# Patient Record
Sex: FEMALE | Race: BLACK OR AFRICAN AMERICAN | NOT HISPANIC OR LATINO | Employment: OTHER | URBAN - METROPOLITAN AREA
[De-identification: names, ages, dates, MRNs, and addresses within clinical notes are randomized per-mention and may not be internally consistent; named-entity substitution may affect disease eponyms.]

---

## 2017-01-06 ENCOUNTER — HOSPITAL ENCOUNTER (OUTPATIENT)
Dept: RADIOLOGY | Facility: CLINIC | Age: 77
Discharge: HOME | End: 2017-01-06
Attending: INTERNAL MEDICINE
Payer: COMMERCIAL

## 2017-01-06 ENCOUNTER — OFFICE VISIT (OUTPATIENT)
Dept: ENDOCRINOLOGY | Facility: CLINIC | Age: 77
End: 2017-01-06
Payer: COMMERCIAL

## 2017-01-06 VITALS
HEIGHT: 67 IN | SYSTOLIC BLOOD PRESSURE: 136 MMHG | HEART RATE: 95 BPM | WEIGHT: 171.31 LBS | DIASTOLIC BLOOD PRESSURE: 80 MMHG | BODY MASS INDEX: 26.89 KG/M2

## 2017-01-06 DIAGNOSIS — M85.80 OSTEOPENIA: Primary | ICD-10-CM

## 2017-01-06 DIAGNOSIS — I10 ESSENTIAL HYPERTENSION: ICD-10-CM

## 2017-01-06 DIAGNOSIS — C50.412 MALIGNANT NEOPLASM OF UPPER-OUTER QUADRANT OF LEFT FEMALE BREAST: ICD-10-CM

## 2017-01-06 DIAGNOSIS — M85.80 OSTEOPENIA: ICD-10-CM

## 2017-01-06 DIAGNOSIS — R73.09 ELEVATED GLUCOSE: ICD-10-CM

## 2017-01-06 DIAGNOSIS — G62.9 NEUROPATHY: ICD-10-CM

## 2017-01-06 PROCEDURE — 77080 DXA BONE DENSITY AXIAL: CPT | Mod: TC

## 2017-01-06 PROCEDURE — 3075F SYST BP GE 130 - 139MM HG: CPT | Mod: S$GLB,,, | Performed by: INTERNAL MEDICINE

## 2017-01-06 PROCEDURE — 1159F MED LIST DOCD IN RCRD: CPT | Mod: S$GLB,,, | Performed by: INTERNAL MEDICINE

## 2017-01-06 PROCEDURE — 99214 OFFICE O/P EST MOD 30 MIN: CPT | Mod: S$GLB,,, | Performed by: INTERNAL MEDICINE

## 2017-01-06 PROCEDURE — 1157F ADVNC CARE PLAN IN RCRD: CPT | Mod: S$GLB,,, | Performed by: INTERNAL MEDICINE

## 2017-01-06 PROCEDURE — 1126F AMNT PAIN NOTED NONE PRSNT: CPT | Mod: S$GLB,,, | Performed by: INTERNAL MEDICINE

## 2017-01-06 PROCEDURE — 1160F RVW MEDS BY RX/DR IN RCRD: CPT | Mod: S$GLB,,, | Performed by: INTERNAL MEDICINE

## 2017-01-06 PROCEDURE — 99999 PR PBB SHADOW E&M-EST. PATIENT-LVL III: CPT | Mod: PBBFAC,,, | Performed by: INTERNAL MEDICINE

## 2017-01-06 PROCEDURE — 77080 DXA BONE DENSITY AXIAL: CPT | Mod: 26,,, | Performed by: INTERNAL MEDICINE

## 2017-01-06 PROCEDURE — 3079F DIAST BP 80-89 MM HG: CPT | Mod: S$GLB,,, | Performed by: INTERNAL MEDICINE

## 2017-01-06 NOTE — PATIENT INSTRUCTIONS
Glucose intolerance and last glucose 220  To get A1C  Needs weight loss     Osteopenia to check BMD and doubt needs medication     Neuropathy feet question diabetes     Hx of breast cancer     HTN and HCTZ may be good for bone

## 2017-01-06 NOTE — MR AVS SNAPSHOT
[facility]  [address]  [facility] [address]  Phone: [phone]  Fax: [phone]                  Ida aSntana   2017 9:30 AM   Office Visit    Description:  Female : 1940   Provider:  Pravin Dumont MD   Department:  [facility]           Reason for Visit     Follow-up           Diagnoses this Visit        Comments    Osteopenia    -  Primary     Malignant neoplasm of upper-outer quadrant of left female breast         Essential hypertension         Elevated glucose         Neuropathy                To Do List           Future Appointments        Provider Department Dept Phone    2017 10:45 AM LAB, APPOINTMENT [place] [facility] [phone]    2017 11:00 AM [facility], DEXA1 [facility] [phone]    3/13/2017 8:00 AM CARTY, VISUAL [place] [facility] [phone]    3/13/2017 8:30 AM Lynda Crawford MD [facility] [phone]      Goals (5 Years of Data)     None      Follow-Up and Disposition     Follow-up and Disposition History      [facility]     [facility] -  Assistance  Registered nurses in the [facility] provide clinical advisement, health education, appointment booking, and other advisory services.  Call for this free service at [phone].             Medications           Message regarding Medications     Verify the changes and/or additions to your medication regime listed below are the same as discussed with your clinician today.  If any of these changes or additions are incorrect, please notify your healthcare provider.             Verify that the below list of medications is an accurate representation of the medications you are currently taking.  If none reported, the list may be blank. If incorrect, please contact your healthcare provider. Carry this list with you in case of emergency.           Current  "Medications     BLOOD SUGAR DIAGNOSTIC (ONE TOUCH II TEST MISC) * * * *.  CHECKS 1 TIME DAILY AT LEAST    CALCIUM CARBONATE/VITAMIN D3 (CALCIUM WITH VITAMIN D3 ORAL) Take by mouth.    flurandrenolide (CORDRAN) 0.05 % lotion 0.05 %.  Lotion Topical .  AAA scalp qd - bid prnDisp:    15ml         60ml     hyoscyamine (LEVSIN/SL) 0.125 mg Subl Place 1 tablet (0.125 mg total) under the tongue every 6 (six) hours as needed.    ketoconazole (NIZORAL) 2 % shampoo USE EVERY OTHER DAY LET SIT ON SCALP FOR FIVE MINUTES BEFORE RINSING    latanoprost 0.005 % ophthalmic solution Place 1 drop into both eyes once daily.    MULTIVITAMIN WITH MINERALS (ONE-A-DAY 50 PLUS) Tab Take by mouth. 1 Tablet Oral Every day    nystatin (MYCOSTATIN) powder Apply to affected area 3 times daily    om-3-epa-dha-fish oil-flax-E (THERA TEARS NUTRITION) [phone] qg-eu-so-unit Cap Take 3 capsules by mouth every morning. Over-the-counter supplement    ondansetron (ZOFRAN) 4 MG tablet Take 1 tablet (4 mg total) by mouth every 8 (eight) hours as needed.    ONE TOUCH ULTRA TEST Strp CHECK 1 TIME DAILY AT LEAST    potassium chloride SA (K-DUR,KLOR-CON) 20 MEQ tablet take 1 tablet by mouth twice a day    sodium,potassium,mag sulfates (SUPREP BOWEL PREP KIT) 17.5-3.13-1.6 gram SolR Take 1 kit by mouth as directed.    triamcinolone (KENALOG) 0.1 % cream 0.1 %.  Cream Topical Twice a day .  mix 30g of 0.1% TAC cream [id number] Loprox cream [id number] mild of magnesiause bid after cool blow dry    valsartan-hydrochlorothiazide (DIOVAN-HCT) 320-12.5 mg per tablet take 1 tablet by mouth once daily    verapamil (CALAN-SR) 240 MG CR tablet take 1 tablet by mouth twice a day           Clinical Reference Information           Vital Signs - Last Recorded  Most recent update: 1/6/2017  9:38 AM by Joann Moore MA    BP Pulse Ht Wt PF BMI    136/80 (BP Location: Right arm, Patient Position: Sitting) 95 5' 7" (1.702 m) 77.7 kg (171 lb 4.8 oz) 98 L/min 26.83 kg/m2    "   Blood Pressure          Most Recent Value    BP  136/80      Allergies as of 1/6/2017     Adhesive Tape-silicones    Atorvastatin    Pravachol  [Pravastatin]      Immunizations Administered on Date of Encounter - 1/6/2017     None      Orders Placed During Today's Visit     Future Labs/Procedures Expected by Expires    DXA Bone Density Spine And Hip_Axial Skeleton  1/6/2017 1/6/2018    Hemoglobin A1c  1/6/2017 1/6/2018      Instructions    Glucose intolerance and last glucose 220  To get A1C  Needs weight loss     Osteopenia to check BMD and doubt needs medication     Neuropathy feet question diabetes     Hx of breast cancer     HTN and HCTZ may be good for bone

## 2017-01-06 NOTE — PROGRESS NOTES
Subjective:      Patient ID: Ida Santana is a 76 y.o. female.    Chief Complaint:  Follow-up      History of Present Illness  Feeling well  Last seen 9/26/13  Recent URI  Osteopenia: No new fractures  Breast cancer in remission: Mother breast cancer. Niece breast cancer  Glucose intolerance and HLP  HTN    Mrs. Santana is a 76-year-old woman, who is well known to me. The patient   has the following problems,   1. Osteopenia.   2. Elevated cholesterol.   3. Hypertension.   4. DCIS treated with lumpectomy and radiation.   5. Status post glucose intolerance.       The patient is actually doing quite well. She has never had a fracture, she had been   on Fosamax for about 5 years. We discontinued that 9 years ago. Please see   my detailed note of May 16, 2007. I had initially seen the patient on   February 12, 2006.      The patient has hx of glucose intolerance and lost 13 pounds and with improvement of weather plans on starting exercise.  Nocturia 0  Not checking glucose.  One touch 2     Stopped statin     BP meds reviewed and changed from Benicar    Review of Systems   Constitutional: Negative for fatigue and unexpected weight change.   HENT: Negative for hearing loss.    Eyes: Negative for visual disturbance.   Respiratory: Negative for cough and shortness of breath.    Cardiovascular: Negative for chest pain and palpitations.   Musculoskeletal: Negative for arthralgias and back pain.   Neurological: Negative for headaches.       Objective:   Physical Exam   Constitutional: She is oriented to person, place, and time. She appears well-developed and well-nourished.   Apple shape   Cardiovascular: Normal rate, regular rhythm and normal heart sounds.    No murmur heard.  Pulmonary/Chest: Effort normal and breath sounds normal.   Musculoskeletal: Normal range of motion.   Neurological: She is alert and oriented to person, place, and time.   Comprehensive foot exam  Left and Right Feet  No ulcers  Normal  temperature  Vibration mild deficit  Pulses intact     Psychiatric: Her behavior is normal. Thought content normal.   Vitals reviewed.      Lab Review:   Results for orders placed or performed during the hospital encounter of 11/02/16   CBC auto differential   Result Value Ref Range    WBC 8.24 3.90 - 12.70 K/uL    RBC 4.59 4.00 - 5.40 M/uL    Hemoglobin 14.5 12.0 - 16.0 g/dL    Hematocrit 41.9 37.0 - 48.5 %    MCV 91 82 - 98 fL    MCH 31.6 (H) 27.0 - 31.0 pg    MCHC 34.6 32.0 - 36.0 %    RDW 12.8 11.5 - 14.5 %    Platelets 211 150 - 350 K/uL    MPV 10.8 9.2 - 12.9 fL    Gran # 7.6 1.8 - 7.7 K/uL    Lymph # 0.5 (L) 1.0 - 4.8 K/uL    Mono # 0.1 (L) 0.3 - 1.0 K/uL    Eos # 0.0 0.0 - 0.5 K/uL    Baso # 0.01 0.00 - 0.20 K/uL    Gran% 92.3 (H) 38.0 - 73.0 %    Lymph% 6.6 (L) 18.0 - 48.0 %    Mono% 0.7 (L) 4.0 - 15.0 %    Eosinophil% 0.1 0.0 - 8.0 %    Basophil% 0.1 0.0 - 1.9 %    Differential Method Automated    Basic metabolic panel   Result Value Ref Range    Sodium 141 136 - 145 mmol/L    Potassium 3.5 3.5 - 5.1 mmol/L    Chloride 105 95 - 110 mmol/L    CO2 24 23 - 29 mmol/L    Glucose 220 (H) 70 - 110 mg/dL    BUN, Bld 14 8 - 23 mg/dL    Creatinine 0.9 0.5 - 1.4 mg/dL    Calcium 9.6 8.7 - 10.5 mg/dL    Anion Gap 12 8 - 16 mmol/L    eGFR if African American >60.0 >60 mL/min/1.73 m^2    eGFR if non African American >60.0 >60 mL/min/1.73 m^2   Hepatic function panel   Result Value Ref Range    Total Protein 7.7 6.0 - 8.4 g/dL    Albumin 4.1 3.5 - 5.2 g/dL    Total Bilirubin 0.6 0.1 - 1.0 mg/dL    Bilirubin, Direct 0.2 0.1 - 0.3 mg/dL    AST 20 10 - 40 U/L    ALT 17 10 - 44 U/L    Alkaline Phosphatase 71 55 - 135 U/L     Lab Results   Component Value Date    CHOL 181 09/26/2016    CHOL 181 09/23/2015    CHOL 174 09/23/2014     Lab Results   Component Value Date    HDL 42 09/26/2016    HDL 45 09/23/2015    HDL 44 09/23/2014     Lab Results   Component Value Date    LDLCALC 118.8 09/26/2016    LDLCALC 109.0 09/23/2015     LDLCALC 97.2 09/23/2014     Lab Results   Component Value Date    TRIG 101 09/26/2016    TRIG 135 09/23/2015    TRIG 164 (H) 09/23/2014     Lab Results   Component Value Date    CHOLHDL 23.2 09/26/2016    CHOLHDL 24.9 09/23/2015    CHOLHDL 25.3 09/23/2014         Assessment:     Glucose intolerance and last glucose 220  To get A1C  Needs weight loss    Osteopenia to check BMD and doubt needs medication    Neuropathy feet question diabetes    Hx of breast cancer    HTN and HCTZ may be good for bone      Plan:     Orders Placed This Encounter   Procedures    DXA Bone Density Spine And Hip_Axial Skeleton     Standing Status:   Future     Standing Expiration Date:   1/6/2018    Hemoglobin A1c     Standing Status:   Future     Standing Expiration Date:   1/6/2018

## 2017-01-09 DIAGNOSIS — H40.1191 PRIMARY OPEN-ANGLE GLAUCOMA, MILD STAGE: ICD-10-CM

## 2017-01-09 RX ORDER — LATANOPROST 50 UG/ML
SOLUTION/ DROPS OPHTHALMIC
Qty: 2.5 ML | Refills: 12 | Status: ON HOLD | OUTPATIENT
Start: 2017-01-09 | End: 2017-01-13

## 2017-01-09 RX ORDER — POTASSIUM CHLORIDE 20 MEQ/1
TABLET, EXTENDED RELEASE ORAL
Qty: 60 TABLET | Refills: 12 | Status: ON HOLD | OUTPATIENT
Start: 2017-01-09 | End: 2017-02-08 | Stop reason: HOSPADM

## 2017-01-12 ENCOUNTER — HOSPITAL ENCOUNTER (INPATIENT)
Facility: HOSPITAL | Age: 77
LOS: 15 days | Discharge: NURSING, REHAB OR LONG-TERM CARE | DRG: 871 | End: 2017-01-27
Attending: EMERGENCY MEDICINE | Admitting: HOSPITALIST
Payer: COMMERCIAL

## 2017-01-12 DIAGNOSIS — N39.0 URINARY TRACT INFECTION WITHOUT HEMATURIA, SITE UNSPECIFIED: Primary | ICD-10-CM

## 2017-01-12 DIAGNOSIS — A41.9 SEPSIS: ICD-10-CM

## 2017-01-12 DIAGNOSIS — R41.82 ALTERED MENTAL STATUS, UNSPECIFIED: ICD-10-CM

## 2017-01-12 DIAGNOSIS — R78.81 MRSA BACTEREMIA: ICD-10-CM

## 2017-01-12 DIAGNOSIS — J15.212 PNEUMONIA OF BOTH LUNGS DUE TO METHICILLIN RESISTANT STAPHYLOCOCCUS AUREUS (MRSA), UNSPECIFIED PART OF LUNG: ICD-10-CM

## 2017-01-12 DIAGNOSIS — B95.62 MRSA BACTEREMIA: ICD-10-CM

## 2017-01-12 DIAGNOSIS — R65.20 SEVERE SEPSIS: ICD-10-CM

## 2017-01-12 DIAGNOSIS — A41.9 SEVERE SEPSIS: ICD-10-CM

## 2017-01-12 LAB
ALBUMIN SERPL BCP-MCNC: 3 G/DL
ALP SERPL-CCNC: 69 U/L
ALT SERPL W/O P-5'-P-CCNC: 17 U/L
AMPHET+METHAMPHET UR QL: NEGATIVE
ANION GAP SERPL CALC-SCNC: 11 MMOL/L
APAP SERPL-MCNC: <3 UG/ML
AST SERPL-CCNC: 22 U/L
BACTERIA #/AREA URNS AUTO: ABNORMAL /HPF
BARBITURATES UR QL SCN>200 NG/ML: NEGATIVE
BASOPHILS # BLD AUTO: 0 K/UL
BASOPHILS NFR BLD: 0 %
BENZODIAZ UR QL SCN>200 NG/ML: NEGATIVE
BILIRUB SERPL-MCNC: 1.2 MG/DL
BILIRUB UR QL STRIP: NEGATIVE
BUN SERPL-MCNC: 18 MG/DL
BZE UR QL SCN: NEGATIVE
CALCIUM SERPL-MCNC: 9.4 MG/DL
CANNABINOIDS UR QL SCN: NEGATIVE
CHLORIDE SERPL-SCNC: 101 MMOL/L
CLARITY UR REFRACT.AUTO: ABNORMAL
CO2 SERPL-SCNC: 21 MMOL/L
COLOR UR AUTO: YELLOW
CREAT SERPL-MCNC: 1.2 MG/DL
CREAT UR-MCNC: 263 MG/DL
DIFFERENTIAL METHOD: ABNORMAL
EOSINOPHIL # BLD AUTO: 0 K/UL
EOSINOPHIL NFR BLD: 0 %
ERYTHROCYTE [DISTWIDTH] IN BLOOD BY AUTOMATED COUNT: 12.9 %
EST. GFR  (AFRICAN AMERICAN): 51.4 ML/MIN/1.73 M^2
EST. GFR  (NON AFRICAN AMERICAN): 44.6 ML/MIN/1.73 M^2
ETHANOL SERPL-MCNC: <10 MG/DL
GLUCOSE SERPL-MCNC: 244 MG/DL
GLUCOSE UR QL STRIP: ABNORMAL
HCT VFR BLD AUTO: 35.8 %
HGB BLD-MCNC: 12.8 G/DL
HGB UR QL STRIP: ABNORMAL
HYALINE CASTS UR QL AUTO: 0 /LPF
KETONES UR QL STRIP: ABNORMAL
LEUKOCYTE ESTERASE UR QL STRIP: ABNORMAL
LYMPHOCYTES # BLD AUTO: 0.8 K/UL
LYMPHOCYTES NFR BLD: 4.9 %
MCH RBC QN AUTO: 32.2 PG
MCHC RBC AUTO-ENTMCNC: 35.8 %
MCV RBC AUTO: 90 FL
METHADONE UR QL SCN>300 NG/ML: NEGATIVE
MICROSCOPIC COMMENT: ABNORMAL
MONOCYTES # BLD AUTO: 1 K/UL
MONOCYTES NFR BLD: 6.7 %
NEUTROPHILS # BLD AUTO: 13.4 K/UL
NEUTROPHILS NFR BLD: 87 %
NITRITE UR QL STRIP: NEGATIVE
OPIATES UR QL SCN: NEGATIVE
PCP UR QL SCN>25 NG/ML: NEGATIVE
PH UR STRIP: 6 [PH] (ref 5–8)
PLATELET # BLD AUTO: 167 K/UL
PMV BLD AUTO: 10.3 FL
POCT GLUCOSE: 231 MG/DL (ref 70–110)
POTASSIUM SERPL-SCNC: 4 MMOL/L
PROT SERPL-MCNC: 7.6 G/DL
PROT UR QL STRIP: ABNORMAL
RBC # BLD AUTO: 3.98 M/UL
RBC #/AREA URNS AUTO: 7 /HPF (ref 0–4)
SODIUM SERPL-SCNC: 133 MMOL/L
SP GR UR STRIP: 1.02 (ref 1–1.03)
SQUAMOUS #/AREA URNS AUTO: 2 /HPF
TOXICOLOGY INFORMATION: NORMAL
TSH SERPL DL<=0.005 MIU/L-ACNC: 0.81 UIU/ML
URN SPEC COLLECT METH UR: ABNORMAL
UROBILINOGEN UR STRIP-ACNC: 1 EU/DL
WBC # BLD AUTO: 15.43 K/UL
WBC #/AREA URNS AUTO: 35 /HPF (ref 0–5)

## 2017-01-12 PROCEDURE — 80329 ANALGESICS NON-OPIOID 1 OR 2: CPT

## 2017-01-12 PROCEDURE — 82962 GLUCOSE BLOOD TEST: CPT

## 2017-01-12 PROCEDURE — 87205 SMEAR GRAM STAIN: CPT

## 2017-01-12 PROCEDURE — 63600175 PHARM REV CODE 636 W HCPCS: Performed by: EMERGENCY MEDICINE

## 2017-01-12 PROCEDURE — 84443 ASSAY THYROID STIM HORMONE: CPT

## 2017-01-12 PROCEDURE — 82570 ASSAY OF URINE CREATININE: CPT

## 2017-01-12 PROCEDURE — 93010 ELECTROCARDIOGRAM REPORT: CPT | Mod: ,,, | Performed by: INTERNAL MEDICINE

## 2017-01-12 PROCEDURE — 80053 COMPREHEN METABOLIC PANEL: CPT

## 2017-01-12 PROCEDURE — 81001 URINALYSIS AUTO W/SCOPE: CPT

## 2017-01-12 PROCEDURE — 12000002 HC ACUTE/MED SURGE SEMI-PRIVATE ROOM

## 2017-01-12 PROCEDURE — 93005 ELECTROCARDIOGRAM TRACING: CPT

## 2017-01-12 PROCEDURE — 99285 EMERGENCY DEPT VISIT HI MDM: CPT | Mod: ,,, | Performed by: EMERGENCY MEDICINE

## 2017-01-12 PROCEDURE — 87086 URINE CULTURE/COLONY COUNT: CPT

## 2017-01-12 PROCEDURE — 96365 THER/PROPH/DIAG IV INF INIT: CPT

## 2017-01-12 PROCEDURE — 85025 COMPLETE CBC W/AUTO DIFF WBC: CPT

## 2017-01-12 PROCEDURE — 83690 ASSAY OF LIPASE: CPT

## 2017-01-12 PROCEDURE — 80320 DRUG SCREEN QUANTALCOHOLS: CPT

## 2017-01-12 PROCEDURE — 25000003 PHARM REV CODE 250: Performed by: EMERGENCY MEDICINE

## 2017-01-12 PROCEDURE — 99285 EMERGENCY DEPT VISIT HI MDM: CPT | Mod: 25

## 2017-01-12 RX ORDER — IBUPROFEN 200 MG
16 TABLET ORAL
Status: DISCONTINUED | OUTPATIENT
Start: 2017-01-13 | End: 2017-01-13

## 2017-01-12 RX ORDER — IBUPROFEN 200 MG
24 TABLET ORAL
Status: DISCONTINUED | OUTPATIENT
Start: 2017-01-13 | End: 2017-01-23

## 2017-01-12 RX ORDER — GLUCAGON 1 MG
1 KIT INJECTION
Status: DISCONTINUED | OUTPATIENT
Start: 2017-01-13 | End: 2017-01-27 | Stop reason: HOSPADM

## 2017-01-12 RX ORDER — INSULIN ASPART 100 [IU]/ML
0-5 INJECTION, SOLUTION INTRAVENOUS; SUBCUTANEOUS
Status: DISCONTINUED | OUTPATIENT
Start: 2017-01-13 | End: 2017-01-27 | Stop reason: HOSPADM

## 2017-01-12 RX ADMIN — CEFTRIAXONE 1 G: 1 INJECTION, SOLUTION INTRAVENOUS at 11:01

## 2017-01-12 RX ADMIN — SODIUM CHLORIDE 500 ML: 0.9 INJECTION, SOLUTION INTRAVENOUS at 11:01

## 2017-01-13 PROBLEM — R65.20 SEVERE SEPSIS: Status: ACTIVE | Noted: 2017-01-13

## 2017-01-13 PROBLEM — G93.41 SEPTIC ENCEPHALOPATHY: Status: ACTIVE | Noted: 2017-01-13

## 2017-01-13 PROBLEM — A41.9 SEPSIS: Status: ACTIVE | Noted: 2017-01-13

## 2017-01-13 PROBLEM — N17.9 AKI (ACUTE KIDNEY INJURY): Status: ACTIVE | Noted: 2017-01-13

## 2017-01-13 PROBLEM — R41.82 ALTERED MENTAL STATUS: Status: ACTIVE | Noted: 2017-01-13

## 2017-01-13 PROBLEM — A41.9 SEVERE SEPSIS: Status: ACTIVE | Noted: 2017-01-13

## 2017-01-13 PROBLEM — E86.9 VOLUME DEPLETION: Status: ACTIVE | Noted: 2017-01-13

## 2017-01-13 PROBLEM — K52.9 GASTROENTERITIS: Status: ACTIVE | Noted: 2017-01-13

## 2017-01-13 LAB
ALBUMIN SERPL BCP-MCNC: 2.5 G/DL
ALP SERPL-CCNC: 60 U/L
ALT SERPL W/O P-5'-P-CCNC: 19 U/L
ANION GAP SERPL CALC-SCNC: 9 MMOL/L
ANISOCYTOSIS BLD QL SMEAR: SLIGHT
AST SERPL-CCNC: 33 U/L
BASOPHILS # BLD AUTO: ABNORMAL K/UL
BASOPHILS NFR BLD: 0 %
BILIRUB SERPL-MCNC: 0.8 MG/DL
BUN SERPL-MCNC: 16 MG/DL
CALCIUM SERPL-MCNC: 9 MG/DL
CHLORIDE SERPL-SCNC: 108 MMOL/L
CO2 SERPL-SCNC: 19 MMOL/L
CREAT SERPL-MCNC: 0.9 MG/DL
DACRYOCYTES BLD QL SMEAR: ABNORMAL
DIFFERENTIAL METHOD: ABNORMAL
EOSINOPHIL # BLD AUTO: ABNORMAL K/UL
EOSINOPHIL NFR BLD: 0 %
ERYTHROCYTE [DISTWIDTH] IN BLOOD BY AUTOMATED COUNT: 13.1 %
EST. GFR  (AFRICAN AMERICAN): >60 ML/MIN/1.73 M^2
EST. GFR  (NON AFRICAN AMERICAN): >60 ML/MIN/1.73 M^2
GLUCOSE SERPL-MCNC: 143 MG/DL
GRAM STN SPEC: NORMAL
HCT VFR BLD AUTO: 33.6 %
HGB BLD-MCNC: 12.1 G/DL
LACTATE SERPL-SCNC: 1.5 MMOL/L
LACTATE SERPL-SCNC: 2.7 MMOL/L
LIPASE SERPL-CCNC: 7 U/L
LYMPHOCYTES # BLD AUTO: ABNORMAL K/UL
LYMPHOCYTES NFR BLD: 2 %
MAGNESIUM SERPL-MCNC: 1.8 MG/DL
MCH RBC QN AUTO: 32.3 PG
MCHC RBC AUTO-ENTMCNC: 36 %
MCV RBC AUTO: 90 FL
MONOCYTES # BLD AUTO: ABNORMAL K/UL
MONOCYTES NFR BLD: 6 %
NEUTROPHILS NFR BLD: 86 %
NEUTS BAND NFR BLD MANUAL: 6 %
OVALOCYTES BLD QL SMEAR: ABNORMAL
PHOSPHATE SERPL-MCNC: 1.4 MG/DL
PLATELET # BLD AUTO: 161 K/UL
PLATELET BLD QL SMEAR: ABNORMAL
PMV BLD AUTO: 11.2 FL
POCT GLUCOSE: 124 MG/DL (ref 70–110)
POCT GLUCOSE: 167 MG/DL (ref 70–110)
POCT GLUCOSE: 192 MG/DL (ref 70–110)
POCT GLUCOSE: 195 MG/DL (ref 70–110)
POIKILOCYTOSIS BLD QL SMEAR: SLIGHT
POTASSIUM SERPL-SCNC: 3.9 MMOL/L
PROCALCITONIN SERPL IA-MCNC: 26.8 NG/ML
PROT SERPL-MCNC: 6.7 G/DL
RBC # BLD AUTO: 3.75 M/UL
SODIUM SERPL-SCNC: 136 MMOL/L
WBC # BLD AUTO: 14.05 K/UL

## 2017-01-13 PROCEDURE — 25000003 PHARM REV CODE 250: Performed by: STUDENT IN AN ORGANIZED HEALTH CARE EDUCATION/TRAINING PROGRAM

## 2017-01-13 PROCEDURE — 63600175 PHARM REV CODE 636 W HCPCS: Performed by: STUDENT IN AN ORGANIZED HEALTH CARE EDUCATION/TRAINING PROGRAM

## 2017-01-13 PROCEDURE — 83605 ASSAY OF LACTIC ACID: CPT | Mod: 91

## 2017-01-13 PROCEDURE — 25000003 PHARM REV CODE 250: Performed by: EMERGENCY MEDICINE

## 2017-01-13 PROCEDURE — 83735 ASSAY OF MAGNESIUM: CPT

## 2017-01-13 PROCEDURE — 87077 CULTURE AEROBIC IDENTIFY: CPT

## 2017-01-13 PROCEDURE — 80053 COMPREHEN METABOLIC PANEL: CPT

## 2017-01-13 PROCEDURE — 87040 BLOOD CULTURE FOR BACTERIA: CPT | Mod: 59

## 2017-01-13 PROCEDURE — 94761 N-INVAS EAR/PLS OXIMETRY MLT: CPT

## 2017-01-13 PROCEDURE — 36415 COLL VENOUS BLD VENIPUNCTURE: CPT

## 2017-01-13 PROCEDURE — 20600001 HC STEP DOWN PRIVATE ROOM

## 2017-01-13 PROCEDURE — 85027 COMPLETE CBC AUTOMATED: CPT

## 2017-01-13 PROCEDURE — 99223 1ST HOSP IP/OBS HIGH 75: CPT | Mod: ,,, | Performed by: HOSPITALIST

## 2017-01-13 PROCEDURE — 25000003 PHARM REV CODE 250: Performed by: INTERNAL MEDICINE

## 2017-01-13 PROCEDURE — 63600175 PHARM REV CODE 636 W HCPCS: Performed by: INTERNAL MEDICINE

## 2017-01-13 PROCEDURE — 85007 BL SMEAR W/DIFF WBC COUNT: CPT

## 2017-01-13 PROCEDURE — 99233 SBSQ HOSP IP/OBS HIGH 50: CPT | Mod: ,,, | Performed by: HOSPITALIST

## 2017-01-13 PROCEDURE — 84100 ASSAY OF PHOSPHORUS: CPT

## 2017-01-13 PROCEDURE — 87186 SC STD MICRODIL/AGAR DIL: CPT

## 2017-01-13 PROCEDURE — 84145 PROCALCITONIN (PCT): CPT

## 2017-01-13 RX ORDER — HEPARIN SODIUM 5000 [USP'U]/ML
5000 INJECTION, SOLUTION INTRAVENOUS; SUBCUTANEOUS EVERY 8 HOURS
Status: DISCONTINUED | OUTPATIENT
Start: 2017-01-13 | End: 2017-01-19

## 2017-01-13 RX ORDER — VERAPAMIL HYDROCHLORIDE 120 MG/1
240 TABLET, FILM COATED, EXTENDED RELEASE ORAL 2 TIMES DAILY
Status: DISCONTINUED | OUTPATIENT
Start: 2017-01-13 | End: 2017-01-27 | Stop reason: HOSPADM

## 2017-01-13 RX ORDER — IBUPROFEN 200 MG
24 TABLET ORAL
Status: DISCONTINUED | OUTPATIENT
Start: 2017-01-13 | End: 2017-01-27 | Stop reason: HOSPADM

## 2017-01-13 RX ORDER — IBUPROFEN 200 MG
16 TABLET ORAL
Status: DISCONTINUED | OUTPATIENT
Start: 2017-01-13 | End: 2017-01-27 | Stop reason: HOSPADM

## 2017-01-13 RX ORDER — ACETAMINOPHEN 325 MG/1
650 TABLET ORAL EVERY 8 HOURS PRN
Status: DISCONTINUED | OUTPATIENT
Start: 2017-01-13 | End: 2017-01-13

## 2017-01-13 RX ORDER — SODIUM CHLORIDE 9 MG/ML
INJECTION, SOLUTION INTRAVENOUS CONTINUOUS
Status: DISCONTINUED | OUTPATIENT
Start: 2017-01-13 | End: 2017-01-13

## 2017-01-13 RX ORDER — ONDANSETRON 2 MG/ML
4 INJECTION INTRAMUSCULAR; INTRAVENOUS EVERY 6 HOURS PRN
Status: DISCONTINUED | OUTPATIENT
Start: 2017-01-13 | End: 2017-01-27 | Stop reason: HOSPADM

## 2017-01-13 RX ORDER — CIPROFLOXACIN 2 MG/ML
400 INJECTION, SOLUTION INTRAVENOUS
Status: DISCONTINUED | OUTPATIENT
Start: 2017-01-13 | End: 2017-01-13

## 2017-01-13 RX ORDER — ACETAMINOPHEN 325 MG/1
650 TABLET ORAL EVERY 8 HOURS PRN
Status: DISCONTINUED | OUTPATIENT
Start: 2017-01-13 | End: 2017-01-27 | Stop reason: HOSPADM

## 2017-01-13 RX ORDER — POLYETHYLENE GLYCOL 3350 17 G/17G
17 POWDER, FOR SOLUTION ORAL DAILY PRN
Status: DISCONTINUED | OUTPATIENT
Start: 2017-01-13 | End: 2017-01-27 | Stop reason: HOSPADM

## 2017-01-13 RX ORDER — TRAMADOL HYDROCHLORIDE 50 MG/1
50 TABLET ORAL EVERY 6 HOURS PRN
Status: DISCONTINUED | OUTPATIENT
Start: 2017-01-13 | End: 2017-01-27 | Stop reason: HOSPADM

## 2017-01-13 RX ORDER — LOSARTAN POTASSIUM AND HYDROCHLOROTHIAZIDE 12.5; 1 MG/1; MG/1
1 TABLET ORAL DAILY
Status: DISCONTINUED | OUTPATIENT
Start: 2017-01-13 | End: 2017-01-13

## 2017-01-13 RX ORDER — PANTOPRAZOLE SODIUM 40 MG/1
40 TABLET, DELAYED RELEASE ORAL DAILY
Status: DISCONTINUED | OUTPATIENT
Start: 2017-01-13 | End: 2017-01-19

## 2017-01-13 RX ORDER — POTASSIUM CHLORIDE 20 MEQ/1
20 TABLET, EXTENDED RELEASE ORAL 2 TIMES DAILY
Status: DISCONTINUED | OUTPATIENT
Start: 2017-01-13 | End: 2017-01-13

## 2017-01-13 RX ORDER — SODIUM CHLORIDE 0.9 % (FLUSH) 0.9 %
3 SYRINGE (ML) INJECTION EVERY 8 HOURS
Status: DISCONTINUED | OUTPATIENT
Start: 2017-01-13 | End: 2017-01-27 | Stop reason: HOSPADM

## 2017-01-13 RX ORDER — SODIUM CHLORIDE 9 MG/ML
INJECTION, SOLUTION INTRAVENOUS CONTINUOUS
Status: ACTIVE | OUTPATIENT
Start: 2017-01-13 | End: 2017-01-13

## 2017-01-13 RX ADMIN — Medication 3 ML: at 02:01

## 2017-01-13 RX ADMIN — SODIUM CHLORIDE: 0.9 INJECTION, SOLUTION INTRAVENOUS at 04:01

## 2017-01-13 RX ADMIN — ACETAMINOPHEN 650 MG: 325 TABLET ORAL at 02:01

## 2017-01-13 RX ADMIN — VERAPAMIL HYDROCHLORIDE 240 MG: 120 TABLET, FILM COATED, EXTENDED RELEASE ORAL at 09:01

## 2017-01-13 RX ADMIN — HEPARIN SODIUM 5000 UNITS: 5000 INJECTION, SOLUTION INTRAVENOUS; SUBCUTANEOUS at 09:01

## 2017-01-13 RX ADMIN — VANCOMYCIN HYDROCHLORIDE 1250 MG: 1 INJECTION, POWDER, LYOPHILIZED, FOR SOLUTION INTRAVENOUS at 05:01

## 2017-01-13 RX ADMIN — SODIUM CHLORIDE 500 ML: 0.9 INJECTION, SOLUTION INTRAVENOUS at 02:01

## 2017-01-13 RX ADMIN — Medication 3 ML: at 09:01

## 2017-01-13 RX ADMIN — HEPARIN SODIUM 5000 UNITS: 5000 INJECTION, SOLUTION INTRAVENOUS; SUBCUTANEOUS at 05:01

## 2017-01-13 RX ADMIN — PANTOPRAZOLE SODIUM 40 MG: 40 TABLET, DELAYED RELEASE ORAL at 09:01

## 2017-01-13 RX ADMIN — SODIUM CHLORIDE 1000 ML: 0.9 INJECTION, SOLUTION INTRAVENOUS at 04:01

## 2017-01-13 RX ADMIN — HEPARIN SODIUM 5000 UNITS: 5000 INJECTION, SOLUTION INTRAVENOUS; SUBCUTANEOUS at 02:01

## 2017-01-13 RX ADMIN — ACETAMINOPHEN 650 MG: 325 TABLET ORAL at 03:01

## 2017-01-13 RX ADMIN — ACETAMINOPHEN 650 MG: 325 TABLET ORAL at 09:01

## 2017-01-13 RX ADMIN — Medication 3 ML: at 05:01

## 2017-01-13 RX ADMIN — CEFTRIAXONE 1 G: 1 INJECTION, SOLUTION INTRAVENOUS at 06:01

## 2017-01-13 RX ADMIN — SODIUM CHLORIDE 1000 ML: 0.9 INJECTION, SOLUTION INTRAVENOUS at 09:01

## 2017-01-13 RX ADMIN — SODIUM CHLORIDE 1000 ML: 0.9 INJECTION, SOLUTION INTRAVENOUS at 11:01

## 2017-01-13 NOTE — PROGRESS NOTES
Called lab to confirm AM labs are pending as a lab draw. Anise in the lab stated she will call back with confirmation.

## 2017-01-13 NOTE — ASSESSMENT & PLAN NOTE
-likely viral in etiology  -pt does not recall any bad/new food intake or sick person exposure  - has had  PO inatake  -treat symptomatically with antiemetics and IVF

## 2017-01-13 NOTE — PROGRESS NOTES
[facility] Hospital Medicine  Progress Note    Patient Name: Ida Santana  MRN: [id number]  Patient Class: IP- Inpatient   Admission Date: 1/12/2017  Length of Stay: 1 days  Attending Physician: Loyda Lui MD  Primary Care Provider: Giovanna Murray MD    [facility] Medicine Team: [facility] HOSP MED 1 Porfirio Posey MD    Subjective:     Principal Problem:Severe sepsis      Hospital Course:  1/13/17: Patient was admitted overnight for confusion, fever, and confusion. Was found to have UTI and was 2/4 SIRS. Began treatment with Rocephin 1g qD. Patient this AM had less confusion. Procalcitonin was elevated to 26.8. Patient feeling better but had continued fevers this today. Giving 2L NS today.       Interval History: Pt admitted overnight but doing better today. Still continuing to have fevers.     Review of Systems   Constitutional: Positive for fever. Negative for chills.   HENT: Negative for congestion and sore throat.    Eyes: Negative for photophobia.   Respiratory: Negative for apnea, cough, chest tightness, shortness of breath and wheezing.    Cardiovascular: Negative for chest pain.   Gastrointestinal: Positive for diarrhea. Negative for abdominal pain, anal bleeding, constipation and nausea.   Genitourinary: Positive for dysuria. Negative for flank pain.   Musculoskeletal: Negative for arthralgias and back pain.   Skin: Negative for pallor.   Neurological: Negative for dizziness and headaches.   Psychiatric/Behavioral: Positive for confusion.     Objective:     Vital Signs (Most Recent):  Temp: (!) 102.5 °F (39.2 °C) (01/13/17 1558)  Pulse: 108 (01/13/17 1200)  Resp: 18 (01/13/17 1200)  BP: 113/67 (01/13/17 1200)  SpO2: (!) 93 % (01/13/17 1200) Vital Signs (24h Range):  Temp:  [97.4 °F (36.3 °C)-103 °F (39.4 °C)] 102.5 °F (39.2 °C)  Pulse:  [106-117] 108  Resp:  [18] 18  SpO2:  [93 %-98 %] 93 %  BP: (113-159)/(58-89) 113/67     Weight: 77.6 kg (171 lb)  Body mass index is 26.78  kg/(m^2).    Intake/Output Summary (Last 24 hours) at 01/13/17 1604  Last data filed at 01/13/17 0700   Gross per 24 hour   Intake               50 ml   Output                0 ml   Net               50 ml      Physical Exam   Constitutional: She is oriented to person, place, and time. She appears well-developed and well-nourished. No distress.   HENT:   Head: Normocephalic and atraumatic.   Eyes: Pupils are equal, round, and reactive to light.   Neck: Normal range of motion. Neck supple.   Cardiovascular: Regular rhythm and normal heart sounds.    No murmur heard.  Tachycardic.    Pulmonary/Chest: Effort normal and breath sounds normal. No respiratory distress.   Abdominal: Soft. Bowel sounds are normal. She exhibits no distension.   Musculoskeletal: Normal range of motion.   Neurological: She is alert and oriented to person, place, and time.   Skin: She is not diaphoretic.       Significant Labs:   CBC:   Recent Labs  Lab 01/12/17  2131 01/13/17  1027   WBC 15.43* 14.05*   HGB 12.8 12.1   HCT 35.8* 33.6*    161     CMP:   Recent Labs  Lab 01/12/17  2131 01/13/17  1027   * 136   K 4.0 3.9    108   CO2 21* 19*   * 143*   BUN 18 16   CREATININE 1.2 0.9   CALCIUM 9.4 9.0   PROT 7.6 6.7   ALBUMIN 3.0* 2.5*   BILITOT 1.2* 0.8   ALKPHOS 69 60   AST 22 33   ALT 17 19   ANIONGAP 11 9   EGFRNONAA 44.6* >60.0       Significant Imaging: I have reviewed all pertinent imaging results/findings within the past 24 hours.    Assessment/Plan:                  * Severe sepsis  Urinary tract infection without hematuria    -pt 2/4  SIRS criteria  - continues to have fevers and tachycardia. Gave 2L NS today. Pt does not appear toxic and is much improved since yesterday  - continue Rocephin, adding Vanc 1/13/17    Hypertension  - continue verapamil and hold off on Diovan in setting of RAHEEL    Altered mental status  Septic encephalopathy  2/2 UTI vs hyponatremia vs intercranial event - status much improved  today  - continue to monitor  - CT and MRI head negative  -avoid benzo, narcotics if possible    Gastroenteritis  -likely viral in etiology  -pt does not recall any bad/new food intake or sick person exposure  - has had decreased PO inatake  -treat symptomatically with antiemetics and IVF      RAHEEL (acute kidney injury)  -likely 2/2 hypovolemia in setting of vomiting and diarrhea and decreased PO intake  -baseline 0.9, on admission 1.2 ->1 today   - will hold of on ace/arb med for now    DMII  - pt does not seem to be taking any home diabetic meds  - low dose SSI    Ppx: [id number]  Diet: Diabetic      Porfirio Posey MD  Department of Hospital Medicine   [facility]                    01/14/2017                             STAFF PHYSICIAN NOTE                                   Attending Attestation for Rounds with Resident  I have reviewed and concur with the resident's history, physical, assessment, and plan.  I have personally interviewed and examined the patient at bedside and agree with the resident's findings.                                  ________________________________________                                     REASON FOR ADMISSION:     Patient is 76 y.o.female    Body mass index is 26.78 kg/(m^2).,  Severe sepsis

## 2017-01-13 NOTE — ASSESSMENT & PLAN NOTE
-pt 2/4  SIRS criteria  - recived 500 cc fluid in ED with ceftriaaxone  - administer another 500 and continue ceftriaxone, adjust based on culture results

## 2017-01-13 NOTE — PLAN OF CARE
Extended Emergency Contact Information  Primary Emergency Contact: Bhavna Santana  Address: [address] [place] [address] [facility]  Home Phone: [phone]  Mobile Phone: [phone]  Relation: Sister    Giovanna Murray MD  [address] / [place] [address]    Future Appointments  Date Time Provider Department Center   3/13/2017 8:00 AM MACHELLE, VISUAL [place] [facility]   3/13/2017 8:30 AM Lynda Crawford MD [facility]     Payor: HUMANA / Plan: HUMANA POS / Product Type: PPO /       [id number] [facility] [place] [facility] [address]  [address] [facility] [address]  Phone: [phone] Fax: [phone]       01/13/17 2031   Discharge Assessment   Assessment Type Discharge Planning Assessment   Confirmed/corrected address and phone number on facesheet? Yes   Assessment information obtained from? Patient;Medical Record   Expected Length of Stay (days) 3   Communicated expected length of stay with patient/caregiver yes   Prior to hospitilization cognitive status: Alert/Oriented   Prior to hospitalization functional status: Independent   Current cognitive status: Alert/Oriented   Current Functional Status: Independent   Arrived From home or self-care   Lives With alone   Able to Return to Prior Arrangements yes   Is patient able to care for self after discharge? Unable to determine at this time (comments)   How many people do you have in your home that can help with your care after discharge? 0   Patient's perception of discharge disposition home or selfcare   Readmission Within The Last 30 Days no previous admission in last 30 days   Patient currently being followed by outpatient case management? No   Patient currently receives home health services? No   Does the patient currently use HME? No   Patient currently receives private duty nursing? N/A   Patient currently receives any other outside agency services? No   Equipment  Currently Used at Home none   Do you have any problems affording any of your prescribed medications? No   Is the patient taking medications as prescribed? yes   Do you have any financial concerns preventing you from receiving the healthcare you need? No   Does the patient have transportation to healthcare appointments? Yes   Transportation Available car   On Dialysis? No   Does the patient receive services at the Coumadin Clinic? No   Are there any open cases? No   Discharge Plan A Home   Discharge Plan B Home;Home Health   Patient/Family In Agreement With Plan yes

## 2017-01-13 NOTE — ASSESSMENT & PLAN NOTE
-likely 2/2 hypovolemia in setting of vomiting and diarrhea and decreased PO intake  -baseline 0.9, on admission 1.2  - adm additional 500 cc bolus and continue IVF  - will hold of on ace/arb med for now

## 2017-01-13 NOTE — PLAN OF CARE
Problem: Patient Care Overview  Goal: Individualization & Mutuality  Outcome: Ongoing (interventions implemented as appropriate)  POC reviewed with patient, understanding verbalized. Patient AAOx4,no signs of confusion, responds appropriately. Able to move all extremities, generalized weakness noted. Patient is able to ambulate with assistance. Patient admitted from the ED in febrile state, oral temperatures trending down. Last temp 99.4. On IV antibiotics, cultures pending. Family at bedside. Will continue to monitor.

## 2017-01-13 NOTE — H&P
[facility] Hospital Medicine  History & Physical    Patient Name: Ida Santana  MRN: [id number]  Admission Date: 1/12/2017  Attending Physician: Yomi Jeong MD   Primary Care Provider: Giovanna Murray MD    [facility] Medicine Team: [facility] HOSP MED 1 Kelli Alan MD     Patient information was obtained from patient, relative(s) and ER records.     Subjective:     Principal Problem:Altered mental status    Chief Complaint:  ams     HPI: pt is a 76 y.o.lady with history of HTN, HLD,  osteopenia  And breast cancer who is brought in by family for  acute onset of confusion this evening.  Family reports that today in the afternoon after visiting pt they noted that pt has let the bathroom sink overflow and left a pot burning on the stove this evening. She did not suffer any burns and she does not recollect the incidents very clearly. The patient reports 3 episodes of NBNB emesis with 3 episodes of diarrhea today,otherwise she denies any fever chills, abdominal pain, weakness, paraesthesia, dysuria, frequency or malodorous urin , family and opt denies any recent changes to her medication. She has never had a similar episode in the past. The patient lives alone.   In ED she was noted to be tachycardic and hypertensive, AAO X3 but inattentive and unable to focus on task. 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    Osteopenia     Potassium depletion 1998       Past Surgical History   Procedure Laterality Date    Breast biopsy  2001     left breast DCIS    Breast surgery  9/2001     left lumpectomy with SLN bx    Colonoscopy       Colonoscopy N/A 10/31/2016     Procedure: COLONOSCOPY;  Surgeon: YAMILETH Richards MD;  Location: [facility] ([address] [place] [address]);  Service: Endoscopy;  Laterality: N/A;       Review of patient's allergies indicates:   Allergen Reactions    Adhesive tape-silicones      Other reaction(s): pulling skin off    Atorvastatin      Other reaction(s): Muscle pain    Pravachol  [pravastatin] Rash       No current facility-administered medications on file prior to encounter.      Current Outpatient Prescriptions on File Prior to Encounter   Medication Sig    BLOOD SUGAR DIAGNOSTIC (ONE TOUCH II TEST MISC) * * * *.  CHECKS 1 TIME DAILY AT LEAST    CALCIUM CARBONATE/VITAMIN D3 (CALCIUM WITH VITAMIN D3 ORAL) Take by mouth.    flurandrenolide (CORDRAN) 0.05 % lotion 0.05 %.  Lotion Topical .  AAA scalp qd - bid prnDisp:    15ml         60ml     hyoscyamine (LEVSIN/SL) 0.125 mg Subl Place 1 tablet (0.125 mg total) under the tongue every 6 (six) hours as needed.    ketoconazole (NIZORAL) 2 % shampoo USE EVERY OTHER DAY LET SIT ON SCALP FOR FIVE MINUTES BEFORE RINSING    latanoprost 0.005 % ophthalmic solution place 1 drop into both eyes once daily    MULTIVITAMIN WITH MINERALS (ONE-A-DAY 50 PLUS) Tab Take by mouth. 1 Tablet Oral Every day    nystatin (MYCOSTATIN) powder Apply to affected area 3 times daily    om-3-epa-dha-fish oil-flax-E (THERA TEARS NUTRITION) [phone] zc-ua-qo-unit Cap Take 3 capsules by mouth every morning. Over-the-counter supplement    ondansetron (ZOFRAN) 4 MG tablet Take 1 tablet (4 mg total) by mouth every 8 (eight) hours as needed.    ONE TOUCH ULTRA TEST Strp CHECK 1 TIME DAILY AT LEAST    potassium chloride SA (K-DUR,KLOR-CON) 20 MEQ tablet take 1 tablet by mouth twice a day    sodium,potassium,mag sulfates (SUPREP BOWEL PREP KIT) 17.5-3.13-1.6 gram SolR Take 1 kit by mouth as directed.    triamcinolone (KENALOG) 0.1 % cream 0.1 %.  Cream Topical Twice a day .  mix 30g of 0.1% TAC cream  [id number] Loprox cream [id number] mild of magnesiause bid after cool blow dry    valsartan-hydrochlorothiazide (DIOVAN-HCT) 320-12.5 mg per tablet take 1 tablet by mouth once daily    verapamil (CALAN-SR) 240 MG CR tablet take 1 tablet by mouth twice a day     Family History     Problem Relation (Age of Onset)    Breast cancer Mother, Other    Cancer Mother, Father    Heart disease Father    Stroke Brother        Social History Main Topics    Smoking status: Never Smoker    Smokeless tobacco: Never Used    Alcohol use No    Drug use: No    Sexual activity: No     Review of Systems   Constitutional: Negative for chills and fever.   Eyes: Negative for visual disturbance.   Respiratory: Negative for cough and shortness of breath.    Cardiovascular: Negative for chest pain and leg swelling.   Gastrointestinal: Positive for diarrhea and vomiting. Negative for abdominal pain, constipation and nausea.   Endocrine: Negative for polyuria.   Genitourinary: Negative for dysuria and frequency.   Musculoskeletal: Negative for arthralgias and myalgias.   Skin: Negative for rash and wound.   Neurological: Negative for weakness, numbness and headaches.   Psychiatric/Behavioral: Positive for confusion.     Objective:     Vital Signs (Most Recent):  Temp: 97.4 °F (36.3 °C) (01/1942)  Pulse: 106 (01/12/17 2132)  Resp: 18 (01/1940)  BP: 131/70 (01/12/17 2132)  SpO2: 98 % (01/12/17 2132) Vital Signs (24h Range):  Temp:  [97.4 °F (36.3 °C)] 97.4 °F (36.3 °C)  Pulse:  [106-113] 106  Resp:  [18] 18  SpO2:  [95 %-98 %] 98 %  BP: (131-159)/(70-73) 131/70     Weight: 77.6 kg (171 lb)  Body mass index is 26.78 kg/(m^2).    Physical Exam   Constitutional: She is oriented to person, place, and time. She appears well-developed and well-nourished. No distress.   HENT:   Head: Normocephalic and atraumatic.   Mouth/Throat: No oropharyngeal exudate.   Eyes: Right eye exhibits no discharge. Left eye exhibits no discharge. No scleral  icterus.   Neck: Normal range of motion. Neck supple. No tracheal deviation present.   Cardiovascular: Normal rate, regular rhythm, normal heart sounds and intact distal pulses.  Exam reveals no gallop and no friction rub.    No murmur heard.  Pulmonary/Chest: Effort normal and breath sounds normal. No respiratory distress. She has no wheezes. She has no rales. She exhibits no tenderness.   Abdominal: Soft. Bowel sounds are normal. She exhibits no distension. There is no tenderness. There is no rebound and no guarding.   Musculoskeletal: She exhibits no edema, tenderness or deformity.   Neurological: She is oriented to person, place, and time. No cranial nerve deficit. She exhibits normal muscle tone.   Upper and lower extremities sensation intact and strength 4/5   Skin: Skin is warm and dry. No rash noted. She is not diaphoretic. No erythema.   Psychiatric: She has a normal mood and affect. Her behavior is normal.        Significant Labs:   CBC:   Recent Labs  Lab 01/12/17 2131   WBC 15.43*   HGB 12.8   HCT 35.8*        CMP:   Recent Labs  Lab 01/12/17 2131   *   K 4.0      CO2 21*   *   BUN 18   CREATININE 1.2   CALCIUM 9.4   PROT 7.6   ALBUMIN 3.0*   BILITOT 1.2*   ALKPHOS 69   AST 22   ALT 17   ANIONGAP 11   EGFRNONAA 44.6*     Lactic Acid: No results for input(s): LACTATE in the last 48 hours.  Lipase:   Recent Labs  Lab 01/12/17  2131   LIPASE 7     Urine Culture: No results for input(s): LABURIN in the last 48 hours.  Urine Studies:   Recent Labs  Lab 01/12/17  2211   COLORU Yellow   APPEARANCEUA Hazy*   PHUR 6.0   SPECGRAV 1.025   PROTEINUA 3+*   GLUCUA Trace*   KETONESU 1+*   BILIRUBINUA Negative   OCCULTUA 2+*   NITRITE Negative   UROBILINOGEN 1.0   LEUKOCYTESUR 1+*   RBCUA 7*   WBCUA 35*   BACTERIA Rare   SQUAMEPITHEL 2   HYALINECASTS 0       Significant Imaging: -   CXR  Stable chronic findings, no acute cardiopulmonary process.      CT head  No acute intracranial abnormality  detected.    Generalized cerebral volume loss and chronic microvascular ischemic change.    Assessment/Plan:     * Altered mental status  2/2 UTI vs hyponatremia vs intercranial event  - CT head was negative, we will FU with MRI to rule out CVA  - UA indicative of infection, pt received ceftriaxone in ED, continue, FU culture and adjust based on sensitivities  - hyponatremia less likely an etiology as it is very mild Na is 133  -avoid benzo, narcotics if possible      Hypertension  -continue verapamil and hold off on Diovan in setting of RAHEEL      Urinary tract infection without hematuria  -pt 2/4  SIRS criteria  - recived 500 cc fluid in ED with ceftriaaxone  - administer another 500 and continue ceftriaxone, adjust based on culture results       Gastroenteritis  -likely viral in etiology  -pt does not recall any bad/new food intake or sick person exposure  - has had  PO inatake  -treat symptomatically with antiemetics and IVF      RAHEEL (acute kidney injury)  -likely 2/2 hypovolemia in setting of vomiting and diarrhea and decreased PO intake  -baseline 0.9, on admission 1.2  - adm additional 500 cc bolus and continue IVF  - will hold of on ace/arb med for now    VTE Risk Mitigation         Ordered     heparin (porcine) injection 5,000 Units  Every 8 hours     Route:  Subcutaneous        17     Low Risk of VTE  Once      17        Kelli Alan MD  Department of Hospital Medicine   [facility]

## 2017-01-13 NOTE — ED PROVIDER NOTES
Encounter Date: 1/12/2017    SCRIBE #1 NOTE: I, Janice Connell, am scribing for, and in the presence of, Dr. Jeong.       History     Chief Complaint   Patient presents with    Altered Mental Status     Onset this evening. Family states pt let the bathroom sink overflow and a pot burn on the stove. 2 episodes of vomiting today. Pt has no complaints     Review of patient's allergies indicates:   Allergen Reactions    Adhesive tape-silicones      Other reaction(s): pulling skin off    Atorvastatin      Other reaction(s): Muscle pain    Pravachol  [pravastatin] Rash     HPI Comments: Time seen by provider: 9:25 PM    This is a 76 y.o. Female with history of HTN, HLD, uterine fibroids, osteopenia who presents with complaint of acute onset of confusion this evening. Family states that the patient let the bathroom sink overflow and left a pot burning on the stove this evening. She did not suffer any burns. The patient reports 3 episodes of emesis today, the most recent while in the emergency department waiting room. Per sister-in-law, the patient was a bit unstable when walking into the ED. The patient reports some diarrhea today. She denies nausea currently. No fever. She has never had a similar episode in the past. No recent medication changes. The patient lives alone.     The history is provided by the patient.     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    Osteopenia     Potassium depletion 1998     Past Medical History Pertinent Negatives   Diagnosis Date Noted    Amblyopia 4/17/2013    Arthritis 4/17/2013    Cataract 4/17/2013    Diabetes mellitus 4/17/2013    Diabetic retinopathy 4/17/2013    Macular degeneration 4/17/2013    Retinal detachment 4/17/2013     Strabismus 4/17/2013    Uveitis 4/17/2013     Past Surgical History   Procedure Laterality Date    Breast biopsy  2001     left breast DCIS    Breast surgery  9/2001     left lumpectomy with SLN bx    Colonoscopy      Colonoscopy N/A 10/31/2016     Procedure: COLONOSCOPY;  Surgeon: YAMILETH Richards MD;  [facility] ([address] [place] [address]);  Service: Endoscopy;  Laterality: N/A;     Family History   Problem Relation Age of Onset    Cancer Mother      breast    Breast cancer Mother      40s and again in 50s (bilateral)    Breast cancer Other     Heart disease Father      bypass    Cancer Father      asbestos    Stroke Brother     Ovarian cancer Neg Hx     Amblyopia Neg Hx     Blindness Neg Hx     Cataracts Neg Hx     Diabetes Neg Hx     Glaucoma Neg Hx     Hypertension Neg Hx     Macular degeneration Neg Hx     Retinal detachment Neg Hx     Strabismus Neg Hx     Thyroid disease Neg Hx     Colon cancer Neg Hx      Social History   Substance Use Topics    Smoking status: Never Smoker    Smokeless tobacco: Never Used    Alcohol use No     Review of Systems   Constitutional: Negative for chills and fever.   HENT: Negative for trouble swallowing.    Respiratory: Negative for shortness of breath.    Cardiovascular: Negative for chest pain.   Gastrointestinal: Positive for diarrhea and vomiting (resolved). Negative for nausea.   Genitourinary: Negative for hematuria.   Musculoskeletal: Negative for neck stiffness.   Skin: Negative for rash.   Neurological: Negative for dizziness, syncope and headaches.   Psychiatric/Behavioral: Positive for confusion.     All systems were reviewed and were negative except as noted in the HPI.    Physical Exam   Initial Vitals   BP Pulse Resp Temp SpO2   01/1940 01/1942 01/1942 01/[birth year]   159/73 113 18 97.4 °F (36.3 °C) 95 %     Physical Exam    Nursing note and vitals reviewed.  Constitutional: She appears well-developed and  well-nourished. No distress.   HENT:   Head: Normocephalic.   Mouth/Throat: Oropharynx is clear and moist.   Eyes: Conjunctivae are normal.   Neck: Normal range of motion. Neck supple.   Cardiovascular: Regular rhythm and intact distal pulses.   Pulmonary/Chest: Breath sounds normal. No respiratory distress. She has no wheezes. She has no rhonchi. She has no rales.   Abdominal: Soft. There is no tenderness.   Musculoskeletal: Normal range of motion.   Neurological: She is alert and oriented to person, place, and time. She has normal strength. No cranial nerve deficit or sensory deficit.   Confused in extended conversation  Moves all extremities x4  Strength 5/5 in all extremities   Skin: Skin is warm and dry.         ED Course   Procedures  Labs Reviewed   CBC W/ AUTO DIFFERENTIAL - Abnormal; Notable for the following:        Result Value    WBC 15.43 (*)     RBC 3.98 (*)     Hematocrit 35.8 (*)     MCH 32.2 (*)     Gran # 13.4 (*)     Lymph # 0.8 (*)     Gran% 87.0 (*)     Lymph% 4.9 (*)     All other components within normal limits   COMPREHENSIVE METABOLIC PANEL - Abnormal; Notable for the following:     Sodium 133 (*)     CO2 21 (*)     Glucose 244 (*)     Albumin 3.0 (*)     Total Bilirubin 1.2 (*)     eGFR if  51.4 (*)     eGFR if non  44.6 (*)     All other components within normal limits   URINALYSIS - Abnormal; Notable for the following:     Appearance, UA Hazy (*)     Protein, UA 3+ (*)     Glucose, UA Trace (*)     Ketones, UA 1+ (*)     Occult Blood UA 2+ (*)     Leukocytes, UA 1+ (*)     All other components within normal limits   ACETAMINOPHEN LEVEL - Abnormal; Notable for the following:     Acetaminophen (Tylenol), Serum <3.0 (*)     All other components within normal limits   URINALYSIS MICROSCOPIC - Abnormal; Notable for the following:     RBC, UA 7 (*)     WBC, UA 35 (*)     All other components within normal limits   POCT GLUCOSE - Abnormal; Notable for the  following:     POCT Glucose 231 (*)     All other components within normal limits   CULTURE, URINE   GRAM STAIN   CULTURE, BLOOD   CULTURE, BLOOD   CULTURE, URINE    Narrative:     add on CXURN order [id number] per Dr. Yomi Jeong  01/12/2017  23:38    GRAM STAIN    Narrative:     add on GRAM order [id number] per Dr. Yomi Jeong  01/12/2017  23:46    TSH   DRUG SCREEN PANEL, URINE EMERGENCY   ALCOHOL,MEDICAL (ETHANOL)   POCT GLUCOSE MONITORING CONTINUOUS   POCT GLUCOSE MONITORING CONTINUOUS     EKG Readings: (Independently Interpreted)   Sinus tachycardia, rate of 109, no obvious ischemia, QTc of 433        X-Rays:   Independently Interpreted Readings:   Chest X-Ray: No obvious pneumonia or acute process.   Head CT: Microvascular changes, but no acute findings.     Medical Decision Making:   History:   Old Medical Records: I decided to obtain old medical records.  Independently Interpreted Test(s):   I have ordered and independently interpreted X-rays - see prior notes.  I have ordered and independently interpreted EKG Reading(s) - see prior notes  Clinical Tests:   Lab Tests: Ordered and Reviewed       <> Summary of Lab: Glucose was mildly elevated; UA showed possible UTI (gram stain and culture was added); drug screen was negative; her Tylenol, ethanol, TSH are normal; chemistry shows mild hyponatremia and mild hyperglycemia; CBC shows an elevated white count of 15.4.  Radiological Study: Reviewed and Ordered  Medical Tests: Reviewed and Ordered  ED Management:  She was given IV antibiotics for the potential UTI and discussed with hospital medicine and case management. She meets inpatient criteria and will be admitted to hospital medicine.  Other:   I have discussed this case with another health care provider.      Medical Decision Making:    I reviewed and interpreted the ECG and any monitoring strips.  I reviewed radiology personally along with interpretations.  I reviewed and interpreted the laboratory  results.    Tin Jeong MD, WILLIAMS, JOI, FACEP  Department of Emergency Medicine  , [facility]             Scribe Attestation:   Scribe #1: I performed the above scribed service and the documentation accurately describes the services I performed. I attest to the accuracy of the note.    Attending Attestation:           Physician Attestation for Scribe:  Physician Attestation Statement for Scribe #1: I, Dr. Jeong, reviewed documentation, as scribed by Janice Connell in my presence, and it is both accurate and complete.                 ED Course     Clinical Impression:   The primary encounter diagnosis was Urinary tract infection without hematuria, site unspecified. A diagnosis of Altered mental status, unspecified was also pertinent to this visit.    Disposition:   Disposition: Admitted    Tin Jeong MD, WILLIAMS, JOI, FACEP  Department of Emergency Medicine  , [facility] [employer]       Yomi Jeong MD  01/14/17 3126

## 2017-01-13 NOTE — ASSESSMENT & PLAN NOTE
-likely viral in etiology  -pt does not recall any bad/new food intake or sick person exposure  - has had decreased PO inatake  -treat symptomatically with antiemetics and IVF

## 2017-01-13 NOTE — ASSESSMENT & PLAN NOTE
-likely 2/2 hypovolemia in setting of vomiting and diarrhea and decreased PO intake  -baseline 0.9, on admission 1.2 ->1 today   - will hold of on ace/arb med for now

## 2017-01-13 NOTE — ASSESSMENT & PLAN NOTE
-pt 2/4  SIRS criteria  - continues to have fevers and tachycardia. Gave 2L NS today. Pt does not appear toxic and is much improved since yesterday  - continue Rocephin, adding Vanc 1/13/17

## 2017-01-13 NOTE — SUBJECTIVE & OBJECTIVE
Interval History: Pt admitted overnight but doing better today. Still continuing to have fevers.     Review of Systems   Constitutional: Positive for fever. Negative for chills.   HENT: Negative for congestion and sore throat.    Eyes: Negative for photophobia.   Respiratory: Negative for apnea, cough, chest tightness, shortness of breath and wheezing.    Cardiovascular: Negative for chest pain.   Gastrointestinal: Positive for diarrhea. Negative for abdominal pain, anal bleeding, constipation and nausea.   Genitourinary: Positive for dysuria. Negative for flank pain.   Musculoskeletal: Negative for arthralgias and back pain.   Skin: Negative for pallor.   Neurological: Negative for dizziness and headaches.   Psychiatric/Behavioral: Positive for confusion.     Objective:     Vital Signs (Most Recent):  Temp: (!) 102.5 °F (39.2 °C) (01/13/17 1558)  Pulse: 108 (01/13/17 1200)  Resp: 18 (01/13/17 1200)  BP: 113/67 (01/13/17 1200)  SpO2: (!) 93 % (01/13/17 1200) Vital Signs (24h Range):  Temp:  [97.4 °F (36.3 °C)-103 °F (39.4 °C)] 102.5 °F (39.2 °C)  Pulse:  [106-117] 108  Resp:  [18] 18  SpO2:  [93 %-98 %] 93 %  BP: (113-159)/(58-89) 113/67     Weight: 77.6 kg (171 lb)  Body mass index is 26.78 kg/(m^2).    Intake/Output Summary (Last 24 hours) at 01/13/17 1604  Last data filed at 01/13/17 0700   Gross per 24 hour   Intake               50 ml   Output                0 ml   Net               50 ml      Physical Exam   Constitutional: She is oriented to person, place, and time. She appears well-developed and well-nourished. No distress.   HENT:   Head: Normocephalic and atraumatic.   Eyes: Pupils are equal, round, and reactive to light.   Neck: Normal range of motion. Neck supple.   Cardiovascular: Regular rhythm and normal heart sounds.    No murmur heard.  Tachycardic.    Pulmonary/Chest: Effort normal and breath sounds normal. No respiratory distress.   Abdominal: Soft. Bowel sounds are normal. She exhibits no  distension.   Musculoskeletal: Normal range of motion.   Neurological: She is alert and oriented to person, place, and time.   Skin: She is not diaphoretic.       Significant Labs:   CBC:   Recent Labs  Lab 01/12/17 2131 01/13/17  1027   WBC 15.43* 14.05*   HGB 12.8 12.1   HCT 35.8* 33.6*    161     CMP:   Recent Labs  Lab 01/12/17 2131 01/13/17  1027   * 136   K 4.0 3.9    108   CO2 21* 19*   * 143*   BUN 18 16   CREATININE 1.2 0.9   CALCIUM 9.4 9.0   PROT 7.6 6.7   ALBUMIN 3.0* 2.5*   BILITOT 1.2* 0.8   ALKPHOS 69 60   AST 22 33   ALT 17 19   ANIONGAP 11 9   EGFRNONAA 44.6* >60.0       Significant Imaging: I have reviewed all pertinent imaging results/findings within the past 24 hours.

## 2017-01-13 NOTE — ASSESSMENT & PLAN NOTE
2/2 UTI vs hyponatremia vs intercranial event - status much improved today  - continue to monitor  - CT and MRI head negative  -avoid benzo, narcotics if possible

## 2017-01-13 NOTE — SUBJECTIVE & OBJECTIVE
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    Osteopenia     Potassium depletion 1998       Past Surgical History   Procedure Laterality Date    Breast biopsy  2001     left breast DCIS    Breast surgery  9/2001     left lumpectomy with SLN bx    Colonoscopy      Colonoscopy N/A 10/31/2016     Procedure: COLONOSCOPY;  Surgeon: YAMILETH Richards MD;  Location: [address]);  Service: Endoscopy;  Laterality: N/A;       Review of patient's allergies indicates:   Allergen Reactions    Adhesive tape-silicones      Other reaction(s): pulling skin off    Atorvastatin      Other reaction(s): Muscle pain    Pravachol  [pravastatin] Rash       No current facility-administered medications on file prior to encounter.      Current Outpatient Prescriptions on File Prior to Encounter   Medication Sig    BLOOD SUGAR DIAGNOSTIC (ONE TOUCH II TEST MISC) * * * *.  CHECKS 1 TIME DAILY AT LEAST    CALCIUM CARBONATE/VITAMIN D3 (CALCIUM WITH VITAMIN D3 ORAL) Take by mouth.    flurandrenolide (CORDRAN) 0.05 % lotion 0.05 %.  Lotion Topical .  AAA scalp qd - bid prnDisp:    15ml         60ml     hyoscyamine (LEVSIN/SL) 0.125 mg Subl Place 1 tablet (0.125 mg total) under the tongue every 6 (six) hours as needed.    ketoconazole (NIZORAL) 2 % shampoo USE EVERY OTHER DAY LET SIT ON SCALP FOR FIVE MINUTES BEFORE RINSING    latanoprost 0.005 % ophthalmic solution place 1 drop into both eyes once daily    MULTIVITAMIN WITH MINERALS (ONE-A-DAY 50 PLUS) Tab Take by mouth. 1 Tablet Oral Every day    nystatin (MYCOSTATIN) powder Apply to affected area 3 times daily    om-3-epa-dha-fish oil-flax-E (THERA TEARS NUTRITION) [phone] nm-pn-yv-unit Cap Take 3 capsules by mouth every  morning. Over-the-counter supplement    ondansetron (ZOFRAN) 4 MG tablet Take 1 tablet (4 mg total) by mouth every 8 (eight) hours as needed.    ONE TOUCH ULTRA TEST Strp CHECK 1 TIME DAILY AT LEAST    potassium chloride SA (K-DUR,KLOR-CON) 20 MEQ tablet take 1 tablet by mouth twice a day    sodium,potassium,mag sulfates (SUPREP BOWEL PREP KIT) 17.5-3.13-1.6 gram SolR Take 1 kit by mouth as directed.    triamcinolone (KENALOG) 0.1 % cream 0.1 %.  Cream Topical Twice a day .  mix 30g of 0.1% TAC cream [id number] Loprox cream [id number] mild of magnesiause bid after cool blow dry    valsartan-hydrochlorothiazide (DIOVAN-HCT) 320-12.5 mg per tablet take 1 tablet by mouth once daily    verapamil (CALAN-SR) 240 MG CR tablet take 1 tablet by mouth twice a day     Family History     Problem Relation (Age of Onset)    Breast cancer Mother, Other    Cancer Mother, Father    Heart disease Father    Stroke Brother        Social History Main Topics    Smoking status: Never Smoker    Smokeless tobacco: Never Used    Alcohol use No    Drug use: No    Sexual activity: No     Review of Systems   Constitutional: Negative for chills and fever.   Eyes: Negative for visual disturbance.   Respiratory: Negative for cough and shortness of breath.    Cardiovascular: Negative for chest pain and leg swelling.   Gastrointestinal: Positive for diarrhea and vomiting. Negative for abdominal pain, constipation and nausea.   Endocrine: Negative for polyuria.   Genitourinary: Negative for dysuria and frequency.   Musculoskeletal: Negative for arthralgias and myalgias.   Skin: Negative for rash and wound.   Neurological: Negative for weakness, numbness and headaches.   Psychiatric/Behavioral: Positive for confusion.     Objective:     Vital Signs (Most Recent):  Temp: 97.4 °F (36.3 °C) (01/1940)  Pulse: 106 (01/12/17 2132)  Resp: 18 (01/1940)  BP: 131/70 (01/12/17 2132)  SpO2: 98 % (01/12/17 2132) Vital Signs (24h Range):  Temp:   [97.4 °F (36.3 °C)] 97.4 °F (36.3 °C)  Pulse:  [106-113] 106  Resp:  [18] 18  SpO2:  [95 %-98 %] 98 %  BP: (131-159)/(70-73) 131/70     Weight: 77.6 kg (171 lb)  Body mass index is 26.78 kg/(m^2).    Physical Exam   Constitutional: She is oriented to person, place, and time. She appears well-developed and well-nourished. No distress.   HENT:   Head: Normocephalic and atraumatic.   Mouth/Throat: No oropharyngeal exudate.   Eyes: Right eye exhibits no discharge. Left eye exhibits no discharge. No scleral icterus.   Neck: Normal range of motion. Neck supple. No tracheal deviation present.   Cardiovascular: Normal rate, regular rhythm, normal heart sounds and intact distal pulses.  Exam reveals no gallop and no friction rub.    No murmur heard.  Pulmonary/Chest: Effort normal and breath sounds normal. No respiratory distress. She has no wheezes. She has no rales. She exhibits no tenderness.   Abdominal: Soft. Bowel sounds are normal. She exhibits no distension. There is no tenderness. There is no rebound and no guarding.   Musculoskeletal: She exhibits no edema, tenderness or deformity.   Neurological: She is oriented to person, place, and time. No cranial nerve deficit. She exhibits normal muscle tone.   Upper and lower extremities sensation intact and strength 4/5   Skin: Skin is warm and dry. No rash noted. She is not diaphoretic. No erythema.   Psychiatric: She has a normal mood and affect. Her behavior is normal.        Significant Labs:   CBC:   Recent Labs  Lab 01/12/17 2131   WBC 15.43*   HGB 12.8   HCT 35.8*        CMP:   Recent Labs  Lab 01/12/17 2131   *   K 4.0      CO2 21*   *   BUN 18   CREATININE 1.2   CALCIUM 9.4   PROT 7.6   ALBUMIN 3.0*   BILITOT 1.2*   ALKPHOS 69   AST 22   ALT 17   ANIONGAP 11   EGFRNONAA 44.6*     Lactic Acid: No results for input(s): LACTATE in the last 48 hours.  Lipase:   Recent Labs  Lab 01/12/17 2131   LIPASE 7     Urine Culture: No results for  input(s): LABURIN in the last 48 hours.  Urine Studies:   Recent Labs  Lab 01/12/17  2211   COLORU Yellow   APPEARANCEUA Hazy*   PHUR 6.0   SPECGRAV 1.025   PROTEINUA 3+*   GLUCUA Trace*   KETONESU 1+*   BILIRUBINUA Negative   OCCULTUA 2+*   NITRITE Negative   UROBILINOGEN 1.0   LEUKOCYTESUR 1+*   RBCUA 7*   WBCUA 35*   BACTERIA Rare   SQUAMEPITHEL 2   HYALINECASTS 0       Significant Imaging: -   CXR  Stable chronic findings, no acute cardiopulmonary process.      CT head  No acute intracranial abnormality detected.    Generalized cerebral volume loss and chronic microvascular ischemic change.

## 2017-01-13 NOTE — ASSESSMENT & PLAN NOTE
2/2 UTI vs hyponatremia vs intercranial event  - CT head was negative, we will FU with MRI to rule out CVA  - UA indicative of infection, pt received ceftriaxone in ED, continue, FU culture and adjust based on sensitivities  - hyponatremia less likely an etiology as it is very mild Na is 133  -avoid benzo, narcotics if possible

## 2017-01-14 PROBLEM — A41.01 STAPHYLOCOCCUS AUREUS SEPSIS: Status: ACTIVE | Noted: 2017-01-14

## 2017-01-14 LAB
ALLENS TEST: ABNORMAL
ANISOCYTOSIS BLD QL SMEAR: SLIGHT
BACTERIA UR CULT: NORMAL
BACTERIA UR CULT: NORMAL
BASOPHILS # BLD AUTO: 0.01 K/UL
BASOPHILS NFR BLD: 0.1 %
BURR CELLS BLD QL SMEAR: ABNORMAL
D DIMER PPP IA.FEU-MCNC: 8.61 MG/L FEU
DACRYOCYTES BLD QL SMEAR: ABNORMAL
DIFFERENTIAL METHOD: ABNORMAL
EOSINOPHIL # BLD AUTO: 0 K/UL
EOSINOPHIL NFR BLD: 0.1 %
ERYTHROCYTE [DISTWIDTH] IN BLOOD BY AUTOMATED COUNT: 13.3 %
HCO3 UR-SCNC: 17.7 MMOL/L (ref 24–28)
HCT VFR BLD AUTO: 33.4 %
HGB BLD-MCNC: 11.8 G/DL
HYPOCHROMIA BLD QL SMEAR: ABNORMAL
LACTATE SERPL-SCNC: 1.7 MMOL/L
LYMPHOCYTES # BLD AUTO: 0.5 K/UL
LYMPHOCYTES NFR BLD: 3.2 %
MCH RBC QN AUTO: 31.8 PG
MCHC RBC AUTO-ENTMCNC: 35.3 %
MCV RBC AUTO: 90 FL
MONOCYTES # BLD AUTO: 1.2 K/UL
MONOCYTES NFR BLD: 7.4 %
NEUTROPHILS # BLD AUTO: 14.7 K/UL
NEUTROPHILS NFR BLD: 89.2 %
OVALOCYTES BLD QL SMEAR: ABNORMAL
PCO2 BLDA: 28.3 MMHG (ref 35–45)
PH SMN: 7.41 [PH] (ref 7.35–7.45)
PLATELET # BLD AUTO: 160 K/UL
PMV BLD AUTO: 11 FL
PO2 BLDA: 48 MMHG (ref 80–100)
POC BE: -7 MMOL/L
POC SATURATED O2: 84 % (ref 95–100)
POC TCO2: 19 MMOL/L (ref 23–27)
POCT GLUCOSE: 71 MG/DL (ref 70–110)
POCT GLUCOSE: 79 MG/DL (ref 70–110)
POCT GLUCOSE: 82 MG/DL (ref 70–110)
POIKILOCYTOSIS BLD QL SMEAR: SLIGHT
POLYCHROMASIA BLD QL SMEAR: ABNORMAL
RBC # BLD AUTO: 3.71 M/UL
SAMPLE: ABNORMAL
SITE: ABNORMAL
WBC # BLD AUTO: 16.54 K/UL

## 2017-01-14 PROCEDURE — 36600 WITHDRAWAL OF ARTERIAL BLOOD: CPT

## 2017-01-14 PROCEDURE — 87077 CULTURE AEROBIC IDENTIFY: CPT

## 2017-01-14 PROCEDURE — 25000003 PHARM REV CODE 250: Performed by: EMERGENCY MEDICINE

## 2017-01-14 PROCEDURE — 25000003 PHARM REV CODE 250: Performed by: STUDENT IN AN ORGANIZED HEALTH CARE EDUCATION/TRAINING PROGRAM

## 2017-01-14 PROCEDURE — 85379 FIBRIN DEGRADATION QUANT: CPT

## 2017-01-14 PROCEDURE — 97166 OT EVAL MOD COMPLEX 45 MIN: CPT

## 2017-01-14 PROCEDURE — 87040 BLOOD CULTURE FOR BACTERIA: CPT | Mod: 59

## 2017-01-14 PROCEDURE — 82803 BLOOD GASES ANY COMBINATION: CPT

## 2017-01-14 PROCEDURE — 36415 COLL VENOUS BLD VENIPUNCTURE: CPT

## 2017-01-14 PROCEDURE — 63600175 PHARM REV CODE 636 W HCPCS: Performed by: STUDENT IN AN ORGANIZED HEALTH CARE EDUCATION/TRAINING PROGRAM

## 2017-01-14 PROCEDURE — 25500020 PHARM REV CODE 255: Performed by: HOSPITALIST

## 2017-01-14 PROCEDURE — 11000001 HC ACUTE MED/SURG PRIVATE ROOM

## 2017-01-14 PROCEDURE — 93010 ELECTROCARDIOGRAM REPORT: CPT | Mod: ,,, | Performed by: INTERNAL MEDICINE

## 2017-01-14 PROCEDURE — 25000003 PHARM REV CODE 250: Performed by: INTERNAL MEDICINE

## 2017-01-14 PROCEDURE — 93005 ELECTROCARDIOGRAM TRACING: CPT

## 2017-01-14 PROCEDURE — 87186 SC STD MICRODIL/AGAR DIL: CPT

## 2017-01-14 PROCEDURE — 85025 COMPLETE CBC W/AUTO DIFF WBC: CPT

## 2017-01-14 PROCEDURE — 63600175 PHARM REV CODE 636 W HCPCS: Performed by: INTERNAL MEDICINE

## 2017-01-14 RX ORDER — AZITHROMYCIN 250 MG/1
500 TABLET, FILM COATED ORAL DAILY
Status: DISCONTINUED | OUTPATIENT
Start: 2017-01-14 | End: 2017-01-15

## 2017-01-14 RX ADMIN — ACETAMINOPHEN 650 MG: 325 TABLET ORAL at 01:01

## 2017-01-14 RX ADMIN — VERAPAMIL HYDROCHLORIDE 240 MG: 120 TABLET, FILM COATED, EXTENDED RELEASE ORAL at 09:01

## 2017-01-14 RX ADMIN — HEPARIN SODIUM 5000 UNITS: 5000 INJECTION, SOLUTION INTRAVENOUS; SUBCUTANEOUS at 09:01

## 2017-01-14 RX ADMIN — Medication 3 ML: at 05:01

## 2017-01-14 RX ADMIN — IOHEXOL 100 ML: 350 INJECTION, SOLUTION INTRAVENOUS at 08:01

## 2017-01-14 RX ADMIN — HEPARIN SODIUM 5000 UNITS: 5000 INJECTION, SOLUTION INTRAVENOUS; SUBCUTANEOUS at 05:01

## 2017-01-14 RX ADMIN — PANTOPRAZOLE SODIUM 40 MG: 40 TABLET, DELAYED RELEASE ORAL at 09:01

## 2017-01-14 RX ADMIN — VANCOMYCIN HYDROCHLORIDE 1250 MG: 1 INJECTION, POWDER, LYOPHILIZED, FOR SOLUTION INTRAVENOUS at 05:01

## 2017-01-14 RX ADMIN — VANCOMYCIN HYDROCHLORIDE 1250 MG: 1 INJECTION, POWDER, LYOPHILIZED, FOR SOLUTION INTRAVENOUS at 06:01

## 2017-01-14 RX ADMIN — AZITHROMYCIN 500 MG: 250 TABLET, FILM COATED ORAL at 04:01

## 2017-01-14 RX ADMIN — HEPARIN SODIUM 5000 UNITS: 5000 INJECTION, SOLUTION INTRAVENOUS; SUBCUTANEOUS at 01:01

## 2017-01-14 RX ADMIN — Medication 3 ML: at 01:01

## 2017-01-14 RX ADMIN — CEFTRIAXONE 1 G: 1 INJECTION, SOLUTION INTRAVENOUS at 05:01

## 2017-01-14 RX ADMIN — Medication 3 ML: at 09:01

## 2017-01-14 NOTE — ASSESSMENT & PLAN NOTE
-likely 2/2 hypovolemia in setting of vomiting and diarrhea and decreased PO intake  -baseline 0.9, on admission 1.2 - improving  - will hold of on ace/arb med for now

## 2017-01-14 NOTE — PROGRESS NOTES
Spoke with pharmacy regarding 2300 scheduled doses of rocephin. Scheduled every 24 hours, last dose given at 645am on 1/13. Pharmacy confirmed future doses should be scheduled at 0630 and will change.

## 2017-01-14 NOTE — PROGRESS NOTES
STAT ABG results called into Dr. Ho per respiratory therapist Estrella. No new orders given at this time. Will monitor.

## 2017-01-14 NOTE — PT/OT/SLP EVAL
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    Osteopenia     Potassium depletion 1998      Past Surgical History   Procedure Laterality Date    Breast biopsy  2001     left breast DCIS    Breast surgery  9/2001     left lumpectomy with SLN bx    Colonoscopy      Colonoscopy N/A 10/31/2016     Procedure: COLONOSCOPY;  Surgeon: YAMILETH Richards MD;  Location: [facility] ([address] [place] [address]);  Service: Endoscopy;  Laterality: N/A;       Referring physician: Porfirio Posey MD  Date referred to OT: 1/13/2017    General Precautions: Standard, fall, NPO  Orthopedic Precautions: N/A  Braces: N/A    Do you have any cultural, spiritual, Hoahaoism conflicts, given your current situation?: none stated     Patient History:  Living Environment  Lives With: alone  Living Arrangements: house  Home Layout: Able to live on 1st floor  Transportation Available: car, family or friend will provide  Living Environment Comment: Pt may not be an accurate historian. Pt's sister-in-law present to answer questions from therapist. Pt reported living in alone in a H with TH step to enter;bathroom has a tub/shower combo. PTA, pt was (I) with all ADLs and mobility. Owns DME of shower chair but not using it. Unsure if pt has 24/7 assistance upon d/c.   Equipment Currently Used at Home: none    Prior level of function:   Bed Mobility/Transfers: independent  Grooming: independent  Bathing: independent  Upper Body Dressing:  "independent  Lower Body Dressing: independent  Toileting: independent  Driving License: Yes  Mode of Transportation: Car, Family, Friends  Occupation: Retired     Dominant hand: right    Subjective:  Communicated with RN prior to session.    Pt agreeable to OT evaluation     Chief Complaint: not remembering  Patient/Family stated goals: return to PLOF    "I just need to remember to look out for handles and things around me"    Pain Ratin/10  Pain Rating Post-Intervention: 0/10    Objective:  Patient found with: peripheral IV, oxygen (IV not connected to monitor)     Pt's sister-in-law present during entire session.    Cognitive Exam:  Oriented to: Person, Place and Time; Pt could not remember why she was in the hospital (does not remember leaving stove on and water flowing in sink)  Follows Commands/attention: Inattentive, Easily distracted and Follows two-step commands  Communication: clear/fluent  Memory:  Impaired STM and Poor immediate recall  Safety awareness/insight to disability: impaired  Coping skills/emotional control: flat affect    Visual/perceptual:  Intact    Physical Exam:  Postural examination/scapula alignment: Rounded shoulder and Head forward  Skin integrity: Visible skin intact  Edema: None noted in BUE    Sensation:   Intact in BUE    Upper Extremity Range of Motion:  Right Upper Extremity: WFL  Left Upper Extremity: WFL    Upper Extremity Strength:  Right Upper Extremity: WFL  Left Upper Extremity: WFL   Strength: good    Fine motor coordination:   Intact    Gross motor coordination: WFL    Functional Mobility:  Bed Mobility:  Rolling/Turning Right: Contact guard assistance  Scooting/Bridging: Stand by Assistance (to EOB)  Supine to Sit: Contact Guard Assistance (HOB flat)    Transfers: verbal cues for safety and technique  Sit <> Stand Assistance: Contact Guard Assistance (1 trial from EOB)  Sit <> Stand Assistive Device: Rolling Walker  Bed <> Chair Technique: Stand Pivot  Bed <> " "Chair Transfer Assistance: Contact Guard Assistance   -During chair transfer, pt became confused and thought RW was chair. She asked "which chair do I sit on" and began pointing to RW and chair.    Functional Ambulation: ~5 steps with CGA using RW, verbal cues for safety and walker management. No LOB or SOB    Activities of Daily Living:  LE Dressing Level of Assistance: Stand by assistance (for donning/doffing socks from chair by reaching down; OT educated on figure four technique for increased safety)    Balance:   Static Sit: FAIR+: Able to take MINIMAL challenges from all directions  Dynamic Sit: FAIR+: Maintains balance through MINIMAL excursions of active trunk motion  Static Stand: FAIR: Maintains without assist but unable to take challenges  Dynamic stand: FAIR: Needs CONTACT GUARD during gait    Therapeutic Activities and Exercises:  Pt educated on OT role/POC, eliminating distractions in room for increased attention to task, whiteboard updated, re-orientation to call light and safety UIC using call light. Verbalized understanding. Pt's sister-in-law was fully educated to not to leave pt alone in chair unless RN was notified due to pt's confusion.       AM-PAC 6 CLICK ADL  How much help from another person does this patient currently need?  1 = Unable, Total/Dependent Assistance  2 = A lot, Maximum/Moderate Assistance  3 = A little, Minimum/Contact Guard/Supervision  4 = None, Modified [place]/Independent    Putting on and taking off regular lower body clothing? : 3  Bathing (including washing, rinsing, drying)?: 3  Toileting, which includes using toilet, bedpan, or urinal? : 3  Putting on and taking off regular upper body clothing?: 3  Taking care of personal grooming such as brushing teeth?: 3  Eating meals?: 4  Total Score: 19    AM-PAC Raw Score CMS "G-Code Modifier Level of Impairment Assistance   6 % Total / Unable   7 - 9 CM 80 - 100% Maximal Assist   10 - 14 CL 60 - 80% Moderate Assist "   15 - 19 CK 40 - 60% Moderate Assist   20 - 22 CJ 20 - 40% Minimal Assist   23 CI 1-20% SBA / CGA   24 CH 0% Independent/ Mod I       Patient left up in chair with all lines intact, call button in reach and RN notified, sister-in-law    Assessment:  Ida Santana is a 76 y.o. female with a medical diagnosis of Severe sepsis and presents with impaired endurance and increased confusion impacting performance on ADLs. Session tolerated well with no c/o pain. PTA, pt was (I) with LE dressing and currently she is requiring verbal cues for safety but no physical assistance from therapist. Completed bed mobility with SBA demonstrating good functional strength. However, pt required verbal/tactile cues for safety while performing bed mobility and ambulation due to current confusion and cognitive deficits. Currently, pt is unsafe to return to her PLOF of living alone due to pt being increased fall risk and poor safety awareness. OT is recommending SNF (SS) vs HHOT pending 24/7 assistance/supervision due to poor safety awareness and decreased caregiver assistance. She will continue to benefit from skilled OT services to maximize safety and increase (I) in ADLs.     Rehab identified problem list/impairments: Rehab identified problem list/impairments: weakness, impaired endurance, impaired self care skills, impaired functional mobilty, impaired balance, impaired cognition, decreased safety awareness    Rehab potential is good.    Activity tolerance: Good    Discharge recommendations: Discharge Facility/Level Of Care Needs: nursing facility, skilled (vs HHOT with 24/7 supervision/assistance; currently pt is unsafe to return home due to increased confusion)     Barriers to discharge: Barriers to Discharge: Decreased caregiver support    Equipment recommendations:  (TBD)     GOALS:   Occupational Therapy Goals        Problem: Occupational Therapy Goal    Goal Priority Disciplines Outcome Interventions   Occupational Therapy Goal      OT, PT/OT Ongoing (interventions implemented as appropriate)    Description:  Goals to be met by: 2017    Patient will increase functional independence with ADLs by performin. Pt will perform supine<>sit transfer with supervision to increase independence and safety with functional transfers.   2. Pt will perform sit<>stand transfer with supervision to increase independence and safety with functional transfers.  3. Pt will demonstrate improve activity tolerance by walking continuous for 2 minutes with no RB   4. Pt will perform therex 2x10 with supervision for BUE strengthening.   5. Pt will perform bed to chair transfer with supervision to increase independence and safety with functional transfers  6. Pt will perform bedside commode transfer with supervision to increase independence and safety with functional transfers.  7. Pt will complete standing grooming activity with supervision to increase endurance for completion of ADLs.                  PLAN:  Patient to be seen 4 x/week to address the above listed problems via self-care/home management, therapeutic activities, therapeutic exercises  Plan of Care expires: 17  Plan of Care reviewed with: patient, family         CLOTILDE Dominguez  2017

## 2017-01-14 NOTE — PROGRESS NOTES
"[facility] Hospital Medicine  Progress Note    Patient Name: Ida Santana  MRN: [id number]  Patient Class: IP- Inpatient   Admission Date: 1/12/2017  Length of Stay: 2 days  Attending Physician: Loyda Lui MD  Primary Care Provider: Giovanna Murray MD    [facility] Medicine Team: [facility] H[facility] MED 1 Porfirio Posey MD    Subjective:     Principal Problem:Severe sepsis      Hospital Course:  1/13/17: Patient was admitted overnight for confusion, fever, and confusion. Was found to have UTI and was 2/4 SIRS. Began treatment with Rocephin 1g qD. Patient this AM had less confusion. Procalcitonin was elevated to 26.8. Patient feeling better but had continued fevers this today. Giving 2L NS today.       1/14/17: Patient had episode of SOB and was confused last night. ABG was ordered and showed low PO2 and procalcitonin was elevated at 26.8. Patient also continued to have fevers. CTA was ordered due to concern for pulmonary embolism - although WELLS score was low - and CTA showed no PE but it did show, "Scattered areas of consolidation in both lungs. This acute pulmonary disease could represent multifocal pneumonia..." Lactate was trended from 2.7 to 1.7 after a liter of NS. Patient this AM was doing well and was sitting up and walking around with assistance. We are continuing with vanc and rocephin and adding azithromycin 500mg to cover for atypicals. OT saw patient and recommended SNF. Orders placed.       Interval History: Patient had fevers and SOB overnight. Improved in the AM. Starting azithromycin.    Review of Systems   Constitutional: Positive for fever. Negative for chills.   HENT: Negative for congestion and sore throat.    Eyes: Negative for photophobia.   Respiratory: Negative for apnea, cough, chest tightness, shortness of breath and wheezing.    Cardiovascular: Negative for chest pain.   Gastrointestinal: Positive for diarrhea. Negative for abdominal pain, anal bleeding, constipation and " nausea.   Genitourinary: Positive for dysuria. Negative for flank pain.   Musculoskeletal: Negative for arthralgias and back pain.   Skin: Negative for pallor.   Neurological: Negative for dizziness and headaches.   Psychiatric/Behavioral: Positive for confusion.     Objective:     Vital Signs (Most Recent):  Temp: 98.8 °F (37.1 °C) (01/14/17 1300)  Pulse: 103 (01/14/17 1300)  Resp: 18 (01/14/17 1300)  BP: 128/60 (01/14/17 1300)  SpO2: (!) 93 % (01/14/17 1300) Vital Signs (24h Range):  Temp:  [97.4 °F (36.3 °C)-102.5 °F (39.2 °C)] 98.8 °F (37.1 °C)  Pulse:  [] 103  Resp:  [18-26] 18  SpO2:  [90 %-94 %] 93 %  BP: (117-168)/(60-81) 128/60     Weight: 77.6 kg (171 lb)  Body mass index is 26.78 kg/(m^2).    Intake/Output Summary (Last 24 hours) at 01/14/17 1522  Last data filed at 01/14/17 0555   Gross per 24 hour   Intake              840 ml   Output              200 ml   Net              640 ml      Physical Exam   Constitutional: She is oriented to person, place, and time. She appears well-developed and well-nourished. No distress.   HENT:   Head: Normocephalic and atraumatic.   Eyes: Pupils are equal, round, and reactive to light.   Neck: Normal range of motion. Neck supple.   Cardiovascular: Regular rhythm and normal heart sounds.    No murmur heard.  Tachycardic.    Pulmonary/Chest: Effort normal and breath sounds normal. No respiratory distress.   Abdominal: Soft. Bowel sounds are normal. She exhibits no distension.   Musculoskeletal: Normal range of motion.   Neurological: She is alert and oriented to person, place, and time.   Skin: She is not diaphoretic.       Significant Labs:   CBC:   Recent Labs  Lab 01/12/17  2131 01/13/17  1027 01/14/17  0443   WBC 15.43* 14.05* 16.54*   HGB 12.8 12.1 11.8*   HCT 35.8* 33.6* 33.4*    161 160       Significant Imaging: I have reviewed all pertinent imaging results/findings within the past 24 hours.    Assessment/Plan:      Staphylococcus aureus sepsis  Septic  "encephalopathy  Severe sepsis  Urinary tract infection without hematuria  Patient had episode of SOB and was confused last night. ABG was ordered and showed low PO2 and procalcitonin was elevated at 26.8. Patient also continued to have fevers. CTA was ordered due to concern for pulmonary embolism - although WELLS score was low - and CTA showed no PE but it did show, "Scattered areas of consolidation in both lungs. This acute pulmonary disease could represent multifocal pneumonia..." Lactate was trended from 2.7 to 1.7 after a liter of NS. Patient this AM was doing well and was sitting up and walking around with assistance.   - continuing with vanc and rocephin and adding azithromycin 500mg to cover for atypicals.     Hypertension  - continue verapamil and hold off on Diovan in setting of RAHEEL    Altered mental status  Volume depletion  2/2 UTI vs hyponatremia vs intercranial event - status much improved today  - continue to monitor  - CT and MRI head negative  -avoid benzo, narcotics if possible    Gastroenteritis  -likely viral in etiology  -pt does not recall any bad/new food intake or sick person exposure  - has had decreased PO inatake  -treat symptomatically with antiemetics and IVF  - symptoms improving    RAHEEL (acute kidney injury)   -likely 2/2 hypovolemia in setting of vomiting and diarrhea and decreased PO intake  -baseline 0.9, on admission 1.2 - improving  - will hold of on ace/arb med for now    VTE Risk Mitigation         Ordered     heparin (porcine) injection 5,000 Units  Every 8 hours     Route:  Subcutaneous        01/13/17 0158     Low Risk of VTE  Once      01/13/17 0158          Diet: Diabetic  Ppx: Heparin 5KU  DISPO: pending resolution of symptoms and possible d/c to SNF    Porfirio Posey MD  Department of Hospital Medicine   [facility]                      01/14/2017                             STAFF PHYSICIAN NOTE                                   Attending Attestation for " Rounds with Resident  I have reviewed and concur with the resident's history, physical, assessment, and plan.  Iagree with the resident's findings.                                  ________________________________________                                     REASON FOR ADMISSION:     Patient is 76 y.o.female    Body mass index is 26.78 kg/(m^2).,  Severe sepsis    Pt away at [facility] today.  Already seen by Dr. Holt for today.

## 2017-01-14 NOTE — PROGRESS NOTES
Paged medicine on call regarding patient with labored mouth breathing, restless and confused, spo2 84%. Patient placed on 2L nasal cannula and repositioned, sp02 returned to 100%. Patient states she does not feel short of breath, but still continues to display labored mouth breathing.  stated he would come to floor. No new orders, will continue to monitor.

## 2017-01-14 NOTE — PROGRESS NOTES
Transfer from 728A  To 1108A    Transfer via wheelchair and 2L nasal cannula    Transfer with pt belongings and family at bedside    Transported by RN    Medicines sent: none    Pt's chart given to UD

## 2017-01-14 NOTE — PLAN OF CARE
Problem: Occupational Therapy Goal  Goal: Occupational Therapy Goal  Goals to be met by: 2017    Patient will increase functional independence with ADLs by performin. Pt will perform supine<>sit transfer with supervision to increase independence and safety with functional transfers.   2. Pt will perform sit<>stand transfer with supervision to increase independence and safety with functional transfers.  3. Pt will demonstrate improve activity tolerance by walking continuous for 2 minutes with no RB   4. Pt will perform therex 2x10 with supervision for BUE strengthening.   5. Pt will perform bed to chair transfer with supervision to increase independence and safety with functional transfers  6. Pt will perform bedside commode transfer with supervision to increase independence and safety with functional transfers.  7. Pt will complete standing grooming activity with supervision to increase endurance for completion of ADLs.  Outcome: Ongoing (interventions implemented as appropriate)  OT evaluation completed. OT goals and POC established.     CLOTILDE Harvey  2017

## 2017-01-14 NOTE — ASSESSMENT & PLAN NOTE
-likely viral in etiology  -pt does not recall any bad/new food intake or sick person exposure  - has had decreased PO inatake  -treat symptomatically with antiemetics and IVF  - symptoms improving

## 2017-01-14 NOTE — ASSESSMENT & PLAN NOTE
"Patient had episode of SOB and was confused last night. ABG was ordered and showed low PO2 and procalcitonin was elevated at 26.8. Patient also continued to have fevers. CTA was ordered due to concern for pulmonary embolism - although WELLS score was low - and CTA showed no PE but it did show, "Scattered areas of consolidation in both lungs. This acute pulmonary disease could represent multifocal pneumonia..." Lactate was trended from 2.7 to 1.7 after a liter of NS. Patient this AM was doing well and was sitting up and walking around with assistance.   - continuing with vanc and rocephin and adding azithromycin 500mg to cover for atypicals.     "

## 2017-01-14 NOTE — PLAN OF CARE
Problem: Patient Care Overview  Goal: Plan of Care Review  Outcome: Ongoing (interventions implemented as appropriate)  Pt free of falls/trauma/injuries this shift.  VSS. Pt receiving IV and PO antibiotics per MD order. Pt was confused at the start of the shift, but has become more oriented to time, place, and situation. Pt voices no complaints. Will continue to monitor pt.

## 2017-01-14 NOTE — PROGRESS NOTES
Pt back to room from CT, Dr. Posey ordered a diet and also MD made aware of positive blood cultures drawn on 1/13/17,

## 2017-01-14 NOTE — SUBJECTIVE & OBJECTIVE
Interval History: Patient had fevers and SOB overnight. Improved in the AM. Starting azithromycin.    Review of Systems   Constitutional: Positive for fever. Negative for chills.   HENT: Negative for congestion and sore throat.    Eyes: Negative for photophobia.   Respiratory: Negative for apnea, cough, chest tightness, shortness of breath and wheezing.    Cardiovascular: Negative for chest pain.   Gastrointestinal: Positive for diarrhea. Negative for abdominal pain, anal bleeding, constipation and nausea.   Genitourinary: Positive for dysuria. Negative for flank pain.   Musculoskeletal: Negative for arthralgias and back pain.   Skin: Negative for pallor.   Neurological: Negative for dizziness and headaches.   Psychiatric/Behavioral: Positive for confusion.     Objective:     Vital Signs (Most Recent):  Temp: 98.8 °F (37.1 °C) (01/14/17 1300)  Pulse: 103 (01/14/17 1300)  Resp: 18 (01/14/17 1300)  BP: 128/60 (01/14/17 1300)  SpO2: (!) 93 % (01/14/17 1300) Vital Signs (24h Range):  Temp:  [97.4 °F (36.3 °C)-102.5 °F (39.2 °C)] 98.8 °F (37.1 °C)  Pulse:  [] 103  Resp:  [18-26] 18  SpO2:  [90 %-94 %] 93 %  BP: (117-168)/(60-81) 128/60     Weight: 77.6 kg (171 lb)  Body mass index is 26.78 kg/(m^2).    Intake/Output Summary (Last 24 hours) at 01/14/17 1522  Last data filed at 01/14/17 0555   Gross per 24 hour   Intake              840 ml   Output              200 ml   Net              640 ml      Physical Exam   Constitutional: She is oriented to person, place, and time. She appears well-developed and well-nourished. No distress.   HENT:   Head: Normocephalic and atraumatic.   Eyes: Pupils are equal, round, and reactive to light.   Neck: Normal range of motion. Neck supple.   Cardiovascular: Regular rhythm and normal heart sounds.    No murmur heard.  Tachycardic.    Pulmonary/Chest: Effort normal and breath sounds normal. No respiratory distress.   Abdominal: Soft. Bowel sounds are normal. She exhibits no  distension.   Musculoskeletal: Normal range of motion.   Neurological: She is alert and oriented to person, place, and time.   Skin: She is not diaphoretic.       Significant Labs:   CBC:   Recent Labs  Lab 01/12/17  2131 01/13/17  1027 01/14/17  0443   WBC 15.43* 14.05* 16.54*   HGB 12.8 12.1 11.8*   HCT 35.8* 33.6* 33.4*    161 160       Significant Imaging: I have reviewed all pertinent imaging results/findings within the past 24 hours.

## 2017-01-14 NOTE — PLAN OF CARE
"Problem: Patient Care Overview  Goal: Plan of Care Review  Outcome: Ongoing (interventions implemented as appropriate)  POC reviewed with patient and family at bedside, understanding verbalized. Patient had episode of labored tachypnea during shift, presenting with labored mouth breathing, restlessness, confusion, SPO2 84 and respiratory rate of 34 breaths per minute. Started patient on 2L nasal canula, adjusted position in bed and notified MD. SP02 increased to 100% with treatment, but patient still presented with labored mouth breathing and tachypnea.  Patient stated she does not feel short of breath, and that it is just her "sinuses".  came to bedside. Stat EKG, D-dimer, and chest x-ray ordered. Lung sounds assessed by RNx2 and MD. No crackles or wheezing noted. Patient febrile at 101. Tylenol given and effective, temperature returned to 97. MD kept up to date on changes.   Returned to bedside to assess. Patient no longer febrile, tachypneic, or presenting with labored breathing, kept on 2L nasal cannula per MD Recommendation. Family at bedside, educated on call bell use should they notice any change in patients condition. Patient continues to receive antibiotics by IV. Free from falls or injury, able to ambulate with assist x 1. All safety measures maintained and will continue to monitor.       "

## 2017-01-14 NOTE — PLAN OF CARE
Problem: Patient Care Overview  Goal: Individualization & Mutuality  Outcome: Ongoing (interventions implemented as appropriate)  POC reviewed with pt and family. AAOx4. VS remained stable throughout shift. Pt had an elevated temperature x2 today which was resolved with PRN Tylenol. No new neuro changes. No new skin impairments. No c/o pain or nausea/vomiting. BG checked; no correction insulin needed. Bed locked and low, side rails up x2, with call light in reach. Will continue to monitor.

## 2017-01-14 NOTE — PLAN OF CARE
[facility]    FACILITY TRANSFER ORDERS      Patient Name: Ida Santana  YOB: 1940    PCP: Giovanna Murray MD   PCP Address: [facility] [address] [facility] / [place] [address]  PCP Phone Number: [phone]  PCP Fax: [phone]    Encounter Date: 01/14/2017    Admit to: SNF    Vital Signs:  Routine    Diagnoses:   Active Hospital Problems    Diagnosis  POA    *Severe sepsis [A41.9, R65.20]  Yes    Altered mental status [R41.82]  Yes    Gastroenteritis [K52.9]  Yes    RAHEEL (acute kidney injury) [N17.9]  Yes    Volume depletion [E86.9]  Yes    Septic encephalopathy [G93.41]  Yes    Urinary tract infection without hematuria [N39.0]  Yes    Hypertension [I10]  Yes      Resolved Hospital Problems    Diagnosis Date Resolved POA   No resolved problems to display.       Allergies:  Review of patient's allergies indicates:   Allergen Reactions    Adhesive tape-silicones      Other reaction(s): pulling skin off    Atorvastatin      Other reaction(s): Muscle pain    Pravachol  [pravastatin] Rash       Diet: regular diet    Activities: Activity as tolerated, Ambulate in wren and Bathroom privileges with assistance    Nursing: Help with ADLs until patient shows she is competent.      Labs: CBC and CMP Once on 1/20/17    CONSULTS:    Physical Therapy to evaluate and treat.  and Occupational Therapy to evaluate and treat.    MISCELLANEOUS CARE:  None    WOUND CARE ORDERS  None    Medications: Review discharge medications with patient and family and provide education.      Current Discharge Medication List      CONTINUE these medications which have NOT CHANGED    Details   BLOOD SUGAR DIAGNOSTIC (ONE TOUCH II TEST MISC) * * * *.  CHECKS 1 TIME DAILY AT LEAST      CALCIUM CARBONATE/VITAMIN D3 (CALCIUM WITH VITAMIN D3 ORAL) Take 1 tablet by mouth.       flurandrenolide (CORDRAN) 0.05 % lotion Apply to  scalp one to two times daily as needed for dryness      ketoconazole (NIZORAL) 2 % shampoo USE  EVERY OTHER DAY LET SIT ON SCALP FOR FIVE MINUTES BEFORE RINSING  Qty: 120 mL, Refills: 6      MULTIVITAMIN WITH MINERALS (ONE-A-DAY 50 PLUS) Tab Take 1 tablet by mouth once daily.       om-3-epa-dha-fish oil-flax-E (THERA TEARS NUTRITION) [phone] up-lw-mi-unit Cap Take 3 capsules by mouth every morning. Over-the-counter supplement    Associated Diagnoses: Dry eye syndrome, bilateral      potassium chloride SA (K-DUR,KLOR-CON) 20 MEQ tablet take 1 tablet by mouth twice a day  Qty: 60 tablet, Refills: 12      valsartan-hydrochlorothiazide (DIOVAN-HCT) 320-12.5 mg per tablet take 1 tablet by mouth once daily  Qty: 30 tablet, Refills: 11      verapamil (CALAN-SR) 240 MG CR tablet take 1 tablet by mouth twice a day  Qty: 30 tablet, Refills: 12                  _________________________________  Porfirio Posey MD  01/14/2017

## 2017-01-15 PROBLEM — N17.9 AKI (ACUTE KIDNEY INJURY): Status: RESOLVED | Noted: 2017-01-13 | Resolved: 2017-01-15

## 2017-01-15 LAB
ALBUMIN SERPL BCP-MCNC: 2 G/DL
ALP SERPL-CCNC: 83 U/L
ALT SERPL W/O P-5'-P-CCNC: 30 U/L
ANION GAP SERPL CALC-SCNC: 7 MMOL/L
AST SERPL-CCNC: 35 U/L
BACTERIA BLD CULT: NORMAL
BASOPHILS # BLD AUTO: 0.02 K/UL
BASOPHILS NFR BLD: 0.1 %
BILIRUB SERPL-MCNC: 0.7 MG/DL
BUN SERPL-MCNC: 17 MG/DL
CALCIUM SERPL-MCNC: 8.8 MG/DL
CHLORIDE SERPL-SCNC: 105 MMOL/L
CO2 SERPL-SCNC: 23 MMOL/L
CREAT SERPL-MCNC: 0.9 MG/DL
DIFFERENTIAL METHOD: ABNORMAL
EOSINOPHIL # BLD AUTO: 0 K/UL
EOSINOPHIL NFR BLD: 0.1 %
ERYTHROCYTE [DISTWIDTH] IN BLOOD BY AUTOMATED COUNT: 13.5 %
EST. GFR  (AFRICAN AMERICAN): >60 ML/MIN/1.73 M^2
EST. GFR  (NON AFRICAN AMERICAN): >60 ML/MIN/1.73 M^2
GLUCOSE SERPL-MCNC: 138 MG/DL
HCT VFR BLD AUTO: 31.7 %
HGB BLD-MCNC: 11.4 G/DL
LYMPHOCYTES # BLD AUTO: 0.8 K/UL
LYMPHOCYTES NFR BLD: 5.2 %
MCH RBC QN AUTO: 31.8 PG
MCHC RBC AUTO-ENTMCNC: 36 %
MCV RBC AUTO: 89 FL
MONOCYTES # BLD AUTO: 1 K/UL
MONOCYTES NFR BLD: 6.3 %
NEUTROPHILS # BLD AUTO: 14.2 K/UL
NEUTROPHILS NFR BLD: 87.7 %
PLATELET # BLD AUTO: 183 K/UL
PMV BLD AUTO: 11.3 FL
POCT GLUCOSE: 120 MG/DL (ref 70–110)
POCT GLUCOSE: 131 MG/DL (ref 70–110)
POCT GLUCOSE: 165 MG/DL (ref 70–110)
POTASSIUM SERPL-SCNC: 3.2 MMOL/L
PROT SERPL-MCNC: 6.2 G/DL
RBC # BLD AUTO: 3.58 M/UL
SODIUM SERPL-SCNC: 135 MMOL/L
VANCOMYCIN TROUGH SERPL-MCNC: 13.3 UG/ML
WBC # BLD AUTO: 16.13 K/UL

## 2017-01-15 PROCEDURE — 85025 COMPLETE CBC W/AUTO DIFF WBC: CPT

## 2017-01-15 PROCEDURE — 87077 CULTURE AEROBIC IDENTIFY: CPT

## 2017-01-15 PROCEDURE — 87040 BLOOD CULTURE FOR BACTERIA: CPT | Mod: 59

## 2017-01-15 PROCEDURE — 80202 ASSAY OF VANCOMYCIN: CPT

## 2017-01-15 PROCEDURE — 27000221 HC OXYGEN, UP TO 24 HOURS

## 2017-01-15 PROCEDURE — 25000003 PHARM REV CODE 250: Performed by: INTERNAL MEDICINE

## 2017-01-15 PROCEDURE — 25000003 PHARM REV CODE 250: Performed by: STUDENT IN AN ORGANIZED HEALTH CARE EDUCATION/TRAINING PROGRAM

## 2017-01-15 PROCEDURE — 25000003 PHARM REV CODE 250: Performed by: EMERGENCY MEDICINE

## 2017-01-15 PROCEDURE — 86580 TB INTRADERMAL TEST: CPT | Performed by: STUDENT IN AN ORGANIZED HEALTH CARE EDUCATION/TRAINING PROGRAM

## 2017-01-15 PROCEDURE — 36415 COLL VENOUS BLD VENIPUNCTURE: CPT

## 2017-01-15 PROCEDURE — 63600175 PHARM REV CODE 636 W HCPCS: Performed by: STUDENT IN AN ORGANIZED HEALTH CARE EDUCATION/TRAINING PROGRAM

## 2017-01-15 PROCEDURE — 99233 SBSQ HOSP IP/OBS HIGH 50: CPT | Mod: ,,, | Performed by: HOSPITALIST

## 2017-01-15 PROCEDURE — 94667 MNPJ CHEST WALL 1ST: CPT

## 2017-01-15 PROCEDURE — 63600175 PHARM REV CODE 636 W HCPCS: Performed by: INTERNAL MEDICINE

## 2017-01-15 PROCEDURE — 11000001 HC ACUTE MED/SURG PRIVATE ROOM

## 2017-01-15 PROCEDURE — 80053 COMPREHEN METABOLIC PANEL: CPT

## 2017-01-15 PROCEDURE — 87186 SC STD MICRODIL/AGAR DIL: CPT

## 2017-01-15 PROCEDURE — 94799 UNLISTED PULMONARY SVC/PX: CPT

## 2017-01-15 RX ADMIN — PANTOPRAZOLE SODIUM 40 MG: 40 TABLET, DELAYED RELEASE ORAL at 08:01

## 2017-01-15 RX ADMIN — VERAPAMIL HYDROCHLORIDE 240 MG: 120 TABLET, FILM COATED, EXTENDED RELEASE ORAL at 09:01

## 2017-01-15 RX ADMIN — Medication 3 ML: at 09:01

## 2017-01-15 RX ADMIN — VERAPAMIL HYDROCHLORIDE 240 MG: 120 TABLET, FILM COATED, EXTENDED RELEASE ORAL at 08:01

## 2017-01-15 RX ADMIN — VANCOMYCIN HYDROCHLORIDE 1250 MG: 1 INJECTION, POWDER, LYOPHILIZED, FOR SOLUTION INTRAVENOUS at 05:01

## 2017-01-15 RX ADMIN — Medication 5 UNITS: at 10:01

## 2017-01-15 RX ADMIN — HEPARIN SODIUM 5000 UNITS: 5000 INJECTION, SOLUTION INTRAVENOUS; SUBCUTANEOUS at 09:01

## 2017-01-15 RX ADMIN — SODIUM CHLORIDE 1000 ML: 0.9 INJECTION, SOLUTION INTRAVENOUS at 10:01

## 2017-01-15 RX ADMIN — Medication 3 ML: at 08:01

## 2017-01-15 RX ADMIN — ACETAMINOPHEN 650 MG: 325 TABLET ORAL at 09:01

## 2017-01-15 RX ADMIN — VANCOMYCIN HYDROCHLORIDE 1500 MG: 500 INJECTION, POWDER, LYOPHILIZED, FOR SOLUTION INTRAVENOUS at 05:01

## 2017-01-15 RX ADMIN — Medication 3 ML: at 02:01

## 2017-01-15 RX ADMIN — HEPARIN SODIUM 5000 UNITS: 5000 INJECTION, SOLUTION INTRAVENOUS; SUBCUTANEOUS at 05:01

## 2017-01-15 RX ADMIN — CEFTRIAXONE 1 G: 1 INJECTION, SOLUTION INTRAVENOUS at 08:01

## 2017-01-15 RX ADMIN — HEPARIN SODIUM 5000 UNITS: 5000 INJECTION, SOLUTION INTRAVENOUS; SUBCUTANEOUS at 01:01

## 2017-01-15 RX ADMIN — ACETAMINOPHEN 650 MG: 325 TABLET ORAL at 05:01

## 2017-01-15 RX ADMIN — Medication 500 MG: at 09:01

## 2017-01-15 NOTE — PLAN OF CARE
Pt free from injury and in no acute distress. Denies pain. Wheels locked, call light in reach, bed in lowest position, side rails up x2, family at bedside. Will continue to monitor.

## 2017-01-15 NOTE — ASSESSMENT & PLAN NOTE
- has had continued fevers and bcx grew MRSA, concerned that 2/2 continued + bcx patient may have staph seeded somewhere, possible endocarditis. She does not have any recent significant hospital contact.   - TTE  - continuing with vanc and rocephin  - 1L NS

## 2017-01-15 NOTE — PROGRESS NOTES
[facility] Hospital Medicine  Progress Note    Patient Name: Ida Santana  MRN: [id number]  Patient Class: IP- Inpatient   Admission Date: 1/12/2017  Length of Stay: 3 days  Attending Physician: Loyda Lui MD  Primary Care Provider: Giovanna Murray MD    [facility] Medicine Team: [facility] HOSP MED 1 Porfirio Posey MD    Subjective:     Principal Problem:Severe sepsis      Hospital Course:  1/15/17: Patient has continued to have fevers. Bcx grew out MRSA. Increased dosing of vanc as trough was 13.3 and we want btwn 15-20. OT saw patient yesterday and recommended SNF 2/2 patient's confusion. Patient this AM was not confused but has not been getting out of bed. Explained to pt and family why it is important to get out of bed and move around as to not get deconditioned. Repeating bcx after afternoon vanc. Giving 1L NS as patient has not been drinking or eating very much per family. Ordered TTE as concern for endocarditis. Ordered ICS and chest physiotherapy as pt has been coughing up mucus.       Interval History: Continues to have fevers and now has productive cough. Bcx grew MRSA.     Review of Systems   Constitutional: Positive for fever. Negative for chills.   HENT: Negative for congestion and sore throat.    Eyes: Negative for photophobia.   Respiratory: Negative for apnea, cough, chest tightness, shortness of breath and wheezing.    Cardiovascular: Negative for chest pain.   Gastrointestinal: Negative for abdominal pain, anal bleeding, constipation, diarrhea and nausea.   Genitourinary: Negative for flank pain.   Musculoskeletal: Negative for arthralgias and back pain.   Skin: Negative for pallor.   Neurological: Negative for dizziness and headaches.   Psychiatric/Behavioral: Positive for confusion.     Objective:     Vital Signs (Most Recent):  Temp: 100.1 °F (37.8 °C) (01/15/17 0804)  Pulse: 69 (01/15/17 0804)  Resp: 18 (01/15/17 0804)  BP: (!) 114/56 (01/15/17 0804)  SpO2: (!) 92 % (01/15/17  0804) Vital Signs (24h Range):  Temp:  [98.2 °F (36.8 °C)-102.4 °F (39.1 °C)] 100.1 °F (37.8 °C)  Pulse:  [] 69  Resp:  [18] 18  SpO2:  [92 %-94 %] 92 %  BP: (114-140)/(56-68) 114/56     Weight: 77.6 kg (171 lb)  Body mass index is 26.78 kg/(m^2).  No intake or output data in the 24 hours ending 01/15/17 1028   Physical Exam   Constitutional: She is oriented to person, place, and time. She appears well-developed and well-nourished. No distress.   HENT:   Head: Normocephalic and atraumatic.   Eyes: Pupils are equal, round, and reactive to light.   Neck: Normal range of motion. Neck supple.   Cardiovascular: Regular rhythm and normal heart sounds.    No murmur heard.  Tachycardic.    Pulmonary/Chest: Effort normal and breath sounds normal. No respiratory distress.   Abdominal: Soft. Bowel sounds are normal. She exhibits no distension.   Musculoskeletal: Normal range of motion.   Neurological: She is alert and oriented to person, place, and time.   Skin: She is not diaphoretic.       Significant Labs:   Blood Culture:   Recent Labs  Lab 01/13/17  1703 01/13/17  1704 01/14/17  1101   LABBLOO Gram stain aer bottle: Gram positive cocci in clusters resembling Staph  Gram stain alea bottle: Gram positive cocci in clusters resembling Staph   Results called to and read back by:Yariel Verdin RN 01/14/2017  09:19  METHICILLIN RESISTANT STAPHYLOCOCCUS AUREUSFor susceptibility see order #[id number] Gram stain aer bottle: Gram positive cocci in clusters resembling Staph   Results called to and read back by:Yariel Verdin RN 01/14/2017  09:18  Gram stain alea bottle: Gram positive cocci in clusters resembling Staph  01/14/2017  11:07   Results previously called.  METHICILLIN RESISTANT STAPHYLOCOCCUS AUREUSFor susceptibility see order #[id number] Gram stain aer bottle: Gram positive cocci in clusters resembling Staph   Results called to and read back by:Taylor Valerio RN 01/15/2017  02:34  Gram stain aer bottle: Gram  positive cocci in clusters resembling Staph   Results called to and read back by:Taylor Valerio RN 01/15/2017  02:34     CBC:   Recent Labs  Lab 01/14/17  0443 01/15/17  0453   WBC 16.54* 16.13*   HGB 11.8* 11.4*   HCT 33.4* 31.7*    183     CMP:   Recent Labs  Lab 01/15/17  0749   *   K 3.2*      CO2 23   *   BUN 17   CREATININE 0.9   CALCIUM 8.8   PROT 6.2   ALBUMIN 2.0*   BILITOT 0.7   ALKPHOS 83   AST 35   ALT 30   ANIONGAP 7*   EGFRNONAA >60.0       Significant Imaging: I have reviewed all pertinent imaging results/findings within the past 24 hours.    Assessment/Plan:      Staphylococcus aureus sepsis/bactermia  MRSA bacteremia   Urinary tract infection without hematuria  Septic encephalopathy  - has had continued fevers and bcx grew MRSA, concerned that 2/2 continued + bcx patient may have staph seeded somewhere, possible endocarditis. She does not have any recent significant hospital contact.   - TTE  - continuing with vanc and rocephin  - 1L NS    Hypertension  - continue verapamil and hold off on Diovan in setting of low BP    Altered mental status  2/2 UTI vs hyponatremia vs intercranial event - status much improved today  - continue to monitor  - CT and MRI head negative  -avoid benzo, narcotics if possible    Gastroenteritis  -likely viral in etiology  -pt does not recall any bad/new food intake or sick person exposure  - has had decreased PO inatake  -treat symptomatically with antiemetics and IVF  - symptoms improving      RAHEEL (acute kidney injury), resolved as of 1/15/2017       Diet: Diabetic  Ppx: Heparin 5KU  DISPO: Pending finding source of bacteremia and negative blood cultures. OT recommended SNF. I told family that they needed to discuss amongst each other.     Porfirio Posey MD  Department of Hospital Medicine   [facility]                      01/15/2017                             STAFF PHYSICIAN NOTE                                   Attending  Attestation for Rounds with Resident  I have reviewed and concur with the resident's history, physical, assessment, and plan.  I have personally interviewed and examined the patient at bedside and agree with the resident's findings.                                  ________________________________________                                     REASON FOR ADMISSION:     Patient is 76 y.o.female    Body mass index is 26.78 kg/(m^2).,  Severe sepsis

## 2017-01-15 NOTE — SUBJECTIVE & OBJECTIVE
Interval History: Continues to have fevers and now has productive cough. Bcx grew MRSA.     Review of Systems   Constitutional: Positive for fever. Negative for chills.   HENT: Negative for congestion and sore throat.    Eyes: Negative for photophobia.   Respiratory: Negative for apnea, cough, chest tightness, shortness of breath and wheezing.    Cardiovascular: Negative for chest pain.   Gastrointestinal: Negative for abdominal pain, anal bleeding, constipation, diarrhea and nausea.   Genitourinary: Negative for flank pain.   Musculoskeletal: Negative for arthralgias and back pain.   Skin: Negative for pallor.   Neurological: Negative for dizziness and headaches.   Psychiatric/Behavioral: Positive for confusion.     Objective:     Vital Signs (Most Recent):  Temp: 100.1 °F (37.8 °C) (01/15/17 0804)  Pulse: 69 (01/15/17 0804)  Resp: 18 (01/15/17 0804)  BP: (!) 114/56 (01/15/17 0804)  SpO2: (!) 92 % (01/15/17 0804) Vital Signs (24h Range):  Temp:  [98.2 °F (36.8 °C)-102.4 °F (39.1 °C)] 100.1 °F (37.8 °C)  Pulse:  [] 69  Resp:  [18] 18  SpO2:  [92 %-94 %] 92 %  BP: (114-140)/(56-68) 114/56     Weight: 77.6 kg (171 lb)  Body mass index is 26.78 kg/(m^2).  No intake or output data in the 24 hours ending 01/15/17 1028   Physical Exam   Constitutional: She is oriented to person, place, and time. She appears well-developed and well-nourished. No distress.   HENT:   Head: Normocephalic and atraumatic.   Eyes: Pupils are equal, round, and reactive to light.   Neck: Normal range of motion. Neck supple.   Cardiovascular: Regular rhythm and normal heart sounds.    No murmur heard.  Tachycardic.    Pulmonary/Chest: Effort normal and breath sounds normal. No respiratory distress.   Abdominal: Soft. Bowel sounds are normal. She exhibits no distension.   Musculoskeletal: Normal range of motion.   Neurological: She is alert and oriented to person, place, and time.   Skin: She is not diaphoretic.       Significant Labs:   Blood  Culture:   Recent Labs  Lab 01/13/17  1703 01/13/17  1704 01/14/17  1101   LABBLOO Gram stain aer bottle: Gram positive cocci in clusters resembling Staph  Gram stain alea bottle: Gram positive cocci in clusters resembling Staph   Results called to and read back by:Yariel Verdin RN 01/14/2017  09:19  METHICILLIN RESISTANT STAPHYLOCOCCUS AUREUSFor susceptibility see order #[id number] Gram stain aer bottle: Gram positive cocci in clusters resembling Staph   Results called to and read back by:Yariel Verdin RN 01/14/2017  09:18  Gram stain alea bottle: Gram positive cocci in clusters resembling Staph  01/14/2017  11:07   Results previously called.  METHICILLIN RESISTANT STAPHYLOCOCCUS AUREUSFor susceptibility see order #[id number] Gram stain aer bottle: Gram positive cocci in clusters resembling Staph   Results called to and read back by:Taylor Valerio RN 01/15/2017  02:34  Gram stain aer bottle: Gram positive cocci in clusters resembling Staph   Results called to and read back by:aTylor Valerio RN 01/15/2017  02:34     CBC:   Recent Labs  Lab 01/14/17  0443 01/15/17  0453   WBC 16.54* 16.13*   HGB 11.8* 11.4*   HCT 33.4* 31.7*    183     CMP:   Recent Labs  Lab 01/15/17  0749   *   K 3.2*      CO2 23   *   BUN 17   CREATININE 0.9   CALCIUM 8.8   PROT 6.2   ALBUMIN 2.0*   BILITOT 0.7   ALKPHOS 83   AST 35   ALT 30   ANIONGAP 7*   EGFRNONAA >60.0       Significant Imaging: I have reviewed all pertinent imaging results/findings within the past 24 hours.

## 2017-01-15 NOTE — PT/OT/SLP PROGRESS
Physical Therapy      Ida Santana  MRN: [id number]    PT evaluation attempted in AM, MD with pt. PT unable to re-attempt in PM. Will re-attempt evaluation tomorrow.    JOEL CLEMENS, PT   1/15/2017

## 2017-01-16 ENCOUNTER — ANESTHESIA EVENT (OUTPATIENT)
Dept: MEDSURG UNIT | Facility: HOSPITAL | Age: 77
DRG: 871 | End: 2017-01-16
Payer: COMMERCIAL

## 2017-01-16 PROBLEM — B95.62 MRSA BACTEREMIA: Status: ACTIVE | Noted: 2017-01-16

## 2017-01-16 PROBLEM — R78.81 MRSA BACTEREMIA: Status: ACTIVE | Noted: 2017-01-16

## 2017-01-16 LAB
ALBUMIN SERPL BCP-MCNC: 2 G/DL
ALP SERPL-CCNC: 96 U/L
ALT SERPL W/O P-5'-P-CCNC: 40 U/L
AMORPH CRY UR QL COMP ASSIST: ABNORMAL
ANION GAP SERPL CALC-SCNC: 7 MMOL/L
AST SERPL-CCNC: 40 U/L
BACTERIA #/AREA URNS AUTO: ABNORMAL /HPF
BACTERIA BLD CULT: NORMAL
BASOPHILS # BLD AUTO: 0.02 K/UL
BASOPHILS NFR BLD: 0.1 %
BILIRUB SERPL-MCNC: 0.7 MG/DL
BILIRUB UR QL STRIP: NEGATIVE
BUN SERPL-MCNC: 16 MG/DL
CALCIUM SERPL-MCNC: 8.9 MG/DL
CHLORIDE SERPL-SCNC: 108 MMOL/L
CLARITY UR REFRACT.AUTO: ABNORMAL
CO2 SERPL-SCNC: 24 MMOL/L
COLOR UR AUTO: YELLOW
CREAT SERPL-MCNC: 0.8 MG/DL
DIASTOLIC DYSFUNCTION: YES
DIFFERENTIAL METHOD: ABNORMAL
EOSINOPHIL # BLD AUTO: 0 K/UL
EOSINOPHIL NFR BLD: 0.1 %
ERYTHROCYTE [DISTWIDTH] IN BLOOD BY AUTOMATED COUNT: 13.8 %
EST. GFR  (AFRICAN AMERICAN): >60 ML/MIN/1.73 M^2
EST. GFR  (NON AFRICAN AMERICAN): >60 ML/MIN/1.73 M^2
GLOBAL PERICARDIAL EFFUSION: ABNORMAL
GLUCOSE SERPL-MCNC: 115 MG/DL
GLUCOSE UR QL STRIP: NEGATIVE
HCT VFR BLD AUTO: 32.8 %
HGB BLD-MCNC: 11.7 G/DL
HGB UR QL STRIP: ABNORMAL
HYALINE CASTS UR QL AUTO: 0 /LPF
KETONES UR QL STRIP: NEGATIVE
LEUKOCYTE ESTERASE UR QL STRIP: NEGATIVE
LYMPHOCYTES # BLD AUTO: 0.7 K/UL
LYMPHOCYTES NFR BLD: 4.5 %
MCH RBC QN AUTO: 32 PG
MCHC RBC AUTO-ENTMCNC: 35.7 %
MCV RBC AUTO: 90 FL
MICROSCOPIC COMMENT: ABNORMAL
MONOCYTES # BLD AUTO: 1.6 K/UL
MONOCYTES NFR BLD: 9.7 %
NEUTROPHILS # BLD AUTO: 13.7 K/UL
NEUTROPHILS NFR BLD: 85 %
NITRITE UR QL STRIP: NEGATIVE
PH UR STRIP: 6 [PH] (ref 5–8)
PLATELET # BLD AUTO: 200 K/UL
PMV BLD AUTO: 10.7 FL
POCT GLUCOSE: 120 MG/DL (ref 70–110)
POCT GLUCOSE: 154 MG/DL (ref 70–110)
POCT GLUCOSE: 185 MG/DL (ref 70–110)
POTASSIUM SERPL-SCNC: 3.4 MMOL/L
PROT SERPL-MCNC: 6.3 G/DL
PROT UR QL STRIP: ABNORMAL
RBC # BLD AUTO: 3.66 M/UL
RBC #/AREA URNS AUTO: 13 /HPF (ref 0–4)
RETIRED EF AND QEF - SEE NOTES: 65 (ref 55–65)
SODIUM SERPL-SCNC: 139 MMOL/L
SP GR UR STRIP: 1.01 (ref 1–1.03)
SQUAMOUS #/AREA URNS AUTO: 1 /HPF
URN SPEC COLLECT METH UR: ABNORMAL
UROBILINOGEN UR STRIP-ACNC: NEGATIVE EU/DL
WBC # BLD AUTO: 16.15 K/UL
WBC #/AREA URNS AUTO: 1 /HPF (ref 0–5)

## 2017-01-16 PROCEDURE — 85025 COMPLETE CBC W/AUTO DIFF WBC: CPT

## 2017-01-16 PROCEDURE — 63600175 PHARM REV CODE 636 W HCPCS: Performed by: STUDENT IN AN ORGANIZED HEALTH CARE EDUCATION/TRAINING PROGRAM

## 2017-01-16 PROCEDURE — 25000003 PHARM REV CODE 250: Performed by: STUDENT IN AN ORGANIZED HEALTH CARE EDUCATION/TRAINING PROGRAM

## 2017-01-16 PROCEDURE — 87449 NOS EACH ORGANISM AG IA: CPT

## 2017-01-16 PROCEDURE — 99233 SBSQ HOSP IP/OBS HIGH 50: CPT | Mod: ,,, | Performed by: HOSPITALIST

## 2017-01-16 PROCEDURE — 25000003 PHARM REV CODE 250: Performed by: EMERGENCY MEDICINE

## 2017-01-16 PROCEDURE — 93307 TTE W/O DOPPLER COMPLETE: CPT

## 2017-01-16 PROCEDURE — 99223 1ST HOSP IP/OBS HIGH 75: CPT | Mod: ,,, | Performed by: INTERNAL MEDICINE

## 2017-01-16 PROCEDURE — 87040 BLOOD CULTURE FOR BACTERIA: CPT

## 2017-01-16 PROCEDURE — 36415 COLL VENOUS BLD VENIPUNCTURE: CPT

## 2017-01-16 PROCEDURE — 81001 URINALYSIS AUTO W/SCOPE: CPT

## 2017-01-16 PROCEDURE — 25000003 PHARM REV CODE 250: Performed by: INTERNAL MEDICINE

## 2017-01-16 PROCEDURE — 87086 URINE CULTURE/COLONY COUNT: CPT

## 2017-01-16 PROCEDURE — 11000001 HC ACUTE MED/SURG PRIVATE ROOM

## 2017-01-16 PROCEDURE — 80053 COMPREHEN METABOLIC PANEL: CPT

## 2017-01-16 PROCEDURE — 93307 TTE W/O DOPPLER COMPLETE: CPT | Mod: 26,,, | Performed by: INTERNAL MEDICINE

## 2017-01-16 PROCEDURE — 87186 SC STD MICRODIL/AGAR DIL: CPT

## 2017-01-16 PROCEDURE — 63600175 PHARM REV CODE 636 W HCPCS: Performed by: INTERNAL MEDICINE

## 2017-01-16 PROCEDURE — 87077 CULTURE AEROBIC IDENTIFY: CPT

## 2017-01-16 RX ORDER — BENZONATATE 100 MG/1
100 CAPSULE ORAL 3 TIMES DAILY PRN
Status: DISCONTINUED | OUTPATIENT
Start: 2017-01-16 | End: 2017-01-27 | Stop reason: HOSPADM

## 2017-01-16 RX ADMIN — PANTOPRAZOLE SODIUM 40 MG: 40 TABLET, DELAYED RELEASE ORAL at 08:01

## 2017-01-16 RX ADMIN — CEFTRIAXONE 1 G: 1 INJECTION, SOLUTION INTRAVENOUS at 08:01

## 2017-01-16 RX ADMIN — HEPARIN SODIUM 5000 UNITS: 5000 INJECTION, SOLUTION INTRAVENOUS; SUBCUTANEOUS at 04:01

## 2017-01-16 RX ADMIN — Medication 3 ML: at 04:01

## 2017-01-16 RX ADMIN — HEPARIN SODIUM 5000 UNITS: 5000 INJECTION, SOLUTION INTRAVENOUS; SUBCUTANEOUS at 05:01

## 2017-01-16 RX ADMIN — Medication 3 ML: at 09:01

## 2017-01-16 RX ADMIN — BENZONATATE 100 MG: 100 CAPSULE ORAL at 07:01

## 2017-01-16 RX ADMIN — VERAPAMIL HYDROCHLORIDE 240 MG: 120 TABLET, FILM COATED, EXTENDED RELEASE ORAL at 09:01

## 2017-01-16 RX ADMIN — Medication 3 ML: at 05:01

## 2017-01-16 RX ADMIN — VANCOMYCIN HYDROCHLORIDE 1500 MG: 500 INJECTION, POWDER, LYOPHILIZED, FOR SOLUTION INTRAVENOUS at 05:01

## 2017-01-16 RX ADMIN — VERAPAMIL HYDROCHLORIDE 240 MG: 120 TABLET, FILM COATED, EXTENDED RELEASE ORAL at 08:01

## 2017-01-16 RX ADMIN — ACETAMINOPHEN 650 MG: 325 TABLET ORAL at 09:01

## 2017-01-16 RX ADMIN — HEPARIN SODIUM 5000 UNITS: 5000 INJECTION, SOLUTION INTRAVENOUS; SUBCUTANEOUS at 09:01

## 2017-01-16 RX ADMIN — BENZONATATE 100 MG: 100 CAPSULE ORAL at 05:01

## 2017-01-16 NOTE — ANESTHESIA PREPROCEDURE EVALUATION
01/16/2017  Pre-operative evaluation for Procedure(s) (LRB):  TRANSESOPHAGEAL ECHOCARDIOGRAM (MIS) (N/A)    Ida Santana is a 76 y.o. female with history of HTN, HLD, osteopenia and breast cancer who is brought in by family for acute onset of confusion, emesis and diarrhea being evaluated for the above procedure. Pt w/ MRSA bacteremia on abx. Leukocytosis improving.    Pt on 2L NC. Febrile w/in last 24h.    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    Open angle 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 eyes    Osteopenia    Screening    Neuropathy    Urinary tract infection without hematuria    Altered mental status    Gastroenteritis    Volume depletion    Sepsis    Septic encephalopathy    Severe sepsis    Staphylococcus aureus sepsis    MRSA bacteremia       Review of patient's allergies indicates:   Allergen Reactions    Adhesive tape-silicones      Other reaction(s): pulling skin off    Atorvastatin      Other reaction(s): Muscle pain    Pravachol  [pravastatin] Rash        No current facility-administered medications on file prior to encounter.      Current Outpatient Prescriptions on File Prior to Encounter   Medication Sig Dispense Refill    BLOOD SUGAR DIAGNOSTIC (ONE TOUCH II TEST MISC) * * * *.  CHECKS 1 TIME DAILY AT LEAST      CALCIUM CARBONATE/VITAMIN D3 (CALCIUM WITH VITAMIN D3 ORAL) Take 1 tablet by mouth.       flurandrenolide (CORDRAN) 0.05 % lotion Apply to  scalp one to two times daily as needed for dryness      ketoconazole (NIZORAL) 2 % shampoo USE EVERY OTHER DAY LET SIT ON SCALP FOR FIVE MINUTES  BEFORE RINSING 120 mL 6    MULTIVITAMIN WITH MINERALS (ONE-A-DAY 50 PLUS) Tab Take 1 tablet by mouth once daily.       om-3-epa-dha-fish oil-flax-E (THERA TEARS NUTRITION) [phone] oy-um-uy-unit Cap Take 3 capsules by mouth every morning. Over-the-counter supplement      potassium chloride SA (K-DUR,KLOR-CON) 20 MEQ tablet take 1 tablet by mouth twice a day 60 tablet 12    valsartan-hydrochlorothiazide (DIOVAN-HCT) 320-12.5 mg per tablet take 1 tablet by mouth once daily 30 tablet 11    verapamil (CALAN-SR) 240 MG CR tablet take 1 tablet by mouth twice a day 30 tablet 12       Past Surgical History   Procedure Laterality Date    Breast biopsy  2001     left breast DCIS    Breast surgery  9/2001     left lumpectomy with SLN bx    Colonoscopy      Colonoscopy N/A 10/31/2016     Procedure: COLONOSCOPY;  Surgeon: YAMILETH Richards MD;  Location: [address]);  Service: Endoscopy;  Laterality: N/A;       Social History     Social History    Marital status: Single     Spouse name: N/A    Number of children: N/A    Years of education: N/A     Occupational History    Not on file.     Social History Main Topics    Smoking status: Never Smoker    Smokeless tobacco: Never Used    Alcohol use No    Drug use: No    Sexual activity: No     Other Topics Concern    Not on file     Social History Narrative    Retired  (34 years) with [employer]         Vital Signs Range (Last 24H):  Temp:  [36.9 °C (98.5 °F)-38 °C (100.4 °F)]   Pulse:  []   Resp:  [18-20]   BP: (122-156)/(57-77)   SpO2:  [18 %-95 %]       CBC:   Recent Labs      01/15/17   0453  01/16/17   0359   WBC  16.13*  16.15*   RBC  3.58*  3.66*   HGB  11.4*  11.7*   HCT  31.7*  32.8*   PLT  183  200   MCV  89  90   MCH  31.8*  32.0*   MCHC  36.0  35.7       CMP:   Recent Labs      01/15/17   0749  01/16/17   0359   NA  135*  139   K  3.2*  3.4*   CL  105  108   CO2  23  24   BUN  17  16   CREATININE  0.9  0.8    GLU  138*  115*   CALCIUM  8.8  8.9   ALBUMIN  2.0*  2.0*   PROT  6.2  6.3   ALKPHOS  83  96   ALT  30  40   AST  35  40   BILITOT  0.7  0.7       INR  No results for input(s): INR, PROTIME, APTT in the last 72 hours.    Invalid input(s): PT        Diagnostic Studies:      EKG:  Sinus tachycardia  Otherwise normal ECG  When compared with ECG of 12-JAN-2017 19:54,  No significant change was found  Confirmed by RENATE RODRIGUEZ MD ([id number]) on 1/14/2017 2:59:24 PM      [facility] Anesthesia Evaluation         Review of Systems  Anesthesia Hx:  No problems with previous Anesthesia  History of prior surgery of interest to airway management or planning: Denies Family Hx of Anesthesia complications.   Denies Personal Hx of Anesthesia complications.   Social:  Non-Smoker, Alcohol Use    Hematology/Oncology:  Hematology Normal   Oncology Normal   Oncology Comments: 2001 left breast lumpectomy, no lymph nodes radiation, no chemo.  TABITHA now.       Cardiovascular:   Hypertension Denies MI.    Denies Angina.    Pulmonary:  Pulmonary Normal    Renal/:   Denies Chronic Renal Disease.     Hepatic/GI:   Denies Liver Disease.    Neurological:   Denies TIA. Denies CVA. Denies Seizures.    Endocrine:   Denies Diabetes.        Physical Exam  General:  Well nourished    Airway/Jaw/Neck:  Airway Findings: Mouth Opening: Normal Tongue: Normal  General Airway Assessment: Adult, Average  Mallampati: II  TM Distance: Normal, at least 6 cm  Jaw/Neck Findings:  Neck ROM: Normal ROM       Chest/Lungs:  Chest/Lungs Findings: Clear to auscultation, Normal Respiratory Rate     Heart/Vascular:  Heart Findings: Rate: Normal  Rhythm: Regular Rhythm        Mental Status:  Mental Status Findings:  Cooperative, Alert and Oriented         Anesthesia Plan  Type of Anesthesia, risks & benefits discussed:  Anesthesia Type:  general, MAC  Patient's Preference:   Intra-op Monitoring Plan: standard ASA monitors  Intra-op Monitoring Plan Comments:   Post Op Pain  Control Plan:   Post Op Pain Control Plan Comments:   Induction:   IV  Beta Blocker:  Patient is not currently on a Beta-Blocker (No further documentation required).       Informed Consent: Patient understands risks and agrees with Anesthesia plan.  Questions answered. Anesthesia consent signed with patient.  ASA Score: 3     Day of Surgery Review of History & Physical: I have interviewed and examined the patient. I have reviewed the patient's H&P dated:            Ready For Surgery From Anesthesia Perspective.

## 2017-01-16 NOTE — PLAN OF CARE
1/13/17 Blood: MRSA. Please maintain contact isolation with appropriate use of PPE and hand hygiene. Contact Infection Control at [phone] for guidelines on discontinuation of isolation.

## 2017-01-16 NOTE — PROCEDURES
TRANSESOPHAGEAL ECHOCARDIOGRAPHY PRE-PROCEDURE NOTE    2017    HPI:   This is a 76 y.o. female with remote history of breast cancer s/p XRT and mastectomy, HTN, HLD, who was admitted to hospital medicine 17 for severe sepsis possibly from UTI. She had acute dyspnea on  that led to CTA that was negative for PTE but led to concern over multifocal pneumonia. She continues to improve on broad spectrum antibiotics.    Blood cultures , , and  were positive for MRSA. Culture from 1/15 is NGTD.     Dysphagia or odynophagia: no  Liver disease, esophageal disease, or known varices: no  Snoring or sleep apnea: no  Prior neck surgery or radiation: radiation to chest  Able to move neck in all directions: yes  History of anesthetic difficulties: no  Family history of anesthetic difficulties: no  Last oral intake: 9am 17    PHYSICAL EXAM:  VS: Temp:  [98.5 °F (36.9 °C)-100.4 °F (38 °C)] 99.8 °F (37.7 °C)  Pulse:  [] 92  Resp:  [18-20] 18  SpO2:  [18 %-95 %] 95 %  BP: (122-174)/(57-78) 156/77    Gen: alert, oriented  HEENT: uvula midline  CV: RRR  Pulm: CTA  Neuro: nonfocal    DIAGNOSTICS:  Telemetry/EC16 - sinus tachycardia    Labs:   Hb.7   Plts: 200   INR: n/a   LFTs: 6.3/2.0/0.7/40/40/96   K+: 3.4    Mallampati Class: III  ASA Score: III    ASSESSMENT:  Ms Santana is a 77yo F with remote history of breast cancer s/p XRT and mastectomy, HTN, HLD, who presented with severe sepsis from UTI vs pneumonia source. Blood cultures persistently positive for MRSA. MIS to evaluate for endocarditis.    PLAN:  1. MIS on 17 for evaluation of endocarditis.  2. The risk, benefits, and alternatives to the procedure have been discussed with the patient in detail.The patient wishes to proceed.    -The risks, benefits & alternatives of the procedure were explained to the patient.   -The risks of transesophageal echo include but are not limited to: Dental trauma, esophageal trauma/perforation,  bleeding, laryngospasm/brochospasm, aspiration, sore throat/hoarseness, & dislodgement of the endotracheal tube/nasogastric tube (where applicable).    -The risks of moderate sedation include hypotension, respiratory depression, arrhythmias, bronchospasm, & death.    -Informed consent was obtained & the patient is agreeable to proceed with the procedure.

## 2017-01-16 NOTE — PLAN OF CARE
01/16/17 1644   Discharge Reassessment   Assessment Type Discharge Planning Reassessment   Can the patient answer the patient profile reliably? Yes, cognitively intact   How does the patient rate their overall health at the present time? Fair   Describe the patient's ability to walk at the present time. Minor restrictions or changes   How often would a person be available to care for the patient? Occasionally   During the past month, has the patient often been bothered by feeling down, depressed or hopeless? No   During the past month, has the patient often been bothered by little interest or pleasure in doing things? No   Discharge plan remains the same: Yes   Discharge Plan A Home   Discharge Plan B Home;Home Health

## 2017-01-16 NOTE — ASSESSMENT & PLAN NOTE
77 y/o female admitted for an episode of acute confusion. B/C 1/13/17, 1/14/17 positive for MRSA, 1/15/17 no growth today will follow. Source of bacteremia no obvious (no hardware, no skin breakdown, no cellulitis, no wounds, no trauma, no central lines, no joint or back pain).   - will discontinue Ceftriaxone  - Vancomycin trough 13.3, dose already increased, trough goal 15-20  - will monitor B/C for clearance  - follow MIS results

## 2017-01-16 NOTE — CONSULTS
[facility]  Infectious Disease  Consult Note    Patient Name: Ida Santana  MRN: [id number]  Admission Date: 1/12/2017  Hospital Length of Stay: 4 days  Attending Physician: Loyda Lui MD  Primary Care Provider: Giovanna Murray MD     Isolation Status: Contact    Patient information was obtained from patient, past medical records and ER records.      Consults  Assessment/Plan:     MRSA bacteremia  75 y/o female admitted for an episode of acute confusion. B/C 1/13/17, 1/14/17 positive for MRSA, 1/15/17 no growth today will follow. Source of bacteremia no obvious (no hardware, no skin breakdown, no cellulitis, no wounds, no trauma, no central lines, no joint or back pain).   - will discontinue Ceftriaxone  - Vancomycin trough 13.3, dose already increased, trough goal 15-20  - will monitor B/C for clearance  - follow MIS results        Thank you for your consult. I will follow-up with patient. Please contact us if you have any additional questions.     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    Osteopenia     Potassium depletion 1998       Past Surgical History   Procedure Laterality Date    Breast biopsy  2001     left breast DCIS    Breast surgery  9/2001     left lumpectomy with SLN bx    Colonoscopy      Colonoscopy N/A 10/31/2016     Procedure: COLONOSCOPY;  Surgeon: YAMILETH Richards MD;  Location: [facility] ([address] [place] [address]);  Service: Endoscopy;  Laterality: N/A;       Review of patient's allergies indicates:   Allergen Reactions    Adhesive tape-silicones      Other  reaction(s): pulling skin off    Atorvastatin      Other reaction(s): Muscle pain    Pravachol  [pravastatin] Rash       Medications:  Prescriptions Prior to Admission   Medication Sig    BLOOD SUGAR DIAGNOSTIC (ONE TOUCH II TEST MISC) * * * *.  CHECKS 1 TIME DAILY AT LEAST    CALCIUM CARBONATE/VITAMIN D3 (CALCIUM WITH VITAMIN D3 ORAL) Take 1 tablet by mouth.     flurandrenolide (CORDRAN) 0.05 % lotion Apply to  scalp one to two times daily as needed for dryness    ketoconazole (NIZORAL) 2 % shampoo USE EVERY OTHER DAY LET SIT ON SCALP FOR FIVE MINUTES BEFORE RINSING    MULTIVITAMIN WITH MINERALS (ONE-A-DAY 50 PLUS) Tab Take 1 tablet by mouth once daily.     om-3-epa-dha-fish oil-flax-E (THERA TEARS NUTRITION) [phone] bo-rp-vi-unit Cap Take 3 capsules by mouth every morning. Over-the-counter supplement    potassium chloride SA (K-DUR,KLOR-CON) 20 MEQ tablet take 1 tablet by mouth twice a day    valsartan-hydrochlorothiazide (DIOVAN-HCT) 320-12.5 mg per tablet take 1 tablet by mouth once daily    verapamil (CALAN-SR) 240 MG CR tablet take 1 tablet by mouth twice a day     Antibiotics     Start     Stop Route Frequency Ordered    01/13/17 0700  cefTRIAXone (ROCEPHIN) 1 g in dextrose 5 % 50 mL IVPB      -- IV Every 24 hours (non-standard times) 01/13/17 0152    01/15/17 1745  vancomycin (VANCOCIN) 1,500 mg in dextrose 5 % 250 mL IVPB  (Vancomycin IVPB with levels panel)      -- IV Every 12 hours (non-standard times) 01/15/17 1011        Antifungals     None        Antivirals     None           Immunization History   Administered Date(s) Administered    PPD Test 01/15/2017    Pneumococcal Conjugate - 13 Valent 09/23/2015    Pneumococcal Polysaccharide - 23 Valent 09/26/2012    Tdap 09/26/2016    Zoster 10/30/2015       Family History     Problem Relation (Age of Onset)    Breast cancer Mother, Other    Cancer Mother, Father    Heart disease Father    Stroke Brother        Social History     Social  History    Marital status: Single     Spouse name: N/A    Number of children: N/A    Years of education: N/A     Social History Main Topics    Smoking status: Never Smoker    Smokeless tobacco: Never Used    Alcohol use No    Drug use: No    Sexual activity: No     Other Topics Concern    None     Social History Narrative    Retired  (34 years) with [employer]     Travel History:   Has patient traveled outside of the [place]?  Not Relevant  Has patient traveled outside of [place]? Not Relevant      Review of Systems   Constitutional: Negative for chills and fever.   HENT: Negative for dental problem and sore throat.    Eyes: Negative for photophobia and visual disturbance.   Respiratory: Negative for cough, chest tightness and shortness of breath.    Cardiovascular: Negative for chest pain, palpitations and leg swelling.   Gastrointestinal: Negative for abdominal distention, abdominal pain, diarrhea, nausea and vomiting.   Genitourinary: Negative for dysuria, flank pain and urgency.   Musculoskeletal: Negative for arthralgias, back pain, gait problem, joint swelling and neck pain.   Skin: Negative for rash and wound.   Neurological: Negative for dizziness, syncope, speech difficulty, light-headedness, numbness and headaches.   Psychiatric/Behavioral: Negative for agitation and confusion.     Objective:     Vital Signs (Most Recent):  Temp: 99.8 °F (37.7 °C) (01/16/17 0734)  Pulse: 92 (01/16/17 0734)  Resp: 18 (01/16/17 0734)  BP: (!) 156/77 (01/16/17 0734)  SpO2: 95 % (01/16/17 0734) Vital Signs (24h Range):  Temp:  [98.5 °F (36.9 °C)-100.4 °F (38 °C)] 99.8 °F (37.7 °C)  Pulse:  [] 92  Resp:  [18-20] 18  SpO2:  [18 %-95 %] 95 %  BP: (122-156)/(57-77) 156/77     Weight: 77.6 kg (171 lb)  Body mass index is 26.78 kg/(m^2).    Estimated Creatinine Clearance: 66.2 mL/min (based on Cr of 0.8).    Physical Exam   Constitutional: She is oriented to person, place, and time.  No distress.   HENT:   Head: Normocephalic.   Mouth/Throat: No oropharyngeal exudate.   Eyes: No scleral icterus.   Cardiovascular: Normal rate and regular rhythm.  Exam reveals no gallop and no friction rub.    Murmur heard.  Pulmonary/Chest: Effort normal. No respiratory distress. She has no wheezes. She has no rales.   Abdominal: Soft. Bowel sounds are normal. She exhibits no distension. There is no tenderness. There is no rebound and no guarding.   Musculoskeletal: Normal range of motion.   Lymphadenopathy:     She has no cervical adenopathy.   Neurological: She is alert and oriented to person, place, and time. No cranial nerve deficit.   Skin: No rash noted. She is not diaphoretic. No erythema.   No wounds   Vitals reviewed.      Significant Labs:   Blood Culture:   Recent Labs  Lab 01/13/17  0228 01/13/17  1703 01/13/17  1704 01/14/17  1101 01/15/17  1829   LABBLOO Gram stain aer bottle: Gram positive cocci in clusters resembling Staph  Results called to and read back by:Annabelle Gonzales RN 01/13/2017  16:33  Gram stain alea bottle: Gram positive cocci in clusters resembling Staph   Positive results previously called  METHICILLIN RESISTANT STAPHYLOCOCCUS AUREUSFor susceptibility see order #[id number]  Gram stain aer bottle: Gram positive cocci in clusters resembling Staph  Results called to and read back by:Annabelle Gonzales RN 01/13/2017  16:56  Gram stain alea bottle: Gram positive cocci in clusters resembling Staph  Positive results previously called  METHICILLIN RESISTANT STAPHYLOCOCCUS AUREUS Gram stain aer bottle: Gram positive cocci in clusters resembling Staph  Gram stain alea bottle: Gram positive cocci in clusters resembling Staph   Results called to and read back by:Yariel Verdin RN 01/14/2017  09:19  METHICILLIN RESISTANT STAPHYLOCOCCUS AUREUSFor susceptibility see order #[id number] Gram stain aer bottle: Gram positive cocci in clusters resembling Staph   Results called to and read back  by:Yariel Verdin RN 01/14/2017  09:18  Gram stain alea bottle: Gram positive cocci in clusters resembling Staph  01/14/2017  11:07   Results previously called.  METHICILLIN RESISTANT STAPHYLOCOCCUS AUREUSFor susceptibility see order #[id number] Gram stain aer bottle: Gram positive cocci in clusters resembling Staph   Results called to and read back by:Taylor Valerio RN 01/15/2017  02:34  STAPHYLOCOCCUS AUREUSFor susceptibility see order #[id number]  Gram stain aer bottle: Gram positive cocci in clusters resembling Staph   Results called to and read back by:Taylor Valerio RN 01/15/2017  02:34  STAPHYLOCOCCUS AUREUSSusceptibility pending No Growth to date  No Growth to date     BMP:   Recent Labs  Lab 01/16/17  0359   *      K 3.4*      CO2 24   BUN 16   CREATININE 0.8   CALCIUM 8.9     CBC:   Recent Labs  Lab 01/15/17  0453 01/16/17  0359   WBC 16.13* 16.15*   HGB 11.4* 11.7*   HCT 31.7* 32.8*    200     CMP:   Recent Labs  Lab 01/15/17  0749 01/16/17  0359   * 139   K 3.2* 3.4*    108   CO2 23 24   * 115*   BUN 17 16   CREATININE 0.9 0.8   CALCIUM 8.8 8.9   PROT 6.2 6.3   ALBUMIN 2.0* 2.0*   BILITOT 0.7 0.7   ALKPHOS 83 96   AST 35 40   ALT 30 40   ANIONGAP 7* 7*   EGFRNONAA >60.0 >60.0     Urine Culture:   Recent Labs  Lab 01/12/17 2211   LABURIN Multiple organisms isolated. None in predominance.  Repeat if  clinically necessary.     Urine Studies:   Recent Labs  Lab 01/12/17 2211   COLORU Yellow   APPEARANCEUA Hazy*   PHUR 6.0   SPECGRAV 1.025   PROTEINUA 3+*   GLUCUA Trace*   KETONESU 1+*   BILIRUBINUA Negative   OCCULTUA 2+*   NITRITE Negative   UROBILINOGEN 1.0   LEUKOCYTESUR 1+*   RBCUA 7*   WBCUA 35*   BACTERIA Rare   SQUAMEPITHEL 2   HYALINECASTS 0       Significant Imaging: I have reviewed all pertinent imaging results/findings within the past 24 hours.

## 2017-01-16 NOTE — PLAN OF CARE
Problem: Patient Care Overview  Goal: Plan of Care Review  Outcome: Ongoing (interventions implemented as appropriate)  Pt free of falls/injuries throughout the shift. Bed locked, in lowest position, call bell within reach. Pt afebrile, pain assessed & denied. VSS, temp treated w/ Tylenol. Pt up w/ assist to the restroom. Family at bedside, will cont. To monitor.

## 2017-01-16 NOTE — SUBJECTIVE & OBJECTIVE
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    Osteopenia     Potassium depletion 1998       Past Surgical History   Procedure Laterality Date    Breast biopsy  2001     left breast DCIS    Breast surgery  9/2001     left lumpectomy with SLN bx    Colonoscopy      Colonoscopy N/A 10/31/2016     Procedure: COLONOSCOPY;  Surgeon: YAMILETH Richards MD;  Location: [facility] ([address] [place] [address]);  Service: Endoscopy;  Laterality: N/A;       Review of patient's allergies indicates:   Allergen Reactions    Adhesive tape-silicones      Other reaction(s): pulling skin off    Atorvastatin      Other reaction(s): Muscle pain    Pravachol  [pravastatin] Rash       Medications:  Prescriptions Prior to Admission   Medication Sig    BLOOD SUGAR DIAGNOSTIC (ONE TOUCH II TEST MISC) * * * *.  CHECKS 1 TIME DAILY AT LEAST    CALCIUM CARBONATE/VITAMIN D3 (CALCIUM WITH VITAMIN D3 ORAL) Take 1 tablet by mouth.     flurandrenolide (CORDRAN) 0.05 % lotion Apply to  scalp one to two times daily as needed for dryness    ketoconazole (NIZORAL) 2 % shampoo USE EVERY OTHER DAY LET SIT ON SCALP FOR FIVE MINUTES BEFORE RINSING    MULTIVITAMIN WITH MINERALS (ONE-A-DAY 50 PLUS) Tab Take 1 tablet by mouth once daily.     om-3-epa-dha-fish oil-flax-E (THERA TEARS NUTRITION) [phone] xk-np-cl-unit Cap Take 3 capsules by mouth every morning. Over-the-counter supplement    potassium chloride SA (K-DUR,KLOR-CON) 20 MEQ tablet take 1 tablet by mouth twice a day    valsartan-hydrochlorothiazide (DIOVAN-HCT) 320-12.5 mg per tablet take 1 tablet by mouth once daily    verapamil (CALAN-SR) 240 MG CR tablet take 1 tablet by mouth twice a day     Antibiotics     Start     Stop Route Frequency Ordered    01/13/17  0700  cefTRIAXone (ROCEPHIN) 1 g in dextrose 5 % 50 mL IVPB      -- IV Every 24 hours (non-standard times) 01/13/17 0152    01/15/17 1745  vancomycin (VANCOCIN) 1,500 mg in dextrose 5 % 250 mL IVPB  (Vancomycin IVPB with levels panel)      -- IV Every 12 hours (non-standard times) 01/15/17 1011        Antifungals     None        Antivirals     None           Immunization History   Administered Date(s) Administered    PPD Test 01/15/2017    Pneumococcal Conjugate - 13 Valent 09/23/2015    Pneumococcal Polysaccharide - 23 Valent 09/26/2012    Tdap 09/26/2016    Zoster 10/30/2015       Family History     Problem Relation (Age of Onset)    Breast cancer Mother, Other    Cancer Mother, Father    Heart disease Father    Stroke Brother        Social History     Social History    Marital status: Single     Spouse name: N/A    Number of children: N/A    Years of education: N/A     Social History Main Topics    Smoking status: Never Smoker    Smokeless tobacco: Never Used    Alcohol use No    Drug use: No    Sexual activity: No     Other Topics Concern    None     Social History Narrative    Retired  (34 years) with [employer]     Travel History:   Has patient traveled outside of the [place]?  Not Relevant  Has patient traveled outside of [place]? Not Relevant      Review of Systems   Constitutional: Negative for chills and fever.   HENT: Negative for dental problem and sore throat.    Eyes: Negative for photophobia and visual disturbance.   Respiratory: Negative for cough, chest tightness and shortness of breath.    Cardiovascular: Negative for chest pain, palpitations and leg swelling.   Gastrointestinal: Negative for abdominal distention, abdominal pain, diarrhea, nausea and vomiting.   Genitourinary: Negative for dysuria, flank pain and urgency.   Musculoskeletal: Negative for arthralgias, back pain, gait problem, joint swelling and neck pain.   Skin: Negative for rash and  wound.   Neurological: Negative for dizziness, syncope, speech difficulty, light-headedness, numbness and headaches.   Psychiatric/Behavioral: Negative for agitation and confusion.     Objective:     Vital Signs (Most Recent):  Temp: 99.8 °F (37.7 °C) (01/16/17 0734)  Pulse: 92 (01/16/17 0734)  Resp: 18 (01/16/17 0734)  BP: (!) 156/77 (01/16/17 0734)  SpO2: 95 % (01/16/17 0734) Vital Signs (24h Range):  Temp:  [98.5 °F (36.9 °C)-100.4 °F (38 °C)] 99.8 °F (37.7 °C)  Pulse:  [] 92  Resp:  [18-20] 18  SpO2:  [18 %-95 %] 95 %  BP: (122-156)/(57-77) 156/77     Weight: 77.6 kg (171 lb)  Body mass index is 26.78 kg/(m^2).    Estimated Creatinine Clearance: 66.2 mL/min (based on Cr of 0.8).    Physical Exam   Constitutional: She is oriented to person, place, and time. No distress.   HENT:   Head: Normocephalic.   Mouth/Throat: No oropharyngeal exudate.   Eyes: No scleral icterus.   Cardiovascular: Normal rate and regular rhythm.  Exam reveals no gallop and no friction rub.    Murmur heard.  Pulmonary/Chest: Effort normal. No respiratory distress. She has no wheezes. She has no rales.   Abdominal: Soft. Bowel sounds are normal. She exhibits no distension. There is no tenderness. There is no rebound and no guarding.   Musculoskeletal: Normal range of motion.   Lymphadenopathy:     She has no cervical adenopathy.   Neurological: She is alert and oriented to person, place, and time. No cranial nerve deficit.   Skin: No rash noted. She is not diaphoretic. No erythema.   No wounds   Vitals reviewed.      Significant Labs:   Blood Culture:   Recent Labs  Lab 01/13/17  0228 01/13/17  1703 01/13/17  1704 01/14/17  1101 01/15/17  1829   LABBLOO Gram stain aer bottle: Gram positive cocci in clusters resembling Staph  Results called to and read back by:Annabelle Gonzales RN 01/13/2017  16:33  Gram stain alea bottle: Gram positive cocci in clusters resembling Staph   Positive results previously called  METHICILLIN RESISTANT  STAPHYLOCOCCUS AUREUSFor susceptibility see order #[id number]  Gram stain aer bottle: Gram positive cocci in clusters resembling Staph  Results called to and read back by:Annabelle Gonzales RN 01/13/2017  16:56  Gram stain alea bottle: Gram positive cocci in clusters resembling Staph  Positive results previously called  METHICILLIN RESISTANT STAPHYLOCOCCUS AUREUS Gram stain aer bottle: Gram positive cocci in clusters resembling Staph  Gram stain alea bottle: Gram positive cocci in clusters resembling Staph   Results called to and read back by:Yariel Verdin RN 01/14/2017  09:19  METHICILLIN RESISTANT STAPHYLOCOCCUS AUREUSFor susceptibility see order #[id number] Gram stain aer bottle: Gram positive cocci in clusters resembling Staph   Results called to and read back by:Yariel Verdin RN 01/14/2017  09:18  Gram stain alea bottle: Gram positive cocci in clusters resembling Staph  01/14/2017  11:07   Results previously called.  METHICILLIN RESISTANT STAPHYLOCOCCUS AUREUSFor susceptibility see order #[id number] Gram stain aer bottle: Gram positive cocci in clusters resembling Staph   Results called to and read back by:Taylor Valerio RN 01/15/2017  02:34  STAPHYLOCOCCUS AUREUSFor susceptibility see order #[id number]  Gram stain aer bottle: Gram positive cocci in clusters resembling Staph   Results called to and read back by:Taylor Valerio RN 01/15/2017  02:34  STAPHYLOCOCCUS AUREUSSusceptibility pending No Growth to date  No Growth to date     BMP:   Recent Labs  Lab 01/16/17  0359   *      K 3.4*      CO2 24   BUN 16   CREATININE 0.8   CALCIUM 8.9     CBC:   Recent Labs  Lab 01/15/17  0453 01/16/17  0359   WBC 16.13* 16.15*   HGB 11.4* 11.7*   HCT 31.7* 32.8*    200     CMP:   Recent Labs  Lab 01/15/17  0749 01/16/17  0359   * 139   K 3.2* 3.4*    108   CO2 23 24   * 115*   BUN 17 16   CREATININE 0.9 0.8   CALCIUM 8.8 8.9   PROT 6.2 6.3   ALBUMIN 2.0* 2.0*   BILITOT 0.7  0.7   ALKPHOS 83 96   AST 35 40   ALT 30 40   ANIONGAP 7* 7*   EGFRNONAA >60.0 >60.0     Urine Culture:   Recent Labs  Lab 01/12/17 2211   LABURIN Multiple organisms isolated. None in predominance.  Repeat if  clinically necessary.     Urine Studies:   Recent Labs  Lab 01/12/17 2211   COLORU Yellow   APPEARANCEUA Hazy*   PHUR 6.0   SPECGRAV 1.025   PROTEINUA 3+*   GLUCUA Trace*   KETONESU 1+*   BILIRUBINUA Negative   OCCULTUA 2+*   NITRITE Negative   UROBILINOGEN 1.0   LEUKOCYTESUR 1+*   RBCUA 7*   WBCUA 35*   BACTERIA Rare   SQUAMEPITHEL 2   HYALINECASTS 0       Significant Imaging: I have reviewed all pertinent imaging results/findings within the past 24 hours.

## 2017-01-16 NOTE — SUBJECTIVE & OBJECTIVE
Interval History: NAEON. AOx3. Doing     Review of Systems   Constitutional: Negative for chills and fever.   HENT: Negative for dental problem and sore throat.    Eyes: Negative for photophobia and visual disturbance.   Respiratory: Negative for cough, chest tightness and shortness of breath.    Cardiovascular: Negative for chest pain, palpitations and leg swelling.   Gastrointestinal: Negative for abdominal distention, abdominal pain, diarrhea, nausea and vomiting.   Genitourinary: Negative for dysuria, flank pain and urgency.   Musculoskeletal: Negative for arthralgias, back pain, gait problem, joint swelling and neck pain.   Skin: Negative for rash and wound.   Neurological: Negative for dizziness, syncope, speech difficulty, light-headedness, numbness and headaches.   Psychiatric/Behavioral: Negative for agitation and confusion.     Objective:     Vital Signs (Most Recent):  Temp: 99.8 °F (37.7 °C) (01/16/17 0734)  Pulse: 92 (01/16/17 0734)  Resp: 18 (01/16/17 0734)  BP: (!) 156/77 (01/16/17 0734)  SpO2: 95 % (01/16/17 0734) Vital Signs (24h Range):  Temp:  [98.5 °F (36.9 °C)-100.4 °F (38 °C)] 99.8 °F (37.7 °C)  Pulse:  [] 92  Resp:  [18-20] 18  SpO2:  [18 %-95 %] 95 %  BP: (122-156)/(57-77) 156/77     Weight: 77.6 kg (171 lb)  Body mass index is 26.78 kg/(m^2).    Intake/Output Summary (Last 24 hours) at 01/16/17 1304  Last data filed at 01/15/17 1710   Gross per 24 hour   Intake              400 ml   Output                0 ml   Net              400 ml      Physical Exam   Constitutional: She is oriented to person, place, and time. She appears well-developed and well-nourished. No distress.   HENT:   Head: Normocephalic and atraumatic.   Eyes: Pupils are equal, round, and reactive to light.   Neck: Normal range of motion. Neck supple.   Cardiovascular: Regular rhythm and normal heart sounds.    No murmur heard.  Tachycardic.    Pulmonary/Chest: Effort normal and breath sounds normal. No respiratory  distress.   Abdominal: Soft. Bowel sounds are normal. She exhibits no distension.   Musculoskeletal: Normal range of motion.   Neurological: She is alert and oriented to person, place, and time.   Skin: She is not diaphoretic.       Significant Labs:   CBC:   Recent Labs  Lab 01/15/17  0453 01/16/17  0359   WBC 16.13* 16.15*   HGB 11.4* 11.7*   HCT 31.7* 32.8*    200     CMP:   Recent Labs  Lab 01/15/17  0749 01/16/17 0359   * 139   K 3.2* 3.4*    108   CO2 23 24   * 115*   BUN 17 16   CREATININE 0.9 0.8   CALCIUM 8.8 8.9   PROT 6.2 6.3   ALBUMIN 2.0* 2.0*   BILITOT 0.7 0.7   ALKPHOS 83 96   AST 35 40   ALT 30 40   ANIONGAP 7* 7*   EGFRNONAA >60.0 >60.0       Significant Imaging: I have reviewed all pertinent imaging results/findings within the past 24 hours.

## 2017-01-17 ENCOUNTER — ANESTHESIA (OUTPATIENT)
Dept: MEDSURG UNIT | Facility: HOSPITAL | Age: 77
DRG: 871 | End: 2017-01-17
Payer: COMMERCIAL

## 2017-01-17 PROBLEM — K52.9 GASTROENTERITIS: Status: RESOLVED | Noted: 2017-01-13 | Resolved: 2017-01-17

## 2017-01-17 PROBLEM — E83.39 HYPOPHOSPHATEMIA: Status: ACTIVE | Noted: 2017-01-17

## 2017-01-17 LAB
ALBUMIN SERPL BCP-MCNC: 1.8 G/DL
ALP SERPL-CCNC: 87 U/L
ALT SERPL W/O P-5'-P-CCNC: 44 U/L
ANION GAP SERPL CALC-SCNC: 6 MMOL/L
ANISOCYTOSIS BLD QL SMEAR: SLIGHT
AST SERPL-CCNC: 41 U/L
BACTERIA BLD CULT: NORMAL
BASOPHILS # BLD AUTO: ABNORMAL K/UL
BASOPHILS NFR BLD: 0 %
BILIRUB SERPL-MCNC: 0.7 MG/DL
BUN SERPL-MCNC: 17 MG/DL
CALCIUM SERPL-MCNC: 8.7 MG/DL
CHLORIDE SERPL-SCNC: 107 MMOL/L
CO2 SERPL-SCNC: 26 MMOL/L
CREAT SERPL-MCNC: 0.8 MG/DL
DIFFERENTIAL METHOD: ABNORMAL
EOSINOPHIL # BLD AUTO: ABNORMAL K/UL
EOSINOPHIL NFR BLD: 0 %
ERYTHROCYTE [DISTWIDTH] IN BLOOD BY AUTOMATED COUNT: 13.9 %
EST. GFR  (AFRICAN AMERICAN): >60 ML/MIN/1.73 M^2
EST. GFR  (NON AFRICAN AMERICAN): >60 ML/MIN/1.73 M^2
GLUCOSE SERPL-MCNC: 105 MG/DL
HCT VFR BLD AUTO: 30.8 %
HGB BLD-MCNC: 11.3 G/DL
LYMPHOCYTES # BLD AUTO: ABNORMAL K/UL
LYMPHOCYTES NFR BLD: 13 %
MAGNESIUM SERPL-MCNC: 2 MG/DL
MCH RBC QN AUTO: 31.9 PG
MCHC RBC AUTO-ENTMCNC: 36.7 %
MCV RBC AUTO: 87 FL
MITRAL VALVE MOBILITY: NORMAL
MITRAL VALVE REGURGITATION: NORMAL
MONOCYTES # BLD AUTO: ABNORMAL K/UL
MONOCYTES NFR BLD: 6 %
NEUTROPHILS NFR BLD: 80 %
NEUTS BAND NFR BLD MANUAL: 1 %
OVALOCYTES BLD QL SMEAR: ABNORMAL
PHOSPHATE SERPL-MCNC: 2 MG/DL
PLATELET # BLD AUTO: 216 K/UL
PLATELET BLD QL SMEAR: ABNORMAL
PMV BLD AUTO: 10.5 FL
POCT GLUCOSE: 111 MG/DL (ref 70–110)
POCT GLUCOSE: 117 MG/DL (ref 70–110)
POCT GLUCOSE: 125 MG/DL (ref 70–110)
POCT GLUCOSE: 94 MG/DL (ref 70–110)
POIKILOCYTOSIS BLD QL SMEAR: SLIGHT
POTASSIUM SERPL-SCNC: 3.1 MMOL/L
PROT SERPL-MCNC: 5.8 G/DL
RBC # BLD AUTO: 3.54 M/UL
SODIUM SERPL-SCNC: 139 MMOL/L
TB INDURATION 48 - 72 HR READ: 0 MM
TRICUSPID VALVE REGURGITATION: NORMAL
VANCOMYCIN TROUGH SERPL-MCNC: 15.1 UG/ML
WBC # BLD AUTO: 13.53 K/UL

## 2017-01-17 PROCEDURE — 93325 DOPPLER ECHO COLOR FLOW MAPG: CPT

## 2017-01-17 PROCEDURE — 85007 BL SMEAR W/DIFF WBC COUNT: CPT

## 2017-01-17 PROCEDURE — 25000003 PHARM REV CODE 250: Performed by: INTERNAL MEDICINE

## 2017-01-17 PROCEDURE — D9220A PRA ANESTHESIA: Mod: CRNA,,, | Performed by: NURSE ANESTHETIST, CERTIFIED REGISTERED

## 2017-01-17 PROCEDURE — 97530 THERAPEUTIC ACTIVITIES: CPT

## 2017-01-17 PROCEDURE — 80202 ASSAY OF VANCOMYCIN: CPT

## 2017-01-17 PROCEDURE — 25000003 PHARM REV CODE 250: Performed by: EMERGENCY MEDICINE

## 2017-01-17 PROCEDURE — 94668 MNPJ CHEST WALL SBSQ: CPT

## 2017-01-17 PROCEDURE — 83735 ASSAY OF MAGNESIUM: CPT

## 2017-01-17 PROCEDURE — 25000003 PHARM REV CODE 250: Performed by: NURSE ANESTHETIST, CERTIFIED REGISTERED

## 2017-01-17 PROCEDURE — 63600175 PHARM REV CODE 636 W HCPCS: Performed by: NURSE ANESTHETIST, CERTIFIED REGISTERED

## 2017-01-17 PROCEDURE — 11000001 HC ACUTE MED/SURG PRIVATE ROOM

## 2017-01-17 PROCEDURE — D9220A PRA ANESTHESIA: Mod: ANES,,, | Performed by: ANESTHESIOLOGY

## 2017-01-17 PROCEDURE — 80053 COMPREHEN METABOLIC PANEL: CPT

## 2017-01-17 PROCEDURE — 36415 COLL VENOUS BLD VENIPUNCTURE: CPT

## 2017-01-17 PROCEDURE — 85027 COMPLETE CBC AUTOMATED: CPT

## 2017-01-17 PROCEDURE — 93312 ECHO TRANSESOPHAGEAL: CPT | Mod: 26,,, | Performed by: INTERNAL MEDICINE

## 2017-01-17 PROCEDURE — 99233 SBSQ HOSP IP/OBS HIGH 50: CPT | Mod: ,,, | Performed by: INTERNAL MEDICINE

## 2017-01-17 PROCEDURE — 63600175 PHARM REV CODE 636 W HCPCS: Performed by: STUDENT IN AN ORGANIZED HEALTH CARE EDUCATION/TRAINING PROGRAM

## 2017-01-17 PROCEDURE — 97161 PT EVAL LOW COMPLEX 20 MIN: CPT

## 2017-01-17 PROCEDURE — 99233 SBSQ HOSP IP/OBS HIGH 50: CPT | Mod: ,,, | Performed by: HOSPITALIST

## 2017-01-17 PROCEDURE — 37000009 HC ANESTHESIA EA ADD 15 MINS: Performed by: INTERNAL MEDICINE

## 2017-01-17 PROCEDURE — 25000003 PHARM REV CODE 250: Performed by: STUDENT IN AN ORGANIZED HEALTH CARE EDUCATION/TRAINING PROGRAM

## 2017-01-17 PROCEDURE — 97535 SELF CARE MNGMENT TRAINING: CPT

## 2017-01-17 PROCEDURE — 37000008 HC ANESTHESIA 1ST 15 MINUTES: Performed by: INTERNAL MEDICINE

## 2017-01-17 PROCEDURE — 84100 ASSAY OF PHOSPHORUS: CPT

## 2017-01-17 PROCEDURE — 93320 DOPPLER ECHO COMPLETE: CPT | Mod: 26,,, | Performed by: INTERNAL MEDICINE

## 2017-01-17 RX ORDER — PROPOFOL 10 MG/ML
INJECTION, EMULSION INTRAVENOUS CONTINUOUS PRN
Status: DISCONTINUED | OUTPATIENT
Start: 2017-01-17 | End: 2017-01-17

## 2017-01-17 RX ORDER — CARVEDILOL 25 MG/1
25 TABLET ORAL 2 TIMES DAILY WITH MEALS
Status: ON HOLD | COMMUNITY
End: 2017-02-10

## 2017-01-17 RX ORDER — SODIUM CHLORIDE 9 MG/ML
INJECTION, SOLUTION INTRAVENOUS CONTINUOUS PRN
Status: DISCONTINUED | OUTPATIENT
Start: 2017-01-17 | End: 2017-01-17

## 2017-01-17 RX ORDER — LISINOPRIL 20 MG/1
20 TABLET ORAL DAILY
Status: ON HOLD | COMMUNITY
End: 2017-02-10

## 2017-01-17 RX ORDER — PROPOFOL 10 MG/ML
INJECTION, EMULSION INTRAVENOUS
Status: DISCONTINUED | OUTPATIENT
Start: 2017-01-17 | End: 2017-01-17

## 2017-01-17 RX ORDER — LIDOCAINE HCL/PF 100 MG/5ML
SYRINGE (ML) INTRAVENOUS
Status: DISCONTINUED | OUTPATIENT
Start: 2017-01-17 | End: 2017-01-17

## 2017-01-17 RX ORDER — SODIUM,POTASSIUM PHOSPHATES 280-250MG
1 POWDER IN PACKET (EA) ORAL
Status: COMPLETED | OUTPATIENT
Start: 2017-01-17 | End: 2017-01-18

## 2017-01-17 RX ADMIN — LIDOCAINE HYDROCHLORIDE 100 MG: 20 INJECTION, SOLUTION INTRAVENOUS at 09:01

## 2017-01-17 RX ADMIN — POTASSIUM BICARBONATE 50 MEQ: 25 TABLET, EFFERVESCENT ORAL at 10:01

## 2017-01-17 RX ADMIN — Medication 3 ML: at 10:01

## 2017-01-17 RX ADMIN — PROPOFOL 50 MG: 10 INJECTION, EMULSION INTRAVENOUS at 09:01

## 2017-01-17 RX ADMIN — PROPOFOL 20 MG: 10 INJECTION, EMULSION INTRAVENOUS at 09:01

## 2017-01-17 RX ADMIN — VERAPAMIL HYDROCHLORIDE 240 MG: 120 TABLET, FILM COATED, EXTENDED RELEASE ORAL at 10:01

## 2017-01-17 RX ADMIN — PANTOPRAZOLE SODIUM 40 MG: 40 TABLET, DELAYED RELEASE ORAL at 08:01

## 2017-01-17 RX ADMIN — VERAPAMIL HYDROCHLORIDE 240 MG: 120 TABLET, FILM COATED, EXTENDED RELEASE ORAL at 08:01

## 2017-01-17 RX ADMIN — PROPOFOL 100 MCG/KG/MIN: 10 INJECTION, EMULSION INTRAVENOUS at 09:01

## 2017-01-17 RX ADMIN — POTASSIUM & SODIUM PHOSPHATES POWDER PACK 280-160-250 MG 1 PACKET: 280-160-250 PACK at 05:01

## 2017-01-17 RX ADMIN — HEPARIN SODIUM 5000 UNITS: 5000 INJECTION, SOLUTION INTRAVENOUS; SUBCUTANEOUS at 02:01

## 2017-01-17 RX ADMIN — HEPARIN SODIUM 5000 UNITS: 5000 INJECTION, SOLUTION INTRAVENOUS; SUBCUTANEOUS at 05:01

## 2017-01-17 RX ADMIN — SODIUM CHLORIDE: 0.9 INJECTION, SOLUTION INTRAVENOUS at 09:01

## 2017-01-17 RX ADMIN — POTASSIUM & SODIUM PHOSPHATES POWDER PACK 280-160-250 MG 1 PACKET: 280-160-250 PACK at 10:01

## 2017-01-17 RX ADMIN — Medication 3 ML: at 02:01

## 2017-01-17 RX ADMIN — VANCOMYCIN HYDROCHLORIDE 1500 MG: 500 INJECTION, POWDER, LYOPHILIZED, FOR SOLUTION INTRAVENOUS at 05:01

## 2017-01-17 RX ADMIN — VANCOMYCIN HYDROCHLORIDE 1500 MG: 500 INJECTION, POWDER, LYOPHILIZED, FOR SOLUTION INTRAVENOUS at 06:01

## 2017-01-17 RX ADMIN — HEPARIN SODIUM 5000 UNITS: 5000 INJECTION, SOLUTION INTRAVENOUS; SUBCUTANEOUS at 10:01

## 2017-01-17 RX ADMIN — Medication 3 ML: at 06:01

## 2017-01-17 NOTE — TRANSFER OF CARE
"Anesthesia Transfer of Care Note    Patient: Ida Santana    Procedure(s) Performed: Procedure(s) (LRB):  TRANSESOPHAGEAL ECHOCARDIOGRAM (MIS) (N/A)    Patient location: PACU    Anesthesia Type: general    Transport from OR: Transported from OR on 100% O2 by closed face mask with adequate spontaneous ventilation    Post pain: adequate analgesia    Post assessment: no apparent anesthetic complications and tolerated procedure well    Post vital signs: stable    Level of consciousness: awake, alert and oriented    Nausea/Vomiting: no nausea/vomiting    Complications: none          Last vitals:   0952    /36    Pulse 76    Temp 36.7 °C (98.1 °F) (Oral)    Resp 19    Ht 5' 7" (1.702 m)    Wt 77.6 kg (171 lb)    LMP  (LMP Unknown)    SpO2 (!) 96%    Breastfeeding No    BMI 26.78 kg/m2     "

## 2017-01-17 NOTE — SUBJECTIVE & OBJECTIVE
Interval History: Patient continues to cough. Receiving MIS today. Had episode of fever to 101.6 overnight. Finished course of rocephin if we are concerned for pneumonia. Still on Vanc.     Review of Systems   Constitutional: Positive for fever. Negative for chills.   HENT: Negative for dental problem and sore throat.    Eyes: Negative for photophobia and visual disturbance.   Respiratory: Negative for cough, chest tightness and shortness of breath.    Cardiovascular: Negative for chest pain, palpitations and leg swelling.   Gastrointestinal: Negative for abdominal distention, abdominal pain, diarrhea, nausea and vomiting.   Genitourinary: Negative for dysuria, flank pain and urgency.   Musculoskeletal: Negative for arthralgias, back pain, gait problem, joint swelling and neck pain.   Skin: Negative for rash and wound.   Neurological: Negative for dizziness, syncope, speech difficulty, light-headedness, numbness and headaches.   Psychiatric/Behavioral: Negative for agitation and confusion.     Objective:     Vital Signs (Most Recent):  Temp: 99 °F (37.2 °C) (01/17/17 1030)  Pulse: 79 (01/17/17 1055)  Resp: 20 (01/17/17 1055)  BP: 108/61 (01/17/17 1055)  SpO2: 96 % (01/17/17 1055) Vital Signs (24h Range):  Temp:  [97.8 °F (36.6 °C)-101.6 °F (38.7 °C)] 99 °F (37.2 °C)  Pulse:  [76-93] 79  Resp:  [18-22] 20  SpO2:  [90 %-96 %] 96 %  BP: (107-150)/(36-67) 108/61     Weight: 77.6 kg (171 lb)  Body mass index is 26.78 kg/(m^2).    Intake/Output Summary (Last 24 hours) at 01/17/17 1205  Last data filed at 01/17/17 0947   Gross per 24 hour   Intake              750 ml   Output                0 ml   Net              750 ml      Physical Exam   Constitutional: She is oriented to person, place, and time. No distress.   HENT:   Head: Normocephalic.   Mouth/Throat: No oropharyngeal exudate.   Eyes: No scleral icterus.   Cardiovascular: Normal rate and regular rhythm.  Exam reveals no gallop and no friction rub.    Murmur  heard.  Pulmonary/Chest: Effort normal. No respiratory distress. She has no wheezes. She has no rales.   Abdominal: Soft. Bowel sounds are normal. She exhibits no distension. There is no tenderness. There is no rebound and no guarding.   Musculoskeletal: Normal range of motion.   Lymphadenopathy:     She has no cervical adenopathy.   Neurological: She is alert and oriented to person, place, and time. No cranial nerve deficit.   Skin: No rash noted. She is not diaphoretic. No erythema.   Vitals reviewed.      Significant Labs:   Blood Culture:   Recent Labs  Lab 01/15/17  1829 01/16/17 1949   LABBLOO Gram stain aer bottle: Gram positive cocci in clusters resembling Staph   Results called to and read back by:Kristie Palomares RN 01/16/2017  13:45  STAPHYLOCOCCUS AUREUSFor susceptibility see order #[id number]  Gram stain aer bottle: Gram positive cocci in clusters resembling Staph   Results called to and read back by:Kristie Wolff RN  01/16/2017  15:34  STAPHYLOCOCCUS AUREUSSusceptibility pending No Growth to date  No Growth to date     CBC:   Recent Labs  Lab 01/16/17  0359 01/17/17  0412   WBC 16.15* 13.53*   HGB 11.7* 11.3*   HCT 32.8* 30.8*    216     CMP:   Recent Labs  Lab 01/16/17  0359 01/17/17  0412    139   K 3.4* 3.1*    107   CO2 24 26   * 105   BUN 16 17   CREATININE 0.8 0.8   CALCIUM 8.9 8.7   PROT 6.3 5.8*   ALBUMIN 2.0* 1.8*   BILITOT 0.7 0.7   ALKPHOS 96 87   AST 40 41*   ALT 40 44   ANIONGAP 7* 6*   EGFRNONAA >60.0 >60.0       Significant Imaging: I have reviewed all pertinent imaging results/findings within the past 24 hours.

## 2017-01-17 NOTE — PROGRESS NOTES
[facility]  Infectious Disease  Progress Note    Patient Name: Ida Santana  MRN: [id number]  Admission Date: 1/12/2017  Length of Stay: 5 days  Attending Physician: Loyda Lui MD  Primary Care Provider: Giovanna Murray MD    Isolation Status: Contact  Assessment/Plan:      MRSA bacteremia  75 y/o female admitted for an episode of acute confusion. B/C 1/13/17, 1/14/17, 1/15/17, 1/16/17 positive for MRSA, repeated 1/16/17 will follow. No obvious source of bacteremia (no hardware, no skin breakdown, no cellulitis, no wounds, no trauma, no central lines, no joint or back pain).   - Vancomycin trough 15.1, trough goal 15-20, will continue same dose for now  - repeat B/C tomorrow AM  - MIS 1/17/17 negative for endocarditis      Anticipated Disposition: IV Vancomycin    Thank you for your consult. I will follow-up with patient. Please contact us if you have any additional questions.     Farhad Finney MD  Infectious Disease Fellow, PGY-4  Pager: [phone]  [facility]    Subjective:     Principal Problem:Severe sepsis    Interval History: Patient had another episode of fever yesterday, WBC decreased to 13    HPI:   75 y/o female with history of HTN, HLD,  Osteopenia, remote breast cancer came to hospital for family members noted that patient let the bathroom sink overflow and left a pot burning on the stove the day of admission. Patient lives by herself and is fully functional. The patient reports 3 episodes of vomits gastric content and 3 episodes of diarrhea today, denied fever, chills, abdominal pain, weakness, paraesthesia, dysuria, frequency or malodorous urin, skin breakdown, wounds, trauma, central lines, joint or back pain. Denies similar episodes in the past.        Review of Systems   Constitutional: Positive for fever. Negative for chills.   HENT: Negative for sore throat.    Respiratory: Negative for cough, chest tightness and shortness of breath.     Cardiovascular: Negative for chest pain, palpitations and leg swelling.   Gastrointestinal: Negative for abdominal distention, abdominal pain, diarrhea, nausea and vomiting.   Genitourinary: Negative for dysuria, flank pain and urgency.   Skin: Negative for rash.   Neurological: Negative for dizziness, light-headedness and numbness.   Psychiatric/Behavioral: Negative for confusion.     Objective:     Vital Signs (Most Recent):  Temp: 98.1 °F (36.7 °C) (01/16/17 2300)  Pulse: 76 (01/17/17 0751)  Resp: 19 (01/17/17 0751)  BP: (!) 142/67 (01/17/17 0751)  SpO2: (!) 91 % (01/17/17 0751) Vital Signs (24h Range):  Temp:  [98.1 °F (36.7 °C)-101.6 °F (38.7 °C)] 98.1 °F (36.7 °C)  Pulse:  [76-93] 76  Resp:  [18-19] 19  SpO2:  [90 %-95 %] 91 %  BP: (119-150)/(56-67) 142/67     Weight: 77.6 kg (171 lb)  Body mass index is 26.78 kg/(m^2).    Estimated Creatinine Clearance: 66.2 mL/min (based on Cr of 0.8).    Physical Exam   Constitutional: She is oriented to person, place, and time. No distress.   HENT:   Head: Normocephalic.   Mouth/Throat: No oropharyngeal exudate.   Eyes: No scleral icterus.   Cardiovascular: Normal rate and regular rhythm.  Exam reveals no gallop and no friction rub.    Murmur heard.  Pulmonary/Chest: Effort normal. No respiratory distress. She has no wheezes. She has no rales.   Abdominal: Soft. Bowel sounds are normal. She exhibits no distension. There is no tenderness. There is no rebound and no guarding.   Musculoskeletal: Normal range of motion.   Lymphadenopathy:     She has no cervical adenopathy.   Neurological: She is alert and oriented to person, place, and time. No cranial nerve deficit.   Skin: No rash noted. She is not diaphoretic. No erythema.   Vitals reviewed.      Significant Labs:   Blood Culture:   Recent Labs  Lab 01/13/17  1703 01/13/17  1704 01/14/17  1101 01/15/17  1829 01/16/17  1949   LABBLOO Gram stain aer bottle: Gram positive cocci in clusters resembling Staph  Gram stain alea  bottle: Gram positive cocci in clusters resembling Staph   Results called to and read back by:Yariel Verdin RN 01/14/2017  09:19  METHICILLIN RESISTANT STAPHYLOCOCCUS AUREUSFor susceptibility see order #[id number] Gram stain aer bottle: Gram positive cocci in clusters resembling Staph   Results called to and read back by:Yariel Verdin RN 01/14/2017  09:18  Gram stain alea bottle: Gram positive cocci in clusters resembling Staph  01/14/2017  11:07   Results previously called.  METHICILLIN RESISTANT STAPHYLOCOCCUS AUREUSFor susceptibility see order #[id number] Gram stain aer bottle: Gram positive cocci in clusters resembling Staph   Results called to and read back by:Taylor Valerio RN 01/15/2017  02:34  STAPHYLOCOCCUS AUREUSFor susceptibility see order #[id number]  Gram stain aer bottle: Gram positive cocci in clusters resembling Staph   Results called to and read back by:Taylor Valerio RN 01/15/2017  02:34  STAPHYLOCOCCUS AUREUSSusceptibility pending Gram stain aer bottle: Gram positive cocci in clusters resembling Staph   Results called to and read back by:Kristie Wolff RN  01/16/2017  15:34  Gram stain aer bottle: Gram positive cocci in clusters resembling Staph   Results called to and read back by:Kristie Palomares RN 01/16/2017  13:45 No Growth to date  No Growth to date     BMP:   Recent Labs  Lab 01/17/17  0412         K 3.1*      CO2 26   BUN 17   CREATININE 0.8   CALCIUM 8.7     CBC:   Recent Labs  Lab 01/16/17  0359 01/17/17  0412   WBC 16.15* 13.53*   HGB 11.7* 11.3*   HCT 32.8* 30.8*    216     CMP:   Recent Labs  Lab 01/16/17  0359 01/17/17  0412    139   K 3.4* 3.1*    107   CO2 24 26   * 105   BUN 16 17   CREATININE 0.8 0.8   CALCIUM 8.9 8.7   PROT 6.3 5.8*   ALBUMIN 2.0* 1.8*   BILITOT 0.7 0.7   ALKPHOS 96 87   AST 40 41*   ALT 40 44   ANIONGAP 7* 6*   EGFRNONAA >60.0 >60.0       Significant Imaging: I have reviewed all pertinent imaging  results/findings within the past 24 hours.

## 2017-01-17 NOTE — PROGRESS NOTES
[facility] Hospital Medicine  Progress Note    Patient Name: Ida Santana  MRN: [id number]  Patient Class: IP- Inpatient   Admission Date: 1/12/2017  Length of Stay: 4 days  Attending Physician: Loyda Lui MD  Primary Care Provider: Giovanna Murray MD    [facility] Medicine Team: [facility] HOSP MED 1 Porfirio Posey MD    Subjective:     Principal Problem:Severe sepsis    Hospital Course:  1/16/17: Patient had elevated temp overnight and continues to have leukocytosis. Bcx from yesterday was (-) x1 day. Consulted ID for assistance with patient's bacteremia. MIS scheduled along with TTE to assess for endocarditis. Repeating UA and UCX.       Interval History: NAEON. AOx3. Doing     Review of Systems   Constitutional: Negative for chills and fever.   HENT: Negative for dental problem and sore throat.    Eyes: Negative for photophobia and visual disturbance.   Respiratory: Negative for cough, chest tightness and shortness of breath.    Cardiovascular: Negative for chest pain, palpitations and leg swelling.   Gastrointestinal: Negative for abdominal distention, abdominal pain, diarrhea, nausea and vomiting.   Genitourinary: Negative for dysuria, flank pain and urgency.   Musculoskeletal: Negative for arthralgias, back pain, gait problem, joint swelling and neck pain.   Skin: Negative for rash and wound.   Neurological: Negative for dizziness, syncope, speech difficulty, light-headedness, numbness and headaches.   Psychiatric/Behavioral: Negative for agitation and confusion.     Objective:     Vital Signs (Most Recent):  Temp: 99.8 °F (37.7 °C) (01/16/17 0734)  Pulse: 92 (01/16/17 0734)  Resp: 18 (01/16/17 0734)  BP: (!) 156/77 (01/16/17 0734)  SpO2: 95 % (01/16/17 0734) Vital Signs (24h Range):  Temp:  [98.5 °F (36.9 °C)-100.4 °F (38 °C)] 99.8 °F (37.7 °C)  Pulse:  [] 92  Resp:  [18-20] 18  SpO2:  [18 %-95 %] 95 %  BP: (122-156)/(57-77) 156/77     Weight: 77.6 kg (171 lb)  Body mass index is  26.78 kg/(m^2).    Intake/Output Summary (Last 24 hours) at 01/16/17 1304  Last data filed at 01/15/17 1710   Gross per 24 hour   Intake              400 ml   Output                0 ml   Net              400 ml      Physical Exam   Constitutional: She is oriented to person, place, and time. She appears well-developed and well-nourished. No distress.   HENT:   Head: Normocephalic and atraumatic.   Eyes: Pupils are equal, round, and reactive to light.   Neck: Normal range of motion. Neck supple.   Cardiovascular: Regular rhythm and normal heart sounds.    No murmur heard.  Tachycardic.    Pulmonary/Chest: Effort normal and breath sounds normal. No respiratory distress.   Abdominal: Soft. Bowel sounds are normal. She exhibits no distension.   Musculoskeletal: Normal range of motion.   Neurological: She is alert and oriented to person, place, and time.   Skin: She is not diaphoretic.       Significant Labs:   CBC:   Recent Labs  Lab 01/15/17  0453 01/16/17  0359   WBC 16.13* 16.15*   HGB 11.4* 11.7*   HCT 31.7* 32.8*    200     CMP:   Recent Labs  Lab 01/15/17  0749 01/16/17  0359   * 139   K 3.2* 3.4*    108   CO2 23 24   * 115*   BUN 17 16   CREATININE 0.9 0.8   CALCIUM 8.8 8.9   PROT 6.2 6.3   ALBUMIN 2.0* 2.0*   BILITOT 0.7 0.7   ALKPHOS 83 96   AST 35 40   ALT 30 40   ANIONGAP 7* 7*   EGFRNONAA >60.0 >60.0       Significant Imaging: I have reviewed all pertinent imaging results/findings within the past 24 hours.    Assessment/Plan:      Staphylococcus aureus sepsis  Urinary tract infection without hematuria  Septic encephalopathy  MRSA bacteremia  - has had continued fevers and bcx grew MRSA, concerned that 2/2 continued + bcx patient may have staph seeded somewhere, possible endocarditis. She does not have any recent significant hospital contact.   - TTE and MIS  - continuing with vanc and rocephin  - repeat bcx shown staph again, repeating bcx  - ID consult said to get MIS  also    Hypertension  - continue verapamil and hold off on Diovan in setting of low BP    Altered mental status  2/2 UTI vs hyponatremia vs intercranial event - status much improved today  - continue to monitor  - CT and MRI head negative  -avoid benzo, narcotics if possible    Gastroenteritis  -likely viral in etiology  -pt does not recall any bad/new food intake or sick person exposure  - has had decreased PO inatake  -treat symptomatically with antiemetics and IVF  - symptoms improving      Ppx: [id number]  Diet: Diabetic, NPO after midnight      Porfirio Posey MD  Department of Hospital Medicine   [facility]                      01/17/2017                             STAFF PHYSICIAN NOTE                                   Attending Attestation for Rounds with Resident  I have reviewed and concur with the resident's history, physical, assessment, and plan.  I have personally interviewed and examined the patient at bedside and agree with the resident's findings.                                  ________________________________________                                     REASON FOR ADMISSION:     Patient is 76 y.o.female    Body mass index is 26.78 kg/(m^2).,  Severe sepsis

## 2017-01-17 NOTE — ASSESSMENT & PLAN NOTE
2/2 UTI vs hyponatremia vs intercranial event - status much improved today  - continue to monitor  - CT and MRI head negative  - avoid benzo, narcotics if possible

## 2017-01-17 NOTE — ASSESSMENT & PLAN NOTE
- Ucx grew what appears to be contaminant with nothing in predominance. Unclear if this is source of patient's MRSA bacteremia, doubtful  - repeat UA showed no UTI

## 2017-01-17 NOTE — ASSESSMENT & PLAN NOTE
- has had continued fevers and bcx grew MRSA, concerned that 2/2 continued + bcx patient may have staph seeded somewhere, possible endocarditis. She does not have any recent significant hospital contact.   - TTE and MIS  - continuing with vanc and rocephin  - repeat bcx shown staph again, repeating bcx - now no growth x1 day  - MIS done today - FU results

## 2017-01-17 NOTE — ASSESSMENT & PLAN NOTE
-likely viral in etiology  -pt does not recall any bad/new food intake or sick person exposure  - has had decreased PO intake  -treat symptomatically with antiemetics and IVF  - symptoms improving

## 2017-01-17 NOTE — SUBJECTIVE & OBJECTIVE
Interval History: Patient had another episode of fever yesterday, WBC decreased to 13    HPI:   77 y/o female with history of HTN, HLD,  Osteopenia, remote breast cancer came to hospital for family members noted that patient let the bathroom sink overflow and left a pot burning on the stove the day of admission. Patient lives by herself and is fully functional. The patient reports 3 episodes of vomits gastric content and 3 episodes of diarrhea today, denied fever, chills, abdominal pain, weakness, paraesthesia, dysuria, frequency or malodorous urin, skin breakdown, wounds, trauma, central lines, joint or back pain. Denies similar episodes in the past.        Review of Systems   Constitutional: Positive for fever. Negative for chills.   HENT: Negative for sore throat.    Respiratory: Negative for cough, chest tightness and shortness of breath.    Cardiovascular: Negative for chest pain, palpitations and leg swelling.   Gastrointestinal: Negative for abdominal distention, abdominal pain, diarrhea, nausea and vomiting.   Genitourinary: Negative for dysuria, flank pain and urgency.   Skin: Negative for rash.   Neurological: Negative for dizziness, light-headedness and numbness.   Psychiatric/Behavioral: Negative for confusion.     Objective:     Vital Signs (Most Recent):  Temp: 98.1 °F (36.7 °C) (01/16/17 2300)  Pulse: 76 (01/17/17 0751)  Resp: 19 (01/17/17 0751)  BP: (!) 142/67 (01/17/17 0751)  SpO2: (!) 91 % (01/17/17 0751) Vital Signs (24h Range):  Temp:  [98.1 °F (36.7 °C)-101.6 °F (38.7 °C)] 98.1 °F (36.7 °C)  Pulse:  [76-93] 76  Resp:  [18-19] 19  SpO2:  [90 %-95 %] 91 %  BP: (119-150)/(56-67) 142/67     Weight: 77.6 kg (171 lb)  Body mass index is 26.78 kg/(m^2).    Estimated Creatinine Clearance: 66.2 mL/min (based on Cr of 0.8).    Physical Exam   Constitutional: She is oriented to person, place, and time. No distress.   HENT:   Head: Normocephalic.   Mouth/Throat: No oropharyngeal exudate.   Eyes: No scleral  icterus.   Cardiovascular: Normal rate and regular rhythm.  Exam reveals no gallop and no friction rub.    Murmur heard.  Pulmonary/Chest: Effort normal. No respiratory distress. She has no wheezes. She has no rales.   Abdominal: Soft. Bowel sounds are normal. She exhibits no distension. There is no tenderness. There is no rebound and no guarding.   Musculoskeletal: Normal range of motion.   Lymphadenopathy:     She has no cervical adenopathy.   Neurological: She is alert and oriented to person, place, and time. No cranial nerve deficit.   Skin: No rash noted. She is not diaphoretic. No erythema.   Vitals reviewed.      Significant Labs:   Blood Culture:   Recent Labs  Lab 01/13/17  1703 01/13/17  1704 01/14/17  1101 01/15/17  1829 01/16/17  1949   LABBLOO Gram stain aer bottle: Gram positive cocci in clusters resembling Staph  Gram stain alea bottle: Gram positive cocci in clusters resembling Staph   Results called to and read back by:Yariel Verdin RN 01/14/2017  09:19  METHICILLIN RESISTANT STAPHYLOCOCCUS AUREUSFor susceptibility see order #[id number] Gram stain aer bottle: Gram positive cocci in clusters resembling Staph   Results called to and read back by:Yariel Verdin RN 01/14/2017  09:18  Gram stain alea bottle: Gram positive cocci in clusters resembling Staph  01/14/2017  11:07   Results previously called.  METHICILLIN RESISTANT STAPHYLOCOCCUS AUREUSFor susceptibility see order #[id number] Gram stain aer bottle: Gram positive cocci in clusters resembling Staph   Results called to and read back by:Taylor Valerio RN 01/15/2017  02:34  STAPHYLOCOCCUS AUREUSFor susceptibility see order #[id number]  Gram stain aer bottle: Gram positive cocci in clusters resembling Staph   Results called to and read back by:Taylor Valerio RN 01/15/2017  02:34  STAPHYLOCOCCUS AUREUSSusceptibility pending Gram stain aer bottle: Gram positive cocci in clusters resembling Staph   Results called to and read back by:Kristie  Mariella AHUJA  01/16/2017  15:34  Gram stain aer bottle: Gram positive cocci in clusters resembling Staph   Results called to and read back by:Kristie Palomares RN 01/16/2017  13:45 No Growth to date  No Growth to date     BMP:   Recent Labs  Lab 01/17/17  0412         K 3.1*      CO2 26   BUN 17   CREATININE 0.8   CALCIUM 8.7     CBC:   Recent Labs  Lab 01/16/17 0359 01/17/17 0412   WBC 16.15* 13.53*   HGB 11.7* 11.3*   HCT 32.8* 30.8*    216     CMP:   Recent Labs  Lab 01/16/17  0359 01/17/17 0412    139   K 3.4* 3.1*    107   CO2 24 26   * 105   BUN 16 17   CREATININE 0.8 0.8   CALCIUM 8.9 8.7   PROT 6.3 5.8*   ALBUMIN 2.0* 1.8*   BILITOT 0.7 0.7   ALKPHOS 96 87   AST 40 41*   ALT 40 44   ANIONGAP 7* 6*   EGFRNONAA >60.0 >60.0       Significant Imaging: I have reviewed all pertinent imaging results/findings within the past 24 hours.

## 2017-01-17 NOTE — ASSESSMENT & PLAN NOTE
- has had continued fevers and bcx grew MRSA, concerned that 2/2 continued + bcx patient may have staph seeded somewhere, possible endocarditis. She does not have any recent significant hospital contact.   - TTE and MIS  - continuing with vanc and rocephin  - repeat bcx shown staph again, repeating bcx  - ID consult said to get MIS also

## 2017-01-17 NOTE — ASSESSMENT & PLAN NOTE
75 y/o female admitted for an episode of acute confusion. B/C 1/13/17, 1/14/17, 1/15/17, 1/16/17 positive for MRSA, repeated 1/16/17 will follow. No obvious source of bacteremia (no hardware, no skin breakdown, no cellulitis, no wounds, no trauma, no central lines, no joint or back pain).   - Vancomycin trough 15.1, trough goal 15-20, will continue same dose for now  - repeat B/C tomorrow AM  - MIS 1/17/17 negative for endocarditis

## 2017-01-17 NOTE — ANESTHESIA RELEASE NOTE
Anesthesia Release from PACU Note    Patient: Ida Santana    Procedure(s) Performed: Procedure(s) (LRB):  TRANSESOPHAGEAL ECHOCARDIOGRAM (MIS) (N/A)    Anesthesia type: general    Post pain: Adequate analgesia    Post assessment: no apparent anesthetic complications, tolerated procedure well and no evidence of recall    Post vital signs:   Vitals:    01/17/17 1030   BP: (!) 112/46   Pulse: 79   Resp: 20   Temp: 37.2 °C (99 °F)        Level of consciousness: awake, alert  and oriented    Nausea/Vomiting: no nausea/no vomiting    Complications: none    Airway Patency: patent    Respiratory: unassisted, spontaneous ventilation    Cardiovascular: stable and blood pressure at baseline    Hydration: euvolemic

## 2017-01-17 NOTE — ASSESSMENT & PLAN NOTE
- Ucx grew what appears to be contaminant with nothing in predominance. Unclear if this is source of patient's MRSA bacteremia, doubtful  - repeat UA showed no evidence of UTI

## 2017-01-17 NOTE — PLAN OF CARE
Problem: Occupational Therapy Goal  Goal: Occupational Therapy Goal  Goals to be met by: 2017    Patient will increase functional independence with ADLs by performin. Pt will perform supine<>sit transfer with supervision to increase independence and safety with functional transfers.   2. Pt will perform sit<>stand transfer with supervision to increase independence and safety with functional transfers. (met )  3. Pt will demonstrate improve activity tolerance by walking continuous for 2 minutes with no RB   4. Pt will perform therex 2x10 with supervision for BUE strengthening.   5. Pt will perform bed to chair transfer with supervision to increase independence and safety with functional transfers  6. Pt will perform bedside commode transfer with supervision to increase independence and safety with functional transfers.  7. Pt will complete standing grooming activity with supervision to increase endurance for completion of ADLs.(MET )     Outcome: Ongoing (interventions implemented as appropriate)  Patient making good progress towards goals.  Patient still limited by confusion affecting safety and tasks initiation.

## 2017-01-17 NOTE — ANESTHESIA POSTPROCEDURE EVALUATION
"Anesthesia Post Evaluation    Patient: Ida Santana    Procedure(s) Performed: Procedure(s) (LRB):  TRANSESOPHAGEAL ECHOCARDIOGRAM (MIS) (N/A)    Final Anesthesia Type: general  Patient location during evaluation: PACU  Patient participation: Yes- Able to Participate  Level of consciousness: awake and alert  Post-procedure vital signs: reviewed and stable  Pain management: adequate  Airway patency: patent  PONV status at discharge: No PONV  Anesthetic complications: no      Cardiovascular status: hemodynamically stable  Respiratory status: unassisted  Hydration status: euvolemic  Follow-up not needed.        Visit Vitals    BP (!) 112/46    Pulse 79    Temp 37.2 °C (99 °F) (Oral)    Resp 20    Ht 5' 7" (1.702 m)    Wt 77.6 kg (171 lb)    LMP  (LMP Unknown)    SpO2 96%    Breastfeeding No    BMI 26.78 kg/m2       Pain/Ellen Score: Pain Assessment Performed: Yes (1/17/2017 10:00 AM)  Presence of Pain: denies (1/17/2017 10:00 AM)  Pain Rating Prior to Med Admin: 0 (1/16/2017  9:57 PM)  Ellen Score: 8 (1/17/2017 10:00 AM)      "

## 2017-01-17 NOTE — PT/OT/SLP EVAL
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    Osteopenia     Potassium depletion 1998      Past Surgical History   Procedure Laterality Date    Breast biopsy  2001     left breast DCIS    Breast surgery  9/2001     left lumpectomy with SLN bx    Colonoscopy      Colonoscopy N/A 10/31/2016     Procedure: COLONOSCOPY;  Surgeon: YAMILETH Richards MD;  Location: [facility] ([address] [place] [address]);  Service: Endoscopy;  Laterality: N/A;       Referring physician: Porfirio April   Date referred to PT: 1/13/17    General Precautions: Standard, fall, contact  Orthopedic Precautions: N/A   Braces: N/A       Do you have any cultural, spiritual, Orthodox conflicts, given your current situation?: None stated     Patient History:  Lives With: alone  Living Arrangements: house  Home Layout: Able to live on 1st floor  Transportation Available: family or friend will provide  Living Environment Comment: Per patient and patient's sister in law, patient lives alone in [facility] with no SCOTT. Bathroom has tub/shower combo. PTA, patient was (I) with functional mobility and ADLs. Pt's family and friends able to assist upon DC if needed.   Equipment Currently Used at Home: none  DME owned (not currently used): shower chair    Previous Level of Function:  Ambulation Skills: independent  Transfer Skills: independent  ADL Skills: independent  Work/Leisure Activity: independent    Subjective:  Communicated with RN prior to session.  Pt agreeable to  "therapy.   Chief Complaint: "Can I eat?"  Patient goals: return home     Pain Ratin/10   Pain Rating Post-Intervention: 0/10    Objective:   Patient found with: oxygen     Cognitive Exam:  Oriented to: Person, Place, Time and Situation    Follows Commands/attention: Follows one-step commands and Follows two-step commands  Communication: clear/fluent  Safety awareness/insight to disability: impaired    Physical Exam:  Postural examination/scapula alignment: Rounded shoulder and Head forward    Skin integrity: Visible skin intact  Edema: None noted in BLE    Sensation:   Intact    Lower Extremity Range of Motion:  Right Lower Extremity: WFL  Left Lower Extremity: WFL    Lower Extremity Strength:  Right Lower Extremity: WFL  Left Lower Extremity: WFL     Fine motor coordination:  Intact    Gross motor coordination: WFL    Functional Mobility:  Bed Mobility:  Rolling/Turning to Left: Stand by assistance, With side rail  Scooting/Bridging: Stand by Assistance (HOB flat)  Supine to Sit: Stand by Assistance    Transfers:  Sit <> Stand Assistance: Stand By Assistance  Sit <> Stand Assistive Device: No Assistive Device  Bed <> Chair Technique: Stand Pivot  Bed <> Chair Assistance: Stand By Assistance  Bed <> Chair Assistive Device: No Assistive Device    Gait:    Gait Distance: 10' ; no LOB or SOB noted; VCs for safety   Assistance 1: Stand by Assistance  Gait Assistive Device: No device  Gait Pattern: reciprocal  Gait Deviation(s): decreased jose, decreased step length, decreased stride length, increased time in double stance    Stairs: did not occur     Balance:   Static Sit: GOOD-: Takes MODERATE challenges from all directions but inconsistently  Dynamic Sit: FAIR+: Maintains balance through MINIMAL excursions of active trunk motion  Static Stand: FAIR+: Takes MINIMAL challenges from all directions  Dynamic stand: FAIR: Needs CONTACT GUARD during gait    Therapeutic Activities and Exercises:  Pt educated on role of " PT and POC/goals for therapy as well as safety with mobility. Pt verbalized understanding. Pt expressed no further concerns/questions.     AM-PAC 6 CLICK MOBILITY  How much help from another person does this patient currently need?   1 = Unable, Total/Dependent Assistance  2 = A lot, Maximum/Moderate Assistance  3 = A little, Minimum/Contact Guard/Supervision  4 = None, Modified [place]/Independent    Turning over in bed (including adjusting bedclothes, sheets and blankets)?: 3  Sitting down on and standing up from a chair with arms (e.g., wheelchair, bedside commode, etc.): 3  Moving from lying on back to sitting on the side of the bed?: 3  Moving to and from a bed to a chair (including a wheelchair)?: 3  Need to walk in hospital room?: 3  Climbing 3-5 steps with a railing?: 3  Total Score: 18     AM-PAC Raw Score CMS G-Code Modifier Level of Impairment Assistance   6 % Total / Unable   7 - 9 CM 80 - 100% Maximal Assist   10 - 14 CL 60 - 80% Moderate Assist   15 - 19 CK 40 - 60% Moderate Assist   20 - 22 CJ 20 - 40% Minimal Assist   23 CI 1-20% SBA / CGA   24 CH 0% Independent/ Mod I     Patient left up in chair with all lines intact, call button in reach and family present.    Assessment:   Ida Santana is a 76 y.o. female with a medical diagnosis of Severe sepsis. PT evaluation complete and goals established. Upon initial PT evaluation, pt presents with gait instability, decreased endurance, and impaired functional mobility. Pt completed all functional mobility with SBA. Pt reports great family support upon DC. Pt would benefit from skilled PT services to address these deficits and improve return to PLOF. Anticipate d/c to HHPT.     Rehab identified problem list/impairments: Rehab identified problem list/impairments: weakness, impaired endurance, impaired self care skills, impaired functional mobilty, gait instability, impaired balance, decreased safety awareness    Rehab potential is  good.    Activity tolerance: Good    Discharge recommendations: Discharge Facility/Level Of Care Needs: home health PT     Barriers to discharge: Barriers to Discharge: None    Equipment recommendations: Equipment Needed After Discharge: none     GOALS:   Physical Therapy Goals        Problem: Physical Therapy Goal    Goal Priority Disciplines Outcome Goal Variances Interventions   Physical Therapy Goal     PT/OT, PT Ongoing (interventions implemented as appropriate)     Description:  Goals to be met by: 17     Patient will increase functional independence with mobility by performin. Supine to sit with Modified [place]  2. Sit to supine with Modified [place]  3. Sit to stand transfer with Supervision  4. Bed to chair transfer with Supervision using LRAD  5. Gait  x 150 feet with Supervision using LRAD              PLAN:    Patient to be seen 4 x/week to address the above listed problems via gait training, therapeutic activities, therapeutic exercises, neuromuscular re-education  Plan of Care expires: 17  Plan of Care reviewed with: patient, family      Ashtyn Wright, PT  2017

## 2017-01-17 NOTE — PLAN OF CARE
JULIANO met with pt face to face to discuss d/c planning and recommendations of SNF placement. Pt's family members at her bedside. JULIANO provided pt and family with list of SNF choice in network with insurance ([employer]). Pt and her family selected [facility], [facility], [facility] and [facility].     JULIANO sent referrals via [facility] per [facility] Karly HENAO called [phone] to complete LOCET, completed with [place] , Next JULIANO faxed completed PASRR to [facility] [phone]

## 2017-01-17 NOTE — PLAN OF CARE
Problem: Physical Therapy Goal  Goal: Physical Therapy Goal  Goals to be met by: 17     Patient will increase functional independence with mobility by performin. Supine to sit with Modified [place]  2. Sit to supine with Modified [place]  3. Sit to stand transfer with Supervision  4. Bed to chair transfer with Supervision using LRAD  5. Gait x 150 feet with Supervision using LRAD  Outcome: Ongoing (interventions implemented as appropriate)  PT evaluation complete and goals established. Upon initial PT evaluation, pt presents with gait instability, decreased endurance, and impaired functional mobility. Pt completed all functional mobility with SBA. Pt reports great family support upon DC. Pt would benefit from skilled PT services to address these deficits and improve return to PLOF. Anticipate d/c to HHPT.      Ashtyn Wright DPT, PT  2017

## 2017-01-17 NOTE — PLAN OF CARE
[facility]     Department of Hospital Medicine     [address] [place] [address]     [phone] after hours  [phone] fax       NURSING HOME ORDERS    01/17/2017    Admit to Nursing Home:   Skilled Bed      Diagnoses:  Active Hospital Problems    Diagnosis  POA    *Severe sepsis [A41.9, R65.20]  Yes     Priority: 2     Staphylococcus aureus sepsis [A41.01]  Yes     Priority: 1 - High    MRSA bacteremia [R78.81]  Yes     Priority: 2     Urinary tract infection without hematuria [N39.0]  Yes     Priority: 2     Hypophosphatemia [E83.39]  Yes    Hypertension [I10]  Yes      Resolved Hospital Problems    Diagnosis Date Resolved POA    Gastroenteritis [K52.9] 01/17/2017 Yes       Patient is homebound due to:  Severe sepsis    Allergies:  Review of patient's allergies indicates:   Allergen Reactions    Adhesive tape-silicones      Other reaction(s): pulling skin off    Atorvastatin      Other reaction(s): Muscle pain    Pravachol  [pravastatin] Rash       Vitals:  Every shift (Skilled Nursing patients)    Diet:    Supplement:  1 can three times a day with meals                         Type:   Glucerna      Infusion:    vancomycin (VANCOCIN) 1,500 mg in dextrose 5 % 250 mL IVPB 20 mg/kg, Intravenous, 166.7 mL/hr, Every 12 hours  End date: 2/12/17  To be given through 1L NS    Acitivities:     - Up in a chair each morning as tolerated   - Scheduled walks once each shift (every 8 hours)   - May ambulate independently    LABS:  Per facility protocol   CMP, CBC each month for 3 months     Nursing Precautions:     - Fall precautions per nursing home protocol       CONSULTS:     Physical Therapy: to evaluate and treat     Occupational Therapy: to evaluate and treat                     DIABETES CARE:     Check blood sugar:     Fingerstick blood sugar a.m and p.m.          Report CBG < 60 or > 400 to physician.                                          Insulin  Sliding Scale          Glucose  Novolog Insulin Subcutaneous        0 - 60   Orange juice or glucose tablet, hold insulin      No insulin   201-250  2 units   251-300  4 units   301-350  6 units   351-400  8 units   >400   10 units then call physician      Medications: Discontinue all previous medication orders, if any. See new list below.     Ida Santana   [place] Medication Instructions JERRICA:[id number]    Printed on:01/17/17 4013   Medication Information                      BLOOD SUGAR DIAGNOSTIC (ONE TOUCH II TEST MISC)  * * * *.  CHECKS 1 TIME DAILY AT LEAST             CALCIUM CARBONATE/VITAMIN D3 (CALCIUM WITH VITAMIN D3 ORAL)  Take 1 tablet by mouth.              carvedilol (COREG) 25 MG tablet  Take 25 mg by mouth 2 (two) times daily with meals.             dextrose 5 % SolP 250 mL with vancomycin 1,000 mg SolR 1,552 mg  Inject 1,552 mg into the vein every 12 (twelve) hours.             flurandrenolide (CORDRAN) 0.05 % lotion  Apply to  scalp one to two times daily as needed for dryness             ketoconazole (NIZORAL) 2 % shampoo  USE EVERY OTHER DAY LET SIT ON SCALP FOR FIVE MINUTES BEFORE RINSING             lisinopril (PRINIVIL,ZESTRIL) 20 MG tablet  Take 20 mg by mouth once daily.             MULTIVITAMIN WITH MINERALS (ONE-A-DAY 50 PLUS) Tab  Take 1 tablet by mouth once daily.              om-3-epa-dha-fish oil-flax-E (THERA TEARS NUTRITION) [phone] og-bc-ma-unit Cap  Take 3 capsules by mouth every morning. Over-the-counter supplement             potassium chloride SA (K-DUR,KLOR-CON) 20 MEQ tablet  take 1 tablet by mouth twice a day             valsartan-hydrochlorothiazide (DIOVAN-HCT) 320-12.5 mg per tablet  take 1 tablet by mouth once daily             verapamil (CALAN-SR) 240 MG CR tablet  take 1 tablet by mouth twice a day       vancomycin (VANCOCIN) 1,500 mg in dextrose 5 % 250 mL IVPB 20 mg/kg, Intravenous, 166.7 mL/hr, Every 12 hours                              _________________________________  Porfirio Posey MD  01/17/2017

## 2017-01-17 NOTE — PT/OT/SLP PROGRESS
Occupational Therapy  Treatment    Ida Santana   MRN: [id number]   Admitting Diagnosis: Severe sepsis    OT Date of Treatment: 17   OT Start Time: 1420  OT Stop Time: 1446  OT Total Time (min): 26 min    Billable Minutes:  Self Care/Home Management 16 and Therapeutic Activity 10    General Precautions: Standard, fall, contact  Orthopedic Precautions: N/A  Braces: N/A    Do you have any cultural, spiritual, Jew conflicts, given your current situation?: none stated    Subjective:  Communicated with nurse prior to session.    Pain Ratin/10         Pain Rating Post-Intervention: 0/10    Objective:  Patient found with: oxygen     Functional Mobility:  Bed Mobility:  Scooting/Bridging: Supervision    Transfers:   Sit <> Stand Assistance: Supervision  Sit <> Stand Assistive Device: No Assistive Device    Functional Ambulation: HHA and SBA for ambulation EOB to and from sink    Activities of Daily Living:   LE Dressing Level of Assistance: Minimum assistance (socks) Patient displays difficulty reaching feet, propping strategies and compensatory movement discussed  Grooming Position: Standing at sink  Grooming Level of Assistance: Modified independent good task sequencing with grooming (items were in place at sink)        Balance:   Static Sit: NORMAL: No deviations seen in posture held statically  Dynamic Sit: GOOD+: Maintains balance through MAXIMAL excursions of active trunk motion  Static Stand: GOOD-: Takes MODERATE challenges from all directions inconsistently  Dynamic stand: FAIR+: Needs CLOSE SUPERVISION during gait and is able to right self with minor LOB    Therapeutic Activities and Exercises:  Patient was found sit EOB with good balance, and remained EOB 5 mins before ready to initiate axs.  Once initiated, problem solving for tasks sequencing required at EOB.  Patient ambulated to sink and returned to EOB, and left seated EOB.     AM-PAC 6 CLICK ADL   How much help from another person does this  patient currently need?   1 = Unable, Total/Dependent Assistance  2 = A lot, Maximum/Moderate Assistance  3 = A little, Minimum/Contact Guard/Supervision  4 = None, Modified [place]/Independent    Putting on and taking off regular lower body clothing? : 3  Bathing (including washing, rinsing, drying)?: 3  Toileting, which includes using toilet, bedpan, or urinal? : 3  Putting on and taking off regular upper body clothing?: 3  Taking care of personal grooming such as brushing teeth?: 3  Eating meals?: 4  Total Score: 19     AM-PAC Raw Score CMS G-Code Modifier Level of Impairment Assistance   6 % Total / Unable   7 - 9 CM 80 - 100% Maximal Assist   10 - 14 CL 60 - 80% Moderate Assist   15 - 19 CK 40 - 60% Moderate Assist   20 - 22 CJ 20 - 40% Minimal Assist   23 CI 1-20% SBA / CGA   24 CH 0% Independent/ Mod I     Patient left seated EOB with all lines intact, call button in reach and family and friend present    ASSESSMENT:  Ida Santana is a 76 y.o. female with a medical diagnosis of Severe sepsis and presents with improving participation in ADLs, but still displays marked confusion affecting tasks completion .    Rehab identified problem list/impairments: Rehab identified problem list/impairments: weakness, impaired endurance, impaired self care skills, impaired functional mobilty, gait instability, impaired balance, impaired cognition, decreased safety awareness, edema    Rehab potential is good.    Activity tolerance: Good    Discharge recommendations: Discharge Facility/Level Of Care Needs: home, home health OT (24/7 supervision required)     Barriers to discharge: Barriers to Discharge: Decreased caregiver support    Equipment recommendations: shower chair     GOALS:   Occupational Therapy Goals        Problem: Occupational Therapy Goal    Goal Priority Disciplines Outcome Interventions   Occupational Therapy Goal     OT, PT/OT Ongoing (interventions implemented as appropriate)    Description:   Goals to be met by: 2017    Patient will increase functional independence with ADLs by performin. Pt will perform supine<>sit transfer with supervision to increase independence and safety with functional transfers.   2. Pt will perform sit<>stand transfer with supervision to increase independence and safety with functional transfers. (met )  3. Pt will demonstrate improve activity tolerance by walking continuous for 2 minutes with no RB   4. Pt will perform therex 2x10 with supervision for BUE strengthening.   5. Pt will perform bed to chair transfer with supervision to increase independence and safety with functional transfers  6. Pt will perform bedside commode transfer with supervision to increase independence and safety with functional transfers.  7. Pt will complete standing grooming activity with supervision to increase endurance for completion of ADLs.(MET )                   Plan:  Patient to be seen 4 x/week to address the above listed problems via self-care/home management, therapeutic activities, therapeutic exercises  Plan of Care expires:    Plan of Care reviewed with: patient, family         Caned Baum OT  2017

## 2017-01-17 NOTE — SUBJECTIVE & OBJECTIVE
"Interval History: NAEON. AOx3. WBC increased. Pt reports cough, post nasal drip. Pt has injected left eye, pt denies pain or discharge, thinks it is because she "slept on it."     Review of Systems   HENT: Positive for postnasal drip, rhinorrhea, sinus pressure and sore throat.    Eyes: Positive for redness. Negative for pain, discharge and visual disturbance.   Respiratory: Positive for cough. Negative for shortness of breath.    Cardiovascular: Negative for chest pain, palpitations and leg swelling.   Gastrointestinal: Negative for abdominal pain.   Genitourinary: Negative for dysuria.   Neurological: Negative for headaches.     Objective:     Vital Signs (Most Recent):  Temp: 99 °F (37.2 °C) (01/17/17 1030)  Pulse: 79 (01/17/17 1055)  Resp: 20 (01/17/17 1055)  BP: 108/61 (01/17/17 1055)  SpO2: 96 % (01/17/17 1055) Vital Signs (24h Range):  Temp:  [97.8 °F (36.6 °C)-101.6 °F (38.7 °C)] 99 °F (37.2 °C)  Pulse:  [76-93] 79  Resp:  [18-22] 20  SpO2:  [90 %-96 %] 96 %  BP: (107-150)/(36-67) 108/61     Weight: 77.6 kg (171 lb)  Body mass index is 26.78 kg/(m^2).    Intake/Output Summary (Last 24 hours) at 01/17/17 1510  Last data filed at 01/17/17 0947   Gross per 24 hour   Intake              750 ml   Output                0 ml   Net              750 ml      Physical Exam   Constitutional: She is oriented to person, place, and time. No distress.   HENT:   Head: Normocephalic.   Mouth/Throat: No oropharyngeal exudate.   Eyes: EOM are normal. Pupils are equal, round, and reactive to light. Right eye exhibits no discharge. Left eye exhibits no discharge. Right conjunctiva is not injected. Left conjunctiva is injected.   Cardiovascular: Normal rate and regular rhythm.  Exam reveals no gallop and no friction rub.    Murmur heard.  Pulmonary/Chest: Effort normal and breath sounds normal. No respiratory distress.   Abdominal: Soft. Bowel sounds are normal. She exhibits no distension. There is no tenderness. "   Musculoskeletal: Normal range of motion. She exhibits no edema.   Neurological: She is alert and oriented to person, place, and time. No cranial nerve deficit.   Skin: She is not diaphoretic.   Psychiatric: She has a normal mood and affect.   Vitals reviewed.      Significant Labs:   CBC:     Recent Labs  Lab 01/17/17 0412 01/18/17  0611   WBC 13.53* 18.07*   HGB 11.3* 10.2*   HCT 30.8* 28.7*    242     CMP:     Recent Labs  Lab 01/17/17 0412 01/18/17  0611    142   K 3.1* 3.4*    109   CO2 26 25    157*   BUN 17 17   CREATININE 0.8 0.7   CALCIUM 8.7 8.2*   PROT 5.8* 5.6*   ALBUMIN 1.8* 1.7*   BILITOT 0.7 0.7   ALKPHOS 87 81   AST 41* 42*   ALT 44 53*   ANIONGAP 6* 8   EGFRNONAA >60.0 >60.0       Significant Imaging: I have reviewed all pertinent imaging results/findings within the past 24 hours.

## 2017-01-17 NOTE — CONSULTS
Please see consult note from today.    Farhad Finney MD  Infectious Disease Fellow, PGY-4  Pager: [phone]  [facility]

## 2017-01-17 NOTE — ASSESSMENT & PLAN NOTE
- has had continued fevers and bcx grew MRSA, concerned that 2/2 continued + bcx patient may have staph seeded somewhere, possible endocarditis. She does not have any recent significant hospital contact.   - MIS negative for endocarditis  - continuing with vanc and rocephin  - repeat bcx shown staph again, repeating bcx - now no growth x1 day (from 1/16)   - repeat bcx today  - Per ID, holding off on broadening abx and ordered CT chest, abdomen, pelvis with contrast.

## 2017-01-17 NOTE — PROGRESS NOTES
[facility] Hospital Medicine  Progress Note    Patient Name: Ida Santana  MRN: [id number]  Patient Class: IP- Inpatient   Admission Date: 1/12/2017  Length of Stay: 5 days  Attending Physician: Loyda Lui MD  Primary Care Provider: Giovanna Murray MD    [facility] Medicine Team: [facility] HOSP MED 1 Porfirio Posey MD    Subjective:     Principal Problem:Severe sepsis    Hospital Course:  1/17/17: Patient continues to cough. Receiving MIS today. Had episode of fever to 101.6 overnight. Finished course of rocephin if we are concerned for pneumonia. Continuing on vanc.         Interval History: Patient continues to cough. Receiving MIS today. Had episode of fever to 101.6 overnight. Finished course of rocephin if we are concerned for pneumonia. Still on Vanc.     Review of Systems   Constitutional: Positive for fever. Negative for chills.   HENT: Negative for dental problem and sore throat.    Eyes: Negative for photophobia and visual disturbance.   Respiratory: Negative for cough, chest tightness and shortness of breath.    Cardiovascular: Negative for chest pain, palpitations and leg swelling.   Gastrointestinal: Negative for abdominal distention, abdominal pain, diarrhea, nausea and vomiting.   Genitourinary: Negative for dysuria, flank pain and urgency.   Musculoskeletal: Negative for arthralgias, back pain, gait problem, joint swelling and neck pain.   Skin: Negative for rash and wound.   Neurological: Negative for dizziness, syncope, speech difficulty, light-headedness, numbness and headaches.   Psychiatric/Behavioral: Negative for agitation and confusion.     Objective:     Vital Signs (Most Recent):  Temp: 99 °F (37.2 °C) (01/17/17 1030)  Pulse: 79 (01/17/17 1055)  Resp: 20 (01/17/17 1055)  BP: 108/61 (01/17/17 1055)  SpO2: 96 % (01/17/17 1055) Vital Signs (24h Range):  Temp:  [97.8 °F (36.6 °C)-101.6 °F (38.7 °C)] 99 °F (37.2 °C)  Pulse:  [76-93] 79  Resp:  [18-22] 20  SpO2:  [90 %-96 %] 96  %  BP: (107-150)/(36-67) 108/61     Weight: 77.6 kg (171 lb)  Body mass index is 26.78 kg/(m^2).    Intake/Output Summary (Last 24 hours) at 01/17/17 1205  Last data filed at 01/17/17 0947   Gross per 24 hour   Intake              750 ml   Output                0 ml   Net              750 ml      Physical Exam   Constitutional: She is oriented to person, place, and time. No distress.   HENT:   Head: Normocephalic.   Mouth/Throat: No oropharyngeal exudate.   Eyes: No scleral icterus.   Cardiovascular: Normal rate and regular rhythm.  Exam reveals no gallop and no friction rub.    Murmur heard.  Pulmonary/Chest: Effort normal. No respiratory distress. She has no wheezes. She has no rales.   Abdominal: Soft. Bowel sounds are normal. She exhibits no distension. There is no tenderness. There is no rebound and no guarding.   Musculoskeletal: Normal range of motion.   Lymphadenopathy:     She has no cervical adenopathy.   Neurological: She is alert and oriented to person, place, and time. No cranial nerve deficit.   Skin: No rash noted. She is not diaphoretic. No erythema.   Vitals reviewed.      Significant Labs:   Blood Culture:   Recent Labs  Lab 01/15/17  1829 01/16/17 1949   LABBLOO Gram stain aer bottle: Gram positive cocci in clusters resembling Staph   Results called to and read back by:Kristie Palomares RN 01/16/2017  13:45  STAPHYLOCOCCUS AUREUSFor susceptibility see order #[id number]  Gram stain aer bottle: Gram positive cocci in clusters resembling Staph   Results called to and read back by:Kristie Wolff RN  01/16/2017  15:34  STAPHYLOCOCCUS AUREUSSusceptibility pending No Growth to date  No Growth to date     CBC:   Recent Labs  Lab 01/16/17  0359 01/17/17 0412   WBC 16.15* 13.53*   HGB 11.7* 11.3*   HCT 32.8* 30.8*    216     CMP:   Recent Labs  Lab 01/16/17  0359 01/17/17 0412    139   K 3.4* 3.1*    107   CO2 24 26   * 105   BUN 16 17   CREATININE 0.8 0.8   CALCIUM 8.9 8.7    PROT 6.3 5.8*   ALBUMIN 2.0* 1.8*   BILITOT 0.7 0.7   ALKPHOS 96 87   AST 40 41*   ALT 40 44   ANIONGAP 7* 6*   EGFRNONAA >60.0 >60.0       Significant Imaging: I have reviewed all pertinent imaging results/findings within the past 24 hours.    Assessment/Plan:        MRSA bacteremia  Staphylococcus aureus sepsis  Severe sepsis  - has had continued fevers and bcx grew MRSA, concerned that 2/2 continued + bcx patient may have staph seeded somewhere, possible endocarditis. She does not have any recent significant hospital contact.   - MIS showed no valvular endocarditis  - continuing with vanc and rocephin  - repeat bcx shown staph again, repeating bcx - now no growth x1 day  - MIS done today - FU results     Urinary tract infection without hematuria  - Ucx grew what appears to be contaminant with nothing in predominance. Unclear if this is source of patient's MRSA bacteremia, doubtful  - repeat UA showed no UTI    Hypophosphatemia  - low today - repleting with NaPh oral packets for 4 doses    Altered mental status  2/2 UTI vs hyponatremia vs intercranial event - status much improved today  - continue to monitor  - CT and MRI head negative  - avoid benzo, narcotics if possible    Gastroenteritis  -likely viral in etiology  -pt does not recall any bad/new food intake or sick person exposure  - has had decreased PO inatake  -treat symptomatically with antiemetics and IVF  - symptoms improving    Ppx: [id number]  Diet: Diabetic  DISPO: FU with ID and continued clear bcx    Porfirio Posey MD  Department of Hospital Medicine   [facility]                          01/17/2017                             STAFF PHYSICIAN NOTE                                   Attending Attestation for Rounds with Resident  I have reviewed and concur with the resident's history, physical, assessment, and plan.  I have personally interviewed and examined the patient at bedside and agree with the resident's findings.                                   ________________________________________                                     REASON FOR ADMISSION:     Patient is 76 y.o.female    Body mass index is 26.78 kg/(m^2).,  Severe sepsis

## 2017-01-18 PROBLEM — H10.9 CONJUNCTIVITIS: Status: ACTIVE | Noted: 2017-01-18

## 2017-01-18 LAB
ALBUMIN SERPL BCP-MCNC: 1.7 G/DL
ALP SERPL-CCNC: 81 U/L
ALT SERPL W/O P-5'-P-CCNC: 53 U/L
ANION GAP SERPL CALC-SCNC: 8 MMOL/L
ANISOCYTOSIS BLD QL SMEAR: SLIGHT
AST SERPL-CCNC: 42 U/L
BACTERIA BLD CULT: NORMAL
BACTERIA UR CULT: NO GROWTH
BASOPHILS # BLD AUTO: ABNORMAL K/UL
BASOPHILS NFR BLD: 0 %
BILIRUB SERPL-MCNC: 0.7 MG/DL
BUN SERPL-MCNC: 17 MG/DL
CALCIUM SERPL-MCNC: 8.2 MG/DL
CHLORIDE SERPL-SCNC: 109 MMOL/L
CO2 SERPL-SCNC: 25 MMOL/L
CREAT SERPL-MCNC: 0.7 MG/DL
DIFFERENTIAL METHOD: ABNORMAL
EOSINOPHIL # BLD AUTO: ABNORMAL K/UL
EOSINOPHIL NFR BLD: 0 %
ERYTHROCYTE [DISTWIDTH] IN BLOOD BY AUTOMATED COUNT: 14.2 %
EST. GFR  (AFRICAN AMERICAN): >60 ML/MIN/1.73 M^2
EST. GFR  (NON AFRICAN AMERICAN): >60 ML/MIN/1.73 M^2
GLUCOSE SERPL-MCNC: 157 MG/DL
HCT VFR BLD AUTO: 28.7 %
HGB BLD-MCNC: 10.2 G/DL
HIV1+2 IGG SERPL QL IA.RAPID: NEGATIVE
HYPOCHROMIA BLD QL SMEAR: ABNORMAL
L PNEUMO AG UR QL IA: NOT DETECTED
LYMPHOCYTES # BLD AUTO: ABNORMAL K/UL
LYMPHOCYTES NFR BLD: 4 %
MCH RBC QN AUTO: 31.8 PG
MCHC RBC AUTO-ENTMCNC: 35.5 %
MCV RBC AUTO: 89 FL
MONOCYTES # BLD AUTO: ABNORMAL K/UL
MONOCYTES NFR BLD: 2 %
NEUTROPHILS NFR BLD: 94 %
OVALOCYTES BLD QL SMEAR: ABNORMAL
PLATELET # BLD AUTO: 242 K/UL
PMV BLD AUTO: 10.1 FL
POCT GLUCOSE: 142 MG/DL (ref 70–110)
POCT GLUCOSE: 154 MG/DL (ref 70–110)
POCT GLUCOSE: 189 MG/DL (ref 70–110)
POCT GLUCOSE: 90 MG/DL (ref 70–110)
POCT GLUCOSE: 97 MG/DL (ref 70–110)
POIKILOCYTOSIS BLD QL SMEAR: SLIGHT
POLYCHROMASIA BLD QL SMEAR: ABNORMAL
POTASSIUM SERPL-SCNC: 3.4 MMOL/L
PROT SERPL-MCNC: 5.6 G/DL
RBC # BLD AUTO: 3.21 M/UL
SODIUM SERPL-SCNC: 142 MMOL/L
WBC # BLD AUTO: 18.07 K/UL

## 2017-01-18 PROCEDURE — 99233 SBSQ HOSP IP/OBS HIGH 50: CPT | Mod: ,,, | Performed by: INTERNAL MEDICINE

## 2017-01-18 PROCEDURE — 27000221 HC OXYGEN, UP TO 24 HOURS

## 2017-01-18 PROCEDURE — 85027 COMPLETE CBC AUTOMATED: CPT

## 2017-01-18 PROCEDURE — 80053 COMPREHEN METABOLIC PANEL: CPT

## 2017-01-18 PROCEDURE — 86701 HIV-1ANTIBODY: CPT

## 2017-01-18 PROCEDURE — 11000001 HC ACUTE MED/SURG PRIVATE ROOM

## 2017-01-18 PROCEDURE — 87633 RESP VIRUS 12-25 TARGETS: CPT

## 2017-01-18 PROCEDURE — 25500020 PHARM REV CODE 255: Performed by: HOSPITALIST

## 2017-01-18 PROCEDURE — 87186 SC STD MICRODIL/AGAR DIL: CPT

## 2017-01-18 PROCEDURE — 85007 BL SMEAR W/DIFF WBC COUNT: CPT

## 2017-01-18 PROCEDURE — 25000003 PHARM REV CODE 250: Performed by: EMERGENCY MEDICINE

## 2017-01-18 PROCEDURE — 25000003 PHARM REV CODE 250: Performed by: STUDENT IN AN ORGANIZED HEALTH CARE EDUCATION/TRAINING PROGRAM

## 2017-01-18 PROCEDURE — 25000003 PHARM REV CODE 250: Performed by: INTERNAL MEDICINE

## 2017-01-18 PROCEDURE — 94761 N-INVAS EAR/PLS OXIMETRY MLT: CPT

## 2017-01-18 PROCEDURE — 87449 NOS EACH ORGANISM AG IA: CPT

## 2017-01-18 PROCEDURE — 36415 COLL VENOUS BLD VENIPUNCTURE: CPT

## 2017-01-18 PROCEDURE — 63600175 PHARM REV CODE 636 W HCPCS: Performed by: STUDENT IN AN ORGANIZED HEALTH CARE EDUCATION/TRAINING PROGRAM

## 2017-01-18 PROCEDURE — 99233 SBSQ HOSP IP/OBS HIGH 50: CPT | Mod: ,,, | Performed by: HOSPITALIST

## 2017-01-18 PROCEDURE — 87040 BLOOD CULTURE FOR BACTERIA: CPT | Mod: 59

## 2017-01-18 PROCEDURE — 87077 CULTURE AEROBIC IDENTIFY: CPT

## 2017-01-18 PROCEDURE — 94668 MNPJ CHEST WALL SBSQ: CPT

## 2017-01-18 RX ORDER — ERYTHROMYCIN 5 MG/G
OINTMENT OPHTHALMIC 4 TIMES DAILY
Status: DISPENSED | OUTPATIENT
Start: 2017-01-18 | End: 2017-01-25

## 2017-01-18 RX ADMIN — Medication 3 ML: at 05:01

## 2017-01-18 RX ADMIN — VERAPAMIL HYDROCHLORIDE 240 MG: 120 TABLET, FILM COATED, EXTENDED RELEASE ORAL at 09:01

## 2017-01-18 RX ADMIN — HEPARIN SODIUM 5000 UNITS: 5000 INJECTION, SOLUTION INTRAVENOUS; SUBCUTANEOUS at 05:01

## 2017-01-18 RX ADMIN — PANTOPRAZOLE SODIUM 40 MG: 40 TABLET, DELAYED RELEASE ORAL at 08:01

## 2017-01-18 RX ADMIN — VANCOMYCIN HYDROCHLORIDE 1500 MG: 500 INJECTION, POWDER, LYOPHILIZED, FOR SOLUTION INTRAVENOUS at 05:01

## 2017-01-18 RX ADMIN — POTASSIUM & SODIUM PHOSPHATES POWDER PACK 280-160-250 MG 1 PACKET: 280-160-250 PACK at 08:01

## 2017-01-18 RX ADMIN — VERAPAMIL HYDROCHLORIDE 240 MG: 120 TABLET, FILM COATED, EXTENDED RELEASE ORAL at 08:01

## 2017-01-18 RX ADMIN — IOHEXOL 75 ML: 350 INJECTION, SOLUTION INTRAVENOUS at 10:01

## 2017-01-18 RX ADMIN — POTASSIUM & SODIUM PHOSPHATES POWDER PACK 280-160-250 MG 1 PACKET: 280-160-250 PACK at 12:01

## 2017-01-18 RX ADMIN — Medication 3 ML: at 09:01

## 2017-01-18 RX ADMIN — HEPARIN SODIUM 5000 UNITS: 5000 INJECTION, SOLUTION INTRAVENOUS; SUBCUTANEOUS at 02:01

## 2017-01-18 RX ADMIN — Medication 3 ML: at 02:01

## 2017-01-18 RX ADMIN — ERYTHROMYCIN 1 INCH: 5 OINTMENT OPHTHALMIC at 05:01

## 2017-01-18 RX ADMIN — ERYTHROMYCIN 1 INCH: 5 OINTMENT OPHTHALMIC at 11:01

## 2017-01-18 RX ADMIN — HEPARIN SODIUM 5000 UNITS: 5000 INJECTION, SOLUTION INTRAVENOUS; SUBCUTANEOUS at 09:01

## 2017-01-18 NOTE — SUBJECTIVE & OBJECTIVE
Interval History: Patient had low grade fever 100.3 F, otherwise no changes in clinical status    HPI:   77 y/o female with history of HTN, HLD,  Osteopenia, remote breast cancer came to hospital for family members noted that patient let the bathroom sink overflow and left a pot burning on the stove the day of admission. Patient lives by herself and is fully functional. The patient reports 3 episodes of vomits gastric content and 3 episodes of diarrhea today, denied fever, chills, abdominal pain, weakness, paraesthesia, dysuria, frequency or malodorous urin, skin breakdown, wounds, trauma, central lines, joint or back pain. Denies similar episodes in the past.        Review of Systems   Constitutional: Positive for fever. Negative for chills.   HENT: Negative for sore throat.    Respiratory: Negative for cough, chest tightness and shortness of breath.    Cardiovascular: Negative for chest pain, palpitations and leg swelling.   Gastrointestinal: Negative for abdominal distention, abdominal pain, diarrhea, nausea and vomiting.   Genitourinary: Negative for dysuria, flank pain and urgency.   Skin: Negative for rash.   Neurological: Negative for dizziness, light-headedness and numbness.   Psychiatric/Behavioral: Negative for confusion.     Objective:     Vital Signs (Most Recent):  Temp: 99.5 °F (37.5 °C) (01/18/17 0300)  Pulse: 76 (01/18/17 0300)  Resp: 20 (01/18/17 0300)  BP: (!) 121/57 (01/18/17 0300)  SpO2: 95 % (01/18/17 0300) Vital Signs (24h Range):  Temp:  [97.8 °F (36.6 °C)-100.3 °F (37.9 °C)] 99.5 °F (37.5 °C)  Pulse:  [76-86] 76  Resp:  [18-22] 20  SpO2:  [91 %-96 %] 95 %  BP: (107-146)/(36-67) 121/57     Weight: 77.6 kg (171 lb)  Body mass index is 26.78 kg/(m^2).    Estimated Creatinine Clearance: 75.7 mL/min (based on Cr of 0.7).    Physical Exam   Constitutional: She is oriented to person, place, and time. No distress.   HENT:   Head: Normocephalic.   Mouth/Throat: No oropharyngeal exudate.   Eyes: No  scleral icterus.   Cardiovascular: Normal rate and regular rhythm.  Exam reveals no gallop and no friction rub.    Murmur heard.  Pulmonary/Chest: Effort normal. No respiratory distress. She has no wheezes. She has no rales.   Abdominal: Soft. Bowel sounds are normal. She exhibits no distension. There is no tenderness. There is no rebound and no guarding.   Musculoskeletal: Normal range of motion.   Lymphadenopathy:     She has no cervical adenopathy.   Neurological: She is alert and oriented to person, place, and time. No cranial nerve deficit.   Skin: No rash noted. She is not diaphoretic. No erythema.   Vitals reviewed.      Significant Labs:   Blood Culture:     Recent Labs  Lab 01/13/17  1703 01/13/17  1704 01/14/17  1101 01/15/17  1829 01/16/17  1949   LABBLOO Gram stain aer bottle: Gram positive cocci in clusters resembling Staph  Gram stain alea bottle: Gram positive cocci in clusters resembling Staph   Results called to and read back by:Yariel Verdin RN 01/14/2017  09:19  METHICILLIN RESISTANT STAPHYLOCOCCUS AUREUSFor susceptibility see order #[id number] Gram stain aer bottle: Gram positive cocci in clusters resembling Staph   Results called to and read back by:Yariel Verdin RN 01/14/2017  09:18  Gram stain alea bottle: Gram positive cocci in clusters resembling Staph  01/14/2017  11:07   Results previously called.  METHICILLIN RESISTANT STAPHYLOCOCCUS AUREUSFor susceptibility see order #[id number] Gram stain aer bottle: Gram positive cocci in clusters resembling Staph   Results called to and read back by:Taylor Valerio RN 01/15/2017  02:34  METHICILLIN RESISTANT STAPHYLOCOCCUS AUREUSFor susceptibility see order #[id number]  Gram stain aer bottle: Gram positive cocci in clusters resembling Staph   Results called to and read back by:Taylor Valerio RN 01/15/2017  02:34  METHICILLIN RESISTANT STAPHYLOCOCCUS AUREUS Gram stain aer bottle: Gram positive cocci in clusters resembling Staph   Results  called to and read back by:Kristie Palomares RN 01/16/2017  13:45  STAPHYLOCOCCUS AUREUSFor susceptibility see order #[id number]  Gram stain aer bottle: Gram positive cocci in clusters resembling Staph   Results called to and read back by:Kristie Wolff RN  01/16/2017  15:34  STAPHYLOCOCCUS AUREUSSusceptibility pending Gram stain aer bottle: Gram positive cocci in clusters resembling Staph   Results called to and read back by: Kristie Wolff RN  01/17/2017  17:58  Gram stain aer bottle: Gram positive cocci in clusters resembling Staph   Results called to and read back by: Sujatha Bennett RN 01/17/2017  15:13     BMP:     Recent Labs  Lab 01/17/17  0706 01/18/17  0611   GLU  --  157*   NA  --  142   K  --  3.4*   CL  --  109   CO2  --  25   BUN  --  17   CREATININE  --  0.7   CALCIUM  --  8.2*   MG 2.0  --      CBC:     Recent Labs  Lab 01/17/17  0412 01/18/17  0611   WBC 13.53* 18.07*   HGB 11.3* 10.2*   HCT 30.8* 28.7*    242     CMP:     Recent Labs  Lab 01/17/17  0412 01/18/17  0611    142   K 3.1* 3.4*    109   CO2 26 25    157*   BUN 17 17   CREATININE 0.8 0.7   CALCIUM 8.7 8.2*   PROT 5.8* 5.6*   ALBUMIN 1.8* 1.7*   BILITOT 0.7 0.7   ALKPHOS 87 81   AST 41* 42*   ALT 44 53*   ANIONGAP 6* 8   EGFRNONAA >60.0 >60.0       Significant Imaging: I have reviewed all pertinent imaging results/findings within the past 24 hours.

## 2017-01-18 NOTE — PROGRESS NOTES
[facility]  Infectious Disease  Progress Note    Patient Name: Ida Santana  MRN: [id number]  Admission Date: 1/12/2017  Length of Stay: 6 days  Attending Physician: Loyda Lui MD  Primary Care Provider: Giovanna Murray MD    Isolation Status: Contact  Assessment/Plan:      MRSA bacteremia  75 y/o female admitted for an episode of acute confusion. B/C 1/13/17, 1/14/17, 1/15/17, 1/16/17 positive for MRSA, repeated 1/16/17 will follow. No obvious source of bacteremia (no hardware, no skin breakdown, no cellulitis, no wounds, no trauma, no central lines, no joint or back pain).    - Last Vancomycin trough 15.1, trough goal 15-20, will continue same dose for now  - B/C repeated today, will follow  - MIS 1/17/17 negative for endocarditis  - consider imaging of spine looking for abscess/discitis  - seems there is no good source control at this time          Anticipated Disposition: IV vancomycin    Thank you for your consult. I will follow-up with patient. Please contact us if you have any additional questions.     Farhad Finney MD  Infectious Disease Fellow, PGY-4  Pager: [phone]  [facility]    Subjective:     Principal Problem:Severe sepsis    Interval History: Patient had low grade fever 100.3 F, otherwise no changes in clinical status    HPI:   75 y/o female with history of HTN, HLD,  Osteopenia, remote breast cancer came to hospital for family members noted that patient let the bathroom sink overflow and left a pot burning on the stove the day of admission. Patient lives by herself and is fully functional. The patient reports 3 episodes of vomits gastric content and 3 episodes of diarrhea today, denied fever, chills, abdominal pain, weakness, paraesthesia, dysuria, frequency or malodorous urin, skin breakdown, wounds, trauma, central lines, joint or back pain. Denies similar episodes in the past.        Review of Systems   Constitutional: Positive for fever.  Negative for chills.   HENT: Negative for sore throat.    Respiratory: Negative for cough, chest tightness and shortness of breath.    Cardiovascular: Negative for chest pain, palpitations and leg swelling.   Gastrointestinal: Negative for abdominal distention, abdominal pain, diarrhea, nausea and vomiting.   Genitourinary: Negative for dysuria, flank pain and urgency.   Skin: Negative for rash.   Neurological: Negative for dizziness, light-headedness and numbness.   Psychiatric/Behavioral: Negative for confusion.     Objective:     Vital Signs (Most Recent):  Temp: 99.5 °F (37.5 °C) (01/18/17 0300)  Pulse: 76 (01/18/17 0300)  Resp: 20 (01/18/17 0300)  BP: (!) 121/57 (01/18/17 0300)  SpO2: 95 % (01/18/17 0300) Vital Signs (24h Range):  Temp:  [97.8 °F (36.6 °C)-100.3 °F (37.9 °C)] 99.5 °F (37.5 °C)  Pulse:  [76-86] 76  Resp:  [18-22] 20  SpO2:  [91 %-96 %] 95 %  BP: (107-146)/(36-67) 121/57     Weight: 77.6 kg (171 lb)  Body mass index is 26.78 kg/(m^2).    Estimated Creatinine Clearance: 75.7 mL/min (based on Cr of 0.7).    Physical Exam   Constitutional: She is oriented to person, place, and time. No distress.   HENT:   Head: Normocephalic.   Mouth/Throat: No oropharyngeal exudate.   Eyes: No scleral icterus.   Cardiovascular: Normal rate and regular rhythm.  Exam reveals no gallop and no friction rub.    Murmur heard.  Pulmonary/Chest: Effort normal. No respiratory distress. She has no wheezes. She has no rales.   Abdominal: Soft. Bowel sounds are normal. She exhibits no distension. There is no tenderness. There is no rebound and no guarding.   Musculoskeletal: Normal range of motion.   Lymphadenopathy:     She has no cervical adenopathy.   Neurological: She is alert and oriented to person, place, and time. No cranial nerve deficit.   Skin: No rash noted. She is not diaphoretic. No erythema.   Vitals reviewed.      Significant Labs:   Blood Culture:     Recent Labs  Lab 01/13/17  1703 01/13/17  1708  01/14/17  1101 01/15/17  1829 01/16/17  1949   LABBLOO Gram stain aer bottle: Gram positive cocci in clusters resembling Staph  Gram stain alea bottle: Gram positive cocci in clusters resembling Staph   Results called to and read back by:Yariel Verdin RN 01/14/2017  09:19  METHICILLIN RESISTANT STAPHYLOCOCCUS AUREUSFor susceptibility see order #[id number] Gram stain aer bottle: Gram positive cocci in clusters resembling Staph   Results called to and read back by:Yariel Verdin RN 01/14/2017  09:18  Gram stain alea bottle: Gram positive cocci in clusters resembling Staph  01/14/2017  11:07   Results previously called.  METHICILLIN RESISTANT STAPHYLOCOCCUS AUREUSFor susceptibility see order #[id number] Gram stain aer bottle: Gram positive cocci in clusters resembling Staph   Results called to and read back by:Taylor Valerio RN 01/15/2017  02:34  METHICILLIN RESISTANT STAPHYLOCOCCUS AUREUSFor susceptibility see order #[id number]  Gram stain aer bottle: Gram positive cocci in clusters resembling Staph   Results called to and read back by:Taylor Valerio RN 01/15/2017  02:34  METHICILLIN RESISTANT STAPHYLOCOCCUS AUREUS Gram stain aer bottle: Gram positive cocci in clusters resembling Staph   Results called to and read back by:Kristie Palomares RN 01/16/2017  13:45  STAPHYLOCOCCUS AUREUSFor susceptibility see order #[id number]  Gram stain aer bottle: Gram positive cocci in clusters resembling Staph   Results called to and read back by:Kristie Wolff RN  01/16/2017  15:34  STAPHYLOCOCCUS AUREUSSusceptibility pending Gram stain aer bottle: Gram positive cocci in clusters resembling Staph   Results called to and read back by: Kristie Wolff RN  01/17/2017  17:58  Gram stain aer bottle: Gram positive cocci in clusters resembling Staph   Results called to and read back by: Sujatha Bennett RN 01/17/2017  15:13     BMP:     Recent Labs  Lab 01/17/17  0706 01/18/17  0611   GLU  --  157*   NA  --  142   K  --  3.4*   CL  --   109   CO2  --  25   BUN  --  17   CREATININE  --  0.7   CALCIUM  --  8.2*   MG 2.0  --      CBC:     Recent Labs  Lab 01/17/17  0412 01/18/17  0611   WBC 13.53* 18.07*   HGB 11.3* 10.2*   HCT 30.8* 28.7*    242     CMP:     Recent Labs  Lab 01/17/17 0412 01/18/17  0611    142   K 3.1* 3.4*    109   CO2 26 25    157*   BUN 17 17   CREATININE 0.8 0.7   CALCIUM 8.7 8.2*   PROT 5.8* 5.6*   ALBUMIN 1.8* 1.7*   BILITOT 0.7 0.7   ALKPHOS 87 81   AST 41* 42*   ALT 44 53*   ANIONGAP 6* 8   EGFRNONAA >60.0 >60.0       Significant Imaging: I have reviewed all pertinent imaging results/findings within the past 24 hours.

## 2017-01-19 PROBLEM — H10.9 CONJUNCTIVITIS: Status: ACTIVE | Noted: 2017-01-19

## 2017-01-19 LAB
ALBUMIN SERPL BCP-MCNC: 2 G/DL
ALP SERPL-CCNC: 107 U/L
ALT SERPL W/O P-5'-P-CCNC: 65 U/L
ANION GAP SERPL CALC-SCNC: 12 MMOL/L
ANISOCYTOSIS BLD QL SMEAR: SLIGHT
ANISOCYTOSIS BLD QL SMEAR: SLIGHT
APTT BLDCRRT: 32.1 SEC
AST SERPL-CCNC: 51 U/L
BACTERIA BLD CULT: NORMAL
BASOPHILS # BLD AUTO: ABNORMAL K/UL
BASOPHILS # BLD AUTO: ABNORMAL K/UL
BASOPHILS NFR BLD: 0 %
BASOPHILS NFR BLD: 0 %
BILIRUB SERPL-MCNC: 0.8 MG/DL
BUN SERPL-MCNC: 13 MG/DL
CALCIUM SERPL-MCNC: 8.6 MG/DL
CHLORIDE SERPL-SCNC: 105 MMOL/L
CO2 SERPL-SCNC: 26 MMOL/L
CREAT SERPL-MCNC: 0.7 MG/DL
DIFFERENTIAL METHOD: ABNORMAL
DIFFERENTIAL METHOD: ABNORMAL
EOSINOPHIL # BLD AUTO: ABNORMAL K/UL
EOSINOPHIL # BLD AUTO: ABNORMAL K/UL
EOSINOPHIL NFR BLD: 0 %
EOSINOPHIL NFR BLD: 1.5 %
ERYTHROCYTE [DISTWIDTH] IN BLOOD BY AUTOMATED COUNT: 14.5 %
ERYTHROCYTE [DISTWIDTH] IN BLOOD BY AUTOMATED COUNT: 14.7 %
EST. GFR  (AFRICAN AMERICAN): >60 ML/MIN/1.73 M^2
EST. GFR  (NON AFRICAN AMERICAN): >60 ML/MIN/1.73 M^2
FACT X PPP CHRO-ACNC: 0.73 IU/ML
FACT X PPP CHRO-ACNC: 1.15 IU/ML
GLUCOSE SERPL-MCNC: 92 MG/DL
HCT VFR BLD AUTO: 32.4 %
HCT VFR BLD AUTO: 33.7 %
HGB BLD-MCNC: 11.4 G/DL
HGB BLD-MCNC: 11.9 G/DL
INR PPP: 1.1
LYMPHOCYTES # BLD AUTO: ABNORMAL K/UL
LYMPHOCYTES # BLD AUTO: ABNORMAL K/UL
LYMPHOCYTES NFR BLD: 13 %
LYMPHOCYTES NFR BLD: 5 %
MCH RBC QN AUTO: 31.3 PG
MCH RBC QN AUTO: 31.6 PG
MCHC RBC AUTO-ENTMCNC: 35.2 %
MCHC RBC AUTO-ENTMCNC: 35.3 %
MCV RBC AUTO: 89 FL
MCV RBC AUTO: 90 FL
METAMYELOCYTES NFR BLD MANUAL: 0.5 %
METAMYELOCYTES NFR BLD MANUAL: 3 %
MONOCYTES # BLD AUTO: ABNORMAL K/UL
MONOCYTES # BLD AUTO: ABNORMAL K/UL
MONOCYTES NFR BLD: 5 %
MONOCYTES NFR BLD: 8.5 %
MYELOCYTES NFR BLD MANUAL: 0.5 %
NEUTROPHILS NFR BLD: 78 %
NEUTROPHILS NFR BLD: 81 %
NEUTS BAND NFR BLD MANUAL: 1 %
NEUTS BAND NFR BLD MANUAL: 3 %
OVALOCYTES BLD QL SMEAR: ABNORMAL
PLATELET # BLD AUTO: 284 K/UL
PLATELET # BLD AUTO: 326 K/UL
PLATELET BLD QL SMEAR: ABNORMAL
PMV BLD AUTO: 10 FL
PMV BLD AUTO: 10.2 FL
POCT GLUCOSE: 114 MG/DL (ref 70–110)
POCT GLUCOSE: 133 MG/DL (ref 70–110)
POIKILOCYTOSIS BLD QL SMEAR: SLIGHT
POLYCHROMASIA BLD QL SMEAR: ABNORMAL
POLYCHROMASIA BLD QL SMEAR: ABNORMAL
POTASSIUM SERPL-SCNC: 2.9 MMOL/L
PROT SERPL-MCNC: 6.5 G/DL
PROTHROMBIN TIME: 11.4 SEC
RBC # BLD AUTO: 3.64 M/UL
RBC # BLD AUTO: 3.76 M/UL
SODIUM SERPL-SCNC: 143 MMOL/L
VANCOMYCIN TROUGH SERPL-MCNC: 18.9 UG/ML
WBC # BLD AUTO: 22.48 K/UL
WBC # BLD AUTO: 27.77 K/UL

## 2017-01-19 PROCEDURE — 25000003 PHARM REV CODE 250: Performed by: STUDENT IN AN ORGANIZED HEALTH CARE EDUCATION/TRAINING PROGRAM

## 2017-01-19 PROCEDURE — 99233 SBSQ HOSP IP/OBS HIGH 50: CPT | Mod: ,,, | Performed by: HOSPITALIST

## 2017-01-19 PROCEDURE — 99233 SBSQ HOSP IP/OBS HIGH 50: CPT | Mod: ,,, | Performed by: INTERNAL MEDICINE

## 2017-01-19 PROCEDURE — 85610 PROTHROMBIN TIME: CPT

## 2017-01-19 PROCEDURE — 85730 THROMBOPLASTIN TIME PARTIAL: CPT

## 2017-01-19 PROCEDURE — 85027 COMPLETE CBC AUTOMATED: CPT | Mod: 91

## 2017-01-19 PROCEDURE — 80202 ASSAY OF VANCOMYCIN: CPT

## 2017-01-19 PROCEDURE — 87040 BLOOD CULTURE FOR BACTERIA: CPT

## 2017-01-19 PROCEDURE — 85007 BL SMEAR W/DIFF WBC COUNT: CPT

## 2017-01-19 PROCEDURE — 85520 HEPARIN ASSAY: CPT

## 2017-01-19 PROCEDURE — 25000003 PHARM REV CODE 250: Performed by: EMERGENCY MEDICINE

## 2017-01-19 PROCEDURE — 11000001 HC ACUTE MED/SURG PRIVATE ROOM

## 2017-01-19 PROCEDURE — 36415 COLL VENOUS BLD VENIPUNCTURE: CPT

## 2017-01-19 PROCEDURE — 63600175 PHARM REV CODE 636 W HCPCS: Performed by: STUDENT IN AN ORGANIZED HEALTH CARE EDUCATION/TRAINING PROGRAM

## 2017-01-19 PROCEDURE — 80053 COMPREHEN METABOLIC PANEL: CPT

## 2017-01-19 RX ORDER — HEPARIN SODIUM,PORCINE/D5W 25000/250
17 INTRAVENOUS SOLUTION INTRAVENOUS CONTINUOUS
Status: DISPENSED | OUTPATIENT
Start: 2017-01-19 | End: 2017-01-25

## 2017-01-19 RX ADMIN — VANCOMYCIN HYDROCHLORIDE 1500 MG: 500 INJECTION, POWDER, LYOPHILIZED, FOR SOLUTION INTRAVENOUS at 08:01

## 2017-01-19 RX ADMIN — Medication 3 ML: at 02:01

## 2017-01-19 RX ADMIN — ERYTHROMYCIN 1 INCH: 5 OINTMENT OPHTHALMIC at 05:01

## 2017-01-19 RX ADMIN — Medication 3 ML: at 11:01

## 2017-01-19 RX ADMIN — VERAPAMIL HYDROCHLORIDE 240 MG: 120 TABLET, FILM COATED, EXTENDED RELEASE ORAL at 08:01

## 2017-01-19 RX ADMIN — Medication 3 ML: at 05:01

## 2017-01-19 RX ADMIN — HEPARIN SODIUM AND DEXTROSE 13 UNITS/KG/HR: 10000; 5 INJECTION INTRAVENOUS at 06:01

## 2017-01-19 RX ADMIN — ERYTHROMYCIN 1 INCH: 5 OINTMENT OPHTHALMIC at 06:01

## 2017-01-19 RX ADMIN — ERYTHROMYCIN 1 INCH: 5 OINTMENT OPHTHALMIC at 11:01

## 2017-01-19 RX ADMIN — ERYTHROMYCIN 1 INCH: 5 OINTMENT OPHTHALMIC at 12:01

## 2017-01-19 RX ADMIN — HEPARIN SODIUM AND DEXTROSE 13.02 UNITS/KG/HR: 10000; 5 INJECTION INTRAVENOUS at 08:01

## 2017-01-19 RX ADMIN — PANTOPRAZOLE SODIUM 40 MG: 40 TABLET, DELAYED RELEASE ORAL at 08:01

## 2017-01-19 RX ADMIN — VANCOMYCIN HYDROCHLORIDE 1500 MG: 500 INJECTION, POWDER, LYOPHILIZED, FOR SOLUTION INTRAVENOUS at 04:01

## 2017-01-19 RX ADMIN — HEPARIN SODIUM AND DEXTROSE 17 UNITS/KG/HR: 10000; 5 INJECTION INTRAVENOUS at 01:01

## 2017-01-19 NOTE — PLAN OF CARE
JULIANO updated pt's sister-in-law Bhavna that pt will now possibly discharge on Monday and that pt has been clinically accepted to [facility] and [facility]. [facility] is still under review. Bhavna stated that she really wanted [facility] because they really don't want to have to deal with all the nursing home paperwork.     Melina to follow up on possibly admission by responding to SNF consult in EPIC

## 2017-01-19 NOTE — SIGNIFICANT EVENT
Notifed by Radiology that among other CT findings, patient is positive for left renal vein thrombosis. As there are no obvious contraindication to anticoagulation at this time, heparin drip is ordered. Will collect PT/INR prior to starting infusion.    Yesi Garza MD

## 2017-01-19 NOTE — CONSULTS
Consult Note  Pulmonology    Team: [facility]    PRESENTING HISTORY     Chief Complaint/Reason for Admission:  Altered Mental Status    History of Present Illness:  Ms. Ida Santana is a 76 y.o. female with a past medical history of HTN, HLD, osteopenia, and breast cancer presented to [facility] ED with altered mental status. She was brought in by her family after having an acute onset of confusion. She had three episodes of non bloody non bilious emesis and three episodes of diarrhea prior to her admission. At that time she denied any other associated symptoms such as fever, chills, abdominal pain, weakness, paraesthesias, dysuria, increased urinary frequency or urgency, palpitations, or night sweats. In the emergency department she was found to be tachycardic and hypertensive. CT head was performed which did not show an acute intercranial abnormality. Laboratory work up was significant for a leukocytosis and a UTI. She was ultimately found to have MRSA bacteremia. Infectious disease has been following her case. She has been worked up extensively and no source has been found to date. Her blood cultures have been positive from 1/13-1/18 with cultures from today pending. She is on vancomycin. She had a MIS on 1/17 which did not show evidence of endocarditis. Pulmonology was consulted after CT abdomen, pelvis, and chest was performed for source evaluation. She was found to have evolving cavitary lesions. Today, she states that she has been feeling better. She reports some postnasal drip, rhinorrhea, sinus pressure, sore throat, eye redness, and cough. She had no acute concerns or complaints and was resting comfortably. She denied fever, chills, nausea, vomiting, night sweats, shortness of breath, difficulty breathing, pain on inspiration, chest pain, or chest discomfort.    Review of Systems:  HENT: Positive for postnasal drip, rhinorrhea, sinus pressure and sore throat.   Eyes: Positive for redness.  Negative for pain, discharge and visual disturbance.   Respiratory: Positive for cough. Negative for shortness of breath.   Cardiovascular: Negative for chest pain, palpitations and leg swelling.   Gastrointestinal: Negative for abdominal pain.   Genitourinary: Negative for dysuria.   Neurological: Negative for headaches.     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    Osteopenia     Potassium depletion 1998       Past Surgical History   Procedure Laterality Date    Breast biopsy  2001     left breast DCIS    Breast surgery  9/2001     left lumpectomy with SLN bx    Colonoscopy      Colonoscopy N/A 10/31/2016     Procedure: COLONOSCOPY;  Surgeon: YAMILETH Richards MD;  Location: [facility] ([address] [place] [address]);  Service: Endoscopy;  Laterality: N/A;       Family History   Problem Relation Age of Onset    Cancer Mother      breast    Breast cancer Mother      40s and again in 50s (bilateral)    Breast cancer Other     Heart disease Father      bypass    Cancer Father      asbestos    Stroke Brother     Ovarian cancer Neg Hx     Amblyopia Neg Hx     Blindness Neg Hx     Cataracts Neg Hx     Diabetes Neg Hx     Glaucoma Neg Hx     Hypertension Neg Hx     Macular degeneration Neg Hx     Retinal detachment Neg Hx     Strabismus Neg Hx     Thyroid disease Neg Hx     Colon cancer Neg Hx        Social History     Social History    Marital status: Single     Spouse name: N/A    Number of children: N/A    Years of education: N/A     Social History Main Topics    Smoking status: Never Smoker    Smokeless tobacco: Never Used    Alcohol use No    Drug use: No    Sexual activity: No     Other Topics Concern    None     Social History Narrative    Retired   (34 years) with [employer]       MEDICATIONS & ALLERGIES:     No current facility-administered medications on file prior to encounter.      Current Outpatient Prescriptions on File Prior to Encounter   Medication Sig Dispense Refill    BLOOD SUGAR DIAGNOSTIC (ONE TOUCH II TEST MISC) * * * *.  CHECKS 1 TIME DAILY AT LEAST      CALCIUM CARBONATE/VITAMIN D3 (CALCIUM WITH VITAMIN D3 ORAL) Take 1 tablet by mouth.       flurandrenolide (CORDRAN) 0.05 % lotion Apply to  scalp one to two times daily as needed for dryness      ketoconazole (NIZORAL) 2 % shampoo USE EVERY OTHER DAY LET SIT ON SCALP FOR FIVE MINUTES BEFORE RINSING 120 mL 6    MULTIVITAMIN WITH MINERALS (ONE-A-DAY 50 PLUS) Tab Take 1 tablet by mouth once daily.       om-3-epa-dha-fish oil-flax-E (THERA TEARS NUTRITION) [id number] ja-sx-yo-unit Cap Take 3 capsules by mouth every morning. Over-the-counter supplement      potassium chloride SA (K-DUR,KLOR-CON) 20 MEQ tablet take 1 tablet by mouth twice a day 60 tablet 12    valsartan-hydrochlorothiazide (DIOVAN-HCT) 320-12.5 mg per tablet take 1 tablet by mouth once daily 30 tablet 11    verapamil (CALAN-SR) 240 MG CR tablet take 1 tablet by mouth twice a day 30 tablet 12        Review of patient's allergies indicates:   Allergen Reactions    Adhesive tape-silicones      Other reaction(s): pulling skin off    Atorvastatin      Other reaction(s): Muscle pain    Pravachol  [pravastatin] Rash       OBJECTIVE:     Vital Signs:  Temp:  [98.5 °F (36.9 °C)-99.9 °F (37.7 °C)] 99.5 °F (37.5 °C)  Pulse:  [74-80] 75  Resp:  [18] 18  SpO2:  [92 %-97 %] 93 %  BP: (124-142)/(59-69) 127/59  Body mass index is 26.78 kg/(m^2).     Physical Exam:  Constitutional: She is oriented to person, place, and time. No distress.   Head: Normocephalic.   Mouth/Throat: No oropharyngeal exudate.   Eyes: EOM are normal. Pupils are equal, round, and reactive to light. Right eye exhibits no discharge.  Left eye exhibits no discharge. Right conjunctiva is not injected. Left conjunctiva is erythematous.   Cardiovascular: Normal rate and regular rhythm. Exam reveals no gallop and no friction rub.   Pulmonary/Chest: Her breathing is non labored. Her respiratory effort is normal. Breath sounds are decreased bilaterally. She has no r/r/w. She is in no respiratory distress.   Abdominal: Soft. Bowel sounds are normal. She exhibits no distension. There is no tenderness.   Musculoskeletal: Normal range of motion. She exhibits no edema.   Neurological: She is alert and oriented to person, place, and time. No cranial nerve deficit.   Skin: She is not diaphoretic.   Psychiatric: She has a normal mood and affect.     Laboratory  Lab Results   Component Value Date    WBC 27.77 (H) 01/19/2017    HGB 11.9 (L) 01/19/2017    HCT 33.7 (L) 01/19/2017    MCV 90 01/19/2017     01/19/2017       Recent Labs  Lab 01/19/17  0402   GLU 92      K 2.9*      CO2 26   BUN 13   CREATININE 0.7   CALCIUM 8.6*     Lab Results   Component Value Date    INR 1.1 01/19/2017     Lab Results   Component Value Date    HGBA1C 6.0 01/06/2017     Recent Labs      01/17/17   1153  01/17/17   1451  01/17/17   2233  01/18/17   0759  01/18/17   1218  01/18/17   1640  01/18/17   2110  01/19/17   0832   POCTGLUCOSE  94  111*  189*  154*  90  97  142*  133*       Diagnostic Results:  Imaging Results         CT Abdomen Pelvis With Contrast (Final result)    Abnormal Result time:  01/19/17 00:24:04    Final result by Shalini Cowan MD (01/19/17 00:24:04)    Impression:      1.  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    2. There is a 1.7 cm hypodense lesion in the inferior pole of the left kidney which could represent renal infarction but underlying neoplastic lesion not excluded.    3.  Large soft tissue density lesion in the left upper abdomen,  anterior to the left kidney and superior to the left renal vein.  This is of uncertain etiology and could represent enlarged lymph node and/or metastatic lesion.  Clinical correlation recommended.    4.  Interval development of cavitation in several previously identified focal regions of pulmonary parenchymal consolidation, most predominant within the upper lobes.  Additional multifocal opacities as well as bibasilar atelectatic/consolidative change in pleural effusions.  Findings are concerning for septic emboli or possibly multifocal infectious process with underlying neoplasia not excluded.  Clinical correlation recommended.    5.  Several subcentimeter hypodensities within the pancreas as well as a 2.1 cm hypodense lesion within the neck of the pancreas.  Findings are nonspecific and possible cystic pancreatic neoplasm not excluded.    6.  Markedly abnormal appearance of the enlarged leiomyomatous uterus with probable large pedunculated fibroid as detailed above.  Further evaluation with pelvic ultrasound recommended.    7. Multiple additional findings including probable pericardial cyst, large hiatal hernia and cholelithiasis as detailed above.    Report was flagged in EPIC.    Findings were identified at 23:45 and findings discussed with Dr. Teresa M.D. at 23:51:22 01/18/17.      Electronically signed by: BILLIE BANEGAS  Date:     01/19/17  Time:    00:24     Narrative:      TECHNIQUE: 5 mm axial CT images of the chest, abdomen and pelvis were obtained following the administration of 75 cc Omni 350 IV contrast.  Coronal and sagittal reformatted images were reviewed.    COMPARISON: CTA chest 1/14/2017    FINDINGS:    The soft tissue and vascular structures of the base of the neck are within normal limits.  The thoracic aorta is non-aneurysmal.  The heart is not enlarged.  No significant pericardial effusion is present.  There is redemonstration of an approximately 4.1 cm well-circumscribed fluid density lesion  along the left aspect of the superior mediastinum, similar to prior CT chest.  This could represent a pericardial cyst or possibly thymic cyst.  There is no significant mediastinal adenopathy or axillary adenopathy.    The trachea and proximal airways are patent.  There once again bilateral pleural effusions with associated atelectatic versus consolidative change in the lung bases.  There has been interval development of cavitation within multiple previously identified regions of pulmonary consolidation.  For example, there is a large, 2.7 cm cavitary lesion within the right upper lobe with additional smaller cavitary lesions predominately within the upper lobes bilaterally.  There are multiple additional focal nodular opacities throughout the lung parenchyma.  Overall, findings are concerning for septic emboli or possibly multifocal pneumonia with possible neoplasia not excluded.    The liver is normal in size and attenuation without focal hepatic abnormality.  There are multiple large peripherally calcified stones within the gallbladder lumen.  There is no intra-or extrahepatic biliary ductal dilatation.  The pancreas demonstrates several subcentimeter hypodensities within the body (axial series 1, image 54).  There is an approximately 2.1 cm hypodensity within the neck of the pancreas (axial series 2, image 23). There is a large hiatal hernia present.  The stomach is otherwise unremarkable.  The right adrenal gland is unremarkable.    There is a filling defect consistent with thrombus involving the left renal vein (coronal series, images 31 and 32).  The left kidney is diffusely enlarged/edematous with adjacent perinephric fat stranding and perinephric edema.  Within the inferior pole of the left kidney there is a hypodensity measuring approximately 1.7 cm.  This does not demonstrate fluid attenuation.  Differential considerations include possible renal infarct with underlying neoplasm not excluded.  The left  kidney demonstrates a striated nephrogram, but does appear to concentrated and excreted contrast, with renally excreted contrast seen in the left ureter.  Anterior to left kidney and superior to the left renal vein there is a solid, 4.2 x 3.1 x 3.3 cm mass (axial series 1, image 58).  This is of uncertain etiology.  The medial aspect of this lesion does appear to be within close proximity to the left adrenal gland, but it is difficult to delineate if it definitively originates from the adrenal.  This could possibly represent an enlarged lymph node and/or peritoneal metastatic lesion.  Clinical correlation recommended.  The right kidney is normal in size and concentrates and excretes contrast satisfactorily.  There is a subcentimeter hypodensity in the right kidney, too small to definitively characterize.  There is no right hydronephrosis.    The uterus is markedly abnormal in appearance, with significant enlargement and a marked lobular contour.  There are multiple calcifications throughout the uterus likely representing involuting fibroids.  There is a 6.1 cm possible pedunculated projecting from the left aspect of the uterus.  Further evaluation with pelvic ultrasound can be performed.    The visualized loops of large and small bowel demonstrate no evidence of obstruction or inflammatory change.  The abdominal aorta is nonaneurysmal.  There is atherosclerotic plaquing of the aorta.  Osseous structures demonstrate degenerative change            CT Chest With Contrast (Final result)    Abnormal Result time:  01/19/17 00:24:04    Final result by Shalini Cowan MD (01/19/17 00:24:04)    Impression:      1.  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    2. There is a 1.7 cm hypodense lesion in the inferior pole of the left kidney which could represent renal infarction but underlying neoplastic lesion not excluded.    3.   Large soft tissue density lesion in the left upper abdomen, anterior to the left kidney and superior to the left renal vein.  This is of uncertain etiology and could represent enlarged lymph node and/or metastatic lesion.  Clinical correlation recommended.    4.  Interval development of cavitation in several previously identified focal regions of pulmonary parenchymal consolidation, most predominant within the upper lobes.  Additional multifocal opacities as well as bibasilar atelectatic/consolidative change in pleural effusions.  Findings are concerning for septic emboli or possibly multifocal infectious process with underlying neoplasia not excluded.  Clinical correlation recommended.    5.  Several subcentimeter hypodensities within the pancreas as well as a 2.1 cm hypodense lesion within the neck of the pancreas.  Findings are nonspecific and possible cystic pancreatic neoplasm not excluded.    6.  Markedly abnormal appearance of the enlarged leiomyomatous uterus with probable large pedunculated fibroid as detailed above.  Further evaluation with pelvic ultrasound recommended.    7. Multiple additional findings including probable pericardial cyst, large hiatal hernia and cholelithiasis as detailed above.        Findings were identified at 23:45 and findings discussed with Dr. Teresa M.D. at 23:51:22 01/18/17.      Electronically signed by: BILLIE BANEGAS  Date:     01/19/17  Time:    00:24     Narrative:      TECHNIQUE: 5 mm axial CT images of the chest, abdomen and pelvis were obtained following the administration of 75 cc Omni 350 IV contrast.  Coronal and sagittal reformatted images were reviewed.    COMPARISON: CTA chest 1/14/2017    FINDINGS:    The soft tissue and vascular structures of the base of the neck are within normal limits.  The thoracic aorta is non-aneurysmal.  The heart is not enlarged.  No significant pericardial effusion is present.  There is redemonstration of an approximately 4.1 cm  well-circumscribed fluid density lesion along the left aspect of the superior mediastinum, similar to prior CT chest.  This could represent a pericardial cyst or possibly thymic cyst.  There is no significant mediastinal adenopathy or axillary adenopathy.    The trachea and proximal airways are patent.  There once again bilateral pleural effusions with associated atelectatic versus consolidative change in the lung bases.  There has been interval development of cavitation within multiple previously identified regions of pulmonary consolidation.  For example, there is a large, 2.7 cm cavitary lesion within the right upper lobe with additional smaller cavitary lesions predominately within the upper lobes bilaterally.  There are multiple additional focal nodular opacities throughout the lung parenchyma.  Overall, findings are concerning for septic emboli or possibly multifocal pneumonia with possible neoplasia not excluded.    The liver is normal in size and attenuation without focal hepatic abnormality.  There are multiple large peripherally calcified stones within the gallbladder lumen.  There is no intra-or extrahepatic biliary ductal dilatation.  The pancreas demonstrates several subcentimeter hypodensities within the body (axial series 1, image 54).  There is an approximately 2.1 cm hypodensity within the neck of the pancreas (axial series 2, image 23). There is a large hiatal hernia present.  The stomach is otherwise unremarkable.  The right adrenal gland is unremarkable.    There is a filling defect consistent with thrombus involving the left renal vein (coronal series, images 31 and 32).  The left kidney is diffusely enlarged/edematous with adjacent perinephric fat stranding and perinephric edema.  Within the inferior pole of the left kidney there is a hypodensity measuring approximately 1.7 cm.  This does not demonstrate fluid attenuation.  Differential considerations include possible renal infarct with  underlying neoplasm not excluded.  The left kidney demonstrates a striated nephrogram, but does appear to concentrated and excreted contrast, with renally excreted contrast seen in the left ureter.  Anterior to left kidney and superior to the left renal vein there is a solid, 4.2 x 3.1 x 3.3 cm mass (axial series 1, image 58).  This is of uncertain etiology.  The medial aspect of this lesion does appear to be within close proximity to the left adrenal gland, but it is difficult to delineate if it definitively originates from the adrenal.  This could possibly represent an enlarged lymph node and/or peritoneal metastatic lesion.  Clinical correlation recommended.  The right kidney is normal in size and concentrates and excretes contrast satisfactorily.  There is a subcentimeter hypodensity in the right kidney, too small to definitively characterize.  There is no right hydronephrosis.    The uterus is markedly abnormal in appearance, with significant enlargement and a marked lobular contour.  There are multiple calcifications throughout the uterus likely representing involuting fibroids.  There is a 6.1 cm possible pedunculated projecting from the left aspect of the uterus.  Further evaluation with pelvic ultrasound can be performed.    The visualized loops of large and small bowel demonstrate no evidence of obstruction or inflammatory change.  The abdominal aorta is nonaneurysmal.  There is atherosclerotic plaquing of the aorta.  Osseous structures demonstrate degenerative change            X-Ray Chest 1 View (Final result) Result time:  01/18/17 12:03:13    Final result by Eric Roe MD (01/18/17 12:03:13)    Impression:     See above      Electronically signed by: Eric Roe MD  Date:     01/18/17  Time:    12:03     Narrative:    Heart size is normal.  Widened mediastinum consistent with lymphadenopathy identified.  Ill-defined patchy airspace disease with associated nodular changes identified and was  described on the previous CT scan.  Left-sided pleural effusion.            CTA Chest Non Coronary (Final result)    Abnormal Result time:  01/14/17 08:50:06    Final result by Beni Garcia MD (01/14/17 08:50:06)    Impression:        1. No pulmonary embolus.    2. Mild bilateral pleural effusions.    3. Scattered areas of consolidation in both lungs. This acute pulmonary disease could represent multifocal pneumonia, aspiration, edema, septic emboli, etc. Correlate clinically.    4. Probable pericardial cyst along left mediastinal border    5. Probable hiatal hernia in lower mediastinum    6. Cholelithiasis    7. Small pancreatic cystic lesions. Recommend MRI followup in one year.    This report has been flagged in EPIC .       Electronically signed by: BENI GARCIA MD  Date:     01/14/17  Time:    08:50     Narrative:    CTA of the thorax was performed in 3 mm sections with 100 cc Omnipaque 350 intravenous contrast with pulmonary embolus protocol. Multiplanar reconstructions including MIP images for vessel analysis were processed from original data. Comparison: Chest radiographs 1/12/17 and 1/14/17.    The pulmonary arteries enhance with contrast with no definite filling defects identified that would indicate pulmonary thromboembolism.    Visualized structures in the lower neck and both axillary areas are unremarkable. The mediastinal and hilar areas show no significant lymph node enlargement. The left side of the mediastinum near the main pulmonary artery shows a well circumscribed fluid density finding 4 cm in size likely representing a pericardial cyst. Thymic cyst is another possibility . The lower mediastinum shows a soft tissue density containing air, likely a hiatal hernia. Esophageal tumor is thought unlikely. Thoracic aorta is normal size with left-sided three-vessel arch. Atherosclerosis calcifications are seen in the aorta and coronary arteries.    The visualized upper abdomen shows  multiple calcified gallstones. No liver or adrenal lesions are seen. The pancreas is normal size but demonstrates 3 fluid density findings with the largest 1.0 cm in the neck area. These are consistent with pancreatic cysts or cystic tumors. Usual recommendation is MRI followup in one year.    The trachea and bronchi are patent with expansion of both lungs. Both lower lobes show mild volume loss next to mild bilateral pleural effusions which appear simple by imaging. Both lungs also demonstrate scattered areas of consolidation opacity, more notably in the bases of the lower lobes. Many of these opacities are nodular in form, and cavitation may be developing in one in the right upper lobe. Reviewing recent chest radiographs, this represents acute pulmonary disease. Possibilities include multifocal pneumonia, aspiration, edema, septic emboli, etc. Correlate clinically.    Skeletal structures are intact with age appropriate degenerative changes in the spine. No acute fracture or lytic/blastic lesion is identified.            X-Ray Chest 1 View (Final result) Result time:  01/14/17 01:26:04    Final result by Tyrell Mar MD (01/14/17 01:26:04)    Impression:      As above        Electronically signed by: TYRELL MAR MD  Date:     01/14/17  Time:    01:26     Narrative:    Chest AP portable    Indication:Hypoxemia    Comparison:1/12/2017    Findings:  The cardiomediastinal silhouette is stable.  There is no pleural effusion.  The trachea is midline.  The lungs are symmetrically expanded bilaterally with patchy increased interstitial and parenchymal attenuation bilaterally, more prominent on today's exam, however likely as a result of shallow inspiratory effort, differential would potentially include worsening edema. No large focal consolidation seen.  There is no pneumothorax.  The osseous structures are unchanged.            MRI Brain Without Contrast (Final result) Result time:  01/13/17 03:55:18    Final  result by Tyrell Mar MD (01/13/17 03:55:18)    Impression:        Motion limited exam.    T2/flair signal abnormality within the bilateral white matter likely related to chronic microvascular ischemic change.    No acute infarct or acute hemorrhage.        Electronically signed by: TYRELL MAR MD  Date:     01/13/17  Time:    03:55     Narrative:    Comparison: CT 1/12/2017    Rule out stroke    Technique:    Multiplanar multi-sequence MRI images of the brain were obtained pre-and post the administration of 14 cc Omniscan IV contrast.        Findings:    Please note, examination is limited by motion artifact, most severely affecting the axial T2 sequence, and Barry sequence.    There are multifocal regions of T2/flair signal abnormality within the supratentorial white matter as well as within the homer, most consistent with sequela of chronic microvascular ischemic change.  There is generalized cerebral volume loss with compensatory ventricular enlargement without hydrocephalus.  There is a small focus of gradient susceptibility within the right basal ganglia suggesting sequela of previous microhemorrhage..  There is no evidence of intracranial mass.  There is no restricted diffusion that would suggest acute ischemia.   There are no significant extra-axial or extracranial abnormalities detected.    The globes, orbits, pituitary gland, pineal gland and craniocervical junction are normal in configuration.  The major vascular flow voids are patent.            X-Ray Chest PA And Lateral (Final result) Result time:  01/12/17 22:48:34    Final result by Tyrell Mar MD (01/12/17 22:48:34)    Impression:         Stable chronic findings, no acute cardiopulmonary process.          Electronically signed by: TYRELL MAR MD  Date:     01/12/17  Time:    22:48     Narrative:    Chest PA and lateral    Indication:Shortness of breath    Comparison:11/2/2016    Findings:  The cardiomediastinal silhouette is not  enlarged prominent calcification of the aortic arch.  There is no pleural effusion.  The trachea is midline.  The lungs are symmetrically expanded bilaterally with coarse interstitial attenuation, similar to the previous exam. No large focal consolidation seen.  There is no pneumothorax.  The osseous structures remarkable for degenerative changes of the spine and shoulders.            CT Head Without Contrast (Final result) Result time:  01/12/17 22:38:36    Final result by Raheel Moore MD (01/12/17 22:38:36)    Impression:         No acute intracranial abnormality detected.    Generalized cerebral volume loss and chronic microvascular ischemic change.  ______________________________________     Electronically signed by resident: RAHEEL MOORE MD  Date:     01/12/17  Time:    22:30            As the supervising and teaching physician, I personally reviewed the images and resident's interpretation and I agree with the findings.          Electronically signed by: TYRELL ZALDIVAR MD  Date:     01/12/17  Time:    22:38     Narrative:    Clinical indication: Altered mental status.    Comparison: None.    Technique: 5-mm axial images of the head were performed without the administration of contrast.  Sagittal and coronal reformatted images were also obtained.    Findings:    Generalized cerebral volume loss with compensatory enlargement of the ventricles slightly advanced for the patient's age. Moderate degree of decreased attenuation in the supratentorial white matter compatible with chronic microvascular ischemic change. No evidence of acute major territorial infarction or hemorrhage.  The ventricular system shows no distortion by mass-effect or evidence of hydrocephalus. The visualized paranasal sinuses demonstrate no significant abnormality. The mastoid air cells are clear.  The osseous structures show no evidence of fracture or any other abnormality.  The surrounding soft tissues are unremarkable.                   ASSESSMENT & PLAN:     Current Problems List:  Active Hospital Problems    Diagnosis  POA    *Severe sepsis [A41.9, R65.20]  Yes    Conjunctivitis [H10.9]  No    Hypophosphatemia [E83.39]  Yes    MRSA bacteremia [R78.81]  Yes    Staphylococcus aureus sepsis [A41.01]  Yes    Urinary tract infection without hematuria [N39.0]  Yes    Hypertension [I10]  Yes      Resolved Hospital Problems    Diagnosis Date Resolved POA    Gastroenteritis [K52.9] 01/17/2017 Yes     Problem Assessment & Treatment Plan:    Ms. Ida Santana is a 76 y.o. female with a past medical history of HTN, HLD, osteopenia, and breast cancer presented to [facility] ED with altered mental status. Found to have MRSA bacteremia. Currently undergoing source evaluation. Pulmonology was consulted for interval development of cavitary lesions.    - CT imaging shows interval development of cavitation in several previously identified focal regions of pulmonary parenchymal consolidations. It is unclear of the lungs are the primary site. Based on her imaging it is more likely that there has been hematogenous spread to the lungs. There appears to be a new collection of fluid in the mediastinum on recent imaging. Will query radiology and ask them to comment about this.    - Pulmonology will follow up with pending respiratory viral panel    - Recommend repeat respiratory culture   - Agree with infectious disease. Continue vancomycin therapy for now. Repeat blood cultures until clear for 48 hours.   - Consider further evaluation to determine source of the infection. May consider investigation of the spine.  - no acute intervention at this time.   - DVT prophylaxis   - maintain oxygen saturations > 90     Thank you for your consult and allowing us to participate in the care of this patient.     Alex Pak MD PGY 1  Pager: [phone]    Formal staff attestation to follow by Dr. Hendrix    Signing Physician:  Alex Pak MD

## 2017-01-19 NOTE — PROGRESS NOTES
[facility]  Infectious Disease  Progress Note    Patient Name: Ida Santana  MRN: [id number]  Admission Date: 1/12/2017  Length of Stay: 7 days  Attending Physician: Loyda Lui MD  Primary Care Provider: Giovanna Murray MD    Isolation Status: Contact  Assessment/Plan:      MRSA bacteremia  77 y/o female admitted for an episode of acute confusion. B/C 1/13/17, 1/14/17, 1/15/17, 1/16/17, 1/18/17 positive for MRSA, will repeat tomorrow. No obvious initial source of bacteremia (no hardware, no skin breakdown, no cellulitis, no wounds, no trauma, no central lines, no joint or back pain). Findings on Chest/A/P CT scan (on lungs and kidney) 1/18/17 most likely due to septic emboli.   - B/C persistent positive, most likely due to poor source control  - Last Vancomycin trough 15.1, trough goal 15-20, will continue same dose for now  - repeat B/C tomorrow  - MIS 1/17/17 negative for endocarditis          Anticipated Disposition: IV vancomycin    Thank you for your consult. I will follow-up with patient. Please contact us if you have any additional questions.     Farhad Finney MD  Infectious Disease Fellow, PGY-4  Pager: [phone]  [facility]    Subjective:     Principal Problem:Severe sepsis    Interval History: fever improving otherwise no changes in clinical status    HPI:   77 y/o female with history of HTN, HLD,  Osteopenia, remote breast cancer came to hospital for family members noted that patient let the bathroom sink overflow and left a pot burning on the stove the day of admission. Patient lives by herself and is fully functional. The patient reports 3 episodes of vomits gastric content and 3 episodes of diarrhea today, denied fever, chills, abdominal pain, weakness, paraesthesia, dysuria, frequency or malodorous urin, skin breakdown, wounds, trauma, central lines, joint or back pain. Denies similar episodes in the past.        Review of Systems   Constitutional:  Positive for fever. Negative for chills.   HENT: Negative for sore throat.    Respiratory: Negative for cough, chest tightness and shortness of breath.    Cardiovascular: Negative for chest pain, palpitations and leg swelling.   Gastrointestinal: Negative for abdominal distention, abdominal pain, diarrhea, nausea and vomiting.   Genitourinary: Negative for dysuria, flank pain and urgency.   Skin: Negative for rash.   Neurological: Negative for dizziness, light-headedness and numbness.   Psychiatric/Behavioral: Negative for confusion.     Objective:     Vital Signs (Most Recent):  Temp: 98.6 °F (37 °C) (01/19/17 0724)  Pulse: 75 (01/19/17 0724)  Resp: 18 (01/19/17 0724)  BP: 124/62 (01/19/17 0724)  SpO2: 97 % (01/19/17 0724) Vital Signs (24h Range):  Temp:  [98.5 °F (36.9 °C)-99.9 °F (37.7 °C)] 98.6 °F (37 °C)  Pulse:  [72-80] 75  Resp:  [18] 18  SpO2:  [92 %-99 %] 97 %  BP: (124-142)/(59-69) 124/62     Weight: 77.6 kg (171 lb)  Body mass index is 26.78 kg/(m^2).    Estimated Creatinine Clearance: 75.7 mL/min (based on Cr of 0.7).    Physical Exam   Constitutional: She is oriented to person, place, and time. No distress.   HENT:   Head: Normocephalic.   Mouth/Throat: No oropharyngeal exudate.   Eyes: No scleral icterus.   Cardiovascular: Normal rate and regular rhythm.  Exam reveals no gallop and no friction rub.    Murmur heard.  Pulmonary/Chest: Effort normal. No respiratory distress. She has no wheezes. She has no rales.   Abdominal: Soft. Bowel sounds are normal. She exhibits no distension. There is no tenderness. There is no rebound and no guarding.   Musculoskeletal: Normal range of motion.   Lymphadenopathy:     She has no cervical adenopathy.   Neurological: She is alert and oriented to person, place, and time. No cranial nerve deficit.   Skin: No rash noted. She is not diaphoretic. No erythema.   Vitals reviewed.      Significant Labs:   Blood Culture:     Recent Labs  Lab 01/13/17  1704 01/14/17  1105  01/15/17  1829 01/16/17  1949 01/18/17  0611   LABBLOO Gram stain aer bottle: Gram positive cocci in clusters resembling Staph   Results called to and read back by:Yariel Verdin RN 01/14/2017  09:18  Gram stain alea bottle: Gram positive cocci in clusters resembling Staph  01/14/2017  11:07   Results previously called.  METHICILLIN RESISTANT STAPHYLOCOCCUS AUREUSFor susceptibility see order #[id number] Gram stain aer bottle: Gram positive cocci in clusters resembling Staph   Results called to and read back by:Taylor Valerio RN 01/15/2017  02:34  METHICILLIN RESISTANT STAPHYLOCOCCUS AUREUSFor susceptibility see order #[id number]  Gram stain aer bottle: Gram positive cocci in clusters resembling Staph   Results called to and read back by:Taylor Valerio RN 01/15/2017  02:34  METHICILLIN RESISTANT STAPHYLOCOCCUS AUREUS Gram stain aer bottle: Gram positive cocci in clusters resembling Staph   Results called to and read back by:Kristie Palomares RN 01/16/2017  13:45  METHICILLIN RESISTANT STAPHYLOCOCCUS AUREUSFor susceptibility see order #[id number]  Gram stain aer bottle: Gram positive cocci in clusters resembling Staph   Results called to and read back by:Kristie Wolff RN  01/16/2017  15:34  METHICILLIN RESISTANT STAPHYLOCOCCUS AUREUS Gram stain aer bottle: Gram positive cocci in clusters resembling Staph   Results called to and read back by: Sujatha Bennett RN 01/17/2017  15:13  STAPHYLOCOCCUS AUREUSFor susceptibility see order #[id number]  Gram stain aer bottle: Gram positive cocci in clusters resembling Staph   Results called to and read back by: Kristie Wolff RN  01/17/2017  17:58  STAPHYLOCOCCUS AUREUSSusceptibility pending Gram stain aer bottle: Gram positive cocci in clusters resembling Staph   Results called to and read back by:Ness Lagos RN 01/19/2017  04:10  Gram stain aer bottle: Gram positive cocci in clusters resembling Staph   Results called to and read back by:Ness Lagos RN 01/19/2017   04:10     BMP:     Recent Labs  Lab 01/19/17  0402   GLU 92      K 2.9*      CO2 26   BUN 13   CREATININE 0.7   CALCIUM 8.6*     CBC:     Recent Labs  Lab 01/18/17  0611 01/19/17  0015 01/19/17  0402   WBC 18.07* 22.48* 27.77*   HGB 10.2* 11.4* 11.9*   HCT 28.7* 32.4* 33.7*    284 326     CMP:     Recent Labs  Lab 01/18/17  0611 01/19/17  0402    143   K 3.4* 2.9*    105   CO2 25 26   * 92   BUN 17 13   CREATININE 0.7 0.7   CALCIUM 8.2* 8.6*   PROT 5.6* 6.5   ALBUMIN 1.7* 2.0*   BILITOT 0.7 0.8   ALKPHOS 81 107   AST 42* 51*   ALT 53* 65*   ANIONGAP 8 12   EGFRNONAA >60.0 >60.0       Significant Imaging: I have reviewed all pertinent imaging results/findings within the past 24 hours.     Chest/A/P CT scan 1/18/17:    1.  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    2. There is a 1.7 cm hypodense lesion in the inferior pole of the left kidney which could represent renal infarction but underlying neoplastic lesion not excluded.    3.  Large soft tissue density lesion in the left upper abdomen, anterior to the left kidney and superior to the left renal vein.  This is of uncertain etiology and could represent enlarged lymph node and/or metastatic lesion.  Clinical correlation recommended.    4.  Interval development of cavitation in several previously identified focal regions of pulmonary parenchymal consolidation, most predominant within the upper lobes.  Additional multifocal opacities as well as bibasilar atelectatic/consolidative change in pleural effusions.  Findings are concerning for septic emboli or possibly multifocal infectious process with underlying neoplasia not excluded.  Clinical correlation recommended.    5.  Several subcentimeter hypodensities within the pancreas as well as a 2.1 cm hypodense lesion within the neck of the pancreas.  Findings are nonspecific and  possible cystic pancreatic neoplasm not excluded.    6.  Markedly abnormal appearance of the enlarged leiomyomatous uterus with probable large pedunculated fibroid as detailed above.  Further evaluation with pelvic ultrasound recommended.    7. Multiple additional findings including probable pericardial cyst, large hiatal hernia and cholelithiasis as detailed above.

## 2017-01-19 NOTE — ASSESSMENT & PLAN NOTE
Developed conjunctivitis of left eye 1/18. Pt denies pain, drainage, decreased vision.   - Erythromycin x7days  - Spoke with Ophthalmology. Call if she develops pain, drainage, decreased vision, or pain with eye movement.   - Cont to monitor

## 2017-01-19 NOTE — PROGRESS NOTES
"[facility] Hospital Medicine  Progress Note    Patient Name: Ida Santana  MRN: [id number]  Patient Class: IP- Inpatient   Admission Date: 1/12/2017  Length of Stay: 6 days  Attending Physician: Loyda Lui MD  Primary Care Provider: Giovanna Murray MD    [facility] Medicine Team: [facility] HOSP MED 1 Diann Lopez MD    Subjective:     Principal Problem:Severe sepsis    Interval History: NAEON. AOx3. WBC increased. Pt reports cough, post nasal drip. Pt has injected left eye, pt denies pain or discharge, thinks it is because she "slept on it."     Review of Systems   HENT: Positive for postnasal drip, rhinorrhea, sinus pressure and sore throat.    Eyes: Positive for redness. Negative for pain, discharge and visual disturbance.   Respiratory: Positive for cough. Negative for shortness of breath.    Cardiovascular: Negative for chest pain, palpitations and leg swelling.   Gastrointestinal: Negative for abdominal pain.   Genitourinary: Negative for dysuria.   Neurological: Negative for headaches.     Objective:     Vital Signs (Most Recent):  Temp: 99 °F (37.2 °C) (01/17/17 1030)  Pulse: 79 (01/17/17 1055)  Resp: 20 (01/17/17 1055)  BP: 108/61 (01/17/17 1055)  SpO2: 96 % (01/17/17 1055) Vital Signs (24h Range):  Temp:  [97.8 °F (36.6 °C)-101.6 °F (38.7 °C)] 99 °F (37.2 °C)  Pulse:  [76-93] 79  Resp:  [18-22] 20  SpO2:  [90 %-96 %] 96 %  BP: (107-150)/(36-67) 108/61     Weight: 77.6 kg (171 lb)  Body mass index is 26.78 kg/(m^2).    Intake/Output Summary (Last 24 hours) at 01/17/17 1510  Last data filed at 01/17/17 0947   Gross per 24 hour   Intake              750 ml   Output                0 ml   Net              750 ml      Physical Exam   Constitutional: She is oriented to person, place, and time. No distress.   HENT:   Head: Normocephalic.   Mouth/Throat: No oropharyngeal exudate.   Eyes: EOM are normal. Pupils are equal, round, and reactive to light. Right eye exhibits no discharge. Left eye " exhibits no discharge. Right conjunctiva is not injected. Left conjunctiva is injected.   Cardiovascular: Normal rate and regular rhythm.  Exam reveals no gallop and no friction rub.    Murmur heard.  Pulmonary/Chest: Effort normal and breath sounds normal. No respiratory distress.   Abdominal: Soft. Bowel sounds are normal. She exhibits no distension. There is no tenderness.   Musculoskeletal: Normal range of motion. She exhibits no edema.   Neurological: She is alert and oriented to person, place, and time. No cranial nerve deficit.   Skin: She is not diaphoretic.   Psychiatric: She has a normal mood and affect.   Vitals reviewed.      Significant Labs:   CBC:     Recent Labs  Lab 01/17/17 0412 01/18/17  0611   WBC 13.53* 18.07*   HGB 11.3* 10.2*   HCT 30.8* 28.7*    242     CMP:     Recent Labs  Lab 01/17/17 0412 01/18/17  0611    142   K 3.1* 3.4*    109   CO2 26 25    157*   BUN 17 17   CREATININE 0.8 0.7   CALCIUM 8.7 8.2*   PROT 5.8* 5.6*   ALBUMIN 1.8* 1.7*   BILITOT 0.7 0.7   ALKPHOS 87 81   AST 41* 42*   ALT 44 53*   ANIONGAP 6* 8   EGFRNONAA >60.0 >60.0       Significant Imaging: I have reviewed all pertinent imaging results/findings within the past 24 hours.    Assessment/Plan:      * Severe sepsis        Hypertension  - continue verapamil and hold off on Diovan in setting of low BP      Urinary tract infection without hematuria  - Ucx grew what appears to be contaminant with nothing in predominance. Unclear if this is source of patient's MRSA bacteremia, doubtful  - repeat UA showed no evidence of UTI     Staphylococcus aureus sepsis  - has had continued fevers and bcx grew MRSA, concerned that 2/2 continued + bcx patient may have staph seeded somewhere, possible endocarditis. She does not have any recent significant hospital contact.   - MIS negative for endocarditis  - continuing with vanc and rocephin  - repeat bcx shown staph again, repeating bcx - now no growth x1 day  (from 1/16)   - repeat bcx today  - Per ID, holding off on broadening abx and ordered CT chest, abdomen, pelvis with contrast.       MRSA bacteremia        Hypophosphatemia  - low 1/17- repleting with NaPh oral packets for 4 doses    Conjunctivitis  Developed conjunctivitis of left eye 1/18. Pt denies pain, drainage, decreased vision.   - Erythromycin x7days  - Spoke with Ophthalmology. Call if she develops pain, drainage, decreased vision, or pain with eye movement.   - Cont to monitor      Gastroenteritis, resolved as of 1/17/2017  -likely viral in etiology  -pt does not recall any bad/new food intake or sick person exposure  - has had decreased PO intake  -treat symptomatically with antiemetics and IVF  - symptoms improving      VTE Risk Mitigation         Ordered     heparin (porcine) injection 5,000 Units  Every 8 hours     Route:  Subcutaneous        01/13/17 0158     Low Risk of VTE  Once      01/13/17 0158        Ppx: heparin  Diet: [id number]  PT/OT: ordered  Dispo: Pending hospital course    Discussed with staff, Dr. Lui.   THEO Lopez MD MPH PGY-1  Department of Hospital Medicine   [facility]                      01/19/2017                             STAFF PHYSICIAN NOTE                                   Attending Attestation for Rounds with Resident  I have reviewed and concur with the resident's history, physical, assessment, and plan.  I have personally interviewed and examined the patient at bedside and agree with the resident's findings.                                  ________________________________________                                     REASON FOR ADMISSION:     Patient is 76 y.o.female    Body mass index is 26.78 kg/(m^2).,  Severe sepsis

## 2017-01-19 NOTE — PLAN OF CARE
Pt has been clinically accepted to [facility] and [facility] for SNF. Galina Nolasco is still reviewing.

## 2017-01-19 NOTE — SUBJECTIVE & OBJECTIVE
Interval History: fever improving otherwise no changes in clinical status    HPI:   75 y/o female with history of HTN, HLD,  Osteopenia, remote breast cancer came to hospital for family members noted that patient let the bathroom sink overflow and left a pot burning on the stove the day of admission. Patient lives by herself and is fully functional. The patient reports 3 episodes of vomits gastric content and 3 episodes of diarrhea today, denied fever, chills, abdominal pain, weakness, paraesthesia, dysuria, frequency or malodorous urin, skin breakdown, wounds, trauma, central lines, joint or back pain. Denies similar episodes in the past.        Review of Systems   Constitutional: Positive for fever. Negative for chills.   HENT: Negative for sore throat.    Respiratory: Negative for cough, chest tightness and shortness of breath.    Cardiovascular: Negative for chest pain, palpitations and leg swelling.   Gastrointestinal: Negative for abdominal distention, abdominal pain, diarrhea, nausea and vomiting.   Genitourinary: Negative for dysuria, flank pain and urgency.   Skin: Negative for rash.   Neurological: Negative for dizziness, light-headedness and numbness.   Psychiatric/Behavioral: Negative for confusion.     Objective:     Vital Signs (Most Recent):  Temp: 98.6 °F (37 °C) (01/19/17 0724)  Pulse: 75 (01/19/17 0724)  Resp: 18 (01/19/17 0724)  BP: 124/62 (01/19/17 0724)  SpO2: 97 % (01/19/17 0724) Vital Signs (24h Range):  Temp:  [98.5 °F (36.9 °C)-99.9 °F (37.7 °C)] 98.6 °F (37 °C)  Pulse:  [72-80] 75  Resp:  [18] 18  SpO2:  [92 %-99 %] 97 %  BP: (124-142)/(59-69) 124/62     Weight: 77.6 kg (171 lb)  Body mass index is 26.78 kg/(m^2).    Estimated Creatinine Clearance: 75.7 mL/min (based on Cr of 0.7).    Physical Exam   Constitutional: She is oriented to person, place, and time. No distress.   HENT:   Head: Normocephalic.   Mouth/Throat: No oropharyngeal exudate.   Eyes: No scleral icterus.   Cardiovascular:  Normal rate and regular rhythm.  Exam reveals no gallop and no friction rub.    Murmur heard.  Pulmonary/Chest: Effort normal. No respiratory distress. She has no wheezes. She has no rales.   Abdominal: Soft. Bowel sounds are normal. She exhibits no distension. There is no tenderness. There is no rebound and no guarding.   Musculoskeletal: Normal range of motion.   Lymphadenopathy:     She has no cervical adenopathy.   Neurological: She is alert and oriented to person, place, and time. No cranial nerve deficit.   Skin: No rash noted. She is not diaphoretic. No erythema.   Vitals reviewed.      Significant Labs:   Blood Culture:     Recent Labs  Lab 01/13/17  1704 01/14/17  1101 01/15/17  1829 01/16/17  1949 01/18/17  0611   LABBLOO Gram stain aer bottle: Gram positive cocci in clusters resembling Staph   Results called to and read back by:Yariel Verdin RN 01/14/2017  09:18  Gram stain alea bottle: Gram positive cocci in clusters resembling Staph  01/14/2017  11:07   Results previously called.  METHICILLIN RESISTANT STAPHYLOCOCCUS AUREUSFor susceptibility see order #[id number] Gram stain aer bottle: Gram positive cocci in clusters resembling Staph   Results called to and read back by:Taylor Valerio RN 01/15/2017  02:34  METHICILLIN RESISTANT STAPHYLOCOCCUS AUREUSFor susceptibility see order #[id number]  Gram stain aer bottle: Gram positive cocci in clusters resembling Staph   Results called to and read back by:Taylor Valerio RN 01/15/2017  02:34  METHICILLIN RESISTANT STAPHYLOCOCCUS AUREUS Gram stain aer bottle: Gram positive cocci in clusters resembling Staph   Results called to and read back by:Kristie Palomares RN 01/16/2017  13:45  METHICILLIN RESISTANT STAPHYLOCOCCUS AUREUSFor susceptibility see order #[id number]  Gram stain aer bottle: Gram positive cocci in clusters resembling Staph   Results called to and read back by:Kristie Wolff RN  01/16/2017  15:34  METHICILLIN RESISTANT STAPHYLOCOCCUS AUREUS Gram  stain aer bottle: Gram positive cocci in clusters resembling Staph   Results called to and read back by: Sujatha Bennett RN 01/17/2017  15:13  STAPHYLOCOCCUS AUREUSFor susceptibility see order #[id number]  Gram stain aer bottle: Gram positive cocci in clusters resembling Staph   Results called to and read back by: Kristie Wolff RN  01/17/2017  17:58  STAPHYLOCOCCUS AUREUSSusceptibility pending Gram stain aer bottle: Gram positive cocci in clusters resembling Staph   Results called to and read back by:Ness Lagos RN 01/19/2017  04:10  Gram stain aer bottle: Gram positive cocci in clusters resembling Staph   Results called to and read back by:Ness Lagos RN 01/19/2017  04:10     BMP:     Recent Labs  Lab 01/19/17  0402   GLU 92      K 2.9*      CO2 26   BUN 13   CREATININE 0.7   CALCIUM 8.6*     CBC:     Recent Labs  Lab 01/18/17  0611 01/19/17  0015 01/19/17  0402   WBC 18.07* 22.48* 27.77*   HGB 10.2* 11.4* 11.9*   HCT 28.7* 32.4* 33.7*    284 326     CMP:     Recent Labs  Lab 01/18/17  0611 01/19/17  0402    143   K 3.4* 2.9*    105   CO2 25 26   * 92   BUN 17 13   CREATININE 0.7 0.7   CALCIUM 8.2* 8.6*   PROT 5.6* 6.5   ALBUMIN 1.7* 2.0*   BILITOT 0.7 0.8   ALKPHOS 81 107   AST 42* 51*   ALT 53* 65*   ANIONGAP 8 12   EGFRNONAA >60.0 >60.0       Significant Imaging: I have reviewed all pertinent imaging results/findings within the past 24 hours.     Chest/A/P CT scan 1/18/17:    1.  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    2. There is a 1.7 cm hypodense lesion in the inferior pole of the left kidney which could represent renal infarction but underlying neoplastic lesion not excluded.    3.  Large soft tissue density lesion in the left upper abdomen, anterior to the left kidney and superior to the left renal vein.  This is of uncertain etiology and could represent  enlarged lymph node and/or metastatic lesion.  Clinical correlation recommended.    4.  Interval development of cavitation in several previously identified focal regions of pulmonary parenchymal consolidation, most predominant within the upper lobes.  Additional multifocal opacities as well as bibasilar atelectatic/consolidative change in pleural effusions.  Findings are concerning for septic emboli or possibly multifocal infectious process with underlying neoplasia not excluded.  Clinical correlation recommended.    5.  Several subcentimeter hypodensities within the pancreas as well as a 2.1 cm hypodense lesion within the neck of the pancreas.  Findings are nonspecific and possible cystic pancreatic neoplasm not excluded.    6.  Markedly abnormal appearance of the enlarged leiomyomatous uterus with probable large pedunculated fibroid as detailed above.  Further evaluation with pelvic ultrasound recommended.    7. Multiple additional findings including probable pericardial cyst, large hiatal hernia and cholelithiasis as detailed above.

## 2017-01-19 NOTE — ASSESSMENT & PLAN NOTE
77 y/o female admitted for an episode of acute confusion. B/C 1/13/17, 1/14/17, 1/15/17, 1/16/17, 1/18/17 positive for MRSA, will repeat tomorrow. No obvious initial source of bacteremia (no hardware, no skin breakdown, no cellulitis, no wounds, no trauma, no central lines, no joint or back pain). Findings on Chest/A/P CT scan (on lungs and kidney) 1/18/17 most likely due to septic emboli.   - B/C persistent positive, most likely due to poor source control  - Last Vancomycin trough 15.1, trough goal 15-20, will continue same dose for now  - repeat B/C tomorrow  - MIS 1/17/17 negative for endocarditis

## 2017-01-19 NOTE — PHYSICIAN QUERY
PT Name: Ida Santana  MR #: [id number]     Physician Query Form - Diagnosis Clarification    Reviewer  Ext [id number] Deysi Anderson RN CDS    This form is a permanent document in the medical record.     Query Date: January 19, 2017    By submitting this query, we are merely seeking further clarification of documentation.  Please utilize your independent clinical judgment when addressing the question(s) below.     (The Medical record reflects the following:)      Findings Supporting Clinical Information Location in Medical Record   Had episode of fever to 101.6 overnight. Finished course of rocephin if we are concerned for pneumonia. Continuing on vanc.     Progress Note 1/17     Please clarify if the ______pneumonia________________ diagnosis has been:                 [   ]   Ruled In               [   ]   Ruled In, Now Resolved               [   ]   Resolved Prior to My Assessment               [   ]   Ruled Out               [   ]   Clinically insignificant               [x   ]   Clinically undetermined  Not sure yet               [   ]   Other/Clarification of findings:_____________________________________       Please document in your progress notes daily for the duration of treatment, until resolved, and include in your discharge summary.

## 2017-01-19 NOTE — PLAN OF CARE
01/19/17 1556   Discharge Reassessment   Assessment Type Discharge Planning Reassessment   Can the patient answer the patient profile reliably? Yes, cognitively intact   How does the patient rate their overall health at the present time? Fair   Describe the patient's ability to walk at the present time. Minor restrictions or changes   How often would a person be available to care for the patient? Occasionally   Number of comorbid conditions (as recorded on the chart) None   During the past month, has the patient often been bothered by feeling down, depressed or hopeless? No   During the past month, has the patient often been bothered by little interest or pleasure in doing things? No   Discharge plan remains the same: Yes   Discharge Plan A Home   Discharge Plan B Home;Home Health   Change in patient condition or support system No

## 2017-01-20 PROBLEM — J15.212 MRSA PNEUMONIA: Status: ACTIVE | Noted: 2017-01-20

## 2017-01-20 LAB
ALBUMIN SERPL BCP-MCNC: 1.8 G/DL
ALP SERPL-CCNC: 101 U/L
ALT SERPL W/O P-5'-P-CCNC: 50 U/L
ANION GAP SERPL CALC-SCNC: 8 MMOL/L
ANISOCYTOSIS BLD QL SMEAR: SLIGHT
AST SERPL-CCNC: 32 U/L
BASOPHILS NFR BLD: 0 %
BILIRUB SERPL-MCNC: 0.7 MG/DL
BUN SERPL-MCNC: 9 MG/DL
CALCIUM SERPL-MCNC: 7.7 MG/DL
CHLORIDE SERPL-SCNC: 106 MMOL/L
CO2 SERPL-SCNC: 25 MMOL/L
CREAT SERPL-MCNC: 0.7 MG/DL
DIFFERENTIAL METHOD: ABNORMAL
EOSINOPHIL NFR BLD: 1 %
ERYTHROCYTE [DISTWIDTH] IN BLOOD BY AUTOMATED COUNT: 14.6 %
EST. GFR  (AFRICAN AMERICAN): >60 ML/MIN/1.73 M^2
EST. GFR  (NON AFRICAN AMERICAN): >60 ML/MIN/1.73 M^2
FACT X PPP CHRO-ACNC: 0.24 IU/ML
FACT X PPP CHRO-ACNC: 0.47 IU/ML
FACT X PPP CHRO-ACNC: 1.1 IU/ML
GLUCOSE SERPL-MCNC: 134 MG/DL
HCT VFR BLD AUTO: 29 %
HGB BLD-MCNC: 10.3 G/DL
HYPOCHROMIA BLD QL SMEAR: ABNORMAL
L PNEUMO AG UR QL IA: NOT DETECTED
LYMPHOCYTES NFR BLD: 5 %
MCH RBC QN AUTO: 31.6 PG
MCHC RBC AUTO-ENTMCNC: 35.5 %
MCV RBC AUTO: 89 FL
MONOCYTES NFR BLD: 4 %
NEUTROPHILS NFR BLD: 90 %
OVALOCYTES BLD QL SMEAR: ABNORMAL
PLATELET # BLD AUTO: 301 K/UL
PLATELET BLD QL SMEAR: ABNORMAL
PMV BLD AUTO: 9.8 FL
POCT GLUCOSE: 114 MG/DL (ref 70–110)
POCT GLUCOSE: 126 MG/DL (ref 70–110)
POCT GLUCOSE: 145 MG/DL (ref 70–110)
POCT GLUCOSE: 193 MG/DL (ref 70–110)
POIKILOCYTOSIS BLD QL SMEAR: SLIGHT
POLYCHROMASIA BLD QL SMEAR: ABNORMAL
POTASSIUM SERPL-SCNC: 3 MMOL/L
PROT SERPL-MCNC: 6.1 G/DL
RBC # BLD AUTO: 3.26 M/UL
SODIUM SERPL-SCNC: 139 MMOL/L
WBC # BLD AUTO: 25.06 K/UL

## 2017-01-20 PROCEDURE — 25000003 PHARM REV CODE 250: Performed by: STUDENT IN AN ORGANIZED HEALTH CARE EDUCATION/TRAINING PROGRAM

## 2017-01-20 PROCEDURE — 36415 COLL VENOUS BLD VENIPUNCTURE: CPT

## 2017-01-20 PROCEDURE — 99233 SBSQ HOSP IP/OBS HIGH 50: CPT | Mod: ,,, | Performed by: INTERNAL MEDICINE

## 2017-01-20 PROCEDURE — 11000001 HC ACUTE MED/SURG PRIVATE ROOM

## 2017-01-20 PROCEDURE — 99233 SBSQ HOSP IP/OBS HIGH 50: CPT | Mod: ,,, | Performed by: HOSPITALIST

## 2017-01-20 PROCEDURE — 63600175 PHARM REV CODE 636 W HCPCS: Performed by: STUDENT IN AN ORGANIZED HEALTH CARE EDUCATION/TRAINING PROGRAM

## 2017-01-20 PROCEDURE — 80053 COMPREHEN METABOLIC PANEL: CPT

## 2017-01-20 PROCEDURE — 99223 1ST HOSP IP/OBS HIGH 75: CPT | Mod: ,,, | Performed by: INTERNAL MEDICINE

## 2017-01-20 PROCEDURE — A9585 GADOBUTROL INJECTION: HCPCS | Performed by: HOSPITALIST

## 2017-01-20 PROCEDURE — 85007 BL SMEAR W/DIFF WBC COUNT: CPT

## 2017-01-20 PROCEDURE — 87040 BLOOD CULTURE FOR BACTERIA: CPT | Mod: 59

## 2017-01-20 PROCEDURE — 85027 COMPLETE CBC AUTOMATED: CPT

## 2017-01-20 PROCEDURE — 25500020 PHARM REV CODE 255: Performed by: HOSPITALIST

## 2017-01-20 PROCEDURE — 25000003 PHARM REV CODE 250: Performed by: EMERGENCY MEDICINE

## 2017-01-20 PROCEDURE — 85520 HEPARIN ASSAY: CPT | Mod: 91

## 2017-01-20 PROCEDURE — 85520 HEPARIN ASSAY: CPT

## 2017-01-20 RX ORDER — POTASSIUM CHLORIDE 750 MG/1
50 CAPSULE, EXTENDED RELEASE ORAL 2 TIMES DAILY
Status: COMPLETED | OUTPATIENT
Start: 2017-01-20 | End: 2017-01-20

## 2017-01-20 RX ORDER — GADOBUTROL 604.72 MG/ML
8 INJECTION INTRAVENOUS
Status: COMPLETED | OUTPATIENT
Start: 2017-01-20 | End: 2017-01-20

## 2017-01-20 RX ORDER — GADOBUTROL 604.72 MG/ML
10 INJECTION INTRAVENOUS
Status: COMPLETED | OUTPATIENT
Start: 2017-01-20 | End: 2017-01-20

## 2017-01-20 RX ADMIN — VERAPAMIL HYDROCHLORIDE 240 MG: 120 TABLET, FILM COATED, EXTENDED RELEASE ORAL at 08:01

## 2017-01-20 RX ADMIN — POTASSIUM BICARBONATE 50 MEQ: 25 TABLET, EFFERVESCENT ORAL at 09:01

## 2017-01-20 RX ADMIN — POTASSIUM CHLORIDE 50 MEQ: 750 CAPSULE, EXTENDED RELEASE ORAL at 08:01

## 2017-01-20 RX ADMIN — Medication 3 ML: at 08:01

## 2017-01-20 RX ADMIN — GADOBUTROL 8 ML: 604.72 INJECTION INTRAVENOUS at 05:01

## 2017-01-20 RX ADMIN — ERYTHROMYCIN 1 INCH: 5 OINTMENT OPHTHALMIC at 08:01

## 2017-01-20 RX ADMIN — VANCOMYCIN HYDROCHLORIDE 1500 MG: 500 INJECTION, POWDER, LYOPHILIZED, FOR SOLUTION INTRAVENOUS at 05:01

## 2017-01-20 RX ADMIN — POTASSIUM CHLORIDE 50 MEQ: 750 CAPSULE, EXTENDED RELEASE ORAL at 12:01

## 2017-01-20 RX ADMIN — Medication 3 ML: at 05:01

## 2017-01-20 RX ADMIN — ERYTHROMYCIN 1 INCH: 5 OINTMENT OPHTHALMIC at 12:01

## 2017-01-20 RX ADMIN — VERAPAMIL HYDROCHLORIDE 240 MG: 120 TABLET, FILM COATED, EXTENDED RELEASE ORAL at 09:01

## 2017-01-20 RX ADMIN — GADOBUTROL 10 ML: 604.72 INJECTION INTRAVENOUS at 05:01

## 2017-01-20 RX ADMIN — ERYTHROMYCIN 1 INCH: 5 OINTMENT OPHTHALMIC at 05:01

## 2017-01-20 NOTE — SUBJECTIVE & OBJECTIVE
Interval History: Patient continues to have + bcx. From 1/19 to 1/20 are clear. Consulted CT surgery.     Review of Systems   Constitutional: Negative for chills and fever.   HENT: Negative for sore throat.    Respiratory: Negative for cough, chest tightness and shortness of breath.    Cardiovascular: Negative for chest pain, palpitations and leg swelling.   Gastrointestinal: Negative for abdominal distention, abdominal pain, diarrhea, nausea and vomiting.   Genitourinary: Negative for dysuria, flank pain and urgency.   Skin: Negative for rash.   Neurological: Negative for dizziness, light-headedness and numbness.   Psychiatric/Behavioral: Negative for confusion.     Objective:     Vital Signs (Most Recent):  Temp: 97.9 °F (36.6 °C) (01/20/17 1219)  Pulse: 82 (01/20/17 1219)  Resp: 18 (01/20/17 1219)  BP: (!) 144/75 (01/20/17 1219)  SpO2: 95 % (01/20/17 1219) Vital Signs (24h Range):  Temp:  [97.9 °F (36.6 °C)-100.6 °F (38.1 °C)] 97.9 °F (36.6 °C)  Pulse:  [71-82] 82  Resp:  [17-18] 18  SpO2:  [94 %-96 %] 95 %  BP: (136-148)/(63-75) 144/75     Weight: 77.6 kg (171 lb)  Body mass index is 26.78 kg/(m^2).    Intake/Output Summary (Last 24 hours) at 01/20/17 1619  Last data filed at 01/20/17 1230   Gross per 24 hour   Intake              720 ml   Output                0 ml   Net              720 ml      Physical Exam   Constitutional: She is oriented to person, place, and time. No distress.   HENT:   Head: Normocephalic.   Mouth/Throat: No oropharyngeal exudate.   Eyes: EOM are normal. Pupils are equal, round, and reactive to light. Right eye exhibits no discharge. Left eye exhibits no discharge. Right conjunctiva is not injected. Left conjunctiva is injected.   Cardiovascular: Normal rate and regular rhythm.  Exam reveals no gallop and no friction rub.    Murmur heard.  Pulmonary/Chest: Effort normal and breath sounds normal. No respiratory distress.   Abdominal: Soft. Bowel sounds are normal. She exhibits no  distension. There is no tenderness.   Musculoskeletal: Normal range of motion. She exhibits no edema.   Neurological: She is alert and oriented to person, place, and time. No cranial nerve deficit.   Skin: She is not diaphoretic.   Psychiatric: She has a normal mood and affect.   Vitals reviewed.      Significant Labs:   Blood Culture:   Recent Labs  Lab 01/19/17  1210   LABBLOO No Growth to date  No Growth to date  No Growth to date  No Growth to date     BMP:   Recent Labs  Lab 01/20/17  0546   *      K 3.0*      CO2 25   BUN 9   CREATININE 0.7   CALCIUM 7.7*     CBC:   Recent Labs  Lab 01/19/17  0015 01/19/17  0402 01/20/17  0546   WBC 22.48* 27.77* 25.06*   HGB 11.4* 11.9* 10.3*   HCT 32.4* 33.7* 29.0*    326 301       Significant Imaging: I have reviewed all pertinent imaging results/findings within the past 24 hours.

## 2017-01-20 NOTE — PROGRESS NOTES
[facility] Hospital Medicine  Progress Note    Patient Name: Ida Santana  MRN: [id number]  Patient Class: IP- Inpatient   Admission Date: 1/12/2017  Length of Stay: 8 days  Attending Physician: Loyda Lui MD  Primary Care Provider: Giovanna Murray MD    [facility] Medicine Team: [facility] HOSP MED 1 Porfirio Posey MD    Subjective:     Principal Problem:Severe sepsis      Hospital Course:  1/20/17: CT chest showed bilateral pleural effusions and a possible carmen-aortic fluid collection. Consulted CT surgery and they recommend continuing with abx and seeing if IR can drain her left-sided pleural effusion.       Interval History: Patient continues to have + bcx. From 1/19 to 1/20 are clear. Consulted CT surgery.     Review of Systems   Constitutional: Negative for chills and fever.   HENT: Negative for sore throat.    Respiratory: Negative for cough, chest tightness and shortness of breath.    Cardiovascular: Negative for chest pain, palpitations and leg swelling.   Gastrointestinal: Negative for abdominal distention, abdominal pain, diarrhea, nausea and vomiting.   Genitourinary: Negative for dysuria, flank pain and urgency.   Skin: Negative for rash.   Neurological: Negative for dizziness, light-headedness and numbness.   Psychiatric/Behavioral: Negative for confusion.     Objective:     Vital Signs (Most Recent):  Temp: 97.9 °F (36.6 °C) (01/20/17 1219)  Pulse: 82 (01/20/17 1219)  Resp: 18 (01/20/17 1219)  BP: (!) 144/75 (01/20/17 1219)  SpO2: 95 % (01/20/17 1219) Vital Signs (24h Range):  Temp:  [97.9 °F (36.6 °C)-100.6 °F (38.1 °C)] 97.9 °F (36.6 °C)  Pulse:  [71-82] 82  Resp:  [17-18] 18  SpO2:  [94 %-96 %] 95 %  BP: (136-148)/(63-75) 144/75     Weight: 77.6 kg (171 lb)  Body mass index is 26.78 kg/(m^2).    Intake/Output Summary (Last 24 hours) at 01/20/17 1619  Last data filed at 01/20/17 1230   Gross per 24 hour   Intake              720 ml   Output                0 ml   Net               720 ml      Physical Exam   Constitutional: She is oriented to person, place, and time. No distress.   HENT:   Head: Normocephalic.   Mouth/Throat: No oropharyngeal exudate.   Eyes: EOM are normal. Pupils are equal, round, and reactive to light. Right eye exhibits no discharge. Left eye exhibits no discharge. Right conjunctiva is not injected. Left conjunctiva is injected.   Cardiovascular: Normal rate and regular rhythm.  Exam reveals no gallop and no friction rub.    Murmur heard.  Pulmonary/Chest: Effort normal and breath sounds normal. No respiratory distress.   Abdominal: Soft. Bowel sounds are normal. She exhibits no distension. There is no tenderness.   Musculoskeletal: Normal range of motion. She exhibits no edema.   Neurological: She is alert and oriented to person, place, and time. No cranial nerve deficit.   Skin: She is not diaphoretic.   Psychiatric: She has a normal mood and affect.   Vitals reviewed.      Significant Labs:   Blood Culture:   Recent Labs  Lab 01/19/17  1210   LABBLOO No Growth to date  No Growth to date  No Growth to date  No Growth to date     BMP:   Recent Labs  Lab 01/20/17  0546   *      K 3.0*      CO2 25   BUN 9   CREATININE 0.7   CALCIUM 7.7*     CBC:   Recent Labs  Lab 01/19/17  0015 01/19/17  0402 01/20/17  0546   WBC 22.48* 27.77* 25.06*   HGB 11.4* 11.9* 10.3*   HCT 32.4* 33.7* 29.0*    326 301       Significant Imaging: I have reviewed all pertinent imaging results/findings within the past 24 hours.    Assessment/Plan:      * Severe sepsis  Staphylococcus aureus sepsis  MRSA bacteremia  - has had continued fevers and bcx grew MRSA, concerned that 2/2 continued + bcx patient may have staph seeded somewhere, possible endocarditis. She does not have any recent significant hospital contact.   - MIS negative for endocarditis  - continuing with vanc and rocephin  - repeat bcx shown staph again, repeating bcx - now no growth x1 day (from 1/16)   -  Follow repeat bcx.   - Per ID, holding off on broadening abx for now.   -  CT chest, abdomen, pelvis with contrast: septic emboli to multiple organs (lungs w/ cavitary lesions, pancreas, kidneys...) among other findings that will require follow up. (eveutally urology for concern for renal cancer).   - WBC continues to increase, do not have source control.   - MRI spine c/t/l - no evidence of spinal abscess  - CT surgery saw pt today and recommended to continue with vanc and consult IR for possible drainage of left pleural effusion    Urinary tract infection without hematuria  - Ucx grew what appears to be contaminant with nothing in predominance. Unclear if this is source of patient's MRSA bacteremia, doubtful  - repeat UA showed no evidence of UTI     Hypertension  - continue verapamil and hold off on Diovan in setting of low BP    Hypophosphatemia  - replete PRN    Conjunctivitis  Developed conjunctivitis of left eye 1/18. Pt denies pain, drainage, decreased vision.   - Erythromycin x7days  - Spoke with Ophthalmology. Call if she develops pain, drainage, decreased vision, or pain with eye movement.   - Cont to monitor    Ppx: [id number]  Diet: Diabetic      Porfirio Posey MD  Department of Hospital Medicine   [facility]                      01/20/2017                             STAFF PHYSICIAN NOTE                                   Attending Attestation for Rounds with Resident  I have reviewed and concur with the resident's history, physical, assessment, and plan.  I have personally interviewed and examined the patient at bedside and agree with the resident's findings.                                  ________________________________________                                     REASON FOR ADMISSION:     Patient is 76 y.o.female    Body mass index is 26.78 kg/(m^2).,  Severe sepsis

## 2017-01-20 NOTE — ASSESSMENT & PLAN NOTE
75 y/o female admitted for an episode of acute confusion. B/C 1/13/17, 1/14/17, 1/15/17, 1/16/17, 1/18/17 positive for MRSA, 1/19/17 and 1/20/17 in progress. Initial source of bacteremia could be related to pneumonia (no hardware, no skin breakdown, no cellulitis, no wounds, no trauma, no central lines, no joint or back pain).     - Findings on Chest/A/P CT scan (on lungs and kidney) 1/18/17 most likely due to septic emboli.   - MRI cervical, thoracic, lumbar 1/20/17 negative for osteomyelitis/abscess.  - MIS 1/17/17 negative for endocarditis  - B/C persistent positive, most likely due to poor source control  - follow B/C   - Last Vancomycin trough 18, trough goal 15-20  - will continue same dose for now

## 2017-01-20 NOTE — CONSULTS
"Consult Note  Thoracic Surgery    Consult Requested By: Dr Lui  Reason for Consult: Pleural effusion, cavitary lung lesions    SUBJECTIVE:     History of Present Illness:  Patient is a 76 y.o. female with PMH of HTN, HLD, osteopenia and breast cancer presented on 1/13 for altered mental status.  Initial workup showed a negative CT head but leukocytosis and a UTI.  She was ultimately found to have MRSA bacteremia with no known source.  She is currently on vancomycin.  MIS on 1/17 was negative for endocarditis.  CT chest/abdomen/pelvis was performed on 1/18 showing bilateral basilar pneumonia and multiple cavitary lung lesions.  There was also a questionable carmen-aortic fluid collection.  Thoracic surgery was consulted to evaluate for possible drainage of pleural fluid.  When evaluated at bedside patient's only complaint was a cough from a "sinus cold" she has been having.  She denies any CP, SOB, nausea, vomiting, diarrhea.  She has been afebrile today with a WBC count of 26.      Scheduled Meds:   erythromycin   Left Eye QID    potassium chloride  50 mEq Oral BID    sodium chloride 0.9%  1,000 mL Intravenous Once    sodium chloride 0.9%  3 mL Intravenous Q8H    vancomycin (VANCOCIN) IVPB  20 mg/kg Intravenous Q12H    verapamil  240 mg Oral BID     Continuous Infusions:   heparin (porcine) in D5W 12 Units/kg/hr (01/20/17 1039)     PRN Meds:acetaminophen, benzonatate, dextrose 50%, dextrose 50%, glucagon (human recombinant), glucose, glucose, glucose, heparin (PORCINE), heparin (PORCINE), insulin aspart, ondansetron, polyethylene glycol, tramadol    Review of patient's allergies indicates:   Allergen Reactions    Adhesive tape-silicones      Other reaction(s): pulling skin off    Atorvastatin      Other reaction(s): Muscle pain    Pravachol  [pravastatin] Rash       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    History of uterine " fibroid     Hyperlipidemia      dyslipidemia    Hypertension     Open angle 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 eyes 5/2/2013    Osteopenia     Potassium depletion 1998     Past Surgical History   Procedure Laterality Date    Breast biopsy  2001     left breast DCIS    Breast surgery  9/2001     left lumpectomy with SLN bx    Colonoscopy      Colonoscopy N/A 10/31/2016     Procedure: COLONOSCOPY;  Surgeon: YAMILETH Richards MD;  Location: [facility] ([address] [place] [address]);  Service: Endoscopy;  Laterality: N/A;     Family History   Problem Relation Age of Onset    Cancer Mother      breast    Breast cancer Mother      40s and again in 50s (bilateral)    Breast cancer Other     Heart disease Father      bypass    Cancer Father      asbestos    Stroke Brother     Ovarian cancer Neg Hx     Amblyopia Neg Hx     Blindness Neg Hx     Cataracts Neg Hx     Diabetes Neg Hx     Glaucoma Neg Hx     Hypertension Neg Hx     Macular degeneration Neg Hx     Retinal detachment Neg Hx     Strabismus Neg Hx     Thyroid disease Neg Hx     Colon cancer Neg Hx      Social History   Substance Use Topics    Smoking status: Never Smoker    Smokeless tobacco: Never Used    Alcohol use No        Review of Systems:   A 10-point review of systems is negative except for the above mentioned in the HPI.      OBJECTIVE:     Vital Signs (Most Recent)  Temp: 97.9 °F (36.6 °C) (01/20/17 1219)  Pulse: 82 (01/20/17 1219)  Resp: 18 (01/20/17 1219)  BP: (!) 144/75 (01/20/17 1219)  SpO2: 95 % (01/20/17 1219)    Vital Signs Range (Last 24H):  Temp:  [97.9 °F (36.6 °C)-100.6 °F (38.1 °C)]   Pulse:  [71-82]   Resp:  [17-18]   BP: (136-148)/(63-75)   SpO2:  [94 %-96 %]     Physical Exam:  NAD, resting comfortably in bed  RRR  Decreased breath sounds at bilateral lung bases  Abdomen soft, obese, nondistended, nontender  Ext warm and well perfused    Laboratory:  CBC:   Recent Labs  Lab 01/20/17  0546   WBC 25.06*   RBC 3.26*    HGB 10.3*   HCT 29.0*        CMP:   Recent Labs  Lab 01/20/17  0546   *   CALCIUM 7.7*   ALBUMIN 1.8*   PROT 6.1      K 3.0*   CO2 25      BUN 9   CREATININE 0.7   ALKPHOS 101   ALT 50*   AST 32   BILITOT 0.7       Diagnostic Results:  CT: Reviewed    ASSESSMENT/PLAN:     75 yo F with PMHx of HTN, HLD, osteopenia and breast cancer who presented on 1/13 with altered mental status.  She was found to have MRSA bacteremia without a source.  Thoracic surgery was consulted for bilateral basilar pneumonia, Left pleural effusion, and cavitary lung lesions    - Recommend continuing current antibiotic therapy  - If leukocytosis does not improve, consider ultrasound guided thoracentesis of Left pleural effusion  - If not amendable to US guided thoracentesis and leukocytosis persists or increases, will consider VATS drainage of effusion next week  - Questionable periaortic fluid collection likely loculated pleural fluid  - No surgical interventions for cavitary lung lesions   - Imaging reviewed and case discussed with Dr Jenkins  - Will follow, please call with any questions    Leonardo Limon M.D.  General Surgery PGY1  [phone]

## 2017-01-20 NOTE — ASSESSMENT & PLAN NOTE
- has had continued fevers and bcx grew MRSA, concerned that 2/2 continued + bcx patient may have staph seeded somewhere, possible endocarditis. She does not have any recent significant hospital contact.   - MIS negative for endocarditis  - continuing with vanc and rocephin  - repeat bcx shown staph again, repeating bcx - now no growth x1 day (from 1/16)   - Follow repeat bcx.   - Per ID, holding off on broadening abx for now.   -  CT chest, abdomen, pelvis with contrast: septic emboli to multiple organs (lungs w/ cavitary lesions, pancreas, kidneys...) among other findings that will require follow up. (eveutally urology for concern for renal cancer).   - WBC continues to increase, do not have source control.   - MRI spine c/t/l - no evidence of spinal abscess

## 2017-01-20 NOTE — PROGRESS NOTES
[facility] Hospital Medicine  Progress Note    Patient Name: Ida Santana  MRN: [id number]  Patient Class: IP- Inpatient   Admission Date: 1/12/2017  Length of Stay: 7 days  Attending Physician: Loyda Lui MD  Primary Care Provider: Giovanna Murray MD    [facility] Medicine Team: [facility] 1 Diann Lopez MD    Subjective:     Principal Problem:Severe sepsis    Interval History: Overnight, started on heparin gtt for renal vein thrombosis noted on CT. Pt reports feeling OK. Denies SOB, CP, Abd pain.     Review of Systems   Eyes: Positive for redness. Negative for pain, discharge and visual disturbance.   Respiratory: Positive for cough. Negative for shortness of breath.    Cardiovascular: Negative for chest pain, palpitations and leg swelling.   Gastrointestinal: Negative for abdominal pain.   Genitourinary: Negative for dysuria.   Neurological: Negative for headaches.     Objective:     Vital Signs (Most Recent):  Temp: 99.4 °F (37.4 °C) (01/19/17 1615)  Pulse: 76 (01/19/17 1615)  Resp: 18 (01/19/17 1615)  BP: (!) 140/63 (01/19/17 1615)  SpO2: 96 % (01/19/17 1615) Vital Signs (24h Range):  Temp:  [98.5 °F (36.9 °C)-99.5 °F (37.5 °C)] 99.4 °F (37.4 °C)  Pulse:  [74-80] 76  Resp:  [18] 18  SpO2:  [92 %-97 %] 96 %  BP: (124-140)/(59-69) 140/63     Weight: 77.6 kg (171 lb)  Body mass index is 26.78 kg/(m^2).  No intake or output data in the 24 hours ending 01/19/17 1850   Physical Exam   Constitutional: She is oriented to person, place, and time. No distress.   HENT:   Head: Normocephalic.   Mouth/Throat: No oropharyngeal exudate.   Eyes: EOM are normal. Pupils are equal, round, and reactive to light. Right eye exhibits no discharge. Left eye exhibits no discharge. Right conjunctiva is not injected. Left conjunctiva is injected.   Cardiovascular: Normal rate and regular rhythm.  Exam reveals no gallop and no friction rub.    Murmur heard.  Pulmonary/Chest: Effort normal and breath sounds  normal. No respiratory distress.   Abdominal: Soft. Bowel sounds are normal. She exhibits no distension. There is no tenderness.   Musculoskeletal: Normal range of motion. She exhibits no edema.   Neurological: She is alert and oriented to person, place, and time. No cranial nerve deficit.   Skin: She is not diaphoretic.   Psychiatric: She has a normal mood and affect.   Vitals reviewed.      Significant Labs:   CBC:   Recent Labs  Lab 01/18/17  0611 01/19/17  0015 01/19/17  0402   WBC 18.07* 22.48* 27.77*   HGB 10.2* 11.4* 11.9*   HCT 28.7* 32.4* 33.7*    284 326     CMP:   Recent Labs  Lab 01/18/17  0611 01/19/17  0402    143   K 3.4* 2.9*    105   CO2 25 26   * 92   BUN 17 13   CREATININE 0.7 0.7   CALCIUM 8.2* 8.6*   PROT 5.6* 6.5   ALBUMIN 1.7* 2.0*   BILITOT 0.7 0.8   ALKPHOS 81 107   AST 42* 51*   ALT 53* 65*   ANIONGAP 8 12   EGFRNONAA >60.0 >60.0       Significant Imaging: I have reviewed all pertinent imaging results/findings within the past 24 hours.    Assessment/Plan:      * Severe sepsis        Hypertension  - continue verapamil and hold off on Diovan in setting of low BP      Urinary tract infection without hematuria  - Ucx grew what appears to be contaminant with nothing in predominance. Unclear if this is source of patient's MRSA bacteremia, doubtful  - repeat UA showed no evidence of UTI     Staphylococcus aureus sepsis  - has had continued fevers and bcx grew MRSA, concerned that 2/2 continued + bcx patient may have staph seeded somewhere, possible endocarditis. She does not have any recent significant hospital contact.   - MIS negative for endocarditis  - continuing with vanc and rocephin  - repeat bcx shown staph again, repeating bcx - now no growth x1 day (from 1/16)   - Follow repeat bcx.   - Per ID, holding off on broadening abx for now.   -  CT chest, abdomen, pelvis with contrast: septic emboli to multiple organs (lungs w/ cavitary lesions, pancreas, kidneys...)  among other findings that will require follow up. (eveutally urology for concern for renal cancer).   - WBC continues to increase, do not have source control.   - Follow up MRI spine c/t/l.     MRSA bacteremia        Hypophosphatemia  - low 1/17- repleting with NaPh oral packets for 4 doses    Conjunctivitis  Developed conjunctivitis of left eye 1/18. Pt denies pain, drainage, decreased vision.   - Erythromycin x7days  - Spoke with Ophthalmology. Call if she develops pain, drainage, decreased vision, or pain with eye movement.   - Cont to monitor    VTE Risk Mitigation         Ordered     Low Risk of VTE  Once      01/13/17 0158          Ppx: Hep gtt   Diet: DM  PT/OT: ordered  Dispo: Pending hospital course. Anticipate SNF.     Discussed with staff, Dr. Lui.   THEO Lopez MD MPH PGY-1  Department of Hospital Medicine   [facility]                      01/19/2017                             STAFF PHYSICIAN NOTE                                   Attending Attestation for Rounds with Resident  I have reviewed and concur with the resident's history, physical, assessment, and plan.  I have personally interviewed and examined the patient at bedside and agree with the resident's findings.                                  ________________________________________                                     REASON FOR ADMISSION:     Patient is 76 y.o.female    Body mass index is 26.78 kg/(m^2).,  Severe sepsis

## 2017-01-20 NOTE — SUBJECTIVE & OBJECTIVE
Interval History: Overnight, started on heparin gtt for renal vein thrombosis noted on CT. Pt reports feeling OK. Denies SOB, CP, Abd pain.     Review of Systems   Eyes: Positive for redness. Negative for pain, discharge and visual disturbance.   Respiratory: Positive for cough. Negative for shortness of breath.    Cardiovascular: Negative for chest pain, palpitations and leg swelling.   Gastrointestinal: Negative for abdominal pain.   Genitourinary: Negative for dysuria.   Neurological: Negative for headaches.     Objective:     Vital Signs (Most Recent):  Temp: 99.4 °F (37.4 °C) (01/19/17 1615)  Pulse: 76 (01/19/17 1615)  Resp: 18 (01/19/17 1615)  BP: (!) 140/63 (01/19/17 1615)  SpO2: 96 % (01/19/17 1615) Vital Signs (24h Range):  Temp:  [98.5 °F (36.9 °C)-99.5 °F (37.5 °C)] 99.4 °F (37.4 °C)  Pulse:  [74-80] 76  Resp:  [18] 18  SpO2:  [92 %-97 %] 96 %  BP: (124-140)/(59-69) 140/63     Weight: 77.6 kg (171 lb)  Body mass index is 26.78 kg/(m^2).  No intake or output data in the 24 hours ending 01/19/17 1850   Physical Exam   Constitutional: She is oriented to person, place, and time. No distress.   HENT:   Head: Normocephalic.   Mouth/Throat: No oropharyngeal exudate.   Eyes: EOM are normal. Pupils are equal, round, and reactive to light. Right eye exhibits no discharge. Left eye exhibits no discharge. Right conjunctiva is not injected. Left conjunctiva is injected.   Cardiovascular: Normal rate and regular rhythm.  Exam reveals no gallop and no friction rub.    Murmur heard.  Pulmonary/Chest: Effort normal and breath sounds normal. No respiratory distress.   Abdominal: Soft. Bowel sounds are normal. She exhibits no distension. There is no tenderness.   Musculoskeletal: Normal range of motion. She exhibits no edema.   Neurological: She is alert and oriented to person, place, and time. No cranial nerve deficit.   Skin: She is not diaphoretic.   Psychiatric: She has a normal mood and affect.   Vitals  reviewed.      Significant Labs:   CBC:   Recent Labs  Lab 01/18/17  0611 01/19/17  0015 01/19/17  0402   WBC 18.07* 22.48* 27.77*   HGB 10.2* 11.4* 11.9*   HCT 28.7* 32.4* 33.7*    284 326     CMP:   Recent Labs  Lab 01/18/17  0611 01/19/17  0402    143   K 3.4* 2.9*    105   CO2 25 26   * 92   BUN 17 13   CREATININE 0.7 0.7   CALCIUM 8.2* 8.6*   PROT 5.6* 6.5   ALBUMIN 1.7* 2.0*   BILITOT 0.7 0.8   ALKPHOS 81 107   AST 42* 51*   ALT 53* 65*   ANIONGAP 8 12   EGFRNONAA >60.0 >60.0       Significant Imaging: I have reviewed all pertinent imaging results/findings within the past 24 hours.

## 2017-01-20 NOTE — ASSESSMENT & PLAN NOTE
- has had continued fevers and bcx grew MRSA, concerned that 2/2 continued + bcx patient may have staph seeded somewhere, possible endocarditis. She does not have any recent significant hospital contact.   - MIS negative for endocarditis  - continuing with vanc and rocephin  - repeat bcx shown staph again, repeating bcx - now no growth x1 day (from 1/16)   - Follow repeat bcx.   - Per ID, holding off on broadening abx for now.   -  CT chest, abdomen, pelvis with contrast: septic emboli to multiple organs (lungs w/ cavitary lesions, pancreas, kidneys...) among other findings that will require follow up. (eveutally urology for concern for renal cancer).   - WBC continues to increase, do not have source control.   - Follow up MRI spine c/t/l.

## 2017-01-20 NOTE — PROGRESS NOTES
[facility]  Infectious Disease  Progress Note    Patient Name: Ida Santana  MRN: [id number]  Admission Date: 1/12/2017  Length of Stay: 8 days  Attending Physician: Loyda Lui MD  Primary Care Provider: Giovanna Murray MD    Isolation Status: Contact  Assessment/Plan:      MRSA bacteremia  77 y/o female admitted for an episode of acute confusion. B/C 1/13/17, 1/14/17, 1/15/17, 1/16/17, 1/18/17 positive for MRSA, 1/19/17 and 1/20/17 in progress. Initial source of bacteremia could be related to pneumonia (no hardware, no skin breakdown, no cellulitis, no wounds, no trauma, no central lines, no joint or back pain).     - Findings on Chest/A/P CT scan (on lungs and kidney) 1/18/17 most likely due to septic emboli.   - MRI cervical, thoracic, lumbar 1/20/17 negative for osteomyelitis/abscess.  - MIS 1/17/17 negative for endocarditis  - B/C persistent positive, most likely due to poor source control  - follow B/C   - Last Vancomycin trough 18, trough goal 15-20  - will continue same dose for now                  Anticipated Disposition: IV Vancomycin    Thank you for your consult. I will follow-up with patient. Please contact us if you have any additional questions.     Farhad Finney MD  Infectious Disease Fellow, PGY-4  Pager: [phone]  [facility]    Subjective:     Principal Problem:Severe sepsis    Interval History: Patient feels well, has no complain, fever improving     HPI:   77 y/o female with history of HTN, HLD,  Osteopenia, remote breast cancer came to hospital for family members noted that patient let the bathroom sink overflow and left a pot burning on the stove the day of admission. Patient lives by herself and is fully functional. The patient reports 3 episodes of vomits gastric content and 3 episodes of diarrhea today, denied fever, chills, abdominal pain, weakness, paraesthesia, dysuria, frequency or malodorous urin, skin breakdown, wounds, trauma,  central lines, joint or back pain. Denies similar episodes in the past.        Review of Systems   Constitutional: Positive for fever. Negative for chills.   HENT: Negative for sore throat.    Respiratory: Negative for cough, chest tightness and shortness of breath.    Cardiovascular: Negative for chest pain, palpitations and leg swelling.   Gastrointestinal: Negative for abdominal distention, abdominal pain, diarrhea, nausea and vomiting.   Genitourinary: Negative for dysuria, flank pain and urgency.   Skin: Negative for rash.   Neurological: Negative for dizziness, light-headedness and numbness.   Psychiatric/Behavioral: Negative for confusion.     Objective:     Vital Signs (Most Recent):  Temp: 97.9 °F (36.6 °C) (01/20/17 1219)  Pulse: 82 (01/20/17 1219)  Resp: 18 (01/20/17 1219)  BP: (!) 144/75 (01/20/17 1219)  SpO2: 95 % (01/20/17 1219) Vital Signs (24h Range):  Temp:  [97.9 °F (36.6 °C)-100.6 °F (38.1 °C)] 97.9 °F (36.6 °C)  Pulse:  [71-82] 82  Resp:  [17-18] 18  SpO2:  [94 %-96 %] 95 %  BP: (136-148)/(63-75) 144/75     Weight: 77.6 kg (171 lb)  Body mass index is 26.78 kg/(m^2).    Estimated Creatinine Clearance: 75.7 mL/min (based on Cr of 0.7).    Physical Exam   Constitutional: She is oriented to person, place, and time. No distress.   HENT:   Head: Normocephalic.   Mouth/Throat: No oropharyngeal exudate.   Eyes: No scleral icterus.   Cardiovascular: Normal rate and regular rhythm.  Exam reveals no gallop and no friction rub.    Murmur heard.  Pulmonary/Chest: Effort normal. No respiratory distress. She has no wheezes. She has no rales.   Abdominal: Soft. Bowel sounds are normal. She exhibits no distension. There is no tenderness. There is no rebound and no guarding.   Musculoskeletal: Normal range of motion.   Lymphadenopathy:     She has no cervical adenopathy.   Neurological: She is alert and oriented to person, place, and time. No cranial nerve deficit.   Skin: No rash noted. She is not diaphoretic.  No erythema.   Vitals reviewed.      Significant Labs:   Blood Culture:     Recent Labs  Lab 01/14/17  1101 01/15/17  1829 01/16/17  1949 01/18/17  0611 01/19/17  1210   LABBLOO Gram stain aer bottle: Gram positive cocci in clusters resembling Staph   Results called to and read back by:Taylor Valerio RN 01/15/2017  02:34  METHICILLIN RESISTANT STAPHYLOCOCCUS AUREUSFor susceptibility see order #[id number]  Gram stain aer bottle: Gram positive cocci in clusters resembling Staph   Results called to and read back by:Taylor Valerio RN 01/15/2017  02:34  METHICILLIN RESISTANT STAPHYLOCOCCUS AUREUS Gram stain aer bottle: Gram positive cocci in clusters resembling Staph   Results called to and read back by:Kristie Palomares RN 01/16/2017  13:45  METHICILLIN RESISTANT STAPHYLOCOCCUS AUREUSFor susceptibility see order #[id number]  Gram stain aer bottle: Gram positive cocci in clusters resembling Staph   Results called to and read back by:Kristie Wolff RN  01/16/2017  15:34  METHICILLIN RESISTANT STAPHYLOCOCCUS AUREUS Gram stain aer bottle: Gram positive cocci in clusters resembling Staph   Results called to and read back by: Sujatha Bennett RN 01/17/2017  15:13  METHICILLIN RESISTANT STAPHYLOCOCCUS AUREUSFor susceptibility see order #[id number]  Gram stain aer bottle: Gram positive cocci in clusters resembling Staph   Results called to and read back by: Kristie Wolff RN  01/17/2017  17:58  METHICILLIN RESISTANT STAPHYLOCOCCUS AUREUS Gram stain aer bottle: Gram positive cocci in clusters resembling Staph   Results called to and read back by:Ness Lagos RN 01/19/2017  04:10  STAPHYLOCOCCUS AUREUSFor susceptibility see order # [id number]  Gram stain aer bottle: Gram positive cocci in clusters resembling Staph   Results called to and read back by:Ness Lagos RN 01/19/2017  04:10  STAPHYLOCOCCUS AUREUSSusceptibility pending No Growth to date  No Growth to date     BMP:     Recent Labs  Lab 01/20/17  0546   *       K 3.0*      CO2 25   BUN 9   CREATININE 0.7   CALCIUM 7.7*     CBC:     Recent Labs  Lab 01/19/17  0015 01/19/17  0402 01/20/17  0546   WBC 22.48* 27.77* 25.06*   HGB 11.4* 11.9* 10.3*   HCT 32.4* 33.7* 29.0*    326 301     CMP:     Recent Labs  Lab 01/19/17  0402 01/20/17  0546    139   K 2.9* 3.0*    106   CO2 26 25   GLU 92 134*   BUN 13 9   CREATININE 0.7 0.7   CALCIUM 8.6* 7.7*   PROT 6.5 6.1   ALBUMIN 2.0* 1.8*   BILITOT 0.8 0.7   ALKPHOS 107 101   AST 51* 32   ALT 65* 50*   ANIONGAP 12 8   EGFRNONAA >60.0 >60.0       Significant Imaging: I have reviewed all pertinent imaging results/findings within the past 24 hours.     Chest/A/P CT scan 1/18/17:    1.  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    2. There is a 1.7 cm hypodense lesion in the inferior pole of the left kidney which could represent renal infarction but underlying neoplastic lesion not excluded.    3.  Large soft tissue density lesion in the left upper abdomen, anterior to the left kidney and superior to the left renal vein.  This is of uncertain etiology and could represent enlarged lymph node and/or metastatic lesion.  Clinical correlation recommended.    4.  Interval development of cavitation in several previously identified focal regions of pulmonary parenchymal consolidation, most predominant within the upper lobes.  Additional multifocal opacities as well as bibasilar atelectatic/consolidative change in pleural effusions.  Findings are concerning for septic emboli or possibly multifocal infectious process with underlying neoplasia not excluded.  Clinical correlation recommended.    5.  Several subcentimeter hypodensities within the pancreas as well as a 2.1 cm hypodense lesion within the neck of the pancreas.  Findings are nonspecific and possible cystic pancreatic neoplasm not excluded.    6.  Markedly  abnormal appearance of the enlarged leiomyomatous uterus with probable large pedunculated fibroid as detailed above.  Further evaluation with pelvic ultrasound recommended.    7. Multiple additional findings including probable pericardial cyst, large hiatal hernia and cholelithiasis as detailed above.

## 2017-01-20 NOTE — SUBJECTIVE & OBJECTIVE
Interval History: Patient feels well, has no complain, fever improving     HPI:   77 y/o female with history of HTN, HLD,  Osteopenia, remote breast cancer came to hospital for family members noted that patient let the bathroom sink overflow and left a pot burning on the stove the day of admission. Patient lives by herself and is fully functional. The patient reports 3 episodes of vomits gastric content and 3 episodes of diarrhea today, denied fever, chills, abdominal pain, weakness, paraesthesia, dysuria, frequency or malodorous urin, skin breakdown, wounds, trauma, central lines, joint or back pain. Denies similar episodes in the past.        Review of Systems   Constitutional: Positive for fever. Negative for chills.   HENT: Negative for sore throat.    Respiratory: Negative for cough, chest tightness and shortness of breath.    Cardiovascular: Negative for chest pain, palpitations and leg swelling.   Gastrointestinal: Negative for abdominal distention, abdominal pain, diarrhea, nausea and vomiting.   Genitourinary: Negative for dysuria, flank pain and urgency.   Skin: Negative for rash.   Neurological: Negative for dizziness, light-headedness and numbness.   Psychiatric/Behavioral: Negative for confusion.     Objective:     Vital Signs (Most Recent):  Temp: 97.9 °F (36.6 °C) (01/20/17 1219)  Pulse: 82 (01/20/17 1219)  Resp: 18 (01/20/17 1219)  BP: (!) 144/75 (01/20/17 1219)  SpO2: 95 % (01/20/17 1219) Vital Signs (24h Range):  Temp:  [97.9 °F (36.6 °C)-100.6 °F (38.1 °C)] 97.9 °F (36.6 °C)  Pulse:  [71-82] 82  Resp:  [17-18] 18  SpO2:  [94 %-96 %] 95 %  BP: (136-148)/(63-75) 144/75     Weight: 77.6 kg (171 lb)  Body mass index is 26.78 kg/(m^2).    Estimated Creatinine Clearance: 75.7 mL/min (based on Cr of 0.7).    Physical Exam   Constitutional: She is oriented to person, place, and time. No distress.   HENT:   Head: Normocephalic.   Mouth/Throat: No oropharyngeal exudate.   Eyes: No scleral icterus.    Cardiovascular: Normal rate and regular rhythm.  Exam reveals no gallop and no friction rub.    Murmur heard.  Pulmonary/Chest: Effort normal. No respiratory distress. She has no wheezes. She has no rales.   Abdominal: Soft. Bowel sounds are normal. She exhibits no distension. There is no tenderness. There is no rebound and no guarding.   Musculoskeletal: Normal range of motion.   Lymphadenopathy:     She has no cervical adenopathy.   Neurological: She is alert and oriented to person, place, and time. No cranial nerve deficit.   Skin: No rash noted. She is not diaphoretic. No erythema.   Vitals reviewed.      Significant Labs:   Blood Culture:     Recent Labs  Lab 01/14/17  1101 01/15/17  1829 01/16/17  1949 01/18/17  0611 01/19/17  1210   LABBLOO Gram stain aer bottle: Gram positive cocci in clusters resembling Staph   Results called to and read back by:Taylor Valerio RN 01/15/2017  02:34  METHICILLIN RESISTANT STAPHYLOCOCCUS AUREUSFor susceptibility see order #[id number]  Gram stain aer bottle: Gram positive cocci in clusters resembling Staph   Results called to and read back by:Taylor Valerio RN 01/15/2017  02:34  METHICILLIN RESISTANT STAPHYLOCOCCUS AUREUS Gram stain aer bottle: Gram positive cocci in clusters resembling Staph   Results called to and read back by:Kristie Palomares RN 01/16/2017  13:45  METHICILLIN RESISTANT STAPHYLOCOCCUS AUREUSFor susceptibility see order #[id number]  Gram stain aer bottle: Gram positive cocci in clusters resembling Staph   Results called to and read back by:Kristie Wolff RN  01/16/2017  15:34  METHICILLIN RESISTANT STAPHYLOCOCCUS AUREUS Gram stain aer bottle: Gram positive cocci in clusters resembling Staph   Results called to and read back by: Sujatha Bennett RN 01/17/2017  15:13  METHICILLIN RESISTANT STAPHYLOCOCCUS AUREUSFor susceptibility see order #[id number]  Gram stain aer bottle: Gram positive cocci in clusters resembling Staph   Results called to and read back  by: Kristie Wolff RN  01/17/2017  17:58  METHICILLIN RESISTANT STAPHYLOCOCCUS AUREUS Gram stain aer bottle: Gram positive cocci in clusters resembling Staph   Results called to and read back by:Ness Lagos RN 01/19/2017  04:10  STAPHYLOCOCCUS AUREUSFor susceptibility see order # [id number]  Gram stain aer bottle: Gram positive cocci in clusters resembling Staph   Results called to and read back by:Ness Lagos RN 01/19/2017  04:10  STAPHYLOCOCCUS AUREUSSusceptibility pending No Growth to date  No Growth to date     BMP:     Recent Labs  Lab 01/20/17  0546   *      K 3.0*      CO2 25   BUN 9   CREATININE 0.7   CALCIUM 7.7*     CBC:     Recent Labs  Lab 01/19/17  0015 01/19/17  0402 01/20/17  0546   WBC 22.48* 27.77* 25.06*   HGB 11.4* 11.9* 10.3*   HCT 32.4* 33.7* 29.0*    326 301     CMP:     Recent Labs  Lab 01/19/17  0402 01/20/17  0546    139   K 2.9* 3.0*    106   CO2 26 25   GLU 92 134*   BUN 13 9   CREATININE 0.7 0.7   CALCIUM 8.6* 7.7*   PROT 6.5 6.1   ALBUMIN 2.0* 1.8*   BILITOT 0.8 0.7   ALKPHOS 107 101   AST 51* 32   ALT 65* 50*   ANIONGAP 12 8   EGFRNONAA >60.0 >60.0       Significant Imaging: I have reviewed all pertinent imaging results/findings within the past 24 hours.     Chest/A/P CT scan 1/18/17:    1.  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    2. There is a 1.7 cm hypodense lesion in the inferior pole of the left kidney which could represent renal infarction but underlying neoplastic lesion not excluded.    3.  Large soft tissue density lesion in the left upper abdomen, anterior to the left kidney and superior to the left renal vein.  This is of uncertain etiology and could represent enlarged lymph node and/or metastatic lesion.  Clinical correlation recommended.    4.  Interval development of cavitation in several previously identified focal  regions of pulmonary parenchymal consolidation, most predominant within the upper lobes.  Additional multifocal opacities as well as bibasilar atelectatic/consolidative change in pleural effusions.  Findings are concerning for septic emboli or possibly multifocal infectious process with underlying neoplasia not excluded.  Clinical correlation recommended.    5.  Several subcentimeter hypodensities within the pancreas as well as a 2.1 cm hypodense lesion within the neck of the pancreas.  Findings are nonspecific and possible cystic pancreatic neoplasm not excluded.    6.  Markedly abnormal appearance of the enlarged leiomyomatous uterus with probable large pedunculated fibroid as detailed above.  Further evaluation with pelvic ultrasound recommended.    7. Multiple additional findings including probable pericardial cyst, large hiatal hernia and cholelithiasis as detailed above.

## 2017-01-21 PROBLEM — N39.0 URINARY TRACT INFECTION WITHOUT HEMATURIA: Status: RESOLVED | Noted: 2017-01-12 | Resolved: 2017-01-21

## 2017-01-21 LAB
ALBUMIN SERPL BCP-MCNC: 1.9 G/DL
ALP SERPL-CCNC: 93 U/L
ALT SERPL W/O P-5'-P-CCNC: 46 U/L
ANION GAP SERPL CALC-SCNC: 9 MMOL/L
ANISOCYTOSIS BLD QL SMEAR: SLIGHT
AST SERPL-CCNC: 31 U/L
BACTERIA BLD CULT: NORMAL
BASOPHILS # BLD AUTO: ABNORMAL K/UL
BASOPHILS NFR BLD: 0 %
BILIRUB SERPL-MCNC: 0.7 MG/DL
BUN SERPL-MCNC: 8 MG/DL
CALCIUM SERPL-MCNC: 8 MG/DL
CHLORIDE SERPL-SCNC: 108 MMOL/L
CO2 SERPL-SCNC: 23 MMOL/L
CREAT SERPL-MCNC: 0.8 MG/DL
DIFFERENTIAL METHOD: ABNORMAL
EOSINOPHIL # BLD AUTO: ABNORMAL K/UL
EOSINOPHIL NFR BLD: 0 %
ERYTHROCYTE [DISTWIDTH] IN BLOOD BY AUTOMATED COUNT: 14.9 %
EST. GFR  (AFRICAN AMERICAN): >60 ML/MIN/1.73 M^2
EST. GFR  (NON AFRICAN AMERICAN): >60 ML/MIN/1.73 M^2
FACT X PPP CHRO-ACNC: 0.22 IU/ML
FACT X PPP CHRO-ACNC: 0.23 IU/ML
GLUCOSE SERPL-MCNC: 117 MG/DL
HCT VFR BLD AUTO: 30.2 %
HGB BLD-MCNC: 10.5 G/DL
HYPOCHROMIA BLD QL SMEAR: ABNORMAL
LYMPHOCYTES # BLD AUTO: ABNORMAL K/UL
LYMPHOCYTES NFR BLD: 3 %
MCH RBC QN AUTO: 31.3 PG
MCHC RBC AUTO-ENTMCNC: 34.8 %
MCV RBC AUTO: 90 FL
MONOCYTES # BLD AUTO: ABNORMAL K/UL
MONOCYTES NFR BLD: 2 %
NEUTROPHILS NFR BLD: 95 %
OVALOCYTES BLD QL SMEAR: ABNORMAL
PLATELET # BLD AUTO: 301 K/UL
PMV BLD AUTO: 9.5 FL
POCT GLUCOSE: 109 MG/DL (ref 70–110)
POCT GLUCOSE: 137 MG/DL (ref 70–110)
POCT GLUCOSE: 248 MG/DL (ref 70–110)
POCT GLUCOSE: 77 MG/DL (ref 70–110)
POCT GLUCOSE: 85 MG/DL (ref 70–110)
POIKILOCYTOSIS BLD QL SMEAR: SLIGHT
POLYCHROMASIA BLD QL SMEAR: ABNORMAL
POTASSIUM SERPL-SCNC: 3.8 MMOL/L
PROT SERPL-MCNC: 6.3 G/DL
RBC # BLD AUTO: 3.36 M/UL
SODIUM SERPL-SCNC: 140 MMOL/L
VANCOMYCIN TROUGH SERPL-MCNC: 22.6 UG/ML
WBC # BLD AUTO: 19.41 K/UL

## 2017-01-21 PROCEDURE — 80202 ASSAY OF VANCOMYCIN: CPT

## 2017-01-21 PROCEDURE — 85027 COMPLETE CBC AUTOMATED: CPT

## 2017-01-21 PROCEDURE — 99232 SBSQ HOSP IP/OBS MODERATE 35: CPT | Mod: ,,, | Performed by: INTERNAL MEDICINE

## 2017-01-21 PROCEDURE — 27000221 HC OXYGEN, UP TO 24 HOURS

## 2017-01-21 PROCEDURE — 25000003 PHARM REV CODE 250: Performed by: STUDENT IN AN ORGANIZED HEALTH CARE EDUCATION/TRAINING PROGRAM

## 2017-01-21 PROCEDURE — 94668 MNPJ CHEST WALL SBSQ: CPT

## 2017-01-21 PROCEDURE — 11000001 HC ACUTE MED/SURG PRIVATE ROOM

## 2017-01-21 PROCEDURE — 63600175 PHARM REV CODE 636 W HCPCS: Performed by: STUDENT IN AN ORGANIZED HEALTH CARE EDUCATION/TRAINING PROGRAM

## 2017-01-21 PROCEDURE — 85520 HEPARIN ASSAY: CPT | Mod: 91

## 2017-01-21 PROCEDURE — 99233 SBSQ HOSP IP/OBS HIGH 50: CPT | Mod: ,,, | Performed by: HOSPITALIST

## 2017-01-21 PROCEDURE — 87040 BLOOD CULTURE FOR BACTERIA: CPT

## 2017-01-21 PROCEDURE — 85007 BL SMEAR W/DIFF WBC COUNT: CPT

## 2017-01-21 PROCEDURE — 25000003 PHARM REV CODE 250: Performed by: EMERGENCY MEDICINE

## 2017-01-21 PROCEDURE — 80053 COMPREHEN METABOLIC PANEL: CPT

## 2017-01-21 PROCEDURE — 94799 UNLISTED PULMONARY SVC/PX: CPT

## 2017-01-21 PROCEDURE — 36415 COLL VENOUS BLD VENIPUNCTURE: CPT

## 2017-01-21 RX ORDER — CARVEDILOL 3.12 MG/1
6.25 TABLET ORAL 2 TIMES DAILY WITH MEALS
Status: DISCONTINUED | OUTPATIENT
Start: 2017-01-21 | End: 2017-01-22

## 2017-01-21 RX ORDER — LISINOPRIL 20 MG/1
20 TABLET ORAL DAILY
Status: DISCONTINUED | OUTPATIENT
Start: 2017-01-21 | End: 2017-01-27 | Stop reason: HOSPADM

## 2017-01-21 RX ADMIN — VERAPAMIL HYDROCHLORIDE 240 MG: 120 TABLET, FILM COATED, EXTENDED RELEASE ORAL at 09:01

## 2017-01-21 RX ADMIN — Medication 3 ML: at 06:01

## 2017-01-21 RX ADMIN — Medication 3 ML: at 09:01

## 2017-01-21 RX ADMIN — LISINOPRIL 20 MG: 20 TABLET ORAL at 12:01

## 2017-01-21 RX ADMIN — ERYTHROMYCIN 1 INCH: 5 OINTMENT OPHTHALMIC at 11:01

## 2017-01-21 RX ADMIN — CARVEDILOL 6.25 MG: 3.12 TABLET, FILM COATED ORAL at 05:01

## 2017-01-21 RX ADMIN — ERYTHROMYCIN 1 INCH: 5 OINTMENT OPHTHALMIC at 09:01

## 2017-01-21 RX ADMIN — ERYTHROMYCIN 1 INCH: 5 OINTMENT OPHTHALMIC at 05:01

## 2017-01-21 RX ADMIN — ERYTHROMYCIN 1 INCH: 5 OINTMENT OPHTHALMIC at 12:01

## 2017-01-21 RX ADMIN — VERAPAMIL HYDROCHLORIDE 240 MG: 120 TABLET, FILM COATED, EXTENDED RELEASE ORAL at 08:01

## 2017-01-21 RX ADMIN — VANCOMYCIN HYDROCHLORIDE 1500 MG: 500 INJECTION, POWDER, LYOPHILIZED, FOR SOLUTION INTRAVENOUS at 10:01

## 2017-01-21 RX ADMIN — VANCOMYCIN HYDROCHLORIDE 1500 MG: 500 INJECTION, POWDER, LYOPHILIZED, FOR SOLUTION INTRAVENOUS at 05:01

## 2017-01-21 RX ADMIN — HEPARIN SODIUM AND DEXTROSE 11.98 UNITS/KG/HR: 10000; 5 INJECTION INTRAVENOUS at 12:01

## 2017-01-21 RX ADMIN — HEPARIN SODIUM AND DEXTROSE 14 UNITS/KG/HR: 10000; 5 INJECTION INTRAVENOUS at 07:01

## 2017-01-21 NOTE — PROGRESS NOTES
[facility]  Infectious Disease  Progress Note    Patient Name: Ida Santana  MRN: [id number]  Admission Date: 1/12/2017  Length of Stay: 9 days  Attending Physician: Loyda Lui MD  Primary Care Provider: Giovanna Murray MD    Isolation Status: Contact  Assessment/Plan:      MRSA bacteremia  75 y/o female admitted for an episode of acute confusion. B/C 1/13/17, 1/14/17, 1/15/17, 1/16/17, 1/18/17 positive for MRSA, 1/19/17 and 1/20/17 in progress. Initial source of bacteremia could be related to pneumonia (no hardware, no skin breakdown, no cellulitis, no wounds, no trauma, no central lines, no joint or back pain).     - Findings on Chest/A/P CT scan (on lungs and kidney) 1/18/17 most likely due to septic emboli.   - MRI cervical, thoracic, lumbar 1/20/17 negative for osteomyelitis/abscess.  - MIS 1/17/17 negative for endocarditis  - B/C persistent positive, most likely due to poor source control  - B/C 1/20/17 so far shows no growth, will follow until final   - Last Vancomycin trough 18, trough goal 15-20  - will continue same dose for now                  Anticipated Disposition: IV vancomycin    Thank you for your consult. I will follow-up with patient. Please contact us if you have any additional questions.     Farhad Finney MD  Infectious Disease Fellow, PGY-4  Pager: [phone]  [facility]    Subjective:     Principal Problem:Severe sepsis    Interval History: Patient feeling well, no complain, still low grade fever, otherwise no change in clinical condition    HPI:   75 y/o female with history of HTN, HLD,  Osteopenia, remote breast cancer came to hospital for family members noted that patient let the bathroom sink overflow and left a pot burning on the stove the day of admission. Patient lives by herself and is fully functional. The patient reports 3 episodes of vomits gastric content and 3 episodes of diarrhea today, denied fever, chills, abdominal pain,  weakness, paraesthesia, dysuria, frequency or malodorous urin, skin breakdown, wounds, trauma, central lines, joint or back pain. Denies similar episodes in the past.        Review of Systems   Constitutional: Positive for fever. Negative for chills.   HENT: Negative for sore throat.    Respiratory: Negative for cough, chest tightness and shortness of breath.    Cardiovascular: Negative for chest pain, palpitations and leg swelling.   Gastrointestinal: Negative for abdominal distention, abdominal pain, diarrhea, nausea and vomiting.   Genitourinary: Negative for dysuria, flank pain and urgency.   Skin: Negative for rash.   Neurological: Negative for dizziness, light-headedness and numbness.   Psychiatric/Behavioral: Negative for confusion.     Objective:     Vital Signs (Most Recent):  Temp: 98.8 °F (37.1 °C) (01/21/17 0800)  Pulse: 85 (01/21/17 0830)  Resp: 18 (01/21/17 0830)  BP: (!) 145/77 (01/21/17 0800)  SpO2: (!) 92 % (01/21/17 0830) Vital Signs (24h Range):  Temp:  [97.9 °F (36.6 °C)-100.1 °F (37.8 °C)] 98.8 °F (37.1 °C)  Pulse:  [74-85] 85  Resp:  [17-18] 18  SpO2:  [92 %-95 %] 92 %  BP: (144-151)/(65-77) 145/77     Weight: 77.6 kg (171 lb)  Body mass index is 26.78 kg/(m^2).    Estimated Creatinine Clearance: 66.2 mL/min (based on Cr of 0.8).    Physical Exam   Constitutional: She is oriented to person, place, and time. No distress.   HENT:   Head: Normocephalic.   Mouth/Throat: No oropharyngeal exudate.   Eyes: No scleral icterus.   Cardiovascular: Normal rate and regular rhythm.  Exam reveals no gallop and no friction rub.    Murmur heard.  Pulmonary/Chest: Effort normal. No respiratory distress. She has no wheezes. She has no rales.   Abdominal: Soft. Bowel sounds are normal. She exhibits no distension. There is no tenderness. There is no rebound and no guarding.   Musculoskeletal: Normal range of motion.   Lymphadenopathy:     She has no cervical adenopathy.   Neurological: She is alert and oriented to  person, place, and time. No cranial nerve deficit.   Skin: No rash noted. She is not diaphoretic. No erythema.   Vitals reviewed.      Significant Labs:   Blood Culture:     Recent Labs  Lab 01/16/17  1949 01/18/17  0611 01/19/17  1210 01/20/17  0546 01/20/17  0551   LABBLOO Gram stain aer bottle: Gram positive cocci in clusters resembling Staph   Results called to and read back by: Sujatha Bennett RN 01/17/2017  15:13  METHICILLIN RESISTANT STAPHYLOCOCCUS AUREUSFor susceptibility see order #[id number]  Gram stain aer bottle: Gram positive cocci in clusters resembling Staph   Results called to and read back by: Kristie Wolff RN  01/17/2017  17:58  METHICILLIN RESISTANT STAPHYLOCOCCUS AUREUS Gram stain aer bottle: Gram positive cocci in clusters resembling Staph   Results called to and read back by:Ness Lagos RN 01/19/2017  04:10  METHICILLIN RESISTANT STAPHYLOCOCCUS AUREUSFor susceptibility see order # [id number]  Gram stain aer bottle: Gram positive cocci in clusters resembling Staph   Results called to and read back by:Ness Lagos RN 01/19/2017  04:10  METHICILLIN RESISTANT STAPHYLOCOCCUS AUREUS Gram stain aer bottle: Gram positive cocci in clusters resembling Staph   Results called to and read back by: Danyelle Langford RN  01/21/2017    05:58  No Growth to date  No Growth to date No Growth to date No Growth to date     BMP:     Recent Labs  Lab 01/21/17  0346   *      K 3.8      CO2 23   BUN 8   CREATININE 0.8   CALCIUM 8.0*     CBC:     Recent Labs  Lab 01/20/17  0546 01/21/17  0346   WBC 25.06* 19.41*   HGB 10.3* 10.5*   HCT 29.0* 30.2*    301     CMP:     Recent Labs  Lab 01/20/17  0546 01/21/17  0346    140   K 3.0* 3.8    108   CO2 25 23   * 117*   BUN 9 8   CREATININE 0.7 0.8   CALCIUM 7.7* 8.0*   PROT 6.1 6.3   ALBUMIN 1.8* 1.9*   BILITOT 0.7 0.7   ALKPHOS 101 93   AST 32 31   ALT 50* 46*   ANIONGAP 8 9   EGFRNONAA >60.0 >60.0        Significant Imaging: I have reviewed all pertinent imaging results/findings within the past 24 hours.     Chest/A/P CT scan 1/18/17:    1.  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    2. There is a 1.7 cm hypodense lesion in the inferior pole of the left kidney which could represent renal infarction but underlying neoplastic lesion not excluded.    3.  Large soft tissue density lesion in the left upper abdomen, anterior to the left kidney and superior to the left renal vein.  This is of uncertain etiology and could represent enlarged lymph node and/or metastatic lesion.  Clinical correlation recommended.    4.  Interval development of cavitation in several previously identified focal regions of pulmonary parenchymal consolidation, most predominant within the upper lobes.  Additional multifocal opacities as well as bibasilar atelectatic/consolidative change in pleural effusions.  Findings are concerning for septic emboli or possibly multifocal infectious process with underlying neoplasia not excluded.  Clinical correlation recommended.    5.  Several subcentimeter hypodensities within the pancreas as well as a 2.1 cm hypodense lesion within the neck of the pancreas.  Findings are nonspecific and possible cystic pancreatic neoplasm not excluded.    6.  Markedly abnormal appearance of the enlarged leiomyomatous uterus with probable large pedunculated fibroid as detailed above.  Further evaluation with pelvic ultrasound recommended.    7. Multiple additional findings including probable pericardial cyst, large hiatal hernia and cholelithiasis as detailed above.

## 2017-01-21 NOTE — SUBJECTIVE & OBJECTIVE
Interval History: Patient feeling well, no complain, still low grade fever, otherwise no change in clinical condition    HPI:   75 y/o female with history of HTN, HLD,  Osteopenia, remote breast cancer came to hospital for family members noted that patient let the bathroom sink overflow and left a pot burning on the stove the day of admission. Patient lives by herself and is fully functional. The patient reports 3 episodes of vomits gastric content and 3 episodes of diarrhea today, denied fever, chills, abdominal pain, weakness, paraesthesia, dysuria, frequency or malodorous urin, skin breakdown, wounds, trauma, central lines, joint or back pain. Denies similar episodes in the past.        Review of Systems   Constitutional: Positive for fever. Negative for chills.   HENT: Negative for sore throat.    Respiratory: Negative for cough, chest tightness and shortness of breath.    Cardiovascular: Negative for chest pain, palpitations and leg swelling.   Gastrointestinal: Negative for abdominal distention, abdominal pain, diarrhea, nausea and vomiting.   Genitourinary: Negative for dysuria, flank pain and urgency.   Skin: Negative for rash.   Neurological: Negative for dizziness, light-headedness and numbness.   Psychiatric/Behavioral: Negative for confusion.     Objective:     Vital Signs (Most Recent):  Temp: 98.8 °F (37.1 °C) (01/21/17 0800)  Pulse: 85 (01/21/17 0830)  Resp: 18 (01/21/17 0830)  BP: (!) 145/77 (01/21/17 0800)  SpO2: (!) 92 % (01/21/17 0830) Vital Signs (24h Range):  Temp:  [97.9 °F (36.6 °C)-100.1 °F (37.8 °C)] 98.8 °F (37.1 °C)  Pulse:  [74-85] 85  Resp:  [17-18] 18  SpO2:  [92 %-95 %] 92 %  BP: (144-151)/(65-77) 145/77     Weight: 77.6 kg (171 lb)  Body mass index is 26.78 kg/(m^2).    Estimated Creatinine Clearance: 66.2 mL/min (based on Cr of 0.8).    Physical Exam   Constitutional: She is oriented to person, place, and time. No distress.   HENT:   Head: Normocephalic.   Mouth/Throat: No  oropharyngeal exudate.   Eyes: No scleral icterus.   Cardiovascular: Normal rate and regular rhythm.  Exam reveals no gallop and no friction rub.    Murmur heard.  Pulmonary/Chest: Effort normal. No respiratory distress. She has no wheezes. She has no rales.   Abdominal: Soft. Bowel sounds are normal. She exhibits no distension. There is no tenderness. There is no rebound and no guarding.   Musculoskeletal: Normal range of motion.   Lymphadenopathy:     She has no cervical adenopathy.   Neurological: She is alert and oriented to person, place, and time. No cranial nerve deficit.   Skin: No rash noted. She is not diaphoretic. No erythema.   Vitals reviewed.      Significant Labs:   Blood Culture:     Recent Labs  Lab 01/16/17  1949 01/18/17  0611 01/19/17  1210 01/20/17  0546 01/20/17  0551   LABBLOO Gram stain aer bottle: Gram positive cocci in clusters resembling Staph   Results called to and read back by: Sujatha Bennett RN 01/17/2017  15:13  METHICILLIN RESISTANT STAPHYLOCOCCUS AUREUSFor susceptibility see order #[id number]  Gram stain aer bottle: Gram positive cocci in clusters resembling Staph   Results called to and read back by: Kristie Wolff RN  01/17/2017  17:58  METHICILLIN RESISTANT STAPHYLOCOCCUS AUREUS Gram stain aer bottle: Gram positive cocci in clusters resembling Staph   Results called to and read back by:Ness Lagos RN 01/19/2017  04:10  METHICILLIN RESISTANT STAPHYLOCOCCUS AUREUSFor susceptibility see order # [id number]  Gram stain aer bottle: Gram positive cocci in clusters resembling Staph   Results called to and read back by:Ness Lagos RN 01/19/2017  04:10  METHICILLIN RESISTANT STAPHYLOCOCCUS AUREUS Gram stain aer bottle: Gram positive cocci in clusters resembling Staph   Results called to and read back by: Danyelle Langford RN  01/21/2017    05:58  No Growth to date  No Growth to date No Growth to date No Growth to date     BMP:     Recent Labs  Lab 01/21/17  0346   GLU  117*      K 3.8      CO2 23   BUN 8   CREATININE 0.8   CALCIUM 8.0*     CBC:     Recent Labs  Lab 01/20/17  0546 01/21/17  0346   WBC 25.06* 19.41*   HGB 10.3* 10.5*   HCT 29.0* 30.2*    301     CMP:     Recent Labs  Lab 01/20/17  0546 01/21/17  0346    140   K 3.0* 3.8    108   CO2 25 23   * 117*   BUN 9 8   CREATININE 0.7 0.8   CALCIUM 7.7* 8.0*   PROT 6.1 6.3   ALBUMIN 1.8* 1.9*   BILITOT 0.7 0.7   ALKPHOS 101 93   AST 32 31   ALT 50* 46*   ANIONGAP 8 9   EGFRNONAA >60.0 >60.0       Significant Imaging: I have reviewed all pertinent imaging results/findings within the past 24 hours.     Chest/A/P CT scan 1/18/17:    1.  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    2. There is a 1.7 cm hypodense lesion in the inferior pole of the left kidney which could represent renal infarction but underlying neoplastic lesion not excluded.    3.  Large soft tissue density lesion in the left upper abdomen, anterior to the left kidney and superior to the left renal vein.  This is of uncertain etiology and could represent enlarged lymph node and/or metastatic lesion.  Clinical correlation recommended.    4.  Interval development of cavitation in several previously identified focal regions of pulmonary parenchymal consolidation, most predominant within the upper lobes.  Additional multifocal opacities as well as bibasilar atelectatic/consolidative change in pleural effusions.  Findings are concerning for septic emboli or possibly multifocal infectious process with underlying neoplasia not excluded.  Clinical correlation recommended.    5.  Several subcentimeter hypodensities within the pancreas as well as a 2.1 cm hypodense lesion within the neck of the pancreas.  Findings are nonspecific and possible cystic pancreatic neoplasm not excluded.    6.  Markedly abnormal appearance of the enlarged  leiomyomatous uterus with probable large pedunculated fibroid as detailed above.  Further evaluation with pelvic ultrasound recommended.    7. Multiple additional findings including probable pericardial cyst, large hiatal hernia and cholelithiasis as detailed above.

## 2017-01-21 NOTE — ASSESSMENT & PLAN NOTE
- has had continued fevers and bcx grew MRSA, concerned that 2/2 continued + bcx patient may have staph seeded somewhere, possible endocarditis. She does not have any recent significant hospital contact.   - MIS negative for endocarditis  - continuing with vanc   - repeat bcx shown staph again, repeating bcx  - ID following, FU continued recs  -  CT chest, abdomen, pelvis with contrast: septic emboli to multiple organs (lungs w/ cavitary lesions, pancreas, kidneys...) among other findings that will require follow up. (eventually urology for concern for renal cancer).   - WBC continues to increase, do not have source control.   - MRI spine c/t/l - no evidence of spinal abscess

## 2017-01-21 NOTE — SUBJECTIVE & OBJECTIVE
Interval History: NAEON. AOx3. Patient subjectively improved symptoms. Encouraging pt to ambulate.     Review of Systems   Constitutional: Negative for chills and fever.   HENT: Negative for sore throat.    Respiratory: Negative for cough, chest tightness and shortness of breath.    Cardiovascular: Negative for chest pain, palpitations and leg swelling.   Gastrointestinal: Negative for abdominal distention, abdominal pain, diarrhea, nausea and vomiting.   Genitourinary: Negative for dysuria, flank pain and urgency.   Skin: Negative for rash.   Neurological: Negative for dizziness, light-headedness and numbness.   Psychiatric/Behavioral: Negative for confusion.     Objective:     Vital Signs (Most Recent):  Temp: 98.8 °F (37.1 °C) (01/21/17 0800)  Pulse: 85 (01/21/17 0830)  Resp: 18 (01/21/17 0830)  BP: (!) 145/77 (01/21/17 0800)  SpO2: (!) 92 % (01/21/17 0830) Vital Signs (24h Range):  Temp:  [97.9 °F (36.6 °C)-100.1 °F (37.8 °C)] 98.8 °F (37.1 °C)  Pulse:  [74-85] 85  Resp:  [17-18] 18  SpO2:  [92 %-95 %] 92 %  BP: (144-151)/(65-77) 145/77     Weight: 77.6 kg (171 lb)  Body mass index is 26.78 kg/(m^2).    Intake/Output Summary (Last 24 hours) at 01/21/17 1101  Last data filed at 01/21/17 0614   Gross per 24 hour   Intake              720 ml   Output                0 ml   Net              720 ml      Physical Exam   Constitutional: She is oriented to person, place, and time. No distress.   HENT:   Head: Normocephalic.   Mouth/Throat: No oropharyngeal exudate.   Eyes: EOM are normal. Pupils are equal, round, and reactive to light. Right eye exhibits no discharge. Left eye exhibits no discharge. Right conjunctiva is not injected. Left conjunctiva is injected.   Cardiovascular: Normal rate and regular rhythm.  Exam reveals no gallop and no friction rub.    Murmur heard.  Pulmonary/Chest: Effort normal and breath sounds normal. No respiratory distress.   Abdominal: Soft. Bowel sounds are normal. She exhibits no  distension. There is no tenderness.   Musculoskeletal: Normal range of motion. She exhibits no edema.   Neurological: She is alert and oriented to person, place, and time. No cranial nerve deficit.   Skin: She is not diaphoretic.   Psychiatric: She has a normal mood and affect.   Vitals reviewed.      Significant Labs:   Blood Culture:   Recent Labs  Lab 01/19/17  1210 01/20/17  0546 01/20/17  0551   LABBLOO Gram stain aer bottle: Gram positive cocci in clusters resembling Staph   Results called to and read back by: Danyelle Langford RN  01/21/2017    05:58  No Growth to date  No Growth to date No Growth to date No Growth to date     CBC:   Recent Labs  Lab 01/20/17  0546 01/21/17  0346   WBC 25.06* 19.41*   HGB 10.3* 10.5*   HCT 29.0* 30.2*    301     CMP:   Recent Labs  Lab 01/20/17  0546 01/21/17  0346    140   K 3.0* 3.8    108   CO2 25 23   * 117*   BUN 9 8   CREATININE 0.7 0.8   CALCIUM 7.7* 8.0*   PROT 6.1 6.3   ALBUMIN 1.8* 1.9*   BILITOT 0.7 0.7   ALKPHOS 101 93   AST 32 31   ALT 50* 46*   ANIONGAP 8 9   EGFRNONAA >60.0 >60.0       Significant Imaging: I have reviewed all pertinent imaging results/findings within the past 24 hours.

## 2017-01-21 NOTE — PT/OT/SLP PROGRESS
Occupational Therapy      dIa Santana  MRN: [id number]    Patient not seen today secondary to Unavailable (Comment) (MRI). Will follow-up.    CLOTILDE Dee  1/21/2017

## 2017-01-21 NOTE — ASSESSMENT & PLAN NOTE
77 y/o female admitted for an episode of acute confusion. B/C 1/13/17, 1/14/17, 1/15/17, 1/16/17, 1/18/17 positive for MRSA, 1/19/17 and 1/20/17 in progress. Initial source of bacteremia could be related to pneumonia (no hardware, no skin breakdown, no cellulitis, no wounds, no trauma, no central lines, no joint or back pain).     - Findings on Chest/A/P CT scan (on lungs and kidney) 1/18/17 most likely due to septic emboli.   - MRI cervical, thoracic, lumbar 1/20/17 negative for osteomyelitis/abscess.  - MIS 1/17/17 negative for endocarditis  - B/C persistent positive, most likely due to poor source control  - B/C 1/20/17 so far shows no growth, will follow until final   - Last Vancomycin trough 18, trough goal 15-20  - will continue same dose for now

## 2017-01-21 NOTE — PROGRESS NOTES
[facility] Hospital Medicine  Progress Note    Patient Name: Ida Santana  MRN: [id number]  Patient Class: IP- Inpatient   Admission Date: 1/12/2017  Length of Stay: 9 days  Attending Physician: Loyda Lui MD  Primary Care Provider: Giovanna Murray MD    [facility] Medicine Team: [facility] MED 1 Porfirio Posey MD    Subjective:     Principal Problem:Severe sepsis    Hospital Course:  1/21/17: Bcx from 1/19 grew out staph again. MRI of spine did not show any evidence of spinal abscesses. Unclear source at this time. Continuing with vancomycin and will push for possible IR US-guided thoracentesis on Monday.       Interval History: NAEON. AOx3. Patient subjectively improved symptoms. Encouraging pt to ambulate.     Review of Systems   Constitutional: Negative for chills and fever.   HENT: Negative for sore throat.    Respiratory: Negative for cough, chest tightness and shortness of breath.    Cardiovascular: Negative for chest pain, palpitations and leg swelling.   Gastrointestinal: Negative for abdominal distention, abdominal pain, diarrhea, nausea and vomiting.   Genitourinary: Negative for dysuria, flank pain and urgency.   Skin: Negative for rash.   Neurological: Negative for dizziness, light-headedness and numbness.   Psychiatric/Behavioral: Negative for confusion.     Objective:     Vital Signs (Most Recent):  Temp: 98.8 °F (37.1 °C) (01/21/17 0800)  Pulse: 85 (01/21/17 0830)  Resp: 18 (01/21/17 0830)  BP: (!) 145/77 (01/21/17 0800)  SpO2: (!) 92 % (01/21/17 0830) Vital Signs (24h Range):  Temp:  [97.9 °F (36.6 °C)-100.1 °F (37.8 °C)] 98.8 °F (37.1 °C)  Pulse:  [74-85] 85  Resp:  [17-18] 18  SpO2:  [92 %-95 %] 92 %  BP: (144-151)/(65-77) 145/77     Weight: 77.6 kg (171 lb)  Body mass index is 26.78 kg/(m^2).    Intake/Output Summary (Last 24 hours) at 01/21/17 1101  Last data filed at 01/21/17 0614   Gross per 24 hour   Intake              720 ml   Output                0 ml   Net               720 ml      Physical Exam   Constitutional: She is oriented to person, place, and time. No distress.   HENT:   Head: Normocephalic.   Mouth/Throat: No oropharyngeal exudate.   Eyes: EOM are normal. Pupils are equal, round, and reactive to light. Right eye exhibits no discharge. Left eye exhibits no discharge. Right conjunctiva is not injected. Left conjunctiva is injected.   Cardiovascular: Normal rate and regular rhythm.  Exam reveals no gallop and no friction rub.    Murmur heard.  Pulmonary/Chest: Effort normal and breath sounds normal. No respiratory distress.   Abdominal: Soft. Bowel sounds are normal. She exhibits no distension. There is no tenderness.   Musculoskeletal: Normal range of motion. She exhibits no edema.   Neurological: She is alert and oriented to person, place, and time. No cranial nerve deficit.   Skin: She is not diaphoretic.   Psychiatric: She has a normal mood and affect.   Vitals reviewed.      Significant Labs:   Blood Culture:   Recent Labs  Lab 01/19/17  1210 01/20/17  0546 01/20/17  0551   LABBLOO Gram stain aer bottle: Gram positive cocci in clusters resembling Staph   Results called to and read back by: Danyelle Langford RN  01/21/2017    05:58  No Growth to date  No Growth to date No Growth to date No Growth to date     CBC:   Recent Labs  Lab 01/20/17  0546 01/21/17  0346   WBC 25.06* 19.41*   HGB 10.3* 10.5*   HCT 29.0* 30.2*    301     CMP:   Recent Labs  Lab 01/20/17  0546 01/21/17  0346    140   K 3.0* 3.8    108   CO2 25 23   * 117*   BUN 9 8   CREATININE 0.7 0.8   CALCIUM 7.7* 8.0*   PROT 6.1 6.3   ALBUMIN 1.8* 1.9*   BILITOT 0.7 0.7   ALKPHOS 101 93   AST 32 31   ALT 50* 46*   ANIONGAP 8 9   EGFRNONAA >60.0 >60.0       Significant Imaging: I have reviewed all pertinent imaging results/findings within the past 24 hours.    Assessment/Plan:      Staphylococcus aureus sepsis  MRSA pneumonia  - has had continued fevers and bcx grew MRSA,  concerned that 2/2 continued + bcx patient may have staph seeded somewhere, possible endocarditis. She does not have any recent significant hospital contact.   - MIS negative for endocarditis  - continuing with vanc   - repeat bcx shown staph again, repeating bcx  - ID following, FU continued recs  -  CT chest, abdomen, pelvis with contrast: septic emboli to multiple organs (lungs w/ cavitary lesions, pancreas, kidneys...) among other findings that will require follow up. (eventually urology for concern for renal cancer).   - MRI spine c/t/l - no evidence of spinal abscess  - WBC finally trending down  - CT also showed bilateral pleural effusions and a periaortic fluid collection - CT surgery consulted and suggested IR US-guided thoracentesis and possible VATS later on   - CTS staff commented that periaortic fluid collection is possible extension of left sided pleural effusion      Urinary tract infection without hematuria, resolved as of 1/21/2017  - Ucx grew what appears to be contaminant with nothing in predominance. Unclear if this is source of patient's MRSA bacteremia, doubtful  - repeat UA showed no evidence of UTI     Hypertension  - continue verapamil  - restarted home lisinopril 20mg and coreg 6.25mg (home dose is 25mg)       Hypophosphatemia  - replete PRN    Conjunctivitis  Developed conjunctivitis of left eye 1/18. Pt denies pain, drainage, decreased vision.   - Erythromycin x7days  - Spoke with Ophthalmology. Call if she develops pain, drainage, decreased vision, or pain with eye movement.   - Cont to monitor      Ppx: Heparin gtt  Diet Diabetic  DISPO: pending clear bcx and finding source of pt's bactermia.       Porfirio Posey MD  Department of Hospital Medicine   [facility]                      01/22/2017                             STAFF PHYSICIAN NOTE                                   Attending Attestation for Rounds with Resident  I have reviewed and concur with the  resident's history, physical, assessment, and plan.  I have personally interviewed and examined the patient at bedside and agree with the resident's findings.                                  ________________________________________                                     REASON FOR ADMISSION:     Patient is 76 y.o.female    Body mass index is 26.78 kg/(m^2).,  Severe sepsis

## 2017-01-22 LAB
ALBUMIN SERPL BCP-MCNC: 1.9 G/DL
ALP SERPL-CCNC: 91 U/L
ALT SERPL W/O P-5'-P-CCNC: 48 U/L
ANION GAP SERPL CALC-SCNC: 10 MMOL/L
AST SERPL-CCNC: 41 U/L
BASOPHILS # BLD AUTO: 0.08 K/UL
BASOPHILS NFR BLD: 0.5 %
BILIRUB SERPL-MCNC: 0.6 MG/DL
BUN SERPL-MCNC: 9 MG/DL
CALCIUM SERPL-MCNC: 8.4 MG/DL
CHLORIDE SERPL-SCNC: 109 MMOL/L
CO2 SERPL-SCNC: 19 MMOL/L
CREAT SERPL-MCNC: 0.8 MG/DL
DIFFERENTIAL METHOD: ABNORMAL
EOSINOPHIL # BLD AUTO: 0.1 K/UL
EOSINOPHIL NFR BLD: 0.8 %
ERYTHROCYTE [DISTWIDTH] IN BLOOD BY AUTOMATED COUNT: 15.6 %
EST. GFR  (AFRICAN AMERICAN): >60 ML/MIN/1.73 M^2
EST. GFR  (NON AFRICAN AMERICAN): >60 ML/MIN/1.73 M^2
FACT X PPP CHRO-ACNC: 0.17 IU/ML
FACT X PPP CHRO-ACNC: 0.27 IU/ML
FACT X PPP CHRO-ACNC: 1.24 IU/ML
FACT X PPP CHRO-ACNC: >1.87 IU/ML
GLUCOSE SERPL-MCNC: 109 MG/DL
HCT VFR BLD AUTO: 29.4 %
HGB BLD-MCNC: 9.9 G/DL
LYMPHOCYTES # BLD AUTO: 1.3 K/UL
LYMPHOCYTES NFR BLD: 7.8 %
MCH RBC QN AUTO: 32 PG
MCHC RBC AUTO-ENTMCNC: 33.7 %
MCV RBC AUTO: 95 FL
MONOCYTES # BLD AUTO: 1 K/UL
MONOCYTES NFR BLD: 6 %
NEUTROPHILS # BLD AUTO: 14.2 K/UL
NEUTROPHILS NFR BLD: 84.9 %
PLATELET # BLD AUTO: 285 K/UL
PMV BLD AUTO: 10.4 FL
POCT GLUCOSE: 128 MG/DL (ref 70–110)
POCT GLUCOSE: 138 MG/DL (ref 70–110)
POCT GLUCOSE: 171 MG/DL (ref 70–110)
POCT GLUCOSE: 213 MG/DL (ref 70–110)
POTASSIUM SERPL-SCNC: 4.3 MMOL/L
PROT SERPL-MCNC: 6.3 G/DL
RBC # BLD AUTO: 3.09 M/UL
SODIUM SERPL-SCNC: 138 MMOL/L
WBC # BLD AUTO: 17.09 K/UL

## 2017-01-22 PROCEDURE — 25000003 PHARM REV CODE 250: Performed by: EMERGENCY MEDICINE

## 2017-01-22 PROCEDURE — 94799 UNLISTED PULMONARY SVC/PX: CPT

## 2017-01-22 PROCEDURE — 36415 COLL VENOUS BLD VENIPUNCTURE: CPT

## 2017-01-22 PROCEDURE — 99233 SBSQ HOSP IP/OBS HIGH 50: CPT | Mod: ,,, | Performed by: INTERNAL MEDICINE

## 2017-01-22 PROCEDURE — 97110 THERAPEUTIC EXERCISES: CPT

## 2017-01-22 PROCEDURE — 25000003 PHARM REV CODE 250: Performed by: STUDENT IN AN ORGANIZED HEALTH CARE EDUCATION/TRAINING PROGRAM

## 2017-01-22 PROCEDURE — 80053 COMPREHEN METABOLIC PANEL: CPT

## 2017-01-22 PROCEDURE — 25000003 PHARM REV CODE 250: Performed by: INTERNAL MEDICINE

## 2017-01-22 PROCEDURE — 94761 N-INVAS EAR/PLS OXIMETRY MLT: CPT

## 2017-01-22 PROCEDURE — 63600175 PHARM REV CODE 636 W HCPCS: Performed by: EMERGENCY MEDICINE

## 2017-01-22 PROCEDURE — 99233 SBSQ HOSP IP/OBS HIGH 50: CPT | Mod: ,,, | Performed by: HOSPITALIST

## 2017-01-22 PROCEDURE — 97535 SELF CARE MNGMENT TRAINING: CPT

## 2017-01-22 PROCEDURE — 94668 MNPJ CHEST WALL SBSQ: CPT

## 2017-01-22 PROCEDURE — 63600175 PHARM REV CODE 636 W HCPCS: Performed by: INTERNAL MEDICINE

## 2017-01-22 PROCEDURE — 85025 COMPLETE CBC W/AUTO DIFF WBC: CPT

## 2017-01-22 PROCEDURE — 11000001 HC ACUTE MED/SURG PRIVATE ROOM

## 2017-01-22 PROCEDURE — 85520 HEPARIN ASSAY: CPT

## 2017-01-22 RX ORDER — CARVEDILOL 25 MG/1
25 TABLET ORAL 2 TIMES DAILY WITH MEALS
Status: DISCONTINUED | OUTPATIENT
Start: 2017-01-22 | End: 2017-01-27 | Stop reason: HOSPADM

## 2017-01-22 RX ADMIN — Medication 3 ML: at 05:01

## 2017-01-22 RX ADMIN — Medication 3 ML: at 08:01

## 2017-01-22 RX ADMIN — HEPARIN SODIUM AND DEXTROSE 21 UNITS/KG/HR: 10000; 5 INJECTION INTRAVENOUS at 09:01

## 2017-01-22 RX ADMIN — ERYTHROMYCIN 1 INCH: 5 OINTMENT OPHTHALMIC at 05:01

## 2017-01-22 RX ADMIN — LISINOPRIL 20 MG: 20 TABLET ORAL at 09:01

## 2017-01-22 RX ADMIN — ERYTHROMYCIN 1 INCH: 5 OINTMENT OPHTHALMIC at 11:01

## 2017-01-22 RX ADMIN — HEPARIN SODIUM AND DEXTROSE 17 UNITS/KG/HR: 10000; 5 INJECTION INTRAVENOUS at 11:01

## 2017-01-22 RX ADMIN — VERAPAMIL HYDROCHLORIDE 240 MG: 120 TABLET, FILM COATED, EXTENDED RELEASE ORAL at 09:01

## 2017-01-22 RX ADMIN — VANCOMYCIN HYDROCHLORIDE 1250 MG: 1 INJECTION, POWDER, LYOPHILIZED, FOR SOLUTION INTRAVENOUS at 11:01

## 2017-01-22 RX ADMIN — HEPARIN SODIUM AND DEXTROSE 14 UNITS/KG/HR: 10000; 5 INJECTION INTRAVENOUS at 05:01

## 2017-01-22 RX ADMIN — CARVEDILOL 25 MG: 25 TABLET, FILM COATED ORAL at 05:01

## 2017-01-22 RX ADMIN — INSULIN ASPART 1 UNITS: 100 INJECTION, SOLUTION INTRAVENOUS; SUBCUTANEOUS at 08:01

## 2017-01-22 RX ADMIN — CARVEDILOL 25 MG: 25 TABLET, FILM COATED ORAL at 09:01

## 2017-01-22 RX ADMIN — VERAPAMIL HYDROCHLORIDE 240 MG: 120 TABLET, FILM COATED, EXTENDED RELEASE ORAL at 08:01

## 2017-01-22 RX ADMIN — HEPARIN SODIUM AND DEXTROSE 14 UNITS/KG/HR: 10000; 5 INJECTION INTRAVENOUS at 09:01

## 2017-01-22 NOTE — PT/OT/SLP PROGRESS
"Occupational Therapy  Treatment    Ida Santana   MRN: [id number]   Admitting Diagnosis: Severe sepsis    OT Date of Treatment: 17   OT Start Time: 1058  OT Stop Time: 1120  OT Total Time (min): 22 min    Billable Minutes:  Self Care/Home Management 8 min and Therapeutic Exercise 14min    General Precautions: Standard, contact, fall  Orthopedic Precautions: N/A  Braces: N/A    Do you have any cultural, spiritual, Protestant conflicts, given your current situation?: none stated    Subjective:  Communicated with nurse prior to session.  "I am so sleepy.  I just want to sleep"    Pain Ratin/10   Pain Rating Post-Intervention: 0/10    Objective:  Patient found with: peripheral IV     Functional Mobility:  Bed Mobility:  Rolling/Turning to Left: Modified independent, With side rail  Rolling/Turning Right: Modified independent, With side rail  Scooting/Bridging: Modified Independent  Supine to Sit: Modified Independent  Sit to Supine: Modified Independent    Transfers:   Sit <> Stand Assistance: Supervision  Sit <> Stand Assistive Device: No Assistive Device    Functional Ambulation: Pt remained sitting on EOB for UE exercises after ADLs.     Activities of Daily Living:   UE Dressing Level of Assistance: Set-up Assistance  LE Dressing Level of Assistance: Set-up Assistance  Grooming Position: EOB  Grooming Level of Assistance: Supervision  ** All self care skills performed with pt sitting on EOB - increased time required for all tasks.      Balance:   Static Sit: GOOD: Takes MODERATE challenges from all directions  Dynamic Sit: GOOD: Maintains balance through MODERATE excursions of active trunk movement  Static Stand: FAIR+: Takes MINIMAL challenges from all directions  Dynamic stand: FAIR+: Needs CLOSE SUPERVISION during gait and is able to right self with minor LOB    Therapeutic Activities and Exercises:  Pt performed UE exercises using yellow theraband for 1 set of 12 reps in order to increase her " strength/endurance to facilitate transfers, mobility and self care skills.  Pt completed over head press, shoulder flex, elbow flex, elbow ext, and shoulder hor abduction.  Pt needed vcs during shoudler flex exercises to maintain straight elbow in order to isolate shoulder muscles.  VCs also needed during elbow flex exercises for pt to maintain elbow at her side (not raise and lower with movement).    AM-PAC 6 CLICK ADL   How much help from another person does this patient currently need?   1 = Unable, Total/Dependent Assistance  2 = A lot, Maximum/Moderate Assistance  3 = A little, Minimum/Contact Guard/Supervision  4 = None, Modified Swain/Independent    Putting on and taking off regular lower body clothing? : 3  Bathing (including washing, rinsing, drying)?: 3  Toileting, which includes using toilet, bedpan, or urinal? : 3  Putting on and taking off regular upper body clothing?: 3  Taking care of personal grooming such as brushing teeth?: 3  Eating meals?: 4  Total Score: 19     AM-PAC Raw Score CMS G-Code Modifier Level of Impairment Assistance   6 % Total / Unable   7 - 9 CM 80 - 100% Maximal Assist   10 - 14 CL 60 - 80% Moderate Assist   15 - 19 CK 40 - 60% Moderate Assist   20 - 22 CJ 20 - 40% Minimal Assist   23 CI 1-20% SBA / CGA   24 CH 0% Independent/ Mod I     Patient left HOB elevated with all lines intact, call button in reach and family present    ASSESSMENT:  Ida Santana is a 76 y.o. female with a medical diagnosis of Severe sepsis and presents with decreased strength/endurance, decreased self care skills, decreased func mobility, and decreased safety awareness.  Pt was noted to reach 2 goals during session: supine -> sit with supervision and UE exercises with supervision.  New goals added for pt to complete dressing and grooming with mod I.  Pt will continue to benefit from skilled OT services to continue to improve her endurance/strength and level of independence with mobility and  self care skills.  OT recommendation for discharge has been changed to  OT at this time as pt will have 24 hours supervision from various family members when discharged home.  Pt's brother, sister-in-law, and niece all verbalized this to be true during OT session.          Rehab identified problem list/impairments: Rehab identified problem list/impairments: weakness, impaired endurance, impaired functional mobilty, impaired self care skills, gait instability, impaired balance, impaired cognition, decreased safety awareness    Rehab potential is good.    Activity tolerance: Good    Discharge recommendations: Discharge Facility/Level Of Care Needs: home health OT     Barriers to discharge: Barriers to Discharge: None (Pt's brother, sister-in-law, and niece present and stated with all family, they will provide 24 hours supervision at home)    Equipment recommendations: shower chair     GOALS:   Occupational Therapy Goals        Problem: Occupational Therapy Goal    Goal Priority Disciplines Outcome Interventions   Occupational Therapy Goal     OT, PT/OT Ongoing (interventions implemented as appropriate)    Description:  Goals to be met by: 2017    Patient will increase functional independence with ADLs by performin. Pt will perform supine<>sit transfer with supervision to increase independence and safety with functional transfers.  GOAL MET 17  2. Pt will perform sit<>stand transfer with supervision to increase independence and safety with functional transfers. (met )  3. Pt will demonstrate improve activity tolerance by walking continuous for 2 minutes with no RB   4. Pt will perform therex 2x10 with supervision for BUE strengthening.  GOAL MET 17  5. Pt will perform bed to chair transfer with supervision to increase independence and safety with functional transfers  6. Pt will perform bedside commode transfer with supervision to increase independence and safety with functional  transfers.  7. Pt will complete standing grooming activity with supervision to increase endurance for completion of ADLs.(MET 1/17)    ** NEW GOALS:  (01/22/17)  Pt will perform UE dressing with mod I  Pt will perform LE dressing with mod I  Pt will perform grooming while standing with mod I                Plan:  Patient to be seen 4 x/week to address the above listed problems via self-care/home management, therapeutic activities, therapeutic exercises  Plan of Care expires:    Plan of Care reviewed with: patient, family         Shaista Ceballos, CALE  01/22/2017

## 2017-01-22 NOTE — PROGRESS NOTES
[facility] Hospital Medicine  Progress Note    Patient Name: Ida Santana  MRN: [id number]  Patient Class: IP- Inpatient   Admission Date: 1/12/2017  Length of Stay: 10 days  Attending Physician: Loyda Lui MD  Primary Care Provider: Giovanna Murray MD    [facility] Medicine Team: [facility] [place] [facility] MED 1 Porfirio Posey MD    Subjective:     Principal Problem:Severe sepsis      Hospital Course:   1/22/17: Pt continues to improve subjectively. Bcx from 1/20 grew staph in one tube and bcx from 1/19 are only + in one tube still. Still will try for IR thoracentesis on Monday.       Interval History: NAEON. AOx3. Patient continues to improve.       Review of Systems   Constitutional: Negative for chills and fever.   HENT: Negative for sore throat.    Respiratory: Negative for cough, chest tightness and shortness of breath.    Cardiovascular: Negative for chest pain, palpitations and leg swelling.   Gastrointestinal: Negative for abdominal distention, abdominal pain, diarrhea, nausea and vomiting.   Genitourinary: Negative for dysuria, flank pain and urgency.   Skin: Negative for rash.   Neurological: Negative for dizziness, light-headedness and numbness.   Psychiatric/Behavioral: Negative for confusion.     Objective:     Vital Signs (Most Recent):  Temp: 98.3 °F (36.8 °C) (01/22/17 0800)  Pulse: 74 (01/22/17 0800)  Resp: 18 (01/22/17 0800)  BP: 134/80 (01/22/17 0800)  SpO2: 96 % (01/22/17 0800) Vital Signs (24h Range):  Temp:  [98 °F (36.7 °C)-98.8 °F (37.1 °C)] 98.3 °F (36.8 °C)  Pulse:  [74-88] 74  Resp:  [17-20] 18  SpO2:  [92 %-96 %] 96 %  BP: (134-158)/(64-80) 134/80     Weight: 77.6 kg (171 lb)  Body mass index is 26.78 kg/(m^2).    Intake/Output Summary (Last 24 hours) at 01/22/17 1101  Last data filed at 01/22/17 0623   Gross per 24 hour   Intake              720 ml   Output                0 ml   Net              720 ml      Physical Exam   Constitutional: She is oriented to person, place, and  time. No distress.   HENT:   Head: Normocephalic.   Mouth/Throat: No oropharyngeal exudate.   Eyes: EOM are normal. Pupils are equal, round, and reactive to light. Right eye exhibits no discharge. Left eye exhibits no discharge. Right conjunctiva is not injected. Left conjunctiva is injected.   Cardiovascular: Normal rate and regular rhythm.  Exam reveals no gallop and no friction rub.    Murmur heard.  Pulmonary/Chest: Effort normal and breath sounds normal. No respiratory distress.   Abdominal: Soft. Bowel sounds are normal. She exhibits no distension. There is no tenderness.   Musculoskeletal: Normal range of motion. She exhibits no edema.   Neurological: She is alert and oriented to person, place, and time. No cranial nerve deficit.   Skin: She is not diaphoretic.   Psychiatric: She has a normal mood and affect.   Vitals reviewed.      Significant Labs:   CBC:   Recent Labs  Lab 01/21/17  0346 01/22/17  0430   WBC 19.41* 17.09*   HGB 10.5* 9.9*   HCT 30.2* 29.4*    285     CMP:   Recent Labs  Lab 01/21/17  0346 01/22/17  0430    138   K 3.8 4.3    109   CO2 23 19*   * 109   BUN 8 9   CREATININE 0.8 0.8   CALCIUM 8.0* 8.4*   PROT 6.3 6.3   ALBUMIN 1.9* 1.9*   BILITOT 0.7 0.6   ALKPHOS 93 91   AST 31 41*   ALT 46* 48*   ANIONGAP 9 10   EGFRNONAA >60.0 >60.0       Significant Imaging: I have reviewed all pertinent imaging results/findings within the past 24 hours.    Assessment/Plan:      * Severe sepsis  Staphylococcus aureus sepsis  MRSA pneumonia  MRSA bacteremia  - has had continued fevers and bcx grew MRSA, concerned that 2/2 continued + bcx patient may have staph seeded somewhere, possible endocarditis. She does not have any recent significant hospital contact.   - MIS negative for endocarditis  - continuing with vanc   - repeat bcx shown staph again, repeating bcx  - ID following, FU continued recs  -  CT chest, abdomen, pelvis with contrast: septic emboli to multiple organs (lungs  w/ cavitary lesions, pancreas, kidneys...) among other findings that will require follow up. (eventually urology for concern for renal cancer).    - MRI spine c/t/l - no evidence of spinal abscess  - CT chest also shows bilateral pleural effusions and a periaortic fluid collection - consult in to IR for US-guided thoracentesis    Hypertension  - continue verapamil  - restarted home lisinopril 20mg and coreg  25mg    Hypophosphatemia  - replete PRN    Conjunctivitis  Developed conjunctivitis of left eye 1/18. Pt denies pain, drainage, decreased vision.   - Erythromycin x7days  - Spoke with Ophthalmology. Call if she develops pain, drainage, decreased vision, or pain with eye movement.   - Cont to monitor, is improving    Ppx: 25,[id number]  Diet: Diabetic       Porfirio Posey MD  Department of Hospital Medicine   [facility]                      01/22/2017                             STAFF PHYSICIAN NOTE                                   Attending Attestation for Rounds with Resident  I have reviewed and concur with the resident's history, physical, assessment, and plan.  I have personally interviewed and examined the patient at bedside and agree with the resident's findings.                                  ________________________________________                                     REASON FOR ADMISSION:     Patient is 76 y.o.female    Body mass index is 26.78 kg/(m^2).,  Severe sepsis

## 2017-01-22 NOTE — ASSESSMENT & PLAN NOTE
Developed conjunctivitis of left eye 1/18. Pt denies pain, drainage, decreased vision.   - Erythromycin x7days  - Spoke with Ophthalmology. Call if she develops pain, drainage, decreased vision, or pain with eye movement.   - Cont to monitor, is improving

## 2017-01-22 NOTE — PLAN OF CARE
Problem: Occupational Therapy Goal  Goal: Occupational Therapy Goal  Goals to be met by: 2017    Patient will increase functional independence with ADLs by performin. Pt will perform supine<>sit transfer with supervision to increase independence and safety with functional transfers. GOAL MET 17  2. Pt will perform sit<>stand transfer with supervision to increase independence and safety with functional transfers. (met )  3. Pt will demonstrate improve activity tolerance by walking continuous for 2 minutes with no RB   4. Pt will perform therex 2x10 with supervision for BUE strengthening. GOAL MET 17  5. Pt will perform bed to chair transfer with supervision to increase independence and safety with functional transfers  6. Pt will perform bedside commode transfer with supervision to increase independence and safety with functional transfers.  7. Pt will complete standing grooming activity with supervision to increase endurance for completion of ADLs.(MET )    ** NEW GOALS: (17)  Pt will perform UE dressing with mod I  Pt will perform LE dressing with mod I  Pt will perform grooming while standing with mod I   Outcome: Ongoing (interventions implemented as appropriate)  Pt was agreeable to OT and noted to reach 2 goals during session: supine -> sit with supervision and UE exercises with supervision.  New goals added for pt to complete dressing and grooming with mod I.  Pt will continue to benefit from skilled OT services to continue to improve her endurance/strength and level of independence with mobility and self care skills.  OT recommendation for discharge has been changed to  OT at this time as pt will have 24 hours supervision from various family members when discharged home.  Pt's brother, sister-in-law, and niece all verbalized this to be true during OT session.       Shaista Ceballos, OT  2017

## 2017-01-22 NOTE — SUBJECTIVE & OBJECTIVE
Interval History: NAEON, feeling well this morning. Afebrile and WCC trending down. Blcx from 1/21 NGTD. On vanc    HPI:   77 y/o female with history of HTN, HLD,  Osteopenia, remote breast cancer came to hospital for family members noted that patient let the bathroom sink overflow and left a pot burning on the stove the day of admission. Patient lives by herself and is fully functional. The patient reports 3 episodes of vomits gastric content and 3 episodes of diarrhea today, denied fever, chills, abdominal pain, weakness, paraesthesia, dysuria, frequency or malodorous urin, skin breakdown, wounds, trauma, central lines, joint or back pain. Denies similar episodes in the past.        Review of Systems   Constitutional: Negative for chills and fever.   HENT: Negative for sore throat.    Respiratory: Negative for cough, chest tightness and shortness of breath.    Cardiovascular: Negative for chest pain, palpitations and leg swelling.   Gastrointestinal: Negative for abdominal distention, abdominal pain, diarrhea, nausea and vomiting.   Genitourinary: Negative for dysuria, flank pain and urgency.   Skin: Negative for rash.   Neurological: Negative for dizziness, light-headedness and numbness.   Psychiatric/Behavioral: Negative for confusion.     Objective:     Vital Signs (Most Recent):  Temp: 98.3 °F (36.8 °C) (01/22/17 0800)  Pulse: 74 (01/22/17 0800)  Resp: 18 (01/22/17 0800)  BP: 134/80 (01/22/17 0800)  SpO2: 96 % (01/22/17 0800) Vital Signs (24h Range):  Temp:  [98 °F (36.7 °C)-98.8 °F (37.1 °C)] 98.3 °F (36.8 °C)  Pulse:  [74-88] 74  Resp:  [17-20] 18  SpO2:  [92 %-96 %] 96 %  BP: (134-150)/(64-80) 134/80     Weight: 77.6 kg (171 lb)  Body mass index is 26.78 kg/(m^2).    Estimated Creatinine Clearance: 66.2 mL/min (based on Cr of 0.8).    Physical Exam   Constitutional: She is oriented to person, place, and time. No distress.   HENT:   Head: Normocephalic.   Mouth/Throat: No oropharyngeal exudate.   Eyes: EOM  are normal. Pupils are equal, round, and reactive to light. Right eye exhibits no discharge. Left eye exhibits no discharge. Right conjunctiva is not injected. Left conjunctiva is injected.   Cardiovascular: Normal rate and regular rhythm.  Exam reveals no gallop and no friction rub.    Murmur heard.  Pulmonary/Chest: Effort normal and breath sounds normal. No respiratory distress.   Abdominal: Soft. Bowel sounds are normal. She exhibits no distension. There is no tenderness.   Musculoskeletal: Normal range of motion. She exhibits no edema.   Neurological: She is alert and oriented to person, place, and time. No cranial nerve deficit.   Skin: She is not diaphoretic.   Psychiatric: She has a normal mood and affect.   Vitals reviewed.      Significant Labs:   CBC:   Recent Labs  Lab 01/21/17 0346 01/22/17  0430   WBC 19.41* 17.09*   HGB 10.5* 9.9*   HCT 30.2* 29.4*    285     CMP:   Recent Labs  Lab 01/21/17 0346 01/22/17  0430    138   K 3.8 4.3    109   CO2 23 19*   * 109   BUN 8 9   CREATININE 0.8 0.8   CALCIUM 8.0* 8.4*   PROT 6.3 6.3   ALBUMIN 1.9* 1.9*   BILITOT 0.7 0.6   ALKPHOS 93 91   AST 31 41*   ALT 46* 48*   ANIONGAP 9 10   EGFRNONAA >60.0 >60.0     Microbiology Results (last 7 days)     Procedure Component Value Units Date/Time    Blood culture [[id number]] Collected:  01/20/17 0546    Order Status:  Completed Specimen:  Blood Updated:  01/22/17 1126     Blood Culture, Routine Gram stain aer bottle: Gram positive cocci in clusters resembling Staph      Blood Culture, Routine Results called to and read back by: Ashley Coleman RN  01/21/2017  15:11     Blood Culture, Routine --     METHICILLIN RESISTANT STAPHYLOCOCCUS AUREUS  For susceptibility see order # [id number]      Blood culture [[id number]] Collected:  01/19/17 1210    Order Status:  Completed Specimen:  Blood Updated:  01/22/17 1124     Blood Culture, Routine Gram stain aer bottle: Gram positive cocci in clusters resembling  Staph      Blood Culture, Routine Results called to and read back by: Danyelle Langford RN  01/21/2017       Blood Culture, Routine 05:58     Blood Culture, Routine --     METHICILLIN RESISTANT STAPHYLOCOCCUS AUREUS  For susceptibility see order # [id number]      Narrative:       Second blood culture to be drawn from separate site.    Blood culture [[id number]]     Order Status:  Canceled Specimen:  Blood     Blood culture [[id number]]     Order Status:  Canceled Specimen:  Blood     Blood culture [[id number]] Collected:  01/21/17 1153    Order Status:  Completed Specimen:  Blood Updated:  01/21/17 1915     Blood Culture, Routine No Growth to date    Narrative:       Please take from different site than first bcx.    Blood culture [[id number]] Collected:  01/21/17 1153    Order Status:  Completed Specimen:  Blood Updated:  01/21/17 1915     Blood Culture, Routine No Growth to date    Blood culture [[id number]] Collected:  01/19/17 1210    Order Status:  Completed Specimen:  Blood Updated:  01/21/17 1612     Blood Culture, Routine No Growth to date     Blood Culture, Routine No Growth to date     Blood Culture, Routine No Growth to date    Blood culture [[id number]] Collected:  01/20/17 0551    Order Status:  Completed Specimen:  Blood Updated:  01/21/17 1212     Blood Culture, Routine No Growth to date     Blood Culture, Routine No Growth to date    Blood culture [[id number]] Collected:  01/18/17 0611    Order Status:  Completed Specimen:  Blood Updated:  01/21/17 1010     Blood Culture, Routine Gram stain aer bottle: Gram positive cocci in clusters resembling Staph      Blood Culture, Routine Results called to and read back by:Ness Lagos RN 01/19/2017  04:10     Blood Culture, Routine --     METHICILLIN RESISTANT STAPHYLOCOCCUS AUREUS  For susceptibility see order # [id number]      Blood culture [[id number]]  (Susceptibility) Collected:  01/18/17 0611    Order Status:  Completed Specimen:  Blood Updated:  01/21/17  1010     Blood Culture, Routine Gram stain aer bottle: Gram positive cocci in clusters resembling Staph      Blood Culture, Routine Results called to and read back by:Ness Lagos RN 01/19/2017  04:10     Blood Culture, Routine METHICILLIN RESISTANT STAPHYLOCOCCUS AUREUS    Blood culture [[id number]] Collected:  01/16/17 1949    Order Status:  Completed Specimen:  Blood Updated:  01/19/17 1023     Blood Culture, Routine Gram stain aer bottle: Gram positive cocci in clusters resembling Staph      Blood Culture, Routine Results called to and read back by: Sujatha Bennett RN 01/17/2017  15:13     Blood Culture, Routine --     METHICILLIN RESISTANT STAPHYLOCOCCUS AUREUS  For susceptibility see order #[id number]      Narrative:       Please take from different site than first bcx.    Blood culture [[id number]]  (Susceptibility) Collected:  01/16/17 1949    Order Status:  Completed Specimen:  Blood Updated:  01/19/17 1022     Blood Culture, Routine Gram stain aer bottle: Gram positive cocci in clusters resembling Staph      Blood Culture, Routine Results called to and read back by: Kristie Wolff RN  01/17/2017  17:58     Blood Culture, Routine METHICILLIN RESISTANT STAPHYLOCOCCUS AUREUS    Respiratory Viral Panel by PCR [[id number]] Collected:  01/18/17 1312    Order Status:  Completed Updated:  01/19/17 0951     Respiratory Virus Panel, source Nasopharyngeal Swab    Respiratory virus antigens panel [[id number]] Collected:  01/18/17 1312    Order Status:  Canceled Specimen:  Respiratory from Nasopharyngeal Swab Updated:  01/18/17 1317    Narrative:       Respiratory Viruses - DFA was cancelled on 01/18/2017 at 13:50 by   SXD; Changed to sendout.    Respiratory Viral Panel by PCR Nasal Swab [[id number]] Collected:  01/18/17 1312    Order Status:  Sent Specimen:  Respiratory Updated:  01/18/17 1312    Narrative:       Specimen Source->Nasal Swab    Blood culture [[id number]] Collected:  01/15/17 3509    Order Status:  Completed  Specimen:  Blood Updated:  01/18/17 1012     Blood Culture, Routine Gram stain aer bottle: Gram positive cocci in clusters resembling Staph      Blood Culture, Routine Results called to and read back by:Kristie Palomares RN 01/16/2017  13:45     Blood Culture, Routine --     METHICILLIN RESISTANT STAPHYLOCOCCUS AUREUS  For susceptibility see order #[id number]      Narrative:       Please take from different site than first bcx.  Please take at least 2 hours after administration of  afternoon vancomycin.    Blood culture [[id number]]  (Susceptibility) Collected:  01/15/17 1829    Order Status:  Completed Specimen:  Blood Updated:  01/18/17 1011     Blood Culture, Routine Gram stain aer bottle: Gram positive cocci in clusters resembling Staph      Blood Culture, Routine Results called to and read back by:Kristie Wolff RN  01/16/2017  15:34     Blood Culture, Routine METHICILLIN RESISTANT STAPHYLOCOCCUS AUREUS    Narrative:       Please take at least 2 hours after administration of  afternoon vancomycin.    Urine culture [[id number]] Collected:  01/16/17 1702    Order Status:  Completed Specimen:  Urine from Urine, Clean Catch Updated:  01/18/17 0115     Urine Culture, Routine No growth    Culture, Respiratory [[id number]]     Order Status:  No result Specimen:  Respiratory from Sputum, Expectorated     Blood culture [[id number]] Collected:  01/14/17 1101    Order Status:  Completed Specimen:  Blood Updated:  01/17/17 1039     Blood Culture, Routine Gram stain aer bottle: Gram positive cocci in clusters resembling Staph      Blood Culture, Routine Results called to and read back by:Taylor Valerio RN 01/15/2017  02:34     Blood Culture, Routine --     METHICILLIN RESISTANT STAPHYLOCOCCUS AUREUS  For susceptibility see order #[id number]      Narrative:       Please obtain from different site than first bcx.    Blood culture [[id number]]  (Susceptibility) Collected:  01/14/17 1101    Order Status:  Completed Specimen:  Blood Updated:   01/17/17 1039     Blood Culture, Routine Gram stain aer bottle: Gram positive cocci in clusters resembling Staph      Blood Culture, Routine Results called to and read back by:Taylor Valerio RN 01/15/2017  02:34     Blood Culture, Routine METHICILLIN RESISTANT STAPHYLOCOCCUS AUREUS    Blood culture [[id number]] Collected:  01/13/17 1703    Order Status:  Completed Specimen:  Blood Updated:  01/16/17 1025     Blood Culture, Routine Gram stain aer bottle: Gram positive cocci in clusters resembling Staph     Blood Culture, Routine Gram stain alae bottle: Gram positive cocci in clusters resembling Staph      Blood Culture, Routine Results called to and read back by:Yariel Verdin RN 01/14/2017  09:19     Blood Culture, Routine --     METHICILLIN RESISTANT STAPHYLOCOCCUS AUREUS  For susceptibility see order #[id number]      Narrative:       Please take from different site than first bcx.    Blood culture [[id number]] Collected:  01/13/17 1704    Order Status:  Completed Specimen:  Blood Updated:  01/16/17 1022     Blood Culture, Routine Gram stain aer bottle: Gram positive cocci in clusters resembling Staph      Blood Culture, Routine Results called to and read back by:Yariel Verdin RN 01/14/2017  09:18     Blood Culture, Routine Gram stain alea bottle: Gram positive cocci in clusters resembling Staph     Blood Culture, Routine 01/14/2017  11:07      Blood Culture, Routine Results previously called.     Blood Culture, Routine --     METHICILLIN RESISTANT STAPHYLOCOCCUS AUREUS  For susceptibility see order #[id number]              Significant Imaging: I have reviewed all pertinent imaging results/findings within the past 24 hours.

## 2017-01-22 NOTE — PROGRESS NOTES
[facility]  Infectious Disease  Progress Note    Patient Name: Ida Santana  MRN: [id number]  Admission Date: 1/12/2017  Length of Stay: 10 days  Attending Physician: Loyda Lui MD  Primary Care Provider: Giovanna Murray MD    Isolation Status: Contact  Assessment/Plan:      MRSA bacteremia  77 y/o female admitted for an episode of acute confusion. B/C 1/13/17, 1/14/17, 1/15/17, 1/16/17, 1/18/17 positive for MRSA, 1/19/17 and 1/20/17 in progress. Initial source of bacteremia could be related to pneumonia (no hardware, no skin breakdown, no cellulitis, no wounds, no trauma, no central lines, no joint or back pain).     - Findings on Chest/A/P CT scan (on lungs and kidney) 1/18/17 most likely due to septic emboli.    -agree with MARCIAL murcia for further evaluation, please send for cell diff, gram stain, and culture  - MRI cervical, thoracic, lumbar 1/20/17 negative for osteomyelitis/abscess.  - MIS 1/17/17 negative for endocarditis  - B/C have remained positive, most likely due to poor source control  - B/C 1/21/17 -> NGTD  - Last Vancomycin trough 22.6, trough goal 15-20   -vanc 1500m g -> 1250 mg, vanc trough ordered prior to 4th dose                    Anticipated Disposition: pending    Thank you for your consult. Will continue to follow, staff attestation pending.    Subjective:     Principal Problem:Severe sepsis    Interval History: NAEON, feeling well this morning. Afebrile and WCC trending down. Blcx from 1/21 NGTD. On vanc    HPI:   77 y/o female with history of HTN, HLD,  Osteopenia, remote breast cancer came to hospital for family members noted that patient let the bathroom sink overflow and left a pot burning on the stove the day of admission. Patient lives by herself and is fully functional. The patient reports 3 episodes of vomits gastric content and 3 episodes of diarrhea today, denied fever, chills, abdominal pain, weakness, paraesthesia, dysuria, frequency or malodorous urin,  skin breakdown, wounds, trauma, central lines, joint or back pain. Denies similar episodes in the past.        Review of Systems   Constitutional: Negative for chills and fever.   HENT: Negative for sore throat.    Respiratory: Negative for cough, chest tightness and shortness of breath.    Cardiovascular: Negative for chest pain, palpitations and leg swelling.   Gastrointestinal: Negative for abdominal distention, abdominal pain, diarrhea, nausea and vomiting.   Genitourinary: Negative for dysuria, flank pain and urgency.   Skin: Negative for rash.   Neurological: Negative for dizziness, light-headedness and numbness.   Psychiatric/Behavioral: Negative for confusion.     Objective:     Vital Signs (Most Recent):  Temp: 98.3 °F (36.8 °C) (01/22/17 0800)  Pulse: 74 (01/22/17 0800)  Resp: 18 (01/22/17 0800)  BP: 134/80 (01/22/17 0800)  SpO2: 96 % (01/22/17 0800) Vital Signs (24h Range):  Temp:  [98 °F (36.7 °C)-98.8 °F (37.1 °C)] 98.3 °F (36.8 °C)  Pulse:  [74-88] 74  Resp:  [17-20] 18  SpO2:  [92 %-96 %] 96 %  BP: (134-150)/(64-80) 134/80     Weight: 77.6 kg (171 lb)  Body mass index is 26.78 kg/(m^2).    Estimated Creatinine Clearance: 66.2 mL/min (based on Cr of 0.8).    Physical Exam   Constitutional: She is oriented to person, place, and time. No distress.   HENT:   Head: Normocephalic.   Mouth/Throat: No oropharyngeal exudate.   Eyes: EOM are normal. Pupils are equal, round, and reactive to light. Right eye exhibits no discharge. Left eye exhibits no discharge. Right conjunctiva is not injected. Left conjunctiva is injected.   Cardiovascular: Normal rate and regular rhythm.  Exam reveals no gallop and no friction rub.    Murmur heard.  Pulmonary/Chest: Effort normal and breath sounds normal. No respiratory distress.   Abdominal: Soft. Bowel sounds are normal. She exhibits no distension. There is no tenderness.   Musculoskeletal: Normal range of motion. She exhibits no edema.   Neurological: She is alert and  oriented to person, place, and time. No cranial nerve deficit.   Skin: She is not diaphoretic.   Psychiatric: She has a normal mood and affect.   Vitals reviewed.      Significant Labs:   CBC:   Recent Labs  Lab 01/21/17  0346 01/22/17  0430   WBC 19.41* 17.09*   HGB 10.5* 9.9*   HCT 30.2* 29.4*    285     CMP:   Recent Labs  Lab 01/21/17  0346 01/22/17  0430    138   K 3.8 4.3    109   CO2 23 19*   * 109   BUN 8 9   CREATININE 0.8 0.8   CALCIUM 8.0* 8.4*   PROT 6.3 6.3   ALBUMIN 1.9* 1.9*   BILITOT 0.7 0.6   ALKPHOS 93 91   AST 31 41*   ALT 46* 48*   ANIONGAP 9 10   EGFRNONAA >60.0 >60.0     Microbiology Results (last 7 days)     Procedure Component Value Units Date/Time    Blood culture [[id number]] Collected:  01/20/17 0546    Order Status:  Completed Specimen:  Blood Updated:  01/22/17 1126     Blood Culture, Routine Gram stain aer bottle: Gram positive cocci in clusters resembling Staph      Blood Culture, Routine Results called to and read back by: Ashley Coleman RN  01/21/2017  15:11     Blood Culture, Routine --     METHICILLIN RESISTANT STAPHYLOCOCCUS AUREUS  For susceptibility see order # [id number]      Blood culture [[id number]] Collected:  01/19/17 1210    Order Status:  Completed Specimen:  Blood Updated:  01/22/17 1124     Blood Culture, Routine Gram stain aer bottle: Gram positive cocci in clusters resembling Staph      Blood Culture, Routine Results called to and read back by: Danyelle Langford RN  01/21/2017       Blood Culture, Routine 05:58     Blood Culture, Routine --     METHICILLIN RESISTANT STAPHYLOCOCCUS AUREUS  For susceptibility see order # [id number]      Narrative:       Second blood culture to be drawn from separate site.    Blood culture [[id number]]     Order Status:  Canceled Specimen:  Blood     Blood culture [[id number]]     Order Status:  Canceled Specimen:  Blood     Blood culture [[id number]] Collected:  01/21/17 1153    Order Status:  Completed Specimen:   Blood Updated:  01/21/17 1915     Blood Culture, Routine No Growth to date    Narrative:       Please take from different site than first bcx.    Blood culture [[id number]] Collected:  01/21/17 1153    Order Status:  Completed Specimen:  Blood Updated:  01/21/17 1915     Blood Culture, Routine No Growth to date    Blood culture [[id number]] Collected:  01/19/17 1210    Order Status:  Completed Specimen:  Blood Updated:  01/21/17 1612     Blood Culture, Routine No Growth to date     Blood Culture, Routine No Growth to date     Blood Culture, Routine No Growth to date    Blood culture [[id number]] Collected:  01/20/17 0551    Order Status:  Completed Specimen:  Blood Updated:  01/21/17 1212     Blood Culture, Routine No Growth to date     Blood Culture, Routine No Growth to date    Blood culture [[id number]] Collected:  01/18/17 0611    Order Status:  Completed Specimen:  Blood Updated:  01/21/17 1010     Blood Culture, Routine Gram stain aer bottle: Gram positive cocci in clusters resembling Staph      Blood Culture, Routine Results called to and read back by:eNss Lagos RN 01/19/2017  04:10     Blood Culture, Routine --     METHICILLIN RESISTANT STAPHYLOCOCCUS AUREUS  For susceptibility see order # [id number]      Blood culture [[id number]]  (Susceptibility) Collected:  01/18/17 0611    Order Status:  Completed Specimen:  Blood Updated:  01/21/17 1010     Blood Culture, Routine Gram stain aer bottle: Gram positive cocci in clusters resembling Staph      Blood Culture, Routine Results called to and read back by:Ness Lagos RN 01/19/2017  04:10     Blood Culture, Routine METHICILLIN RESISTANT STAPHYLOCOCCUS AUREUS    Blood culture [[id number]] Collected:  01/16/17 1949    Order Status:  Completed Specimen:  Blood Updated:  01/19/17 1023     Blood Culture, Routine Gram stain aer bottle: Gram positive cocci in clusters resembling Staph      Blood Culture, Routine Results called to and read back by: Sujatha Bennett RN  01/17/2017  15:13     Blood Culture, Routine --     METHICILLIN RESISTANT STAPHYLOCOCCUS AUREUS  For susceptibility see order #[id number]      Narrative:       Please take from different site than first bcx.    Blood culture [[id number]]  (Susceptibility) Collected:  01/16/17 1949    Order Status:  Completed Specimen:  Blood Updated:  01/19/17 1022     Blood Culture, Routine Gram stain aer bottle: Gram positive cocci in clusters resembling Staph      Blood Culture, Routine Results called to and read back by: Kristie Wolff RN  01/17/2017  17:58     Blood Culture, Routine METHICILLIN RESISTANT STAPHYLOCOCCUS AUREUS    Respiratory Viral Panel by PCR [[id number]] Collected:  01/18/17 1312    Order Status:  Completed Updated:  01/19/17 0951     Respiratory Virus Panel, source Nasopharyngeal Swab    Respiratory virus antigens panel [[id number]] Collected:  01/18/17 1312    Order Status:  Canceled Specimen:  Respiratory from Nasopharyngeal Swab Updated:  01/18/17 1317    Narrative:       Respiratory Viruses - DFA was cancelled on 01/18/2017 at 13:50 by   SXD; Changed to sendout.    Respiratory Viral Panel by PCR Nasal Swab [[id number]] Collected:  01/18/17 1312    Order Status:  Sent Specimen:  Respiratory Updated:  01/18/17 1312    Narrative:       Specimen Source->Nasal Swab    Blood culture [[id number]] Collected:  01/15/17 1829    Order Status:  Completed Specimen:  Blood Updated:  01/18/17 1012     Blood Culture, Routine Gram stain aer bottle: Gram positive cocci in clusters resembling Staph      Blood Culture, Routine Results called to and read back by:Kristie Palomares RN 01/16/2017  13:45     Blood Culture, Routine --     METHICILLIN RESISTANT STAPHYLOCOCCUS AUREUS  For susceptibility see order #[id number]      Narrative:       Please take from different site than first bcx.  Please take at least 2 hours after administration of  afternoon vancomycin.    Blood culture [[id number]]  (Susceptibility) Collected:  01/15/17 1829     Order Status:  Completed Specimen:  Blood Updated:  01/18/17 1011     Blood Culture, Routine Gram stain aer bottle: Gram positive cocci in clusters resembling Staph      Blood Culture, Routine Results called to and read back by:Kristie Wolff RN  01/16/2017  15:34     Blood Culture, Routine METHICILLIN RESISTANT STAPHYLOCOCCUS AUREUS    Narrative:       Please take at least 2 hours after administration of  afternoon vancomycin.    Urine culture [[id number]] Collected:  01/16/17 1702    Order Status:  Completed Specimen:  Urine from Urine, Clean Catch Updated:  01/18/17 0115     Urine Culture, Routine No growth    Culture, Respiratory [[id number]]     Order Status:  No result Specimen:  Respiratory from Sputum, Expectorated     Blood culture [[id number]] Collected:  01/14/17 1101    Order Status:  Completed Specimen:  Blood Updated:  01/17/17 1039     Blood Culture, Routine Gram stain aer bottle: Gram positive cocci in clusters resembling Staph      Blood Culture, Routine Results called to and read back by:Taylor Valerio RN 01/15/2017  02:34     Blood Culture, Routine --     METHICILLIN RESISTANT STAPHYLOCOCCUS AUREUS  For susceptibility see order #[id number]      Narrative:       Please obtain from different site than first bcx.    Blood culture [[id number]]  (Susceptibility) Collected:  01/14/17 1101    Order Status:  Completed Specimen:  Blood Updated:  01/17/17 1039     Blood Culture, Routine Gram stain aer bottle: Gram positive cocci in clusters resembling Staph      Blood Culture, Routine Results called to and read back by:Taylor Valerio RN 01/15/2017  02:34     Blood Culture, Routine METHICILLIN RESISTANT STAPHYLOCOCCUS AUREUS    Blood culture [[id number]] Collected:  01/13/17 1703    Order Status:  Completed Specimen:  Blood Updated:  01/16/17 1025     Blood Culture, Routine Gram stain aer bottle: Gram positive cocci in clusters resembling Staph     Blood Culture, Routine Gram stain alea bottle: Gram positive cocci in  clusters resembling Staph      Blood Culture, Routine Results called to and read back by:Yariel Verdin RN 01/14/2017  09:19     Blood Culture, Routine --     METHICILLIN RESISTANT STAPHYLOCOCCUS AUREUS  For susceptibility see order #[id number]      Narrative:       Please take from different site than first bcx.    Blood culture [[id number]] Collected:  01/13/17 1704    Order Status:  Completed Specimen:  Blood Updated:  01/16/17 1022     Blood Culture, Routine Gram stain aer bottle: Gram positive cocci in clusters resembling Staph      Blood Culture, Routine Results called to and read back by:Yariel Verdin RN 01/14/2017  09:18     Blood Culture, Routine Gram stain alea bottle: Gram positive cocci in clusters resembling Staph     Blood Culture, Routine 01/14/2017  11:07      Blood Culture, Routine Results previously called.     Blood Culture, Routine --     METHICILLIN RESISTANT STAPHYLOCOCCUS AUREUS  For susceptibility see order #[id number]              Significant Imaging: I have reviewed all pertinent imaging results/findings within the past 24 hours.        LONG Varma  Infectious Disease  [facility]

## 2017-01-22 NOTE — SUBJECTIVE & OBJECTIVE
Interval History: NAEON. AOx3. Patient continues to improve.       Review of Systems   Constitutional: Negative for chills and fever.   HENT: Negative for sore throat.    Respiratory: Negative for cough, chest tightness and shortness of breath.    Cardiovascular: Negative for chest pain, palpitations and leg swelling.   Gastrointestinal: Negative for abdominal distention, abdominal pain, diarrhea, nausea and vomiting.   Genitourinary: Negative for dysuria, flank pain and urgency.   Skin: Negative for rash.   Neurological: Negative for dizziness, light-headedness and numbness.   Psychiatric/Behavioral: Negative for confusion.     Objective:     Vital Signs (Most Recent):  Temp: 98.3 °F (36.8 °C) (01/22/17 0800)  Pulse: 74 (01/22/17 0800)  Resp: 18 (01/22/17 0800)  BP: 134/80 (01/22/17 0800)  SpO2: 96 % (01/22/17 0800) Vital Signs (24h Range):  Temp:  [98 °F (36.7 °C)-98.8 °F (37.1 °C)] 98.3 °F (36.8 °C)  Pulse:  [74-88] 74  Resp:  [17-20] 18  SpO2:  [92 %-96 %] 96 %  BP: (134-158)/(64-80) 134/80     Weight: 77.6 kg (171 lb)  Body mass index is 26.78 kg/(m^2).    Intake/Output Summary (Last 24 hours) at 01/22/17 1101  Last data filed at 01/22/17 0623   Gross per 24 hour   Intake              720 ml   Output                0 ml   Net              720 ml      Physical Exam   Constitutional: She is oriented to person, place, and time. No distress.   HENT:   Head: Normocephalic.   Mouth/Throat: No oropharyngeal exudate.   Eyes: EOM are normal. Pupils are equal, round, and reactive to light. Right eye exhibits no discharge. Left eye exhibits no discharge. Right conjunctiva is not injected. Left conjunctiva is injected.   Cardiovascular: Normal rate and regular rhythm.  Exam reveals no gallop and no friction rub.    Murmur heard.  Pulmonary/Chest: Effort normal and breath sounds normal. No respiratory distress.   Abdominal: Soft. Bowel sounds are normal. She exhibits no distension. There is no tenderness.   Musculoskeletal:  Normal range of motion. She exhibits no edema.   Neurological: She is alert and oriented to person, place, and time. No cranial nerve deficit.   Skin: She is not diaphoretic.   Psychiatric: She has a normal mood and affect.   Vitals reviewed.      Significant Labs:   CBC:   Recent Labs  Lab 01/21/17 0346 01/22/17  0430   WBC 19.41* 17.09*   HGB 10.5* 9.9*   HCT 30.2* 29.4*    285     CMP:   Recent Labs  Lab 01/21/17 0346 01/22/17  0430    138   K 3.8 4.3    109   CO2 23 19*   * 109   BUN 8 9   CREATININE 0.8 0.8   CALCIUM 8.0* 8.4*   PROT 6.3 6.3   ALBUMIN 1.9* 1.9*   BILITOT 0.7 0.6   ALKPHOS 93 91   AST 31 41*   ALT 46* 48*   ANIONGAP 9 10   EGFRNONAA >60.0 >60.0       Significant Imaging: I have reviewed all pertinent imaging results/findings within the past 24 hours.

## 2017-01-22 NOTE — ASSESSMENT & PLAN NOTE
- has had continued fevers and bcx grew MRSA, concerned that 2/2 continued + bcx patient may have staph seeded somewhere, possible endocarditis. She does not have any recent significant hospital contact.   - MIS negative for endocarditis  - continuing with vanc   - repeat bcx shown staph again, repeating bcx  - ID following, FU continued recs  -  CT chest, abdomen, pelvis with contrast: septic emboli to multiple organs (lungs w/ cavitary lesions, pancreas, kidneys...) among other findings that will require follow up. (eventually urology for concern for renal cancer).    - MRI spine c/t/l - no evidence of spinal abscess  - CT chest also shows bilateral pleural effusions and a periaortic fluid collection - consult in to IR for US-guided thoracentesis

## 2017-01-23 PROBLEM — E87.6 HYPOKALEMIA: Status: ACTIVE | Noted: 2017-01-23

## 2017-01-23 LAB
ALBUMIN SERPL BCP-MCNC: 1.9 G/DL
ALP SERPL-CCNC: 71 U/L
ALT SERPL W/O P-5'-P-CCNC: 46 U/L
ANION GAP SERPL CALC-SCNC: 6 MMOL/L
AST SERPL-CCNC: 35 U/L
BACTERIA BLD CULT: NORMAL
BASOPHILS # BLD AUTO: 0.03 K/UL
BASOPHILS NFR BLD: 0.2 %
BILIRUB SERPL-MCNC: 0.6 MG/DL
BUN SERPL-MCNC: 11 MG/DL
CALCIUM SERPL-MCNC: 8.4 MG/DL
CHLORIDE SERPL-SCNC: 109 MMOL/L
CO2 SERPL-SCNC: 26 MMOL/L
CREAT SERPL-MCNC: 0.8 MG/DL
DIFFERENTIAL METHOD: ABNORMAL
EOSINOPHIL # BLD AUTO: 0.1 K/UL
EOSINOPHIL NFR BLD: 0.7 %
ERYTHROCYTE [DISTWIDTH] IN BLOOD BY AUTOMATED COUNT: 14.8 %
EST. GFR  (AFRICAN AMERICAN): >60 ML/MIN/1.73 M^2
EST. GFR  (NON AFRICAN AMERICAN): >60 ML/MIN/1.73 M^2
FACT X PPP CHRO-ACNC: 0.5 IU/ML
GLUCOSE SERPL-MCNC: 126 MG/DL
HCT VFR BLD AUTO: 28.1 %
HGB BLD-MCNC: 9.8 G/DL
LYMPHOCYTES # BLD AUTO: 1.6 K/UL
LYMPHOCYTES NFR BLD: 10.6 %
MCH RBC QN AUTO: 30.7 PG
MCHC RBC AUTO-ENTMCNC: 34.9 %
MCV RBC AUTO: 88 FL
MONOCYTES # BLD AUTO: 0.6 K/UL
MONOCYTES NFR BLD: 3.8 %
NEUTROPHILS # BLD AUTO: 12.8 K/UL
NEUTROPHILS NFR BLD: 82.9 %
PLATELET # BLD AUTO: 285 K/UL
PMV BLD AUTO: 9.1 FL
POCT GLUCOSE: 120 MG/DL (ref 70–110)
POCT GLUCOSE: 138 MG/DL (ref 70–110)
POCT GLUCOSE: 142 MG/DL (ref 70–110)
POCT GLUCOSE: 97 MG/DL (ref 70–110)
POTASSIUM SERPL-SCNC: 3.4 MMOL/L
PROT SERPL-MCNC: 6.4 G/DL
RBC # BLD AUTO: 3.19 M/UL
SODIUM SERPL-SCNC: 141 MMOL/L
WBC # BLD AUTO: 15.42 K/UL

## 2017-01-23 PROCEDURE — 97802 MEDICAL NUTRITION INDIV IN: CPT

## 2017-01-23 PROCEDURE — 63600175 PHARM REV CODE 636 W HCPCS: Performed by: INTERNAL MEDICINE

## 2017-01-23 PROCEDURE — 25000003 PHARM REV CODE 250: Performed by: INTERNAL MEDICINE

## 2017-01-23 PROCEDURE — 80053 COMPREHEN METABOLIC PANEL: CPT

## 2017-01-23 PROCEDURE — 85025 COMPLETE CBC W/AUTO DIFF WBC: CPT

## 2017-01-23 PROCEDURE — 99233 SBSQ HOSP IP/OBS HIGH 50: CPT | Mod: ,,, | Performed by: HOSPITALIST

## 2017-01-23 PROCEDURE — 11000001 HC ACUTE MED/SURG PRIVATE ROOM

## 2017-01-23 PROCEDURE — 25000003 PHARM REV CODE 250: Performed by: STUDENT IN AN ORGANIZED HEALTH CARE EDUCATION/TRAINING PROGRAM

## 2017-01-23 PROCEDURE — 25000003 PHARM REV CODE 250: Performed by: EMERGENCY MEDICINE

## 2017-01-23 PROCEDURE — 36415 COLL VENOUS BLD VENIPUNCTURE: CPT

## 2017-01-23 PROCEDURE — 99233 SBSQ HOSP IP/OBS HIGH 50: CPT | Mod: ,,, | Performed by: INTERNAL MEDICINE

## 2017-01-23 PROCEDURE — 94760 N-INVAS EAR/PLS OXIMETRY 1: CPT

## 2017-01-23 PROCEDURE — 94668 MNPJ CHEST WALL SBSQ: CPT

## 2017-01-23 PROCEDURE — 85520 HEPARIN ASSAY: CPT

## 2017-01-23 RX ORDER — POTASSIUM CHLORIDE 20 MEQ/1
40 TABLET, EXTENDED RELEASE ORAL ONCE
Status: COMPLETED | OUTPATIENT
Start: 2017-01-23 | End: 2017-01-23

## 2017-01-23 RX ADMIN — Medication 3 ML: at 02:01

## 2017-01-23 RX ADMIN — POTASSIUM CHLORIDE 40 MEQ: 1500 TABLET, EXTENDED RELEASE ORAL at 05:01

## 2017-01-23 RX ADMIN — HEPARIN SODIUM AND DEXTROSE 21 UNITS/KG/HR: 10000; 5 INJECTION INTRAVENOUS at 01:01

## 2017-01-23 RX ADMIN — CARVEDILOL 25 MG: 25 TABLET, FILM COATED ORAL at 09:01

## 2017-01-23 RX ADMIN — CEFTAROLINE FOSAMIL 600 MG: 600 POWDER, FOR SOLUTION INTRAVENOUS at 06:01

## 2017-01-23 RX ADMIN — ERYTHROMYCIN 1 INCH: 5 OINTMENT OPHTHALMIC at 05:01

## 2017-01-23 RX ADMIN — CARVEDILOL 25 MG: 25 TABLET, FILM COATED ORAL at 05:01

## 2017-01-23 RX ADMIN — Medication 3 ML: at 08:01

## 2017-01-23 RX ADMIN — Medication 3 ML: at 05:01

## 2017-01-23 RX ADMIN — ERYTHROMYCIN 1 INCH: 5 OINTMENT OPHTHALMIC at 11:01

## 2017-01-23 RX ADMIN — HEPARIN SODIUM AND DEXTROSE 21 UNITS/KG/HR: 10000; 5 INJECTION INTRAVENOUS at 09:01

## 2017-01-23 RX ADMIN — VERAPAMIL HYDROCHLORIDE 240 MG: 120 TABLET, FILM COATED, EXTENDED RELEASE ORAL at 09:01

## 2017-01-23 RX ADMIN — VERAPAMIL HYDROCHLORIDE 240 MG: 120 TABLET, FILM COATED, EXTENDED RELEASE ORAL at 08:01

## 2017-01-23 RX ADMIN — LISINOPRIL 20 MG: 20 TABLET ORAL at 09:01

## 2017-01-23 RX ADMIN — VANCOMYCIN HYDROCHLORIDE 1250 MG: 1 INJECTION, POWDER, LYOPHILIZED, FOR SOLUTION INTRAVENOUS at 11:01

## 2017-01-23 NOTE — PT/OT/SLP PROGRESS
Physical Therapy      Ida Santana  MRN: [id number]    Patient not seen today secondary to patient unwilling to participate. PT educated on importance of mobility and ability to ambulate in hallway with therapy. Will follow-up according to PT POC.    Ahstyn Wright, PT   1/23/2017

## 2017-01-23 NOTE — SUBJECTIVE & OBJECTIVE
Interval History: NAEON. AOx3. Remains with leukocytosis. Multiple BCx still growing MRSA. No IR intervention per IR given low volume of fluid to drawin. Will d/w ID and Thoracic surgery for possible VATS.       Review of Systems   Constitutional: Negative for chills and fever.   HENT: Negative for sore throat.    Respiratory: Negative for cough, chest tightness and shortness of breath.    Cardiovascular: Negative for chest pain, palpitations and leg swelling.   Gastrointestinal: Negative for abdominal distention, abdominal pain, diarrhea, nausea and vomiting.   Genitourinary: Negative for dysuria, flank pain and urgency.   Skin: Negative for rash.   Neurological: Negative for dizziness, light-headedness and numbness.   Psychiatric/Behavioral: Negative for confusion.     Objective:     Vital Signs (Most Recent):  Temp: 98.6 °F (37 °C) (01/23/17 0801)  Pulse: 75 (01/23/17 0905)  Resp: 16 (01/23/17 0905)  BP: 132/60 (01/23/17 0801)  SpO2: (!) 92 % (01/23/17 0905) Vital Signs (24h Range):  Temp:  [98.6 °F (37 °C)-99.2 °F (37.3 °C)] 98.6 °F (37 °C)  Pulse:  [65-85] 75  Resp:  [14-18] 16  SpO2:  [90 %-92 %] 92 %  BP: (131-168)/(60-81) 132/60     Weight: 77.6 kg (171 lb)  Body mass index is 26.78 kg/(m^2).    Intake/Output Summary (Last 24 hours) at 01/23/17 1511  Last data filed at 01/23/17 0555   Gross per 24 hour   Intake              240 ml   Output                0 ml   Net              240 ml      Physical Exam   Constitutional: She is oriented to person, place, and time. No distress.   HENT:   Head: Normocephalic.   Mouth/Throat: No oropharyngeal exudate.   Eyes: EOM are normal. Pupils are equal, round, and reactive to light. Right eye exhibits no discharge. Left eye exhibits no discharge. Right conjunctiva is not injected. Left conjunctiva is injected.   Cardiovascular: Normal rate and regular rhythm.  Exam reveals no gallop and no friction rub.    Murmur heard.  Pulmonary/Chest: Effort normal and breath sounds  normal. No respiratory distress.   Abdominal: Soft. Bowel sounds are normal. She exhibits no distension. There is no tenderness.   Musculoskeletal: Normal range of motion. She exhibits no edema.   Neurological: She is alert and oriented to person, place, and time. No cranial nerve deficit.   Skin: She is not diaphoretic.   Psychiatric: She has a normal mood and affect.   Vitals reviewed.      Significant Labs:   CBC:     Recent Labs  Lab 01/22/17  0430 01/23/17  0415   WBC 17.09* 15.42*   HGB 9.9* 9.8*   HCT 29.4* 28.1*    285     CMP:     Recent Labs  Lab 01/22/17  0430 01/23/17  0415    141   K 4.3 3.4*    109   CO2 19* 26    126*   BUN 9 11   CREATININE 0.8 0.8   CALCIUM 8.4* 8.4*   PROT 6.3 6.4   ALBUMIN 1.9* 1.9*   BILITOT 0.6 0.6   ALKPHOS 91 71   AST 41* 35   ALT 48* 46*   ANIONGAP 10 6*   EGFRNONAA >60.0 >60.0     Significant Imaging: I have reviewed all pertinent imaging results/findings within the past 24 hours.

## 2017-01-23 NOTE — PROGRESS NOTES
[facility] Hospital Medicine  Progress Note    Patient Name: Ida Santana  MRN: [id number]  Patient Class: IP- Inpatient   Admission Date: 1/12/2017  Length of Stay: 11 days  Attending Physician: Loyda Lui MD  Primary Care Provider: Giovanna Murray MD    [facility] Medicine Team: [facility] II    Subjective:     Principal Problem:Severe sepsis    HPI:  pt is a 76 y.o.lady with history of HTN, HLD,  osteopenia  And breast cancer who is brought in by family for  acute onset of confusion this evening.  Family reports that today in the afternoon after visiting pt they noted that pt has let the bathroom sink overflow and left a pot burning on the stove this evening. She did not suffer any burns and she does not recollect the incidents very clearly. The patient reports 3 episodes of NBNB emesis with 3 episodes of diarrhea today,otherwise she denies any fever chills, abdominal pain, weakness, paraesthesia, dysuria, frequency or malodorous urin , family and opt denies any recent changes to her medication. She has never had a similar episode in the past. The patient lives alone.   In ED she was noted to be tachycardic and hypertensive, AAO X3 but inattentive and unable to focus on task. CT head did not show any acute intercranial activity and lab work up was significant for leukocytosis and UTI, utox was negative         Hospital Course:  1/13/17: Patient was admitted overnight for confusion, fever, and confusion. Was found to have UTI and was 2/4 SIRS. Began treatment with Rocephin 1g qD. Patient this AM had less confusion. Procalcitonin was elevated to 26.8. Patient feeling better but had continued fevers this today. Giving 2L NS today.       1/14/17: Patient had episode of SOB and was confused last night. ABG was ordered and showed low PO2 and procalcitonin was elevated at 26.8. Patient also continued to have fevers. CTA was ordered due to concern for pulmonary embolism -  "although WELLS score was low - and CTA showed no PE but it did show, "Scattered areas of consolidation in both lungs. This acute pulmonary disease could represent multifocal pneumonia..." Lactate was trended from 2.7 to 1.7 after a liter of NS. Patient this AM was doing well and was sitting up and walking around with assistance. We are continuing with vanc and rocephin and adding azithromycin 500mg to cover for atypicals. OT saw patient and recommended SNF. Orders placed.     1/15/17: Patient has continued to have fevers. Bcx grew out MRSA. Increased dosing of vanc as trough was 13.3 and we want btwn 15-20. OT saw patient yesterday and recommended SNF 2/2 patient's confusion. Patient this AM was not confused but has not been getting out of bed. Explained to pt and family why it is important to get out of bed and move around as to not get deconditioned. Repeating bcx after afternoon vanc. Giving 1L NS as patient has not been drinking or eating very much per family. Ordered TTE as concern for endocarditis. Ordered ICS and chest physiotherapy as pt has been coughing up mucus.     1/16/17: Patient had elevated temp overnight and continues to have leukocytosis. Bcx from yesterday was (-) x1 day. Consulted ID for assistance with patient's bacteremia. MIS scheduled along with TTE to assess for endocarditis. Repeating UA and UCX.     1/17/17: Patient continues to cough. Receiving MIS today. Had episode of fever to 101.6 overnight. Finished course of rocephin if we are concerned for pneumonia. Continuing on vanc. MIS showed no evidence of valvular endocarditis. Bcx from 1/16 show no growth. Continuing on vanc.     1/18/17: WBC continues to increase. Per ID, not broadening abx, ordered CT CAP w/ contrast. Repeat bcx today. Developed conjuctivits, on erythromycin ointment, spoke with optho -call if develops pain, discharge, decreased vision, pain with eye movement.     1/19/17: CT C/A/P resulted. Started hep gtt overnight for " renal vein thrombosis. Cavitary lesions noted (lungs, pancreas, kidney). WBC cont to rise (27). Pulmonary on board. Ordered MRI Spine C/T/L. Multiple other findings that will eventually require follow up, including consult urology for possible renal cancer.     1/20/17: CT chest showed bilateral pleural effusions and a possible carmen-aortic fluid collection. Consulted CT surgery and they recommend continuing with abx and seeing if IR can drain her left-sided pleural effusion.     1/21/17: Bcx from 1/19 grew out staph again, but only from one tube. MRI of spine did not show any evidence of spinal abscesses. Unclear source at this time. Continuing with vancomycin and will push for possible IR US-guided thoracentesis on Monday.      1/22/17: Pt continues to improve subjectively. Bcx from 1/20 grew staph in one tube and bcx from 1/19 are only + in one tube still. Still will try for IR thoracentesis on Monday.       Interval History: NAEON. AOx3. Remains with leukocytosis. Multiple BCx still growing MRSA. No IR intervention per IR given low volume of fluid to drawin. Will d/w ID and Thoracic surgery for possible VATS.       Review of Systems   Constitutional: Negative for chills and fever.   HENT: Negative for sore throat.    Respiratory: Negative for cough, chest tightness and shortness of breath.    Cardiovascular: Negative for chest pain, palpitations and leg swelling.   Gastrointestinal: Negative for abdominal distention, abdominal pain, diarrhea, nausea and vomiting.   Genitourinary: Negative for dysuria, flank pain and urgency.   Skin: Negative for rash.   Neurological: Negative for dizziness, light-headedness and numbness.   Psychiatric/Behavioral: Negative for confusion.     Objective:     Vital Signs (Most Recent):  Temp: 98.6 °F (37 °C) (01/23/17 0801)  Pulse: 75 (01/23/17 0905)  Resp: 16 (01/23/17 0905)  BP: 132/60 (01/23/17 0801)  SpO2: (!) 92 % (01/23/17 0905) Vital Signs (24h Range):  Temp:  [98.6 °F (37  °C)-99.2 °F (37.3 °C)] 98.6 °F (37 °C)  Pulse:  [65-85] 75  Resp:  [14-18] 16  SpO2:  [90 %-92 %] 92 %  BP: (131-168)/(60-81) 132/60     Weight: 77.6 kg (171 lb)  Body mass index is 26.78 kg/(m^2).    Intake/Output Summary (Last 24 hours) at 01/23/17 1511  Last data filed at 01/23/17 0555   Gross per 24 hour   Intake              240 ml   Output                0 ml   Net              240 ml      Physical Exam   Constitutional: She is oriented to person, place, and time. No distress.   HENT:   Head: Normocephalic.   Mouth/Throat: No oropharyngeal exudate.   Eyes: EOM are normal. Pupils are equal, round, and reactive to light. Right eye exhibits no discharge. Left eye exhibits no discharge. Right conjunctiva is not injected. Left conjunctiva is injected.   Cardiovascular: Normal rate and regular rhythm.  Exam reveals no gallop and no friction rub.    Murmur heard.  Pulmonary/Chest: Effort normal and breath sounds normal. No respiratory distress.   Abdominal: Soft. Bowel sounds are normal. She exhibits no distension. There is no tenderness.   Musculoskeletal: Normal range of motion. She exhibits no edema.   Neurological: She is alert and oriented to person, place, and time. No cranial nerve deficit.   Skin: She is not diaphoretic.   Psychiatric: She has a normal mood and affect.   Vitals reviewed.      Significant Labs:   CBC:     Recent Labs  Lab 01/22/17  0430 01/23/17  0415   WBC 17.09* 15.42*   HGB 9.9* 9.8*   HCT 29.4* 28.1*    285     CMP:     Recent Labs  Lab 01/22/17  0430 01/23/17  0415    141   K 4.3 3.4*    109   CO2 19* 26    126*   BUN 9 11   CREATININE 0.8 0.8   CALCIUM 8.4* 8.4*   PROT 6.3 6.4   ALBUMIN 1.9* 1.9*   BILITOT 0.6 0.6   ALKPHOS 91 71   AST 41* 35   ALT 48* 46*   ANIONGAP 10 6*   EGFRNONAA >60.0 >60.0     Significant Imaging: I have reviewed all pertinent imaging results/findings within the past 24 hours.    Assessment/Plan:      * Severe sepsis  -See MRSA  bacteremia.     Hypertension  - continue verapamil  - restarted home lisinopril 20mg and coreg  25mg      Staphylococcus aureus sepsis  - has had continued fevers and bcx grew MRSA, concerned that 2/2 continued + bcx patient may have staph seeded somewhere, possible endocarditis. She does not have any recent significant hospital contact.   - MIS negative for endocarditis  - continuing with vanc   - repeat bcx shown staph again, repeating bcx  - ID following, FU continued recs  -  CT chest, abdomen, pelvis with contrast: septic emboli to multiple organs (lungs w/ cavitary lesions, pancreas, kidneys...) among other findings that will require follow up. (eventually urology for concern for renal cancer).    - MRI spine c/t/l - no evidence of spinal abscess  - CT chest also shows bilateral pleural effusions and a periaortic fluid collection   -No IR drainage due to low volumes.   -Will re-address with ID and thoracic surgery.     MRSA bacteremia  -See staph aureus sepsis.     Hypophosphatemia  - replete PRN  -Continue to monitor.     Conjunctivitis  Developed conjunctivitis of left eye 1/18. Pt denies pain, drainage, decreased vision.   - Erythromycin x7days  - Spoke with Ophthalmology. Call if she develops pain, drainage, decreased vision, or pain with eye movement.   - Cont to monitor, is improving    MRSA pneumonia    -See staph aureus bacteremia.     Hypokalemia  -Replete PRN  -Continue to monitor.     VTE Risk Mitigation         Ordered     Low Risk of VTE  Once      01/13/17 0158          LONG Berry II  Department of Hospital Medicine   [facility]                      01/23/2017                             STAFF PHYSICIAN NOTE                                   Attending Attestation for Rounds with Resident  I have reviewed and concur with the resident's history, physical, assessment, and plan.  I have personally interviewed and examined the patient at bedside and agree with the  resident's findings.                                  ________________________________________                                     REASON FOR ADMISSION:     Patient is 76 y.o.female    Body mass index is 26.78 kg/(m^2).,  Severe sepsis

## 2017-01-23 NOTE — ASSESSMENT & PLAN NOTE
- has had continued fevers and bcx grew MRSA, concerned that 2/2 continued + bcx patient may have staph seeded somewhere, possible endocarditis. She does not have any recent significant hospital contact.   - MIS negative for endocarditis  - continuing with vanc   - repeat bcx shown staph again, repeating bcx  - ID following, FU continued recs  -  CT chest, abdomen, pelvis with contrast: septic emboli to multiple organs (lungs w/ cavitary lesions, pancreas, kidneys...) among other findings that will require follow up. (eventually urology for concern for renal cancer).    - MRI spine c/t/l - no evidence of spinal abscess  - CT chest also shows bilateral pleural effusions and a periaortic fluid collection   -No IR drainage due to low volumes.   -Will re-address with ID and thoracic surgery.

## 2017-01-23 NOTE — SUBJECTIVE & OBJECTIVE
Interval History: Patient feeling well, no complain, still low grade fever, otherwise no change in clinical condition    HPI:   77 y/o female with history of HTN, HLD,  Osteopenia, remote breast cancer came to hospital for family members noted that patient let the bathroom sink overflow and left a pot burning on the stove the day of admission. Patient lives by herself and is fully functional. The patient reports 3 episodes of vomits gastric content and 3 episodes of diarrhea today, denied fever, chills, abdominal pain, weakness, paraesthesia, dysuria, frequency or malodorous urin, skin breakdown, wounds, trauma, central lines, joint or back pain. Denies similar episodes in the past.        Review of Systems   Constitutional: Negative for chills and fever.   HENT: Negative for sore throat.    Respiratory: Negative for cough, chest tightness and shortness of breath.    Cardiovascular: Negative for chest pain, palpitations and leg swelling.   Gastrointestinal: Negative for abdominal distention, abdominal pain, diarrhea, nausea and vomiting.   Genitourinary: Negative for dysuria, flank pain and urgency.   Skin: Negative for rash.   Neurological: Negative for dizziness, light-headedness and numbness.   Psychiatric/Behavioral: Negative for confusion.     Objective:     Vital Signs (Most Recent):  Temp: 98.6 °F (37 °C) (01/23/17 0801)  Pulse: 75 (01/23/17 0905)  Resp: 16 (01/23/17 0905)  BP: 132/60 (01/23/17 0801)  SpO2: (!) 92 % (01/23/17 0905) Vital Signs (24h Range):  Temp:  [98.6 °F (37 °C)-99.2 °F (37.3 °C)] 98.6 °F (37 °C)  Pulse:  [65-85] 75  Resp:  [14-18] 16  SpO2:  [90 %-95 %] 92 %  BP: (127-168)/(60-82) 132/60     Weight: 77.6 kg (171 lb)  Body mass index is 26.78 kg/(m^2).    Estimated Creatinine Clearance: 66.2 mL/min (based on Cr of 0.8).    Physical Exam   Constitutional: She is oriented to person, place, and time. No distress.   HENT:   Head: Normocephalic.   Mouth/Throat: No oropharyngeal exudate.   Eyes:  No scleral icterus.   Cardiovascular: Normal rate and regular rhythm.  Exam reveals no gallop and no friction rub.    Murmur heard.  Pulmonary/Chest: Effort normal. No respiratory distress. She has no wheezes. She has no rales.   Abdominal: Soft. Bowel sounds are normal. She exhibits no distension. There is no tenderness. There is no rebound and no guarding.   Musculoskeletal: Normal range of motion.   Lymphadenopathy:     She has no cervical adenopathy.   Neurological: She is alert and oriented to person, place, and time. No cranial nerve deficit.   Skin: No rash noted. She is not diaphoretic. No erythema.   Vitals reviewed.      Significant Labs:   Blood Culture:     Recent Labs  Lab 01/18/17  0611 01/19/17  1210 01/20/17  0546 01/20/17  0551 01/21/17  1153   LABBLOO Gram stain aer bottle: Gram positive cocci in clusters resembling Staph   Results called to and read back by:Ness Lagos RN 01/19/2017  04:10  METHICILLIN RESISTANT STAPHYLOCOCCUS AUREUSFor susceptibility see order # [id number]  Gram stain aer bottle: Gram positive cocci in clusters resembling Staph   Results called to and read back by:Ness Lagos RN 01/19/2017  04:10  METHICILLIN RESISTANT STAPHYLOCOCCUS AUREUS Gram stain aer bottle: Gram positive cocci in clusters resembling Staph   Results called to and read back by: Danyelle Langford RN  01/21/2017    05:58  METHICILLIN RESISTANT STAPHYLOCOCCUS AUREUSFor susceptibility see order # [id number]  No Growth to date  No Growth to date  No Growth to date  No Growth to date Gram stain aer bottle: Gram positive cocci in clusters resembling Staph   Results called to and read back by: Ashley Coleman RN  01/21/2017  15:11  METHICILLIN RESISTANT STAPHYLOCOCCUS AUREUSFor susceptibility see order # [id number] No Growth to date  No Growth to date  No Growth to date No Growth to date  No Growth to date  No Growth to date  No Growth to date     BMP:     Recent Labs  Lab 01/23/17  0415    *      K 3.4*      CO2 26   BUN 11   CREATININE 0.8   CALCIUM 8.4*     CBC:     Recent Labs  Lab 01/22/17  0430 01/23/17 0415   WBC 17.09* 15.42*   HGB 9.9* 9.8*   HCT 29.4* 28.1*    285     CMP:     Recent Labs  Lab 01/22/17  0430 01/23/17 0415    141   K 4.3 3.4*    109   CO2 19* 26    126*   BUN 9 11   CREATININE 0.8 0.8   CALCIUM 8.4* 8.4*   PROT 6.3 6.4   ALBUMIN 1.9* 1.9*   BILITOT 0.6 0.6   ALKPHOS 91 71   AST 41* 35   ALT 48* 46*   ANIONGAP 10 6*   EGFRNONAA >60.0 >60.0       Significant Imaging: I have reviewed all pertinent imaging results/findings within the past 24 hours.

## 2017-01-23 NOTE — PROGRESS NOTES
[facility]  Infectious Disease  Progress Note    Patient Name: Ida Santana  MRN: [id number]  Admission Date: 1/12/2017  Length of Stay: 11 days  Attending Physician: Loyda Lui MD  Primary Care Provider: Giovanna Murray MD    Isolation Status: Contact  Assessment/Plan:      MRSA bacteremia  77 y/o female admitted for an episode of acute confusion. B/C 1/13/17, 1/14/17, 1/15/17, 1/16/17, 1/18/17, 1/19/17, 1/20/17 positive for MRSA, 1/21/17 in progress. Initial source of bacteremia could be related to pneumonia (no hardware, no skin breakdown, no cellulitis, no wounds, no trauma, no central lines, no joint or back pain).     - Findings on Chest/A/P CT scan (on lungs and kidney) 1/18/17 most likely due to septic emboli.   - MRI cervical, thoracic, lumbar 1/20/17 negative for osteomyelitis/abscess.  - MIS 1/17/17 negative for endocarditis  - B/C have remained positive, most likely due to poor source control  - B/C 1/21/17 NGTD  - due to persistent bacteremia and most likely source of MRSA is pneumonia, will discontinue Vancomycin and start Ceftaroline                Anticipated Disposition: pending    Thank you for your consult. I will follow-up with patient. Please contact us if you have any additional questions.     Farhad Finney MD  Infectious Disease Fellow, PGY-4  Pager: [phone]  [facility]    Subjective:     Principal Problem:Severe sepsis    Interval History: Patient feeling well, no complain, still low grade fever, otherwise no change in clinical condition    HPI:   77 y/o female with history of HTN, HLD,  Osteopenia, remote breast cancer came to hospital for family members noted that patient let the bathroom sink overflow and left a pot burning on the stove the day of admission. Patient lives by herself and is fully functional. The patient reports 3 episodes of vomits gastric content and 3 episodes of diarrhea today, denied fever, chills, abdominal pain,  weakness, paraesthesia, dysuria, frequency or malodorous urin, skin breakdown, wounds, trauma, central lines, joint or back pain. Denies similar episodes in the past.        Review of Systems   Constitutional: Negative for chills and fever.   HENT: Negative for sore throat.    Respiratory: Negative for cough, chest tightness and shortness of breath.    Cardiovascular: Negative for chest pain, palpitations and leg swelling.   Gastrointestinal: Negative for abdominal distention, abdominal pain, diarrhea, nausea and vomiting.   Genitourinary: Negative for dysuria, flank pain and urgency.   Skin: Negative for rash.   Neurological: Negative for dizziness, light-headedness and numbness.   Psychiatric/Behavioral: Negative for confusion.     Objective:     Vital Signs (Most Recent):  Temp: 98.6 °F (37 °C) (01/23/17 0801)  Pulse: 75 (01/23/17 0905)  Resp: 16 (01/23/17 0905)  BP: 132/60 (01/23/17 0801)  SpO2: (!) 92 % (01/23/17 0905) Vital Signs (24h Range):  Temp:  [98.6 °F (37 °C)-99.2 °F (37.3 °C)] 98.6 °F (37 °C)  Pulse:  [65-85] 75  Resp:  [14-18] 16  SpO2:  [90 %-95 %] 92 %  BP: (127-168)/(60-82) 132/60     Weight: 77.6 kg (171 lb)  Body mass index is 26.78 kg/(m^2).    Estimated Creatinine Clearance: 66.2 mL/min (based on Cr of 0.8).    Physical Exam   Constitutional: She is oriented to person, place, and time. No distress.   HENT:   Head: Normocephalic.   Mouth/Throat: No oropharyngeal exudate.   Eyes: No scleral icterus.   Cardiovascular: Normal rate and regular rhythm.  Exam reveals no gallop and no friction rub.    Murmur heard.  Pulmonary/Chest: Effort normal. No respiratory distress. She has no wheezes. She has no rales.   Abdominal: Soft. Bowel sounds are normal. She exhibits no distension. There is no tenderness. There is no rebound and no guarding.   Musculoskeletal: Normal range of motion.   Lymphadenopathy:     She has no cervical adenopathy.   Neurological: She is alert and oriented to person, place, and  time. No cranial nerve deficit.   Skin: No rash noted. She is not diaphoretic. No erythema.   Vitals reviewed.      Significant Labs:   Blood Culture:     Recent Labs  Lab 01/18/17  0611 01/19/17  1210 01/20/17  0546 01/20/17  0551 01/21/17  1153   LABBLOO Gram stain aer bottle: Gram positive cocci in clusters resembling Staph   Results called to and read back by:Ness Lagos RN 01/19/2017  04:10  METHICILLIN RESISTANT STAPHYLOCOCCUS AUREUSFor susceptibility see order # [id number]  Gram stain aer bottle: Gram positive cocci in clusters resembling Staph   Results called to and read back by:Ness Lagos RN 01/19/2017  04:10  METHICILLIN RESISTANT STAPHYLOCOCCUS AUREUS Gram stain aer bottle: Gram positive cocci in clusters resembling Staph   Results called to and read back by: Danyelle Langford RN  01/21/2017    05:58  METHICILLIN RESISTANT STAPHYLOCOCCUS AUREUSFor susceptibility see order # [id number]  No Growth to date  No Growth to date  No Growth to date  No Growth to date Gram stain aer bottle: Gram positive cocci in clusters resembling Staph   Results called to and read back by: Ashley Coleman RN  01/21/2017  15:11  METHICILLIN RESISTANT STAPHYLOCOCCUS AUREUSFor susceptibility see order # [id number] No Growth to date  No Growth to date  No Growth to date No Growth to date  No Growth to date  No Growth to date  No Growth to date     BMP:     Recent Labs  Lab 01/23/17  0415   *      K 3.4*      CO2 26   BUN 11   CREATININE 0.8   CALCIUM 8.4*     CBC:     Recent Labs  Lab 01/22/17  0430 01/23/17  0415   WBC 17.09* 15.42*   HGB 9.9* 9.8*   HCT 29.4* 28.1*    285     CMP:     Recent Labs  Lab 01/22/17  0430 01/23/17  0415    141   K 4.3 3.4*    109   CO2 19* 26    126*   BUN 9 11   CREATININE 0.8 0.8   CALCIUM 8.4* 8.4*   PROT 6.3 6.4   ALBUMIN 1.9* 1.9*   BILITOT 0.6 0.6   ALKPHOS 91 71   AST 41* 35   ALT 48* 46*   ANIONGAP 10 6*   EGFRNONAA >60.0  >60.0       Significant Imaging: I have reviewed all pertinent imaging results/findings within the past 24 hours.

## 2017-01-23 NOTE — ASSESSMENT & PLAN NOTE
75 y/o female admitted for an episode of acute confusion. B/C 1/13/17, 1/14/17, 1/15/17, 1/16/17, 1/18/17, 1/19/17, 1/20/17 positive for MRSA, 1/21/17 in progress. Initial source of bacteremia could be related to pneumonia (no hardware, no skin breakdown, no cellulitis, no wounds, no trauma, no central lines, no joint or back pain).     - Findings on Chest/A/P CT scan (on lungs and kidney) 1/18/17 most likely due to septic emboli.   - MRI cervical, thoracic, lumbar 1/20/17 negative for osteomyelitis/abscess.  - MIS 1/17/17 negative for endocarditis  - B/C have remained positive, most likely due to poor source control  - B/C 1/21/17 NGTD  - due to persistent bacteremia and most likely source of MRSA is pneumonia, will discontinue Vancomycin and start Ceftaroline

## 2017-01-23 NOTE — PROGRESS NOTES
[facility]  Adult Nutrition  Consult Note    SUMMARY     Recommendations    Recommendation/Intervention: 1. Current diet order exceeding EEN by providing 150% of EEN; Rec to change diet order to 1800 misael diabetic 2. Cont Boost OS to help meet EPN; if glu becomes elevated, change to Boost Glu Control TID  Goals: Maintain meal intake >50%  Nutrition Goal Status: new       Continuum of Care Plan    Referral to Outpatient Services: other (see comments) (Nutr D/c Plan: adequate meal intake to meet EEN/EPN)    Reason for Assessment    Reason for Assessment: length of stay  Diagnosis: infection/sepsis, other (see comments) (severe sepsis; MRSA bacteremia-source unclear; w/u ongoing)  Relevent Medical History: HTN, HLD, osteopenia   Interdisciplinary Rounds: did not attend     General Information Comments: Was not able to speak to pt; per RN documentation, pt w/ adequate meal/OS intake. Denied wt loss pta    Nutrition Prescription Ordered    Current Diet Order: 2400 calorie diabetic              Oral Nutrition Supplement: Boost Plus TID        Nutrition Risk Screen     Nutrition Risk Screen: no indicators present, other (see comments)    Nutrition/Diet History       Typical Food/Fluid Intake: adequate  Food Preferences: EPI        Factors Affecting Nutritional Intake: difficulty/impaired swallowing, other (see comments) (none)     Labs/Tests/Procedures/Meds       Pertinent Labs Reviewed: reviewed  Pertinent Labs Comments: K 3.4, Glu 126, WBC 15.42 (improving), POCT's 128-213, A1C 6.0  Pertinent Medications Reviewed: reviewed  Pertinent Medications Comments: IV abx, heparin    Physical Findings    Overall Physical Appearance: overweight        Skin: intact, other (see comments) (Ihsan Valero)    Anthropometrics       Height (inches): 67.01 in  Weight Method: Stated  Weight (kg): 77.6 kg     Ideal Body Weight (IBW), Female: 135.05 lb     % Ideal Body Weight, Female (lb): 126.68 lb  BMI (kg/m2): 26.79  BMI  Grade: 25 - 29.9 - overweight           Weight Loss: other (see comments) (denies)        Estimated/Assessed Needs    Weight Used For Calorie Calculations: 77.6 kg (171 lb 1.2 oz)   Height (cm): 170.2 cm     Energy Need Method: Hernando-St Jeor (x 1.25(PAL) = 1642 cals daily)     RMR (Hernando-St. Jeor Equation): 1313.89        Weight Used For Protein Calculations: 77.6 kg (171 lb 1.2 oz)  Protein Requirements: 78-93 gms (1-1.2 gms/kg)    Fluid Need Method: RDA Method (1ml/kcal)        Malnutrition (Undernutrition) Diagnosis    % Intake of Estimated Energy Needs: 75 - 100%  % Meal Intake: 100%  Malnutrition Reason: other (see comments) (no indicators of malnutrition present)           Nutrition Diagnosis    Nutrition Problem: Excessive energy intake  Etiology/Related To: liberal diet order  Nutrition Diagnosis Signs/Symptoms As Evidenced By: current diet exceeding EEN  Nutrition Diagnosis Status: New    Monitor and Evaluation    Food and Nutrient Intake: food and beverage intake, energy intake  Food and Nutrient Adminstration: diet order     Physical Activity and Function: nutrition-related ADLs and IADLs  Anthropometric Measurements: weight, weight change  Biochemical Data, Medical Tests and Procedures: glucose/endocrine profile, electrolyte and renal panel  Nutrition-Focused Physical Findings: overall appearance    Nutrition Risk    Level of Risk: low    Nutrition Follow-Up    RD Follow-up?: Yes

## 2017-01-23 NOTE — PLAN OF CARE
Problem: Patient Care Overview  Goal: Plan of Care Review  Recommendations     Recommendation/Intervention: 1. Current diet order exceeding EEN by providing 150% of EEN; Rec to change diet order to 1800 misael diabetic 2. Cont Boost OS to help meet EPN; if glu becomes elevated, change to Boost Glu Control TID  Goals: Maintain meal intake >50%  Nutrition Goal Status: new

## 2017-01-24 PROBLEM — I82.3 THROMBOSIS OF LEFT RENAL VEIN: Status: ACTIVE | Noted: 2017-01-24

## 2017-01-24 PROBLEM — A41.9 SEVERE SEPSIS: Status: RESOLVED | Noted: 2017-01-13 | Resolved: 2017-01-24

## 2017-01-24 PROBLEM — R65.20 SEVERE SEPSIS: Status: RESOLVED | Noted: 2017-01-13 | Resolved: 2017-01-24

## 2017-01-24 LAB
ALBUMIN SERPL BCP-MCNC: 1.9 G/DL
ALP SERPL-CCNC: 71 U/L
ALT SERPL W/O P-5'-P-CCNC: 47 U/L
ANION GAP SERPL CALC-SCNC: 8 MMOL/L
AST SERPL-CCNC: 34 U/L
BACTERIA BLD CULT: NORMAL
BASOPHILS # BLD AUTO: 0.03 K/UL
BASOPHILS NFR BLD: 0.2 %
BILIRUB SERPL-MCNC: 0.6 MG/DL
BUN SERPL-MCNC: 12 MG/DL
CALCIUM SERPL-MCNC: 8.3 MG/DL
CHLORIDE SERPL-SCNC: 110 MMOL/L
CO2 SERPL-SCNC: 23 MMOL/L
CREAT SERPL-MCNC: 0.8 MG/DL
DIFFERENTIAL METHOD: ABNORMAL
EOSINOPHIL # BLD AUTO: 0.1 K/UL
EOSINOPHIL NFR BLD: 0.6 %
ERYTHROCYTE [DISTWIDTH] IN BLOOD BY AUTOMATED COUNT: 15 %
EST. GFR  (AFRICAN AMERICAN): >60 ML/MIN/1.73 M^2
EST. GFR  (NON AFRICAN AMERICAN): >60 ML/MIN/1.73 M^2
FACT X PPP CHRO-ACNC: 0.45 IU/ML
GLUCOSE SERPL-MCNC: 112 MG/DL
HCT VFR BLD AUTO: 28 %
HGB BLD-MCNC: 9.4 G/DL
LYMPHOCYTES # BLD AUTO: 1.6 K/UL
LYMPHOCYTES NFR BLD: 11.6 %
MAGNESIUM SERPL-MCNC: 1.9 MG/DL
MCH RBC QN AUTO: 31.4 PG
MCHC RBC AUTO-ENTMCNC: 33.6 %
MCV RBC AUTO: 94 FL
MONOCYTES # BLD AUTO: 0.7 K/UL
MONOCYTES NFR BLD: 5.2 %
NEUTROPHILS # BLD AUTO: 11.5 K/UL
NEUTROPHILS NFR BLD: 80.9 %
PLATELET # BLD AUTO: 327 K/UL
PMV BLD AUTO: 9.5 FL
POCT GLUCOSE: 110 MG/DL (ref 70–110)
POCT GLUCOSE: 121 MG/DL (ref 70–110)
POCT GLUCOSE: 139 MG/DL (ref 70–110)
POCT GLUCOSE: 80 MG/DL (ref 70–110)
POTASSIUM SERPL-SCNC: 3.5 MMOL/L
PROT SERPL-MCNC: 6.5 G/DL
RBC # BLD AUTO: 2.99 M/UL
RVP - ADENOVIRUS: NORMAL
RVP - HUMAN METAPNEUMOVIRUS (HMPV): NORMAL
RVP - INFLUENZA A SUBTYPE H1 - (SEASONAL): NORMAL
RVP - INFLUENZA A SUBTYPE H3 - (SEASONAL): NORMAL
RVP - INFLUENZA A: NORMAL
RVP - INFLUENZA B: NORMAL
RVP - PARAINFLUENZA VIRUS 1: NORMAL
RVP - PARAINFLUENZA VIRUS 2: NORMAL
RVP - PARAINFLUENZA VIRUS 3: NORMAL
RVP - RESPIRATORY SYNCTIAL VIRUS (RSV) A: NORMAL
RVP - RESPIRATORY SYNCTIAL VIRUS (RSV) B: NORMAL
RVP - RESPIRATORY VIRAL PANEL, SOURCE: NORMAL
RVP - RHINOVIRUS: NORMAL
SODIUM SERPL-SCNC: 141 MMOL/L
WBC # BLD AUTO: 14.18 K/UL

## 2017-01-24 PROCEDURE — 97530 THERAPEUTIC ACTIVITIES: CPT

## 2017-01-24 PROCEDURE — 85520 HEPARIN ASSAY: CPT

## 2017-01-24 PROCEDURE — 25000003 PHARM REV CODE 250: Performed by: STUDENT IN AN ORGANIZED HEALTH CARE EDUCATION/TRAINING PROGRAM

## 2017-01-24 PROCEDURE — 94760 N-INVAS EAR/PLS OXIMETRY 1: CPT

## 2017-01-24 PROCEDURE — 97110 THERAPEUTIC EXERCISES: CPT

## 2017-01-24 PROCEDURE — 94799 UNLISTED PULMONARY SVC/PX: CPT

## 2017-01-24 PROCEDURE — 85025 COMPLETE CBC W/AUTO DIFF WBC: CPT

## 2017-01-24 PROCEDURE — 83735 ASSAY OF MAGNESIUM: CPT

## 2017-01-24 PROCEDURE — 80053 COMPREHEN METABOLIC PANEL: CPT

## 2017-01-24 PROCEDURE — 94668 MNPJ CHEST WALL SBSQ: CPT

## 2017-01-24 PROCEDURE — 36415 COLL VENOUS BLD VENIPUNCTURE: CPT

## 2017-01-24 PROCEDURE — 11000001 HC ACUTE MED/SURG PRIVATE ROOM

## 2017-01-24 PROCEDURE — 25000003 PHARM REV CODE 250: Performed by: EMERGENCY MEDICINE

## 2017-01-24 PROCEDURE — 99233 SBSQ HOSP IP/OBS HIGH 50: CPT | Mod: ,,, | Performed by: HOSPITALIST

## 2017-01-24 PROCEDURE — 25000003 PHARM REV CODE 250: Performed by: INTERNAL MEDICINE

## 2017-01-24 PROCEDURE — 99233 SBSQ HOSP IP/OBS HIGH 50: CPT | Mod: ,,, | Performed by: INTERNAL MEDICINE

## 2017-01-24 PROCEDURE — 63600175 PHARM REV CODE 636 W HCPCS: Performed by: INTERNAL MEDICINE

## 2017-01-24 PROCEDURE — 97116 GAIT TRAINING THERAPY: CPT

## 2017-01-24 RX ORDER — POTASSIUM CHLORIDE 20 MEQ/1
40 TABLET, EXTENDED RELEASE ORAL ONCE
Status: COMPLETED | OUTPATIENT
Start: 2017-01-24 | End: 2017-01-24

## 2017-01-24 RX ADMIN — ERYTHROMYCIN 1 INCH: 5 OINTMENT OPHTHALMIC at 06:01

## 2017-01-24 RX ADMIN — CEFTAROLINE FOSAMIL 600 MG: 600 POWDER, FOR SOLUTION INTRAVENOUS at 07:01

## 2017-01-24 RX ADMIN — CEFTAROLINE FOSAMIL 600 MG: 600 POWDER, FOR SOLUTION INTRAVENOUS at 05:01

## 2017-01-24 RX ADMIN — VERAPAMIL HYDROCHLORIDE 240 MG: 120 TABLET, FILM COATED, EXTENDED RELEASE ORAL at 09:01

## 2017-01-24 RX ADMIN — Medication 3 ML: at 09:01

## 2017-01-24 RX ADMIN — CARVEDILOL 25 MG: 25 TABLET, FILM COATED ORAL at 05:01

## 2017-01-24 RX ADMIN — ERYTHROMYCIN 1 INCH: 5 OINTMENT OPHTHALMIC at 05:01

## 2017-01-24 RX ADMIN — CEFTAROLINE FOSAMIL 600 MG: 600 POWDER, FOR SOLUTION INTRAVENOUS at 01:01

## 2017-01-24 RX ADMIN — Medication 3 ML: at 06:01

## 2017-01-24 RX ADMIN — Medication 3 ML: at 02:01

## 2017-01-24 RX ADMIN — ERYTHROMYCIN 1 INCH: 5 OINTMENT OPHTHALMIC at 11:01

## 2017-01-24 RX ADMIN — VERAPAMIL HYDROCHLORIDE 240 MG: 120 TABLET, FILM COATED, EXTENDED RELEASE ORAL at 07:01

## 2017-01-24 RX ADMIN — LISINOPRIL 20 MG: 20 TABLET ORAL at 07:01

## 2017-01-24 RX ADMIN — HEPARIN SODIUM AND DEXTROSE 21 UNITS/KG/HR: 10000; 5 INJECTION INTRAVENOUS at 01:01

## 2017-01-24 RX ADMIN — CARVEDILOL 25 MG: 25 TABLET, FILM COATED ORAL at 07:01

## 2017-01-24 RX ADMIN — POTASSIUM CHLORIDE 40 MEQ: 1500 TABLET, EXTENDED RELEASE ORAL at 05:01

## 2017-01-24 NOTE — ASSESSMENT & PLAN NOTE
- has had continued fevers and bcx grew MRSA, concerned that 2/2 continued + bcx patient may have staph seeded somewhere, possible endocarditis. She does not have any recent significant hospital contact.   - MIS negative for endocarditis  - repeat bcx shown staph again, repeating bcx  - ID following, FU continued recs  - CT chest, abdomen, pelvis with contrast: septic emboli to multiple organs (lungs w/ cavitary lesions, pancreas, kidneys...) among other findings that will require follow up. (eventually urology for concern for renal cancer).    - MRI spine c/t/l - no evidence of spinal abscess  - CT chest also shows bilateral pleural effusions and a periaortic fluid collection   -No IR drainage due to low volumes. No Pulm dx thoracentesis due to low volumes.    -Will re-address with ID and thoracic surgery.   - Per ID, switch from vanc to ceftaroline. 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  Followup with ultrasound in 6 months should be obtained. Hyperechoic round mass adjacent to the superior aspect of the left kidney, nonspecific and better evaluated on recent CT exam. Bilateral medical renal disease with elevated arterial resistive indices.

## 2017-01-24 NOTE — PLAN OF CARE
Problem: Pressure Ulcer Risk (Ihsan Scale) (Adult,Obstetrics,Pediatric)  Intervention: Turn/Reposition Often  No skin breakdown noted.

## 2017-01-24 NOTE — SUBJECTIVE & OBJECTIVE
Interval History: No acute events overnight. Pt feels well this morning, eating breakfast, tired. Pt denies SOB, CP, abd pain.       Review of Systems   HENT: Negative for sore throat.    Eyes: Positive for redness. Negative for pain and visual disturbance.   Respiratory: Negative for shortness of breath.    Cardiovascular: Negative for chest pain.   Gastrointestinal: Negative for abdominal pain and constipation.   Neurological: Negative for headaches.   Psychiatric/Behavioral: Negative for confusion.     Objective:     Vital Signs (Most Recent):  Temp: 98.4 °F (36.9 °C) (01/24/17 1145)  Pulse: 74 (01/24/17 1145)  Resp: 18 (01/24/17 1145)  BP: (!) 145/62 (01/24/17 1145)  SpO2: (!) 94 % (01/24/17 1145) Vital Signs (24h Range):  Temp:  [98.4 °F (36.9 °C)-99 °F (37.2 °C)] 98.4 °F (36.9 °C)  Pulse:  [66-76] 74  Resp:  [18] 18  SpO2:  [90 %-94 %] 94 %  BP: (125-152)/(60-70) 145/62        Physical Exam   Constitutional: She is oriented to person, place, and time. No distress.   HENT:   Head: Normocephalic.   Eyes: EOM are normal. Right conjunctiva is not injected. Left conjunctiva is injected.   Injection improving.    Cardiovascular: Normal rate and regular rhythm.  Exam reveals no gallop and no friction rub.    Murmur heard.  Pulmonary/Chest: Effort normal and breath sounds normal. No respiratory distress.   Abdominal: Soft. Bowel sounds are normal. She exhibits no distension. There is no tenderness.   Musculoskeletal: She exhibits no edema.   Neurological: She is alert and oriented to person, place, and time. No cranial nerve deficit.   Skin: She is not diaphoretic.   Psychiatric: She has a normal mood and affect.   Vitals reviewed.      Significant Labs:   CBC:     Recent Labs  Lab 01/23/17 0415 01/24/17 0457   WBC 15.42* 14.18*   HGB 9.8* 9.4*   HCT 28.1* 28.0*    327     CMP:     Recent Labs  Lab 01/23/17 0415 01/24/17 0457    141   K 3.4* 3.5    110   CO2 26 23   * 112*   BUN 11 12    CREATININE 0.8 0.8   CALCIUM 8.4* 8.3*   PROT 6.4 6.5   ALBUMIN 1.9* 1.9*   BILITOT 0.6 0.6   ALKPHOS 71 71   AST 35 34   ALT 46* 47*   ANIONGAP 6* 8   EGFRNONAA >60.0 >60.0     Significant Imaging: I have reviewed all pertinent imaging results/findings within the past 24 hours.

## 2017-01-24 NOTE — PLAN OF CARE
Problem: Infection, Risk/Actual (Adult)  Intervention: Manage Suspected/Actual Infection  Patient continues to receive Teflano 600mg IV every 8 hours; WBC decreased to 14.1 from 15.4; afebrile this shift. MRSA precautions maintained.

## 2017-01-24 NOTE — PLAN OF CARE
Problem: Physical Therapy Goal  Goal: Physical Therapy Goal  Goals to be met by: 17     Patient will increase functional independence with mobility by performin. Supine to sit with Modified [place]- Met  2. Sit to supine with Modified [place]- Met  3. Sit to stand transfer with Supervision- Met  4. Bed to chair transfer with Supervision using LRAD  5. Gait x 150 feet with Supervision using LRAD   6. BLE therapeutic exercises x 10 with independence  Outcome: Ongoing (interventions implemented as appropriate)  Pt progressing towards goals. All goals remain appropriate at this time.     Ashtyn Wright DPT, PT  2017

## 2017-01-24 NOTE — ASSESSMENT & PLAN NOTE
Developed conjunctivitis of left eye 1/18. Pt denies pain, drainage, decreased vision.   - Erythromycin x7days   - Spoke with Ophthalmology. Call if she develops pain, drainage, decreased vision, or pain with eye movement.   - Cont to monitor, is improving  - Expected stop date would be 1/24; however given that injection persists, will continue erythromycin for now.

## 2017-01-24 NOTE — PLAN OF CARE
Problem: Patient Care Overview  Goal: Plan of Care Review  Outcome: Ongoing (interventions implemented as appropriate)  Pt free of falls or injury during shift. Pain assessed and denied. Heparin running at 16.3 mL/hr. Pt lying in bed, bed in lowest position with wheels locked, side rails raised x2, call light within reach, family at bedside. Will continue to monitor.

## 2017-01-24 NOTE — PLAN OF CARE
Problem: Patient Care Overview  Goal: Plan of Care Review  Outcome: Ongoing (interventions implemented as appropriate)  Alert/oriented-inattentive. Up to bathroom and back w/o difficulty. Skin intact. Continues to receive IV antibiotic therapy for MRSA sepsis. Remains on heparin drip for left renal vein clot-theraupetic. No falls/trauma this shift.

## 2017-01-25 PROBLEM — N28.89 KIDNEY MASS: Status: ACTIVE | Noted: 2017-01-25

## 2017-01-25 LAB
ALBUMIN SERPL BCP-MCNC: 2.1 G/DL
ALP SERPL-CCNC: 65 U/L
ALT SERPL W/O P-5'-P-CCNC: 49 U/L
ANION GAP SERPL CALC-SCNC: 10 MMOL/L
AST SERPL-CCNC: 41 U/L
BACTERIA BLD CULT: NORMAL
BASOPHILS # BLD AUTO: 0.02 K/UL
BASOPHILS NFR BLD: 0.2 %
BILIRUB SERPL-MCNC: 0.5 MG/DL
BUN SERPL-MCNC: 11 MG/DL
CALCIUM SERPL-MCNC: 8.7 MG/DL
CHLORIDE SERPL-SCNC: 108 MMOL/L
CO2 SERPL-SCNC: 21 MMOL/L
CREAT SERPL-MCNC: 0.8 MG/DL
DIFFERENTIAL METHOD: ABNORMAL
EOSINOPHIL # BLD AUTO: 0.1 K/UL
EOSINOPHIL NFR BLD: 0.8 %
ERYTHROCYTE [DISTWIDTH] IN BLOOD BY AUTOMATED COUNT: 15.1 %
EST. GFR  (AFRICAN AMERICAN): >60 ML/MIN/1.73 M^2
EST. GFR  (NON AFRICAN AMERICAN): >60 ML/MIN/1.73 M^2
FACT X PPP CHRO-ACNC: 0.44 IU/ML
GLUCOSE SERPL-MCNC: 113 MG/DL
HCT VFR BLD AUTO: 29.4 %
HGB BLD-MCNC: 10 G/DL
LYMPHOCYTES # BLD AUTO: 1.3 K/UL
LYMPHOCYTES NFR BLD: 11.8 %
MAGNESIUM SERPL-MCNC: 2.2 MG/DL
MCH RBC QN AUTO: 31.3 PG
MCHC RBC AUTO-ENTMCNC: 34 %
MCV RBC AUTO: 92 FL
MONOCYTES # BLD AUTO: 0.8 K/UL
MONOCYTES NFR BLD: 7.3 %
NEUTROPHILS # BLD AUTO: 8.8 K/UL
NEUTROPHILS NFR BLD: 78.9 %
PLATELET # BLD AUTO: 331 K/UL
PMV BLD AUTO: 10.6 FL
POCT GLUCOSE: 93 MG/DL (ref 70–110)
POCT GLUCOSE: 94 MG/DL (ref 70–110)
POTASSIUM SERPL-SCNC: 3.8 MMOL/L
PROT SERPL-MCNC: 7 G/DL
RBC # BLD AUTO: 3.19 M/UL
SODIUM SERPL-SCNC: 139 MMOL/L
WBC # BLD AUTO: 11.12 K/UL

## 2017-01-25 PROCEDURE — 87040 BLOOD CULTURE FOR BACTERIA: CPT | Mod: 59

## 2017-01-25 PROCEDURE — 80053 COMPREHEN METABOLIC PANEL: CPT

## 2017-01-25 PROCEDURE — 25000003 PHARM REV CODE 250: Performed by: STUDENT IN AN ORGANIZED HEALTH CARE EDUCATION/TRAINING PROGRAM

## 2017-01-25 PROCEDURE — 63600175 PHARM REV CODE 636 W HCPCS: Performed by: INTERNAL MEDICINE

## 2017-01-25 PROCEDURE — 36569 INSJ PICC 5 YR+ W/O IMAGING: CPT

## 2017-01-25 PROCEDURE — 25000003 PHARM REV CODE 250: Performed by: EMERGENCY MEDICINE

## 2017-01-25 PROCEDURE — 25000003 PHARM REV CODE 250: Performed by: INTERNAL MEDICINE

## 2017-01-25 PROCEDURE — 02HV33Z INSERTION OF INFUSION DEVICE INTO SUPERIOR VENA CAVA, PERCUTANEOUS APPROACH: ICD-10-PCS | Performed by: HOSPITALIST

## 2017-01-25 PROCEDURE — 83735 ASSAY OF MAGNESIUM: CPT

## 2017-01-25 PROCEDURE — 85025 COMPLETE CBC W/AUTO DIFF WBC: CPT

## 2017-01-25 PROCEDURE — C1751 CATH, INF, PER/CENT/MIDLINE: HCPCS

## 2017-01-25 PROCEDURE — 11000001 HC ACUTE MED/SURG PRIVATE ROOM

## 2017-01-25 PROCEDURE — 76937 US GUIDE VASCULAR ACCESS: CPT

## 2017-01-25 PROCEDURE — 85520 HEPARIN ASSAY: CPT

## 2017-01-25 PROCEDURE — 99233 SBSQ HOSP IP/OBS HIGH 50: CPT | Mod: ,,, | Performed by: HOSPITALIST

## 2017-01-25 RX ORDER — SODIUM CHLORIDE 0.9 % (FLUSH) 0.9 %
10 SYRINGE (ML) INJECTION EVERY 6 HOURS
Status: DISCONTINUED | OUTPATIENT
Start: 2017-01-25 | End: 2017-01-27 | Stop reason: HOSPADM

## 2017-01-25 RX ORDER — SODIUM CHLORIDE 0.9 % (FLUSH) 0.9 %
10 SYRINGE (ML) INJECTION
Status: DISCONTINUED | OUTPATIENT
Start: 2017-01-25 | End: 2017-01-27 | Stop reason: HOSPADM

## 2017-01-25 RX ADMIN — LISINOPRIL 20 MG: 20 TABLET ORAL at 08:01

## 2017-01-25 RX ADMIN — ERYTHROMYCIN 1 INCH: 5 OINTMENT OPHTHALMIC at 05:01

## 2017-01-25 RX ADMIN — ERYTHROMYCIN 1 INCH: 5 OINTMENT OPHTHALMIC at 12:01

## 2017-01-25 RX ADMIN — CARVEDILOL 25 MG: 25 TABLET, FILM COATED ORAL at 04:01

## 2017-01-25 RX ADMIN — VERAPAMIL HYDROCHLORIDE 240 MG: 120 TABLET, FILM COATED, EXTENDED RELEASE ORAL at 08:01

## 2017-01-25 RX ADMIN — Medication 3 ML: at 09:01

## 2017-01-25 RX ADMIN — CEFTAROLINE FOSAMIL 600 MG: 600 POWDER, FOR SOLUTION INTRAVENOUS at 04:01

## 2017-01-25 RX ADMIN — CEFTAROLINE FOSAMIL 600 MG: 600 POWDER, FOR SOLUTION INTRAVENOUS at 02:01

## 2017-01-25 RX ADMIN — CARVEDILOL 25 MG: 25 TABLET, FILM COATED ORAL at 08:01

## 2017-01-25 RX ADMIN — HEPARIN SODIUM AND DEXTROSE 21 UNITS/KG/HR: 10000; 5 INJECTION INTRAVENOUS at 06:01

## 2017-01-25 RX ADMIN — Medication 3 ML: at 05:01

## 2017-01-25 RX ADMIN — VERAPAMIL HYDROCHLORIDE 240 MG: 120 TABLET, FILM COATED, EXTENDED RELEASE ORAL at 09:01

## 2017-01-25 RX ADMIN — RIVAROXABAN 15 MG: 15 TABLET, FILM COATED ORAL at 05:01

## 2017-01-25 RX ADMIN — CEFTAROLINE FOSAMIL 600 MG: 600 POWDER, FOR SOLUTION INTRAVENOUS at 10:01

## 2017-01-25 NOTE — SUBJECTIVE & OBJECTIVE
Interval History: No acute events overnight. Pt cont to feel well; denies SOB, reports good PT and walking the halls. Plan for PICC placement and d/c with HH.       Review of Systems   Eyes: Positive for redness. Negative for pain and visual disturbance.   Respiratory: Negative for shortness of breath.    Cardiovascular: Negative for chest pain.   Gastrointestinal: Negative for abdominal pain and constipation.   Neurological: Negative for headaches.     Objective:     Vital Signs (Most Recent):  Temp: 98.9 °F (37.2 °C) (01/25/17 0831)  Pulse: 73 (01/25/17 0831)  Resp: 18 (01/25/17 0831)  BP: (!) 151/67 (01/25/17 0831)  SpO2: (!) 94 % (01/25/17 0831) Vital Signs (24h Range):  Temp:  [98.4 °F (36.9 °C)-98.9 °F (37.2 °C)] 98.9 °F (37.2 °C)  Pulse:  [70-78] 73  Resp:  [18] 18  SpO2:  [93 %-94 %] 94 %  BP: (133-151)/(60-70) 151/67        Physical Exam   Constitutional: She is oriented to person, place, and time. No distress.   HENT:   Head: Normocephalic.   Eyes: EOM are normal. Right conjunctiva is not injected. Left conjunctiva is injected.   Injection improving.    Cardiovascular: Normal rate and regular rhythm.  Exam reveals no gallop and no friction rub.    Murmur heard.  Pulmonary/Chest: Effort normal and breath sounds normal. No respiratory distress.   Abdominal: Soft. Bowel sounds are normal. She exhibits no distension. There is no tenderness.   Musculoskeletal: She exhibits no edema.   Neurological: She is alert and oriented to person, place, and time. No cranial nerve deficit.   Skin: She is not diaphoretic.   Psychiatric: She has a normal mood and affect.   Vitals reviewed.      Significant Labs:   CBC:     Recent Labs  Lab 01/24/17 0457 01/25/17 0347   WBC 14.18* 11.12   HGB 9.4* 10.0*   HCT 28.0* 29.4*    331     CMP:     Recent Labs  Lab 01/24/17 0457 01/25/17 0347    139   K 3.5 3.8    108   CO2 23 21*   * 113*   BUN 12 11   CREATININE 0.8 0.8   CALCIUM 8.3* 8.7   PROT 6.5 7.0    ALBUMIN 1.9* 2.1*   BILITOT 0.6 0.5   ALKPHOS 71 65   AST 34 41*   ALT 47* 49*   ANIONGAP 8 10   EGFRNONAA >60.0 >60.0     Bcx 1/21: NGTD  Bcx 1/25: NGTD    Significant Imaging: no new

## 2017-01-25 NOTE — ASSESSMENT & PLAN NOTE
- Will ultimately need urology f/u for renal mass seen on CT.   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 Followup with ultrasound in 6 months should be obtained. Hyperechoic round mass adjacent to the superior aspect of the left kidney, nonspecific and better evaluated on recent CT exam. Bilateral medical renal disease with elevated arterial resistive indices.

## 2017-01-25 NOTE — PROCEDURES
"Ida Santana is a 76 y.o. female patient.    Temp: 98.9 °F (37.2 °C) (01/25/17 0831)  Pulse: 73 (01/25/17 0831)  Resp: 18 (01/25/17 0831)  BP: (!) 151/67 (01/25/17 0831)  SpO2: (!) 94 % (01/25/17 0831)  Weight: 77.6 kg (171 lb) (01/1940)  Height: 5' 7" (170.2 cm) (01/1940)    PICC  Date/Time: 1/25/2017 3:40 PM  Performed by: FERNANDO BRIAN  Consent Done: Yes  Time out: Immediately prior to procedure a time out was called to verify the correct patient, procedure, equipment, support staff and site/side marked as required  Indications: med administration and vascular access  Anesthesia: local infiltration  Local anesthetic: lidocaine 1% without epinephrine  Anesthetic Total (mL): 2  Preparation: skin prepped with ChloraPrep  Skin prep agent dried: skin prep agent completely dried prior to procedure  Sterile barriers: all five maximum sterile barriers used - cap, mask, sterile gown, sterile gloves, and large sterile sheet  Hand hygiene: hand hygiene performed prior to central venous catheter insertion  Location details: right brachial  Catheter type: double lumen  Catheter size: 5 Fr  Catheter Length: 37cm    Ultrasound guidance: yes  Vessel Caliber: medium and patent, compressibility normal  Needle advanced into vessel with real time Ultrasound guidance.  Guidewire confirmed in vessel.  Image recorded and saved.  Sterile sheath used.  Number of attempts: 1  Post-procedure: blood return through all ports, chlorhexidine patch and sterile dressing applied  Specimens: No  Implants: No  Assessment: placement verified by x-ray  Complications: none        Arnaldo Sexton  1/25/2017  "

## 2017-01-25 NOTE — ASSESSMENT & PLAN NOTE
- Hep gtt  - Transition to xarelto 1/25 (Transition from heparin gtt to xarelto tonight (15mg BID x21 days, then 20mg daily).

## 2017-01-25 NOTE — CONSULTS
Double lumen PICC place to right brachial vein.  37cm in length, 0cm exposed. Initial arm circumference 30cm. Lot # [id number].

## 2017-01-25 NOTE — PROGRESS NOTES
INFECTIOUS DISEASES FOLLOW UP NOTE      ASSESSMENT AND RECOMMENDATIONS:    1. MRSA bacteremia/pneumonia - clinically markedly better   - cultures negative as of 1/21   - continue ceftaroline and to complete 6 weeks from last negative culture - 1/21   - if cultures remain negative, PICC line can be placed   - will follow up with ID - weekly labs CBC, BMP, ESR, and CRP   - fax to [phone] - attn- Dr. Reyes  2. Renal v. Thrombosis - on anticoagulation; u/s did not reveal abscess  Will sign off -    Please call with any questions,  Janice Reyes MD  Beeper - [phone]    SUBJECTIVE:    Feels great and off O2 - has great family support    REVIEW OF SYSTEMS:  Constitutional: Denies fevers, chills, or weakness.  ENT: Denies dysphagia, nasal discharge, ear pain or discharge.  Cardiovascular: Denies chest pain, palpitations, orthopnea, or claudication.  Respiratory: Denies shortness of breath, cough, hemoptysis, or wheezing.  GI: Denies nausea/vomitting, hematochezia, melena, abd pain, or changes in appetite.  : Denies dysuria, incontinence, or hematuria.  Musculoskeletal: Denies joint pain or myalgias.  Skin/breast: Denies rashes, lumps, lesions, or discharge.  Neurologic: Denies headache, dizziness, vertigo, or paresthesias.      OBJECTIVES:    MEDICATIONS:    Antibiotics     Start     Stop Route Frequency Ordered    01/18/17 1800  erythromycin 5 mg/gram (0.5 %) ophthalmic ointment      01/25 1759 LEFT EYE 4 times daily 01/18/17 1643    01/23/17 1730  ceftaroline fosamil (TEFLARO) 600 mg in dextrose 5 % 50 mL IVPB      -- IV Every 8 hours (non-standard times) 01/23/17 1536          PHYSICAL EXAM:  VS (24h):   Vitals:    01/24/17 1500   BP: (!) 152/70   Pulse: 71   Resp: 20   Temp: 98.7 °F (37.1 °C)     Temp:  [98.4 °F (36.9 °C)-98.7 °F (37.1 °C)]       General: Afebrile, alert, comfortable, no acute distress.   HEENT: TOM. EOMI, no scleral icterus. No sinus tenderness. MMM.  Pulmonary: Non labored,clear to  auscultation A/P/L. No wheezing, crackles, or rhonchi.  Cardiac: normal S1 & S2 w/o rubs/murmurs/gallops. No JVD.   Abdominal: Non-tender, non-distended.Bowel sounds present x 4. No appreciable hepatosplenomegaly. No guarding or rebound tenderness  Extremities: Moves all extremities x 4. No peripheral edema. 2+ pulses.  Skin: No jaundice, rashes, or visible lesions.   Neurological:  Alert and oriented x 4.  CN II-XII grossly intact, sensation intact x 4.     Lines:          LABORATORY TESTS:  CBC:   Lab Results   Component Value Date    WBC 14.18 (H) 01/24/2017    WBC 15.42 (H) 01/23/2017    WBC 17.09 (H) 01/22/2017    WBC 19.41 (H) 01/21/2017    WBC 25.06 (H) 01/20/2017    HCT 28.0 (L) 01/24/2017     01/24/2017        BMP:   Recent Labs  Lab 01/24/17  0457   *      K 3.5      CO2 23   BUN 12   CREATININE 0.8   CALCIUM 8.3*   MG 1.9       LFT: Lab Results   Component Value Date    ALT 47 (H) 01/24/2017    AST 34 01/24/2017    ALKPHOS 71 01/24/2017    BILITOT 0.6 01/24/2017         Microbiology x 7d:   Microbiology Results (last 7 days)     Procedure Component Value Units Date/Time    Blood culture [[id number]] Collected:  01/19/17 1210    Order Status:  Completed Specimen:  Blood Updated:  01/24/17 1612     Blood Culture, Routine No growth after 5 days.    Blood culture [[id number]] Collected:  01/21/17 1153    Order Status:  Completed Specimen:  Blood Updated:  01/24/17 1412     Blood Culture, Routine No Growth to date     Blood Culture, Routine No Growth to date     Blood Culture, Routine No Growth to date     Blood Culture, Routine No Growth to date    Narrative:       Please take from different site than first bcx.    Blood culture [[id number]] Collected:  01/21/17 1153    Order Status:  Completed Specimen:  Blood Updated:  01/24/17 1412     Blood Culture, Routine No Growth to date     Blood Culture, Routine No Growth to date     Blood Culture, Routine No Growth to date     Blood Culture,  Routine No Growth to date    Blood culture [[id number]] Collected:  01/20/17 0551    Order Status:  Completed Specimen:  Blood Updated:  01/24/17 1212     Blood Culture, Routine No Growth to date     Blood Culture, Routine No Growth to date     Blood Culture, Routine No Growth to date     Blood Culture, Routine No Growth to date     Blood Culture, Routine No Growth to date    Respiratory Viral Panel by PCR [[id number]] Collected:  01/18/17 1312    Order Status:  Completed Updated:  01/24/17 0812     Respiratory Virus Panel, source Nasopharyngeal Swab     RVP - Human Metapneumovirus (hMPV) SEE COMMENT      See report under Media Tab        RVP - Rhinovirus SEE COMMENT      See report under Media Tab        RVP - Influenza A SEE COMMENT      See report under Media Tab        RVP - Influenza A, subtype H1 SEE COMMENT      See report under Media Tab        RVP - Influenza A, subtype H3 SEE COMMENT      See report under Media Tab        RVP - Influenza B SEE COMMENT      See report under Media Tab        RVP - Respiratory Synctial Virus (RSV) A SEE COMMENT      See report under Media Tab        RVP - Respiratory Synctial Virus (RSV) B SEE COMMENT      See report under Media Tab        RVP - Parainfluenza Virus 1 SEE COMMENT      See report under Media Tab        RVP - Parainfluenza Virus 2 SEE COMMENT      See report under Media Tab        RVP - Parainfluenza Virus 3 SEE COMMENT      See report under Media Tab        RVP - Adenovirus SEE COMMENT      Respiratory Viral Panel is a product of [employer].  It has been approved or cleared by the [place]. Food and Drug  Administration for in vitro diagnostic use.  Results should be  used in conjunction with clinical findings, and should not form  the sole basis for a diagnosis or treatment decision.  Negative results do not preclude respiratory virus infection  and should not be used as the sole basis for diagnosis,   treatment, or other management decisions.  Positive  results do not rule out bacterial infection, or  co-infection with other viruses.  The agent detected may not  be the definitive cause of the disease. The use of additional   laboratory testing (e.g. bacterial culture, immunofluorescence,  radiography) and clinical presentation must be taken into  consideration in order to obtain the final diagnosis of   respiratory viral infection.  The RVP assay cannot adequately detect Adenovirus species C,  or serotypes 7a and 41.  The RVP primers for detection of   rhinovirus have been shown to cross-react with enterovirus.  A rhinovirus reactive result should be confirmed by an   alternative method (e.g. cell culture).  The  of the Respiratory Viral Panel has   recommmended that specimens found to be negative for  Adenovirus be confirmed by an alternative method.  The  of the Respiratory Viral Panel has  recommended that specimens found to be negative for   Influenza be confirmed by an alternative method.  See report under Media Tab         Blood culture [[id number]] Collected:  01/20/17 0546    Order Status:  Completed Specimen:  Blood Updated:  01/23/17 1006     Blood Culture, Routine Gram stain aer bottle: Gram positive cocci in clusters resembling Staph      Blood Culture, Routine Results called to and read back by: Ashley Coleman RN  01/21/2017  15:11     Blood Culture, Routine --     METHICILLIN RESISTANT STAPHYLOCOCCUS AUREUS  For susceptibility see order # [id number]      Blood culture [[id number]] Collected:  01/19/17 1210    Order Status:  Completed Specimen:  Blood Updated:  01/23/17 1005     Blood Culture, Routine Gram stain aer bottle: Gram positive cocci in clusters resembling Staph      Blood Culture, Routine Results called to and read back by: Danyelle Langford RN  01/21/2017       Blood Culture, Routine 05:58     Blood Culture, Routine --     METHICILLIN RESISTANT STAPHYLOCOCCUS AUREUS  For susceptibility see order # [id number]       Narrative:       Second blood culture to be drawn from separate site.    Blood culture [[id number]]     Order Status:  Canceled Specimen:  Blood     Blood culture [[id number]]     Order Status:  Canceled Specimen:  Blood     Blood culture [[id number]] Collected:  01/18/17 0611    Order Status:  Completed Specimen:  Blood Updated:  01/21/17 1010     Blood Culture, Routine Gram stain aer bottle: Gram positive cocci in clusters resembling Staph      Blood Culture, Routine Results called to and read back by:Ness Lagos RN 01/19/2017  04:10     Blood Culture, Routine --     METHICILLIN RESISTANT STAPHYLOCOCCUS AUREUS  For susceptibility see order # [id number]      Blood culture [[id number]]  (Susceptibility) Collected:  01/18/17 0611    Order Status:  Completed Specimen:  Blood Updated:  01/21/17 1010     Blood Culture, Routine Gram stain aer bottle: Gram positive cocci in clusters resembling Staph      Blood Culture, Routine Results called to and read back by:Ness Lagos RN 01/19/2017  04:10     Blood Culture, Routine METHICILLIN RESISTANT STAPHYLOCOCCUS AUREUS    Blood culture [[id number]] Collected:  01/16/17 1949    Order Status:  Completed Specimen:  Blood Updated:  01/19/17 1023     Blood Culture, Routine Gram stain aer bottle: Gram positive cocci in clusters resembling Staph      Blood Culture, Routine Results called to and read back by: Sujatha Bennett RN 01/17/2017  15:13     Blood Culture, Routine --     METHICILLIN RESISTANT STAPHYLOCOCCUS AUREUS  For susceptibility see order #[id number]      Narrative:       Please take from different site than first bcx.    Blood culture [[id number]]  (Susceptibility) Collected:  01/16/17 1949    Order Status:  Completed Specimen:  Blood Updated:  01/19/17 1022     Blood Culture, Routine Gram stain aer bottle: Gram positive cocci in clusters resembling Staph      Blood Culture, Routine Results called to and read back by: Kristie Wolff RN  01/17/2017  17:58     Blood Culture,  Routine METHICILLIN RESISTANT STAPHYLOCOCCUS AUREUS    Respiratory virus antigens panel [[id number]] Collected:  01/18/17 1312    Order Status:  Canceled Specimen:  Respiratory from Nasopharyngeal Swab Updated:  01/18/17 1317    Narrative:       Respiratory Viruses - DFA was cancelled on 01/18/2017 at 13:50 by   SXD; Changed to sendout.    Respiratory Viral Panel by PCR Nasal Swab [[id number]] Collected:  01/18/17 1312    Order Status:  Sent Specimen:  Respiratory Updated:  01/18/17 1312    Narrative:       Specimen Source->Nasal Swab    Blood culture [[id number]] Collected:  01/15/17 1829    Order Status:  Completed Specimen:  Blood Updated:  01/18/17 1012     Blood Culture, Routine Gram stain aer bottle: Gram positive cocci in clusters resembling Staph      Blood Culture, Routine Results called to and read back by:Kristie Palomares RN 01/16/2017  13:45     Blood Culture, Routine --     METHICILLIN RESISTANT STAPHYLOCOCCUS AUREUS  For susceptibility see order #[id number]      Narrative:       Please take from different site than first bcx.  Please take at least 2 hours after administration of  afternoon vancomycin.    Blood culture [[id number]]  (Susceptibility) Collected:  01/15/17 1829    Order Status:  Completed Specimen:  Blood Updated:  01/18/17 1011     Blood Culture, Routine Gram stain aer bottle: Gram positive cocci in clusters resembling Staph      Blood Culture, Routine Results called to and read back by:Kristie Wolff RN  01/16/2017  15:34     Blood Culture, Routine METHICILLIN RESISTANT STAPHYLOCOCCUS AUREUS    Narrative:       Please take at least 2 hours after administration of  afternoon vancomycin.    Urine culture [[id number]] Collected:  01/16/17 1702    Order Status:  Completed Specimen:  Urine from Urine, Clean Catch Updated:  01/18/17 0115     Urine Culture, Routine No growth

## 2017-01-25 NOTE — PROGRESS NOTES
[facility] Hospital Medicine  Progress Note    Patient Name: Ida Santana  MRN: [id number]  Patient Class: IP- Inpatient   Admission Date: 1/12/2017  Length of Stay: 13 days  Attending Physician: Diogenes Maier MD  Primary Care Provider: Giovanna Murray MD    [facility] Medicine Team: [facility]OSP MED 1 Diann Lopez MD    Subjective:     Principal Problem:MRSA bacteremia      Interval History: No acute events overnight. Pt cont to feel well; denies SOB, reports good PT and walking the halls. Plan for PICC placement and d/c with HH.       Review of Systems   Eyes: Positive for redness. Negative for pain and visual disturbance.   Respiratory: Negative for shortness of breath.    Cardiovascular: Negative for chest pain.   Gastrointestinal: Negative for abdominal pain and constipation.   Neurological: Negative for headaches.     Objective:     Vital Signs (Most Recent):  Temp: 98.9 °F (37.2 °C) (01/25/17 0831)  Pulse: 73 (01/25/17 0831)  Resp: 18 (01/25/17 0831)  BP: (!) 151/67 (01/25/17 0831)  SpO2: (!) 94 % (01/25/17 0831) Vital Signs (24h Range):  Temp:  [98.4 °F (36.9 °C)-98.9 °F (37.2 °C)] 98.9 °F (37.2 °C)  Pulse:  [70-78] 73  Resp:  [18] 18  SpO2:  [93 %-94 %] 94 %  BP: (133-151)/(60-70) 151/67        Physical Exam   Constitutional: She is oriented to person, place, and time. No distress.   HENT:   Head: Normocephalic.   Eyes: EOM are normal. Right conjunctiva is not injected. Left conjunctiva is injected.   Injection improving.    Cardiovascular: Normal rate and regular rhythm.  Exam reveals no gallop and no friction rub.    Murmur heard.  Pulmonary/Chest: Effort normal and breath sounds normal. No respiratory distress.   Abdominal: Soft. Bowel sounds are normal. She exhibits no distension. There is no tenderness.   Musculoskeletal: She exhibits no edema.   Neurological: She is alert and oriented to person, place, and time. No cranial nerve deficit.   Skin: She is not diaphoretic.    Psychiatric: She has a normal mood and affect.   Vitals reviewed.      Significant Labs:   CBC:     Recent Labs  Lab 01/24/17  0457 01/25/17  0347   WBC 14.18* 11.12   HGB 9.4* 10.0*   HCT 28.0* 29.4*    331     CMP:     Recent Labs  Lab 01/24/17  0457 01/25/17  0347    139   K 3.5 3.8    108   CO2 23 21*   * 113*   BUN 12 11   CREATININE 0.8 0.8   CALCIUM 8.3* 8.7   PROT 6.5 7.0   ALBUMIN 1.9* 2.1*   BILITOT 0.6 0.5   ALKPHOS 71 65   AST 34 41*   ALT 47* 49*   ANIONGAP 8 10   EGFRNONAA >60.0 >60.0     Bcx 1/21: NGTD  Bcx 1/25: NGTD    Significant Imaging: no new    Assessment/Plan:      Hypertension  - continue verapamil  - restarted home lisinopril 20mg and coreg  25mg    * MRSA bacteremia  Sepsis resolved; MRSA bacteremia and PNA persisted   - ID following, appreciate recs.   - MIS negative for endocarditis  - CT chest, abdomen, pelvis: suspected septic emboli to multiple organs (lungs w/ cavitary lesions, pancreas, kidneys).   - MRI spine c/t/l - no evidence of spinal abscess  - CT chest also shows bilateral pleural effusions and a periaortic fluid collection. No drainage by IR or pulmonary given low volumes.   - Renal ultrasound: no abscess.   - Per ID, switched from vanc to ceftaroline 1/24.   - BCx 1/21 NGTD. 1/25 NGTD.     Hypophosphatemia  - replete PRN  -Continue to monitor.     Conjunctivitis  Developed conjunctivitis of left eye 1/18. Pt denies pain, drainage, decreased vision.   - Erythromycin x7days   - Spoke with Ophthalmology. Call if she develops pain, drainage, decreased vision, or pain with eye movement.   - Cont to monitor, is improving  - Expected stop date would be 1/24; however given that injection persists, will continue erythromycin for now.       MRSA pneumonia  -See staph aureus bacteremia.     Hypokalemia  -Replete PRN  -Continue to monitor.     Thrombosis of left renal vein  - Hep gtt  - Transition to xarelto 1/25 (Transition from heparin gtt to xarelto  tonight (15mg BID x21 days, then 20mg daily).     Kidney mass  - Will ultimately need urology f/u for renal mass seen on CT.   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 Followup with ultrasound in 6 months should be obtained. Hyperechoic round mass adjacent to the superior aspect of the left kidney, nonspecific and better evaluated on recent CT exam. Bilateral medical renal disease with elevated arterial resistive indices.     VTE Risk Mitigation         Ordered     Low Risk of VTE  Once      01/13/17 0158          Ppx: hep gtt, then xarelto  Diet: DM boost  PT/OT: yes  Dispo: Pending PICC line, anticipate d/c tomorrow with HH.     Discussed with staff, Dr. Maier.   THEO Lopez MD MPH PGY-1  Department of Hospital Medicine   [facility] [address]

## 2017-01-25 NOTE — ASSESSMENT & PLAN NOTE
Sepsis resolved; MRSA bacteremia and PNA persisted   - ID following, appreciate recs.   - MIS negative for endocarditis  - CT chest, abdomen, pelvis: suspected septic emboli to multiple organs (lungs w/ cavitary lesions, pancreas, kidneys).   - MRI spine c/t/l - no evidence of spinal abscess  - CT chest also shows bilateral pleural effusions and a periaortic fluid collection. No drainage by IR or pulmonary given low volumes.   - Renal ultrasound: no abscess.   - Per ID, switched from vanc to ceftaroline 1/24.   - BCx 1/21 NGTD. 1/25 NGTD.

## 2017-01-25 NOTE — PLAN OF CARE
[facility]    HOME HEALTH ORDERS  FACE TO FACE ENCOUNTER    Patient Name: Ida Santana  YOB: 1940    PCP: Giovanna Murray MD   PCP Address: [address] / [place] [address]  PCP Phone Number: [phone]  PCP Fax: [phone]    Encounter Date: 01/25/2017    Admit to Home Health    Diagnoses:  Active Hospital Problems    Diagnosis  POA    *MRSA bacteremia [R78.81]  Yes     Priority: 4     Hypertension [I10]  Yes     Priority: 2     Hypophosphatemia [E83.39]  Yes     Priority: 5     Conjunctivitis [H10.9]  No     Priority: 6     MRSA pneumonia [J15.212]  Yes     Priority: 7     Hypokalemia [E87.6]  No     Priority: 8     Kidney mass [N28.89]  Yes    Thrombosis of left renal vein [I82.3]  Yes      Resolved Hospital Problems    Diagnosis Date Resolved POA    Severe sepsis [A41.9, R65.20] 01/24/2017 Yes     Priority: 1 - High    Gastroenteritis [K52.9] 01/17/2017 Yes    Urinary tract infection without hematuria [N39.0] 01/21/2017 Yes       Future Appointments  Date Time Provider Department Center   3/13/2017 8:00 AM MACHELLE VISUAL FIELDS [facility]   3/13/2017 8:30 AM Lynda Crawford MD [facility]           I have seen and examined this patient face to face today. My clinical findings that support the need for the home health skilled services and home bound status are the following:  Weakness/numbness causing balance and gait disturbance due to Weakness/Debility making it taxing to leave home.  Medical restrictions requiring assistance of another human to leave home due to  IV infusion Needs.    Allergies:  Review of patient's allergies indicates:   Allergen Reactions    Adhesive tape-silicones      Other reaction(s): pulling skin off    Atorvastatin      Other reaction(s): Muscle pain    Pravachol  [pravastatin] Rash       Diet: diabetic diet: 2000 calorie and boost plus    Activities: activity as tolerated and with assistance  as needed    Nursing:   Weekly labs: CBC, BMP, ESR, and CRP. Fax to [phone] - attn- Dr. Reyes.     SN to complete comprehensive assessment including routine vital signs. Instruct on disease process and s/s of complications to report to MD. Review/verify medication list sent home with the patient at time of discharge  and instruct patient/caregiver as needed. Frequency may be adjusted depending on start of care date.    Notify MD if SBP > 160 or < 90; DBP > 90 or < 50; HR > 120 or < 50; Temp > 101      CONSULTS:    Physical Therapy to evaluate and treat. Evaluate for home safety and equipment needs; Establish/upgrade home exercise program. Perform / instruct on therapeutic exercises, gait training, transfer training, and Range of Motion.  Occupational Therapy to evaluate and treat. Evaluate home environment for safety and equipment needs. Perform/Instruct on transfers, ADL training, ROM, and therapeutic exercises.    MISCELLANEOUS CARE:  Home Infusion Therapy:   SN to perform Infusion Therapy/Central Line Care.  Review Central Line Care & Central Line Flush with patient.    Administer (drug and dose): Ceftaroline Fosamil (teflaro) 600mg in D5 50mL IVPB, 600mg IV over 60 minutes every 8 hours. STOP DATE 3/4/17     Weekly labs: CBC, BMP, ESR, and CRP. [facility] [phone] [facility] Reyes.     Last dose given: 1/25 (anticipate 1/26)                      Home dose due: 1/26    Scrub the Hub: Prior to accessing the line, always perform a 30 second alcohol scrub  Each lumen of the central line is to be flushed at least daily with 10 mL Normal Saline and 3 mL Heparin flush (100 units/mL)  Skilled Nurse (SN) may draw blood from IV access  Blood Draw Procedure:   - Aspirate at least 5 mL of blood   - Discard   - Obtain specimen   - Change posiflow cap   - Flush with 20 mL Normal Saline followed by a                 3-5 mL Heparin flush (100 units/mL)  Central :   - Sterile dressing changes are done  weekly and as needed.   - Use chlor-hexadine scrub to cleanse site, apply Biopatch to insertion site,       apply securement device dressing   - Posi-flow caps are changed weekly and after EVERY lab draw.   - If sterile gauze is under dressing to control oozing,                 dressing change must be performed every 24 hours until gauze is not needed.    WOUND CARE ORDERS  n/a      Medications: Review discharge medications with patient and family and provide education.      Current Discharge Medication List      START taking these medications    Details   dextrose 5 % SolP 50 mL with ceftaroline fosamil 400 mg SolR 600 mg Inject 600 mg into the vein every 8 (eight) hours. STOP DATE 3/4/17.      rivaroxaban (XARELTO) 15 mg (42)- 20 mg (9) DsPk Take 1 tablet by mouth 2 (two) times daily. PLEASE CHANGE SIG TO: Take as directed on package.  Qty: 51 tablet, Refills: 0         CONTINUE these medications which have NOT CHANGED    Details   BLOOD SUGAR DIAGNOSTIC (ONE TOUCH II TEST MISC) * * * *.  CHECKS 1 TIME DAILY AT LEAST      CALCIUM CARBONATE/VITAMIN D3 (CALCIUM WITH VITAMIN D3 ORAL) Take 1 tablet by mouth.       carvedilol (COREG) 25 MG tablet Take 25 mg by mouth 2 (two) times daily with meals.      flurandrenolide (CORDRAN) 0.05 % lotion Apply to  scalp one to two times daily as needed for dryness      ketoconazole (NIZORAL) 2 % shampoo USE EVERY OTHER DAY LET SIT ON SCALP FOR FIVE MINUTES BEFORE RINSING  Qty: 120 mL, Refills: 6      lisinopril (PRINIVIL,ZESTRIL) 20 MG tablet Take 20 mg by mouth once daily.      MULTIVITAMIN WITH MINERALS (ONE-A-DAY 50 PLUS) Tab Take 1 tablet by mouth once daily.       om-3-epa-dha-fish oil-flax-E (THERA TEARS NUTRITION) [phone] vs-pm-gq-unit Cap Take 3 capsules by mouth every morning. Over-the-counter supplement    Associated Diagnoses: Dry eye syndrome, bilateral      potassium chloride SA (K-DUR,KLOR-CON) 20 MEQ tablet take 1 tablet by mouth twice a day  Qty: 60 tablet,  Refills: 12      verapamil (CALAN-SR) 240 MG CR tablet take 1 tablet by mouth twice a day  Qty: 30 tablet, Refills: 12             I certify that this patient is confined to her home and needs intermittent skilled nursing care, physical therapy and occupational therapy.

## 2017-01-26 LAB
BACTERIA BLD CULT: NORMAL
BACTERIA BLD CULT: NORMAL
POCT GLUCOSE: 81 MG/DL (ref 70–110)
POCT GLUCOSE: 84 MG/DL (ref 70–110)
POCT GLUCOSE: 99 MG/DL (ref 70–110)

## 2017-01-26 PROCEDURE — 99232 SBSQ HOSP IP/OBS MODERATE 35: CPT | Mod: ,,, | Performed by: HOSPITALIST

## 2017-01-26 PROCEDURE — 25000003 PHARM REV CODE 250: Performed by: HOSPITALIST

## 2017-01-26 PROCEDURE — 11000001 HC ACUTE MED/SURG PRIVATE ROOM

## 2017-01-26 PROCEDURE — 97535 SELF CARE MNGMENT TRAINING: CPT

## 2017-01-26 PROCEDURE — 25000003 PHARM REV CODE 250: Performed by: INTERNAL MEDICINE

## 2017-01-26 PROCEDURE — 25000003 PHARM REV CODE 250: Performed by: EMERGENCY MEDICINE

## 2017-01-26 PROCEDURE — 25000003 PHARM REV CODE 250: Performed by: STUDENT IN AN ORGANIZED HEALTH CARE EDUCATION/TRAINING PROGRAM

## 2017-01-26 PROCEDURE — 63600175 PHARM REV CODE 636 W HCPCS: Performed by: INTERNAL MEDICINE

## 2017-01-26 RX ADMIN — CEFTAROLINE FOSAMIL 600 MG: 600 POWDER, FOR SOLUTION INTRAVENOUS at 08:01

## 2017-01-26 RX ADMIN — SODIUM CHLORIDE, PRESERVATIVE FREE 10 ML: 5 INJECTION INTRAVENOUS at 12:01

## 2017-01-26 RX ADMIN — CEFTAROLINE FOSAMIL 600 MG: 600 POWDER, FOR SOLUTION INTRAVENOUS at 02:01

## 2017-01-26 RX ADMIN — CARVEDILOL 25 MG: 25 TABLET, FILM COATED ORAL at 05:01

## 2017-01-26 RX ADMIN — Medication 3 ML: at 09:01

## 2017-01-26 RX ADMIN — RIVAROXABAN 15 MG: 15 TABLET, FILM COATED ORAL at 08:01

## 2017-01-26 RX ADMIN — VERAPAMIL HYDROCHLORIDE 240 MG: 120 TABLET, FILM COATED, EXTENDED RELEASE ORAL at 08:01

## 2017-01-26 RX ADMIN — CEFTAROLINE FOSAMIL 600 MG: 600 POWDER, FOR SOLUTION INTRAVENOUS at 05:01

## 2017-01-26 RX ADMIN — SODIUM CHLORIDE, PRESERVATIVE FREE 10 ML: 5 INJECTION INTRAVENOUS at 02:01

## 2017-01-26 RX ADMIN — Medication 3 ML: at 12:01

## 2017-01-26 RX ADMIN — RIVAROXABAN 15 MG: 15 TABLET, FILM COATED ORAL at 05:01

## 2017-01-26 RX ADMIN — LISINOPRIL 20 MG: 20 TABLET ORAL at 08:01

## 2017-01-26 RX ADMIN — CARVEDILOL 25 MG: 25 TABLET, FILM COATED ORAL at 08:01

## 2017-01-26 RX ADMIN — SODIUM CHLORIDE, PRESERVATIVE FREE 10 ML: 5 INJECTION INTRAVENOUS at 06:01

## 2017-01-26 RX ADMIN — Medication 3 ML: at 06:01

## 2017-01-26 NOTE — PLAN OF CARE
"Call received from Edgardo with [facility] stating that he received benefit information back from pt's insurance and they cover the "[place]" method which included several complex steps. Edgardo reported that he has some concerns about pt going home on this infusion method, however will attempt family teaching. Edgardo will report back to  the results of the family teaching.   "
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
## 2017-01-26 NOTE — PROGRESS NOTES
"Patient and family expressed concern that they will have to be doing their own infusions every 8 hours.  Stated they didn't know that it would be that frequently and that they would have to "be trained" how to do it.  Stated they do not feel comfortable doing this.  Paged the team to notify, awaiting call back.  Will continue to monitor    "

## 2017-01-26 NOTE — PLAN OF CARE
"Following infusion teaching:     Family reported that they did not feel comfortable with assisting with the infusion, especially every 8 hours.     SW also followed up with Edgardo with CPP and he stated that family members in the room that planned on assisting that pt at home on discharge had difficulties grasping the teaching of the "dialaflow", which Edgardo admits to being more complex than the usual infusion pumps.     SW to follow up tomorrow   "

## 2017-01-26 NOTE — PLAN OF CARE
Problem: Patient Care Overview  Goal: Plan of Care Review  Outcome: Ongoing (interventions implemented as appropriate)  Pt free of falls injuries throughout the shift. Bed locked, in lowest position,call bell within reach. Pain assessed & denied. VSS, will continue to monitor. Family at the bedside.

## 2017-01-26 NOTE — PLAN OF CARE
It appears as though pt will now be going home with home health and home infusion.   [facility]

## 2017-01-26 NOTE — PLAN OF CARE
Problem: Patient Care Overview  Goal: Plan of Care Review  Outcome: Ongoing (interventions implemented as appropriate)  Patient remained in stable condition today.  Able to make needs known to staff.  Family remained at bedside through shift,  Patient transitioned to oral anticoagulation medication this evening and heparin gtt stopped as per MD order.  No complaints of pain this shift. Vital signs and blood sugars remained stable.

## 2017-01-27 ENCOUNTER — HOSPITAL ENCOUNTER (INPATIENT)
Facility: HOSPITAL | Age: 77
LOS: 14 days | Discharge: HOME HEALTH | DRG: 871 | End: 2017-02-10
Attending: HOSPITALIST | Admitting: HOSPITALIST
Payer: COMMERCIAL

## 2017-01-27 VITALS
WEIGHT: 171 LBS | HEIGHT: 67 IN | BODY MASS INDEX: 26.84 KG/M2 | TEMPERATURE: 99 F | HEART RATE: 66 BPM | SYSTOLIC BLOOD PRESSURE: 151 MMHG | DIASTOLIC BLOOD PRESSURE: 68 MMHG | OXYGEN SATURATION: 95 % | RESPIRATION RATE: 16 BRPM

## 2017-01-27 DIAGNOSIS — E78.00 PURE HYPERCHOLESTEROLEMIA: ICD-10-CM

## 2017-01-27 DIAGNOSIS — A41.9 SEPSIS: ICD-10-CM

## 2017-01-27 DIAGNOSIS — B95.62 MRSA BACTEREMIA: Primary | ICD-10-CM

## 2017-01-27 DIAGNOSIS — R78.81 MRSA BACTEREMIA: Primary | ICD-10-CM

## 2017-01-27 DIAGNOSIS — H10.9 CONJUNCTIVITIS, UNSPECIFIED CONJUNCTIVITIS TYPE, UNSPECIFIED LATERALITY: ICD-10-CM

## 2017-01-27 DIAGNOSIS — R53.81 DEBILITY: ICD-10-CM

## 2017-01-27 DIAGNOSIS — N28.89 KIDNEY MASS: ICD-10-CM

## 2017-01-27 DIAGNOSIS — I10 ESSENTIAL HYPERTENSION: ICD-10-CM

## 2017-01-27 DIAGNOSIS — I82.3 THROMBOSIS OF LEFT RENAL VEIN: ICD-10-CM

## 2017-01-27 DIAGNOSIS — N39.0 URINARY TRACT INFECTION WITHOUT HEMATURIA, SITE UNSPECIFIED: ICD-10-CM

## 2017-01-27 DIAGNOSIS — J15.212 PNEUMONIA OF BOTH LOWER LOBES DUE TO METHICILLIN RESISTANT STAPHYLOCOCCUS AUREUS (MRSA): ICD-10-CM

## 2017-01-27 LAB
POCT GLUCOSE: 111 MG/DL (ref 70–110)
POCT GLUCOSE: 99 MG/DL (ref 70–110)

## 2017-01-27 PROCEDURE — 63600175 PHARM REV CODE 636 W HCPCS: Performed by: INTERNAL MEDICINE

## 2017-01-27 PROCEDURE — 94668 MNPJ CHEST WALL SBSQ: CPT

## 2017-01-27 PROCEDURE — 25000003 PHARM REV CODE 250: Performed by: STUDENT IN AN ORGANIZED HEALTH CARE EDUCATION/TRAINING PROGRAM

## 2017-01-27 PROCEDURE — 25000003 PHARM REV CODE 250: Performed by: EMERGENCY MEDICINE

## 2017-01-27 PROCEDURE — 99239 HOSP IP/OBS DSCHRG MGMT >30: CPT | Mod: ,,, | Performed by: HOSPITALIST

## 2017-01-27 PROCEDURE — 94799 UNLISTED PULMONARY SVC/PX: CPT

## 2017-01-27 PROCEDURE — 25000003 PHARM REV CODE 250: Performed by: HOSPITALIST

## 2017-01-27 PROCEDURE — 25000003 PHARM REV CODE 250: Performed by: INTERNAL MEDICINE

## 2017-01-27 PROCEDURE — 11000004 HC SNF PRIVATE

## 2017-01-27 RX ORDER — SODIUM CHLORIDE 0.9 % (FLUSH) 0.9 %
10 SYRINGE (ML) INJECTION
Status: CANCELLED | OUTPATIENT
Start: 2017-01-27

## 2017-01-27 RX ORDER — SODIUM CHLORIDE 0.9 % (FLUSH) 0.9 %
10 SYRINGE (ML) INJECTION EVERY 6 HOURS
Status: DISCONTINUED | OUTPATIENT
Start: 2017-01-27 | End: 2017-02-10 | Stop reason: HOSPADM

## 2017-01-27 RX ORDER — SODIUM CHLORIDE 0.9 % (FLUSH) 0.9 %
10 SYRINGE (ML) INJECTION EVERY 6 HOURS
Status: CANCELLED | OUTPATIENT
Start: 2017-01-27

## 2017-01-27 RX ORDER — BENZONATATE 100 MG/1
100 CAPSULE ORAL 3 TIMES DAILY PRN
Status: DISCONTINUED | OUTPATIENT
Start: 2017-01-27 | End: 2017-02-07

## 2017-01-27 RX ORDER — BENZONATATE 100 MG/1
100 CAPSULE ORAL 3 TIMES DAILY PRN
Status: CANCELLED | OUTPATIENT
Start: 2017-01-27

## 2017-01-27 RX ORDER — LISINOPRIL 20 MG/1
20 TABLET ORAL DAILY
Status: DISCONTINUED | OUTPATIENT
Start: 2017-01-28 | End: 2017-02-10 | Stop reason: HOSPADM

## 2017-01-27 RX ORDER — CARVEDILOL 25 MG/1
25 TABLET ORAL 2 TIMES DAILY WITH MEALS
Status: DISCONTINUED | OUTPATIENT
Start: 2017-01-27 | End: 2017-02-10 | Stop reason: HOSPADM

## 2017-01-27 RX ORDER — LISINOPRIL 20 MG/1
20 TABLET ORAL DAILY
Status: CANCELLED | OUTPATIENT
Start: 2017-01-28

## 2017-01-27 RX ORDER — VERAPAMIL HYDROCHLORIDE 120 MG/1
240 TABLET, FILM COATED, EXTENDED RELEASE ORAL 2 TIMES DAILY
Status: CANCELLED | OUTPATIENT
Start: 2017-01-27

## 2017-01-27 RX ORDER — TRAMADOL HYDROCHLORIDE 50 MG/1
50 TABLET ORAL EVERY 6 HOURS PRN
Status: DISCONTINUED | OUTPATIENT
Start: 2017-01-27 | End: 2017-02-10 | Stop reason: HOSPADM

## 2017-01-27 RX ORDER — TRAMADOL HYDROCHLORIDE 50 MG/1
50 TABLET ORAL EVERY 6 HOURS PRN
Status: CANCELLED | OUTPATIENT
Start: 2017-01-27

## 2017-01-27 RX ORDER — SODIUM CHLORIDE 0.9 % (FLUSH) 0.9 %
10 SYRINGE (ML) INJECTION
Status: DISCONTINUED | OUTPATIENT
Start: 2017-01-27 | End: 2017-02-10 | Stop reason: HOSPADM

## 2017-01-27 RX ORDER — CARVEDILOL 25 MG/1
25 TABLET ORAL 2 TIMES DAILY WITH MEALS
Status: CANCELLED | OUTPATIENT
Start: 2017-01-27

## 2017-01-27 RX ORDER — VERAPAMIL HYDROCHLORIDE 240 MG/1
240 TABLET, FILM COATED, EXTENDED RELEASE ORAL 2 TIMES DAILY
Status: DISCONTINUED | OUTPATIENT
Start: 2017-01-27 | End: 2017-02-10 | Stop reason: HOSPADM

## 2017-01-27 RX ORDER — POLYETHYLENE GLYCOL 3350 17 G/17G
17 POWDER, FOR SOLUTION ORAL DAILY PRN
Status: DISCONTINUED | OUTPATIENT
Start: 2017-01-27 | End: 2017-02-10 | Stop reason: HOSPADM

## 2017-01-27 RX ORDER — POLYETHYLENE GLYCOL 3350 17 G/17G
17 POWDER, FOR SOLUTION ORAL DAILY PRN
Status: CANCELLED | OUTPATIENT
Start: 2017-01-27

## 2017-01-27 RX ADMIN — SODIUM CHLORIDE, PRESERVATIVE FREE 10 ML: 5 INJECTION INTRAVENOUS at 05:01

## 2017-01-27 RX ADMIN — Medication 3 ML: at 05:01

## 2017-01-27 RX ADMIN — CARVEDILOL 25 MG: 25 TABLET, FILM COATED ORAL at 09:01

## 2017-01-27 RX ADMIN — SODIUM CHLORIDE, PRESERVATIVE FREE 10 ML: 5 INJECTION INTRAVENOUS at 12:01

## 2017-01-27 RX ADMIN — CEFTAROLINE FOSAMIL 600 MG: 600 POWDER, FOR SOLUTION INTRAVENOUS at 01:01

## 2017-01-27 RX ADMIN — VERAPAMIL HYDROCHLORIDE 240 MG: 120 TABLET, FILM COATED, EXTENDED RELEASE ORAL at 09:01

## 2017-01-27 RX ADMIN — RIVAROXABAN 15 MG: 15 TABLET, FILM COATED ORAL at 09:01

## 2017-01-27 RX ADMIN — LISINOPRIL 20 MG: 20 TABLET ORAL at 09:01

## 2017-01-27 RX ADMIN — CEFTAROLINE FOSAMIL 600 MG: 600 POWDER, FOR SOLUTION INTRAVENOUS at 09:01

## 2017-01-27 RX ADMIN — VERAPAMIL HYDROCHLORIDE 240 MG: 240 TABLET, FILM COATED, EXTENDED RELEASE ORAL at 10:01

## 2017-01-27 NOTE — PLAN OF CARE
Problem: Patient Care Overview  Goal: Plan of Care Review  Outcome: Ongoing (interventions implemented as appropriate)  Patient remained in stable condition today.  Able to make needs known to staff. Family was not comfortable with the home administration from [facility].  JULIANO Carr came to speak with family regarding options.  Patient and family stated they are open to going to a facility but would prefer home if that is an option. Family remained at bedside through shift.

## 2017-01-27 NOTE — PLAN OF CARE
Melina with [facility] has provided call report information [phone], going to room 303A. Melina informed JULIANO that pt's family needs to be aware that while pt is in the skilled unit they will need to continue to participate in family teaching/training for infusion therapy at home.     JULIANO communicated the above information to pt's sister Bhavna via phone [phone], she verbalized understanding. Bhavna still has concerns about copayment for skilled and JULIANO suggested that she call the back of insurance card an inquire about copayment details.     JULIANO provided attending nurse, Leni with call report information     JULIANO then placed call to [facility] [phone], spoke to An who arranged transport for 2:30pm .

## 2017-01-27 NOTE — PLAN OF CARE
Problem: Occupational Therapy Goal  Goal: Occupational Therapy Goal  Goals to be met by: 2017    Patient will increase functional independence with ADLs by performin. Pt will perform supine<>sit transfer with supervision to increase independence and safety with functional transfers. GOAL MET 17  2. Pt will perform sit<>stand transfer with supervision to increase independence and safety with functional transfers. (met )  3. Pt will demonstrate improve activity tolerance by walking continuous for 2 minutes with no RB   4. Pt will perform therex 2x10 with supervision for BUE strengthening. GOAL MET 17  5. Pt will perform bed to chair transfer with supervision to increase independence and safety with functional transfers  6. Pt will perform bedside commode transfer with supervision to increase independence and safety with functional transfers.  7. Pt will complete standing grooming activity with supervision to increase endurance for completion of ADLs.(MET )    ** NEW GOALS: (17)  Pt will perform UE dressing with mod I  Pt will perform LE dressing with mod I  Pt will perform grooming while standing with mod I   Outcome: Ongoing (interventions implemented as appropriate)  Pt continues to make progress toward goals, recommendations for home with supervision/HH services. Please see progress note for further details.

## 2017-01-27 NOTE — PT/OT/SLP PROGRESS
"Occupational Therapy  Treatment    Ida Santana   MRN: [id number]   Admitting Diagnosis: MRSA bacteremia    OT Date of Treatment: 17   OT Start Time: 1118  OT Stop Time: 1130  OT Total Time (min): 12 min    Billable Minutes:  Self Care/Home Management 12    General Precautions: Standard, contact, fall (diabetic )  Orthopedic Precautions: N/A, Braces: N/A    Do you have any cultural, spiritual, Latter-day conflicts, given your current situation?: none stated    Subjective: "I have two teams of people that can help me." re: to older/ younger brother   Communicated with RN prior to session. Pharmacist team and educators for d/c medication plan present following session.   Family present.     Pain Ratin/10  Pain Rating Post-Intervention: 0/10    Objective:  Patient found with: PICC line     Functional Mobility:  Bed Mobility:  Rolling/Turning to Left: Modified independent  Scooting/Bridging: Modified Independent  Supine to Sit: Modified Independent  Sit to Supine: Modified Independent    Transfers:   Sit <> Stand Assistance: Supervision  Sit <> Stand Assistive Device: No Assistive Device    Functional Ambulation: Pt completed    Activities of Daily Living:  LE Dressing Level of Assistance: Modified independent (doffing/donning B socks)    Grooming Position: Standing at sink  Grooming Level of Assistance: Modified independent     Balance:   Static Stand: FAIR+: Takes MINIMAL challenges from all directions  Dynamic stand: FAIR+: Needs CLOSE SUPERVISION during gait and is able to right self with minor LOB    Therapeutic Activities and Exercises:  Pt and family member educated on safety with selfcare ADLs/ functional transfers upon d/c. Pt with no concerns. Seem limited 2/2 educators present for d/c.     AM-PAC 6 CLICK ADL   How much help from another person does this patient currently need?   1 = Unable, Total/Dependent Assistance  2 = A lot, Maximum/Moderate Assistance  3 = A little, Minimum/Contact " Guard/Supervision  4 = None, Modified [place]/Independent    Putting on and taking off regular lower body clothing? : 3  Bathing (including washing, rinsing, drying)?: 3  Toileting, which includes using toilet, bedpan, or urinal? : 3  Putting on and taking off regular upper body clothing?: 4  Taking care of personal grooming such as brushing teeth?: 4  Eating meals?: 4  Total Score: 21     AM-PAC Raw Score CMS G-Code Modifier Level of Impairment Assistance   6 % Total / Unable   7 - 9 CM 80 - 100% Maximal Assist   10 - 14 CL 60 - 80% Moderate Assist   15 - 19 CK 40 - 60% Moderate Assist   20 - 22 CJ 20 - 40% Minimal Assist   23 CI 1-20% SBA / CGA   24 CH 0% Independent/ Mod I     Patient left supine with all lines intact, call button in reach and pharmacist for education for d/c present    ASSESSMENT:  Ida Santana is a 76 y.o. female with a medical diagnosis of MRSA bacteremia and presents with good demeanor, reported no concerns for safety and family assistance upon d/c. Pt continues to benefit from OT services in order to address below deficits to facilitate participation in selfcare ADLs and return to daily roles and routines.       Rehab identified problem list/impairments: Rehab identified problem list/impairments: impaired endurance, impaired self care skills, impaired functional mobilty    Rehab potential is excellent.    Activity tolerance: Excellent    Discharge recommendations: Discharge Facility/Level Of Care Needs: home health OT     Equipment recommendations: shower chair     GOALS:   Occupational Therapy Goals        Problem: Occupational Therapy Goal    Goal Priority Disciplines Outcome Interventions   Occupational Therapy Goal     OT, PT/OT Ongoing (interventions implemented as appropriate)    Description:  Goals to be met by: 2017    Patient will increase functional independence with ADLs by performin. Pt will perform supine<>sit transfer with supervision to increase  independence and safety with functional transfers.  GOAL MET 01/22/17  2. Pt will perform sit<>stand transfer with supervision to increase independence and safety with functional transfers. (met 1/17)  3. Pt will demonstrate improve activity tolerance by walking continuous for 2 minutes with no RB   4. Pt will perform therex 2x10 with supervision for BUE strengthening.  GOAL MET 01/22/17  5. Pt will perform bed to chair transfer with supervision to increase independence and safety with functional transfers  6. Pt will perform bedside commode transfer with supervision to increase independence and safety with functional transfers.  7. Pt will complete standing grooming activity with supervision to increase endurance for completion of ADLs.(MET 1/17)    ** NEW GOALS:  (01/22/17)  Pt will perform UE dressing with mod I  Pt will perform LE dressing with mod I  Pt will perform grooming while standing with mod I                Plan:  Patient to be seen 3 x/week to address the above listed problems via self-care/home management, therapeutic activities, therapeutic exercises  Plan of Care reviewed with: patient, family    CLOTILDE Mina   01/27/2017

## 2017-01-27 NOTE — PROGRESS NOTES
Report called and given to Margo at SNF, [facility] at bedside picking up patient for transfer at the receiving facility. No acute distress noted

## 2017-01-27 NOTE — PROGRESS NOTES
[facility] Hospital Medicine  Progress Note    Patient Name: Ida Santana  MRN: [id number]  Patient Class: IP- Inpatient   Admission Date: 1/12/2017  Length of Stay: 14 days  Attending Physician: Diogenes Maier MD  Primary Care Provider: Giovanna Murray MD    [facility] Medicine Team: [facility] 1 Diann Lopez MD    Subjective:     Principal Problem:MRSA bacteremia    Interval History: No acute events overnight. Pt cont to feel well; denies SOB. States she is ready to go home. Discharge today; however later pt and family express concern with home infusion. Will keep overnight and plan for HH v SNF tomorrow.       Review of Systems   Eyes: Positive for redness. Negative for pain and visual disturbance.   Respiratory: Negative for shortness of breath.    Cardiovascular: Negative for chest pain.   Gastrointestinal: Negative for abdominal pain and constipation.   Neurological: Negative for headaches.     Objective:     Vital Signs (Most Recent):  Temp: 99.5 °F (37.5 °C) (01/26/17 2025)  Pulse: 69 (01/26/17 2025)  Resp: 18 (01/26/17 2025)  BP: 125/78 (01/26/17 2025)  SpO2: 95 % (01/26/17 2025) Vital Signs (24h Range):  Temp:  [98.2 °F (36.8 °C)-99.5 °F (37.5 °C)] 99.5 °F (37.5 °C)  Pulse:  [66-69] 69  Resp:  [18] 18  SpO2:  [95 %-97 %] 95 %  BP: (125-147)/(63-78) 125/78        Physical Exam   Constitutional: She is oriented to person, place, and time. No distress.   HENT:   Head: Normocephalic.   Eyes: EOM are normal. Right conjunctiva is not injected. Left conjunctiva is injected.   Injection improving.    Cardiovascular: Normal rate and regular rhythm.  Exam reveals no gallop and no friction rub.    Murmur heard.  Pulmonary/Chest: Effort normal and breath sounds normal. No respiratory distress.   Abdominal: Soft. Bowel sounds are normal. She exhibits no distension. There is no tenderness.   Musculoskeletal: She exhibits no edema.   PICC in place, RUE   Neurological: She is alert and oriented to  person, place, and time. No cranial nerve deficit.   Skin: She is not diaphoretic.   Psychiatric: She has a normal mood and affect.   Vitals reviewed.      Significant Labs:   No new.     Bcx 1/21: NGTD  Bcx 1/25: NGTD    Significant Imaging: reviewed.     Assessment/Plan:      Hypertension  - continue verapamil  - restarted home lisinopril 20mg and coreg  25mg    * MRSA bacteremia  Sepsis resolved; MRSA bacteremia and PNA persisted   - ID following, appreciate recs.   - MIS negative for endocarditis  - CT chest, abdomen, pelvis: suspected septic emboli to multiple organs (lungs w/ cavitary lesions, pancreas, kidneys).   - MRI spine c/t/l - no evidence of spinal abscess  - CT chest also shows bilateral pleural effusions and a periaortic fluid collection. No drainage by IR or pulmonary given low volumes.   - Renal ultrasound: no abscess.   - Per ID, switched from vanc to ceftaroline 1/24.   - BCx 1/21 NGTD. 1/25 NGTD.   - PICC placed 1/25.     Hypophosphatemia  - replete PRN  -Continue to monitor.     Conjunctivitis  Developed conjunctivitis of left eye 1/18. Pt denies pain, drainage, decreased vision.   - Erythromycin x7days   - Spoke with Ophthalmology. Call if she develops pain, drainage, decreased vision, or pain with eye movement.   - Cont to monitor, is improving  - Discontinued erythromycin.   - Improved, minimal injection remains. Advised pt to call or see doctor immediately for worsening of redness or symptoms.     MRSA pneumonia  -See staph aureus bacteremia.     Hypokalemia  -Replete PRN  -Continue to monitor.     Thrombosis of left renal vein  - Hep gtt  - Transition to xarelto 1/25 (Transition from heparin gtt to xarelto tonight (15mg BID x21 days, then 20mg daily).     Kidney mass  - Will ultimately need urology f/u for renal mass seen on CT.   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 Followup with ultrasound in 6 months should be obtained. Hyperechoic round mass adjacent to  the superior aspect of the left kidney, nonspecific and better evaluated on recent CT exam. Bilateral medical renal disease with elevated arterial resistive indices.   - Discussed with pt.   - Ambulatory referral to urology.     VTE Risk Mitigation         Ordered     Low Risk of VTE  Once      01/13/17 0158          Ppx: xarelto  Diet: DM  PT/OT: yes  Dispo: Discharge intended today to ; however will keep overnight for  v SNF tomorrow.     Discussed with staff, Dr. Maier.   THEO Lopez MD MPH PGY-1  Department of Hospital Medicine   [facility]

## 2017-01-27 NOTE — PLAN OF CARE
JULIANO had lengthy discuss yesterday evening (12/26/17 at 6:00pm) with pt and her brother and sister-in-law, Bhavna about concerns about pt returning home with infusion therapy. Bhavna reported that pt's support at home will be very limited and they will be no one to assist with this complex infusion therapy treatment, especially with length of time of needed infusion and it's frequency. Pt reported that she would like to go home, but wants to do what is safe and it still willing to go to a skilled facility. Pt and family is aware that with skilled they will be responsible for a co-payment and would like to find out specifics of the payment.     This morning JULIANO was able to confirm with IM1 team that skilled placement is good plan since pt's family support at home will be limited and family teaching of infusion failed. JULIANO followed up with Melina Landers LPN at [facility] [id number] and she has a bed for pt to admit to, she is now awaiting authorization from [facility].     JULIANO provided pt's sister, Bhavna with updates about insurance authorization pending for skilled placement.

## 2017-01-27 NOTE — ASSESSMENT & PLAN NOTE
Sepsis resolved; MRSA bacteremia and PNA persisted   - ID following, appreciate recs.   - MIS negative for endocarditis  - CT chest, abdomen, pelvis: suspected septic emboli to multiple organs (lungs w/ cavitary lesions, pancreas, kidneys).   - MRI spine c/t/l - no evidence of spinal abscess  - CT chest also shows bilateral pleural effusions and a periaortic fluid collection. No drainage by IR or pulmonary given low volumes.   - Renal ultrasound: no abscess.   - Per ID, switched from vanc to ceftaroline 1/24.   - BCx 1/21 NGTD. 1/25 NGTD.   - PICC placed 1/25.

## 2017-01-27 NOTE — ASSESSMENT & PLAN NOTE
Developed conjunctivitis of left eye 1/18. Pt denies pain, drainage, decreased vision.   - Erythromycin x7days   - Spoke with Ophthalmology. Call if she develops pain, drainage, decreased vision, or pain with eye movement.   - Cont to monitor, is improving  - Discontinued erythromycin.   - Improved, minimal injection remains. Advised pt to call or see doctor immediately for worsening of redness or symptoms.

## 2017-01-27 NOTE — ASSESSMENT & PLAN NOTE
- Will ultimately need urology f/u for renal mass seen on CT.   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 Followup with ultrasound in 6 months should be obtained. Hyperechoic round mass adjacent to the superior aspect of the left kidney, nonspecific and better evaluated on recent CT exam. Bilateral medical renal disease with elevated arterial resistive indices.   - Discussed with pt.   - Ambulatory referral to urology.

## 2017-01-27 NOTE — IP AVS SNAPSHOT
[facility]  [address]  [facility] [address]  Phone: [phone]           Patient Discharge Instructions     Our goal is to set you up for success. This packet includes information on your condition, medications, and your home care. It will help you to care for yourself so you don't get sicker and need to go back to the hospital.     Please ask your nurse if you have any questions.        There are many details to remember when preparing to leave the hospital. Here is what you will need to do:    1. Take your medicine. If you are prescribed medications, review your Medication List in the following pages. You may have new medications to  at the pharmacy and others that you'll need to stop taking. Review the instructions for how and when to take your medications. Talk with your doctor or nurses if you are unsure of what to do.     2. Go to your follow-up appointments. Specific follow-up information is listed in the following pages. Your may be contacted by a transition nurse or clinical provider about future appointments. Be sure we have all of the phone numbers to reach you, if needed. Please contact your provider's office if you are unable to make an appointment.     3. Watch for warning signs. Your doctor or nurse will give you detailed warning signs to watch for and when to call for assistance. These instructions may also include educational information about your condition. If you experience any of warning signs to your health, call your doctor.               [facility]  Unless otherwise directed by your provider, please contact [facility], our nurse care line that is available for 24/7 assistance.     [phone] (toll-free)    Registered nurses in the [facility] provide clinical advisement, health education, appointment booking, and other advisory services.                    ** Verify the list of medication(s) below is accurate and up  to date. Carry this with you in case of emergency. If your medications have changed, please notify your healthcare provider.             Medication List      CHANGE how you take these medications        Additional Info                      dextrose 5 % SolP 50 mL with ceftaroline fosamil 400 mg SolR 600 mg   Quantity:  69 each   Refills:  0   Dose:  600 mg   Indications:  Bacteremia, Pneumonia   What changed:  additional instructions    Last time this was given:  600 mg on 2/10/2017  1:34 PM   Instructions:  Inject 600 mg into the vein every 8 (eight) hours.     Begin Date    AM    Noon    PM    Bedtime       * rivaroxaban 15 mg Tab   Commonly known as:  XARELTO   Quantity:  10 tablet   Refills:  0   Dose:  15 mg   What changed:    - medication strength  - how much to take  - when to take this  - additional instructions  - Another medication with the same name was removed. Continue taking this medication, and follow the directions you see here.    Last time this was given:  15 mg on 2/10/2017 10:30 AM   Instructions:  Take 1 tablet (15 mg total) by mouth 2 (two) times daily with meals.     Begin Date    AM    Noon    PM    Bedtime       * rivaroxaban 20 mg Tab   Commonly known as:  XARELTO   Quantity:  30 tablet   Refills:  1   Dose:  20 mg   What changed:  You were already taking a medication with the same name, and this prescription was added. Make sure you understand how and when to take each.   Replaces:  rivaroxaban 15 mg (42)- 20 mg (9) Dspk    Start Date:  2/16/2017   Last time this was given:  15 mg on 2/10/2017 10:30 AM   Instructions:  Take 1 tablet (20 mg total) by mouth daily with dinner or evening meal.     Begin Date    AM    Noon    PM    Bedtime       * Notice:  This list has 2 medication(s) that are the same as other medications prescribed for you. Read the directions carefully, and ask your doctor or other care provider to review them with you.      CONTINUE taking these medications         Additional Info                      carvedilol 25 MG tablet   Commonly known as:  COREG   Quantity:  60 tablet   Refills:  3   Dose:  25 mg    Last time this was given:  25 mg on 2/10/2017 10:28 AM   Instructions:  Take 1 tablet (25 mg total) by mouth 2 (two) times daily with meals.     Begin Date    AM    Noon    PM    Bedtime       CORDRAN 0.05 % lotion   Refills:  0   Generic drug:  flurandrenolide    Instructions:  Apply to  scalp one to two times daily as needed for dryness     Begin Date    AM    Noon    PM    Bedtime       ketoconazole 2 % shampoo   Commonly known as:  NIZORAL   Quantity:  120 mL   Refills:  6    Instructions:  USE EVERY OTHER DAY LET SIT ON SCALP FOR FIVE MINUTES BEFORE RINSING     Begin Date    AM    Noon    PM    Bedtime       lisinopril 20 MG tablet   Commonly known as:  PRINIVIL,ZESTRIL   Quantity:  30 tablet   Refills:  3   Dose:  20 mg    Last time this was given:  20 mg on 2/10/2017  9:00 AM   Instructions:  Take 1 tablet (20 mg total) by mouth once daily.     Begin Date    AM    Noon    PM    Bedtime       om-3-epa-dha-fish oil-flax-E [phone] yk-pl-uo-unit Cap   Commonly known as:  THERA TEARS NUTRITION   Refills:  0   Dose:  3 capsule    Instructions:  Take 3 capsules by mouth every morning. Over-the-counter supplement     Begin Date    AM    Noon    PM    Bedtime       ONE-A-DAY 50 PLUS tablet   Refills:  0   Dose:  1 tablet   Generic drug:  geriatric multivitamin-min    Instructions:  Take 1 tablet by mouth once daily.     Begin Date    AM    Noon    PM    Bedtime       verapamil 240 MG CR tablet   Commonly known as:  CALAN-SR   Quantity:  30 tablet   Refills:  12    Last time this was given:  240 mg on 2/10/2017 10:28 AM   Instructions:  take 1 tablet by mouth twice a day     Begin Date    AM    Noon    PM    Bedtime         STOP taking these medications     CALCIUM WITH VITAMIN D3 ORAL       ONE TOUCH II TEST MISC       potassium chloride SA 20 MEQ tablet   Commonly  known as:  K-JOSELINE,KLOR-CON       rivaroxaban 15 mg (42)- 20 mg (9) Dspk   Commonly known as:  XARELTO   Replaced by:  rivaroxaban 20 mg Tab   You also have another medication with the same name that you need to continue taking as instructed.            Where to Get Your Medications      These medications were sent to [id number] [facility] [place] - [address]  [address] [address]     Phone:  [phone]     carvedilol 25 MG tablet    dextrose 5 % SolP 50 mL with ceftaroline fosamil 400 mg SolR 600 mg    lisinopril 20 MG tablet    rivaroxaban 15 mg Tab    rivaroxaban 20 mg Tab                  Please bring to all follow up appointments:    1. A copy of your discharge instructions.  2. All medicines you are currently taking in their original bottles.  3. Identification and insurance card.    Please arrive 15 minutes ahead of scheduled appointment time.    Please call 24 hours in advance if you must reschedule your appointment and/or time.        Your Scheduled Appointments     Feb 13, 2017 10:30 AM CST   Established Patient Visit with MD Mansoor Trujillo [facility] ([facility] )    [address]  [place] [address] [phone]            Feb 13, 2017  2:30 PM CST   Consult with MD Mansoor Oliver [facility] - Urology [place] ([facility] )    [address]  [place] [address] [phone]            Feb 17, 2017 11:20 AM CST   Hospital Follow Up with MD Mansoor Macdonald [facility] - Internal Medicine ([facility])    [address]  [place] [address] [phone]            Mar 13, 2017  8:00 AM CDT   [facility] with JENKINS, VISUAL HARLEY   [facility] - Ophthalmology ([facility] )    [address]  [place] [address] [phone]            Mar 13, 2017  8:30 AM CDT   Established Patient Visit with MD Mansoor De Luna [facility] -  Ophthalmology ([facility] )    [address]  [facility] [address] [phone]              Follow-up Information     Follow up with Hung Musa MD. Go on 2/17/2017.    Specialty:  Family Medicine    Why:  Hospital follow up    Contact information:    [address]  [facility] [id number] [phone]          Follow up with Janice Reyes MD. Go on 2/13/2017.    Specialty:  Infectious Diseases    Why:  Follow up    Contact information:    [address]  [facility] [id number] [phone]          Follow up with Porfirio Arellano MD. Go on 2/13/2017.    Specialty:  Urology    Why:  Follow up    Contact information:    [address]  [facility] [address] [phone]          Follow up with [facility].    Specialties:  DME Provider, Home Health Services    Why:  Home Health Questions/Concerns    Contact information:    [address] [facility] [id number] [phone]          Follow up with [facility].    Specialties:  Pharmacist, DME Provider, IV Infusion    Why:  Home IV Infusion Questions/Concerns    Contact information:    [address] [facility] [address] [facility]  [address]  [facility] [address] [phone]          Discharge Instructions     Future Orders    Activity as tolerated     Call MD for:  increased confusion or weakness     Call MD for:  persistent dizziness, light-headedness, or visual disturbances     Call MD for:  persistent nausea and vomiting or diarrhea     Call MD for:  severe uncontrolled pain     Call MD for:  temperature >100.4     Diet general     Questions:    Total calories:      Fat restriction, if any:      Protein restriction, if any:      Na restriction, if any:      Fluid restriction:      Additional restrictions:  Diabetic 1800        Primary Diagnosis     Your primary diagnosis was:  Bloodstream Infection      Admission Information     Date & Time Provider Department CSN    1/27/2017  4:05 PM Jasiel Dominguez MD [facility]  "[id number]      Care Providers     Provider Role Specialty Primary office phone    Jasiel Dominguez MD Attending Provider Hospitalist [phone]      Important Medicare Message          Most Recent Value    Important Message from Medicare Regarding Discharge Appeal Rights  Given to patient/caregiver, Explained to patient/caregiver, Signed/date by patient/caregiver yes 02/06/2017 1541      Your Vitals Were     BP Pulse Temp Resp Height Weight    114/58 (BP Method: Manual) 72 97.9 °F (36.6 °C) 16 5' 7" (1.702 m) 71.4 kg (157 lb 6.5 oz)    Last Period SpO2 BMI          (LMP Unknown) 99% 24.65 kg/m2        Recent Lab Values        5/1/2006 11/21/2007 7/29/2011 7/23/2012 7/30/2012 8/2/2013 9/23/2014 1/6/2017      8:05 AM  7:45 AM 11:00 AM  9:04 AM 10:25 AM  7:30 AM  2:55 PM 11:12 AM    A1C 5.6 5.8 5.8 6.2 6.2 5.9 5.8 6.0    Comment for A1C at 11:12 AM on 1/6/2017:  According to ADA guidelines, hemoglobin A1C <7.0% represents  optimal control in non-pregnant diabetic patients.  Different  metrics may apply to specific populations.   Standards of Medical Care in Diabetes - 2016.  For the purpose of screening for the presence of diabetes:  <5.7%     Consistent with the absence of diabetes  5.7-6.4%  Consistent with increasing risk for diabetes   (prediabetes)  >or=6.5%  Consistent with diabetes  Currently no consensus exists for use of hemoglobin A1C  for diagnosis of diabetes for children.        Allergies as of 2/10/2017        Reactions    Adhesive Tape-silicones     Other reaction(s): pulling skin off    Atorvastatin     Other reaction(s): Muscle pain    Pravachol  [Pravastatin] Rash      Advance Directives     An advance directive is a document which, in the event you are no longer able to make decisions for yourself, tells your healthcare team what kind of treatment you do or do not want to receive, or who you would like to make those decisions for you.  If you do not currently have an advance directive, [facility] " encourages you to create one.  For more information call:  [phone]), [phone] ([phone]),  or log on to [email or web address].        Language Assistance Services     ATTENTION: Language assistance services are available, free of charge. Please call [phone].      ATENCIÓN: Si habla español, tiene a blancas disposición servicios gratuitos de asistencia lingüística. Llame al [phone].     CHÚ Ý: N?u b?n nói Ti?ng Vi?t, có các d?ch v? h? tr? ngôn ng? mi?n phí dành cho b?n. G?i s? [phone].        Pneumonmia Discharge Instructions                [place] Information            [facility] complies with applicable Federal civil rights laws and does not discriminate on the basis of race, color, national origin, age, disability, or sex.

## 2017-01-27 NOTE — SUBJECTIVE & OBJECTIVE
Interval History: No acute events overnight. Pt cont to feel well; denies SOB. States she is ready to go home. Discharge today; however later pt and family express concern with home infusion. Will keep overnight and plan for HH v SNF tomorrow.       Review of Systems   Eyes: Positive for redness. Negative for pain and visual disturbance.   Respiratory: Negative for shortness of breath.    Cardiovascular: Negative for chest pain.   Gastrointestinal: Negative for abdominal pain and constipation.   Neurological: Negative for headaches.     Objective:     Vital Signs (Most Recent):  Temp: 99.5 °F (37.5 °C) (01/26/17 2025)  Pulse: 69 (01/26/17 2025)  Resp: 18 (01/26/17 2025)  BP: 125/78 (01/26/17 2025)  SpO2: 95 % (01/26/17 2025) Vital Signs (24h Range):  Temp:  [98.2 °F (36.8 °C)-99.5 °F (37.5 °C)] 99.5 °F (37.5 °C)  Pulse:  [66-69] 69  Resp:  [18] 18  SpO2:  [95 %-97 %] 95 %  BP: (125-147)/(63-78) 125/78        Physical Exam   Constitutional: She is oriented to person, place, and time. No distress.   HENT:   Head: Normocephalic.   Eyes: EOM are normal. Right conjunctiva is not injected. Left conjunctiva is injected.   Injection improving.    Cardiovascular: Normal rate and regular rhythm.  Exam reveals no gallop and no friction rub.    Murmur heard.  Pulmonary/Chest: Effort normal and breath sounds normal. No respiratory distress.   Abdominal: Soft. Bowel sounds are normal. She exhibits no distension. There is no tenderness.   Musculoskeletal: She exhibits no edema.   PICC in place, RUE   Neurological: She is alert and oriented to person, place, and time. No cranial nerve deficit.   Skin: She is not diaphoretic.   Psychiatric: She has a normal mood and affect.   Vitals reviewed.      Significant Labs:   No new.     Bcx 1/21: NGTD  Bcx 1/25: NGTD    Significant Imaging: reviewed.

## 2017-01-28 PROBLEM — R53.81 DEBILITY: Status: ACTIVE | Noted: 2017-01-28

## 2017-01-28 PROBLEM — N39.0 URINARY TRACT INFECTION WITHOUT HEMATURIA: Status: RESOLVED | Noted: 2017-01-27 | Resolved: 2017-01-28

## 2017-01-28 LAB
BACTERIA BLD CULT: NORMAL

## 2017-01-28 PROCEDURE — 25000003 PHARM REV CODE 250: Performed by: STUDENT IN AN ORGANIZED HEALTH CARE EDUCATION/TRAINING PROGRAM

## 2017-01-28 PROCEDURE — 94668 MNPJ CHEST WALL SBSQ: CPT

## 2017-01-28 PROCEDURE — 97535 SELF CARE MNGMENT TRAINING: CPT

## 2017-01-28 PROCEDURE — 11000004 HC SNF PRIVATE

## 2017-01-28 PROCEDURE — 97116 GAIT TRAINING THERAPY: CPT

## 2017-01-28 PROCEDURE — 97110 THERAPEUTIC EXERCISES: CPT

## 2017-01-28 PROCEDURE — 99306 1ST NF CARE HIGH MDM 50: CPT | Mod: ,,, | Performed by: HOSPITALIST

## 2017-01-28 PROCEDURE — 97530 THERAPEUTIC ACTIVITIES: CPT

## 2017-01-28 PROCEDURE — 63600175 PHARM REV CODE 636 W HCPCS: Performed by: STUDENT IN AN ORGANIZED HEALTH CARE EDUCATION/TRAINING PROGRAM

## 2017-01-28 PROCEDURE — 97165 OT EVAL LOW COMPLEX 30 MIN: CPT

## 2017-01-28 PROCEDURE — 97161 PT EVAL LOW COMPLEX 20 MIN: CPT

## 2017-01-28 RX ADMIN — CEFTAROLINE FOSAMIL 600 MG: 600 POWDER, FOR SOLUTION INTRAVENOUS at 05:01

## 2017-01-28 RX ADMIN — Medication 10 ML: at 06:01

## 2017-01-28 RX ADMIN — LISINOPRIL 20 MG: 20 TABLET ORAL at 08:01

## 2017-01-28 RX ADMIN — Medication 10 ML: at 12:01

## 2017-01-28 RX ADMIN — RIVAROXABAN 15 MG: 15 TABLET, FILM COATED ORAL at 08:01

## 2017-01-28 RX ADMIN — CARVEDILOL 25 MG: 25 TABLET, FILM COATED ORAL at 05:01

## 2017-01-28 RX ADMIN — CARVEDILOL 25 MG: 25 TABLET, FILM COATED ORAL at 08:01

## 2017-01-28 RX ADMIN — VERAPAMIL HYDROCHLORIDE 240 MG: 240 TABLET, FILM COATED, EXTENDED RELEASE ORAL at 08:01

## 2017-01-28 RX ADMIN — Medication 10 ML: at 11:01

## 2017-01-28 RX ADMIN — RIVAROXABAN 15 MG: 15 TABLET, FILM COATED ORAL at 05:01

## 2017-01-28 RX ADMIN — CEFTAROLINE FOSAMIL 600 MG: 600 POWDER, FOR SOLUTION INTRAVENOUS at 08:01

## 2017-01-28 RX ADMIN — VERAPAMIL HYDROCHLORIDE 240 MG: 240 TABLET, FILM COATED, EXTENDED RELEASE ORAL at 09:01

## 2017-01-28 RX ADMIN — Medication 10 ML: at 05:01

## 2017-01-28 RX ADMIN — CEFTAROLINE FOSAMIL 600 MG: 600 POWDER, FOR SOLUTION INTRAVENOUS at 01:01

## 2017-01-28 NOTE — PLAN OF CARE
Problem: Physical Therapy Goal  Goal: Physical Therapy Goal  Goals to be met by: 7 days     Patient will increase functional independence with mobility by performin. Supine to sit with Modified [place]  2. Sit to supine with Modified [place]  3. Sit to stand transfer with Modified [place]  4. Bed to chair transfer with Modified [place] using no AD  5. Gait x 500 feet with Supervision w/o AD.   6. Wheelchair propulsion x100 feet with Supervision using bilateral upper extremities  7. Ascend/Descend 4 inch curb step with Supervision .  8. Lower extremity exercise program x30 reps per handout, with assistance as needed and gym therex     PT eval completed. Louann Kilgore, PT 2017

## 2017-01-28 NOTE — PROGRESS NOTES
[facility] Hospital Medicine  Progress Note    Patient Name: Ida Santana  MRN: [id number]  Patient Class: IP- Inpatient   Admission Date: 1/12/2017  Length of Stay: 15 days  Attending Physician: No att. providers found  Primary Care Provider: Giovanna Murray MD    [facility] Medicine Team: [facility] 1 Diann Lopez MD    Subjective:     Principal Problem:MRSA bacteremia    Interval History: No acute events overnight. Pt cont to feel well; denies SOB, CP, abdo pain, Nausea. She is glad to be going to SNF instead of Home with HH, as she was concerned about doing her own infusions at home.     Review of Systems   Respiratory: Negative for shortness of breath.    Cardiovascular: Negative for chest pain.   Gastrointestinal: Negative for abdominal pain.     Objective:     Vital Signs (Most Recent):  Temp: 98.5 °F (36.9 °C) (01/27/17 0759)  Pulse: 66 (01/27/17 0759)  Resp: 16 (01/27/17 0759)  BP: (!) 151/68 (01/27/17 0759)  SpO2: 95 % (01/27/17 0759) Vital Signs (24h Range):  Temp:  [98.2 °F (36.8 °C)-98.9 °F (37.2 °C)] 98.2 °F (36.8 °C)  Pulse:  [66-71] 71  Resp:  [16-18] 18  SpO2:  [95 %-97 %] 97 %  BP: (126-151)/(60-71) 147/71        Physical Exam   Constitutional: She is oriented to person, place, and time. She appears well-developed. No distress.   HENT:   Head: Normocephalic.   Eyes: EOM are normal. Right conjunctiva is not injected. Left conjunctiva is injected.   Injection improving, minimal.   Neck: Normal range of motion.   Cardiovascular: Normal rate.    Pulmonary/Chest: Effort normal. No respiratory distress.   Abdominal: Soft. She exhibits no distension. There is no tenderness.   Musculoskeletal: She exhibits no edema.   PICC in place, RUE   Neurological: She is alert and oriented to person, place, and time. No cranial nerve deficit.   Skin: She is not diaphoretic.   Psychiatric: She has a normal mood and affect.   Vitals reviewed.      Significant Labs:   No new.     Bcx 1/21: NGTD  Bcx  1/25: NGTD    Significant Imaging: reviewed.     Assessment/Plan:      Hypertension  - continue verapamil  - restarted home lisinopril 20mg and coreg  25mg    * MRSA bacteremia  Sepsis resolved; MRSA bacteremia and PNA persisted   - ID following, appreciate recs.   - MIS negative for endocarditis  - CT chest, abdomen, pelvis: suspected septic emboli to multiple organs (lungs w/ cavitary lesions, pancreas, kidneys).   - MRI spine c/t/l - no evidence of spinal abscess  - CT chest also shows bilateral pleural effusions and a periaortic fluid collection. No drainage by IR or pulmonary given low volumes.   - Renal ultrasound: no abscess.   - Per ID, switched from vanc to ceftaroline 1/24.   - BCx 1/21 NGTD. 1/25 NGTD.   - PICC placed 1/25.     Hypophosphatemia  resolved    Conjunctivitis  Developed conjunctivitis of left eye 1/18. Pt denied pain, drainage, decreased vision.   - Erythromycin x7+ days   - Spoke with Ophthalmology. Call if she develops pain, drainage, decreased vision, or pain with eye movement.   - Cont to monitor, improving  - Discontinued erythromycin.   - Improved, minimal injection remains. Advised pt to call or see doctor immediately for worsening of redness or symptoms.     MRSA pneumonia  -See staph aureus bacteremia.     Hypokalemia  - resolved    Thrombosis of left renal vein  - Hep gtt  - Transition to xarelto 1/25 (Transition from heparin gtt to xarelto tonight (15mg BID x21 days, then 20mg daily).     Kidney mass  - Will ultimately need urology f/u for renal mass seen on CT.   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 Followup with ultrasound in 6 months should be obtained. Hyperechoic round mass adjacent to the superior aspect of the left kidney, nonspecific and better evaluated on recent CT exam. Bilateral medical renal disease with elevated arterial resistive indices.   - Discussed with pt.   - Ambulatory referral to urology.     VTE Risk Mitigation         Ordered      Low Risk of VTE  Once      01/13/17 0158          Ppx: xarelto  Diet: DM  PT/OT: yes  Dispo: Discharge to SNF today    Discussed with staff, Dr. Maier.  THEO Lopez MD MPH PGY-1  Department of Hospital Medicine   [facility]

## 2017-01-28 NOTE — SUBJECTIVE & OBJECTIVE
Interval History: No acute events overnight. Pt cont to feel well; denies SOB, CP, abdo pain, Nausea. She is glad to be going to SNF instead of Home with HH, as she was concerned about doing her own infusions at home.     Review of Systems   Respiratory: Negative for shortness of breath.    Cardiovascular: Negative for chest pain.   Gastrointestinal: Negative for abdominal pain.     Objective:     Vital Signs (Most Recent):  Temp: 98.5 °F (36.9 °C) (01/27/17 0759)  Pulse: 66 (01/27/17 0759)  Resp: 16 (01/27/17 0759)  BP: (!) 151/68 (01/27/17 0759)  SpO2: 95 % (01/27/17 0759) Vital Signs (24h Range):  Temp:  [98.2 °F (36.8 °C)-98.9 °F (37.2 °C)] 98.2 °F (36.8 °C)  Pulse:  [66-71] 71  Resp:  [16-18] 18  SpO2:  [95 %-97 %] 97 %  BP: (126-151)/(60-71) 147/71        Physical Exam   Constitutional: She is oriented to person, place, and time. She appears well-developed. No distress.   HENT:   Head: Normocephalic.   Eyes: EOM are normal. Right conjunctiva is not injected. Left conjunctiva is injected.   Injection improving, minimal.   Neck: Normal range of motion.   Cardiovascular: Normal rate.    Pulmonary/Chest: Effort normal. No respiratory distress.   Abdominal: Soft. She exhibits no distension. There is no tenderness.   Musculoskeletal: She exhibits no edema.   PICC in place, RUE   Neurological: She is alert and oriented to person, place, and time. No cranial nerve deficit.   Skin: She is not diaphoretic.   Psychiatric: She has a normal mood and affect.   Vitals reviewed.      Significant Labs:   No new.     Bcx 1/21: NGTD  Bcx 1/25: NGTD    Significant Imaging: reviewed.

## 2017-01-28 NOTE — ASSESSMENT & PLAN NOTE
Developed conjunctivitis of left eye 1/18. Pt denied pain, drainage, decreased vision.   - Erythromycin x7+ days   - Spoke with Ophthalmology. Call if she develops pain, drainage, decreased vision, or pain with eye movement.   - Cont to monitor, improving  - Discontinued erythromycin.   - Improved, minimal injection remains. Advised pt to call or see doctor immediately for worsening of redness or symptoms.

## 2017-01-28 NOTE — PROGRESS NOTES
Patient gave me her medication Rivaroxaban that was given to her at main campus. Spoke with charge nurse about medication. Her orders for this medication say to start taking it on 02/16/2017. So I have given this medication to the family to take home.

## 2017-01-28 NOTE — PT/OT/SLP EVAL
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    Osteopenia     Potassium depletion       Past Surgical History   Procedure Laterality Date    Breast biopsy       left breast DCIS    Breast surgery  2001     left lumpectomy with SLN bx    Colonoscopy      Colonoscopy N/A 10/31/2016     Procedure: COLONOSCOPY;  Surgeon: YAMILETH Richards MD;  Location: [facility] ([address] [place] [address]);  Service: Endoscopy;  Laterality: N/A;       Referring physician: Narinder RICE  Date referred to PT: 17    General Precautions: Standard, fall  Orthopedic Precautions: Orthopedic Precautions : N/A   Braces:           Patient History:  Lives With: alone  Living Arrangements: house  Home Accessibility: stairs to enter home  Number of Stairs to Enter Home: 1  Living Environment Comment: lives alone in 1 SH w/small TH STEP. Brother lives nearby. PTA (I) and driving. No DME owned.  Equipment Currently Used at Home: none  DME owned (not currently used): none    Previous Level of Function:  Ambulation Skills: independent  Transfer Skills: independent  ADL Skills: independent  Work/Leisure Activity: independent    Subjective:  Communicated with patient prior to session.    Chief Complaint: antibiotics  Patient goals: get home and take care of myself    Pain Ratin/10              Pain Rating Post-Intervention: 0/10    Objective:  Patient found seatedin bedside chair, with Patient found with: PICC line    Cognitive Exam:  Oriented to: Person, Place, Time and  Situation  Follows Commands/attention: Follows one-step commands. Needs repetition, cues.  Communication: clear/fluent  Safety awareness/insight to disability: intact    Physical Exam:  Postural examination/scapula alignment: No postural abnormalities identified    Skin integrity: Visible skin intact  Edema: None noted     Sensation:   Intact    Upper Extremity Range of Motion:  Right Upper Extremity: WFL  Left Upper Extremity: WFL    Upper Extremity Strength:  Right Upper Extremity: WFL  Left Upper Extremity: WFL    Lower Extremity Range of Motion:  Right Lower Extremity: WNL  Left Lower Extremity: WNL    Lower Extremity Strength:  Right Lower Extremity: WNL  Left Lower Extremity: WNL     Fine motor coordination:      Gross motor coordination: WFL    Functional Mobility:  Bed Mobility :   Supine to sit: Supervision or Set-up Assistance   Sit to supine: Supervision or Set-up Assistance   Rolling: Supervision or Set-up Assistance   Scooting: Supervision or Set-up Assistance    Functional Status:  Bed/Chair/WC: Supervision or Setup  Distance Walk: amb w/o AD w/ supervision. Pt w/ LLE full foot contact rather than heelstrike most steps. Occ mild unsteadiness but w/o LOB. One cue for attension to enveironment (avoid obstacle onfloor). In her room, patient touched walls, furniture, but amb 300 feet in hallways w/o UE support.   Distance Wheelchair: 150 feet w/ min assist and cues/instruction for each turn   Stairs:  (up/down 4 inch curb step w/ SBA; up/down 4 steps w/ BHR and SPV)       PT Exercises: Ankle pumps 20  Quad sets 20  Glut sets 20  Short arc quads 20  Heel slides 20  Long arc quads 20  ABduction 20  ADduction 20    Additional Treatment:  LBE x15 minutes    Patient left up in chair with call button in reach and nurse notified.    Assessment:  Ida Santana is a 76 y.o. female with a medical diagnosis of MRSA bacteremia. She presents with mild deficit in functional mobility. Pt ambulates 300 feet w/ SPV w/  minimal gait irregularity and w/o LOB. She does touch walls, furniture in the room, but amb 300 feet in hallways w/o use of UEs for support and w/o LOB. Pt will benefit from skilled PT to improve safety and functional mobiltiy, strength and endurance.       Rehab identified problem list/impairments: Rehab identified problem list/impairments: impaired endurance, impaired balance    Rehab potential is good.    Activity tolerance: Good    Discharge recommendations: Discharge Facility/Level Of Care Needs: home with home health     Barriers to discharge: Barriers to Discharge: Decreased caregiver support    Equipment recommendations: Equipment Needed After Discharge: none     GOALS:   Physical Therapy Goals        Problem: Physical Therapy Goal    Goal Priority Disciplines Outcome Goal Variances Interventions   Physical Therapy Goal     PT/OT, PT      Description:  Goals to be met by: 7 days     Patient will increase functional independence with mobility by performin. Supine to sit with Modified [place]  2. Sit to supine with Modified [place]  3. Sit to stand transfer with Modified [place]  4. Bed to chair transfer with Modified [place] using no AD  5. Gait  x 500 feet with Supervision w/o AD.   6. Wheelchair propulsion x100 feet with Supervision using bilateral upper extremities  7. Ascend/Descend 4 inch curb step with Supervision .  8. Lower extremity exercise program x30 reps per handout, with assistance as needed and gym therex                  PLAN:    Patient to be seen 5 x/week  to address the above listed problems via gait training, therapeutic activities, therapeutic exercises, wheelchair management/training  Plan of Care Expires: patient    Louann Kilgore, PT 2017

## 2017-01-28 NOTE — PLAN OF CARE
Problem: Patient Care Overview  Goal: Plan of Care Review  Outcome: Revised  Repositions independently, no new skin breakdowns noted. Afebrile, Teflaro IVABX scheduled q 8 hrs. Monitored for pain and safety q 1- 2hrs this shift. Safety maintained. Denies pain.

## 2017-01-28 NOTE — PT/OT/SLP DISCHARGE
Physical Therapy Discharge Summary    Ida Santana  MRN: [id number]   MRSA bacteremia   Patient Discharged from acute Physical Therapy on 17.  Please refer to prior PT noted date on 17 for functional status.     Assessment:   Patient appropriate for care in another setting.  GOALS:   Physical Therapy Goals        Problem: Physical Therapy Goal    Goal Priority Disciplines Outcome Goal Variances Interventions   Physical Therapy Goal     PT/OT, PT Ongoing (interventions implemented as appropriate)     Description:  Goals to be met by: 17     Patient will increase functional independence with mobility by performin. Supine to sit with Modified [place]- Met  2. Sit to supine with Modified [place]- Met  3. Sit to stand transfer with Supervision- Met  4. Bed to chair transfer with Supervision using LRAD  5. Gait  x 150 feet with Supervision using LRAD   6. BLE therapeutic exercises x 10 with independence            Reasons for Discontinuation of Therapy Services  Transfer to alternate level of care.      Plan:  Patient Discharged to: Skilled Nursing Facility.     Ashtyn Wright DPT, PT  2017

## 2017-01-28 NOTE — PLAN OF CARE
Problem: Occupational Therapy Goal  Goal: Occupational Therapy Goal  Goals to be met by: 5 days     Patient will increase functional independence with ADLs by performing:    UE Dressing with [place].  LE Dressing with Modified [place].  Grooming while standing with Modified [place].  Toileting from toilet with Modified [place] for hygiene and clothing management.   Bathing from shower chair/bench with Modified [place].  Supine to sit with [place].  Functional transfers with [place].  Outcome: Ongoing (interventions implemented as appropriate)  OT Evaluation complete. Pt would benefit from OT services to maximize independence with functional tasks and increase safety awareness.

## 2017-01-28 NOTE — H&P
[facility]  History & Physical    SUBJECTIVE:     Chief Complaint/Reason for Admission: Debility    History of Present Illness:  Patient is a 76 y.o. 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 Source of sepsis was MRSA bacteremia likely related to pneumonia. CT scan of chest consistent with septic emboli. Patient started on Vancomycin and then changed to high does ceftaroline per ID. Patient has been working with PT/OT who recommend home PT for further balance/mobility training. However patient not able to administer IV abx due to lack of support at home. Patient being transferred to SNF to complete IV antibiotics. She is tolerating oral diet. Last bowel movement was this morning. Patient currently denies any fever/chills, chest pain, dyspnea, weakness, numbness, abdominal pain, nausea/vomiting, dysuria/hematuria, or weight loss.     PTA Medications   Medication Sig    BLOOD SUGAR DIAGNOSTIC (ONE TOUCH II TEST MISC) * * * *.  CHECKS 1 TIME DAILY AT LEAST    CALCIUM CARBONATE/VITAMIN D3 (CALCIUM WITH VITAMIN D3 ORAL) Take 1 tablet by mouth.     carvedilol (COREG) 25 MG tablet Take 25 mg by mouth 2 (two) times daily with meals.    dextrose 5 % SolP 50 mL with ceftaroline fosamil 400 mg SolR 600 mg Inject 600 mg into the vein every 8 (eight) hours. STOP DATE 3/4/17.    flurandrenolide (CORDRAN) 0.05 % lotion Apply to  scalp one to two times daily as needed for dryness    ketoconazole (NIZORAL) 2 % shampoo USE EVERY OTHER DAY LET SIT ON SCALP FOR FIVE MINUTES BEFORE RINSING    lisinopril (PRINIVIL,ZESTRIL) 20 MG tablet Take 20 mg by mouth once daily.    MULTIVITAMIN WITH MINERALS (ONE-A-DAY 50 PLUS) Tab Take 1 tablet by mouth once daily.     om-3-epa-dha-fish  oil-flax-E (THERA TEARS NUTRITION) [phone] uw-rj-tp-unit Cap Take 3 capsules by mouth every morning. Over-the-counter supplement    potassium chloride SA (K-DUR,KLOR-CON) 20 MEQ tablet take 1 tablet by mouth twice a day    rivaroxaban (XARELTO) 15 mg (42)- 20 mg (9) DsPk Take 1 tablet by mouth 2 (two) times daily. PLEASE CHANGE SIG TO: Take as directed on package.    [START ON 2/16/2017] rivaroxaban (XARELTO) 20 mg Tab Take 1 tablet (20 mg total) by mouth daily with dinner or evening meal. Starting 2/16    verapamil (CALAN-SR) 240 MG CR tablet take 1 tablet by mouth twice a day     Review of patient's allergies indicates:   Allergen Reactions    Adhesive tape-silicones      Other reaction(s): pulling skin off    Atorvastatin      Other reaction(s): Muscle pain    Pravachol  [pravastatin] Rash     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    Osteopenia     Potassium depletion 1998     Past Surgical History   Procedure Laterality Date    Breast biopsy  2001     left breast DCIS    Breast surgery  9/2001     left lumpectomy with SLN bx    Colonoscopy      Colonoscopy N/A 10/31/2016     Procedure: COLONOSCOPY;  Surgeon: YAMILETH Richards MD;  Location: [address]);  Service: Endoscopy;  Laterality: N/A;     Family History   Problem Relation Age of Onset    Cancer Mother      breast    Breast cancer Mother      40s and again in 50s (bilateral)    Breast cancer Other     Heart disease Father      bypass    Cancer Father      asbestos    Stroke Brother     Ovarian cancer Neg Hx     Amblyopia Neg Hx     Blindness Neg Hx     Cataracts Neg Hx     Diabetes Neg Hx     Glaucoma Neg Hx     Hypertension Neg Hx     Macular degeneration Neg Hx      Retinal detachment Neg Hx     Strabismus Neg Hx     Thyroid disease Neg Hx     Colon cancer Neg Hx      Social History   Substance Use Topics    Smoking status: Never Smoker    Smokeless tobacco: Never Used    Alcohol use No      Review of Systems:  Constitutional: no fever or chills  Eyes: no visual changes  ENT: no nasal congestion or sore throat  Respiratory: no cough or shortness of breath  Cardiovascular: no chest pain or palpitations  Gastrointestinal: no nausea or vomiting, no abdominal pain or change in bowel habits  Genitourinary: no hematuria or dysuria  Integument/Breast: no rash or pruritis  Hematologic/Lymphatic: no easy bruising or lymphadenopathy  Musculoskeletal: no arthralgias or myalgias  Neurological: no seizures or tremors  Behavioral/Psych: no auditory or visual hallucinations  Endocrine: no heat or cold intolerance    OBJECTIVE:     Vital Signs (Most Recent):  Temp: 98.7 °F (37.1 °C) (01/28/17 0847)  Pulse: 75 (01/28/17 0847)  Resp: 18 (01/28/17 0847)  BP: (!) 175/81 (01/28/17 0847)  SpO2: (!) 93 % (01/28/17 0847)    Physical Exam:  General: well developed, well nourished, appears stated age, no distress  HENT: Head:normocephalic, atraumatic. Ears:bilateral TM's and external ear canals normal. Nose: Nares normal. Septum midline. Mucosa normal. No drainage or sinus tenderness., no discharge. Throat: lips, mucosa, and tongue normal; teeth and gums normal and no throat erythema.  Eyes: conjunctivae/corneas clear. PERRL.   Neck: supple, symmetrical, trachea midline, no JVD and thyroid not enlarged, symmetric, no tenderness/mass/nodules  Lungs:  clear to auscultation bilaterally and normal respiratory effort  Cardiovascular: Heart: regular rate and rhythm, S1, S2 normal, no murmur, click, rub or gallop. Chest Wall: no tenderness. Extremities: no cyanosis or edema, or clubbing. Pulses: 2+ and symmetric.  Abdomen/Rectal: Abdomen: soft, non-tender non-distented; bowel sounds normal; no masses,   no organomegaly. Rectal: not examined  Skin: Skin color, texture, turgor normal. No rashes or lesions  Musculoskeletal:+ PICC line in RUE  Lymph Nodes: No cervical or supraclavicular adenopathy  Neurologic: Normal strength and tone. No focal numbness or weakness  Mental status: Alert, oriented, thought content appropriate  Psych/Behavioral:  Normal.    Laboratory:  CBC:   Recent Labs  Lab 01/25/17  0347   WBC 11.12   RBC 3.19*   HGB 10.0*   HCT 29.4*      MCV 92   MCH 31.3*   MCHC 34.0     CMP:   Recent Labs  Lab 01/25/17  0347   *   CALCIUM 8.7   ALBUMIN 2.1*   PROT 7.0      K 3.8   CO2 21*      BUN 11   CREATININE 0.8   ALKPHOS 65   ALT 49*   AST 41*   BILITOT 0.5     Coagulation: No results for input(s): INR, APTT in the last 168 hours.    Invalid input(s): PT  Microbiology Results (last 7 days)     ** No results found for the last 168 hours. **        Diagnostic Results:  Labs: Reviewed    ASSESSMENT/PLAN:     Active Hospital Problems    Diagnosis    *MRSA bacteremia / MRSA pneumonia  · Sepsis has resolved.   · Cont Ceftaroline 600mg IV q 8 hours per ID via PICC line. Stop date 3/4/17.  · PICC line care  · Patient has had 2 sets of negative cultures that are greater than 48hr apart. No need for isolation or contact precautions.     Debility  Cont with PT/OT for gait training and strengthening and restoration of ADL's   Cont DVT prophylaxis with rivaroxaban.     Kidney mass  · Will need to follow up with urology after discharge from SNF    Thrombosis of left renal vein  · Cont anticoagulation with Rivaroxaban.     Hypertension  · Well controlled.   · Cont coreg, lisinopril, and verapamil to treat.     Hyperlipidemia  · Diet controlled.      Patient's care plan and discharge planning will be discussed with the SNF team in IDT meetings.     Future Appointments  Date Time Provider Department Center   2/13/2017 10:30 AM Janice Reyes MD [facility] ID [facility]   3/13/2017 8:00 AM  CARTY, VIS[facility]EL[facility]   3/13/2017 8:30 AM Lynda Crawford MD [facility] MD  Attending Staff Physician  [facility] Medicine   Pager: [phone]

## 2017-01-28 NOTE — PT/OT/SLP EVAL
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    Osteopenia     Potassium depletion 1998      Past Surgical History   Procedure Laterality Date    Breast biopsy  2001     left breast DCIS    Breast surgery  9/2001     left lumpectomy with SLN bx    Colonoscopy      Colonoscopy N/A 10/31/2016     Procedure: COLONOSCOPY;  Surgeon: YAMILETH Richards MD;  Location: [facility] ([address] [place] [address]);  Service: Endoscopy;  Laterality: N/A;       Referring physician: Diann Lopez MD  Date referred to OT: 01/27/2017    General Precautions: Standard, fall, diabetic  Orthopedic Precautions: N/A  Braces: N/A    Do you have any cultural, spiritual, Mormon conflicts, given your current situation?: None stated     Patient History:  Lives With: alone  Living Arrangements: house  Number of Stairs to Enter Home: 1  Living Environment Comment: Pt lives alone and was independent with mobility prior. Pt owns a shower chair but states she does not use it.  Equipment Currently Used at Home: none    Prior level of function:   Bed Mobility/Transfers: independent  Grooming: independent  Bathing: independent  Upper Body Dressing: independent  Lower Body Dressing: independent  Toileting: independent  Home Management Skills: independent  Homemaking Responsibilities: Yes  Meal Prep Responsibility: Primary  Laundry Responsibility: Primary  Cleaning Responsibility: Primary  Bill Paying/Finance  "Responsibility: Primary  Shopping Responsibility: Primary  Driving License: Yes  Occupation: Retired  Type of Occupation: Kindergartedn teacher  Leisure and Hobbies: Holiness, choir, traveling with a group     Dominant hand: right    Subjective:  "I am a little tired, but I can get up."    Chief Complaint: tired  Patient/Family stated goals: To return home    Pain Ratin/10              Pain Rating Post-Intervention: 0/10    Objective:   Patient found with: PICC line    Cognitive Exam:  Oriented to: Person, Place, Time and Situation  Follows Commands/attention: Follows multistep  commands  Communication: clear/fluent  Memory:  No Deficits noted  Safety awareness/insight to disability: intact  Coping skills/emotional control: Appropriate to situation    Visual/perceptual:  Intact    Physical Exam:  Postural examination/scapula alignment: Rounded shoulder  Skin integrity: Visible skin intact  Edema: None noted B UE    Sensation:   Intact    Upper Extremity Range of Motion:  Right Upper Extremity: WFL  Left Upper Extremity: WFL    Upper Extremity Strength:  Right Upper Extremity: WFL  Left Upper Extremity: WFL   Strength: WFL    Fine motor coordination:   Intact    Gross motor coordination: WFL    Functional Mobility:  Bed Mobility:   Supine to sit: Supervision    Rolling: Modified Independent     Functional Status:  Eating: Modified [place]  Grooming: Supervision or Setup (Supervision in stance for oral care)  Bathing: Supervision or Setup (Supervision for sponge bath seated)  Dressing - Upper: Supervision or Setup  Dressing - Lower: Supervision or Setup  Bed/Chair/WC: Supervision or Setup (sit to stande from EOB with no AD)     Balance:   Static Sit: Good: Patient able to maintain balance without handhold support, limited postural sway.  Dynamic Sit: Good: Patient accepts moderate challenge; able to maintain balance while picking object off floor.  Static Stand: Good: Patient able to maintain balance " without handhold support, limited postural sway  Dynamic stand: Fair: Patient accepts minimal challenge; able to maintain balance while turning head/trunk.    Additional Treatment:  Pt with SBA for functional mobility within room with no AD. Pt reaching for the doorways and furniture requiring VCs for safety. Pt educated on safety throughout mobility and ADLs. Pt educated on calling for assistance to get OOB 2* fall risk. Pt educated on the SNF unit, role of OT and POC.    Patient left up in chair with call button in reach    Assessment:  Ida Santana is a 76 y.o. female with a medical diagnosis of MRSA bacteremia and presents with decreased endurance and independence with functional mobility and ADLs. Pt was completely independent prior and was living alone and would benefit from continued OT to return to baseline in order to discharge back home safely. Based on Pt's prior level of function and no participation restrictions, Pt is a low complexity eval.    Rehab identified problem list/impairments: Rehab identified problem list/impairments: weakness, impaired endurance, impaired functional mobilty, impaired self care skills    Rehab potential is good    Activity tolerance: Good    Discharge recommendations: home with home health     Barriers to discharge: Barriers to Discharge: Decreased caregiver support     Equipment recommendations: none     GOALS:   Occupational Therapy Goals        Problem: Occupational Therapy Goal    Goal Priority Disciplines Outcome Interventions   Occupational Therapy Goal     OT, PT/OT Ongoing (interventions implemented as appropriate)    Description:  Goals to be met by: 5 days     Patient will increase functional independence with ADLs by performing:    UE Dressing with [place].  LE Dressing with Modified [place].  Grooming while standing with Modified [place].  Toileting from toilet with Modified [place] for hygiene and clothing management.   Bathing from  shower  chair/bench with Modified [place].  Supine to sit with [place].  Functional transfers with [place].                PLAN: Patient to be seen 5 x/week to address the above listed problems via self-care/home management, therapeutic activities, therapeutic exercises, community/work re-entry  Plan of Care expires:    Plan of Care reviewed with: patient    Marielos JaimesCLOTILDE  01/28/2017

## 2017-01-29 PROCEDURE — 25000003 PHARM REV CODE 250: Performed by: STUDENT IN AN ORGANIZED HEALTH CARE EDUCATION/TRAINING PROGRAM

## 2017-01-29 PROCEDURE — 11000004 HC SNF PRIVATE

## 2017-01-29 PROCEDURE — 94668 MNPJ CHEST WALL SBSQ: CPT

## 2017-01-29 PROCEDURE — 63600175 PHARM REV CODE 636 W HCPCS: Performed by: STUDENT IN AN ORGANIZED HEALTH CARE EDUCATION/TRAINING PROGRAM

## 2017-01-29 RX ADMIN — CARVEDILOL 25 MG: 25 TABLET, FILM COATED ORAL at 05:01

## 2017-01-29 RX ADMIN — RIVAROXABAN 15 MG: 15 TABLET, FILM COATED ORAL at 08:01

## 2017-01-29 RX ADMIN — Medication 10 ML: at 12:01

## 2017-01-29 RX ADMIN — Medication 10 ML: at 05:01

## 2017-01-29 RX ADMIN — RIVAROXABAN 15 MG: 15 TABLET, FILM COATED ORAL at 05:01

## 2017-01-29 RX ADMIN — VERAPAMIL HYDROCHLORIDE 240 MG: 240 TABLET, FILM COATED, EXTENDED RELEASE ORAL at 08:01

## 2017-01-29 RX ADMIN — CEFTAROLINE FOSAMIL 600 MG: 600 POWDER, FOR SOLUTION INTRAVENOUS at 01:01

## 2017-01-29 RX ADMIN — LISINOPRIL 20 MG: 20 TABLET ORAL at 08:01

## 2017-01-29 RX ADMIN — CEFTAROLINE FOSAMIL 600 MG: 600 POWDER, FOR SOLUTION INTRAVENOUS at 05:01

## 2017-01-29 RX ADMIN — CARVEDILOL 25 MG: 25 TABLET, FILM COATED ORAL at 08:01

## 2017-01-29 RX ADMIN — CEFTAROLINE FOSAMIL 600 MG: 600 POWDER, FOR SOLUTION INTRAVENOUS at 08:01

## 2017-01-29 NOTE — PLAN OF CARE
Problem: Patient Care Overview  Goal: Plan of Care Review  Outcome: Ongoing (interventions implemented as appropriate)    01/29/17 0028   Coping/Psychosocial   Plan Of Care Reviewed With patient         Comments:   Comments:  Pt was assessed and VS taken recorded. Noted no active skin breakdown. Medications were given and pt tolerated. Call bell within reached, 2 x side rails of bed elevated and bed in the lowest position. Assisted to washroom x 1.Pt turned every 2 hours and monitor for pain and safety.

## 2017-01-29 NOTE — PLAN OF CARE
Problem: Patient Care Overview  Goal: Plan of Care Review  Outcome: Revised  Repositions independently, no new skin breakdowns noted. Afebrile, Teflaro IVABX admin Q8hrs. Monitored for pain and safty q 1- 2hrs this shift. Safety maintained. Denies pain.

## 2017-01-30 ENCOUNTER — PATIENT OUTREACH (OUTPATIENT)
Dept: ADMINISTRATIVE | Facility: CLINIC | Age: 77
End: 2017-01-30
Payer: COMMERCIAL

## 2017-01-30 LAB
ANION GAP SERPL CALC-SCNC: 6 MMOL/L
BACTERIA BLD CULT: NORMAL
BACTERIA BLD CULT: NORMAL
BASOPHILS # BLD AUTO: 0.03 K/UL
BASOPHILS NFR BLD: 0.5 %
BUN SERPL-MCNC: 14 MG/DL
CALCIUM SERPL-MCNC: 8.5 MG/DL
CHLORIDE SERPL-SCNC: 111 MMOL/L
CO2 SERPL-SCNC: 25 MMOL/L
CREAT SERPL-MCNC: 0.8 MG/DL
DIFFERENTIAL METHOD: ABNORMAL
EOSINOPHIL # BLD AUTO: 0.2 K/UL
EOSINOPHIL NFR BLD: 2.3 %
ERYTHROCYTE [DISTWIDTH] IN BLOOD BY AUTOMATED COUNT: 15.7 %
EST. GFR  (AFRICAN AMERICAN): >60 ML/MIN/1.73 M^2
EST. GFR  (NON AFRICAN AMERICAN): >60 ML/MIN/1.73 M^2
GLUCOSE SERPL-MCNC: 99 MG/DL
HCT VFR BLD AUTO: 28.4 %
HGB BLD-MCNC: 9 G/DL
LYMPHOCYTES # BLD AUTO: 1.4 K/UL
LYMPHOCYTES NFR BLD: 21 %
MAGNESIUM SERPL-MCNC: 1.7 MG/DL
MCH RBC QN AUTO: 31.5 PG
MCHC RBC AUTO-ENTMCNC: 31.7 %
MCV RBC AUTO: 99 FL
MONOCYTES # BLD AUTO: 0.6 K/UL
MONOCYTES NFR BLD: 9.8 %
NEUTROPHILS # BLD AUTO: 4.3 K/UL
NEUTROPHILS NFR BLD: 66.4 %
PHOSPHATE SERPL-MCNC: 3 MG/DL
PLATELET # BLD AUTO: 279 K/UL
PMV BLD AUTO: 9.8 FL
POTASSIUM SERPL-SCNC: 3.3 MMOL/L
RBC # BLD AUTO: 2.86 M/UL
SODIUM SERPL-SCNC: 142 MMOL/L
WBC # BLD AUTO: 6.43 K/UL

## 2017-01-30 PROCEDURE — 97110 THERAPEUTIC EXERCISES: CPT

## 2017-01-30 PROCEDURE — 25000003 PHARM REV CODE 250: Performed by: STUDENT IN AN ORGANIZED HEALTH CARE EDUCATION/TRAINING PROGRAM

## 2017-01-30 PROCEDURE — 97530 THERAPEUTIC ACTIVITIES: CPT

## 2017-01-30 PROCEDURE — 85025 COMPLETE CBC W/AUTO DIFF WBC: CPT

## 2017-01-30 PROCEDURE — 84100 ASSAY OF PHOSPHORUS: CPT

## 2017-01-30 PROCEDURE — 80048 BASIC METABOLIC PNL TOTAL CA: CPT

## 2017-01-30 PROCEDURE — 25000003 PHARM REV CODE 250: Performed by: NURSE PRACTITIONER

## 2017-01-30 PROCEDURE — 36415 COLL VENOUS BLD VENIPUNCTURE: CPT

## 2017-01-30 PROCEDURE — 94668 MNPJ CHEST WALL SBSQ: CPT

## 2017-01-30 PROCEDURE — 63600175 PHARM REV CODE 636 W HCPCS: Performed by: STUDENT IN AN ORGANIZED HEALTH CARE EDUCATION/TRAINING PROGRAM

## 2017-01-30 PROCEDURE — 83735 ASSAY OF MAGNESIUM: CPT

## 2017-01-30 PROCEDURE — 11000004 HC SNF PRIVATE

## 2017-01-30 PROCEDURE — 99309 SBSQ NF CARE MODERATE MDM 30: CPT | Mod: ,,, | Performed by: NURSE PRACTITIONER

## 2017-01-30 PROCEDURE — 97116 GAIT TRAINING THERAPY: CPT

## 2017-01-30 RX ORDER — POTASSIUM CHLORIDE 20 MEQ/1
40 TABLET, EXTENDED RELEASE ORAL ONCE
Status: COMPLETED | OUTPATIENT
Start: 2017-01-30 | End: 2017-01-30

## 2017-01-30 RX ADMIN — Medication 10 ML: at 12:01

## 2017-01-30 RX ADMIN — VERAPAMIL HYDROCHLORIDE 240 MG: 240 TABLET, FILM COATED, EXTENDED RELEASE ORAL at 10:01

## 2017-01-30 RX ADMIN — Medication 10 ML: at 06:01

## 2017-01-30 RX ADMIN — CEFTAROLINE FOSAMIL 600 MG: 600 POWDER, FOR SOLUTION INTRAVENOUS at 09:01

## 2017-01-30 RX ADMIN — CEFTAROLINE FOSAMIL 600 MG: 600 POWDER, FOR SOLUTION INTRAVENOUS at 06:01

## 2017-01-30 RX ADMIN — Medication 10 ML: at 11:01

## 2017-01-30 RX ADMIN — CARVEDILOL 25 MG: 25 TABLET, FILM COATED ORAL at 06:01

## 2017-01-30 RX ADMIN — LISINOPRIL 20 MG: 20 TABLET ORAL at 09:01

## 2017-01-30 RX ADMIN — RIVAROXABAN 15 MG: 15 TABLET, FILM COATED ORAL at 08:01

## 2017-01-30 RX ADMIN — RIVAROXABAN 15 MG: 15 TABLET, FILM COATED ORAL at 06:01

## 2017-01-30 RX ADMIN — VERAPAMIL HYDROCHLORIDE 240 MG: 240 TABLET, FILM COATED, EXTENDED RELEASE ORAL at 09:01

## 2017-01-30 RX ADMIN — CARVEDILOL 25 MG: 25 TABLET, FILM COATED ORAL at 08:01

## 2017-01-30 RX ADMIN — POTASSIUM CHLORIDE 40 MEQ: 1500 TABLET, EXTENDED RELEASE ORAL at 02:01

## 2017-01-30 RX ADMIN — CEFTAROLINE FOSAMIL 600 MG: 600 POWDER, FOR SOLUTION INTRAVENOUS at 01:01

## 2017-01-30 NOTE — PT/OT/SLP PROGRESS
Physical Therapy  Treatment    Ida Santana   MRN: [id number]   Admitting Diagnosis: MRSA bacteremia    PT Received On: 17                     Billable Minutes:  Gait [id number], Therapeutic Activity 15 and Therapeutic Exercise 10= 40    Treatment Type: Treatment  PT/PTA: PT     PTA Visit Number: 0       General Precautions: Standard, fall, diabetic  Orthopedic Precautions: N/A   Braces: N/A         Subjective:  Communicated with pt prior to session. Agreeable to PT services.    Pain Ratin/10     Objective:        Functional Mobility:  Bed Mobility :   Supine to sit: Modified Independent   Sit to supine: Modified Independent     Functional Status:  Bed/Chair/WC: Supervision or Setup  Distance Walk: Ambulated 300 feet with supervision and with no assistive device.   Walk: Exception/Household. Supervision.  Wheelchair: Exception (Household Locomotion) (supervision)  Distance Wheelchair: 100 feet  Stairs: Exception/Household (supervision 4 steps x 3 trials)     Gait Pattern: swing-through gait  Gait Deviation(s): decreased jose, slight instability but no loss of balance.  Stairs: Pt ascended/descend 4 stair(s)x 3 trials with No Assistive Device with bilateral with Supervision or Set-up Assistance.     Seated Exercises:   Ankle pumps 20 reps BLE  Glut sets 20 reps BLE  Straight leg raising 20 reps BLE  Hip flexion 20 reps BLE  Long arc quads 20 reps BLE    Standing exercises:   Hip aBduction 20 reps BLE  Toe raises  20 reps BLE  Hip flexion 20 reps BLE    Patient left seated at edge of bed with call button in reach.    Assessment:  Ida Santana is a 76 y.o. female with a medical diagnosis of MRSA bacteremia and presents with slight instability with ambulation with no assistive device and stand-by assistance provided throughout gait trial.  Ms. Santana will benefit from further higher level balance activities to improve her balance before discharge home.  After her stay here, she may not need assistance upon  return home.    Rehab identified problem list/impairments: Rehab identified problem list/impairments: impaired endurance, impaired functional mobilty, gait instability, impaired balance    Rehab potential is good.    Activity tolerance: Good    Discharge recommendations: Discharge Facility/Level Of Care Needs: home health PT     Barriers to discharge: Barriers to Discharge: Decreased caregiver support    Equipment recommendations: Equipment Needed After Discharge: none     GOALS:   Physical Therapy Goals        Problem: Physical Therapy Goal    Goal Priority Disciplines Outcome Goal Variances Interventions   Physical Therapy Goal     PT/OT, PT Ongoing (interventions implemented as appropriate)     Description:  Goals to be met by: 7 days     Patient will increase functional independence with mobility by performin. Supine to sit with Modified [place].  Met (2017)  2. Sit to supine with Modified [place]. Met (2017)  3. Sit to stand transfer with Modified [place]  4. Bed to chair transfer with Modified [place] using no AD  5. Gait  x 500 feet with Supervision w/o AD.   6. Wheelchair propulsion x100 feet with Supervision using bilateral upper extremities  7. Ascend/Descend 4 inch curb step with Supervision .  8. Lower extremity exercise program x30 reps per handout, with assistance as needed and gym therex                   PLAN:    Patient to be seen 5 x/week  to address the above listed problems via gait training, therapeutic activities, therapeutic exercises, wheelchair management/training  Plan of Care expires: 17  Plan of Care reviewed with: patient    Etelvina Jack, PT  2017

## 2017-01-30 NOTE — PLAN OF CARE
Problem: Patient Care Overview  Goal: Plan of Care Review  Outcome: Ongoing (interventions implemented as appropriate)    01/29/17 2256   Coping/Psychosocial   Plan Of Care Reviewed With patient         Comments:   Comments:  Pt was assessed and VS taken recorded. Noted no active skin breakdown. Medications were given and pt tolerated. Family came in to visit. Fall precaution reviewed with pt and pt verbalizes understanding. Pt turned independently and monitored for pain and safety.

## 2017-01-30 NOTE — PLAN OF CARE
Problem: Patient Care Overview  Goal: Plan of Care Review  Outcome: Ongoing (interventions implemented as appropriate)  Patient is AAO x 4. Able to voice own needs and wants. All po medication taken zero swallowing problems noted. Zero S/S of or C/O pain or discomfort. Call light in reach. Will continue to monitor.

## 2017-01-30 NOTE — PT/OT/SLP DISCHARGE
Occupational Therapy Discharge Summary    Ida Santana  MRN: [id number]   MRSA bacteremia   Patient Discharged from acute Occupational Therapy on 2017.  Please refer to prior OT note dated on 2017 for functional status.     Assessment:   Goals partially met. Patient appropriate for care in another setting.     GOALS:   Occupational Therapy Goals        Problem: Occupational Therapy Goal    Goal Priority Disciplines Outcome Interventions   Occupational Therapy Goal     OT, PT/OT Ongoing (interventions implemented as appropriate)    Description:  Goals to be met by: 2017    Patient will increase functional independence with ADLs by performin. Pt will perform supine<>sit transfer with supervision to increase independence and safety with functional transfers.  GOAL MET 17  2. Pt will perform sit<>stand transfer with supervision to increase independence and safety with functional transfers. (met )  3. Pt will demonstrate improve activity tolerance by walking continuous for 2 minutes with no RB   4. Pt will perform therex 2x10 with supervision for BUE strengthening.  GOAL MET 17  5. Pt will perform bed to chair transfer with supervision to increase independence and safety with functional transfers  6. Pt will perform bedside commode transfer with supervision to increase independence and safety with functional transfers.  7. Pt will complete standing grooming activity with supervision to increase endurance for completion of ADLs.(MET )    ** NEW GOALS:  (17)  Pt will perform UE dressing with mod I  Pt will perform LE dressing with mod I  Pt will perform grooming while standing with mod I              Reasons for Discontinuation of Therapy Services  Transfer to alternate level of care.      Plan:  Patient Discharged to: [facility].    CLOTILDE Adkins, 2017  Occupational Therapy  Pager: [phone]

## 2017-01-30 NOTE — PLAN OF CARE
Problem: Occupational Therapy Goal  Goal: Occupational Therapy Goal  Goals to be met by: 5 days     Patient will increase functional independence with ADLs by performing:    UE Dressing with [place].  LE Dressing with Modified [place].  Grooming while standing with Modified [place].  Toileting from toilet with Modified [place] for hygiene and clothing management.   Bathing from shower chair/bench with Modified [place].  Supine to sit with [place].  Functional transfers with [place].   Outcome: Ongoing (interventions implemented as appropriate)  Progressing. CLOTILDE Curry 1/30/2017

## 2017-01-30 NOTE — PLAN OF CARE
Problem: Physical Therapy Goal  Goal: Physical Therapy Goal  Goals to be met by: 7 days     Patient will increase functional independence with mobility by performin. Supine to sit with Modified [place]. Met (2017)  2. Sit to supine with Modified [place]. Met (2017)  3. Sit to stand transfer with Modified [place]  4. Bed to chair transfer with Modified [place] using no AD  5. Gait x 500 feet with Supervision w/o AD.   6. Wheelchair propulsion x100 feet with Supervision using bilateral upper extremities  7. Ascend/Descend 4 inch curb step with Supervision .  8. Lower extremity exercise program x30 reps per handout, with assistance as needed and gym therex     Outcome: Ongoing (interventions implemented as appropriate)  Pt met two goals today.

## 2017-01-30 NOTE — PROGRESS NOTES
Progress Note  Skilled Nursing Unit    Admit Date: 1/27/2017  Anticipated Discharge Date:      SUBJECTIVE:     Follow-up for  MRSA bacteremia    HPI/Interval history: Patient seen in room, reports no pain at this time.      Pain Scale: denies pain at this time    Review of Systems:  Constitutional: no fever or chills  Eyes: no visual changes, negative for irritation  Respiratory: no cough or shortness of breath  Cardiovascular: no chest pain or palpitations  Gastrointestinal: no nausea or vomiting, no abdominal pain or change in bowel habits  Genitourinary: no hematuria or dysuria  Integument/Breast: no rash or pruritis  Musculoskeletal: positive for muscle weakness    OBJECTIVE:       Vital Signs Range (Last 24H):  Temp:  [98.4 °F (36.9 °C)]   Pulse:  [66]   Resp:  [18]   BP: (137-165)/(65-76)   SpO2:  [96 %-98 %]     I & O (Last 24H):    Intake/Output Summary (Last 24 hours) at 01/30/17 1115  Last data filed at 01/30/17 0500   Gross per 24 hour   Intake              120 ml   Output                0 ml   Net              120 ml       Physical Exam:  General: well developed, well nourished, appears stated age, no distress  Eyes: positive findings: conjunctiva: injected on left eye.  Lungs:  clear to auscultation bilaterally and normal respiratory effort  Cardiovascular: Heart: regular rate and rhythm, S1, S2 normal, no murmur, click, rub or gallop. Chest Wall: no tenderness. Extremities: no cyanosis or edema, or clubbing. Pulses: 2+ and symmetric.  Abdomen/Rectal: Abdomen: soft, non-tender non-distented; bowel sounds normal; no masses,  no organomegaly. Rectal: not examined  Skin: Skin color, texture, turgor normal. No rashes or lesions  Musculoskeletal:no clubbing, cyanosis  Psych/Behavioral:  Alert and oriented, appropriate affect.    Diagnostic Results:    Recent Labs  Lab 01/24/17  0457 01/25/17  0347 01/30/17  0604   WBC 14.18* 11.12 6.43   HGB 9.4* 10.0* 9.0*   HCT 28.0* 29.4* 28.4*    331 279         Recent Labs  Lab 01/24/17  0457 01/25/17  0347 01/30/17  0604    139 142   K 3.5 3.8 3.3*    108 111*   CO2 23 21* 25   BUN 12 11 14   CREATININE 0.8 0.8 0.8   * 113* 99   CALCIUM 8.3* 8.7 8.5*   MG 1.9 2.2 1.7   PHOS  --   --  3.0        No results for input(s): INR, APTT in the last 168 hours.    Invalid input(s): PT   Microbiology Results (last 7 days)     ** No results found for the last 168 hours. **           Lab Results   Component Value Date    HGBA1C 6.0 01/06/2017     POCT Glucose   Date Value Ref Range Status   01/27/2017 99 70 - 110 mg/dL Final       Imaging Results     None          Current Facility-Administered Medications   Medication    benzonatate capsule 100 mg    carvedilol tablet 25 mg    ceftaroline fosamil (TEFLARO) 600 mg in dextrose 5 % 50 mL IVPB    lisinopril tablet 20 mg    polyethylene glycol packet 17 g    rivaroxaban tablet 15 mg    [START ON 2/16/2017] rivaroxaban tablet 20 mg    sodium chloride 0.9% flush 10 mL    And    sodium chloride 0.9% flush 10 mL    tramadol tablet 50 mg    verapamil CR tablet 240 mg         ASSESSMENT/PLAN:     Active Hospital Problems    Diagnosis  POA    *MRSA bacteremia [R78.81]  No fevers.  Seen by ID in acute, recommends 6 weeks of IV abx (teflaro 600 mg) from 1-21 (1st neg culture), last dose on 3/4.  Monitor weekly ESR and CRP and biweekly CBC and cmp.  Yes    Debility [R53.81]  Continue therapy to restore functional goals.  She currently lives alone and has 3 brothers who live close by.  She stated she will not be able to self-administer IV abx.  Yes    Kidney mass [N28.89]  Per MD note, follow up with urology after discharge.  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  Yes    MRSA pneumonia [J15.212]  Continue IV abx as stated above.  Yes    Hypertension [I10]  Currently treated with lisinopril 20 mg daily, Coreg 25 mg bid and verapamil 240 mg bid.  BP is 121/59   "Yes    Hyperlipidemia [E78.5]  Yes     dyslipidemia  Continue diet restrictions.  On no meds at this time.      Resolved Hospital Problems    Diagnosis Date Resolved POA    Urinary tract infection without hematuria [N39.0] 01/28/2017 Yes     Suspected conjunctivitis - per d/c summary patient treated with erythromycin ointment.  She currently denies pain or drainage to eye.  She reports she was previously taking gtts for "glycoma" but I do not see on her home list.  Will order artifical tears and continue to monitor.  Should redness worsen refer to ophthalmology for eval.      Future Appointments  Date Time Provider Department Center   2/13/2017 10:30 AM Janice Reyes MD [facility] ID [facility]   3/13/2017 8:00 AM CARTY, VISUAL FIELDS [facility]   3/13/2017 8:30 AM Lynda Crawford MD [facility]       DVT Prophylaxis: Rivaroxaban 15 mg daily until 2-15 then 20 mg daily thereafter      Francisco Hernandez, WANDA, FNP-BC  "

## 2017-01-30 NOTE — PROGRESS NOTES
Discharge Planning Assessment     Payor: HU[employer] / Plan: HU[employer] POS / Product Type: PPO /     Expected length of stay:  [] 7 days   [] 10 days  [] 14 days   [] 21 days   [x] > 30 days    Communicated expected length of stay with patient/caregiver:  [x] Yes   [] No    Anticipated discharge date:  3/6/2017 after IV Abx complete    Assessment information obtained from:  [x] Patient   [] Caregiver     Arrived from:   [x] Home   [] Assisted Living    [] Nursing Home   [] SNF   [] Rehab  [] LTACH   [] Group Home   [] Foster Care   [] Psych   [] Shelter   [] Homeless   [] Transfer  [] Correctional Facility  [] Name of Facility:      Patient currently lives with:    [x] Alone   [] Spouse   [] Daughter   [] Son   [] Grandparents   []  Parents   [] Siblings   [] Friends   [] Domestic Partner   [] Facility Resident      [] Foster Home    [] Other:       Extended Emergency Contact Information  Primary Emergency Contact: Bhavna Santana  Address: [address] [place] [address]  Home Phone: [phone]  Mobile Phone: [phone]  Relation: Sister     Prior to hospitalization cognitive status:   [x] Alert/Oriented  [] No Deficits [] Risk of Harm to Self/Others   [] Not Oriented to Person   [] Not Oriented to Place   [] Not Oriented to Time   [] Coma/Sedated/Intubated  [] Judgement Impaired    []  Unable to Assess   [] Inappropriate Behavior  [] Infant/Toddler    Prior to hospitalization functional status:   [x] Independent   [] Assistive Equipment   [] Assistive Person    [] Completely Dependent  [] Infant/Toddler/Child Appropriate   [] Infant/Toddler/Child Delayed    []  Adolescent     Current cognitive status:   [x] Alert/Oriented  [] No Deficits [] Risk of Harm to Self/Others   [] Not Oriented to Person   [] Not Oriented to Place   [] Not Oriented to Time   [] Coma/Sedated/Intubated  [] Judgement Impaired    []  Unable to Assess   [] Inappropriate Behavior  [] Infant/Toddler    Current  functional status:     [] Independent   [x] Assistive Equipment   [x] Assistive Person     [] Completely Dependent   [] Infant/Toddler/Child Appropriate   [] Infant/Toddler/Child Delayed     [] Adolescent     Capacity to Care for Self:   Is patient able to return to prior living arrangements after discharge: [x] Yes  [] No     Is patient able to care for self after discharge?   [] Yes   [x] No     [] Pediatric     Does the patient have family/friends to assist after discharge?:  [x] Yes   [] No    [] N/A   Comments:  Brothers, sister      Patient/caregiver perception of discharge disposition:   [x] Home   [] Home with Family  [x] Home Health   [] SNF   [] Rehab   [] LTAC    []  New Nursing Home Placement  [] Return to Nursing Home    [] Shelter     [] Assisted Living  [] Foster Home   [] Other:      Readmit:   Has patient fahad in the hospital in the last 30 days? [] Yes   [x] No     If YES, was patient admitted for the same reason?  [] Yes   [] No       Home Health:   Patient currently receives home health services?:   [] Yes   [x] No     Patient previously received home health services and would like to resume services if necessary   [] Yes   [x] No      If YES, name of home health provider:    DME:   Patient currently uses DME:   [] Yes   [x] No        List of equipment currently used:     [] Wheelchair   [] Standard Walker  [] Rolling Walker  []  Rollator    [] Oxygen    [] Portable oxygen   [] Nebulizer    [] Apnea Monitor    [] Crutches  [] Hospital Bed   []  Lift Device   [] Scooter [] Cane     [] Prosthesis   []  BSC   [] Tub Bench   [] Catheter Supplies    [] Ostomy Supplies   [] Trach Supplies     [] Suction Machine        [] Home Vent    [] Bipap   [] Other:         Medications:    Can the patient afford all prescribed medications?  [x] Yes   [] No     If NO, what medication:       Is the patient taking medications as prescribed?    [x] Yes   [] No    Financial Concerns:   Does the patient have any  financial concerns?   [] Yes   [x] No      If YES, what are the concerns:      Transportation:   Does the patient have transportation to healthcare appointments?   [x] Yes   [] No     If YES, what means of transportation does the patient have?   [] Car   [x] Family/Friend  [] Bicycle   [] Motorcycle   [] Public Transportation [] Ambulance[] Wheelchair van   [] Name of Provider    Dialysis:   Does the patient currently receive dialysis?   [] Yes   [x] No      If YES, what is the name of the provider:        Giovanna Murray [phone]   [phone]     [address] [address]    APS/CPS involved in the case:  [] Yes   [x] No   If YES, name of :     If YES, phone number of :        Discharge Plan A:  [x] Home   [] Home with Family  [x] Home Health   [] SNF   [] Rehab   [] LTAC   []  New Nursing Home Placement  [] Return to Nursing Home    [] Assisted Living    [] Shelter     [] Private Duty Nursing   [] Foster Home    [] Psych    [] Early Steps  [] WIC       [] Home Hospice   [] Inpatient Hospice   [] Other    Discharge Plan B:  [] Home   [] Home with Family  [] Home Health   [] SNF   [] Rehab   [] LTAC  []  New Nursing Home Placement   [] Return to  Nursing Home    [] Assisted Living   [] Shelter  [] Private Duty Nursing   [] Foster Home     [] Psych     [] Early Steps  [] WIC    [] Home Hospice     [] Inpatient Hospice   [] Other     [x] Patient and family in agreement with discharge plan.    Rounded with ELENA Singh. Patient AAO, seen in gym, no c/o at this time.  Discharge plan is to return home alone with assist of brothers and sister if needed. Informed patient therapy recommends a 5-7 day SNF stay and that we (MD, nurse, therapy, JULIANO,EDELMIRA) meet today to review her case and come up with a discharge date.  Patient aware of need for IV Abx until 3/6/2017. Patient stated she nor her family are comfortable administering the IV Abx at home. EDELMIRA and JULIANO will continue to follow for any  additional needs.    Sujatha Pelayo RN, CM Skilled  [phone]

## 2017-01-30 NOTE — PT/OT/SLP PROGRESS
Occupational Therapy  Treatment    Ida Santana   MRN: [id number]   Admitting Diagnosis: MRSA bacteremia    OT Date of Treatment: 01/30/17  Total Time (min): 40 min    Billable Minutes:  Therapeutic Activity 15 and Therapeutic Exercise 25    General Precautions: Standard, fall, diabetic    Do you have any cultural, spiritual, Baptism conflicts, given your current situation?: None stated    Subjective:  Communicated with pt prior to session.    Pain Rating:  (some low back pain occasionally)                   Objective:  Patient found with: PICC line (supine in bed)    Functional Mobility:  Bed Mobility:   Supine to sit: Modified Independent   Sit to supine: Modified Independent   Rolling: Modified Independent   Scooting: Modified Independent    Functional Status:  Bed/Chair/WC: Supervision or Setup (from bed no AD and ambulated approx 300ft with sup pushing w/c.  Pt sat in standard chair with no armrests and stood at table x 8 min to perform a puzzle;however, unable to complete due to difficulty) --all with sup    Balance:   Static Sit: Normal: Patient able to maintain steady balance without handhold support.  Dynamic Sit: Good: Patient accepts moderate challenge; able to maintain balance while picking object off floor.  Static Stand: Good: Patient able to maintain balance without handhold support, limited postural sway  Dynamic Stand: Fair: Patient accepts minimal challenge; able to maintain balance while turning head/trunk.    OT Exercises: UE Ergometer 15 min  Esteban 15# 25 x 4    Additional Treatment:  Pt required verbal cues for w/c brake and leg rest mgt prior to standing    Patient left supine with call button in reach and family present    ASSESSMENT:  Ida Santana is a 76 y.o. female with a medical diagnosis of MRSA bacteremia and presents with decreased indep with self care and functional mobiltiy and cont to benefit from OT to address limitations and increase functional indep and safety.    Rehab  identified problem list/impairments: Rehab identified problem list/impairments: impaired endurance, impaired self care skills, impaired functional mobilty, decreased coordination, impaired balance, gait instability    Rehab potential is good    Activity tolerance: Good    Discharge recommendations: home with home health     Barriers to discharge: Barriers to Discharge: Decreased caregiver support     Equipment recommendations: none     GOALS:   Occupational Therapy Goals        Problem: Occupational Therapy Goal    Goal Priority Disciplines Outcome Interventions   Occupational Therapy Goal     OT, PT/OT Ongoing (interventions implemented as appropriate)    Description:  Goals to be met by: 5 days     Patient will increase functional independence with ADLs by performing:    UE Dressing with [place].  LE Dressing with Modified [place].  Grooming while standing with Modified [place].  Toileting from toilet with Modified [place] for hygiene and clothing management.   Bathing from  shower chair/bench with Modified [place].  Supine to sit with [place].  Functional transfers with [place].                Plan:  Patient to be seen 5 x/week to address the above listed problems via self-care/home management, therapeutic activities, therapeutic exercises  Plan of Care reviewed with: patient    CLOTILDE Curry  01/30/2017
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
## 2017-01-31 PROCEDURE — 97530 THERAPEUTIC ACTIVITIES: CPT

## 2017-01-31 PROCEDURE — 11000004 HC SNF PRIVATE

## 2017-01-31 PROCEDURE — 97110 THERAPEUTIC EXERCISES: CPT

## 2017-01-31 PROCEDURE — 97116 GAIT TRAINING THERAPY: CPT

## 2017-01-31 PROCEDURE — 94668 MNPJ CHEST WALL SBSQ: CPT

## 2017-01-31 PROCEDURE — 25000003 PHARM REV CODE 250: Performed by: STUDENT IN AN ORGANIZED HEALTH CARE EDUCATION/TRAINING PROGRAM

## 2017-01-31 PROCEDURE — 63600175 PHARM REV CODE 636 W HCPCS: Performed by: STUDENT IN AN ORGANIZED HEALTH CARE EDUCATION/TRAINING PROGRAM

## 2017-01-31 RX ADMIN — RIVAROXABAN 15 MG: 15 TABLET, FILM COATED ORAL at 06:01

## 2017-01-31 RX ADMIN — CARVEDILOL 25 MG: 25 TABLET, FILM COATED ORAL at 08:01

## 2017-01-31 RX ADMIN — CEFTAROLINE FOSAMIL 600 MG: 600 POWDER, FOR SOLUTION INTRAVENOUS at 08:01

## 2017-01-31 RX ADMIN — VERAPAMIL HYDROCHLORIDE 240 MG: 240 TABLET, FILM COATED, EXTENDED RELEASE ORAL at 08:01

## 2017-01-31 RX ADMIN — LISINOPRIL 20 MG: 20 TABLET ORAL at 08:01

## 2017-01-31 RX ADMIN — RIVAROXABAN 15 MG: 15 TABLET, FILM COATED ORAL at 08:01

## 2017-01-31 RX ADMIN — CEFTAROLINE FOSAMIL 600 MG: 600 POWDER, FOR SOLUTION INTRAVENOUS at 06:01

## 2017-01-31 RX ADMIN — Medication 10 ML: at 12:01

## 2017-01-31 RX ADMIN — CEFTAROLINE FOSAMIL 600 MG: 600 POWDER, FOR SOLUTION INTRAVENOUS at 12:01

## 2017-01-31 RX ADMIN — Medication 10 ML: at 06:01

## 2017-01-31 RX ADMIN — CARVEDILOL 25 MG: 25 TABLET, FILM COATED ORAL at 06:01

## 2017-01-31 RX ADMIN — Medication 10 ML: at 05:01

## 2017-01-31 NOTE — PT/OT/SLP PROGRESS
Occupational Therapy  Treatment    Ida Santana   MRN: [id number]   Admitting Diagnosis: MRSA bacteremia    OT Date of Treatment: 17  Total Time (min): 45 min    Billable Minutes:  Therapeutic Activity 20 and Therapeutic Exercise 25    General Precautions: Standard, fall, diabetic  Orthopedic Precautions: N/A  Braces: N/A    Do you have any cultural, spiritual, Caodaism conflicts, given your current situation?: None stated    Subjective:  Communicated with pt prior to session.      Pain Ratin/10              Pain Rating Post-Intervention: 0/10    Objective:  Patient found with: PICC line (EOB)    Functional Status:  MDS G  Bed Mobility Functional Status:  (I)  Transfer Functional Status: (I) no AD  Walk in Room Functional Status:  (I)  Walk in Corridor Functional Status: S  Locomotion on Unit Functional Status: S  Locomotion Off Unit Functional Status: S  Dressing Functional Status: 0: mod(I)   Personal Hygiene Functional Status:  (I)          OT Exercises: UE Ergometer 15 min  Rickshaw 15# 15 x 4  Rowing 15# 15 x 4    Additional Treatment:  Biceps 5# 10 x 3  Pt ambulated in room and to bathroom with indep and no AD. Pt ambulated in halls >500ft pushing w/c with sup to mod I  PT groomed at sink     Patient left EOB with call button in reach    ASSESSMENT:  Pt tolerated tx and demonstrated increased indep with self care and functional mobiltiy and will cont to benefit from OT to increase functional indep and safety    Rehab identified problem list/impairments: impaired endurance, impaired self care skills, impaired functional mobilty    Rehab potential is good    Activity tolerance: Good    Discharge recommendations: home with home health     Barriers to discharge: None     Equipment recommendations: shower chair     GOALS:   Occupational Therapy Goals        Problem: Occupational Therapy Goal    Goal Priority Disciplines Outcome Interventions   Occupational Therapy Goal     OT, PT/OT Ongoing (interventions  implemented as appropriate)    Description:  Goals to be met by: 5 days     Patient will increase functional independence with ADLs by performing:    UE Dressing with [place].  LE Dressing with Modified [place].  Grooming while standing with Modified [place].--met  Toileting from toilet with Modified [place] for hygiene and clothing management.   Bathing from  shower chair/bench with Modified [place].  Supine to sit with [place].--met  Functional transfers with [place].-met                 Plan:  Patient to be seen 5 x/week to address the above listed problems via self-care/home management, therapeutic activities, therapeutic exercises  Plan of Care reviewed with: patient    CLOTILDE Curry  01/31/2017

## 2017-01-31 NOTE — PLAN OF CARE
Problem: Occupational Therapy Goal  Goal: Occupational Therapy Goal  Goals to be met by: 5 days     Patient will increase functional independence with ADLs by performing:    UE Dressing with [place].  LE Dressing with Modified [place].  Grooming while standing with Modified [place].--met  Toileting from toilet with Modified [place] for hygiene and clothing management.   Bathing from shower chair/bench with Modified [place].  Supine to sit with [place].--met  Functional transfers with [place].-met   Outcome: Ongoing (interventions implemented as appropriate)  Progressing. CLOTILDE Curry  1/31/2017

## 2017-02-01 PROCEDURE — 25000003 PHARM REV CODE 250: Performed by: STUDENT IN AN ORGANIZED HEALTH CARE EDUCATION/TRAINING PROGRAM

## 2017-02-01 PROCEDURE — 11000004 HC SNF PRIVATE

## 2017-02-01 PROCEDURE — 97530 THERAPEUTIC ACTIVITIES: CPT

## 2017-02-01 PROCEDURE — 94668 MNPJ CHEST WALL SBSQ: CPT

## 2017-02-01 PROCEDURE — 63600175 PHARM REV CODE 636 W HCPCS: Performed by: STUDENT IN AN ORGANIZED HEALTH CARE EDUCATION/TRAINING PROGRAM

## 2017-02-01 PROCEDURE — 97535 SELF CARE MNGMENT TRAINING: CPT

## 2017-02-01 RX ADMIN — CARVEDILOL 25 MG: 25 TABLET, FILM COATED ORAL at 08:02

## 2017-02-01 RX ADMIN — Medication 10 ML: at 12:02

## 2017-02-01 RX ADMIN — LISINOPRIL 20 MG: 20 TABLET ORAL at 09:02

## 2017-02-01 RX ADMIN — Medication 10 ML: at 06:02

## 2017-02-01 RX ADMIN — RIVAROXABAN 15 MG: 15 TABLET, FILM COATED ORAL at 06:02

## 2017-02-01 RX ADMIN — CEFTAROLINE FOSAMIL 600 MG: 600 POWDER, FOR SOLUTION INTRAVENOUS at 06:02

## 2017-02-01 RX ADMIN — VERAPAMIL HYDROCHLORIDE 240 MG: 240 TABLET, FILM COATED, EXTENDED RELEASE ORAL at 09:02

## 2017-02-01 RX ADMIN — CARVEDILOL 25 MG: 25 TABLET, FILM COATED ORAL at 06:02

## 2017-02-01 RX ADMIN — CEFTAROLINE FOSAMIL 600 MG: 600 POWDER, FOR SOLUTION INTRAVENOUS at 11:02

## 2017-02-01 RX ADMIN — VERAPAMIL HYDROCHLORIDE 240 MG: 240 TABLET, FILM COATED, EXTENDED RELEASE ORAL at 08:02

## 2017-02-01 RX ADMIN — RIVAROXABAN 15 MG: 15 TABLET, FILM COATED ORAL at 08:02

## 2017-02-01 NOTE — PLAN OF CARE
Problem: Occupational Therapy Goal  Goal: Occupational Therapy Goal  Goals to be met by: 5 days     Patient will increase functional independence with ADLs by performing:    UE Dressing with [place].-met  LE Dressing with Modified [place].-met  Grooming while standing with Modified [place].--met  Toileting from toilet with Modified [place] for hygiene and clothing management. -met  Bathing from shower chair/bench with Modified [place].-met  Supine to sit with [place].--met  Functional transfers with [place].-met   Outcome: Outcome(s) achieved Date Met:  02/01/17  Goals met. CLOTILDE Curry  2/1/2017

## 2017-02-01 NOTE — PT/OT/SLP PROGRESS
Physical Therapy  Treatment/dc summary    Ida Santana   MRN: [id number]   Admitting Diagnosis: MRSA bacteremia    PT Received On: 17  Total Time (min): 25       Billable Minutes:  Gait [id number] and Therapeutic Activity 10    Treatment Type: Treatment  PT/PTA: PT     PTA Visit Number: 0       General Precautions: Standard, fall, diabetic  Orthopedic Precautions: N/A   Braces: N/A         Subjective:  Communicated with pt prior to session. Agreeable to PT services.      Pain Ratin/10       Objective:  Patient found seated at edge of bed with family present.          Functional Status:  MDS G  Bed Mobility Functional Status: mod(I) - (I)  Bed Mobility Level of (A): 0: No set up or physical (A) from staff  Transfer Functional Status: mod(I) - (I)  Transfer Level of (A): 0: No set up or physical (A) from staff  Walk in Room Functional Status: mod(I) - (I)  Walk In Room Level of (A): 0: No set up or physical (A) from staff  Walk in Corridor Functional Status: mod(I) - (I)  Walk In Corridor Level of (A): 0: No set up or physical (A) from staff  Locomotion on Unit Functional Status: mod(I) - (I)  Locomotion on Unit Level of (A) : 0: No set up or physical (A) from staff  Locomotion Off Unit Functional Status: mod(I) - (I)  Locomotion Off Unit Level of (A) : 0: No set up or physical (A) from staff          Bed Mobility:  Transfers:  Sit<>Stand: mod I  Stand Pivot Transfer: Mod I      Gait:  Amb 2x- 300 feet with Mod I (no AD)     Standing therex (with BUE support)- 2x10 reps:  1) hip flexion  2) hip abduction  3) heel raises     Seated Therex - 2x10 reps:  1) hip flexion  2) LAQ  3) Ankle pumps  4) Glute sets    Additional Treatment:  Pt ambulated throughout the PT session with no loss of balance noted, no assistive device utilized.  She was able to  three objects from the floor safely with modified independence (small ball, 2 yellow cones).    Patient left edge of bed with call button in reach and family  present.    Assessment:  Ida Santana is a 76 y.o. female with a medical diagnosis of MRSA bacteremia.  Pt is ambulating with no assistive device and modified independence.  Ms. Santana is safe to discharge from PT services.    Rehab identified problem list/impairments: impaired endurance, impaired functional mobilty, gait instability, impaired balance    Rehab potential is good.    Activity tolerance: Good    Discharge recommendations: home health PT     Barriers to discharge: None    Equipment recommendations: shower chair     GOALS:   Physical Therapy Goals     Not on file      Multidisciplinary Problems (Resolved)        Problem: Physical Therapy Goal    Goal Priority Disciplines Outcome Goal Variances Interventions   Physical Therapy Goal   (Resolved)     PT/OT, PT Outcome(s) achieved     Description:  Goals to be met by: 7 days     Patient will increase functional independence with mobility by performin. Supine to sit with Modified [place].  Met (2017)  2. Sit to supine with Modified [place]. Met (2017)  3. Sit to stand transfer with Modified [place] met (2017)  4. Bed to chair transfer with Modified [place] using no AD met (2017)  5. Gait  x 500 feet with Supervision w/o AD.  Met(2017)  6. Wheelchair propulsion x100 feet with Supervision using bilateral upper extremities met (2017)  7. Ascend/Descend 4 inch curb step with Supervision . Met(2017)  8. Lower extremity exercise program x30 reps per handout, with assistance as needed and gym therex met (2017)                    PLAN:    Patient to be seen 5 x/week  to address the above listed problems via gait training, therapeutic activities, therapeutic exercises  Plan of Care expires: 17  Plan of Care reviewed with: patient    Etelvina Jack, PT  2017

## 2017-02-01 NOTE — PLAN OF CARE
Problem: Physical Therapy Goal  Goal: Physical Therapy Goal  Goals to be met by: 7 days     Patient will increase functional independence with mobility by performin. Supine to sit with Modified [place]. Met (2017)  2. Sit to supine with Modified [place]. Met (2017)  3. Sit to stand transfer with Modified [place] met (2017)  4. Bed to chair transfer with Modified [place] using no AD met (2017)  5. Gait x 500 feet with Supervision w/o AD. Met(2017)  6. Wheelchair propulsion x100 feet with Supervision using bilateral upper extremities met (2017)  7. Ascend/Descend 4 inch curb step with Supervision . Met(2017)  8. Lower extremity exercise program x30 reps per handout, with assistance as needed and gym therex met (2017)     Outcome: Outcome(s) achieved Date Met:  17  Pt met all of her goals.

## 2017-02-01 NOTE — PLAN OF CARE
Problem: Patient Care Overview  Goal: Plan of Care Review  Outcome: Ongoing (interventions implemented as appropriate)  Patient is AAO x 4. Able to voice own needs and wants. All po medication taken zero swallowing problems noted. Call light in reach. Will continue to monitor

## 2017-02-01 NOTE — PT/OT/SLP PROGRESS
Occupational Therapy  Treatment/Discharge    Ida Santana   MRN: [id number]   Admitting Diagnosis: MRSA bacteremia    OT Date of Treatment: 17  Total Time (min): 54 min    Billable Minutes:  Self Care/Home Management 30 and Therapeutic Activity 24    General Precautions: Standard, fall, diabetic    Do you have any cultural, spiritual, Amish conflicts, given your current situation?: None stated    Subjective:  Communicated with pt and nurse to disconnect IV prior to session.      Pain Ratin/10              Pain Rating Post-Intervention: 0/10    Objective:  Patient found with: PICC line    Functional Status:  MDS G  Bed Mobility Functional Status: mod(I) - (I)  Bed Mobility Level of (A): 0: No set up or physical (A) from staff  Transfer Functional Status: mod(I) - (I)  Transfer Level of (A): 0: No set up or physical (A) from staff  Walk in Room Functional Status: mod(I) - (I)  Walk In Room Level of (A): 0: No set up or physical (A) from staff  Walk in Corridor Functional Status: mod(I) - (I)  Walk In Corridor Level of (A): 0: No set up or physical (A) from staff  Locomotion on Unit Functional Status: mod(I) - (I)  Locomotion on Unit Level of (A) : 0: No set up or physical (A) from staff  Locomotion Off Unit Functional Status: mod(I) - (I)  Locomotion Off Unit Level of (A) : 0: No set up or physical (A) from staff  Dressing Functional Status: 0: mod(I) - (I)  Dressing Level of (A) : 0: No set up or physical (A) from staff  Eating Functional Status: mod(I) - (I)  Eating Level of (A) : 0: No set up or physical (A) from staff  Toilet Use Functional Status: mod(I) - (I)  Toilet Use Level of (A) : 0: No set up or physical (A) from staff  Personal Hygiene Functional Status: mod(I) - (I)  Personal Hygiene Level of (A) : 0: No set up or physical (A) from staff  Bathing Functional Status: mod(I) - (I)  Bathing Level of (A) : 1: Set up help only  Moving from seated to standing position: Steady at all times  Walking  (with assistive device if used): Steady at all times  Turning around and facing the opposite direction while walking: Steady at all times  Moving on and off the toilet: Steady at all times  Surface-to-surface transfer (transfer between bed and chair or wheelchair): Steady at all times         Additional Treatment:  EATING:  Indep    GROOMING: Indep standing at sink  BATHING:  Setup in shower on TTB   UBD:  Indep including retrieval  LBD:  Indep including retrieval  TOILETING:  Indep standard toilet    BED MOBILITY: Indep  TRANSFERS: Indep no AD  FUNCTIONAL MOBILITY:  Pt ambulated in room and bathroom no AD  indep and in wren from room to and from shower room approx 160ft with mod I    SITTING BALANCE: Normal  STANDING BALANCE: Good    Educated pt re: safety in room and continuing functional mobility. Pt also encouraged to come to the gym indep to exercise during therapy hours      Patient left up in chair with call button in reach    ASSESSMENT:   Pt has met OT goals and demonstrates increased indep and safety. Pt is safe to ambulate in the room  And bathroom.    Discharge recommendations: home     Barriers to discharge: None     Equipment recommendations: bath bench     GOALS:   Occupational Therapy Goals     Not on file      Multidisciplinary Problems (Resolved)        Problem: Occupational Therapy Goal    Goal Priority Disciplines Outcome Interventions   Occupational Therapy Goal   (Resolved)     OT, PT/OT Outcome(s) achieved    Description:  Goals to be met by: 5 days     Patient will increase functional independence with ADLs by performing:    UE Dressing with [place].-met  LE Dressing with Modified [place].-met  Grooming while standing with Modified [place].--met  Toileting from toilet with Modified [place] for hygiene and clothing management. -met  Bathing from  shower chair/bench with Modified [place].-met  Supine to sit with [place].--met  Functional transfers with  [place].-met                  Plan:    (d/c pt from inpt OT SNF)    Plan of Care reviewed with: patient    CLOTILDE Curry  02/01/2017

## 2017-02-02 LAB
ANION GAP SERPL CALC-SCNC: 8 MMOL/L
BASOPHILS # BLD AUTO: 0.02 K/UL
BASOPHILS NFR BLD: 0.3 %
BUN SERPL-MCNC: 16 MG/DL
CALCIUM SERPL-MCNC: 8.7 MG/DL
CHLORIDE SERPL-SCNC: 110 MMOL/L
CO2 SERPL-SCNC: 25 MMOL/L
CREAT SERPL-MCNC: 0.8 MG/DL
CRP SERPL-MCNC: 20.1 MG/L
DIFFERENTIAL METHOD: ABNORMAL
EOSINOPHIL # BLD AUTO: 0.2 K/UL
EOSINOPHIL NFR BLD: 2.9 %
ERYTHROCYTE [DISTWIDTH] IN BLOOD BY AUTOMATED COUNT: 15.9 %
ERYTHROCYTE [SEDIMENTATION RATE] IN BLOOD BY WESTERGREN METHOD: 140 MM/HR
EST. GFR  (AFRICAN AMERICAN): >60 ML/MIN/1.73 M^2
EST. GFR  (NON AFRICAN AMERICAN): >60 ML/MIN/1.73 M^2
GLUCOSE SERPL-MCNC: 120 MG/DL
HCT VFR BLD AUTO: 28.8 %
HGB BLD-MCNC: 9.1 G/DL
LYMPHOCYTES # BLD AUTO: 1.4 K/UL
LYMPHOCYTES NFR BLD: 23.7 %
MAGNESIUM SERPL-MCNC: 1.7 MG/DL
MCH RBC QN AUTO: 31.3 PG
MCHC RBC AUTO-ENTMCNC: 31.6 %
MCV RBC AUTO: 99 FL
MONOCYTES # BLD AUTO: 0.6 K/UL
MONOCYTES NFR BLD: 10.2 %
NEUTROPHILS # BLD AUTO: 3.6 K/UL
NEUTROPHILS NFR BLD: 62.9 %
PHOSPHATE SERPL-MCNC: 3.7 MG/DL
PLATELET # BLD AUTO: 263 K/UL
PMV BLD AUTO: 10.1 FL
POTASSIUM SERPL-SCNC: 3.5 MMOL/L
RBC # BLD AUTO: 2.91 M/UL
SODIUM SERPL-SCNC: 143 MMOL/L
WBC # BLD AUTO: 5.78 K/UL

## 2017-02-02 PROCEDURE — 85025 COMPLETE CBC W/AUTO DIFF WBC: CPT

## 2017-02-02 PROCEDURE — 63600175 PHARM REV CODE 636 W HCPCS: Performed by: STUDENT IN AN ORGANIZED HEALTH CARE EDUCATION/TRAINING PROGRAM

## 2017-02-02 PROCEDURE — 86140 C-REACTIVE PROTEIN: CPT

## 2017-02-02 PROCEDURE — 85651 RBC SED RATE NONAUTOMATED: CPT

## 2017-02-02 PROCEDURE — 83735 ASSAY OF MAGNESIUM: CPT

## 2017-02-02 PROCEDURE — 36415 COLL VENOUS BLD VENIPUNCTURE: CPT

## 2017-02-02 PROCEDURE — 11000004 HC SNF PRIVATE

## 2017-02-02 PROCEDURE — 25000003 PHARM REV CODE 250: Performed by: STUDENT IN AN ORGANIZED HEALTH CARE EDUCATION/TRAINING PROGRAM

## 2017-02-02 PROCEDURE — 84100 ASSAY OF PHOSPHORUS: CPT

## 2017-02-02 PROCEDURE — 80048 BASIC METABOLIC PNL TOTAL CA: CPT

## 2017-02-02 RX ADMIN — Medication 10 ML: at 12:02

## 2017-02-02 RX ADMIN — CEFTAROLINE FOSAMIL 600 MG: 600 POWDER, FOR SOLUTION INTRAVENOUS at 07:02

## 2017-02-02 RX ADMIN — RIVAROXABAN 15 MG: 15 TABLET, FILM COATED ORAL at 08:02

## 2017-02-02 RX ADMIN — CARVEDILOL 25 MG: 25 TABLET, FILM COATED ORAL at 05:02

## 2017-02-02 RX ADMIN — LISINOPRIL 20 MG: 20 TABLET ORAL at 09:02

## 2017-02-02 RX ADMIN — RIVAROXABAN 15 MG: 15 TABLET, FILM COATED ORAL at 05:02

## 2017-02-02 RX ADMIN — CEFTAROLINE FOSAMIL 600 MG: 600 POWDER, FOR SOLUTION INTRAVENOUS at 03:02

## 2017-02-02 RX ADMIN — Medication 10 ML: at 05:02

## 2017-02-02 RX ADMIN — CEFTAROLINE FOSAMIL 600 MG: 600 POWDER, FOR SOLUTION INTRAVENOUS at 12:02

## 2017-02-02 RX ADMIN — VERAPAMIL HYDROCHLORIDE 240 MG: 240 TABLET, FILM COATED, EXTENDED RELEASE ORAL at 09:02

## 2017-02-02 RX ADMIN — VERAPAMIL HYDROCHLORIDE 240 MG: 240 TABLET, FILM COATED, EXTENDED RELEASE ORAL at 08:02

## 2017-02-02 RX ADMIN — CARVEDILOL 25 MG: 25 TABLET, FILM COATED ORAL at 08:02

## 2017-02-02 NOTE — PLAN OF CARE
Problem: Patient Care Overview  Goal: Plan of Care Review  Outcome: Ongoing (interventions implemented as appropriate)    02/02/17 0150   Coping/Psychosocial   Plan Of Care Reviewed With patient         Comments:   Comments:  Pt was assessed and VS taken recorded. Noted no active skin breakdown. Medications were given and pt tolerated. Call bell with in reached, 2 x side rails of bed elevated and be din the lowest position. Pt turned every 2 hours and monitored for pain and safety.

## 2017-02-03 PROCEDURE — 99900058 HC 022 PAID UNDER SNF PPS

## 2017-02-03 PROCEDURE — 25000003 PHARM REV CODE 250: Performed by: STUDENT IN AN ORGANIZED HEALTH CARE EDUCATION/TRAINING PROGRAM

## 2017-02-03 PROCEDURE — 63600175 PHARM REV CODE 636 W HCPCS: Performed by: STUDENT IN AN ORGANIZED HEALTH CARE EDUCATION/TRAINING PROGRAM

## 2017-02-03 PROCEDURE — 11000004 HC SNF PRIVATE

## 2017-02-03 RX ADMIN — Medication 10 ML: at 07:02

## 2017-02-03 RX ADMIN — RIVAROXABAN 15 MG: 15 TABLET, FILM COATED ORAL at 08:02

## 2017-02-03 RX ADMIN — LISINOPRIL 20 MG: 20 TABLET ORAL at 09:02

## 2017-02-03 RX ADMIN — Medication 10 ML: at 12:02

## 2017-02-03 RX ADMIN — CARVEDILOL 25 MG: 25 TABLET, FILM COATED ORAL at 08:02

## 2017-02-03 RX ADMIN — VERAPAMIL HYDROCHLORIDE 240 MG: 240 TABLET, FILM COATED, EXTENDED RELEASE ORAL at 09:02

## 2017-02-03 RX ADMIN — RIVAROXABAN 15 MG: 15 TABLET, FILM COATED ORAL at 05:02

## 2017-02-03 RX ADMIN — CEFTAROLINE FOSAMIL 600 MG: 600 POWDER, FOR SOLUTION INTRAVENOUS at 03:02

## 2017-02-03 RX ADMIN — CARVEDILOL 25 MG: 25 TABLET, FILM COATED ORAL at 05:02

## 2017-02-03 RX ADMIN — CEFTAROLINE FOSAMIL 600 MG: 600 POWDER, FOR SOLUTION INTRAVENOUS at 07:02

## 2017-02-03 RX ADMIN — CEFTAROLINE FOSAMIL 600 MG: 600 POWDER, FOR SOLUTION INTRAVENOUS at 12:02

## 2017-02-03 NOTE — PLAN OF CARE
02/03/2017  11:38 AM    SW met with pt, pt's sister (Bhavna), and brother-in-law in room yesterday (2/2/17) to discuss discharge planning.  Pt and family all adamantly expressed being uncomfortable with administering pt's own iv antibiotic at home.  SW discussed need for pt to continue receiving more information and training regarding home iv antibiotic administration while at SNF.  Pt agreeable to receiving information and training but certain that she and family will not be capable of administering iv antibiotic at home.  Pt denies having preference for iv infusion agency to be used.    SW called Nancy ([phone]) with [facility] today to notify of above.  Nancy stated plans of visiting pt at SNF today to discuss same and notifying SW of outcome of visit.  SW to follow up as needed.    Alex Watson, [facility]  [id number]

## 2017-02-04 PROCEDURE — 25000003 PHARM REV CODE 250: Performed by: STUDENT IN AN ORGANIZED HEALTH CARE EDUCATION/TRAINING PROGRAM

## 2017-02-04 PROCEDURE — 11000004 HC SNF PRIVATE

## 2017-02-04 PROCEDURE — 63600175 PHARM REV CODE 636 W HCPCS: Performed by: STUDENT IN AN ORGANIZED HEALTH CARE EDUCATION/TRAINING PROGRAM

## 2017-02-04 RX ADMIN — Medication 10 ML: at 05:02

## 2017-02-04 RX ADMIN — CARVEDILOL 25 MG: 25 TABLET, FILM COATED ORAL at 05:02

## 2017-02-04 RX ADMIN — CEFTAROLINE FOSAMIL 600 MG: 600 POWDER, FOR SOLUTION INTRAVENOUS at 03:02

## 2017-02-04 RX ADMIN — VERAPAMIL HYDROCHLORIDE 240 MG: 240 TABLET, FILM COATED, EXTENDED RELEASE ORAL at 08:02

## 2017-02-04 RX ADMIN — RIVAROXABAN 15 MG: 15 TABLET, FILM COATED ORAL at 05:02

## 2017-02-04 RX ADMIN — Medication 10 ML: at 06:02

## 2017-02-04 RX ADMIN — VERAPAMIL HYDROCHLORIDE 240 MG: 240 TABLET, FILM COATED, EXTENDED RELEASE ORAL at 09:02

## 2017-02-04 RX ADMIN — Medication 10 ML: at 12:02

## 2017-02-04 RX ADMIN — LISINOPRIL 20 MG: 20 TABLET ORAL at 08:02

## 2017-02-04 RX ADMIN — CEFTAROLINE FOSAMIL 600 MG: 600 POWDER, FOR SOLUTION INTRAVENOUS at 12:02

## 2017-02-04 RX ADMIN — CARVEDILOL 25 MG: 25 TABLET, FILM COATED ORAL at 08:02

## 2017-02-04 RX ADMIN — RIVAROXABAN 15 MG: 15 TABLET, FILM COATED ORAL at 08:02

## 2017-02-04 NOTE — PLAN OF CARE
Problem: Patient Care Overview  Goal: Plan of Care Review  Outcome: Ongoing (interventions implemented as appropriate)    02/04/17 0053   Coping/Psychosocial   Plan Of Care Reviewed With patient         Comments:   Comments:  Pt was assessed and VS taken recorded. No active skin breakdown noted. 1 person assist going to washroom. Medication were given and pt tolerated. Continent of both. Call bell within reached, turned every 2 hours and monitored for pain and safety.

## 2017-02-04 NOTE — PLAN OF CARE
Problem: Patient Care Overview  Goal: Plan of Care Review  Pt free of fall and injury. No complaints of pain. Bed in lowest position, call light in reach will continue to monitor.     Problem: Infection, Risk/Actual (Adult)  Goal: Infection Prevention/Resolution  Patient will demonstrate the desired outcomes by discharge/transition of care.     02/01/17 1806   Infection, Risk/Actual (Adult)   Infection Prevention/Resolution making progress toward outcome         Problem: Fall Risk (Adult)  Goal: Absence of Falls  Patient will demonstrate the desired outcomes by discharge/transition of care.     02/04/17 1657   Fall Risk (Adult)   Absence of Falls making progress toward outcome

## 2017-02-05 PROCEDURE — 11000004 HC SNF PRIVATE

## 2017-02-05 PROCEDURE — 63600175 PHARM REV CODE 636 W HCPCS: Performed by: STUDENT IN AN ORGANIZED HEALTH CARE EDUCATION/TRAINING PROGRAM

## 2017-02-05 PROCEDURE — 25000003 PHARM REV CODE 250: Performed by: STUDENT IN AN ORGANIZED HEALTH CARE EDUCATION/TRAINING PROGRAM

## 2017-02-05 RX ADMIN — CEFTAROLINE FOSAMIL 600 MG: 600 POWDER, FOR SOLUTION INTRAVENOUS at 12:02

## 2017-02-05 RX ADMIN — Medication 10 ML: at 12:02

## 2017-02-05 RX ADMIN — CEFTAROLINE FOSAMIL 600 MG: 600 POWDER, FOR SOLUTION INTRAVENOUS at 09:02

## 2017-02-05 RX ADMIN — CARVEDILOL 25 MG: 25 TABLET, FILM COATED ORAL at 05:02

## 2017-02-05 RX ADMIN — VERAPAMIL HYDROCHLORIDE 240 MG: 240 TABLET, FILM COATED, EXTENDED RELEASE ORAL at 09:02

## 2017-02-05 RX ADMIN — CARVEDILOL 25 MG: 25 TABLET, FILM COATED ORAL at 09:02

## 2017-02-05 RX ADMIN — RIVAROXABAN 15 MG: 15 TABLET, FILM COATED ORAL at 09:02

## 2017-02-05 RX ADMIN — Medication 10 ML: at 05:02

## 2017-02-05 RX ADMIN — LISINOPRIL 20 MG: 20 TABLET ORAL at 09:02

## 2017-02-05 RX ADMIN — CEFTAROLINE FOSAMIL 600 MG: 600 POWDER, FOR SOLUTION INTRAVENOUS at 05:02

## 2017-02-05 RX ADMIN — Medication 10 ML: at 06:02

## 2017-02-05 RX ADMIN — RIVAROXABAN 15 MG: 15 TABLET, FILM COATED ORAL at 05:02

## 2017-02-05 NOTE — PLAN OF CARE
Problem: Patient Care Overview  Goal: Plan of Care Review  Pt free of fall and injury. No complaints of pain. Bed in lowest position, call light in reach will continue to monitor.     Problem: Infection, Risk/Actual (Adult)  Goal: Infection Prevention/Resolution  Patient will demonstrate the desired outcomes by discharge/transition of care.     02/05/17 1531   Infection, Risk/Actual (Adult)   Infection Prevention/Resolution making progress toward outcome         Problem: Fall Risk (Adult)  Goal: Absence of Falls  Patient will demonstrate the desired outcomes by discharge/transition of care.     02/05/17 1531   Fall Risk (Adult)   Absence of Falls making progress toward outcome

## 2017-02-06 LAB
ANION GAP SERPL CALC-SCNC: 8 MMOL/L
BASOPHILS # BLD AUTO: 0.03 K/UL
BASOPHILS NFR BLD: 0.6 %
BUN SERPL-MCNC: 19 MG/DL
CALCIUM SERPL-MCNC: 8.7 MG/DL
CHLORIDE SERPL-SCNC: 111 MMOL/L
CO2 SERPL-SCNC: 24 MMOL/L
CREAT SERPL-MCNC: 0.9 MG/DL
DIFFERENTIAL METHOD: ABNORMAL
EOSINOPHIL # BLD AUTO: 0.3 K/UL
EOSINOPHIL NFR BLD: 6.3 %
ERYTHROCYTE [DISTWIDTH] IN BLOOD BY AUTOMATED COUNT: 16 %
EST. GFR  (AFRICAN AMERICAN): >60 ML/MIN/1.73 M^2
EST. GFR  (NON AFRICAN AMERICAN): >60 ML/MIN/1.73 M^2
GLUCOSE SERPL-MCNC: 111 MG/DL
HCT VFR BLD AUTO: 30.8 %
HGB BLD-MCNC: 9.7 G/DL
LYMPHOCYTES # BLD AUTO: 1.3 K/UL
LYMPHOCYTES NFR BLD: 24.5 %
MAGNESIUM SERPL-MCNC: 1.9 MG/DL
MCH RBC QN AUTO: 31.2 PG
MCHC RBC AUTO-ENTMCNC: 31.5 %
MCV RBC AUTO: 99 FL
MONOCYTES # BLD AUTO: 0.6 K/UL
MONOCYTES NFR BLD: 12.2 %
NEUTROPHILS # BLD AUTO: 3 K/UL
NEUTROPHILS NFR BLD: 56.4 %
PHOSPHATE SERPL-MCNC: 3.3 MG/DL
PLATELET # BLD AUTO: 238 K/UL
PMV BLD AUTO: 10.4 FL
POTASSIUM SERPL-SCNC: 3.5 MMOL/L
RBC # BLD AUTO: 3.11 M/UL
SODIUM SERPL-SCNC: 143 MMOL/L
WBC # BLD AUTO: 5.26 K/UL

## 2017-02-06 PROCEDURE — 83735 ASSAY OF MAGNESIUM: CPT

## 2017-02-06 PROCEDURE — 85025 COMPLETE CBC W/AUTO DIFF WBC: CPT

## 2017-02-06 PROCEDURE — 84100 ASSAY OF PHOSPHORUS: CPT

## 2017-02-06 PROCEDURE — 80048 BASIC METABOLIC PNL TOTAL CA: CPT

## 2017-02-06 PROCEDURE — 36415 COLL VENOUS BLD VENIPUNCTURE: CPT

## 2017-02-06 PROCEDURE — 11000004 HC SNF PRIVATE

## 2017-02-06 PROCEDURE — 25000003 PHARM REV CODE 250: Performed by: STUDENT IN AN ORGANIZED HEALTH CARE EDUCATION/TRAINING PROGRAM

## 2017-02-06 PROCEDURE — 63600175 PHARM REV CODE 636 W HCPCS: Performed by: STUDENT IN AN ORGANIZED HEALTH CARE EDUCATION/TRAINING PROGRAM

## 2017-02-06 RX ADMIN — CEFTAROLINE FOSAMIL 600 MG: 600 POWDER, FOR SOLUTION INTRAVENOUS at 09:02

## 2017-02-06 RX ADMIN — VERAPAMIL HYDROCHLORIDE 240 MG: 240 TABLET, FILM COATED, EXTENDED RELEASE ORAL at 09:02

## 2017-02-06 RX ADMIN — CARVEDILOL 25 MG: 25 TABLET, FILM COATED ORAL at 04:02

## 2017-02-06 RX ADMIN — Medication 10 ML: at 12:02

## 2017-02-06 RX ADMIN — LISINOPRIL 20 MG: 20 TABLET ORAL at 09:02

## 2017-02-06 RX ADMIN — RIVAROXABAN 15 MG: 15 TABLET, FILM COATED ORAL at 04:02

## 2017-02-06 RX ADMIN — CARVEDILOL 25 MG: 25 TABLET, FILM COATED ORAL at 09:02

## 2017-02-06 RX ADMIN — CEFTAROLINE FOSAMIL 600 MG: 600 POWDER, FOR SOLUTION INTRAVENOUS at 12:02

## 2017-02-06 RX ADMIN — CEFTAROLINE FOSAMIL 600 MG: 600 POWDER, FOR SOLUTION INTRAVENOUS at 04:02

## 2017-02-06 RX ADMIN — Medication 10 ML: at 05:02

## 2017-02-06 RX ADMIN — RIVAROXABAN 15 MG: 15 TABLET, FILM COATED ORAL at 09:02

## 2017-02-06 RX ADMIN — Medication 10 ML: at 11:02

## 2017-02-06 NOTE — PLAN OF CARE
02/06/2017  3:41 PM     02/06/17 1541   Medicare Message   Important Message from Medicare regarding Discharge Appeal Rights Given to patient/caregiver;Explained to patient/caregiver;Signed/date by patient/caregiver   JULIANO notified by EDELMIRA Solares that Magdalena with [facility] reports today that pt's case was brought to [facility] medical director and decided that pt requires NOMNC to be served immediately.  JULIANO served NOMNC to pt with earliest discharge date indicated as 2/9/17 and appeal right.  Pt expressed understanding regarding discharge date and appeal right.  Pt stated plans of appealing discharge as she does not feel comfortable or confident in her ability to self-administer iv antibiotics at home nor does she have available assistance from others at home.  Pt signed NOMNC, and JULIANO provided pt with copy.  JULIANO faxed copy of signed NOMNC to [facility] (fx [phone]) and placed original in pt's blue chart in nurse's station.    Alex Watson, GIOVANNI  [id number]

## 2017-02-06 NOTE — PROGRESS NOTES
02/06/2017   4:28 PM    Discharge Planning-Met with patient in room to inform of discharge date of 2/9/2017. Discharge date written on dry erase board in room. Patient stated dissatisfaction with discharge date. Phone number to [employer] expedited appeal ([phone]) given to patient.    Sujatha Pelayo RN, CM Skilled  [phone]

## 2017-02-06 NOTE — CLINICAL REVIEW
Clinical Pharmacy Chart Review Note    Admit Date: 1/27/2017   LOS: 10 days     Ida Santana is a 76 y.o. female admitted to SNF for PT/OT after hospitalization for MRSA bacteremia.    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]  Yes    MRSA pneumonia [J15.212]  Yes    Hypertension [I10]  Yes    Hyperlipidemia [E78.5]  Yes     dyslipidemia        Resolved Hospital Problems    Diagnosis Date Resolved POA    Urinary tract infection without hematuria [N39.0] 01/28/2017 Yes     Review of patient's allergies indicates:   Allergen Reactions    Adhesive tape-silicones      Other reaction(s): pulling skin off    Atorvastatin      Other reaction(s): Muscle pain    Pravachol  [pravastatin] Rash     Patient Active Problem List    Diagnosis Date Noted    Debility 01/28/2017    Kidney mass 01/25/2017    Thrombosis of left renal vein 01/24/2017    Hypokalemia 01/23/2017    MRSA pneumonia 01/20/2017    Conjunctivitis 01/19/2017    Hypophosphatemia 01/17/2017    MRSA bacteremia 01/16/2017    Altered mental status 01/13/2017    Volume depletion 01/13/2017    Sepsis 01/13/2017    Septic encephalopathy 01/13/2017    Neuropathy 01/06/2017    Screening 10/31/2016    Osteopenia 09/26/2013    Open angle 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 eyes 05/02/2013    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 11/02/2012    Elevated glucose 07/30/2012    Cancer     Hyperlipidemia     Hypertension     History of uterine fibroid        Scheduled Meds:    carvedilol  25 mg Oral BID WM    ceftaroline fosamil  600 mg Intravenous Q8H    lisinopril  20 mg Oral Daily    rivaroxaban  15 mg Oral BID WM    [START ON 2/16/2017] rivaroxaban  20 mg Oral Daily with dinner    sodium chloride 0.9%  10 mL Intravenous Q6H    verapamil  240 mg Oral BID      Continuous Infusions:    PRN Meds: benzonatate, polyethylene glycol, Flushing PICC Protocol **AND** sodium chloride 0.9% **AND** sodium chloride 0.9%, tramadol    OBJECTIVE:     Vital Signs (Last 24H)  Temp:  [98 °F (36.7 °C)-98.7 °F (37.1 °C)]   Pulse:  [61-70]   Resp:  [16-18]   BP: (127-133)/(57-75)   SpO2:  [96 %-97 %]     Laboratory:  CBC:   Recent Labs  Lab 02/02/17  0530 02/06/17  0625   WBC 5.78 5.26   RBC 2.91* 3.11*   HGB 9.1* 9.7*   HCT 28.8* 30.8*    238   MCV 99* 99*   MCH 31.3* 31.2*   MCHC 31.6* 31.5*     BMP:   Recent Labs  Lab 02/02/17  0530 02/06/17  0625   * 111*    143   K 3.5 3.5    111*   CO2 25 24   BUN 16 19   CREATININE 0.8 0.9   CALCIUM 8.7 8.7   MG 1.7 1.9     CMP:   Recent Labs  Lab 02/02/17  0530 02/06/17  0625   * 111*   CALCIUM 8.7 8.7    143   K 3.5 3.5   CO2 25 24    111*   BUN 16 19   CREATININE 0.8 0.9     LFTs: No results for input(s): ALT, AST, ALKPHOS, BILITOT, PROT, ALBUMIN in the last 168 hours.  Coagulation: No results for input(s): INR, APTT in the last 168 hours.    Invalid input(s): PT  Cardiac markers: No results for input(s): CKMB, TROPONINT, MYOGLOBIN in the last 168 hours.  ABGs: No results for input(s): PH, PCO2, PO2, HCO3, POCSATURATED, BE in the last 168 hours.  Microbiology Results (last 7 days)     ** No results found for the last 168 hours. **        Specimen     None        No results for input(s): COLORU, CLARITYU, SPECGRAV, PHUR, PROTEINUA, GLUCOSEU, BILIRUBINCON, BLOODU, WBCU, RBCU, BACTERIA, MUCUS, NITRITE, LEUKOCYTESUR, UROBILINOGEN, HYALINECASTS in the last 168 hours.  Others: No results for input(s): [id number], TSH, T4FREE, LABLIPI in the last 168 hours.    Invalid input(s): A1C    ASSESSMENT/PLAN:      MRSA bacteremia/MRSA pneumonia   --ceftaroline 600 mg q8h for 6 weeks from last negative blood culture- 1/21/17 (Est. End 3/4/17)  Monitor CBC, Temp., , CRP 20.1  --benzonatate 100 mg TID prn  cough     Debility   --PT/OT  --tramadol 50 mg q6h prn severe pain   --bowel regimen for constipation   --DVT PPX: xarelto    Kidney mass     Thrombosis of left renal vein  --xarelto 15 mg BID for 21 days, then 20 mg daily with dinner  Monitor CBC    Hypertension   --lisinopril 20 mg daily, carvedilol 25 mg BID WM, verapamil  mg BID   BP: (127-133)/(57-75), HR 61-70     Hyperlipidemia   --diet controlled  Lab Results   Component Value Date    CHOL 181 09/26/2016    CHOL 181 09/23/2015    CHOL 174 09/23/2014     Lab Results   Component Value Date    HDL 42 09/26/2016    HDL 45 09/23/2015    HDL 44 09/23/2014     Lab Results   Component Value Date    LDLCALC 118.8 09/26/2016    LDLCALC 109.0 09/23/2015    LDLCALC 97.2 09/23/2014     Lab Results   Component Value Date    TRIG 101 09/26/2016    TRIG 135 09/23/2015    TRIG 164 (H) 09/23/2014     Lab Results   Component Value Date    CHOLHDL 23.2 09/26/2016    CHOLHDL 24.9 09/23/2015    CHOLHDL 25.3 09/23/2014        Monitor BMP/CBC/Vitals

## 2017-02-07 PROCEDURE — 63600175 PHARM REV CODE 636 W HCPCS: Performed by: STUDENT IN AN ORGANIZED HEALTH CARE EDUCATION/TRAINING PROGRAM

## 2017-02-07 PROCEDURE — 25000003 PHARM REV CODE 250: Performed by: STUDENT IN AN ORGANIZED HEALTH CARE EDUCATION/TRAINING PROGRAM

## 2017-02-07 PROCEDURE — 99308 SBSQ NF CARE LOW MDM 20: CPT | Mod: ,,, | Performed by: NURSE PRACTITIONER

## 2017-02-07 PROCEDURE — 97802 MEDICAL NUTRITION INDIV IN: CPT

## 2017-02-07 PROCEDURE — 11000004 HC SNF PRIVATE

## 2017-02-07 RX ADMIN — Medication 10 ML: at 06:02

## 2017-02-07 RX ADMIN — SODIUM CHLORIDE, PRESERVATIVE FREE 10 ML: 5 INJECTION INTRAVENOUS at 05:02

## 2017-02-07 RX ADMIN — SODIUM CHLORIDE, PRESERVATIVE FREE 10 ML: 5 INJECTION INTRAVENOUS at 12:02

## 2017-02-07 RX ADMIN — CARVEDILOL 25 MG: 25 TABLET, FILM COATED ORAL at 08:02

## 2017-02-07 RX ADMIN — LISINOPRIL 20 MG: 20 TABLET ORAL at 09:02

## 2017-02-07 RX ADMIN — CEFTAROLINE FOSAMIL 600 MG: 600 POWDER, FOR SOLUTION INTRAVENOUS at 12:02

## 2017-02-07 RX ADMIN — VERAPAMIL HYDROCHLORIDE 240 MG: 240 TABLET, FILM COATED, EXTENDED RELEASE ORAL at 09:02

## 2017-02-07 RX ADMIN — CARVEDILOL 25 MG: 25 TABLET, FILM COATED ORAL at 05:02

## 2017-02-07 RX ADMIN — RIVAROXABAN 15 MG: 15 TABLET, FILM COATED ORAL at 08:02

## 2017-02-07 RX ADMIN — CEFTAROLINE FOSAMIL 600 MG: 600 POWDER, FOR SOLUTION INTRAVENOUS at 05:02

## 2017-02-07 RX ADMIN — Medication 10 ML: at 12:02

## 2017-02-07 RX ADMIN — Medication 10 ML: at 05:02

## 2017-02-07 RX ADMIN — CEFTAROLINE FOSAMIL 600 MG: 600 POWDER, FOR SOLUTION INTRAVENOUS at 09:02

## 2017-02-07 RX ADMIN — RIVAROXABAN 15 MG: 15 TABLET, FILM COATED ORAL at 05:02

## 2017-02-07 RX ADMIN — VERAPAMIL HYDROCHLORIDE 240 MG: 240 TABLET, FILM COATED, EXTENDED RELEASE ORAL at 10:02

## 2017-02-07 NOTE — PROGRESS NOTES
02/07/2017   9:21 AM    Message left for Gi at [facility] ([phone]) regarding patient's covered stay/appeal process.    Sujatha Pelayo RN,  Skilled  [id number]

## 2017-02-07 NOTE — PROGRESS NOTES
02/07/2017   10:38 AM    Discharge Planning-Spoke with Gi via phone ([phone]) regarding patient's stay/appeal process. Gi stated she does see that patient got thru to right people with her appeal. Phone number ([phone]) given again to patient. Gi transferred me to Aury at [facility]. Aury stated patient has MD peer to peer available. Dr FABIAN Dominguez number given to [facility] for Donna with [facility] to set up MD peer to peer. Dr FABIAN Dominguez made aware of incoming call from Lillian RICE.    10:51 AM  Dr FABIAN Dominguez stated Lillian RICE has given patient 1 week thru 2/10/2017 for further training. JULIANO Johnson to call Nancy at [facility] about IV Abx training. Patient made aware of new discharge date. New date written on dry erase board in room.    Sujatha Pelayo RN, CM Skilled  [id number]

## 2017-02-07 NOTE — PROGRESS NOTES
Progress Note  Skilled Nursing Unit    Admit Date: 1/27/2017  Anticipated Discharge Date:  2/9/2017    SUBJECTIVE:     Follow-up for  MRSA bacteremia    HPI/Interval history: Patient seen in room, reports no pain at this time.  She is concerned about having to do IV antibiotics at home as she feels she is not capable of performing the tasks.    Pain Scale: denies pain at this time    Review of Systems:  Constitutional: no fever or chills  Eyes: no visual changes, negative for irritation  Respiratory: no cough or shortness of breath  Cardiovascular: no chest pain or palpitations  Gastrointestinal: no nausea or vomiting, no abdominal pain or change in bowel habits  Genitourinary: no hematuria or dysuria  Integument/Breast: no rash or pruritis  Musculoskeletal: positive for muscle weakness    OBJECTIVE:       Vital Signs Range (Last 24H):  Temp:  [98.2 °F (36.8 °C)-98.4 °F (36.9 °C)]   Pulse:  [63-67]   Resp:  [18]   BP: (154-159)/(72-91)   SpO2:  [96 %-98 %]     I & O (Last 24H):    Intake/Output Summary (Last 24 hours) at 02/07/17 1028  Last data filed at 02/07/17 0400   Gross per 24 hour   Intake              360 ml   Output                0 ml   Net              360 ml       Physical Exam:  General: well developed, well nourished, appears stated age, no distress  Lungs:  clear to auscultation bilaterally and normal respiratory effort  Cardiovascular: Heart: regular rate and rhythm, S1, S2 normal, no murmur, click, rub or gallop. Chest Wall: no tenderness. Extremities: no cyanosis or edema, or clubbing. Pulses: 2+ and symmetric.  Abdomen/Rectal: Abdomen: soft, non-tender non-distented; bowel sounds normal; no masses,  no organomegaly. Rectal: not examined  Skin: Skin color, texture, turgor normal. No rashes or lesions  Musculoskeletal:no clubbing, cyanosis    Diagnostic Results:    Recent Labs  Lab 02/02/17  0530 02/06/17  0625   WBC 5.78 5.26   HGB 9.1* 9.7*   HCT 28.8* 30.8*    238        Recent Labs  Lab  02/02/17  0530 02/06/17  0625    143   K 3.5 3.5    111*   CO2 25 24   BUN 16 19   CREATININE 0.8 0.9   * 111*   CALCIUM 8.7 8.7   MG 1.7 1.9   PHOS 3.7 3.3        No results for input(s): INR, APTT in the last 168 hours.    Invalid input(s): PT   Microbiology Results (last 7 days)     ** No results found for the last 168 hours. **           Lab Results   Component Value Date    HGBA1C 6.0 01/06/2017     No results found for: POCTGLUCOSE    Imaging Results     None          Current Facility-Administered Medications   Medication    benzonatate capsule 100 mg    carvedilol tablet 25 mg    ceftaroline fosamil (TEFLARO) 600 mg in dextrose 5 % 50 mL IVPB    lisinopril tablet 20 mg    polyethylene glycol packet 17 g    rivaroxaban tablet 15 mg    [START ON 2/16/2017] rivaroxaban tablet 20 mg    sodium chloride 0.9% flush 10 mL    And    sodium chloride 0.9% flush 10 mL    tramadol tablet 50 mg    verapamil CR tablet 240 mg         ASSESSMENT/PLAN:     Active Hospital Problems    Diagnosis  POA    *MRSA bacteremia [R78.81]  No fevers.  Currently on 6 weeks of IV abx (teflaro 600 mg) from 1-21 (1st neg culture), last dose on 3/4.  ESR and CRP remains elevated.  Per  her insurance company wants her discharge to complete IV abx at home.  Patient states she is not comfortable of performing tasks needed to do IV abx at home.  Waiting on final insurance decision.  Yes    Debility [R53.81]  No longer has a therapy need and has been discharged.    Yes    Kidney mass [N28.89]  Per MD note, follow up with urology after discharge.  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  Yes    MRSA pneumonia [J15.212]  Continue IV abx as stated above.  Yes    Hypertension [I10]  Currently treated with lisinopril 20 mg daily, Coreg 25 mg bid and verapamil 240 mg bid.  BP is 159/91  Yes    Hyperlipidemia [E78.5]  Yes     dyslipidemia  Continue diet  restrictions.  On no meds at this time.      Resolved Hospital Problems    Diagnosis Date Resolved POA    Urinary tract infection without hematuria [N39.0] 01/28/2017 Yes     Suspected conjunctivitis - resolved.      Future Appointments  Date Time Provider Department Center   2/13/2017 10:30 AM Janice Reyes MD [facility] ID [facility]   3/13/2017 8:00 AM CARTY, VISUAL FIELDS [facility] O[facility]   3/13/2017 8:30 AM Lynda Crawford MD [facility]       DVT Prophylaxis: Rivaroxaban 15 mg bid until 2-15 then 20 mg daily thereafter      Francisco Hernandez, APRN, FNP-BC

## 2017-02-07 NOTE — PLAN OF CARE
Problem: Patient Care Overview  Goal: Plan of Care Review  Outcome: Ongoing (interventions implemented as appropriate)    02/06/17 9044   Coping/Psychosocial   Plan Of Care Reviewed With patient         Comments:   Comments:  Pt was assessed and VS taken recorded. Claimed she was frustrated about her discharge date because nobody will take care of her but claimed the  was aware. Medications were given and pt tolerated. Call bell within reached and commode at bedside. Turned every 2 hours and monitor for pain and safety.

## 2017-02-07 NOTE — PLAN OF CARE
02/07/2017  11:24 AM    SW spoke with Nancy with [facility] regarding pt's iv abx treatment.  Nancy reports having spoken with [facility] pharmacist and notified SW that, with particular abx medication pt is taking and its instability, no other mechanism can be used that would make it easier for pt to self-administer at home.  SW to follow up with Dr. BLAIRE Dominguez to determine whether ID could change pt to another abx medication that is more stable.  SW will continue to follow up as needed.    Addendum 1:23 PM  Per SNF pharmacist Jasmyne, ID would like pt to remain on abx (Teflaro).  SW to notify NP Francisco, Dr. BLAIRE Dominguez, and Nancy ([facility]) of same.    Addendum 2:28 PM  Per Nancy, pt will be visited at [facility] tomorrow to conduct training on iv abx administration.  Pt notified of same.  Pt agreeable to receiving training again but reports that no one else (including family members) will be available or capable of being trained in preparation for discharge home.  Pt aware of current discharge date of 2/10/17.    Alex Watson, [facility]  [id number]

## 2017-02-08 LAB
POCT GLUCOSE: 108 MG/DL (ref 70–110)
POCT GLUCOSE: 111 MG/DL (ref 70–110)
POCT GLUCOSE: 118 MG/DL (ref 70–110)
POCT GLUCOSE: 141 MG/DL (ref 70–110)

## 2017-02-08 PROCEDURE — 63600175 PHARM REV CODE 636 W HCPCS: Performed by: STUDENT IN AN ORGANIZED HEALTH CARE EDUCATION/TRAINING PROGRAM

## 2017-02-08 PROCEDURE — 11000004 HC SNF PRIVATE

## 2017-02-08 PROCEDURE — 99315 NF DSCHRG MGMT 30 MIN/LESS: CPT | Mod: ,,, | Performed by: HOSPITALIST

## 2017-02-08 PROCEDURE — 25000003 PHARM REV CODE 250: Performed by: STUDENT IN AN ORGANIZED HEALTH CARE EDUCATION/TRAINING PROGRAM

## 2017-02-08 RX ADMIN — RIVAROXABAN 15 MG: 15 TABLET, FILM COATED ORAL at 05:02

## 2017-02-08 RX ADMIN — VERAPAMIL HYDROCHLORIDE 240 MG: 240 TABLET, FILM COATED, EXTENDED RELEASE ORAL at 08:02

## 2017-02-08 RX ADMIN — CARVEDILOL 25 MG: 25 TABLET, FILM COATED ORAL at 08:02

## 2017-02-08 RX ADMIN — Medication 10 ML: at 12:02

## 2017-02-08 RX ADMIN — CEFTAROLINE FOSAMIL 600 MG: 600 POWDER, FOR SOLUTION INTRAVENOUS at 05:02

## 2017-02-08 RX ADMIN — CEFTAROLINE FOSAMIL 600 MG: 600 POWDER, FOR SOLUTION INTRAVENOUS at 10:02

## 2017-02-08 RX ADMIN — CARVEDILOL 25 MG: 25 TABLET, FILM COATED ORAL at 05:02

## 2017-02-08 RX ADMIN — CEFTAROLINE FOSAMIL 600 MG: 600 POWDER, FOR SOLUTION INTRAVENOUS at 01:02

## 2017-02-08 RX ADMIN — Medication 10 ML: at 05:02

## 2017-02-08 RX ADMIN — VERAPAMIL HYDROCHLORIDE 240 MG: 240 TABLET, FILM COATED, EXTENDED RELEASE ORAL at 10:02

## 2017-02-08 RX ADMIN — LISINOPRIL 20 MG: 20 TABLET ORAL at 10:02

## 2017-02-08 RX ADMIN — RIVAROXABAN 15 MG: 15 TABLET, FILM COATED ORAL at 08:02

## 2017-02-08 NOTE — PLAN OF CARE
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]  Yes    MRSA pneumonia [J15.212]  Yes    Hypertension [I10]  Yes    Hyperlipidemia [E78.5]  Yes     dyslipidemia        Resolved Hospital Problems    Diagnosis Date Resolved POA    Urinary tract infection without hematuria [N39.0] 01/28/2017 Yes       Future Appointments  Date Time Provider Department Center   2/13/2017 10:30 AM Janice Reyes MD [facility]   2/13/2017 2:30 PM Porfir[facility]llano [facility]   2/17/2017 11:20 AM Hung Musa MD [facility]   3/13/2017 8:00 AM CARTY, VISUAL FIELDS [facility]   3/13/2017 8:30 AM Lynda Crawford MD [facility]     Follow-up Information     Follow up with Hung Musa MD. Go on 2/17/2017.    Specialty:  Family Medicine    Why:  Hospital follow up    Contact information:    [address]  [facility] [address]  [phone]          Follow up with Janice Reyes MD. Go on 2/13/2017.    Specialty:  Infectious Diseases    Why:  Follow up    Contact information:    [address]  [facility] [address]  [phone]          Follow up with Porfirio Arellano MD. Go on 2/13/2017.    Specialty:  Urology    Why:  Follow up    Contact information:    [address]  [facility] [address]  [phone]              I have seen and examined this patient face to face today. My clinical findings that support the need for the home health skilled services and home bound status are  the following:  Weakness/numbness causing balance and gait disturbance due to Infection and Weakness/Debility making it taxing to leave home.    Allergies:  Review of patient's allergies indicates:   Allergen Reactions    Adhesive tape-silicones      Other reaction(s): pulling skin off    Atorvastatin      Other reaction(s): Muscle pain    Pravachol  [pravastatin] Rash       Diet: diabetic diet: 1800 calorie    Activities: activity as tolerated    Nursing:   SN to complete comprehensive assessment including routine vital signs. Instruct on disease process and s/s of complications to report to MD. Review/verify medication list sent home with the patient at time of discharge  and instruct patient/caregiver as needed. Frequency may be adjusted depending on start of care date.    Notify MD if SBP > 160 or < 90; DBP > 90 or < 50; HR > 120 or < 50; Temp > 101;       CONSULTS:    none    MISCELLANEOUS CARE:  Home Infusion Therapy:   SN to perform Infusion Therapy/Central Line Care.  Review Central Line Care & Central Line Flush with patient.    Administer (drug and dose): Teflaro 600 mg tid    Last dose given: 2/10/17 at 0900                   Home dose due: 2/10/17 at 1700  End date on 3/6/17    Scrub the Hub: Prior to accessing the line, always perform a 30 second alcohol scrub  Each lumen of the central line is to be flushed at least daily with 10 mL Normal Saline and 3 mL Heparin flush (100 units/mL)  Skilled Nurse (SN) may draw blood from IV access  Blood Draw Procedure:   - Aspirate at least 5 mL of blood   - Discard   - Obtain specimen   - Change posiflow cap   - Flush with 20 mL Normal Saline followed by a                 3-5 mL Heparin flush (100 units/mL)  Central :   - Sterile dressing changes are done weekly and as needed.   - Use chlor-hexadine scrub to cleanse site, apply Biopatch to insertion site,       apply securement device dressing   - Posi-flow caps are changed weekly and after EVERY  lab draw.   - If sterile gauze is under dressing to control oozing,                 dressing change must be performed every 24 hours until gauze is not needed.    WOUND CARE ORDERS  None    Home nurse to draw CBC, CMP, ESR, CRP weekly beginning 2/16/17.  Send results to Dr. Reyes with infectious disease.  Fax to [phone]        Medications: Review discharge medications with patient and family and provide education.      Current Discharge Medication List      CONTINUE these medications which have CHANGED    Details   dextrose 5 % SolP 50 mL with ceftaroline fosamil 400 mg SolR 600 mg Inject 600 mg into the vein every 8 (eight) hours.  Qty: 69 each, Refills: 0      !! rivaroxaban (XARELTO) 15 mg Tab Take 1 tablet (15 mg total) by mouth 2 (two) times daily with meals.  Qty: 10 tablet, Refills: 0      !! rivaroxaban (XARELTO) 20 mg Tab Take 1 tablet (20 mg total) by mouth daily with dinner or evening meal.  Qty: 30 tablet, Refills: 1       !! - Potential duplicate medications found. Please discuss with provider.      CONTINUE these medications which have NOT CHANGED    Details   ketoconazole (NIZORAL) 2 % shampoo USE EVERY OTHER DAY LET SIT ON SCALP FOR FIVE MINUTES BEFORE RINSING  Qty: 120 mL, Refills: 6      MULTIVITAMIN WITH MINERALS (ONE-A-DAY 50 PLUS) Tab Take 1 tablet by mouth once daily.       om-3-epa-dha-fish oil-flax-E (THERA TEARS NUTRITION) [phone] mg-ru-pp-unit Cap Take 3 capsules by mouth every morning. Over-the-counter supplement    Associated Diagnoses: Dry eye syndrome, bilateral      verapamil (CALAN-SR) 240 MG CR tablet take 1 tablet by mouth twice a day  Qty: 30 tablet, Refills: 12      carvedilol (COREG) 25 MG tablet Take 25 mg by mouth 2 (two) times daily with meals.      flurandrenolide (CORDRAN) 0.05 % lotion Apply to  scalp one to two times daily as needed for dryness      lisinopril (PRINIVIL,ZESTRIL) 20 MG tablet Take 20 mg by mouth once daily.         STOP taking these  medications       BLOOD SUGAR DIAGNOSTIC (ONE TOUCH II TEST MISC) Comments:   Reason for Stopping:         CALCIUM CARBONATE/VITAMIN D3 (CALCIUM WITH VITAMIN D3 ORAL) Comments:   Reason for Stopping:         potassium chloride SA (K-DUR,KLOR-CON) 20 MEQ tablet Comments:   Reason for Stopping:         rivaroxaban (XARELTO) 15 mg (42)- 20 mg (9) DsPk Comments:   Reason for Stopping:               I certify that this patient is confined to her home and needs intermittent skilled nursing care.

## 2017-02-08 NOTE — PROGRESS NOTES
Medical Nutrition Therapy      Reason for Assessment: Length of Stay  Dx: 77 yo female admitted to SNF s/p hospitalization 2/2 MRSA bacteremia  Medical Hx includes: HLD, HTN, breast ca    Interdisciplinary Rounds: did not attend  Discharge planning: no nutr needs @ this time    General Info: Pt familiar to me from stay @ [facility]. Cont's to tiffany diet well w/ no c/o n/v/c/d. LBM 2/6.  Diet/Supplement PTA: diabetic    Current Diet: 1800 calorie diabetic  Suppl order: Boost Plus TID  % intake of meals: 100    Ht: 170.2 cm  Wt: 71.4 kg  % IBW: 116    Labs: Reviewed  Meds: Reviewed    Overall Physical Appearance: Well Nourished    Level of Risk: Low (f/u 1 x weekly)    Nutrition Dx: No nutrition diagnosis at this time R/T Nutrition Parameters WNL AEB : % meals consumed - pt meeting EEN.    Next Follow Up Date: 2/17/17

## 2017-02-08 NOTE — PLAN OF CARE
Problem: Patient Care Overview  Goal: Plan of Care Review  Outcome: Ongoing (interventions implemented as appropriate)    02/08/17 2881   Coping/Psychosocial   Plan Of Care Reviewed With patient         Comments:   Comments:  Pt was assessed and VS taken recorded. Noted no active skin breakdown. Medications were given and pt tolerated. PICC flushed, intact and patent. Pt reviewed with fall protocol and pt verbalizes understanding.Pt turned every 2 hours and monitor pain and safety.

## 2017-02-08 NOTE — PLAN OF CARE
02/08/2017  2:45 PM    JULIANO spoke with Nancy with [facility] today regarding pt's home iv infusion.  Nancy reports having met with pt for training on iv abx self-administration.  Nancy reports pt did well today as she was receptive and expressed understanding of how to administer iv abx.  Nancy stated plans of meeting with pt again at SNF either tomorrow or Friday (2/10/17) prior to SNF discharge to reinforce teaching.  JULIAON faxed home infusion referral information (including orders) to [facility] (fx [phone]).  JULIANO sent home health referral (via [facility]) to [facility] as patient denies having preference for home health placement.  Pt does not require DME to be ordered at this time.  JULIANO remains available for further discharge planning assistance and will follow up as needed.    Alex Watson, [facility]  [id number]

## 2017-02-08 NOTE — DISCHARGE SUMMARY
[facility] Hospital Medicine  Discharge Summary      Patient Name: Ida Santana  MRN: [id number]  Admission Date: 1/27/2017  Hospital Length of Stay: 12 days  Discharge Date and Time: 2/10/17  Attending Physician: Jasiel Dominguez MD   Discharging Provider: Francisco Hernandez NP  Primary Care Provider: Giovanna Murray MD        HPI: Patient is a 76 y.o. 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 Source of sepsis was MRSA bacteremia likely related to pneumonia. CT scan of chest consistent with septic emboli. Patient started on Vancomycin and then changed to high does ceftaroline per ID. Patient has been working with PT/OT who recommend home PT for further balance/mobility training. However patient not able to administer IV abx due to lack of support at home. Patient being transferred to SNF to complete IV antibiotics. She is tolerating oral diet.       * No surgery found *      Indwelling Lines/Drains at time of discharge:   Lines/Drains/Airways     Peripherally Inserted Central Catheter Line                 PICC Double Lumen 01/25/17 1541 right brachial 13 days              Hospital Course:   MRSA bacteremia [R78.81]  MRSA pneumonia [J15.212]  No fevers. Currently on 6 weeks of IV abx (teflaro 600 mg) from 1-21 (1st neg culture), last dose on 3/4. ESR and CRP are trending down. Her insurance company has denied her any more time at SNF and therefore she was taught how to self administer her IV antibiotics by [facility].  She will discharge home and follow up with ID as scheduled.  Home health to draw weekly ESR, CRP, CBC and CMP with results to Dr. Reyes.    Kidney mass [N28.89]  Per MD note, follow up with urology after discharge.    Thrombosis of left  renal vein [I82.3]  Continue to treat with Xalerto as ordered.    Hypertension [I10]  Currently treated with lisinopril 20 mg daily, Coreg 25 mg bid and verapamil 240 mg bid. BP has remained stable, she will continue her current dosing.      Hyperlipidemia [E78.5]  dyslipidemia  Continue diet restrictions. On no meds at this time.    Debility [R53.81]  No longer has a therapy need and has been discharged.     Consults: PT/OT    Significant Diagnostic Studies: Labs: BMP:     Recent Labs  Lab 02/09/17  0630         K 3.6      CO2 24   BUN 16   CREATININE 0.9   CALCIUM 8.8   MG 1.9    and CBC     Recent Labs  Lab 02/09/17  0630   WBC 4.78   HGB 10.1*   HCT 31.4*           Lab Results   Component Value Date    SEDRATE 140 (H) 02/02/2017     Lab Results   Component Value Date    CRP 8.6 (H) 02/09/2017         Pending Diagnostic Studies:     None        Final Active Diagnoses:    Diagnosis Date Noted POA    PRINCIPAL PROBLEM:  MRSA bacteremia [R78.81] 01/16/2017 Yes    Debility [R53.81] 01/28/2017 Yes    Kidney mass [N28.89] 01/25/2017 Yes    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 Go on 2/17/2017.    Specialty:  Family Medicine    Why:  Hospital follow up    Contact information:    [address]  [facility] [address]  [phone]          Follow up with Janice Reyes MD. Go on 2/13/2017.    Specialty:  Infectious Diseases    Why:  Follow up    Contact information:    [address]  [facility] [address]  [phone]           Follow up with Porfirio Arellano MD. Go on 2/13/2017.    Specialty:  Urology    Why:  Follow up    Contact information:    [address]  [facility] [id number] [phone]          Patient Instructions:     Diet general   Order Specific Question Answer Comments   Additional restrictions: Diabetic 1800      Activity as tolerated     Call MD for:  temperature >100.4     Call MD for:  persistent nausea and vomiting or diarrhea     Call MD for:  severe uncontrolled pain     Call MD for:  persistent dizziness, light-headedness, or visual disturbances     Call MD for:  increased confusion or weakness       Medications:  Reconciled Home Medications:   Current Discharge Medication List      CONTINUE these medications which have CHANGED    Details   dextrose 5 % SolP 50 mL with ceftaroline fosamil 400 mg SolR 600 mg Inject 600 mg into the vein every 8 (eight) hours.  Qty: 69 each, Refills: 0      !! rivaroxaban (XARELTO) 15 mg Tab Take 1 tablet (15 mg total) by mouth 2 (two) times daily with meals.  Qty: 10 tablet, Refills: 0      !! rivaroxaban (XARELTO) 20 mg Tab Take 1 tablet (20 mg total) by mouth daily with dinner or evening meal.  Qty: 30 tablet, Refills: 1       !! - Potential duplicate medications found. Please discuss with provider.      CONTINUE these medications which have NOT CHANGED    Details   ketoconazole (NIZORAL) 2 % shampoo USE EVERY OTHER DAY LET SIT ON SCALP FOR FIVE MINUTES BEFORE RINSING  Qty: 120 mL, Refills: 6      MULTIVITAMIN WITH MINERALS (ONE-A-DAY 50 PLUS) Tab Take 1 tablet by mouth once daily.       om-3-epa-dha-fish oil-flax-E (THERA TEARS NUTRITION) [phone] si-bc-wc-unit Cap Take 3 capsules by mouth every morning. Over-the-counter supplement    Associated Diagnoses: Dry eye syndrome, bilateral      verapamil (CALAN-SR) 240 MG CR tablet take 1 tablet by mouth twice a day  Qty: 30 tablet, Refills: 12      carvedilol (COREG) 25 MG tablet Take 25 mg by mouth 2 (two) times daily  with meals.      flurandrenolide (CORDRAN) 0.05 % lotion Apply to  scalp one to two times daily as needed for dryness      lisinopril (PRINIVIL,ZESTRIL) 20 MG tablet Take 20 mg by mouth once daily.         STOP taking these medications       BLOOD SUGAR DIAGNOSTIC (ONE TOUCH II TEST MISC) Comments:   Reason for Stopping:         CALCIUM CARBONATE/VITAMIN D3 (CALCIUM WITH VITAMIN D3 ORAL) Comments:   Reason for Stopping:         potassium chloride SA (K-DUR,KLOR-CON) 20 MEQ tablet Comments:   Reason for Stopping:         rivaroxaban (XARELTO) 15 mg (42)- 20 mg (9) DsPk Comments:   Reason for Stopping:             Time spent on the discharge of patient: 30 minutes    Francisco Hernandez NP  Department of Hospital Medicine  [facility]

## 2017-02-09 LAB
ANION GAP SERPL CALC-SCNC: 7 MMOL/L
BASOPHILS # BLD AUTO: 0.07 K/UL
BASOPHILS NFR BLD: 1.5 %
BUN SERPL-MCNC: 16 MG/DL
CALCIUM SERPL-MCNC: 8.8 MG/DL
CHLORIDE SERPL-SCNC: 107 MMOL/L
CO2 SERPL-SCNC: 24 MMOL/L
CREAT SERPL-MCNC: 0.9 MG/DL
CRP SERPL-MCNC: 8.6 MG/L
DIFFERENTIAL METHOD: ABNORMAL
EOSINOPHIL # BLD AUTO: 0.3 K/UL
EOSINOPHIL NFR BLD: 6.7 %
ERYTHROCYTE [DISTWIDTH] IN BLOOD BY AUTOMATED COUNT: 15.9 %
ERYTHROCYTE [SEDIMENTATION RATE] IN BLOOD BY WESTERGREN METHOD: 89 MM/HR
EST. GFR  (AFRICAN AMERICAN): >60 ML/MIN/1.73 M^2
EST. GFR  (NON AFRICAN AMERICAN): >60 ML/MIN/1.73 M^2
GLUCOSE SERPL-MCNC: 109 MG/DL
HCT VFR BLD AUTO: 31.4 %
HGB BLD-MCNC: 10.1 G/DL
LYMPHOCYTES # BLD AUTO: 1.4 K/UL
LYMPHOCYTES NFR BLD: 28.5 %
MAGNESIUM SERPL-MCNC: 1.9 MG/DL
MCH RBC QN AUTO: 31.4 PG
MCHC RBC AUTO-ENTMCNC: 32.2 %
MCV RBC AUTO: 98 FL
MONOCYTES # BLD AUTO: 0.8 K/UL
MONOCYTES NFR BLD: 15.7 %
NEUTROPHILS # BLD AUTO: 2.3 K/UL
NEUTROPHILS NFR BLD: 47.6 %
PHOSPHATE SERPL-MCNC: 3.4 MG/DL
PLATELET # BLD AUTO: 234 K/UL
PMV BLD AUTO: 10.5 FL
POCT GLUCOSE: 151 MG/DL (ref 70–110)
POTASSIUM SERPL-SCNC: 3.6 MMOL/L
RBC # BLD AUTO: 3.22 M/UL
SODIUM SERPL-SCNC: 138 MMOL/L
WBC # BLD AUTO: 4.78 K/UL

## 2017-02-09 PROCEDURE — 25000003 PHARM REV CODE 250: Performed by: STUDENT IN AN ORGANIZED HEALTH CARE EDUCATION/TRAINING PROGRAM

## 2017-02-09 PROCEDURE — 85651 RBC SED RATE NONAUTOMATED: CPT

## 2017-02-09 PROCEDURE — 36415 COLL VENOUS BLD VENIPUNCTURE: CPT

## 2017-02-09 PROCEDURE — 80048 BASIC METABOLIC PNL TOTAL CA: CPT

## 2017-02-09 PROCEDURE — 83735 ASSAY OF MAGNESIUM: CPT

## 2017-02-09 PROCEDURE — 11000004 HC SNF PRIVATE

## 2017-02-09 PROCEDURE — 85025 COMPLETE CBC W/AUTO DIFF WBC: CPT

## 2017-02-09 PROCEDURE — 63600175 PHARM REV CODE 636 W HCPCS: Performed by: STUDENT IN AN ORGANIZED HEALTH CARE EDUCATION/TRAINING PROGRAM

## 2017-02-09 PROCEDURE — 84100 ASSAY OF PHOSPHORUS: CPT

## 2017-02-09 PROCEDURE — 86140 C-REACTIVE PROTEIN: CPT

## 2017-02-09 RX ADMIN — RIVAROXABAN 15 MG: 15 TABLET, FILM COATED ORAL at 05:02

## 2017-02-09 RX ADMIN — Medication 10 ML: at 06:02

## 2017-02-09 RX ADMIN — VERAPAMIL HYDROCHLORIDE 240 MG: 240 TABLET, FILM COATED, EXTENDED RELEASE ORAL at 09:02

## 2017-02-09 RX ADMIN — LISINOPRIL 20 MG: 20 TABLET ORAL at 09:02

## 2017-02-09 RX ADMIN — CEFTAROLINE FOSAMIL 600 MG: 600 POWDER, FOR SOLUTION INTRAVENOUS at 01:02

## 2017-02-09 RX ADMIN — CEFTAROLINE FOSAMIL 600 MG: 600 POWDER, FOR SOLUTION INTRAVENOUS at 05:02

## 2017-02-09 RX ADMIN — CARVEDILOL 25 MG: 25 TABLET, FILM COATED ORAL at 08:02

## 2017-02-09 RX ADMIN — RIVAROXABAN 15 MG: 15 TABLET, FILM COATED ORAL at 08:02

## 2017-02-09 RX ADMIN — SODIUM CHLORIDE, PRESERVATIVE FREE 10 ML: 5 INJECTION INTRAVENOUS at 05:02

## 2017-02-09 RX ADMIN — CARVEDILOL 25 MG: 25 TABLET, FILM COATED ORAL at 05:02

## 2017-02-09 RX ADMIN — Medication 10 ML: at 01:02

## 2017-02-09 NOTE — PLAN OF CARE
Problem: Infection, Risk/Actual (Adult)  Goal: Infection Prevention/Resolution  Patient will demonstrate the desired outcomes by discharge/transition of care.   Outcome: Ongoing (interventions implemented as appropriate)    02/09/17 0812   Infection, Risk/Actual (Adult)   Infection Prevention/Resolution making progress toward outcome

## 2017-02-10 VITALS
OXYGEN SATURATION: 99 % | HEART RATE: 72 BPM | RESPIRATION RATE: 16 BRPM | SYSTOLIC BLOOD PRESSURE: 114 MMHG | BODY MASS INDEX: 24.71 KG/M2 | TEMPERATURE: 98 F | WEIGHT: 157.44 LBS | HEIGHT: 67 IN | DIASTOLIC BLOOD PRESSURE: 58 MMHG

## 2017-02-10 LAB
POCT GLUCOSE: 121 MG/DL (ref 70–110)
POCT GLUCOSE: 127 MG/DL (ref 70–110)

## 2017-02-10 PROCEDURE — 25000003 PHARM REV CODE 250: Performed by: STUDENT IN AN ORGANIZED HEALTH CARE EDUCATION/TRAINING PROGRAM

## 2017-02-10 PROCEDURE — 63600175 PHARM REV CODE 636 W HCPCS: Performed by: STUDENT IN AN ORGANIZED HEALTH CARE EDUCATION/TRAINING PROGRAM

## 2017-02-10 RX ORDER — CARVEDILOL 25 MG/1
25 TABLET ORAL 2 TIMES DAILY WITH MEALS
Qty: 60 TABLET | Refills: 3 | Status: SHIPPED | OUTPATIENT
Start: 2017-02-10 | End: 2017-06-12

## 2017-02-10 RX ORDER — LISINOPRIL 20 MG/1
20 TABLET ORAL DAILY
Qty: 30 TABLET | Refills: 3 | Status: SHIPPED | OUTPATIENT
Start: 2017-02-10 | End: 2017-06-12

## 2017-02-10 RX ADMIN — VERAPAMIL HYDROCHLORIDE 240 MG: 240 TABLET, FILM COATED, EXTENDED RELEASE ORAL at 10:02

## 2017-02-10 RX ADMIN — Medication 10 ML: at 01:02

## 2017-02-10 RX ADMIN — Medication 10 ML: at 05:02

## 2017-02-10 RX ADMIN — LISINOPRIL 20 MG: 20 TABLET ORAL at 09:02

## 2017-02-10 RX ADMIN — CEFTAROLINE FOSAMIL 600 MG: 600 POWDER, FOR SOLUTION INTRAVENOUS at 01:02

## 2017-02-10 RX ADMIN — CARVEDILOL 25 MG: 25 TABLET, FILM COATED ORAL at 10:02

## 2017-02-10 RX ADMIN — RIVAROXABAN 15 MG: 15 TABLET, FILM COATED ORAL at 10:02

## 2017-02-10 RX ADMIN — Medication 10 ML: at 12:02

## 2017-02-10 RX ADMIN — CEFTAROLINE FOSAMIL 600 MG: 600 POWDER, FOR SOLUTION INTRAVENOUS at 12:02

## 2017-02-10 NOTE — PLAN OF CARE
02/10/2017  8:01 AM    Pt visited by Nancy with [facility] yesterday (2/9/17) to follow up on iv abx administration training.  Nancy reports pt is demonstrating understanding of process.  Nancy stated plans of visiting pt again today to reinforce training further prior to discharge.  Nancy reports pt demonstrates readiness to discharge with iv abx today.  SW will follow up as needed.    Alex Watson, [facility]  [id number]

## 2017-02-10 NOTE — PROGRESS NOTES
Patient was discharged from unit in stable condition. Patient verbalized an understanding of the discharge teaching.

## 2017-02-10 NOTE — PLAN OF CARE
Patient to discharge home today with assist of family. Patient set up with [place] [facility] and [facility] per JULIANO Johnson.     Future Appointments  Date Time Provider Department Center   2/13/2017 10:30 AM Janice Reyes MD [facility] ID [facility]   2/13/2017 2:30 PM Porfirio Arellano MD [facility] UROLOGC [facility]   2/17/2017 11:20 AM Hung Musa MD [facility] IM [facility] PCW   3/13/2017 8:00 AM MACHELLE VISUAL FIELDS [facility] OPHTHAL [facility]   3/13/2017 8:30 AM Lynda Crawford MD [facility] OPHTHAL [facility]          02/10/17 1202   Final Note   Assessment Type Final Discharge Note   Discharge Disposition Home   Discharge planning education complete? Yes   What phone number can be called within the next 1-3 days to see how you are doing after discharge? [phone]   Hospital Follow Up  Appt(s) scheduled? Yes   Discharge plans and expectations educations in teach back method with documentation complete? Yes   Referral to / orders for Home Health Complete? Yes   Any social issues identified prior to discharge? No   Did you assess the readiness or willingness of the family or caregiver to support self management of care? Yes       Sujatha Pelayo RN, CM Skilled  [id number]

## 2017-02-13 ENCOUNTER — TELEPHONE (OUTPATIENT)
Dept: INTERNAL MEDICINE | Facility: CLINIC | Age: 77
End: 2017-02-13

## 2017-02-13 ENCOUNTER — OFFICE VISIT (OUTPATIENT)
Dept: UROLOGY | Facility: CLINIC | Age: 77
End: 2017-02-13
Payer: COMMERCIAL

## 2017-02-13 ENCOUNTER — INFUSION (OUTPATIENT)
Dept: INFECTIOUS DISEASES | Facility: HOSPITAL | Age: 77
End: 2017-02-13
Attending: INTERNAL MEDICINE
Payer: COMMERCIAL

## 2017-02-13 ENCOUNTER — PATIENT OUTREACH (OUTPATIENT)
Dept: ADMINISTRATIVE | Facility: CLINIC | Age: 77
End: 2017-02-13
Payer: COMMERCIAL

## 2017-02-13 ENCOUNTER — LAB VISIT (OUTPATIENT)
Dept: LAB | Facility: HOSPITAL | Age: 77
End: 2017-02-13
Attending: UROLOGY
Payer: COMMERCIAL

## 2017-02-13 ENCOUNTER — OFFICE VISIT (OUTPATIENT)
Dept: INFECTIOUS DISEASES | Facility: CLINIC | Age: 77
End: 2017-02-13
Payer: COMMERCIAL

## 2017-02-13 VITALS
WEIGHT: 156 LBS | HEIGHT: 67 IN | DIASTOLIC BLOOD PRESSURE: 65 MMHG | SYSTOLIC BLOOD PRESSURE: 133 MMHG | BODY MASS INDEX: 24.48 KG/M2 | RESPIRATION RATE: 16 BRPM | HEART RATE: 62 BPM

## 2017-02-13 VITALS
BODY MASS INDEX: 25.51 KG/M2 | WEIGHT: 162.5 LBS | TEMPERATURE: 98 F | HEIGHT: 67 IN | DIASTOLIC BLOOD PRESSURE: 70 MMHG | SYSTOLIC BLOOD PRESSURE: 122 MMHG | HEART RATE: 72 BPM

## 2017-02-13 DIAGNOSIS — N28.89 KIDNEY MASS: ICD-10-CM

## 2017-02-13 DIAGNOSIS — N28.89 LEFT RENAL MASS: ICD-10-CM

## 2017-02-13 DIAGNOSIS — E27.8 LEFT ADRENAL MASS: ICD-10-CM

## 2017-02-13 DIAGNOSIS — J15.212 PNEUMONIA OF BOTH LOWER LOBES DUE TO METHICILLIN RESISTANT STAPHYLOCOCCUS AUREUS (MRSA): Primary | ICD-10-CM

## 2017-02-13 DIAGNOSIS — N28.89 LEFT RENAL MASS: Primary | ICD-10-CM

## 2017-02-13 DIAGNOSIS — B95.62 MRSA BACTEREMIA: ICD-10-CM

## 2017-02-13 DIAGNOSIS — R78.81 MRSA BACTEREMIA: ICD-10-CM

## 2017-02-13 PROCEDURE — 1157F ADVNC CARE PLAN IN RCRD: CPT | Mod: S$GLB,,, | Performed by: INTERNAL MEDICINE

## 2017-02-13 PROCEDURE — 3075F SYST BP GE 130 - 139MM HG: CPT | Mod: S$GLB,,, | Performed by: UROLOGY

## 2017-02-13 PROCEDURE — 99205 OFFICE O/P NEW HI 60 MIN: CPT | Mod: S$GLB,,, | Performed by: UROLOGY

## 2017-02-13 PROCEDURE — 1159F MED LIST DOCD IN RCRD: CPT | Mod: S$GLB,,, | Performed by: UROLOGY

## 2017-02-13 PROCEDURE — 84244 ASSAY OF RENIN: CPT

## 2017-02-13 PROCEDURE — 99999 PR PBB SHADOW E&M-EST. PATIENT-LVL III: CPT | Mod: PBBFAC,,, | Performed by: INTERNAL MEDICINE

## 2017-02-13 PROCEDURE — 1160F RVW MEDS BY RX/DR IN RCRD: CPT | Mod: S$GLB,,, | Performed by: INTERNAL MEDICINE

## 2017-02-13 PROCEDURE — 3078F DIAST BP <80 MM HG: CPT | Mod: S$GLB,,, | Performed by: UROLOGY

## 2017-02-13 PROCEDURE — 3074F SYST BP LT 130 MM HG: CPT | Mod: S$GLB,,, | Performed by: INTERNAL MEDICINE

## 2017-02-13 PROCEDURE — 36415 COLL VENOUS BLD VENIPUNCTURE: CPT

## 2017-02-13 PROCEDURE — 99999 PR PBB SHADOW E&M-EST. PATIENT-LVL III: CPT | Mod: PBBFAC,,, | Performed by: UROLOGY

## 2017-02-13 PROCEDURE — 1126F AMNT PAIN NOTED NONE PRSNT: CPT | Mod: S$GLB,,, | Performed by: INTERNAL MEDICINE

## 2017-02-13 PROCEDURE — 1160F RVW MEDS BY RX/DR IN RCRD: CPT | Mod: S$GLB,,, | Performed by: UROLOGY

## 2017-02-13 PROCEDURE — 1159F MED LIST DOCD IN RCRD: CPT | Mod: S$GLB,,, | Performed by: INTERNAL MEDICINE

## 2017-02-13 PROCEDURE — 83835 ASSAY OF METANEPHRINES: CPT

## 2017-02-13 PROCEDURE — 3078F DIAST BP <80 MM HG: CPT | Mod: S$GLB,,, | Performed by: INTERNAL MEDICINE

## 2017-02-13 PROCEDURE — 82088 ASSAY OF ALDOSTERONE: CPT

## 2017-02-13 PROCEDURE — 99214 OFFICE O/P EST MOD 30 MIN: CPT | Mod: S$GLB,,, | Performed by: INTERNAL MEDICINE

## 2017-02-13 PROCEDURE — 1126F AMNT PAIN NOTED NONE PRSNT: CPT | Mod: S$GLB,,, | Performed by: UROLOGY

## 2017-02-13 PROCEDURE — 82530 CORTISOL FREE: CPT

## 2017-02-13 PROCEDURE — 1157F ADVNC CARE PLAN IN RCRD: CPT | Mod: S$GLB,,, | Performed by: UROLOGY

## 2017-02-13 RX ORDER — LATANOPROST 50 UG/ML
1 SOLUTION/ DROPS OPHTHALMIC NIGHTLY
Refills: 1 | COMMUNITY
Start: 2017-01-28 | End: 2017-11-07 | Stop reason: SDUPTHER

## 2017-02-13 NOTE — PROGRESS NOTES
C3 nurse attempted to contact patient. No answer. The following message was left for the patient to return the call:  Good morning,  I am a nurse calling on behalf of [facility] from the [facility]ination [facility].  This is a Transitional Care Call for Ida Santana. When you have a moment please contact us at [phone] or [phone] Monday through Friday, between the hours of 8 am to 4 pm. We look forward to speaking with you. On behalf of [facility] have a nice day.    The patient has a scheduled HOSFU appointment with Dr. Musa on 2/17/17 @ 1120.

## 2017-02-13 NOTE — PROGRESS NOTES
Subjective:       Patient ID: Ida Santana is a 76 y.o. female.    Chief Complaint: renal mass (consult)      HPI: Ida aSntana is a 76 y.o. Black or  female who presents today for evaluation and management of a left adrenal mass and a potential left renal mass.    The mass was found incidentally during a recent hospitalization for pneumonia. During this evaluation, the patient was found to have a left adrenal mass, possible left renal mass, and a left renal vein thrombus. The patient has been discharged from the hospital and is doing well. She is due to finish her antibiotics this week. The patient is taking blood thinners for her left renal vein thrombus.    The mass measures 4.2 cm.    The patient has not undergone a functional evaluation yet.    The patient denies gross hematuria and/or constitutional symptoms attributable to her recently discovered adrenal mass.    The patient denies a personal and family history of adrenal cortical cancer.    The patient does not have a history of any other cancer.    She presents today to discuss management of her left adrenal mass, possible left renal mass, and left renal vein thrombus.     Review of patient's allergies indicates:   Allergen Reactions    Adhesive tape-silicones      Other reaction(s): pulling skin off    Atorvastatin      Other reaction(s): Muscle pain    Pravachol  [pravastatin] Rash       Current Outpatient Prescriptions   Medication Sig Dispense Refill    carvedilol (COREG) 25 MG tablet Take 1 tablet (25 mg total) by mouth 2 (two) times daily with meals. 60 tablet 3    dextrose 5 % SolP 50 mL with ceftaroline fosamil 400 mg SolR 600 mg Inject 600 mg into the vein every 8 (eight) hours. 69 each 0    flurandrenolide (CORDRAN) 0.05 % lotion Apply to  scalp one to two times daily as needed for dryness      ketoconazole (NIZORAL) 2 % shampoo USE EVERY OTHER DAY LET SIT ON SCALP FOR FIVE MINUTES BEFORE RINSING 120 mL 6    latanoprost  0.005 % ophthalmic solution   1    lisinopril (PRINIVIL,ZESTRIL) 20 MG tablet Take 1 tablet (20 mg total) by mouth once daily. 30 tablet 3    MULTIVITAMIN WITH MINERALS (ONE-A-DAY 50 PLUS) Tab Take 1 tablet by mouth once daily.       om-3-epa-dha-fish oil-flax-E (THERA TEARS NUTRITION) [phone] wh-dn-cc-unit Cap Take 3 capsules by mouth every morning. Over-the-counter supplement      rivaroxaban (XARELTO) 15 mg Tab Take 1 tablet (15 mg total) by mouth 2 (two) times daily with meals. 10 tablet 0    verapamil (CALAN-SR) 240 MG CR tablet take 1 tablet by mouth twice a day 30 tablet 12     No current facility-administered medications for this visit.        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    Osteopenia     Potassium depletion 1998       Past Surgical History   Procedure Laterality Date    Breast biopsy  2001     left breast DCIS    Breast surgery  9/2001     left lumpectomy with SLN bx    Colonoscopy      Colonoscopy N/A 10/31/2016     Procedure: COLONOSCOPY;  Surgeon: YAMILETH Richards MD;  Location: [facility] ([address] [place] [address]);  Service: Endoscopy;  Laterality: N/A;       Family History   Problem Relation Age of Onset    Cancer Mother      breast    Breast cancer Mother      40s and again in 50s (bilateral)    Breast cancer Other     Heart disease Father      bypass    Cancer Father      asbestos    Stroke Brother     Ovarian cancer Neg Hx     Amblyopia Neg Hx     Blindness Neg Hx     Cataracts Neg Hx     Diabetes Neg Hx     Glaucoma Neg Hx     Hypertension Neg Hx     Macular degeneration Neg Hx     Retinal detachment Neg Hx     Strabismus Neg Hx     Thyroid disease Neg Hx     Colon cancer Neg Hx        Review of Systems    Review of Systems    Constitutional: Negative for fever, chills, activity change, appetite change and unexpected weight change.   HENT: Negative for congestion, nosebleeds, sneezing, sore throat and trouble swallowing.    Eyes: Negative for pain, discharge, redness and visual disturbance.   Respiratory: Negative for cough, choking, chest tightness and shortness of breath.    Cardiovascular: Negative for chest pain, palpitations and leg swelling.   Gastrointestinal: Negative for nausea, vomiting, abdominal pain, diarrhea, blood in stool, abdominal distention and anal bleeding.  Genitourinary: As documented per HPI   Endocrine: Negative for cold intolerance, heat intolerance, polydipsia, polyphagia and polyuria.   Musculoskeletal: Negative for myalgias, gait problem, neck pain and neck stiffness.   Skin: Negative for color change, pallor, rash and wound.   Allergic/Immunologic: Negative for immunocompromised state.   Neurological: Negative for seizures, facial asymmetry, speech difficulty, weakness and light-headedness.   Hematological: Negative for adenopathy. Does not bruise/bleed easily.   Psychiatric/Behavioral: Negative for hallucinations, behavioral problems, self-injury and agitation. The patient is not hyperactive.      All other systems were reviewed and were negative.    Objective:     Vitals:    02/13/17 1309   BP: 133/65   Pulse: 62   Resp: 16     Physical Exam   Vitals reviewed.  Constitutional: She is oriented to person, place, and time. She appears well-developed and well-nourished. No distress.   HENT:   Head: Normocephalic and atraumatic.   Right Ear: External ear normal.   Left Ear: External ear normal.   Nose: Nose normal.   Eyes: EOM are normal. Pupils are equal, round, and reactive to light. Right eye exhibits no discharge. Left eye exhibits no discharge.   Neck: Normal range of motion. Neck supple. No tracheal deviation present. No thyroid enlargement or tenderness.  Cardiovascular: Normal heart sounds and intact  distal pulses. No signs of peripheral edema.   Pulmonary/Chest: Effort normal and breath sounds normal. No respiratory distress. She has no wheezes.   Abdominal: Soft. Bowel sounds are normal. She exhibits no distension. There is no tenderness. There is no rebound. No hepatic or splenic enlargement or tenderness.  Musculoskeletal: Normal range of motion. She exhibits no edema.   Neurological: She is alert and oriented to person, place, and time. Coordination normal.   Skin: Skin is warm and dry. She is not diaphoretic.   Lymphatic: No palpable lymph nodes.  Psychiatric: She has a normal mood and affect. Her behavior is normal. Judgment and thought content normal.     Lab Results   Component Value Date    CREATININE 0.9 02/09/2017     Lab Results   Component Value Date    EGFRNONAA >60.0 02/09/2017     Lab Results   Component Value Date    ESTGFRAFRICA >60.0 02/09/2017     I personally reviewed all the patient's imaging studies.    CT abdomen/pelvis with contrast (1/18/17): Left renal vein thrombosis with associated left perinephric fat stranding and perinephric fluid with diffuse enlargement of the left kidney. There is a 1.7 cm hypodense lesion in the inferior pole of the left kidney which could represent renal infarction but underlying neoplastic lesion not excluded. Large soft tissue density lesion in the left upper abdomen, anterior to the left kidney and superior to the left renal vein.  This is of uncertain etiology and could represent enlarged lymph node and/or metastatic lesion.    Assessment:       1. Left renal mass    2. Left adrenal mass        Plan:     Ida was seen today for renal mass.    Diagnoses and all orders for this visit:    Left renal mass  -     Metanephrines, Plasma Free; Future  -     Renin; Future  -     Aldosterone; Future  -     Cortisol, free, serum; Future  -     CT Abdomen With Without Contrast; Future    Left adrenal mass  -     Metanephrines, Plasma Free; Future  -     Renin;  Future  -     Aldosterone; Future  -     Cortisol, free, serum; Future  -     CT Abdomen With Without Contrast; Future    The patient has an interesting radiologic situation. Fortunately, she is asymptomatic from a mass standpoint.    I spent a long time discussing with the patient and her family the nature of adrenal masses.      When a patient has an adrenal mass, we first look to determine if the mass is functional or non-functional. Specifically, we want to ascertain if the mass secretes excess cortisol, excess catecholamines (pheochromocytoma), or excess aldosterone (aldosteronoma). If the mass is not functional and has the imaging characteristics of an adenoma or atypical adenoma, we usually do not treat these masses unless they are greater than 4 cm. When the mass is > 4 cm, there is an increased risk of it representing an adrenal cortical carcinoma, and, thus, we recommend excision (an adrenalectomy).    We also discussed renal lesions and renal masses and what they could represent.    At this point, our information is incomplete. The patient needs a biochemical evaluation and dedicated imaging of her kidneys to help delineate the underlying process. My suspicion is that her left kidney has an underlying renal mass with thrombus with invasion into the patient's left adrenal gland. Imaging should help delineate this issue for us.    As such, I would like the patient to undergo a repeat CT scan of her kidneys as well as biochemical imaging.    At the conclusion of our visit, the patient was agreeable to proceeding as outlined above.    I answered all her and her family's questions.    I encouraged her and/or any of her family members to call and/or email me with questions/concerns.    I spent 60 minutes with the patient of which more than half was spent in coordinating the patient's care as well as in direct consultation with the patient in regards to our treatment and plan.

## 2017-02-13 NOTE — LETTER
February 15, 2017      Diann Lopez MD  [address]  [facility] [address]           [facility]  [address]  [facility] [address]  Phone: [phone]          Patient: Ida Santana   MR Number: [id number]   YOB: 1940   Date of Visit: 2/13/2017       Dear Dr. Diann Lopez:    Thank you for referring Ida Santana to me for evaluation. Attached you will find relevant portions of my assessment and plan of care.    If you have questions, please do not hesitate to call me. I look forward to following Ida Santana along with you.    Sincerely,    Porfirio Arellano MD    Enclosure  CC:  No Recipients    If you would like to receive this communication electronically, please contact [email or web address] or [phone] to request more information on [employer] access.    For providers and/or their staff who would like to refer a patient to [facility], please contact us through our one-stop-shop provider referral line, [facility], at [phone].    If you feel you have received this communication in error or would no longer like to receive these types of communications, please e-mail [email or web address]

## 2017-02-13 NOTE — PROGRESS NOTES
Picc line flushed and blood return noted. Dr Raza notified of status and will call Tamika regarding pts infusions.

## 2017-02-13 NOTE — PROGRESS NOTES
Subjective:      Patient ID: Ida Santana is a 76 y.o. female.    Chief Complaint:Hospital Follow Up      History of Present Illness3    Admitted 1/12 thru 1/27:    76 year old brought to ED after being found confused.  Found to have MRSA bacteremia and septic emboli on CT chest.    Blood cultures were positive since the day of admission on 1/12 thru 1/20.  She initially received IV vancomycin and was changed to ceftaroline which she is still on.    She was transferred to SNF for 12 days and was discharged on 2/10.    Over the weekend, she had issues with the IV infusion with back flow.     Review of Systems   Constitution: Negative for chills, decreased appetite, fever, weakness, malaise/fatigue, night sweats, weight gain and weight loss.   HENT: Negative for congestion, ear pain, headaches, hearing loss, hoarse voice, sore throat and tinnitus.    Eyes: Negative for blurred vision, redness and visual disturbance.   Cardiovascular: Negative for chest pain, leg swelling and palpitations.   Respiratory: Negative for cough, hemoptysis, shortness of breath and sputum production.    Hematologic/Lymphatic: Negative for adenopathy. Does not bruise/bleed easily.   Skin: Negative for dry skin, itching, rash and suspicious lesions.   Musculoskeletal: Negative for back pain, joint pain, myalgias and neck pain.   Gastrointestinal: Negative for abdominal pain, constipation, diarrhea, heartburn, nausea and vomiting.   Genitourinary: Negative for dysuria, flank pain, frequency, hematuria, hesitancy and urgency.   Neurological: Negative for dizziness, numbness and paresthesias.   Psychiatric/Behavioral: Negative for depression and memory loss. The patient does not have insomnia and is not nervous/anxious.      Objective:   Physical Exam   Constitutional: She is oriented to person, place, and time. She appears well-developed and well-nourished.   HENT:   Head: Normocephalic and atraumatic.   Right Ear: External ear normal.   Left  Ear: External ear normal.   Nose: Nose normal.   Mouth/Throat: Oropharynx is clear and moist.   Eyes: Conjunctivae and EOM are normal. Pupils are equal, round, and reactive to light.   Neck: Normal range of motion. Neck supple. No thyromegaly present.   Cardiovascular: Normal rate, regular rhythm, normal heart sounds and intact distal pulses.    No murmur heard.  Pulmonary/Chest: Effort normal and breath sounds normal. No respiratory distress. She has no wheezes. She has no rales.   Abdominal: Soft. Bowel sounds are normal. There is no tenderness.   Musculoskeletal: Normal range of motion.   Lymphadenopathy:     She has no cervical adenopathy.   Neurological: She is alert and oriented to person, place, and time.   Skin: Skin is warm and dry.   Psychiatric: She has a normal mood and affect. Her behavior is normal. Judgment normal.     Assessment:       1. Pneumonia of both lower lobes due to methicillin resistant Staphylococcus aureus (MRSA)    2. Kidney mass    3. MRSA bacteremia          Plan:        1. Discussed with HARVEY and a nurse will be going to the house today to help with the infusion.  2. Would like to complete 6 weeks of IV antibiotics from 1/21.  3. Emphasized to patient the importance of IV antibiotics.

## 2017-02-13 NOTE — PATIENT INSTRUCTIONS
"  Catecholamines (Blood)  Does this test have other names?  Dopamine, epinephrine, and norepinephrine tests,   What is this test?  This test measures the levels of catecholamines in your blood. The catecholamine hormones are epinephrine, norepinephrine; and dopamine. Epinephrine is also called adrenalin.  Catecholamines are made in the adrenal glands. They are released when you have physical or emotional stress. These hormones have many functions in the body. These include sending nerve impulses in the brain, narrowing blood vessels, and raising your heart rate. The test can help diagnose certain conditions that affect catecholamine levels.  Three rare tumors can also affect catecholamine levels. They cause high blood pressure that generally goes away if the tumor is taken out. Pheochromocytomas occur in less than 0.2% of people with high blood pressure. About 10% of them are cancer. Neuroblastoma tumors, which are also cancer, almost always appear in childhood.  Why do I need this test?  You may need this test to help your healthcare provider figure out whether you have certain conditions that cause high blood pressure, severe headaches, a fast heartbeat, and sweating.  You may also have this test if your healthcare provider thinks you have a rare tumor that causes high blood pressure.  Your child may have this test if he or she has symptoms of a tumor that affects catecholamine levels. Signs and symptoms may include:  · Bone pain or a limp  · "Dancing" eye or limb movement  · Anemia  · Weight loss  · An unusual lump, usually in the abdomen (belly)  What other tests might I have along with this test?  Your healthcare provider may also order a urine test to check your catecholamine levels. Your provider may also order a CT or MRI scan to find the suspected tumor.  You may also need a vanillylmandelic acid test and a test for metanephrines in your urine. These tests can help find out the cause of your symptoms. These " substances form when catecholamines break down in the body.  What do my test results mean?  Many things may affect your lab test results. These include the method each lab uses to do the test. Even if your test results are different from the normal value, you may not have a problem. To learn what the results mean for you, talk with your healthcare provider.  Results are given in milligrams (mg). If the blood sample is taken while you are lying down, normal levels are:  · Dopamine: less than 87 mg  · Epinephrine: less than 50 mg  · Norepinephrine: 110 to 410 mg  If the blood sample is taken while you are sitting up, normal levels are:  · Dopamine: less than 87 mg  · Epinephrine: less than 60 mg  · Norepinephrine: 120 to 680 mg  If you have higher levels of catecholamines in your blood, you may have a pheochromocytoma, paraganglioma, or neuroblastoma tumor.  How is this test done?  The test requires a blood sample, which is drawn through a needle from a vein in your arm.  Does this test pose any risks?  Taking a blood sample with a needle carries risks that include bleeding, infection, bruising, or feeling dizzy. When the needle pricks your arm, you may feel a slight stinging sensation or pain. Afterward, the site may be slightly sore.  What might affect my test results?  Physical and emotional stress can affect your results. Your diet and certain medicines can also affect your results. Foods that can affect your results include those that have chocolate, caffeine, or other stimulants. You should not eat these foods until you have had the test.  Your test results can also be affected if you are a woman who is menstruating on test day. Be sure to tell the  that you are menstruating.  How do I get ready for this test?  Try to avoid rigorous exercise and stressful situations before your test. Also, be sure your healthcare provider knows about all medicines, herbs, vitamins, and supplements you are taking.  This includes medicines that don't need a prescription and any illicit drugs you may use.   Date Last Reviewed: 7/15/2015  © 7956-8482 [employer]. [address] [place] [address], [place] [address]. All rights reserved. This information is not intended as a substitute for professional medical care. Always follow your healthcare professional's instructions.

## 2017-02-13 NOTE — PROGRESS NOTES
C3 nurse attempted to contact patient. No answer. The following message was left for the patient to return the call:  Good morning,  I am a nurse calling on behalf of [facility] from the Care Coordination Center.  This is a Transitional Care Call for Ida Santana. When you have a moment please contact us at [phone] or [phone] Monday through Friday, between the hours of 8 am to 4 pm. We look forward to speaking with you. On behalf of [facility] have a nice day.    The patient has a scheduled HOSFU appointment with Dr. Musa on 2/17/17 @ 1120. Message sent to Physician staff.

## 2017-02-15 ENCOUNTER — HOSPITAL ENCOUNTER (OUTPATIENT)
Dept: RADIOLOGY | Facility: HOSPITAL | Age: 77
Discharge: HOME | End: 2017-02-15
Attending: UROLOGY
Payer: COMMERCIAL

## 2017-02-15 DIAGNOSIS — N28.89 LEFT RENAL MASS: ICD-10-CM

## 2017-02-15 DIAGNOSIS — E27.8 LEFT ADRENAL MASS: ICD-10-CM

## 2017-02-15 LAB
ALDOST SERPL-MCNC: 4.2 NG/DL
RENIN PLAS-CCNC: 1.8 NG/ML/H

## 2017-02-15 PROCEDURE — 74170 CT ABD WO CNTRST FLWD CNTRST: CPT | Mod: TC

## 2017-02-15 PROCEDURE — 74170 CT ABD WO CNTRST FLWD CNTRST: CPT | Mod: 26,,, | Performed by: RADIOLOGY

## 2017-02-15 PROCEDURE — 25500020 PHARM REV CODE 255: Performed by: UROLOGY

## 2017-02-15 RX ADMIN — IOHEXOL 75 ML: 350 INJECTION, SOLUTION INTRAVENOUS at 09:02

## 2017-02-17 LAB — CORTIS F SERPL-MCNC: 0.47 MCG/DL

## 2017-02-19 LAB
METANEPH FREE SERPL-MCNC: 58 PG/ML
METANEPHS SERPL-MCNC: 211 PG/ML
NORMETANEPH FREE SERPL-MCNC: 153 PG/ML

## 2017-02-20 ENCOUNTER — OFFICE VISIT (OUTPATIENT)
Dept: UROLOGY | Facility: CLINIC | Age: 77
End: 2017-02-20
Payer: COMMERCIAL

## 2017-02-20 VITALS
SYSTOLIC BLOOD PRESSURE: 148 MMHG | RESPIRATION RATE: 16 BRPM | BODY MASS INDEX: 24.48 KG/M2 | DIASTOLIC BLOOD PRESSURE: 63 MMHG | HEART RATE: 68 BPM | HEIGHT: 67 IN | WEIGHT: 156 LBS

## 2017-02-20 DIAGNOSIS — N28.89 LEFT RENAL MASS: Primary | ICD-10-CM

## 2017-02-20 DIAGNOSIS — I82.3 THROMBOSIS OF LEFT RENAL VEIN: ICD-10-CM

## 2017-02-20 DIAGNOSIS — E27.8 LEFT ADRENAL MASS: ICD-10-CM

## 2017-02-20 PROCEDURE — 1126F AMNT PAIN NOTED NONE PRSNT: CPT | Mod: S$GLB,,, | Performed by: UROLOGY

## 2017-02-20 PROCEDURE — 1157F ADVNC CARE PLAN IN RCRD: CPT | Mod: S$GLB,,, | Performed by: UROLOGY

## 2017-02-20 PROCEDURE — 99999 PR PBB SHADOW E&M-EST. PATIENT-LVL III: CPT | Mod: PBBFAC,,, | Performed by: UROLOGY

## 2017-02-20 PROCEDURE — 3077F SYST BP >= 140 MM HG: CPT | Mod: S$GLB,,, | Performed by: UROLOGY

## 2017-02-20 PROCEDURE — 1159F MED LIST DOCD IN RCRD: CPT | Mod: S$GLB,,, | Performed by: UROLOGY

## 2017-02-20 PROCEDURE — 3078F DIAST BP <80 MM HG: CPT | Mod: S$GLB,,, | Performed by: UROLOGY

## 2017-02-20 PROCEDURE — 99215 OFFICE O/P EST HI 40 MIN: CPT | Mod: S$GLB,,, | Performed by: UROLOGY

## 2017-02-21 ENCOUNTER — TELEPHONE (OUTPATIENT)
Dept: UROLOGY | Facility: CLINIC | Age: 77
End: 2017-02-21

## 2017-02-21 DIAGNOSIS — E27.8 LEFT ADRENAL MASS: ICD-10-CM

## 2017-02-21 DIAGNOSIS — I82.3 THROMBOSIS OF LEFT RENAL VEIN: Primary | ICD-10-CM

## 2017-02-21 NOTE — PROGRESS NOTES
Subjective:       Patient ID: Ida Santana is a 76 y.o. female.    Chief Complaint: Left Renal Mass (ct results)      HPI: Ida Santana is a 76 y.o. Black or  female who returns today in follow-up for a left adrenal mass and a potential left renal mass.    The mass was found incidentally during a recent hospitalization for pneumonia. During this evaluation, the patient was found to have a left adrenal mass, possible left renal mass, and a left renal vein thrombus. The patient has been discharged from the hospital and is doing well. She is due to finish her antibiotics this week. The patient is taking blood thinners for her left renal vein thrombus.    The mass measures 4.2 cm.    The patient has undergone a functional adrenal evaluation which was negative for a biochemically active adrenal lesion.    The patient denies gross hematuria and/or constitutional symptoms attributable to her recently discovered adrenal mass.    The patient denies a personal and family history of adrenal cortical cancer.    The patient does not have a history of any other cancer.    The patient returns today to review recent testing and discuss further management.    She has no new complaints or changes in her health since her last visit.    Review of patient's allergies indicates:   Allergen Reactions    Adhesive tape-silicones      Other reaction(s): pulling skin off    Atorvastatin      Other reaction(s): Muscle pain    Pravachol  [pravastatin] Rash       Current Outpatient Prescriptions   Medication Sig Dispense Refill    carvedilol (COREG) 25 MG tablet Take 1 tablet (25 mg total) by mouth 2 (two) times daily with meals. 60 tablet 3    dextrose 5 % SolP 50 mL with ceftaroline fosamil 400 mg SolR 600 mg Inject 600 mg into the vein every 8 (eight) hours. 69 each 0    flurandrenolide (CORDRAN) 0.05 % lotion Apply to  scalp one to two times daily as needed for dryness      ketoconazole (NIZORAL) 2 % shampoo USE  EVERY OTHER DAY LET SIT ON SCALP FOR FIVE MINUTES BEFORE RINSING 120 mL 6    latanoprost 0.005 % ophthalmic solution   1    lisinopril (PRINIVIL,ZESTRIL) 20 MG tablet Take 1 tablet (20 mg total) by mouth once daily. 30 tablet 3    MULTIVITAMIN WITH MINERALS (ONE-A-DAY 50 PLUS) Tab Take 1 tablet by mouth once daily.       om-3-epa-dha-fish oil-flax-E (THERA TEARS NUTRITION) [phone] jk-im-ld-unit Cap Take 3 capsules by mouth every morning. Over-the-counter supplement      verapamil (CALAN-SR) 240 MG CR tablet take 1 tablet by mouth twice a day 30 tablet 12     No current facility-administered medications for this visit.        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    Osteopenia     Potassium depletion 1998       Past Surgical History   Procedure Laterality Date    Breast biopsy  2001     left breast DCIS    Breast surgery  9/2001     left lumpectomy with SLN bx    Colonoscopy      Colonoscopy N/A 10/31/2016     Procedure: COLONOSCOPY;  Surgeon: YAMILETH Richards MD;  Location: [facility] ([address] [place] [address]);  Service: Endoscopy;  Laterality: N/A;       Family History   Problem Relation Age of Onset    Cancer Mother      breast    Breast cancer Mother      40s and again in 50s (bilateral)    Breast cancer Other     Heart disease Father      bypass    Cancer Father      asbestos    Stroke Brother     Ovarian cancer Neg Hx     Amblyopia Neg Hx     Blindness Neg Hx     Cataracts Neg Hx     Diabetes Neg Hx     Glaucoma Neg Hx     Hypertension Neg Hx     Macular degeneration Neg Hx     Retinal detachment Neg Hx     Strabismus Neg Hx     Thyroid disease Neg Hx     Colon cancer Neg Hx        Review of Systems    Review of Systems   Constitutional: Negative for  fever, chills, activity change, appetite change and unexpected weight change.   HENT: Negative for congestion, nosebleeds, sneezing, sore throat and trouble swallowing.    Eyes: Negative for pain, discharge, redness and visual disturbance.   Respiratory: Negative for cough, choking, chest tightness and shortness of breath.    Cardiovascular: Negative for chest pain, palpitations and leg swelling.   Gastrointestinal: Negative for nausea, vomiting, abdominal pain, diarrhea, blood in stool, abdominal distention and anal bleeding.  Genitourinary: As documented per HPI   Endocrine: Negative for cold intolerance, heat intolerance, polydipsia, polyphagia and polyuria.   Musculoskeletal: Negative for myalgias, gait problem, neck pain and neck stiffness.   Skin: Negative for color change, pallor, rash and wound.   Allergic/Immunologic: Negative for immunocompromised state.   Neurological: Negative for seizures, facial asymmetry, speech difficulty, weakness and light-headedness.   Hematological: Negative for adenopathy. Does not bruise/bleed easily.   Psychiatric/Behavioral: Negative for hallucinations, behavioral problems, self-injury and agitation. The patient is not hyperactive.      All other systems were reviewed and were negative.    Objective:     Vitals:    02/20/17 0802   BP: (!) 148/63   Pulse: 68   Resp: 16     Physical Exam   Vitals reviewed.  Constitutional: She is oriented to person, place, and time. She appears well-developed and well-nourished. No distress.   HENT:   Head: Normocephalic and atraumatic.   Right Ear: External ear normal.   Left Ear: External ear normal.   Nose: Nose normal.   Eyes: EOM are normal. Pupils are equal, round, and reactive to light. Right eye exhibits no discharge. Left eye exhibits no discharge.   Neck: Normal range of motion. Neck supple. No tracheal deviation present. No thyroid enlargement or tenderness.  Cardiovascular: Normal heart sounds and intact distal pulses. No signs of  peripheral edema.   Pulmonary/Chest: Effort normal and breath sounds normal. No respiratory distress. She has no wheezes.   Abdominal: Soft. Bowel sounds are normal. She exhibits no distension. There is no tenderness. There is no rebound. No hepatic or splenic enlargement or tenderness.  Musculoskeletal: Normal range of motion. She exhibits no edema.   Neurological: She is alert and oriented to person, place, and time. Coordination normal.   Skin: Skin is warm and dry. She is not diaphoretic.   Lymphatic: No palpable lymph nodes.  Psychiatric: She has a normal mood and affect. Her behavior is normal. Judgment and thought content normal.     Lab Results   Component Value Date    CREATININE 0.9 02/09/2017     Lab Results   Component Value Date    EGFRNONAA >60.0 02/09/2017     Lab Results   Component Value Date    ESTGFRAFRICA >60.0 02/09/2017     I personally reviewed all the patient's imaging studies.    CT abdomen/pelvis with contrast (1/18/17): Left renal vein thrombosis with associated left perinephric fat stranding and perinephric fluid with diffuse enlargement of the left kidney. There is a 1.7 cm hypodense lesion in the inferior pole of the left kidney which could represent renal infarction but underlying neoplastic lesion not excluded. Large soft tissue density lesion in the left upper abdomen, anterior to the left kidney and superior to the left renal vein.  This is of uncertain etiology and could represent enlarged lymph node and/or metastatic lesion.    CT abdomen without and with contrast (2/15/2017): Large heterogenous LEFT adrenal mass. Interval improvement of bibasilar consolidative changes with some residual nodular opacification that is presumably inflammatory in nature when compared to prior CT examination 01/18/2017. Interval near-complete resolution of bilateral pleural effusions. 1.7 cm nonenhancing focal hypodensity in the LEFT kidney with thick capsule not generally seen with simple cysts.   Consider further evaluation with nonemergent ultrasound.  No enhancement of this nodule is noted. Interval decrease in LEFT perinephric stranding.  Interval resolution of LEFT renal vein thrombosis.    Assessment:       1. Left renal mass    2. Left adrenal mass    3. Thrombosis of left renal vein        Plan:     Ida was seen today for left renal mass.    Diagnoses and all orders for this visit:    Left renal mass    Left adrenal mass    Thrombosis of left renal vein    The patient's repeat findings are interesting, however they do not give us complete clarity.    It is clear her adrenal mass is not functional, however it does need resection. Her left kidney does not appear to have an underlying mass, which suggests that the vein thrombosis is due to the adrenal lesion. This cause and effect is not common, however it is not unheard of. Furthermore, the thrombosis likely has resolved due to the patient being on anti-coagulation.    Today again, I spent a long time discussing with the patient and her family the nature of adrenal masses in general as well as her situation specifically.      When a patient has an adrenal mass, we first look to determine if the mass is functional or non-functional. Specifically, we want to ascertain if the mass secretes excess cortisol, excess catecholamines (pheochromocytoma), or excess aldosterone (aldosteronoma). If the mass is not functional and has the imaging characteristics of an adenoma or atypical adenoma, we usually do not treat these masses unless they are greater than 4 cm. When the mass is > 4 cm (as hers is), there is an increased risk of it representing an adrenal cortical carcinoma, and, thus, we recommend excision (an adrenalectomy).    At this point, I have recommended an open left adrenalectomy via a subcostal incision in addition to a renal exploration and possible thrombectomy. Intraoperative findings will dictate whether we need to remove the patient's left  kidney. I spent a long time detailing my plan to the patient and her family and answered all their questions.    At the conclusion of our visit, the patient was agreeable to proceeding as outlined above.    I encouraged her and/or any of her family members to call and/or email me with questions/concerns.    I spent 40 minutes with the patient of which more than half was spent in coordinating the patient's care as well as in direct consultation with the patient in regards to our treatment and plan.

## 2017-02-21 NOTE — PATIENT INSTRUCTIONS
Chronic Kidney Disease (CKD)    The role of the kidneys is to remove waste products and extra water from the blood.  When the kidneys do not work as they should, waste products begin to build up in the blood. This is called chronic kidney disease (CKD). CKD means that you have kidney damage or a decrease in kidney function lasting at least 3 months. CKD allows extra water, waste, and toxins to build up in the body. This can eventually become life-threatening. You might need dialysis or a kidney transplant to stay alive. This most severe form is called end stage renal disease.  Diabetes is the leading causes of chronic renal failure. Other causes include high blood pressure, hardening of the arteries (atherosclerosis), lupus, inflammation of the blood vessels (vasculitis), and past viral or bacterial infections. Certain over-the-counter pain medicines can cause renal failure when taken often over a long period of time. These include aspirin, ibuprofen, and related anti-inflammatory medicines called NSAIDs (nonsteroidal anti-inflammatory drugs).  Home care  The following guidelines will help you care for yourself at home:  · If you have diabetes, talk with your healthcare provider about keeping your blood sugar under control. Ask if you need to make and changes to your diet, lifestyle, or medicines.  · If you have high blood pressure:  ¨ Take prescribed medicine to lower your blood pressure to the recommended goal of less than 130/80.  ¨ Start a regular exercise program that you enjoy. Check with your healthcare provider to be sure your planned exercise program is right for you.  ¨ Eat less salt (sodium). Your healthcare provider can tell you how much salt per day is safe for you.  · If you are overweight, talk with your healthcare provider about a weight loss plan.  · If you smoke, you must quit. Smoking makes kidney disease worse. Talk with your healthcare provider about ways to help you quit.  For more  information, visit the following links:  ¨ www.smokefree.gov/sites/default/files/pdf/clearing-the-air-accessible.pdf  ¨ www.smokefree.gov  ¨ www.cancer.org/healthy/stayawayfromtobacco/guidetoquittingsmoking/  · Most people with CKD need to follow a special diet.  Be sure you understand yours. In general, you will need to limit protein, salt, potassium, and phosphorus. You also need to limit how much fluid you drink.   · CKD is a risk factor for heart disease. Talk with your healthcare provider about any other risk factors you might have and what you can do to lessen them.  · Talk with your healthcare provider about any medicines you are taking to find out if they need to be reduced or stopped.  · Don't use the following over-the-counter medicines, or consult your healthcare provider before using:  ¨ Aspirin and NSAIDs such as ibuprofen or naproxen. Using acetaminophen for fever or pain is OK.  ¨ Laxatives and antacids containing magnesium or aluminum  ¨ Fleet or phospho soda enemas containing phosphorus  ¨ Certain stomach acid-blocking medicine such as cimetidine or ranitidine   ¨ Decongestants containing pseudoephedrine   ¨ Herbal supplements  Follow-up care  Follow up with your healthcare provider, or as advised. Contact one of the following for more information:  · American Association of Kidney Patients 165-169-9873 www.aakp.org  · National Kidney Foundation 155-695-4903 www.kidney.org  · American Kidney Fund 365-487-5569 www.kidneyfund.org  · National Kidney Disease Education Program 866-4KIDNEY www.nkdep.nih.gov  If an X-ray, ECG (cardiogram), or other diagnostic test was taken, you will be told of any new findings that may affect your care.  Call 911  Call 911 if you have any of the following:  · Severe weakness, dizziness, fainting, drowsiness, or confusion  · Chest pain or shortness of breath  · Heart beating fast, slow, or irregularly  When to seek medical advice  Call your healthcare provider right away  if any of these occur:  · Nausea or vomiting  · Fever of 100.4°F (38°C) or higher, or as directed by your healthcare provider  · Unexpected weight gain or swelling in the legs, ankles, or around the eyes  · Decrease or absent urine output  Date Last Reviewed: 9/1/2016  © 6952-1204 [employer]. [address] [place] [address], [place] [address]. All rights reserved. This information is not intended as a substitute for professional medical care. Always follow your healthcare professional's instructions.

## 2017-03-06 ENCOUNTER — ANESTHESIA EVENT (OUTPATIENT)
Dept: SURGERY | Facility: HOSPITAL | Age: 77
DRG: 615 | End: 2017-03-06
Payer: COMMERCIAL

## 2017-03-07 ENCOUNTER — TELEPHONE (OUTPATIENT)
Dept: INTERNAL MEDICINE | Facility: CLINIC | Age: 77
End: 2017-03-07

## 2017-03-07 DIAGNOSIS — Z01.818 PREOP TESTING: Primary | ICD-10-CM

## 2017-03-07 NOTE — TELEPHONE ENCOUNTER
----- Message from Fidelia Sarmiento RN sent at 3/7/2017  1:07 PM CST -----  Regarding: Xeralto orders preop  Patient is scheduled for surgery on 3/15/17 for an Adrenalectomy/open with renal vein thrombectomy, Nephrectomy-Radical with Dr Arellano. She was recently in hospital and was put on Xarelto.  Please advise as to how many days prior to surgery should patient stop Xarelto. Your help is greatly appreciated.  Thank You,   SEYMOUR Mistry  Pre-Op Center-Anesthesia  [id number]

## 2017-03-07 NOTE — TELEPHONE ENCOUNTER
----- Message from Fidelia Sarmiento RN sent at 3/7/2017  1:18 PM CST -----  Regarding: Preop Clearance/Optimization  Hi. Also, patient will need a Medical Clearance/Optimization prior to surgery on 3/15/17. Please advise if she may be cleared per chart.  Thank you so much,  SEYMOUR Mistry  Pre-Op Center-Anesthesia  [id number]

## 2017-03-07 NOTE — TELEPHONE ENCOUNTER
----- Message from Fidelia Sarmiento RN sent at 3/7/2017  1:18 PM CST -----  Regarding: Preop Clearance/Optimization  Hi. Also, patient will need a Medical Clearance/Optimization prior to surgery on 3/15/17. Please advise if she may be cleared per chart.  Thank you so much,  SEYMOUR Mistry  Pre-Op Center-Anesthesia  [id number]

## 2017-03-07 NOTE — TELEPHONE ENCOUNTER
----- Message from Fidelia Sarmiento RN sent at 3/7/2017  1:07 PM CST -----  Regarding: Xeralto orders preop  Patient is scheduled for surgery on 3/15/17 for an Adrenalectomy/open with renal vein thrombectomy, Nephrectomy-Radical with Dr Arellano. She was recently in hospital and was put on Xarelto.  Please advise as to how many days prior to surgery should patient stop Xarelto. Your help is greatly appreciated.  Thank You,   SEYMOUR Mistry  Pre-Op Center-Anesthesia  [id number]

## 2017-03-07 NOTE — PRE ADMISSION SCREENING
Anesthesia Assessment: Preoperative EQUATION    Planned Procedure: Procedure(s) (LRB):  ADRENALECTOMY/ open with renal vein thrombectomy (Left)  NEPHRECTOMY-RADICAL (Left)  Requested Anesthesia Type:General  Surgeon: Porfirio Arellano MD  Service: Urology  Known or anticipated Date of Surgery:3/15/2017    Surgeon notes: reviewed    Electronic QUestionnaire Assessment completed via nurse interview with patient.        Ida Santana [[id number]] - 76 y.o. Female        Providers Outside of [facility]      No data to display       Surgical Risk Level      Surgical Risk Level:  4           caRDScore (Clinical Anesthesia Rapid Decision Score)        Moderate  Total Score: 16      16 Sum of Clinical Scores       caRDScores (Grouped)      caRDScore - Ane:  0                caRDScore - CVD:  1                caRDScore - Pul:  2                caRDScore - Met:  3                caRDScore - Phy:  10           caRDScore Items           Pre-admit from 3/15/2017 in [facility]     Anesthesia      CVD      Activity similar to best ability for maximal activity or exercise  METS 4     Diagnosed with high blood pressure  Yes     Typical BP runs <150/90  Yes     Pulmonary      Has had pneumonia in the last 3 months  Yes [1/12/17]     Metabolic      Adrenal disease (Specify)  Yes [L Adrenal mass]     Physiologic      Has been hospitalized within last 3 months  Yes     Has acute infection now / taking antibiotics  No [Pneumonia/MRSA- Last dose IV antibiotics 3/6/17]     Has had bloodstream, bodily infection (sepsis) in last 3 mos  Yes     Obesity Status  Not Obese (BMI <30)     Hx of abnormal blood clot  Yes     Blood Thinner:  Rivaroxaban (Xarelto)  Yes     Has glaucoma  Yes [5/2013-surgery]       Flags      Red Flag Score:  4                Yellow Flag Score:  3           Red Flags           Pre-admit from 3/15/2017 in [facility]     Has been hospitalized within last 3 months  Yes     Has had  pneumonia in the last 3 months  Yes [1/12/17]     History of antibiotic resistant organism  Yes     Obesity Status  Not Obese (BMI <30)     Blood Thinner:  Rivaroxaban (Xarelto)  Yes       Yellow Flags           Pre-admit from 3/15/2017 in [facility]     Has acute infection now / taking antibiotics  No [Pneumonia/MRSA- Last dose IV antibiotics 3/6/17]     Has had bloodstream, bodily infection (sepsis) in last 3 mos  Yes     Takes herbal medications or vitamin supplements  Yes     Obesity Status  Not Obese (BMI <30)     Hx of abnormal blood clot  Yes     Has pain  No       PONV Risk Score (assumes periop narcotic use = +1, Max=4)      PONV Risk Score:  3           PONV Risk Factors  Total Score: 2      1 Female     1 Non-Smoker at present       Sleep Apnea  Total Score: 0        LONA STOP-Bang Risk Factors (Max=8)  Total Score: 2      1 Takes medication for high blood pressure     1 Age >50       LONA Risk Level - 1 (Low), 2 (Moderate), 3 (High)      LONA Risk Level:  1           RCRI (Revised Cardiac Risk Indices of ACC/AHA guidelines, Max=6)  Total Score: 0        CAD Risk Factors  Total Score: 2      1 Female. Age >55     1 Diagnosed with high blood pressure       CHADS Score if applicable (history of atrial fib/flutter, Max=6)  Total Score: 1      1 Diagnosed with high blood pressure       Maximal Exercise Capacity           Pre-admit from 3/15/2017 in [facility]     Maximal Exercise Capacity  METS 4       Summary of Dependence  Total Score: 1      1 Is totally independent of others for activities of daily living       Phone Fraility Score (Max = 17)  Total Score: 2      1 Has had 1 hospitalization in last year     1 Uses 5 or more meds on reg basis       Pain Factors           Pre-admit from 3/15/2017 in [facility]     Has pain  No       Risk Triggers (Evidence-Based Risk Triggers)        Pulmonary Risk Triggers  Total Score: 2      1 Age > or = 60     1  Has had pneumonia in the last 3 months       Renal Risk Triggers  Total Score: 1      1 Diagnosed with high blood pressure       Delirium Risk Triggers  Total Score: 0        Urologic Risk Triggers  Total Score: 0        Logistics        Pre-op Clinic Logistics  Total Score: 10      1 Has had 1 hospitalization in last year     1 Has had bloodstream, bodily infection (sepsis) in last 3 mos     1 Has Hx of MRSA     2 Takes herbal medications or vitamin supplements     1 Has had anesthesia, either as adult or as a child     3 Blood Thinner:  Rivaroxaban (Xarelto)     1 Takes medication for high blood pressure       DOSC Logistics  Total Score: 4      2 Has Hx of MRSA     2 Blood Thinner:  Rivaroxaban (Xarelto)       Discharge Logistics  Total Score: 0        Discharge Planning           Pre-admit from 3/15/2017 in [facility]     Discharge Planning      Discharge plans  Home alone     Will assist patient 24/7, if needed  -- [brother and sister-in-law]       Anes & Int Med <For office use only>      Total Score: 19        Surgical Risk Level Assessment                 Triage considerations:     The patient has no apparent active cardiac condition (No unstable coronary Syndrome such as severe unstable angina or recent [<1 month] myocardial infarction, decompensated CHF, severe valvular   disease or significant arrhythmia)    Previous anesthesia records:[facility]  1/17/17    Last PCP note: 6-12 months ago , within [facility]   9/26/16  Subspecialty notes: Infectious Disease  2/13/17    Other important co-morbidities: HTN, Glaucoma, S/P Sepsis/MRSA/Staph/Pneumonia-1/2017,  S/P Breast Ca-2001     Tests already available:  Available tests,  within 3 months , within [facility] .  2/2017 CBC, BMP, Sed Rate, C-RP, Mag, PO4, CT Abd.    1/2017 CMP, 2-D Echo, MIS, EKG, CXR, MRI Spine, US Kidney, MRI Brain, CT Chest/ Abd/Pelvis, Blood Culture            Instructions given. (See in Nurse's note)    Optimization:  Anesthesia  Preop Clinic Assessment  Indicated-Indicated per cards score    Medical Opinion Indicated-Indicated per cards score         Plan:    Testing:  T&S   Pre-anesthesia  Visit  3/10       Visit focus: concerns in complex and/or prolonged anesthesia, airway concerns, S/P Pneumonia, Sepsis, MRSA/Staph     Consultation:Patient's PCP for a statement of optimization  3/14 PCP appointment for Optimization.    MD Fidelia Oliver RN;  3/8/2017      I believe she was placed on xarelto due to the clot in her renal vein which is likely tumor thrombus.     It should be stopped at the appropriate time prior to her surgery on 3/15.     Talat          Patient  has previously scheduled Medical Appointment:  3/13  Ophthalmology  3/14 PCP Dr Murray    Navigation: Tests Scheduled. T&S  3/10                        Results will be tracked by Preop Clinic.    Fidelia Sarmiento RN

## 2017-03-07 NOTE — ANESTHESIA PREPROCEDURE EVALUATION
Anesthesia Assessment: Preoperative EQUATION     Planned Procedure: Procedure(s) (LRB):  ADRENALECTOMY/ open with renal vein thrombectomy (Left)  NEPHRECTOMY-RADICAL (Left)  Requested Anesthesia Type:General  Surgeon: Porfirio Arellano MD  Service: Urology  Known or anticipated Date of Surgery:3/15/2017     Surgeon notes: reviewed     Electronic QUestionnaire Assessment completed via nurse interview with patient.                    Ida Santana [[id number]] - 76 y.o. Female         Providers Outside of [facility]       No data to display        Surgical Risk Level       Surgical Risk Level:   4              caRDScore (Clinical Anesthesia Rapid Decision Score)         Moderate  Total Score: 16       16 Sum of Clinical Scores        caRDScores (Grouped)       caRDScore - Ane:   0                       caRDScore - CVD:   1                       caRDScore - Pul:   2                       caRDScore - Met:   3                       caRDScore - Phy:   10              caRDScore Items             Pre-admit from 3/15/2017 in [facility]      Anesthesia        CVD        Activity similar to best ability for maximal activity or exercise   METS 4      Diagnosed with high blood pressure   Yes      Typical BP runs <150/90   Yes      Pulmonary        Has had pneumonia in the last 3 months   Yes [1/12/17]      Metabolic        Adrenal disease (Specify)   Yes [L Adrenal mass]      Physiologic        Has been hospitalized within last 3 months   Yes      Has acute infection now / taking antibiotics   No [Pneumonia/MRSA- Last dose IV antibiotics 3/6/17]      Has had bloodstream, bodily infection (sepsis) in last 3 mos   Yes      Obesity Status   Not Obese (BMI <30)      Hx of abnormal blood clot   Yes      Blood Thinner:  Rivaroxaban (Xarelto)   Yes      Has glaucoma   Yes [5/2013-surgery]        Flags       Red Flag Score:   4                       Yellow Flag Score:   3              Red Flags             Pre-admit  from 3/15/2017 in [facility]      Has been hospitalized within last 3 months   Yes      Has had pneumonia in the last 3 months   Yes [1/12/17]      History of antibiotic resistant organism   Yes      Obesity Status   Not Obese (BMI <30)      Blood Thinner:  Rivaroxaban (Xarelto)   Yes        Yellow Flags             Pre-admit from 3/15/2017 in [facility]      Has acute infection now / taking antibiotics   No [Pneumonia/MRSA- Last dose IV antibiotics 3/6/17]      Has had bloodstream, bodily infection (sepsis) in last 3 mos   Yes      Takes herbal medications or vitamin supplements   Yes      Obesity Status   Not Obese (BMI <30)      Hx of abnormal blood clot   Yes      Has pain   No        PONV Risk Score (assumes periop narcotic use = +1, Max=4)       PONV Risk Score:   3              PONV Risk Factors  Total Score: 2       1 Female      1 Non-Smoker at present        Sleep Apnea  Total Score: 0         LONA STOP-Bang Risk Factors (Max=8)  Total Score: 2       1 Takes medication for high blood pressure      1 Age >50        LONA Risk Level - 1 (Low), 2 (Moderate), 3 (High)       LONA Risk Level:   1              RCRI (Revised Cardiac Risk Indices of ACC/AHA guidelines, Max=6)  Total Score: 0         CAD Risk Factors  Total Score: 2       1 Female. Age >55      1 Diagnosed with high blood pressure        CHADS Score if applicable (history of atrial fib/flutter, Max=6)  Total Score: 1       1 Diagnosed with high blood pressure        Maximal Exercise Capacity             Pre-admit from 3/15/2017 in [facility]      Maximal Exercise Capacity   METS 4        Summary of Dependence  Total Score: 1       1 Is totally independent of others for activities of daily living        Phone Fraility Score (Max = 17)  Total Score: 2       1 Has had 1 hospitalization in last year      1 Uses 5 or more meds on reg basis        Pain Factors             Pre-admit from 3/15/2017 in  [facility]      Has pain   No        Risk Triggers (Evidence-Based Risk Triggers)         Pulmonary Risk Triggers  Total Score: 2       1 Age > or = 60      1 Has had pneumonia in the last 3 months        Renal Risk Triggers  Total Score: 1       1 Diagnosed with high blood pressure        Delirium Risk Triggers  Total Score: 0         Urologic Risk Triggers  Total Score: 0         Logistics         Pre-op Clinic Logistics  Total Score: 10       1 Has had 1 hospitalization in last year      1 Has had bloodstream, bodily infection (sepsis) in last 3 mos      1 Has Hx of MRSA      2 Takes herbal medications or vitamin supplements      1 Has had anesthesia, either as adult or as a child      3 Blood Thinner:  Rivaroxaban (Xarelto)      1 Takes medication for high blood pressure        DOSC Logistics  Total Score: 4       2 Has Hx of MRSA      2 Blood Thinner:  Rivaroxaban (Xarelto)        Discharge Logistics  Total Score: 0         Discharge Planning             Pre-admit from 3/15/2017 in [facility]      Discharge Planning        Discharge plans   Home alone      Will assist patient 24/7, if needed   -- [brother and sister-in-law]        Anes & Int Med <For office use only>       Total Score: 19          Surgical Risk Level Assessment                       Triage considerations:      The patient has no apparent active cardiac condition (No unstable coronary Syndrome such as severe unstable angina or recent [<1 month] myocardial infarction, decompensated CHF, severe valvular disease or significant arrhythmia)     Previous anesthesia records:[facility] 1/17/17     Last PCP note: 6-12 months ago , within [facility]  9/26/16  Subspecialty notes: Infectious Disease 2/13/17     Other important co-morbidities: HTN, Glaucoma, S/P Sepsis/MRSA/Staph/Pneumonia-1/2017, S/P Breast Ca-2001      Tests already available:  Available tests, within 3 months , within [facility] . 2/2017 CBC, BMP, Sed Rate,  C-RP, Mag, PO4, CT Abd. 1/2017 CMP, 2-D Echo, MIS, EKG, CXR, MRI Spine, US Kidney, MRI Brain, CT Chest/ Abd/Pelvis, Blood Culture      Instructions given. (See in Nurse's note)     Optimization:  Anesthesia Preop Clinic Assessment Indicated-Indicated per cards score  Medical Opinion Indicated-Indicated per cards score          Plan:   Testing: T&S  Pre-anesthesia Visit 3/10   Visit focus: concerns in complex and/or prolonged anesthesia, airway concerns, S/P Pneumonia, Sepsis, MRSA/Staph  Consultation:Patient's PCP for a statement of optimization  Pending  Patient has previously scheduled Medical Appointment: 3/13 Ophthalmology     Navigation: Tests Scheduled. T&S 3/10  Results will be tracked by Preop Clinic.     Fidelia Sarmiento RN                                                                                                                 03/07/2017  Ida Santana is a 76 y.o., female.

## 2017-03-09 NOTE — ANESTHESIA PREPROCEDURE EVALUATION
Anesthesia Assessment: Preoperative EQUATION     Planned Procedure: Procedure(s) (LRB):  ADRENALECTOMY/ open with renal vein thrombectomy (Left)  NEPHRECTOMY-RADICAL (Left)  Requested Anesthesia Type:General  Surgeon: Porfirio Arellano MD  Service: Urology  Known or anticipated Date of Surgery:3/15/2017     Surgeon notes: reviewed     Electronic QUestionnaire Assessment completed via nurse interview with patient.                    Ida Santana [[id number]] - 76 y.o. Female         Providers Outside of [facility]       No data to display        Surgical Risk Level       Surgical Risk Level:   4              caRDScore (Clinical Anesthesia Rapid Decision Score)         Moderate  Total Score: 16       16 Sum of Clinical Scores        caRDScores (Grouped)       caRDScore - Ane:   0                       caRDScore - CVD:   1                       caRDScore - Pul:   2                       caRDScore - Met:   3                       caRDScore - Phy:   10              caRDScore Items             Pre-admit from 3/15/2017 in [facility]      Anesthesia        CVD        Activity similar to best ability for maximal activity or exercise   METS 4      Diagnosed with high blood pressure   Yes      Typical BP runs <150/90   Yes      Pulmonary        Has had pneumonia in the last 3 months   Yes [1/12/17]      Metabolic        Adrenal disease (Specify)   Yes [L Adrenal mass]      Physiologic        Has been hospitalized within last 3 months   Yes      Has acute infection now / taking antibiotics   No [Pneumonia/MRSA- Last dose IV antibiotics 3/6/17]      Has had bloodstream, bodily infection (sepsis) in last 3 mos   Yes      Obesity Status   Not Obese (BMI <30)      Hx of abnormal blood clot   Yes      Blood Thinner:  Rivaroxaban (Xarelto)   Yes      Has glaucoma   Yes [5/2013-surgery]        Flags       Red Flag Score:   4                       Yellow Flag Score:   3              Red Flags             Pre-admit  from 3/15/2017 in [facility]      Has been hospitalized within last 3 months   Yes      Has had pneumonia in the last 3 months   Yes [1/12/17]      History of antibiotic resistant organism   Yes      Obesity Status   Not Obese (BMI <30)      Blood Thinner:  Rivaroxaban (Xarelto)   Yes        Yellow Flags             Pre-admit from 3/15/2017 in [facility]      Has acute infection now / taking antibiotics   No [Pneumonia/MRSA- Last dose IV antibiotics 3/6/17]      Has had bloodstream, bodily infection (sepsis) in last 3 mos   Yes      Takes herbal medications or vitamin supplements   Yes      Obesity Status   Not Obese (BMI <30)      Hx of abnormal blood clot   Yes      Has pain   No        PONV Risk Score (assumes periop narcotic use = +1, Max=4)       PONV Risk Score:   3              PONV Risk Factors  Total Score: 2       1 Female      1 Non-Smoker at present        Sleep Apnea  Total Score: 0         LONA STOP-Bang Risk Factors (Max=8)  Total Score: 2       1 Takes medication for high blood pressure      1 Age >50        LONA Risk Level - 1 (Low), 2 (Moderate), 3 (High)       LONA Risk Level:   1              RCRI (Revised Cardiac Risk Indices of ACC/AHA guidelines, Max=6)  Total Score: 0         CAD Risk Factors  Total Score: 2       1 Female. Age >55      1 Diagnosed with high blood pressure        CHADS Score if applicable (history of atrial fib/flutter, Max=6)  Total Score: 1       1 Diagnosed with high blood pressure        Maximal Exercise Capacity             Pre-admit from 3/15/2017 in [facility]      Maximal Exercise Capacity   METS 4        Summary of Dependence  Total Score: 1       1 Is totally independent of others for activities of daily living        Phone Fraility Score (Max = 17)  Total Score: 2       1 Has had 1 hospitalization in last year      1 Uses 5 or more meds on reg basis        Pain Factors             Pre-admit from 3/15/2017 in  [facility]      Has pain   No        Risk Triggers (Evidence-Based Risk Triggers)         Pulmonary Risk Triggers  Total Score: 2       1 Age > or = 60      1 Has had pneumonia in the last 3 months        Renal Risk Triggers  Total Score: 1       1 Diagnosed with high blood pressure        Delirium Risk Triggers  Total Score: 0         Urologic Risk Triggers  Total Score: 0         Logistics         Pre-op Clinic Logistics  Total Score: 10       1 Has had 1 hospitalization in last year      1 Has had bloodstream, bodily infection (sepsis) in last 3 mos      1 Has Hx of MRSA      2 Takes herbal medications or vitamin supplements      1 Has had anesthesia, either as adult or as a child      3 Blood Thinner:  Rivaroxaban (Xarelto)      1 Takes medication for high blood pressure        DOSC Logistics  Total Score: 4       2 Has Hx of MRSA      2 Blood Thinner:  Rivaroxaban (Xarelto)        Discharge Logistics  Total Score: 0         Discharge Planning             Pre-admit from 3/15/2017 in [facility]      Discharge Planning        Discharge plans   Home alone      Will assist patient 24/7, if needed   -- [brother and sister-in-law]        Anes & Int Med <For office use only>       Total Score: 19          Surgical Risk Level Assessment                       Triage considerations:      The patient has no apparent active cardiac condition (No unstable coronary Syndrome such as severe unstable angina or recent [<1 month] myocardial infarction, decompensated CHF, severe valvular disease or significant arrhythmia)     Previous anesthesia records:[facility] 1/17/17     Last PCP note: 6-12 months ago , within [facility]  9/26/16  Subspecialty notes: Infectious Disease 2/13/17     Other important co-morbidities: HTN, Glaucoma, S/P Sepsis/MRSA/Staph/Pneumonia-1/2017, S/P Breast Ca-2001      Tests already available:  Available tests, within 3 months , within [facility] . 2/2017 CBC, BMP, Sed Rate,  C-RP, Mag, PO4, CT Abd. 1/2017 CMP, 2-D Echo, MIS, EKG, CXR, MRI Spine, US Kidney, MRI Brain, CT Chest/ Abd/Pelvis, Blood Culture      Instructions given. (See in Nurse's note)     Optimization:  Anesthesia Preop Clinic Assessment Indicated-Indicated per cards score  Medical Opinion Indicated-Indicated per cards score      Plan:   Testing: T&S  Pre-anesthesia Visit 3/10   Visit focus: concerns in complex and/or prolonged anesthesia, airway concerns, S/P Pneumonia, Sepsis, MRSA/Staph  Consultation:Patient's PCP for a statement of optimization  3/14 PCP appointment for Optimization.          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     It should be stopped at the appropriate time prior to her surgery on 3/15.     Talat          Patient has previously scheduled Medical Appointment: 3/13 Ophthalmology  3/14 PCP Dr Murray     Navigation: Tests Scheduled. T&S 3/10  Results will be tracked by Preop Clinic.     Fidelia Sarmiento RN                                                                                                                03/09/2017  Ida Santana is a 76 y.o., female.    [facility] Anesthesia Evaluation         Review of Systems  Anesthesia Hx:  No problems with previous Anesthesia History of prior surgery of interest to airway management or planning: Previous anesthesia: General 3/15/17: Vitrectomy with general anesthesia.  Procedure performed at an [facility] Facility. Denies Family Hx of Anesthesia complications.   Denies Personal Hx of Anesthesia complications.   Social:  Non-Smoker  Patient's occupation is Retired . Denies Tobacco Use. Denies Alcohol Use.   Hematology/Oncology:        Hematology Comments: S/P Sepsis/MRSA/Staph/pneumonia: 1/2017 --  Cancer in past history: s/p radiation therapy   Oncology Comments: Hx breast cancer: 6 weeks of radiation     EENT/Dental:   Eyes:  Eye Disease: Glaucoma:   Denies Jaw Problems   Cardiovascular:   Exercise tolerance: good Hypertension Denies CAD.     Denies Angina.  Functional Capacity good / => 4 METS, goes to grocery store/walks around [facility]: denies CP/SOB  Denies Coronary Artery Disease.  Hypertension , Recent typical clinic B/P of 121/68 @ POC visit    Pulmonary:   Denies Asthma.  Denies Recent URI.  Denies Sleep Apnea.  Denies Asthma.  Denies Chronic Obstructive Pulmonary Disease (COPD).  Possible Obstructive Sleep Apnea , (STOP/BANG) Symptoms S - Snoring (loud), P - Pressure being treated for high BP and A - Age > 50  Pulmonary Infection:  Pneumonia.    Renal/:  Renal/ Normal  Thrombosis of left renal vein  Diastolic dysfunction Neoplasm/Tumor (left renal mass).    Hepatic/GI:   Denies PUD. Denies Hiatal Hernia.  Denies GERD. Denies Liver Disease.  Denies Hepatitis.  Denies Esophageal / Stomach Disorders  Denies Liver Disease    Musculoskeletal:  Bone Disorders: Osteopenia    Neurological:   Denies CVA. Denies Seizures.  Denies Seizure Disorder  Denies CVA - Cerebrovasular Accident  Denies TIA - Transient Ischemic Attack    Endocrine:   Denies Diabetes. Denies Hypothyroidism.  Adrenal Disease, Left adrenal mass  Metabolic Disorders, Hyperlipoproteinemia      Physical Exam  General:  Well nourished    Airway/Jaw/Neck:  Airway Findings: Mouth Opening: Normal Tongue: Normal  General Airway Assessment: Adult  Mallampati: I  TM Distance: Normal, at least 6 cm  Jaw/Neck Findings:  Neck ROM: Normal ROM  Neck Findings:     Eyes/Ears/Nose:  EYES/EARS/NOSE FINDINGS: Normal   Dental:  Dental Findings: lower partial dentures   Chest/Lungs:  Chest/Lungs Findings: Clear to auscultation, Normal Respiratory Rate     Heart/Vascular:  Heart Findings: Rate: Normal  Rhythm: Regular Rhythm  Sounds: Normal  Heart Murmur  Systolic Heart Murmur Description: R Upper Sternal Border, Apical  Systolic Heart Murmur Grade: Grade II, Grade I  Vascular  Findings: Normal       Mental Status:  Mental Status Findings:  Cooperative, Alert and Oriented       No time given to stop Xarelto per Dr. Arellano:    Xarelto to be stopped x 3 days per anticoagulation guidelines used for neuraxial procedures- Dr. Leopold aware     Pt. cleared per PCP (Trevor) 3/14/17    Patsy Maher RN      Anesthesia Plan  Type of Anesthesia, risks & benefits discussed:  Anesthesia Type:  general  Patient's Preference: Proceed with anesthesia understanding that the risks are very small but could be serious or life threatening.  Intra-op Monitoring Plan: standard ASA monitors  Intra-op Monitoring Plan Comments:   Post Op Pain Control Plan:   Post Op Pain Control Plan Comments:   Induction:   IV  Beta Blocker:  Patient is on a Beta-Blocker and has received one dose within the past 24 hours (No further documentation required).       Informed Consent: Patient understands risks and agrees with Anesthesia plan.  Questions answered. Anesthesia consent signed with patient.  ASA Score: 2     Day of Surgery Review of History & Physical: I have interviewed and examined the patient. I have reviewed the patient's H&P dated:            Ready For Surgery From Anesthesia Perspective.

## 2017-03-10 ENCOUNTER — HOSPITAL ENCOUNTER (OUTPATIENT)
Dept: PREADMISSION TESTING | Facility: HOSPITAL | Age: 77
Discharge: HOME | End: 2017-03-10
Attending: ANESTHESIOLOGY
Payer: COMMERCIAL

## 2017-03-10 VITALS
DIASTOLIC BLOOD PRESSURE: 68 MMHG | TEMPERATURE: 98 F | WEIGHT: 155 LBS | HEART RATE: 66 BPM | HEIGHT: 67 IN | BODY MASS INDEX: 24.33 KG/M2 | RESPIRATION RATE: 16 BRPM | SYSTOLIC BLOOD PRESSURE: 121 MMHG | OXYGEN SATURATION: 99 %

## 2017-03-10 NOTE — DISCHARGE INSTRUCTIONS
Your surgery has been scheduled for:__________________________________________    You should report to:  ____[facility], located on the [place] side of the first floor of the           [facility] ([phone])  ____[facility], located on the [facility] side of the            Second floor of the [facility] ([phone])  ____3rd Floor [facility] located on the [facility] side of the [facility] [phone]  Please Note   - Tell your doctor if you take Aspirin, products containing Aspirin, herbal medications  or blood thinners, such as Coumadin, Ticlid, or Plavix.  (Consult your provider regarding holding or stopping before surgery).  - Arrange for someone to drive you home following surgery.  You will not be allowed to leave the surgical facility alone or drive yourself home following sedation and anesthesia.  Before Surgery  - Stop taking all herbal medications 14days prior to surgery  - No Motrin/Advil (Ibuprofen) 7 days before surgery  - No Aleve (Naproxen) 7 days before surgery  - Stop Taking Asprin, products containing Asprin _____days before surgery  - Stop taking blood thinners_______days before surgery  - Refrain from drinking alcoholic beverages for 24hours before and after surgery  - Stop or limit smoking _________days before surgery  Night before Surgery  - DO NOT EAT OR DRINK ANYTHING AFTER MIDNIGHT, INCLUDING GUM, HARD CANDY, MINTS, OR CHEWING TOBACCO.  - Take a shower or bath (shower is recommended).  Bathe with Hibiclens soap or an antibacterial soap from the neck down.  If not supplied by your surgeon, hibiclens soap will need to be purchased over the counter in pharmacy.  Rinse soap off thoroughly.  - Shampoo your hair with your regular shampoo  The Day of Surgery  - Take another bath or shower with hibiclens or any antibacterial soap, to reduce the chance of infection.  - Take heart and blood  pressure medications with a small sip of water, as advised by the perioperative team.  - Do not take fluid pills  - You may brush your teeth and rinse your mouth, but do not swall any additional water.   - Do not apply perfumes, powder, body lotions or deodorant on the day of surgery.  - Nail polish should be removed.  - Do not wear makeup or moisturizer  - Wear comfortable clothes, such as a button front shirt and loose fitting pants.  - Leave all jewelry, including body piercings, and valuables at home.    - Bring any devices you will neeed after surgery such as crutches or canes.  - If you have sleep apnea, please bring your CPAP machine  In the event that your physical condition changes including the onset of a cold or respiratory illness, or if you have to delay or cancel your surgery, please notify your surgeon.    Anesthesia: General Anesthesia  Youre due to have surgery. During surgery, youll be given medication called anesthesia. (It is also called anesthetic.) This will keep you comfortable and pain-free. Your surgeon will use general anesthesia. This sheet tells you more about it.    What Is General Anesthesia?  General anesthesia puts you into a state like deep sleep. It goes into the bloodstream (IV anesthetics), into the lungs (gas anesthetics), or both. You feel nothing during the procedure. You will not remember it. During the procedure, the anesthesia provider monitors you. He or she checks your heart rate and rhythm, blood pressure, and blood oxygen.  · IV Anesthetics  IV anesthetics are given through an IV line in your arm. Theyre often given first. This is so you are asleep before a gas anesthetic is started. Some kinds of IV anesthetics relieve pain. Others relax you. Your doctor will decide which kind is best in your case.  · Gas Anesthetics  Gas anesthetics are breathed into the lungs. They are often used to keep you asleep. They can be given through a facemask, an endotracheal tube, or a  laryngeal mask airway.  ¨ If you have a facemask, your anesthesia provider will most likely place it over your nose and mouth while youre still awake. Youll breathe oxygen through the mask as your IV anesthetic is started. Gas anesthetic may be added through the mask.  ¨ If you have an endotracheal tube or a laryngeal mask airway, it will be inserted into your throat after youre asleep.  Anesthesia Tools and Medications  You will likely have:  · IV anesthetics sent through an IV line into your bloodstream.  · Gas anesthetics breathed into your lungs, where they pass into your bloodstream.  · A pulse oximeter on the end of your finger. This measures your blood oxygen level.  · Electrocardiography leads (electrodes) on your chest. These record your heart rate and rhythm.  · A blood pressure cuff. This reads your blood pressure.  Risks and Possible Complications  General anesthesia has some risks. These include:  · Breathing problems  · Nausea and vomiting  · Sore throat or hoarseness  · Allergic reaction to the anesthetic  · Ongoing numbness (rare)  · Irregular heartbeat (rare)  · Cardiac arrest (rare)   Anesthesia Safety  · Follow all instructions you are given for how long not to eat or drink before your procedure.  · Be sure your doctor knows what medications you take. This includes over-the-counter medications, herbs, and supplements.  · Have an adult family member or friend drive you home after the procedure.  · For the first 24 hours after your surgery:  ¨ Do not drive or use heavy equipment.  ¨ Do not make important decisions or sign documents.  ¨ Avoid alcohol.  ¨ Have someone stay with you, if possible. They can watch for problems and help keep you safe.  © 3115-8480 [employer], [address] [place] [address], [place] [address]. All rights reserved. This information is not intended as a substitute for professional medical care. Always follow your healthcare professional's instructions.

## 2017-03-10 NOTE — IP AVS SNAPSHOT
[facility]  [address]  [facility] [address]  Phone: [phone]           Patient Discharge Instructions    Our goal is to set you up for success. This packet includes information on your condition, medications, and your home care. It will help you to care for yourself so you don't get sicker.     Please ask your nurse if you have any questions.        There are many details to remember when preparing for your surgery. Here is what you will need to do, please ask your nurse if there are more specific instructions and if you have any questions:    1. 24 hours before procedure Do not smoke or drink alcoholic beverages 24 hours prior to your procedure    2. Eating before procedure Do not eat or drink anything 8 hours before your procedure - this includes gum, mints, and candy.     3. Day of procedure Please remove all jewelry for the procedure. If you wear contact lenses, dentures, hearing aids or glasses, bring a container to put them in during your surgery and give to a family member for safekeeping.  If your doctor has scheduled you for an overnight stay, bring a small overnight bag with any personal items that you need.    4. After procedure Make arrangements in advance for transportation home by a responsible adult. It is not safe to drive a vehicle during the 24 hours following surgery.     PLEASE NOTE: You may be contacted the day before your surgery to confirm your surgery date and arrival time. The Surgery schedule has many variables which may affect the time of your surgery case. Family members should be available if your surgery time changes.                [facility]  Unless otherwise directed by your provider, please contact [facility], our nurse care line that is available for 24/7 assistance.     [phone] (toll-free)    Registered nurses in the [facility] provide clinical advisement, health education, appointment booking, and other  advisory services.                    ** Verify the list of medication(s) below is accurate and up to date. Carry this with you in case of emergency. If your medications have changed, please notify your healthcare provider.             Medication List      TAKE these medications        Additional Info                      carvedilol 25 MG tablet   Commonly known as:  COREG   Quantity:  60 tablet   Refills:  3   Dose:  25 mg    Instructions:  Take 1 tablet (25 mg total) by mouth 2 (two) times daily with meals.     Begin Date    AM    Noon    PM    Bedtime       CORDRAN 0.05 % lotion   Refills:  0   Generic drug:  flurandrenolide    Instructions:  Apply to  scalp one to two times daily as needed for dryness     Begin Date    AM    Noon    PM    Bedtime       ketoconazole 2 % shampoo   Commonly known as:  NIZORAL   Quantity:  120 mL   Refills:  6    Instructions:  USE EVERY OTHER DAY LET SIT ON SCALP FOR FIVE MINUTES BEFORE RINSING     Begin Date    AM    Noon    PM    Bedtime       latanoprost 0.005 % ophthalmic solution   Refills:  1      Begin Date    AM    Noon    PM    Bedtime       lisinopril 20 MG tablet   Commonly known as:  PRINIVIL,ZESTRIL   Quantity:  30 tablet   Refills:  3   Dose:  20 mg    Instructions:  Take 1 tablet (20 mg total) by mouth once daily.     Begin Date    AM    Noon    PM    Bedtime       om-3-epa-dha-fish oil-flax-E [phone] qn-wp-ty-unit Cap   Commonly known as:  THERA TEARS NUTRITION   Refills:  0   Dose:  3 capsule    Instructions:  Take 3 capsules by mouth every morning. Over-the-counter supplement     Begin Date    AM    Noon    PM    Bedtime       ONE-A-DAY 50 PLUS tablet   Refills:  0   Dose:  1 tablet   Generic drug:  geriatric multivitamin-min    Instructions:  Take 1 tablet by mouth once daily.     Begin Date    AM    Noon    PM    Bedtime       verapamil 240 MG CR tablet   Commonly known as:  CALAN-SR   Quantity:  30 tablet   Refills:  12    Instructions:  take 1 tablet by  mouth twice a day     Begin Date    AM    Noon    PM    Bedtime       XARELTO 20 mg Tab   Refills:  0   Dose:  20 mg   Generic drug:  rivaroxaban    Instructions:  Take 20 mg by mouth once daily.     Begin Date    AM    Noon    PM    Bedtime                  Please bring to all follow up appointments:    1. A copy of your discharge instructions.  2. All medicines you are currently taking in their original bottles.  3. Identification and insurance card.    Please arrive 15 minutes ahead of scheduled appointment time.    Please call 24 hours in advance if you must reschedule your appointment and/or time.        Your Scheduled Appointments     Mar 13, 2017  8:00 AM CDT   [facility] with JENKINS, VISUAL HARLEY   [facility] - Ophthalmology ([facility] )    [address]  [place] [address] [phone]            Mar 13, 2017  8:30 AM CDT   Established Patient Visit with Lynda Crawford MD   [facility] - Ophthalmology ([facility] )    [address]  [place] [id number] [phone]            Mar 14, 2017  8:00 AM CDT   Pre OP with Giovanna Murray MD   [facility] - Internal Medicine ([facility])    [address]  [place] [address] [phone]            Mar 20, 2017  3:30 PM CDT   Pre OP with PREOP,  UROLOGY CANCER CTR   [facility] - Urology [facility] ([facility] )    [address]  [place] [address] [phone]              Your Future Surgeries/Procedures     Mar 22, 2017   Surgery with Porfirio Arellano MD   [facility] ([facility])    [address] [facility] [id number] [phone]                  Discharge Instructions       Your surgery has been scheduled for:__________________________________________    You should report to:  ____[facility], located on the [place] side of the first floor of the           [facility]  ([phone])  ____[facility], located on the [facility] side of the            Second floor of the [facility] ([phone])  ____3rd Floor [facility] located on the [facility] side of the [facility] [phone]  Please Note   - Tell your doctor if you take Aspirin, products containing Aspirin, herbal medications  or blood thinners, such as Coumadin, Ticlid, or Plavix.  (Consult your provider regarding holding or stopping before surgery).  - Arrange for someone to drive you home following surgery.  You will not be allowed to leave the surgical facility alone or drive yourself home following sedation and anesthesia.  Before Surgery  - Stop taking all herbal medications 14days prior to surgery  - No Motrin/Advil (Ibuprofen) 7 days before surgery  - No Aleve (Naproxen) 7 days before surgery  - Stop Taking Asprin, products containing Asprin _____days before surgery  - Stop taking blood thinners_______days before surgery  - Refrain from drinking alcoholic beverages for 24hours before and after surgery  - Stop or limit smoking _________days before surgery  Night before Surgery  - DO NOT EAT OR DRINK ANYTHING AFTER MIDNIGHT, INCLUDING GUM, HARD CANDY, MINTS, OR CHEWING TOBACCO.  - Take a shower or bath (shower is recommended).  Bathe with Hibiclens soap or an antibacterial soap from the neck down.  If not supplied by your surgeon, hibiclens soap will need to be purchased over the counter in pharmacy.  Rinse soap off thoroughly.  - Shampoo your hair with your regular shampoo  The Day of Surgery  - Take another bath or shower with hibiclens or any antibacterial soap, to reduce the chance of infection.  - Take heart and blood pressure medications with a small sip of water, as advised by the perioperative team.  - Do not take fluid pills  - You may brush your teeth and rinse your mouth, but do not swall any additional water.   - Do not apply perfumes, powder,  body lotions or deodorant on the day of surgery.  - Nail polish should be removed.  - Do not wear makeup or moisturizer  - Wear comfortable clothes, such as a button front shirt and loose fitting pants.  - Leave all jewelry, including body piercings, and valuables at home.    - Bring any devices you will neeed after surgery such as crutches or canes.  - If you have sleep apnea, please bring your CPAP machine  In the event that your physical condition changes including the onset of a cold or respiratory illness, or if you have to delay or cancel your surgery, please notify your surgeon.    Anesthesia: General Anesthesia  Youre due to have surgery. During surgery, youll be given medication called anesthesia. (It is also called anesthetic.) This will keep you comfortable and pain-free. Your surgeon will use general anesthesia. This sheet tells you more about it.    What Is General Anesthesia?  General anesthesia puts you into a state like deep sleep. It goes into the bloodstream (IV anesthetics), into the lungs (gas anesthetics), or both. You feel nothing during the procedure. You will not remember it. During the procedure, the anesthesia provider monitors you. He or she checks your heart rate and rhythm, blood pressure, and blood oxygen.  · IV Anesthetics  IV anesthetics are given through an IV line in your arm. Theyre often given first. This is so you are asleep before a gas anesthetic is started. Some kinds of IV anesthetics relieve pain. Others relax you. Your doctor will decide which kind is best in your case.  · Gas Anesthetics  Gas anesthetics are breathed into the lungs. They are often used to keep you asleep. They can be given through a facemask, an endotracheal tube, or a laryngeal mask airway.  ¨ If you have a facemask, your anesthesia provider will most likely place it over your nose and mouth while youre still awake. Youll breathe oxygen through the mask as your IV anesthetic is started. Gas  anesthetic may be added through the mask.  ¨ If you have an endotracheal tube or a laryngeal mask airway, it will be inserted into your throat after youre asleep.  Anesthesia Tools and Medications  You will likely have:  · IV anesthetics sent through an IV line into your bloodstream.  · Gas anesthetics breathed into your lungs, where they pass into your bloodstream.  · A pulse oximeter on the end of your finger. This measures your blood oxygen level.  · Electrocardiography leads (electrodes) on your chest. These record your heart rate and rhythm.  · A blood pressure cuff. This reads your blood pressure.  Risks and Possible Complications  General anesthesia has some risks. These include:  · Breathing problems  · Nausea and vomiting  · Sore throat or hoarseness  · Allergic reaction to the anesthetic  · Ongoing numbness (rare)  · Irregular heartbeat (rare)  · Cardiac arrest (rare)   Anesthesia Safety  · Follow all instructions you are given for how long not to eat or drink before your procedure.  · Be sure your doctor knows what medications you take. This includes over-the-counter medications, herbs, and supplements.  · Have an adult family member or friend drive you home after the procedure.  · For the first 24 hours after your surgery:  ¨ Do not drive or use heavy equipment.  ¨ Do not make important decisions or sign documents.  ¨ Avoid alcohol.  ¨ Have someone stay with you, if possible. They can watch for problems and help keep you safe.  © 7660-4639 [facility], [address] [place] [address], [place] [address]. All rights reserved. This information is not intended as a substitute for professional medical care. Always follow your healthcare professional's instructions.    Discharge References/Attachments     PAIN MANAGEMENT AFTER SURGERY (ENGLISH)    PAIN AT HOME, MANAGING POST-OP: NON-MEDICATION RELIEF (ENGLISH)        Admission Information     Date & Time Provider Department CSN    3/10/2017  1:00 PM Pily WILEY  "Leopold, MD [facility] [id number]      Care Providers     Provider Role Specialty Primary office phone    Rhonda G Leopold, MD Attending Provider Anesthesiology [phone]      Your Vitals Were     BP Pulse Temp Resp Height Weight    121/68 66 98 °F (36.7 °C) 16 5' 7" (1.702 m) 70.3 kg (155 lb)    Last Period SpO2 BMI          (LMP Unknown) 99% 24.28 kg/m2        Recent Lab Values        5/1/2006 11/21/2007 7/29/2011 7/23/2012 7/30/2012 8/2/2013 9/23/2014 1/6/2017      8:05 AM  7:45 AM 11:00 AM  9:04 AM 10:25 AM  7:30 AM  2:55 PM 11:12 AM    A1C 5.6 5.8 5.8 6.2 6.2 5.9 5.8 6.0    Comment for A1C at 11:12 AM on 1/6/2017:  According to ADA guidelines, hemoglobin A1C <7.0% represents  optimal control in non-pregnant diabetic patients.  Different  metrics may apply to specific populations.   Standards of Medical Care in Diabetes - 2016.  For the purpose of screening for the presence of diabetes:  <5.7%     Consistent with the absence of diabetes  5.7-6.4%  Consistent with increasing risk for diabetes   (prediabetes)  >or=6.5%  Consistent with diabetes  Currently no consensus exists for use of hemoglobin A1C  for diagnosis of diabetes for children.        Allergies as of 3/10/2017        Reactions    Adhesive Tape-silicones     Other reaction(s): pulling skin off    Atorvastatin     Other reaction(s): Muscle pain    Pravachol  [Pravastatin] Rash      Advance Directives     An advance directive is a document which, in the event you are no longer able to make decisions for yourself, tells your healthcare team what kind of treatment you do or do not want to receive, or who you would like to make those decisions for you.  If you do not currently have an advance directive, [facility] encourages you to create one.  For more information call:  [phone]), [phone] ([phone]),  or log on to [email or web address].        Language Assistance Services     ATTENTION: Language assistance " services are available, free of charge. Please call [phone].      ATENCIÓN: Si habla marcie, tiene a blancas disposición servicios gratuitos de asistencia lingüística. Llame al [phone].     CHÚ Ý: N?u b?n nói Ti?ng Vi?t, có các d?ch v? h? tr? ngôn ng? mi?n phí dành cho b?n. G?i s? [phone].        Pneumonmia Discharge Instructions                Jose Information [facility] complies with applicable Federal civil rights laws and does not discriminate on the basis of race, color, national origin, age, disability, or sex.

## 2017-03-13 ENCOUNTER — OFFICE VISIT (OUTPATIENT)
Dept: OPHTHALMOLOGY | Facility: CLINIC | Age: 77
End: 2017-03-13
Payer: COMMERCIAL

## 2017-03-13 ENCOUNTER — TELEPHONE (OUTPATIENT)
Dept: OPHTHALMOLOGY | Facility: CLINIC | Age: 77
End: 2017-03-13

## 2017-03-13 ENCOUNTER — INITIAL CONSULT (OUTPATIENT)
Dept: OPHTHALMOLOGY | Facility: CLINIC | Age: 77
End: 2017-03-13
Payer: COMMERCIAL

## 2017-03-13 ENCOUNTER — CLINICAL SUPPORT (OUTPATIENT)
Dept: OPHTHALMOLOGY | Facility: CLINIC | Age: 77
End: 2017-03-13
Payer: COMMERCIAL

## 2017-03-13 DIAGNOSIS — H20.9 IRITIS OF LEFT EYE: ICD-10-CM

## 2017-03-13 DIAGNOSIS — H44.002 LEFT ENDOPHTHALMIA: ICD-10-CM

## 2017-03-13 DIAGNOSIS — H40.1131 PRIMARY OPEN ANGLE GLAUCOMA OF BOTH EYES, MILD STAGE: Primary | ICD-10-CM

## 2017-03-13 DIAGNOSIS — Q07.8 ANOMALOUS OPTIC NERVE: ICD-10-CM

## 2017-03-13 DIAGNOSIS — H33.22 RETINAL DETACHMENT, LEFT: ICD-10-CM

## 2017-03-13 DIAGNOSIS — H44.002 ENDOPHTHALMITIS, LEFT EYE: ICD-10-CM

## 2017-03-13 DIAGNOSIS — H33.002 RHEGMATOGENOUS RETINAL DETACHMENT OF LEFT EYE: Primary | ICD-10-CM

## 2017-03-13 DIAGNOSIS — H40.1131 PRIMARY OPEN ANGLE GLAUCOMA OF BOTH EYES, MILD STAGE: ICD-10-CM

## 2017-03-13 DIAGNOSIS — H44.19 ENDOGENOUS ENDOPHTHALMITIS: ICD-10-CM

## 2017-03-13 DIAGNOSIS — H25.13 SENILE NUCLEAR SCLEROSIS, BILATERAL: ICD-10-CM

## 2017-03-13 DIAGNOSIS — H35.062 RETINAL VASCULITIS OF LEFT EYE: ICD-10-CM

## 2017-03-13 DIAGNOSIS — H33.002 RETINAL DETACHMENT OF LEFT EYE WITH RETINAL BREAK: ICD-10-CM

## 2017-03-13 DIAGNOSIS — M35.01 KCS (KERATOCONJUNCTIVITIS SICCA): ICD-10-CM

## 2017-03-13 DIAGNOSIS — H43.89 VITRITIS OF LEFT EYE: ICD-10-CM

## 2017-03-13 PROCEDURE — 99999 PR PBB SHADOW E&M-EST. PATIENT-LVL III: CPT | Mod: PBBFAC,,, | Performed by: OPHTHALMOLOGY

## 2017-03-13 PROCEDURE — 92083 EXTENDED VISUAL FIELD XM: CPT | Mod: S$GLB,,, | Performed by: OPHTHALMOLOGY

## 2017-03-13 PROCEDURE — 92225 PR SPECIAL EYE EXAM, INITIAL: CPT | Mod: LT,S$GLB,, | Performed by: OPHTHALMOLOGY

## 2017-03-13 PROCEDURE — 92133 CPTRZD OPH DX IMG PST SGM ON: CPT | Mod: S$GLB,,, | Performed by: OPHTHALMOLOGY

## 2017-03-13 PROCEDURE — 92014 COMPRE OPH EXAM EST PT 1/>: CPT | Mod: S$GLB,,, | Performed by: OPHTHALMOLOGY

## 2017-03-13 PROCEDURE — 76512 OPH US DX B-SCAN: CPT | Mod: LT,S$GLB,, | Performed by: OPHTHALMOLOGY

## 2017-03-13 NOTE — MR AVS SNAPSHOT
[facility] - Ophthalmology  [address]  [place] [address]  Phone: [phone]  Fax: [phone]                  Ida Santana   3/13/2017 1:10 PM   Initial consult    Description:  Female : 1940   Provider:  JAKE Beaulieu MD   Department:  [facility] - Ophthalmology           Reason for Visit     Concerns About Ocular Health           Diagnoses this Visit        Comments    Retinal detachment of left eye with retinal break         Endophthalmitis, left eye                To Do List           Future Appointments        Provider Department Dept Phone    3/14/2017 8:00 AM Giovanna Murray MD [facility] Internal Medicine [phone]    3/20/2017 3:30 PM PREOP,  UROLOGY CANCER CTR [facility] Urology [facility] [phone]      Your Future Surgeries/Procedures     Mar 22, 2017   Surgery with Porfirio Arellano MD   [facility] ([facility])    [address] [facility] [address] [phone]              Goals (5 Years of Data)     None      [facility]     [facility] Nurse Care Line - 24/7 Assistance  Registered nurses in the [facility] provide clinical advisement, health education, appointment booking, and other advisory services.  Call for this free service at [phone].             Medications           Message regarding Medications     Verify the changes and/or additions to your medication regime listed below are the same as discussed with your clinician today.  If any of these changes or additions are incorrect, please notify your healthcare provider.             Verify that the below list of medications is an accurate representation of the medications you are currently taking.  If none reported, the list may be blank. If incorrect, please contact your healthcare provider. Carry this list with you in case of emergency.           Current Medications     carvedilol (COREG) 25 MG tablet Take 1 tablet (25 mg  total) by mouth 2 (two) times daily with meals.    flurandrenolide (CORDRAN) 0.05 % lotion Apply to  scalp one to two times daily as needed for dryness    ketoconazole (NIZORAL) 2 % shampoo USE EVERY OTHER DAY LET SIT ON SCALP FOR FIVE MINUTES BEFORE RINSING    latanoprost 0.005 % ophthalmic solution     lisinopril (PRINIVIL,ZESTRIL) 20 MG tablet Take 1 tablet (20 mg total) by mouth once daily.    MULTIVITAMIN WITH MINERALS (ONE-A-DAY 50 PLUS) Tab Take 1 tablet by mouth once daily.     om-3-epa-dha-fish oil-flax-E (THERA TEARS NUTRITION) [phone] ti-nn-zh-unit Cap Take 3 capsules by mouth every morning. Over-the-counter supplement    rivaroxaban (XARELTO) 20 mg Tab Take 20 mg by mouth once daily.    verapamil (CALAN-SR) 240 MG CR tablet take 1 tablet by mouth twice a day           Clinical Reference Information           Your Vitals Were     Last Period                   (LMP Unknown)           Allergies as of 3/13/2017     Adhesive Tape-silicones    Atorvastatin    Pravachol  [Pravastatin]      Immunizations Administered on Date of Encounter - 3/13/2017     None      Language Assistance Services     ATTENTION: Language assistance services are available, free of charge. Please call [phone].      ATENCIÓN: Si renala marcie, tiene a blancas disposición servicios gratuitos de asistencia lingüística. Llame al [phone].     FLORENCIO Ý: N?u b?n nói Ti?ng Vi?t, có các d?ch v? h? tr? ngôn ng? mi?n phí dành cho b?n. G?i s? [phone].         [facility] complies with applicable Federal civil rights laws and does not discriminate on the basis of race, color, national origin, age, disability, or sex.

## 2017-03-13 NOTE — LETTER
March 13, 2017      Lynda Crawford MD  [address]  [facility] [address]           [facility]  [address]  [facility] [address]  Phone: [phone]  Fax: [phone]          Patient: Ida Santana   MR Number: [id number]   YOB: 1940   Date of Visit: 3/13/2017       Dear Dr. Lynda Crawford:    Thank you for referring Ida Santana to me for evaluation. Attached you will find relevant portions of my assessment and plan of care.    If you have questions, please do not hesitate to call me. I look forward to following Ida Santana along with you.    Sincerely,    JAKE Beaulieu MD    Enclosure  CC:  No Recipients    If you would like to receive this communication electronically, please contact [email or web address] or [phone] to request more information on [facility] access.    For providers and/or their staff who would like to refer a patient to [facility], please contact us through our one-stop-shop provider referral line, [facility], at [phone].    If you feel you have received this communication in error or would no longer like to receive these types of communications, please e-mail [email or web address]

## 2017-03-13 NOTE — PROGRESS NOTES
"HPI     Cons per dr phillips       Last edited by Rosanne Valdez on 3/13/2017 12:34 PM.       History of hospitalization in January for pneumonia- + MRSA sepsis- c/o of "red eye" 1/18/17 but no change in vision- was given erythromycin ointment and states red eye got better.  Was treated for MRSA sepsis IV and dischared.  States she feels like everything in eye is stable today.      Glaucoma (type and duration)    Suspect 2/2 lrg cups ou    First HVF   2000   First photos   2013   Treatment / Drops started   xal 1/2016           Family history    none        Glaucoma meds   Latanoprost started 1/4/2016         H/O adverse rxn to glaucoma drops    none        LASERS    none        GLAUCOMA SURGERIES    none        OTHER EYE SURGERIES    none        CDR    0.75-0.8 / 0.7-0.75        Tbase    18-22 / 19-22          Tmax    22/22            Ttarget    ?              HVF    4 test 2000 to  2016 - near full od // ? NS os        Gonio    +2-3 ou         CCT    510/517        OCT    2 test 2013 to 2015 - RNFL - nl od // nl os        HRT    2 test 2016 to 2016 -MR -  Dec. I, bord S/T od // nl os /// CDR 0.74 od // 0.57 os  (large disc size ou)         Disc photos    2013, 2015 - OIS     - Ttoday    16/16 - good resp to latanoprost  was  20/21- however with ? Endogenous endophthalmitis vs other uveitic process OS today   - Test done today    IOP check // HVF/OCT/DFE/Bscan OS     2. Anomaolous Optic nerves    - very large disc size ou    - HRT - 2.97 od // 3.04 os (nl is up to 2.8 ou)     3. Dry eyes     4. 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      PLAN  Refer to retina   IOP ok ou - hold latanoprost OS for now given uveitis   NS " OS improved but still with hint INS present on repeat HVF    F/U 2-3 weeks for IOP check - and to review retinas findings     Pt also with an adrenal mass is suppose to have sx on 3/22/2017    I have seen and personally examined the patient.  I agree with the findings, assessment and plan of the resident and/or fellow.     Lynda Crawford MD

## 2017-03-13 NOTE — PROGRESS NOTES
"HPI     DLS: 9/09/16    Pt here for HVF review;  Pt states she's having kidney surgery on 3/22/17.     Meds: Xalatan qam ou     1. Primary open angle glaucoma of both eyes, mild stage  2. Anomalous optic nerve  3. KCS (keratoconjunctivitis sicca)  4. Senile nuclear sclerosis, bilateral        Last edited by Windy Mosqueda on 3/13/2017  9:05 AM.       History of hospitalization in January for pneumonia- + MRSA sepsis- c/o of "red eye" 1/18/17 but no change in vision- was given erythromycin ointment and states red eye got better.  Was treated for MRSA sepsis IV and dischared.  States she feels like everything in eye is stable today.  Assessment /Plan     For exam results, see Encounter Report.    Primary open angle glaucoma of both eyes, mild stage  -     Posterior Segment OCT Optic Nerve- Both eyes    Anomalous optic nerve    KCS (keratoconjunctivitis sicca)    Senile nuclear sclerosis, bilateral    Iritis of left eye  -     B Scan    Vitritis of left eye  -     B Scan    Retinal vasculitis of left eye  -     B Scan    Endogenous endophthalmitis    Retinal detachment, left      Glaucoma (type and duration)    Suspect 2/2 lrg cups ou    First HVF   2000   First photos   2013   Treatment / Drops started   xal 1/2016           Family history    none        Glaucoma meds   Latanoprost started 1/4/2016         H/O adverse rxn to glaucoma drops    none        LASERS    none        GLAUCOMA SURGERIES    none        OTHER EYE SURGERIES    none        CDR    0.75-0.8 / 0.7-0.75        Tbase    18-22 / 19-22          Tmax    22/22            Ttarget    ?              HVF    4 test 2000 to  2016 - near full od // ? NS os        Gonio    +2-3 ou         CCT    510/517        OCT    2 test 2013 to 2015 - RNFL - nl od // nl os        HRT    2 test 2016 to 2016 -MR -  Dec. I, bord S/T od // nl os /// CDR 0.74 od // 0.57 os  (large disc size ou)         Disc photos    2013, 2015 - OIS     - Ttoday    16/16 - good resp to latanoprost  " was  20/21- however with ? Endogenous endophthalmitis vs other uveitic process OS today   - Test done today    IOP check // HVF/OCT/DFE/Bscan OS     2. Anomaolous Optic nerves    - very large disc size ou    - HRT - 2.97 od // 3.04 os (nl is up to 2.8 ou)     3. Dry eyes     4. 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      PLAN  Refer to retina   IOP ok ou - hold latanoprost OS for now given uveitis   NS OS improved but still with hint INS present on repeat HVF    F/U 2-3 weeks for IOP check - and to review retinas findings    Pt also with an adrenal mass is suppose to have sx on 3/22/2017    I have seen and personally examined the patient.  I agree with the findings, assessment and plan of the resident and/or fellow.     Lynda Crawford MD

## 2017-03-13 NOTE — PROGRESS NOTES
1. Rhegmatogenous Retinal Detachment OS  - inferior detachment  - likely developed secondary to vitreous contraction associated with endogenous endopthalmitis OS  - rec 25g PPV/EL/AFx/Gas vs Oil and possible synechiolysis  - LMA  - LOC 90 min    Risk, benefits, and alternatives of the procedure were discussed in detail with the patient. The patient voiced understanding and wished to proceed with the procedure.  Discussed delaying procedure could increase risk of blindness/loss of eye    2. 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 NS OU    4. POAG OU  On Latanoprost OU qam    To OR

## 2017-03-14 ENCOUNTER — OFFICE VISIT (OUTPATIENT)
Dept: INTERNAL MEDICINE | Facility: CLINIC | Age: 77
End: 2017-03-14
Payer: COMMERCIAL

## 2017-03-14 ENCOUNTER — HOSPITAL ENCOUNTER (OUTPATIENT)
Dept: RADIOLOGY | Facility: HOSPITAL | Age: 77
Discharge: HOME | End: 2017-03-14
Attending: INTERNAL MEDICINE
Payer: COMMERCIAL

## 2017-03-14 VITALS
BODY MASS INDEX: 25.01 KG/M2 | SYSTOLIC BLOOD PRESSURE: 124 MMHG | HEIGHT: 67 IN | WEIGHT: 159.38 LBS | HEART RATE: 58 BPM | DIASTOLIC BLOOD PRESSURE: 68 MMHG

## 2017-03-14 DIAGNOSIS — H33.002 RETINAL DETACHMENT OF LEFT EYE WITH RETINAL BREAK: ICD-10-CM

## 2017-03-14 DIAGNOSIS — C80.1 CANCER: ICD-10-CM

## 2017-03-14 DIAGNOSIS — I10 ESSENTIAL HYPERTENSION: ICD-10-CM

## 2017-03-14 DIAGNOSIS — M85.80 OSTEOPENIA: ICD-10-CM

## 2017-03-14 DIAGNOSIS — I82.3 THROMBOSIS OF LEFT RENAL VEIN: ICD-10-CM

## 2017-03-14 DIAGNOSIS — Z86.19 HISTORY OF SEPSIS: ICD-10-CM

## 2017-03-14 DIAGNOSIS — Z01.818 PREOPERATIVE EXAMINATION: Primary | ICD-10-CM

## 2017-03-14 DIAGNOSIS — N28.89 KIDNEY MASS: ICD-10-CM

## 2017-03-14 DIAGNOSIS — Z87.440 HISTORY OF UTI: ICD-10-CM

## 2017-03-14 DIAGNOSIS — H44.002 ENDOPHTHALMITIS, LEFT EYE: ICD-10-CM

## 2017-03-14 DIAGNOSIS — G62.9 NEUROPATHY: ICD-10-CM

## 2017-03-14 PROBLEM — A41.9 SEPSIS: Status: RESOLVED | Noted: 2017-01-13 | Resolved: 2017-03-14

## 2017-03-14 PROBLEM — J15.212 MRSA PNEUMONIA: Status: RESOLVED | Noted: 2017-01-20 | Resolved: 2017-03-14

## 2017-03-14 PROBLEM — R41.82 ALTERED MENTAL STATUS: Status: RESOLVED | Noted: 2017-01-13 | Resolved: 2017-03-14

## 2017-03-14 PROBLEM — B95.62 MRSA BACTEREMIA: Status: RESOLVED | Noted: 2017-01-16 | Resolved: 2017-03-14

## 2017-03-14 PROBLEM — G93.41 SEPTIC ENCEPHALOPATHY: Status: RESOLVED | Noted: 2017-01-13 | Resolved: 2017-03-14

## 2017-03-14 PROBLEM — R78.81 MRSA BACTEREMIA: Status: RESOLVED | Noted: 2017-01-16 | Resolved: 2017-03-14

## 2017-03-14 PROBLEM — E87.6 HYPOKALEMIA: Status: RESOLVED | Noted: 2017-01-23 | Resolved: 2017-03-14

## 2017-03-14 PROBLEM — R53.81 DEBILITY: Status: RESOLVED | Noted: 2017-01-28 | Resolved: 2017-03-14

## 2017-03-14 PROBLEM — E83.39 HYPOPHOSPHATEMIA: Status: RESOLVED | Noted: 2017-01-17 | Resolved: 2017-03-14

## 2017-03-14 PROBLEM — E86.9 VOLUME DEPLETION: Status: RESOLVED | Noted: 2017-01-13 | Resolved: 2017-03-14

## 2017-03-14 LAB
BACTERIA #/AREA URNS AUTO: NORMAL /HPF
BILIRUB UR QL STRIP: NEGATIVE
CAOX CRY UR QL COMP ASSIST: NORMAL
CLARITY UR REFRACT.AUTO: ABNORMAL
COLOR UR AUTO: YELLOW
GLUCOSE UR QL STRIP: NEGATIVE
HGB UR QL STRIP: NEGATIVE
KETONES UR QL STRIP: NEGATIVE
LEUKOCYTE ESTERASE UR QL STRIP: ABNORMAL
MICROSCOPIC COMMENT: NORMAL
NITRITE UR QL STRIP: NEGATIVE
PH UR STRIP: 6 [PH] (ref 5–8)
PROT UR QL STRIP: NEGATIVE
RBC #/AREA URNS AUTO: 2 /HPF (ref 0–4)
SP GR UR STRIP: 1.01 (ref 1–1.03)
SQUAMOUS #/AREA URNS AUTO: 1 /HPF
URN SPEC COLLECT METH UR: ABNORMAL
UROBILINOGEN UR STRIP-ACNC: NEGATIVE EU/DL
WBC #/AREA URNS AUTO: 5 /HPF (ref 0–5)

## 2017-03-14 PROCEDURE — 81001 URINALYSIS AUTO W/SCOPE: CPT

## 2017-03-14 PROCEDURE — 1157F ADVNC CARE PLAN IN RCRD: CPT | Mod: S$GLB,,, | Performed by: INTERNAL MEDICINE

## 2017-03-14 PROCEDURE — 71020 XR CHEST PA AND LATERAL: CPT | Mod: 26,,, | Performed by: RADIOLOGY

## 2017-03-14 PROCEDURE — 93010 ELECTROCARDIOGRAM REPORT: CPT | Mod: S$GLB,,, | Performed by: INTERNAL MEDICINE

## 2017-03-14 PROCEDURE — 3078F DIAST BP <80 MM HG: CPT | Mod: S$GLB,,, | Performed by: INTERNAL MEDICINE

## 2017-03-14 PROCEDURE — 1159F MED LIST DOCD IN RCRD: CPT | Mod: S$GLB,,, | Performed by: INTERNAL MEDICINE

## 2017-03-14 PROCEDURE — 93005 ELECTROCARDIOGRAM TRACING: CPT | Mod: S$GLB,,, | Performed by: INTERNAL MEDICINE

## 2017-03-14 PROCEDURE — 87086 URINE CULTURE/COLONY COUNT: CPT

## 2017-03-14 PROCEDURE — 99215 OFFICE O/P EST HI 40 MIN: CPT | Mod: S$GLB,,, | Performed by: INTERNAL MEDICINE

## 2017-03-14 PROCEDURE — 1160F RVW MEDS BY RX/DR IN RCRD: CPT | Mod: S$GLB,,, | Performed by: INTERNAL MEDICINE

## 2017-03-14 PROCEDURE — 99999 PR PBB SHADOW E&M-EST. PATIENT-LVL III: CPT | Mod: PBBFAC,,, | Performed by: INTERNAL MEDICINE

## 2017-03-14 PROCEDURE — 71020 XR CHEST PA AND LATERAL: CPT | Mod: TC

## 2017-03-14 PROCEDURE — 3074F SYST BP LT 130 MM HG: CPT | Mod: S$GLB,,, | Performed by: INTERNAL MEDICINE

## 2017-03-14 PROCEDURE — 1126F AMNT PAIN NOTED NONE PRSNT: CPT | Mod: S$GLB,,, | Performed by: INTERNAL MEDICINE

## 2017-03-14 NOTE — H&P
Pre-Operative History & Physical  Ophthalmology      SUBJECTIVE:     History of Present Illness:  Patient is a 76 y.o. 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    MEDICATIONS:   No prescriptions prior to admission.       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    History of uterine fibroid     Hyperlipidemia     dyslipidemia    Hypertension     Open angle 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 eyes 5/2/2013    Osteopenia     Potassium depletion 1998    Retinal detachment of left eye with retinal break 3/13/2017     PAST SURGICAL HISTORY:   Past Surgical History:   Procedure Laterality Date    BREAST BIOPSY  2001    left breast DCIS    BREAST SURGERY  9/2001    left lumpectomy with SLN bx    COLONOSCOPY      COLONOSCOPY N/A 10/31/2016    Procedure: COLONOSCOPY;  Surgeon: YAMILETH Richards MD;  Location: [address]);  Service: Endoscopy;  Laterality: N/A;     PAST FAMILY HISTORY:   Family History   Problem Relation Age of Onset    Cancer Mother      breast    Breast cancer Mother      40s and again in 50s (bilateral)    Breast cancer Other     Heart disease Father      bypass    Cancer Father      asbestos    Stroke Brother     Ovarian cancer Neg Hx     Amblyopia Neg Hx     Blindness Neg Hx     Cataracts Neg Hx     Diabetes Neg Hx     Glaucoma Neg Hx     Hypertension Neg Hx     Macular degeneration Neg Hx     Retinal detachment Neg Hx     Strabismus Neg Hx     Thyroid disease Neg Hx     Colon cancer Neg Hx      SOCIAL HISTORY:   Social History   Substance Use Topics     Smoking status: Never Smoker    Smokeless tobacco: Never Used    Alcohol use No        MENTAL STATUS: Alert    REVIEW OF SYSTEMS: Negative    OBJECTIVE:     Vital Signs (Most Recent)       Physical Exam:  General: NAD  HEENT: , Atraumatic  Lungs: Adequate respirations  Heart: + pulses  Abdomen: Soft    ASSESSMENT/PLAN:     Patient is a 76 y.o. 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     - Plan for surgical correction RRD OS   - Risks/benefits/alternatives of the procedure including, but not limited to scarring, bleeding, infection, loss or decreased vision, and/or need for possible repeat surgery discussed with the patient and family.   - Informed consent obtained prior to surgery and the patient/family voiced good understanding.

## 2017-03-14 NOTE — MR AVS SNAPSHOT
[facility]  [address]  [place] [address]  Phone: [phone]  Fax: [phone]                  Ida Santana   3/14/2017 8:00 AM   Office Visit    Description:  Female : 1940   Provider:  Giovanna Murray MD   Department:  [facility]           Reason for Visit     Pre-op Exam           Diagnoses this Visit        Comments    Preoperative examination    -  Primary     Essential hypertension         Neuropathy         Thrombosis of left renal vein         Kidney mass         Cancer         Osteopenia         History of sepsis         History of UTI         Endophthalmitis, left eye         Retinal detachment of left eye with retinal break                To Do List           Future Appointments        Provider Department Dept Phone    3/14/2017 9:10 AM LAB, APPOINTMENT [facility]ED [facility] [phone]    3/14/2017 9:30 AM [facility] [id number] [address] LIMIT [facility] [phone]    3/20/2017 3:30 PM PREOP, [facility] [phone]      Your Future Surgeries/Procedures     Mar 22, 2017   Surgery with Porfirio Arellano MD   [facility] ([facility])    [address] [facility] [address] [phone]              Goals (5 Years of Data)     None      [facility]     [facility] Care Line - 24/7 Assistance  Registered nurses in the [facility] provide clinical advisement, health education, appointment booking, and other advisory services.  Call for this free service at [phone].             Medications           Message regarding Medications     Verify the changes and/or additions to your medication regime listed below are the same as discussed with your clinician today.  If any of these changes or additions are incorrect, please notify your healthcare provider.             Verify that the below list of medications  "is an accurate representation of the medications you are currently taking.  If none reported, the list may be blank. If incorrect, please contact your healthcare provider. Carry this list with you in case of emergency.           Current Medications     carvedilol (COREG) 25 MG tablet Take 1 tablet (25 mg total) by mouth 2 (two) times daily with meals.    verapamil (CALAN-SR) 240 MG CR tablet take 1 tablet by mouth twice a day    flurandrenolide (CORDRAN) 0.05 % lotion Apply to  scalp one to two times daily as needed for dryness    ketoconazole (NIZORAL) 2 % shampoo USE EVERY OTHER DAY LET SIT ON SCALP FOR FIVE MINUTES BEFORE RINSING    latanoprost 0.005 % ophthalmic solution     lisinopril (PRINIVIL,ZESTRIL) 20 MG tablet Take 1 tablet (20 mg total) by mouth once daily.    MULTIVITAMIN WITH MINERALS (ONE-A-DAY 50 PLUS) Tab Take 1 tablet by mouth once daily.     om-3-epa-dha-fish oil-flax-E (THERA TEARS NUTRITION) [phone] au-do-ym-unit Cap Take 3 capsules by mouth every morning. Over-the-counter supplement    rivaroxaban (XARELTO) 20 mg Tab Take 20 mg by mouth once daily.           Clinical Reference Information           Your Vitals Were     BP Pulse Height Weight Last Period BMI    124/68 58 5' 7" (1.702 m) 72.3 kg (159 lb 6.3 oz) (LMP Unknown) 24.96 kg/m2      Blood Pressure          Most Recent Value    BP  124/68      Allergies as of 3/14/2017     Adhesive Tape-silicones    Atorvastatin    Pravachol  [Pravastatin]      Immunizations Administered on Date of Encounter - 3/14/2017     None      Orders Placed During Today's Visit      Normal Orders This Visit    Urinalysis     Urine culture     Future Labs/Procedures Expected by Expires    Blood culture  3/14/2017 5/13/2018    Blood culture  3/14/2017 5/13/2018    CBC auto differential  3/14/2017 5/13/2017    Comprehensive metabolic panel  3/14/2017 (Approximate) 5/13/2017    TSH  3/14/2017 (Approximate) 5/13/2017    X-Ray Chest PA And Lateral  3/14/2017 " 5/13/2017    EKG 12-lead  As directed 5/13/2017      Language Assistance Services     ATTENTION: Language assistance services are available, free of charge. Please call [phone].      ATENCIÓN: Si chad jack, tiene a blancas disposición servicios gratuitos de asistencia lingüística. Llame al [phone].     CHÚ Ý: N?u b?n nói Ti?ng Vi?t, có các d?ch v? h? tr? ngôn ng? mi?n phí dành cho b?n. G?i s? [phone].         [facility] complies with applicable Federal civil rights laws and does not discriminate on the basis of race, color, national origin, age, disability, or sex.
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
## 2017-03-15 ENCOUNTER — ANESTHESIA (OUTPATIENT)
Dept: SURGERY | Facility: HOSPITAL | Age: 77
DRG: 615 | End: 2017-03-15
Payer: COMMERCIAL

## 2017-03-15 ENCOUNTER — SURGERY (OUTPATIENT)
Age: 77
End: 2017-03-15

## 2017-03-15 ENCOUNTER — HOSPITAL ENCOUNTER (OUTPATIENT)
Facility: HOSPITAL | Age: 77
Discharge: HOME | End: 2017-03-15
Attending: OPHTHALMOLOGY | Admitting: OPHTHALMOLOGY
Payer: COMMERCIAL

## 2017-03-15 ENCOUNTER — ANESTHESIA (OUTPATIENT)
Dept: SURGERY | Facility: HOSPITAL | Age: 77
End: 2017-03-15
Payer: COMMERCIAL

## 2017-03-15 ENCOUNTER — ANESTHESIA EVENT (OUTPATIENT)
Dept: SURGERY | Facility: HOSPITAL | Age: 77
End: 2017-03-15
Payer: COMMERCIAL

## 2017-03-15 VITALS
HEART RATE: 48 BPM | RESPIRATION RATE: 18 BRPM | BODY MASS INDEX: 24.96 KG/M2 | HEIGHT: 67 IN | SYSTOLIC BLOOD PRESSURE: 140 MMHG | DIASTOLIC BLOOD PRESSURE: 50 MMHG | TEMPERATURE: 98 F | WEIGHT: 159 LBS | OXYGEN SATURATION: 100 %

## 2017-03-15 DIAGNOSIS — H33.002 RETINAL DETACHMENT OF LEFT EYE WITH RETINAL BREAK: Primary | ICD-10-CM

## 2017-03-15 DIAGNOSIS — H33.002 RHEGMATOGENOUS RETINAL DETACHMENT OF LEFT EYE: ICD-10-CM

## 2017-03-15 PROCEDURE — 37000009 HC ANESTHESIA EA ADD 15 MINS: Performed by: OPHTHALMOLOGY

## 2017-03-15 PROCEDURE — 25000003 PHARM REV CODE 250: Performed by: NURSE ANESTHETIST, CERTIFIED REGISTERED

## 2017-03-15 PROCEDURE — 27600004 OPTIME MED/SURG SUP & DEVICES INTRAOCULAR LENS: Performed by: OPHTHALMOLOGY

## 2017-03-15 PROCEDURE — 27201423 OPTIME MED/SURG SUP & DEVICES STERILE SUPPLY: Performed by: OPHTHALMOLOGY

## 2017-03-15 PROCEDURE — 25000003 PHARM REV CODE 250: Performed by: OPHTHALMOLOGY

## 2017-03-15 PROCEDURE — 37000008 HC ANESTHESIA 1ST 15 MINUTES: Performed by: OPHTHALMOLOGY

## 2017-03-15 PROCEDURE — 36000707: Performed by: OPHTHALMOLOGY

## 2017-03-15 PROCEDURE — D9220A PRA ANESTHESIA: Mod: CRNA,,, | Performed by: NURSE ANESTHETIST, CERTIFIED REGISTERED

## 2017-03-15 PROCEDURE — C1814 RETINAL TAMP, SILICONE OIL: HCPCS | Performed by: OPHTHALMOLOGY

## 2017-03-15 PROCEDURE — 63600175 PHARM REV CODE 636 W HCPCS: Performed by: OPHTHALMOLOGY

## 2017-03-15 PROCEDURE — 63600175 PHARM REV CODE 636 W HCPCS: Performed by: NURSE ANESTHETIST, CERTIFIED REGISTERED

## 2017-03-15 PROCEDURE — C1784 OCULAR DEV, INTRAOP, DET RET: HCPCS | Performed by: OPHTHALMOLOGY

## 2017-03-15 PROCEDURE — D9220A PRA ANESTHESIA: Mod: ANES,,, | Performed by: ANESTHESIOLOGY

## 2017-03-15 PROCEDURE — 67108 REPAIR DETACHED RETINA: CPT | Mod: LT,,, | Performed by: OPHTHALMOLOGY

## 2017-03-15 PROCEDURE — 71000015 HC POSTOP RECOV 1ST HR: Performed by: OPHTHALMOLOGY

## 2017-03-15 PROCEDURE — 71000033 HC RECOVERY, INTIAL HOUR: Performed by: OPHTHALMOLOGY

## 2017-03-15 PROCEDURE — 36000706: Performed by: OPHTHALMOLOGY

## 2017-03-15 RX ORDER — FENTANYL CITRATE 50 UG/ML
25 INJECTION, SOLUTION INTRAMUSCULAR; INTRAVENOUS EVERY 5 MIN PRN
Status: DISCONTINUED | OUTPATIENT
Start: 2017-03-15 | End: 2017-03-15 | Stop reason: HOSPADM

## 2017-03-15 RX ORDER — TETRACAINE HYDROCHLORIDE 5 MG/ML
1 SOLUTION OPHTHALMIC
Status: DISCONTINUED | OUTPATIENT
Start: 2017-03-15 | End: 2017-03-15 | Stop reason: HOSPADM

## 2017-03-15 RX ORDER — DEXAMETHASONE SODIUM PHOSPHATE 4 MG/ML
INJECTION, SOLUTION INTRA-ARTICULAR; INTRALESIONAL; INTRAMUSCULAR; INTRAVENOUS; SOFT TISSUE
Status: DISCONTINUED
Start: 2017-03-15 | End: 2017-03-15 | Stop reason: HOSPADM

## 2017-03-15 RX ORDER — DEXAMETHASONE SODIUM PHOSPHATE 4 MG/ML
INJECTION, SOLUTION INTRA-ARTICULAR; INTRALESIONAL; INTRAMUSCULAR; INTRAVENOUS; SOFT TISSUE
Status: DISCONTINUED | OUTPATIENT
Start: 2017-03-15 | End: 2017-03-15 | Stop reason: HOSPADM

## 2017-03-15 RX ORDER — VASOPRESSIN 20 [USP'U]/ML
INJECTION, SOLUTION INTRAMUSCULAR; SUBCUTANEOUS
Status: DISCONTINUED | OUTPATIENT
Start: 2017-03-15 | End: 2017-03-15

## 2017-03-15 RX ORDER — EPINEPHRINE 1 MG/ML
INJECTION, SOLUTION INTRACARDIAC; INTRAMUSCULAR; INTRAVENOUS; SUBCUTANEOUS
Status: DISCONTINUED
Start: 2017-03-15 | End: 2017-03-15 | Stop reason: HOSPADM

## 2017-03-15 RX ORDER — LIDOCAINE HYDROCHLORIDE 20 MG/ML
INJECTION, SOLUTION EPIDURAL; INFILTRATION; INTRACAUDAL; PERINEURAL
Status: DISCONTINUED | OUTPATIENT
Start: 2017-03-15 | End: 2017-03-15 | Stop reason: HOSPADM

## 2017-03-15 RX ORDER — SODIUM CHLORIDE 0.9 % (FLUSH) 0.9 %
3 SYRINGE (ML) INJECTION
Status: DISCONTINUED | OUTPATIENT
Start: 2017-03-15 | End: 2017-03-15 | Stop reason: HOSPADM

## 2017-03-15 RX ORDER — EPINEPHRINE 1 MG/ML
INJECTION, SOLUTION INTRACARDIAC; INTRAMUSCULAR; INTRAVENOUS; SUBCUTANEOUS
Status: DISCONTINUED | OUTPATIENT
Start: 2017-03-15 | End: 2017-03-15 | Stop reason: HOSPADM

## 2017-03-15 RX ORDER — BUPIVACAINE HYDROCHLORIDE 7.5 MG/ML
INJECTION, SOLUTION EPIDURAL; RETROBULBAR
Status: DISCONTINUED | OUTPATIENT
Start: 2017-03-15 | End: 2017-03-15 | Stop reason: HOSPADM

## 2017-03-15 RX ORDER — VANCOMYCIN HYDROCHLORIDE 500 MG/10ML
INJECTION, POWDER, LYOPHILIZED, FOR SOLUTION INTRAVENOUS
Status: DISCONTINUED
Start: 2017-03-15 | End: 2017-03-15 | Stop reason: HOSPADM

## 2017-03-15 RX ORDER — ACETAMINOPHEN 325 MG/1
650 TABLET ORAL EVERY 4 HOURS PRN
Status: DISCONTINUED | OUTPATIENT
Start: 2017-03-15 | End: 2017-03-15 | Stop reason: HOSPADM

## 2017-03-15 RX ORDER — SODIUM CHLORIDE 9 MG/ML
INJECTION, SOLUTION INTRAVENOUS CONTINUOUS
Status: DISCONTINUED | OUTPATIENT
Start: 2017-03-15 | End: 2017-03-15 | Stop reason: HOSPADM

## 2017-03-15 RX ORDER — LIDOCAINE HYDROCHLORIDE 10 MG/ML
1 INJECTION, SOLUTION EPIDURAL; INFILTRATION; INTRACAUDAL; PERINEURAL ONCE
Status: COMPLETED | OUTPATIENT
Start: 2017-03-15 | End: 2017-03-15

## 2017-03-15 RX ORDER — ONDANSETRON 4 MG/1
4 TABLET, FILM COATED ORAL EVERY 8 HOURS PRN
Qty: 12 TABLET | Refills: 0 | Status: SHIPPED | OUTPATIENT
Start: 2017-03-15 | End: 2017-03-21

## 2017-03-15 RX ORDER — LIDOCAINE HCL/PF 100 MG/5ML
SYRINGE (ML) INTRAVENOUS
Status: DISCONTINUED | OUTPATIENT
Start: 2017-03-15 | End: 2017-03-15

## 2017-03-15 RX ORDER — SUCCINYLCHOLINE CHLORIDE 20 MG/ML
INJECTION INTRAMUSCULAR; INTRAVENOUS
Status: DISCONTINUED | OUTPATIENT
Start: 2017-03-15 | End: 2017-03-15

## 2017-03-15 RX ORDER — PREDNISOLONE ACETATE 10 MG/ML
1 SUSPENSION/ DROPS OPHTHALMIC
Status: DISCONTINUED | OUTPATIENT
Start: 2017-03-15 | End: 2017-03-15 | Stop reason: HOSPADM

## 2017-03-15 RX ORDER — PHENYLEPHRINE HYDROCHLORIDE 100 MG/ML
SOLUTION/ DROPS OPHTHALMIC
Status: DISCONTINUED
Start: 2017-03-15 | End: 2017-03-15 | Stop reason: HOSPADM

## 2017-03-15 RX ORDER — CYCLOPENTOLATE HYDROCHLORIDE 10 MG/ML
1 SOLUTION/ DROPS OPHTHALMIC
Status: DISCONTINUED | OUTPATIENT
Start: 2017-03-15 | End: 2017-03-15 | Stop reason: HOSPADM

## 2017-03-15 RX ORDER — NEOMYCIN SULFATE, POLYMYXIN B SULFATE, AND DEXAMETHASONE 3.5; 10000; 1 MG/G; [USP'U]/G; MG/G
OINTMENT OPHTHALMIC
Status: DISCONTINUED
Start: 2017-03-15 | End: 2017-03-15 | Stop reason: HOSPADM

## 2017-03-15 RX ORDER — ONDANSETRON 8 MG/1
8 TABLET, ORALLY DISINTEGRATING ORAL EVERY 8 HOURS PRN
Status: DISCONTINUED | OUTPATIENT
Start: 2017-03-15 | End: 2017-03-15 | Stop reason: HOSPADM

## 2017-03-15 RX ORDER — MOXIFLOXACIN 5 MG/ML
1 SOLUTION/ DROPS OPHTHALMIC
Status: DISCONTINUED | OUTPATIENT
Start: 2017-03-15 | End: 2017-03-15 | Stop reason: HOSPADM

## 2017-03-15 RX ORDER — TROPICAMIDE 10 MG/ML
1 SOLUTION/ DROPS OPHTHALMIC
Status: DISCONTINUED | OUTPATIENT
Start: 2017-03-15 | End: 2017-03-15 | Stop reason: HOSPADM

## 2017-03-15 RX ORDER — PROPOFOL 10 MG/ML
VIAL (ML) INTRAVENOUS
Status: DISCONTINUED | OUTPATIENT
Start: 2017-03-15 | End: 2017-03-15

## 2017-03-15 RX ORDER — PHENYLEPHRINE HYDROCHLORIDE 25 MG/ML
1 SOLUTION/ DROPS OPHTHALMIC
Status: DISCONTINUED | OUTPATIENT
Start: 2017-03-15 | End: 2017-03-15 | Stop reason: HOSPADM

## 2017-03-15 RX ORDER — LIDOCAINE HYDROCHLORIDE 20 MG/ML
INJECTION, SOLUTION EPIDURAL; INFILTRATION; INTRACAUDAL; PERINEURAL
Status: DISCONTINUED
Start: 2017-03-15 | End: 2017-03-15 | Stop reason: HOSPADM

## 2017-03-15 RX ORDER — ONDANSETRON 2 MG/ML
4 INJECTION INTRAMUSCULAR; INTRAVENOUS ONCE
Status: DISCONTINUED | OUTPATIENT
Start: 2017-03-15 | End: 2017-03-15 | Stop reason: HOSPADM

## 2017-03-15 RX ORDER — HYDROCODONE BITARTRATE AND ACETAMINOPHEN 5; 325 MG/1; MG/1
1 TABLET ORAL EVERY 4 HOURS PRN
Status: DISCONTINUED | OUTPATIENT
Start: 2017-03-15 | End: 2017-03-15 | Stop reason: HOSPADM

## 2017-03-15 RX ORDER — NEOMYCIN SULFATE, POLYMYXIN B SULFATE, AND DEXAMETHASONE 3.5; 10000; 1 MG/G; [USP'U]/G; MG/G
OINTMENT OPHTHALMIC
Status: DISCONTINUED | OUTPATIENT
Start: 2017-03-15 | End: 2017-03-15 | Stop reason: HOSPADM

## 2017-03-15 RX ORDER — FENTANYL CITRATE 50 UG/ML
INJECTION, SOLUTION INTRAMUSCULAR; INTRAVENOUS
Status: DISCONTINUED | OUTPATIENT
Start: 2017-03-15 | End: 2017-03-15

## 2017-03-15 RX ORDER — OXYCODONE AND ACETAMINOPHEN 5; 325 MG/1; MG/1
1 TABLET ORAL EVERY 6 HOURS PRN
Qty: 12 TABLET | Refills: 0 | Status: SHIPPED | OUTPATIENT
Start: 2017-03-15 | End: 2017-03-21

## 2017-03-15 RX ORDER — BUPIVACAINE HYDROCHLORIDE 7.5 MG/ML
INJECTION, SOLUTION EPIDURAL; RETROBULBAR
Status: DISCONTINUED
Start: 2017-03-15 | End: 2017-03-15 | Stop reason: HOSPADM

## 2017-03-15 RX ADMIN — PROPOFOL 170 MG: 10 INJECTION, EMULSION INTRAVENOUS at 12:03

## 2017-03-15 RX ADMIN — BALANCED SALT SOLUTION 500 ML: 6.4; .75; .48; .3; 3.9; 1.7 SOLUTION OPHTHALMIC at 01:03

## 2017-03-15 RX ADMIN — PREDNISOLONE ACETATE 1 DROP: 10 SUSPENSION/ DROPS OPHTHALMIC at 10:03

## 2017-03-15 RX ADMIN — CYCLOPENTOLATE HYDROCHLORIDE 1 DROP: 10 SOLUTION/ DROPS OPHTHALMIC at 10:03

## 2017-03-15 RX ADMIN — SODIUM CHLORIDE: 0.9 INJECTION, SOLUTION INTRAVENOUS at 10:03

## 2017-03-15 RX ADMIN — TROPICAMIDE 1 DROP: 10 SOLUTION/ DROPS OPHTHALMIC at 10:03

## 2017-03-15 RX ADMIN — FENTANYL CITRATE 50 MCG: 50 INJECTION, SOLUTION INTRAMUSCULAR; INTRAVENOUS at 12:03

## 2017-03-15 RX ADMIN — PHENYLEPHRINE HYDROCHLORIDE 1 DROP: 2.5 SOLUTION/ DROPS OPHTHALMIC at 10:03

## 2017-03-15 RX ADMIN — BUPIVACAINE HYDROCHLORIDE 2 ML: 7.5 INJECTION, SOLUTION EPIDURAL; RETROBULBAR at 01:03

## 2017-03-15 RX ADMIN — MOXIFLOXACIN HYDROCHLORIDE 1 DROP: 5 SOLUTION/ DROPS OPHTHALMIC at 10:03

## 2017-03-15 RX ADMIN — VASOPRESSIN 2 UNITS: 20 INJECTION, SOLUTION INTRAMUSCULAR; SUBCUTANEOUS at 01:03

## 2017-03-15 RX ADMIN — EPHEDRINE SULFATE 15 MG: 50 INJECTION, SOLUTION INTRAMUSCULAR; INTRAVENOUS; SUBCUTANEOUS at 01:03

## 2017-03-15 RX ADMIN — PROPOFOL 30 MG: 10 INJECTION, EMULSION INTRAVENOUS at 12:03

## 2017-03-15 RX ADMIN — LIDOCAINE HYDROCHLORIDE 1 MG: 10 INJECTION, SOLUTION EPIDURAL; INFILTRATION; INTRACAUDAL; PERINEURAL at 10:03

## 2017-03-15 RX ADMIN — NEOMYCIN SULFATE, POLYMYXIN B SULFATE, AND DEXAMETHASONE 1 APPLICATION: 3.5; 10000; 1 OINTMENT OPHTHALMIC at 01:03

## 2017-03-15 RX ADMIN — TETRACAINE HYDROCHLORIDE 1 DROP: 5 SOLUTION OPHTHALMIC at 10:03

## 2017-03-15 RX ADMIN — FENTANYL CITRATE 25 MCG: 50 INJECTION, SOLUTION INTRAMUSCULAR; INTRAVENOUS at 01:03

## 2017-03-15 RX ADMIN — VASOPRESSIN 1 UNITS: 20 INJECTION, SOLUTION INTRAMUSCULAR; SUBCUTANEOUS at 01:03

## 2017-03-15 RX ADMIN — DEXAMETHASONE SODIUM PHOSPHATE 2 MG: 4 INJECTION, SOLUTION INTRAMUSCULAR; INTRAVENOUS at 01:03

## 2017-03-15 RX ADMIN — LIDOCAINE HYDROCHLORIDE 80 SYRINGE: 20 INJECTION, SOLUTION INTRAVENOUS at 12:03

## 2017-03-15 RX ADMIN — HOMATROPINE HYDROBROMIDE 1 DROP: 50 SOLUTION OPHTHALMIC at 10:03

## 2017-03-15 RX ADMIN — LIDOCAINE HYDROCHLORIDE 2 ML: 20 INJECTION, SOLUTION EPIDURAL; INFILTRATION; INTRACAUDAL; PERINEURAL at 01:03

## 2017-03-15 RX ADMIN — EPHEDRINE SULFATE 10 MG: 50 INJECTION, SOLUTION INTRAMUSCULAR; INTRAVENOUS; SUBCUTANEOUS at 01:03

## 2017-03-15 RX ADMIN — EPINEPHRINE 0.3 MG: 1 INJECTION, SOLUTION INTRAMUSCULAR; SUBCUTANEOUS at 01:03

## 2017-03-15 RX ADMIN — SUCCINYLCHOLINE CHLORIDE 120 MG: 20 INJECTION, SOLUTION INTRAMUSCULAR; INTRAVENOUS at 12:03

## 2017-03-15 RX ADMIN — FENTANYL CITRATE 25 MCG: 50 INJECTION, SOLUTION INTRAMUSCULAR; INTRAVENOUS at 02:03

## 2017-03-15 RX ADMIN — VANCOMYCIN HYDROCHLORIDE 500 MG: 1 INJECTION, POWDER, LYOPHILIZED, FOR SOLUTION INTRAVENOUS at 01:03

## 2017-03-15 NOTE — BRIEF OP NOTE
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  Lattice with vitreous condensation inferiorly.  No large breaks noted.  Inferior retinopexy noted    Date of Discharge: 3/15/17    Discharge Disposition: stable to recovery then home    F/U: tomorrow

## 2017-03-15 NOTE — DISCHARGE INSTRUCTIONS
Post Op Instructions:  Patient should Maintain Eye shield & Dressing until seen tomorrow in eye clinic  Tylenol as needed for general discomfort  Use Prescription for pain medication if pain is severe  Use Prescription for Nausea (Zofran) if nausea or vomiting  No excessive exercise   No Bending, Lifting or Straining  Call MD if significant pain or nausea / vomiting uncontrolled by medications  Call MD if temperature in excess of 101' F  Sleep on either side or stomach.  Do NOT sleep flat on back  Return to eye clinic for Post Op Examination tomorrow Morning.  Bring Medicine bag to tomorrow's appointment.

## 2017-03-15 NOTE — IP AVS SNAPSHOT
[facility]  [address]  [facility] [address]  Phone: [phone]           Patient Discharge Instructions     Our goal is to set you up for success. This packet includes information on your condition, medications, and your home care. It will help you to care for yourself so you don't get sicker and need to go back to the hospital.     Please ask your nurse if you have any questions.        There are many details to remember when preparing to leave the hospital. Here is what you will need to do:    1. Take your medicine. If you are prescribed medications, review your Medication List in the following pages. You may have new medications to  at the pharmacy and others that you'll need to stop taking. Review the instructions for how and when to take your medications. Talk with your doctor or nurses if you are unsure of what to do.     2. Go to your follow-up appointments. Specific follow-up information is listed in the following pages. Your may be contacted by a transition nurse or clinical provider about future appointments. Be sure we have all of the phone numbers to reach you, if needed. Please contact your provider's office if you are unable to make an appointment.     3. Watch for warning signs. Your doctor or nurse will give you detailed warning signs to watch for and when to call for assistance. These instructions may also include educational information about your condition. If you experience any of warning signs to your health, call your doctor.               [facility]  Unless otherwise directed by your provider, please contact [facility], our nurse care line that is available for 24/7 assistance.     [phone] (toll-free)    Registered nurses in the [facility] provide clinical advisement, health education, appointment booking, and other advisory services.                    ** Verify the list of medication(s) below is accurate and up  to date. Carry this with you in case of emergency. If your medications have changed, please notify your healthcare provider.             Medication List      START taking these medications        Additional Info                      ondansetron 4 MG tablet   Commonly known as:  ZOFRAN   Quantity:  12 tablet   Refills:  0   Dose:  4 mg    Instructions:  Take 1 tablet (4 mg total) by mouth every 8 (eight) hours as needed for Nausea.     Begin Date    AM    Noon    PM    Bedtime       oxycodone-acetaminophen 5-325 mg per tablet   Commonly known as:  PERCOCET   Quantity:  12 tablet   Refills:  0   Dose:  1 tablet    Instructions:  Take 1 tablet by mouth every 6 (six) hours as needed for Pain.     Begin Date    AM    Noon    PM    Bedtime         CONTINUE taking these medications        Additional Info                      carvedilol 25 MG tablet   Commonly known as:  COREG   Quantity:  60 tablet   Refills:  3   Dose:  25 mg    Instructions:  Take 1 tablet (25 mg total) by mouth 2 (two) times daily with meals.     Begin Date    AM    Noon    PM    Bedtime       CORDRAN 0.05 % lotion   Refills:  0   Generic drug:  flurandrenolide    Instructions:  Apply to  scalp one to two times daily as needed for dryness     Begin Date    AM    Noon    PM    Bedtime       ketoconazole 2 % shampoo   Commonly known as:  NIZORAL   Quantity:  120 mL   Refills:  6    Instructions:  USE EVERY OTHER DAY LET SIT ON SCALP FOR FIVE MINUTES BEFORE RINSING     Begin Date    AM    Noon    PM    Bedtime       latanoprost 0.005 % ophthalmic solution   Refills:  1      Begin Date    AM    Noon    PM    Bedtime       lisinopril 20 MG tablet   Commonly known as:  PRINIVIL,ZESTRIL   Quantity:  30 tablet   Refills:  3   Dose:  20 mg    Instructions:  Take 1 tablet (20 mg total) by mouth once daily.     Begin Date    AM    Noon    PM    Bedtime       om-3-epa-dha-fish oil-flax-E [id number] vj-mt-cf-unit Cap   Commonly known as:  THERA TEARS NUTRITION    Refills:  0   Dose:  3 capsule    Instructions:  Take 3 capsules by mouth every morning. Over-the-counter supplement     Begin Date    AM    Noon    PM    Bedtime       ONE-A-DAY 50 PLUS tablet   Refills:  0   Dose:  1 tablet   Generic drug:  geriatric multivitamin-min    Instructions:  Take 1 tablet by mouth once daily.     Begin Date    AM    Noon    PM    Bedtime       verapamil 240 MG CR tablet   Commonly known as:  CALAN-SR   Quantity:  30 tablet   Refills:  12    Instructions:  take 1 tablet by mouth twice a day     Begin Date    AM    Noon    PM    Bedtime       XARELTO 20 mg Tab   Refills:  0   Dose:  20 mg   Generic drug:  rivaroxaban    Instructions:  Take 20 mg by mouth once daily.     Begin Date    AM    Noon    PM    Bedtime            Where to Get Your Medications      You can get these medications from any pharmacy     Bring a paper prescription for each of these medications     ondansetron 4 MG tablet    oxycodone-acetaminophen 5-325 mg per tablet                  Please bring to all follow up appointments:    1. A copy of your discharge instructions.  2. All medicines you are currently taking in their original bottles.  3. Identification and insurance card.    Please arrive 15 minutes ahead of scheduled appointment time.    Please call 24 hours in advance if you must reschedule your appointment and/or time.        Your Scheduled Appointments     Mar 20, 2017  3:30 PM CDT   Pre OP with PRE[facility],  [facility]   [facility] ([facility]    [address]  [place] [id number] [phone]              Your Future Surgeries/Procedures     Mar 22, 2017   Surgery with Porfirio Arellano MD   [facility] ([facility])    [address] [id number] [phone]              Follow-up Information     Follow up with Edilson Ortega MD.    Specialty:  Ophthalmology    Why:  10th Floor eye clinic tomorrow at 9:30    Contact  information:    [address]  [facility] [address]  [phone]          Discharge Instructions     Future Orders    Call MD for:  difficulty breathing, headache or visual disturbances     Call MD for:  extreme fatigue     Call MD for:  hives     Call MD for:  persistent dizziness or light-headedness     Call MD for:  persistent nausea and vomiting     Call MD for:  redness, tenderness, or signs of infection (pain, swelling, redness, odor or green/yellow discharge around incision site)     Call MD for:  severe uncontrolled pain     Call MD for:  temperature >100.4     Diet general     Questions:    Total calories:      Fat restriction, if any:      Protein restriction, if any:      Na restriction, if any:      Fluid restriction:      Additional restrictions:      Lifting restrictions     Weight bearing restrictions (specify)         Discharge Instructions       Post Op Instructions:  Patient should Maintain Eye shield & Dressing until seen tomorrow in eye clinic  Tylenol as needed for general discomfort  Use Prescription for pain medication if pain is severe  Use Prescription for Nausea (Zofran) if nausea or vomiting  No excessive exercise   No Bending, Lifting or Straining  Call MD if significant pain or nausea / vomiting uncontrolled by medications  Call MD if temperature in excess of 101' F  Sleep on either side or stomach.  Do NOT sleep flat on back  Return to eye clinic for Post Op Examination tomorrow Morning.  Bring Medicine bag to tomorrow's appointment.        Primary Diagnosis     Your primary diagnosis was:  Retinal Detachment      Admission Information     Date & Time Provider Department CSN    3/15/2017  6:46 AM JAKE Beaulieu MD [facility] [id number]      Care Providers     Provider Role Specialty Primary office phone    JAKE Beaulieu MD Attending Provider Ophthalmology [phone]    JAKE Beaulieu MD Surgeon  Ophthalmology [phone]      Your Vitals  "Were     BP Pulse Temp Resp Height Weight    109/87 48 97.7 °F (36.5 °C) (Axillary) 18 5' 7" (1.702 m) 72.1 kg (159 lb)    Last Period SpO2 BMI          (LMP Unknown) 96% 24.9 kg/m2        Recent Lab Values        5/1/2006 11/21/2007 7/29/2011 7/23/2012 7/30/2012 8/2/2013 9/23/2014 1/6/2017      8:05 AM  7:45 AM 11:00 AM  9:04 AM 10:25 AM  7:30 AM  2:55 PM 11:12 AM    A1C 5.6 5.8 5.8 6.2 6.2 5.9 5.8 6.0    Comment for A1C at 11:12 AM on 1/6/2017:  According to ADA guidelines, hemoglobin A1C <7.0% represents  optimal control in non-pregnant diabetic patients.  Different  metrics may apply to specific populations.   Standards of Medical Care in Diabetes - 2016.  For the purpose of screening for the presence of diabetes:  <5.7%     Consistent with the absence of diabetes  5.7-6.4%  Consistent with increasing risk for diabetes   (prediabetes)  >or=6.5%  Consistent with diabetes  Currently no consensus exists for use of hemoglobin A1C  for diagnosis of diabetes for children.        Allergies as of 3/15/2017        Reactions    Adhesive Tape-silicones     Other reaction(s): pulling skin off    Atorvastatin     Other reaction(s): Muscle pain    Pravachol  [Pravastatin] Rash      Advance Directives     An advance directive is a document which, in the event you are no longer able to make decisions for yourself, tells your healthcare team what kind of treatment you do or do not want to receive, or who you would like to make those decisions for you.  If you do not currently have an advance directive, [facility] encourages you to create one.  For more information call:  [phone]), [phone] ([phone]),  or log on to [email or web address].        Language Assistance Services     ATTENTION: Language assistance services are available, free of charge. Please call [phone].      ATENCIÓN: Si habla español, tiene a blancas disposición servicios gratuitos de asistencia lingüística. Llame al " [phone].     FLORENCIO Ý: N?u b?n nói Ti?ng Vi?t, có các d?ch v? h? tr? ngôn ng? mi?n phí dành cho b?n. G?i s? [phone].        Pneumonmia Discharge Instructions                Jose Byrne            [facility] complies with applicable Federal civil rights laws and does not discriminate on the basis of race, color, national origin, age, disability, or sex.

## 2017-03-15 NOTE — ANESTHESIA RELEASE NOTE
"Anesthesia Release from PACU Note    Patient: Ida Santana    Procedure(s) Performed: Procedure(s) (LRB):  REPAIR-RETINA (VITRECTOMY) (Left)    Anesthesia type: general    Post pain: Adequate analgesia    Post assessment: no apparent anesthetic complications and tolerated procedure well    Last Vitals:   Visit Vitals    BP (!) 140/50    Pulse (!) 48    Temp 36.4 °C (97.5 °F) (Temporal)    Resp 18    Ht 5' 7" (1.702 m)    Wt 72.1 kg (159 lb)    LMP  (LMP Unknown)    SpO2 100%    BMI 24.9 kg/m2       Post vital signs: stable    Level of consciousness: awake    Nausea/Vomiting: no nausea/no vomiting    Complications: vomited during anesthetic, ett placed    Airway Patency: patent    Respiratory: unassisted    Cardiovascular: stable and blood pressure at baseline    Hydration: euvolemic  "

## 2017-03-15 NOTE — ANESTHESIA POSTPROCEDURE EVALUATION
"Anesthesia Post Evaluation    Patient: Ida Santana    Procedure(s) Performed: Procedure(s) (LRB):  REPAIR-RETINA (VITRECTOMY) (Left)    Final Anesthesia Type: general  Patient location during evaluation: PACU  Patient participation: Yes- Able to Participate  Level of consciousness: awake and alert  Post-procedure vital signs: reviewed and stable  Pain management: adequate  Airway patency: patent  PONV status at discharge: No PONV  Anesthetic complications: yes (vomited during anesthetic, ETT placed)  Perioperative Events: other periop events (see comments)    Cardiovascular status: blood pressure returned to baseline  Respiratory status: unassisted, spontaneous ventilation and room air  Hydration status: euvolemic  Follow-up not needed.        Visit Vitals    BP (!) 140/50    Pulse (!) 48    Temp 36.4 °C (97.5 °F) (Temporal)    Resp 18    Ht 5' 7" (1.702 m)    Wt 72.1 kg (159 lb)    LMP  (LMP Unknown)    SpO2 100%    BMI 24.9 kg/m2       Pain/Ellen Score: Pain Assessment Performed: Yes (3/15/2017  3:00 PM)  Presence of Pain: denies (3/15/2017  3:00 PM)  Ellen Score: 10 (3/15/2017  3:00 PM)      "

## 2017-03-15 NOTE — OP NOTE
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    PROCEDURES PERFORMED:  A 25-gauge pars plana vitrectomy with endolaser,   air-fluid exchange, injection of 1000 centistoke silicone oil to the left eye.    ENDOLASER PARAMETERS:  Number of spots 1059, power 150 milliwatts, duration 0.1   seconds.    ATTENDING SURGEON:  JAKE Beaulieu M.D.    ASSISTANT SURGEON:  Edilson Ortega M.D. (FEL)    ANESTHESIA:  General endotracheal anesthesia with a retrobulbar injection of 4   mL mixture of 0.7% Marcaine, 2% Xylocaine.    ESTIMATED BLOOD LOSS:  Minimal.    COMPLICATIONS:  None.    DISPOSITION:  Stable to recovery.    INDICATIONS FOR SURGERY:  This is a 76-year-old female who presented to my   clinic two days prior to surgery as an urgent referral from Dr. Crawford for   possible retinal detachment.  The patient had a prior admission for pneumonia   complicated by sepsis.  The patient at that time had a red eye but had resolved   while she was on antibiotic therapy and had no residual symptoms.  On followup   examination with Dr. Crawford, she was found to have posterior synechiae,   vitreous consolidation and inferior retinal detachment.  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  Decision was made to take the patient promptly to surgery   to repair the retinal detachment, prevent further vision loss and hopefully   improve some of the peripheral vision she had lost.  Risks, benefits, and   alternatives of surgery were discussed in detail.  Risks including loss of   vision, loss of eye, recurrent retinal detachment, recurrent infection,   hemorrhage, cataract formation, lens dislocation, glaucoma, hypotony, ptosis and   diplopia.   The patient voiced understanding and wished to proceed with the   procedure.    DESCRIPTION OF PROCEDURE:  After proper informed consent was obtained, the   patient was brought back to the operating suite at [facility] where   general anesthesia was induced.  Retrobulbar injection was provided as above   without complication.  The patient was prepped and draped in normal sterile   fashion for ophthalmic surgery, and lid speculum was placed in the left eye.  A   standard 3-port 25-gauge pars plana vitrectomy was set up with the infusion   cannula inserted 4 mm posterior to the limbus.  The infusion cannula was turned   on only after observed to be free and clear of all retinal tissue.  Supranasal   and supratemporal trocars were also placed 4 mm posterior to the limbus.  The   vitrector and light pipe were introduced in the vitreous cavity, and a core   vitrectomy was performed.  Posterior hyaloid was already detached and was   propagated out to the level of the vitreous base.  There is significant vitreous   consolidation consistent with a history of endogenous endophthalmitis.  There   was also significant lattice patches inferiorly with a very taut vitreous.  No   large breaks were found, but there were several areas of small retinal thinning.    Scleral depression was performed 360 degrees to help with the removal of the   peripheral vitreous base shaving.  An inferonasal posterior retinotomy was   created with the diathermy, and air-fluid exchange was performed.  The retina   significantly flattened following this maneuver.  Endolaser was applied around   the posterior retinotomy as well as in a barrier fashion from the 7:30 position   counterclockwise to the 3 o'clock position.  There was also an RO very   superotemporally at the 11 o'clock position, which was lasered.  The supranasal   trocar was removed and closed with 7-0 Vicryl suture.  1000 centistoke silicone   oil was injected into the eye,  approximately 5.9 mL, then the supratemporal and   infusion trocars were removed and closed with 7-0 Vicryl sutures, and the eye   was normal pressure via palpation.  Subconjunctival injections of vancomycin and   Decadron were given to the patient.  The drapes were removed from the patient.    She was washed free of Betadine prep solution.  Maxitrol ointment was placed in   the left eye.  The eye was patch shielded.  General anesthesia was reversed,   and she was brought to Recovery Room in stable condition, tolerating the   procedure well.  Dr. Beaulieu was present for the entire case.      KEHINDE/JENY  dd: 03/15/2017 14:05:51 (CDT)  td: 03/15/2017 14:57:45 (CDT)  Doc ID   #[id number]  Job ID #[id number]    CC:

## 2017-03-15 NOTE — TRANSFER OF CARE
"Anesthesia Transfer of Care Note    Patient: Ida Santana    Procedure(s) Performed: Procedure(s) (LRB):  REPAIR-RETINA (VITRECTOMY) (Left)    Patient location: PACU    Anesthesia Type: general    Transport from OR: Transported from OR on 6-10 L/min O2 by face mask with adequate spontaneous ventilation    Post pain: adequate analgesia    Post assessment: no apparent anesthetic complications    Post vital signs: stable    Level of consciousness: awake    Nausea/Vomiting: no nausea/vomiting    Complications: none          Last vitals:   Visit Vitals    BP (!) 124/51    Pulse (!) 48    Temp 36.5 °C (97.7 °F) (Axillary)    Resp 18    Ht 5' 7" (1.702 m)    Wt 72.1 kg (159 lb)    LMP  (LMP Unknown)    SpO2 100%    BMI 24.9 kg/m2     "

## 2017-03-15 NOTE — PLAN OF CARE
Problem: Patient Care Overview  Goal: Plan of Care Review  Outcome: Outcome(s) achieved Date Met:  03/15/17     Discharge instructions and prescription given to patient and family. Verbalized understanding. Patient stable, tolerating fluids. No complaints at this time.  Dr. Guevara notified for a release.  Patient adequate for discharge.

## 2017-03-15 NOTE — PROGRESS NOTES
Dr. Guevara at bedside assessing patient and explaining her anesthesia care provided to patient and family member.   MD stated patient adequate for discharge.

## 2017-03-15 NOTE — PROGRESS NOTES
Dr. Beaulieu at bedside explaining results of procedure to patient and family. All questions answered. Discharge instructions given. Verbalized understanding.

## 2017-03-15 NOTE — ANESTHESIA PREPROCEDURE EVALUATION
Anesthesia Assessment: Preoperative EQUATION     Planned Procedure: Procedure(s) (LRB):  ADRENALECTOMY/ open with renal vein thrombectomy (Left)  NEPHRECTOMY-RADICAL (Left)  Requested Anesthesia Type:General  Surgeon: Porfirio Arellano MD  Service: Urology  Known or anticipated Date of Surgery:3/15/2017     Surgeon notes: reviewed     Electronic QUestionnaire Assessment completed via nurse interview with patient.                    Ida Santana [[id number]] - 76 y.o. Female         Providers Outside of [facility]       No data to display        Surgical Risk Level       Surgical Risk Level:   4              caRDScore (Clinical Anesthesia Rapid Decision Score)         Moderate  Total Score: 16       16 Sum of Clinical Scores        caRDScores (Grouped)       caRDScore - Ane:   0                       caRDScore - CVD:   1                       caRDScore - Pul:   2                       caRDScore - Met:   3                       caRDScore - Phy:   10              caRDScore Items             Pre-admit from 3/15/2017 in [facility]      Anesthesia        CVD        Activity similar to best ability for maximal activity or exercise   METS 4      Diagnosed with high blood pressure   Yes      Typical BP runs <150/90   Yes      Pulmonary        Has had pneumonia in the last 3 months   Yes [1/12/17]      Metabolic        Adrenal disease (Specify)   Yes [L Adrenal mass]      Physiologic        Has been hospitalized within last 3 months   Yes      Has acute infection now / taking antibiotics   No [Pneumonia/MRSA- Last dose IV antibiotics 3/6/17]      Has had bloodstream, bodily infection (sepsis) in last 3 mos   Yes      Obesity Status   Not Obese (BMI <30)      Hx of abnormal blood clot   Yes      Blood Thinner:  Rivaroxaban (Xarelto)   Yes      Has glaucoma   Yes [5/2013-surgery]        Flags       Red Flag Score:   4                       Yellow Flag Score:   3              Red Flags             Pre-admit  from 3/15/2017 in [facility]      Has been hospitalized within last 3 months   Yes      Has had pneumonia in the last 3 months   Yes [1/12/17]      History of antibiotic resistant organism   Yes      Obesity Status   Not Obese (BMI <30)      Blood Thinner:  Rivaroxaban (Xarelto)   Yes        Yellow Flags             Pre-admit from 3/15/2017 in [facility]      Has acute infection now / taking antibiotics   No [Pneumonia/MRSA- Last dose IV antibiotics 3/6/17]      Has had bloodstream, bodily infection (sepsis) in last 3 mos   Yes      Takes herbal medications or vitamin supplements   Yes      Obesity Status   Not Obese (BMI <30)      Hx of abnormal blood clot   Yes      Has pain   No        PONV Risk Score (assumes periop narcotic use = +1, Max=4)       PONV Risk Score:   3              PONV Risk Factors  Total Score: 2       1 Female      1 Non-Smoker at present        Sleep Apnea  Total Score: 0         LONA STOP-Bang Risk Factors (Max=8)  Total Score: 2       1 Takes medication for high blood pressure      1 Age >50        LONA Risk Level - 1 (Low), 2 (Moderate), 3 (High)       LONA Risk Level:   1              RCRI (Revised Cardiac Risk Indices of ACC/AHA guidelines, Max=6)  Total Score: 0         CAD Risk Factors  Total Score: 2       1 Female. Age >55      1 Diagnosed with high blood pressure        CHADS Score if applicable (history of atrial fib/flutter, Max=6)  Total Score: 1       1 Diagnosed with high blood pressure        Maximal Exercise Capacity             Pre-admit from 3/15/2017 in [facility]      Maximal Exercise Capacity   METS 4        Summary of Dependence  Total Score: 1       1 Is totally independent of others for activities of daily living        Phone Fraility Score (Max = 17)  Total Score: 2       1 Has had 1 hospitalization in last year      1 Uses 5 or more meds on reg basis        Pain Factors             Pre-admit from 3/15/2017 in  [facility]      Has pain   No        Risk Triggers (Evidence-Based Risk Triggers)         Pulmonary Risk Triggers  Total Score: 2       1 Age > or = 60      1 Has had pneumonia in the last 3 months        Renal Risk Triggers  Total Score: 1       1 Diagnosed with high blood pressure        Delirium Risk Triggers  Total Score: 0         Urologic Risk Triggers  Total Score: 0         Logistics         Pre-op Clinic Logistics  Total Score: 10       1 Has had 1 hospitalization in last year      1 Has had bloodstream, bodily infection (sepsis) in last 3 mos      1 Has Hx of MRSA      2 Takes herbal medications or vitamin supplements      1 Has had anesthesia, either as adult or as a child      3 Blood Thinner:  Rivaroxaban (Xarelto)      1 Takes medication for high blood pressure        DOSC Logistics  Total Score: 4       2 Has Hx of MRSA      2 Blood Thinner:  Rivaroxaban (Xarelto)        Discharge Logistics  Total Score: 0         Discharge Planning             Pre-admit from 3/15/2017 in [facility]      Discharge Planning        Discharge plans   Home alone      Will assist patient 24/7, if needed   -- [brother and sister-in-law]        Anes & Int Med <For office use only>       Total Score: 19          Surgical Risk Level Assessment                       Triage considerations:      The patient has no apparent active cardiac condition (No unstable coronary Syndrome such as severe unstable angina or recent [<1 month] myocardial infarction, decompensated CHF, severe valvular disease or significant arrhythmia)     Previous anesthesia records:[facility] 1/17/17     Last PCP note: 6-12 months ago , within [facility]  9/26/16  Subspecialty notes: Infectious Disease 2/13/17     Other important co-morbidities: HTN, Glaucoma, S/P Sepsis/MRSA/Staph/Pneumonia-1/2017, S/P Breast Ca-2001      Tests already available:  Available tests, within 3 months , within [facility] . 2/2017 CBC, BMP, Sed Rate,  C-RP, Mag, PO4, CT Abd. 1/2017 CMP, 2-D Echo, MIS, EKG, CXR, MRI Spine, US Kidney, MRI Brain, CT Chest/ Abd/Pelvis, Blood Culture      Instructions given. (See in Nurse's note)     Optimization:  Anesthesia Preop Clinic Assessment Indicated-Indicated per cards score  Medical Opinion Indicated-Indicated per cards score      Plan:   Testing: T&S  Pre-anesthesia Visit 3/10   Visit focus: concerns in complex and/or prolonged anesthesia, airway concerns, S/P Pneumonia, Sepsis, MRSA/Staph  Consultation:Patient's PCP for a statement of optimization  3/14 PCP appointment for Optimization.          MD Fidelia Oliver RN; 3/8/2017        I believe she was placed on xarelto due to the clot in her renal vein which is likely tumor thrombus.     It should be stopped at the appropriate time prior to her surgery on 3/15.     Talat          Patient has previously scheduled Medical Appointment: 3/13 Ophthalmology  3/14 PCP Dr Murray     Navigation: Tests Scheduled. T&S 3/10  Results will be tracked by Preop Clinic.     Fidelia Sarmiento RN                                                                                                                03/15/2017  Ida Santana is a 76 y.o., female.    [facility] Anesthesia Evaluation         Review of Systems  Anesthesia Hx:  No problems with previous Anesthesia Denies Hx of Anesthetic complications History of prior surgery of interest to airway management or planning: Previous anesthesia: General 1/17/17: MIS with general anesthesia.  Procedure performed at an [facility] Facility. Denies Family Hx of Anesthesia complications.   Denies Personal Hx of Anesthesia complications.   Social:  Non-Smoker  Patient's occupation is Retired . Denies Tobacco Use. Denies Alcohol Use.   Hematology/Oncology:        Hematology Comments: S/P Sepsis/MRSA/Staph/pneumonia: 1/2017 --  Cancer in past history: s/p radiation therapy   Oncology Comments: Hx breast cancer: 6 weeks of  radiation     EENT/Dental:   Eyes: Eye Disease: Glaucoma:   Denies Jaw Problems   Cardiovascular:   Hypertension, well controlled  Denies Angina.  Functional Capacity good / => 4 METS, goes to grocery store/walks around [employer]: denies CP/SOB  Denies Coronary Artery Disease.  Hypertension , Recent typical clinic B/P of 121/68 @ POC visit    Pulmonary:   Denies Shortness of breath.  Denies Asthma.  Denies Chronic Obstructive Pulmonary Disease (COPD).  Possible Obstructive Sleep Apnea , (STOP/BANG) Symptoms S - Snoring (loud), P - Pressure being treated for high BP and A - Age > 50  Pulmonary Infection:  Pneumonia.    Renal/:   Thrombosis of left renal vein  Diastolic dysfunction Neoplasm/Tumor (left renal mass).    Hepatic/GI:  Denies Esophageal / Stomach Disorders  Denies Liver Disease    Musculoskeletal:  Bone Disorders: Osteopenia    Neurological:  Denies Seizure Disorder  Denies CVA - Cerebrovasular Accident  Denies TIA - Transient Ischemic Attack    Endocrine:  Adrenal Disease, Left adrenal mass  Metabolic Disorders, Hyperlipoproteinemia      Physical Exam  General:  Well nourished    Airway/Jaw/Neck:  Airway Findings: Mouth Opening: Normal Jaw/Neck Findings:  Neck ROM: Normal ROM      Dental:  Dental Findings: lower partial dentures   Chest/Lungs:  Chest/Lungs Findings: Normal Respiratory Rate     Heart/Vascular:  Heart Findings: Rate: Normal  Heart Murmur  Systolic Heart Murmur Description: R Upper Sternal Border, Apical  Systolic Heart Murmur Grade: Grade II, Grade I  Vascular Findings: Normal       Mental Status:  Mental Status Findings:  Cooperative, Alert and Oriented         To be cleared per PCP Dede) 3/14    Macy Abel MD      Anesthesia Plan  Type of Anesthesia, risks & benefits discussed:  Anesthesia Type:  general  Patient's Preference:   Intra-op Monitoring Plan: standard ASA monitors  Intra-op Monitoring Plan Comments:   Post Op Pain Control Plan:   Post Op Pain Control Plan  Comments:   Induction:   IV  Beta Blocker:  Patient is on a Beta-Blocker and has received one dose within the past 24 hours (No further documentation required).       Informed Consent: Patient understands risks and agrees with Anesthesia plan.  Questions answered. Anesthesia consent signed with patient.  ASA Score: 2     Day of Surgery Review of History & Physical:    H&P update referred to the surgeon.         Ready For Surgery From Anesthesia Perspective.

## 2017-03-16 ENCOUNTER — OFFICE VISIT (OUTPATIENT)
Dept: OPHTHALMOLOGY | Facility: CLINIC | Age: 77
End: 2017-03-16
Payer: COMMERCIAL

## 2017-03-16 VITALS — HEART RATE: 77 BPM | DIASTOLIC BLOOD PRESSURE: 74 MMHG | SYSTOLIC BLOOD PRESSURE: 144 MMHG

## 2017-03-16 DIAGNOSIS — H33.002 RHEGMATOGENOUS RETINAL DETACHMENT OF LEFT EYE: Primary | ICD-10-CM

## 2017-03-16 LAB
BACTERIA UR CULT: NORMAL
BACTERIA UR CULT: NORMAL

## 2017-03-16 PROCEDURE — 99999 PR PBB SHADOW E&M-EST. PATIENT-LVL II: CPT | Mod: PBBFAC,,, | Performed by: OPHTHALMOLOGY

## 2017-03-16 PROCEDURE — 99024 POSTOP FOLLOW-UP VISIT: CPT | Mod: S$GLB,,, | Performed by: OPHTHALMOLOGY

## 2017-03-16 NOTE — PROGRESS NOTES
A/P  1. Rhegmatogenous Retinal Detachment OS s/p 25g PPV/EL/AFx/Silicone Oil on 3/16/17  - retina flat, good IOP  - start gtts QID, max oint qhs  - no heavy lifting,strenous exercise  - sleep eyshield x 1 week, sleep left side  - return precautions given      2. 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 NS OU    4. POAG OU  On Latanoprost OU qam    RTC 5 days    Pt seen by Dr. Waggoner.  Case discussed.  Agree with H and P and Assessment/Plan

## 2017-03-20 ENCOUNTER — OFFICE VISIT (OUTPATIENT)
Dept: UROLOGY | Facility: CLINIC | Age: 77
DRG: 615 | End: 2017-03-20
Payer: COMMERCIAL

## 2017-03-20 DIAGNOSIS — E27.8 LEFT ADRENAL MASS: ICD-10-CM

## 2017-03-20 PROCEDURE — 99499 UNLISTED E&M SERVICE: CPT | Mod: S$GLB,,, | Performed by: STUDENT IN AN ORGANIZED HEALTH CARE EDUCATION/TRAINING PROGRAM

## 2017-03-20 PROCEDURE — 99999 PR PBB SHADOW E&M-EST. PATIENT-LVL II: CPT | Mod: PBBFAC,,,

## 2017-03-20 NOTE — PROGRESS NOTES
Urology ([facility]) H&P  Staff: Dr. Adan MD    Is this patient in a research study?  No    CC: left adrenal mass and renal vein thrombus     HPI:  Ida Santana is a 76 y.o. female with an incidentally found left adrenal mass and left renal vein thromus. This was found during hospital admission for pneumonia.   CT showed a left renal vein thrombus as well as a 4 cm left adrenal mass. She was started on xarelto for the thrombus and repeat imaging shows resolution of this.     She is asymptomatic from this. She denies hematuria, dysuria, flank pain, changes in appetite, weight loss. Her functional workup was negative.      She has a family hx of kidney cancer in her mother. No smoking history.     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/2/2013    Osteopenia     Potassium depletion 1998    Retinal detachment of left eye with retinal break 3/13/2017       Past Surgical History:   Procedure Laterality Date    BREAST BIOPSY  2001    left breast DCIS    BREAST SURGERY  9/2001    left lumpectomy with SLN bx    COLONOSCOPY      COLONOSCOPY N/A 10/31/2016    Procedure: COLONOSCOPY;  Surgeon: YAMILETH Richards MD;  Location: [facility] ([address] [place] [address]);  Service: Endoscopy;  Laterality: N/A;       Social History     Social History    Marital status: Single     Spouse name: N/A    Number of children: N/A    Years of education: N/A     Social History Main Topics    Smoking status: Never Smoker    Smokeless tobacco: Never Used    Alcohol use No    Drug use: No    Sexual activity: No     Other Topics Concern    Not on file     Social History Narrative    Retired  (34 years) with [employer]       Family History   Problem Relation  Age of Onset    Cancer Mother      breast    Breast cancer Mother      40s and again in 50s (bilateral)    Breast cancer Other     Heart disease Father      bypass    Cancer Father      asbestos    Stroke Brother     Ovarian cancer Neg Hx     Amblyopia Neg Hx     Blindness Neg Hx     Cataracts Neg Hx     Diabetes Neg Hx     Glaucoma Neg Hx     Hypertension Neg Hx     Macular degeneration Neg Hx     Retinal detachment Neg Hx     Strabismus Neg Hx     Thyroid disease Neg Hx     Colon cancer Neg Hx        Review of patient's allergies indicates:   Allergen Reactions    Adhesive tape-silicones      Other reaction(s): pulling skin off    Atorvastatin      Other reaction(s): Muscle pain    Pravachol  [pravastatin] Rash       Current Outpatient Prescriptions on File Prior to Visit   Medication Sig Dispense Refill    carvedilol (COREG) 25 MG tablet Take 1 tablet (25 mg total) by mouth 2 (two) times daily with meals. 60 tablet 3    flurandrenolide (CORDRAN) 0.05 % lotion Apply to  scalp one to two times daily as needed for dryness      ketoconazole (NIZORAL) 2 % shampoo USE EVERY OTHER DAY LET SIT ON SCALP FOR FIVE MINUTES BEFORE RINSING 120 mL 6    latanoprost 0.005 % ophthalmic solution   1    lisinopril (PRINIVIL,ZESTRIL) 20 MG tablet Take 1 tablet (20 mg total) by mouth once daily. 30 tablet 3    MULTIVITAMIN WITH MINERALS (ONE-A-DAY 50 PLUS) Tab Take 1 tablet by mouth once daily.       om-3-epa-dha-fish oil-flax-E (THERA TEARS NUTRITION) [phone] cs-vm-eh-unit Cap Take 3 capsules by mouth every morning. Over-the-counter supplement      ondansetron (ZOFRAN) 4 MG tablet Take 1 tablet (4 mg total) by mouth every 8 (eight) hours as needed for Nausea. 12 tablet 0    oxycodone-acetaminophen (PERCOCET) 5-325 mg per tablet Take 1 tablet by mouth every 6 (six) hours as needed for Pain. 12 tablet 0    rivaroxaban (XARELTO) 20 mg Tab Take 20 mg by mouth once daily.      verapamil (CALAN-SR)  240 MG CR tablet take 1 tablet by mouth twice a day 30 tablet 12     No current facility-administered medications on file prior to visit.        Anticoagulation:  Currently stopped the xarelto     Physical Exam:  weight 159 lb/72 kg  BMI 24.9    AAOx4, NAD, WDWN  NC/AT, EOMI, PER, sclerae anicteric, speech normal, tongue midline  Nl effort, CTAB  RRR  Soft, non-tender, non-distended    Labs:    Urine dipstick today - negative    Lab Results   Component Value Date    WBC 5.86 03/14/2017    HGB 13.6 03/14/2017    HCT 40.6 03/14/2017    MCV 92 03/14/2017     03/14/2017       BMP  Lab Results   Component Value Date     03/14/2017    K 3.7 03/14/2017     03/14/2017    CO2 26 03/14/2017    BUN 15 03/14/2017    CREATININE 1.1 03/14/2017    CALCIUM 10.0 03/14/2017    ANIONGAP 8 03/14/2017    ESTGFRAFRICA 57.2 (A) 03/14/2017    EGFRNONAA 49.6 (A) 03/14/2017       Imaging:      CT C/A/P 1/18/17:  There is a filling defect consistent with thrombus involving the left renal vein.  The left kidney is diffusely enlarged/edematous with adjacent perinephric fat stranding and perinephric edema.  Within the inferior pole of the left kidney there is a hypodensity measuring approximately 1.7 cm.  This does not demonstrate fluid attenuation. The left kidney demonstrates a striated nephrogram, but does appear to concentrated and excreted contrast, with renally excreted contrast seen in the left ureter.  Anterior to left kidney and superior to the left renal vein there is a solid, 4.2 x 3.1 x 3.3 cm mass.    US Retroperitoneal 1/24/17:  There is no nephrolithiasis, or hydronephrosis.  The there is a 2 cm cystic lesion in the lower pole of the left kidney which demonstrates internal complexity however without increased vascularity.  This likely represents a Bosniak IIF cyst.    CT abdomen without and with contrast 2/15/2017: Large heterogenous LEFT adrenal mass. 1.7 cm nonenhancing focal hypodensity in the LEFT kidney  with thick capsule not generally seen with simple cysts. Interval decrease in LEFT perinephric stranding.  Interval resolution of LEFT renal vein thrombosis.    Assessment: Ida Santana is a 76 y.o. 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 To OR on 3/28/17 for open left adrenalectomy and renal vein thrombectomy, possible left nephrectomy  2. Consents signed   3. I have explained the risk, benefits, and alternatives of the procedure in detail. The patient voices understanding and all questions have been answered. The patient agrees to proceed as planned.   4. Type and cross 2 units    Teresa Gordon MD

## 2017-03-21 ENCOUNTER — OFFICE VISIT (OUTPATIENT)
Dept: OPHTHALMOLOGY | Facility: CLINIC | Age: 77
DRG: 615 | End: 2017-03-21
Payer: COMMERCIAL

## 2017-03-21 DIAGNOSIS — H33.002 RETINAL DETACHMENT OF LEFT EYE WITH RETINAL BREAK: Primary | ICD-10-CM

## 2017-03-21 PROCEDURE — 99999 PR PBB SHADOW E&M-EST. PATIENT-LVL II: CPT | Mod: PBBFAC,,, | Performed by: OPHTHALMOLOGY

## 2017-03-21 PROCEDURE — 99024 POSTOP FOLLOW-UP VISIT: CPT | Mod: S$GLB,,, | Performed by: OPHTHALMOLOGY

## 2017-03-21 RX ORDER — DORZOLAMIDE HYDROCHLORIDE AND TIMOLOL MALEATE 20; 5 MG/ML; MG/ML
1 SOLUTION/ DROPS OPHTHALMIC 2 TIMES DAILY
Qty: 10 ML | Refills: 6 | Status: SHIPPED | OUTPATIENT
Start: 2017-03-21 | End: 2017-11-07 | Stop reason: SDUPTHER

## 2017-03-21 NOTE — PROGRESS NOTES
A/P  1. 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 NS OU    4. POAG OU  On Latanoprost OD qam  Start Cosopt BID OS      Patient having surgery on adrenal mass tomorrow    RTC 3 weeks

## 2017-03-21 NOTE — MR AVS SNAPSHOT
[facility] - Ophthalmology  [address]  [place] [address]  Phone: [phone]  Fax: [phone]                  Ida Santana   3/21/2017 1:00 PM   Office Visit    Description:  Female : 1940   Provider:  JAKE Beaulieu MD   Department:  [facility] - Ophthalmology           Reason for Visit     Post-op Evaluation           Diagnoses this Visit        Comments    Retinal detachment of left eye with retinal break    -  Primary            To Do List           Future Appointments        Provider Department Dept Phone    4/10/2017 10:20 AM Becca Sims NP [facility] - Urology [facility] [phone]      Your Future Surgeries/Procedures     Mar 22, 2017   Surgery with Porfirio Arellano MD   [facility] ([facility])    [address] [facility] [address] [phone]              Goals (5 Years of Data)     None      Follow-Up and Disposition     Return in about 3 weeks (around 2017).       These Medications        Disp Refills Start End    dorzolamide-timolol 2-0.5% (COSOPT) 22.3-6.8 mg/mL ophthalmic solution 10 mL 6 3/21/2017 3/21/2018    Place 1 drop into the left eye 2 (two) times daily. - Left Eye    Pharmacy: [facility] [place] [address] [address] [address] #: [phone]         [facility]     [facility] Nurse Care Line -  Assistance  Registered nurses in the [facility] provide clinical advisement, health education, appointment booking, and other advisory services.  Call for this free service at [phone].             Medications           Message regarding Medications     Verify the changes and/or additions to your medication regime listed below are the same as discussed with your clinician today.  If any of these changes or additions are incorrect, please notify your healthcare provider.        START taking these NEW medications        Refills    dorzolamide-timolol 2-0.5%  (COSOPT) 22.3-6.8 mg/mL ophthalmic solution 6    Sig: Place 1 drop into the left eye 2 (two) times daily.    Class: Normal    Route: Left Eye      STOP taking these medications     oxycodone-acetaminophen (PERCOCET) 5-325 mg per tablet Take 1 tablet by mouth every 6 (six) hours as needed for Pain.    ondansetron (ZOFRAN) 4 MG tablet Take 1 tablet (4 mg total) by mouth every 8 (eight) hours as needed for Nausea.           Verify that the below list of medications is an accurate representation of the medications you are currently taking.  If none reported, the list may be blank. If incorrect, please contact your healthcare provider. Carry this list with you in case of emergency.           Current Medications     carvedilol (COREG) 25 MG tablet Take 1 tablet (25 mg total) by mouth 2 (two) times daily with meals.    flurandrenolide (CORDRAN) 0.05 % lotion Apply to  scalp one to two times daily as needed for dryness    ketoconazole (NIZORAL) 2 % shampoo USE EVERY OTHER DAY LET SIT ON SCALP FOR FIVE MINUTES BEFORE RINSING    lisinopril (PRINIVIL,ZESTRIL) 20 MG tablet Take 1 tablet (20 mg total) by mouth once daily.    MULTIVITAMIN WITH MINERALS (ONE-A-DAY 50 PLUS) Tab Take 1 tablet by mouth once daily.     verapamil (CALAN-SR) 240 MG CR tablet take 1 tablet by mouth twice a day    dorzolamide-timolol 2-0.5% (COSOPT) 22.3-6.8 mg/mL ophthalmic solution Place 1 drop into the left eye 2 (two) times daily.    latanoprost 0.005 % ophthalmic solution     om-3-epa-dha-fish oil-flax-E (THERA TEARS NUTRITION) [phone] th-pi-zb-unit Cap Take 3 capsules by mouth every morning. Over-the-counter supplement    rivaroxaban (XARELTO) 20 mg Tab Take 20 mg by mouth once daily.           Clinical Reference Information           Your Vitals Were     Last Period                   (LMP Unknown)           Allergies as of 3/21/2017     Adhesive Tape-silicones    Atorvastatin    Pravachol  [Pravastatin]      Immunizations Administered on  Date of Encounter - 3/21/2017     None      Language Assistance Services     ATTENTION: Language assistance services are available, free of charge. Please call [phone].      ATENCIÓN: Si habdoretha jack, tiene a blancas disposición servicios gratuitos de asistencia lingüística. Llame al [phone].     CHÚ Ý: N?u b?n nói Ti?ng Vi?t, có các d?ch v? h? tr? ngôn ng? mi?n phí dành cho b?n. G?i s? [phone].         [facility] complies with applicable Federal civil rights laws and does not discriminate on the basis of race, color, national origin, age, disability, or sex.

## 2017-03-22 ENCOUNTER — HOSPITAL ENCOUNTER (INPATIENT)
Facility: HOSPITAL | Age: 77
LOS: 2 days | Discharge: HOME | DRG: 615 | End: 2017-03-24
Attending: UROLOGY | Admitting: UROLOGY
Payer: COMMERCIAL

## 2017-03-22 ENCOUNTER — SURGERY (OUTPATIENT)
Age: 77
End: 2017-03-22

## 2017-03-22 DIAGNOSIS — E27.8 LEFT ADRENAL MASS: ICD-10-CM

## 2017-03-22 LAB
ANION GAP SERPL CALC-SCNC: 10 MMOL/L
BASOPHILS # BLD AUTO: 0.01 K/UL
BASOPHILS NFR BLD: 0.1 %
BUN SERPL-MCNC: 14 MG/DL
CALCIUM SERPL-MCNC: 7.5 MG/DL
CHLORIDE SERPL-SCNC: 112 MMOL/L
CO2 SERPL-SCNC: 20 MMOL/L
CREAT SERPL-MCNC: 0.8 MG/DL
DIFFERENTIAL METHOD: ABNORMAL
EOSINOPHIL # BLD AUTO: 0.1 K/UL
EOSINOPHIL NFR BLD: 0.5 %
ERYTHROCYTE [DISTWIDTH] IN BLOOD BY AUTOMATED COUNT: 14.3 %
EST. GFR  (AFRICAN AMERICAN): >60 ML/MIN/1.73 M^2
EST. GFR  (NON AFRICAN AMERICAN): >60 ML/MIN/1.73 M^2
GLUCOSE SERPL-MCNC: 155 MG/DL
HCT VFR BLD AUTO: 29.6 %
HGB BLD-MCNC: 9.9 G/DL
LYMPHOCYTES # BLD AUTO: 1.3 K/UL
LYMPHOCYTES NFR BLD: 10 %
MCH RBC QN AUTO: 30.7 PG
MCHC RBC AUTO-ENTMCNC: 33.4 %
MCV RBC AUTO: 92 FL
MONOCYTES # BLD AUTO: 0.2 K/UL
MONOCYTES NFR BLD: 1.4 %
NEUTROPHILS # BLD AUTO: 11.3 K/UL
NEUTROPHILS NFR BLD: 87.6 %
PLATELET # BLD AUTO: 212 K/UL
PMV BLD AUTO: 10.7 FL
POTASSIUM SERPL-SCNC: 3.6 MMOL/L
RBC # BLD AUTO: 3.23 M/UL
SODIUM SERPL-SCNC: 142 MMOL/L
WBC # BLD AUTO: 12.95 K/UL

## 2017-03-22 PROCEDURE — 11000001 HC ACUTE MED/SURG PRIVATE ROOM

## 2017-03-22 PROCEDURE — 88331 PATH CONSLTJ SURG 1 BLK 1SPC: CPT | Performed by: PATHOLOGY

## 2017-03-22 PROCEDURE — 63600175 PHARM REV CODE 636 W HCPCS: Performed by: UROLOGY

## 2017-03-22 PROCEDURE — 86920 COMPATIBILITY TEST SPIN: CPT

## 2017-03-22 PROCEDURE — D9220A PRA ANESTHESIA: Mod: CRNA,,, | Performed by: NURSE ANESTHETIST, CERTIFIED REGISTERED

## 2017-03-22 PROCEDURE — D9220A PRA ANESTHESIA: Mod: ANES,,, | Performed by: ANESTHESIOLOGY

## 2017-03-22 PROCEDURE — 80048 BASIC METABOLIC PNL TOTAL CA: CPT

## 2017-03-22 PROCEDURE — 63600175 PHARM REV CODE 636 W HCPCS: Performed by: NURSE ANESTHETIST, CERTIFIED REGISTERED

## 2017-03-22 PROCEDURE — 25000003 PHARM REV CODE 250: Performed by: UROLOGY

## 2017-03-22 PROCEDURE — 36000709 HC OR TIME LEV III EA ADD 15 MIN: Performed by: UROLOGY

## 2017-03-22 PROCEDURE — 63600175 PHARM REV CODE 636 W HCPCS: Performed by: ANESTHESIOLOGY

## 2017-03-22 PROCEDURE — 27000221 HC OXYGEN, UP TO 24 HOURS

## 2017-03-22 PROCEDURE — 36000708 HC OR TIME LEV III 1ST 15 MIN: Performed by: UROLOGY

## 2017-03-22 PROCEDURE — 25000003 PHARM REV CODE 250: Performed by: NURSE ANESTHETIST, CERTIFIED REGISTERED

## 2017-03-22 PROCEDURE — 60540 EXPLORE ADRENAL GLAND: CPT | Mod: LT,,, | Performed by: UROLOGY

## 2017-03-22 PROCEDURE — 37000009 HC ANESTHESIA EA ADD 15 MINS: Performed by: UROLOGY

## 2017-03-22 PROCEDURE — 71000033 HC RECOVERY, INTIAL HOUR: Performed by: UROLOGY

## 2017-03-22 PROCEDURE — 27201423 OPTIME MED/SURG SUP & DEVICES STERILE SUPPLY: Performed by: UROLOGY

## 2017-03-22 PROCEDURE — 88331 PATH CONSLTJ SURG 1 BLK 1SPC: CPT | Mod: 26,,, | Performed by: PATHOLOGY

## 2017-03-22 PROCEDURE — 88307 TISSUE EXAM BY PATHOLOGIST: CPT | Mod: 26,,, | Performed by: PATHOLOGY

## 2017-03-22 PROCEDURE — 85025 COMPLETE CBC W/AUTO DIFF WBC: CPT

## 2017-03-22 PROCEDURE — 0GT20ZZ RESECTION OF LEFT ADRENAL GLAND, OPEN APPROACH: ICD-10-PCS | Performed by: UROLOGY

## 2017-03-22 PROCEDURE — 71000039 HC RECOVERY, EACH ADD'L HOUR: Performed by: UROLOGY

## 2017-03-22 PROCEDURE — 37000008 HC ANESTHESIA 1ST 15 MINUTES: Performed by: UROLOGY

## 2017-03-22 PROCEDURE — C9113 INJ PANTOPRAZOLE SODIUM, VIA: HCPCS | Performed by: UROLOGY

## 2017-03-22 PROCEDURE — 27201037 HC PRESSURE MONITORING SET UP

## 2017-03-22 RX ORDER — HYDROMORPHONE HYDROCHLORIDE 1 MG/ML
1 INJECTION, SOLUTION INTRAMUSCULAR; INTRAVENOUS; SUBCUTANEOUS
Status: DISCONTINUED | OUTPATIENT
Start: 2017-03-22 | End: 2017-03-24 | Stop reason: HOSPADM

## 2017-03-22 RX ORDER — PANTOPRAZOLE SODIUM 40 MG/10ML
40 INJECTION, POWDER, LYOPHILIZED, FOR SOLUTION INTRAVENOUS DAILY
Status: DISCONTINUED | OUTPATIENT
Start: 2017-03-22 | End: 2017-03-24 | Stop reason: HOSPADM

## 2017-03-22 RX ORDER — POLYETHYLENE GLYCOL 3350 17 G/17G
17 POWDER, FOR SOLUTION ORAL DAILY
Status: DISCONTINUED | OUTPATIENT
Start: 2017-03-22 | End: 2017-03-24 | Stop reason: HOSPADM

## 2017-03-22 RX ORDER — NEOSTIGMINE METHYLSULFATE 1 MG/ML
INJECTION, SOLUTION INTRAVENOUS
Status: DISCONTINUED | OUTPATIENT
Start: 2017-03-22 | End: 2017-03-22

## 2017-03-22 RX ORDER — SODIUM CHLORIDE 0.9 % (FLUSH) 0.9 %
3 SYRINGE (ML) INJECTION
Status: DISCONTINUED | OUTPATIENT
Start: 2017-03-22 | End: 2017-03-24 | Stop reason: HOSPADM

## 2017-03-22 RX ORDER — OXYCODONE HYDROCHLORIDE 5 MG/1
5 TABLET ORAL EVERY 4 HOURS PRN
Status: DISCONTINUED | OUTPATIENT
Start: 2017-03-22 | End: 2017-03-24 | Stop reason: HOSPADM

## 2017-03-22 RX ORDER — ONDANSETRON 2 MG/ML
4 INJECTION INTRAMUSCULAR; INTRAVENOUS DAILY PRN
Status: DISCONTINUED | OUTPATIENT
Start: 2017-03-22 | End: 2017-03-22 | Stop reason: HOSPADM

## 2017-03-22 RX ORDER — ACETAMINOPHEN 10 MG/ML
1000 INJECTION, SOLUTION INTRAVENOUS EVERY 8 HOURS
Status: COMPLETED | OUTPATIENT
Start: 2017-03-22 | End: 2017-03-23

## 2017-03-22 RX ORDER — PHENYLEPHRINE HYDROCHLORIDE 10 MG/ML
INJECTION INTRAVENOUS
Status: DISCONTINUED | OUTPATIENT
Start: 2017-03-22 | End: 2017-03-22

## 2017-03-22 RX ORDER — ONDANSETRON 2 MG/ML
INJECTION INTRAMUSCULAR; INTRAVENOUS
Status: DISCONTINUED | OUTPATIENT
Start: 2017-03-22 | End: 2017-03-22

## 2017-03-22 RX ORDER — PROPOFOL 10 MG/ML
VIAL (ML) INTRAVENOUS
Status: DISCONTINUED | OUTPATIENT
Start: 2017-03-22 | End: 2017-03-22

## 2017-03-22 RX ORDER — SODIUM CHLORIDE 9 MG/ML
INJECTION, SOLUTION INTRAVENOUS CONTINUOUS
Status: DISCONTINUED | OUTPATIENT
Start: 2017-03-22 | End: 2017-03-23

## 2017-03-22 RX ORDER — GLYCOPYRROLATE 0.2 MG/ML
INJECTION INTRAMUSCULAR; INTRAVENOUS
Status: DISCONTINUED | OUTPATIENT
Start: 2017-03-22 | End: 2017-03-22

## 2017-03-22 RX ORDER — ROCURONIUM BROMIDE 10 MG/ML
INJECTION, SOLUTION INTRAVENOUS
Status: DISCONTINUED | OUTPATIENT
Start: 2017-03-22 | End: 2017-03-22

## 2017-03-22 RX ORDER — DEXAMETHASONE SODIUM PHOSPHATE 4 MG/ML
INJECTION, SOLUTION INTRA-ARTICULAR; INTRALESIONAL; INTRAMUSCULAR; INTRAVENOUS; SOFT TISSUE
Status: DISCONTINUED | OUTPATIENT
Start: 2017-03-22 | End: 2017-03-22

## 2017-03-22 RX ORDER — LIDOCAINE HCL/PF 100 MG/5ML
SYRINGE (ML) INTRAVENOUS
Status: DISCONTINUED | OUTPATIENT
Start: 2017-03-22 | End: 2017-03-22

## 2017-03-22 RX ORDER — LISINOPRIL 20 MG/1
20 TABLET ORAL DAILY
Status: DISCONTINUED | OUTPATIENT
Start: 2017-03-22 | End: 2017-03-24 | Stop reason: HOSPADM

## 2017-03-22 RX ORDER — ACETAMINOPHEN 10 MG/ML
INJECTION, SOLUTION INTRAVENOUS
Status: DISCONTINUED | OUTPATIENT
Start: 2017-03-22 | End: 2017-03-22

## 2017-03-22 RX ORDER — HEPARIN SODIUM 5000 [USP'U]/ML
5000 INJECTION, SOLUTION INTRAVENOUS; SUBCUTANEOUS EVERY 8 HOURS
Status: DISCONTINUED | OUTPATIENT
Start: 2017-03-22 | End: 2017-03-24 | Stop reason: HOSPADM

## 2017-03-22 RX ORDER — ONDANSETRON 2 MG/ML
4 INJECTION INTRAMUSCULAR; INTRAVENOUS EVERY 8 HOURS PRN
Status: DISCONTINUED | OUTPATIENT
Start: 2017-03-22 | End: 2017-03-24 | Stop reason: HOSPADM

## 2017-03-22 RX ORDER — MIDAZOLAM HYDROCHLORIDE 1 MG/ML
INJECTION, SOLUTION INTRAMUSCULAR; INTRAVENOUS
Status: DISCONTINUED | OUTPATIENT
Start: 2017-03-22 | End: 2017-03-22

## 2017-03-22 RX ORDER — FENTANYL CITRATE 50 UG/ML
INJECTION, SOLUTION INTRAMUSCULAR; INTRAVENOUS
Status: DISCONTINUED | OUTPATIENT
Start: 2017-03-22 | End: 2017-03-22

## 2017-03-22 RX ORDER — OXYCODONE HYDROCHLORIDE 5 MG/1
10 TABLET ORAL EVERY 4 HOURS PRN
Status: DISCONTINUED | OUTPATIENT
Start: 2017-03-22 | End: 2017-03-24 | Stop reason: HOSPADM

## 2017-03-22 RX ORDER — OXYCODONE HYDROCHLORIDE 5 MG/1
5 TABLET ORAL
Status: DISCONTINUED | OUTPATIENT
Start: 2017-03-22 | End: 2017-03-22 | Stop reason: HOSPADM

## 2017-03-22 RX ORDER — SODIUM CHLORIDE 9 MG/ML
INJECTION, SOLUTION INTRAVENOUS CONTINUOUS PRN
Status: DISCONTINUED | OUTPATIENT
Start: 2017-03-22 | End: 2017-03-22

## 2017-03-22 RX ORDER — CARVEDILOL 25 MG/1
25 TABLET ORAL 2 TIMES DAILY WITH MEALS
Status: DISCONTINUED | OUTPATIENT
Start: 2017-03-22 | End: 2017-03-24 | Stop reason: HOSPADM

## 2017-03-22 RX ORDER — VERAPAMIL HYDROCHLORIDE 240 MG/1
240 TABLET, FILM COATED, EXTENDED RELEASE ORAL 2 TIMES DAILY
Status: DISCONTINUED | OUTPATIENT
Start: 2017-03-22 | End: 2017-03-24 | Stop reason: HOSPADM

## 2017-03-22 RX ORDER — HYDROMORPHONE HYDROCHLORIDE 1 MG/ML
0.2 INJECTION, SOLUTION INTRAMUSCULAR; INTRAVENOUS; SUBCUTANEOUS EVERY 5 MIN PRN
Status: DISCONTINUED | OUTPATIENT
Start: 2017-03-22 | End: 2017-03-22 | Stop reason: HOSPADM

## 2017-03-22 RX ADMIN — HYDROMORPHONE HYDROCHLORIDE 0.2 MG: 1 INJECTION, SOLUTION INTRAMUSCULAR; INTRAVENOUS; SUBCUTANEOUS at 03:03

## 2017-03-22 RX ADMIN — NEOSTIGMINE METHYLSULFATE 4 MG: 1 INJECTION INTRAVENOUS at 02:03

## 2017-03-22 RX ADMIN — EPHEDRINE SULFATE 10 MG: 50 INJECTION, SOLUTION INTRAMUSCULAR; INTRAVENOUS; SUBCUTANEOUS at 01:03

## 2017-03-22 RX ADMIN — EPHEDRINE SULFATE 5 MG: 50 INJECTION, SOLUTION INTRAMUSCULAR; INTRAVENOUS; SUBCUTANEOUS at 01:03

## 2017-03-22 RX ADMIN — DEXAMETHASONE SODIUM PHOSPHATE 8 MG: 4 INJECTION, SOLUTION INTRAMUSCULAR; INTRAVENOUS at 12:03

## 2017-03-22 RX ADMIN — EPHEDRINE SULFATE 10 MG: 50 INJECTION, SOLUTION INTRAMUSCULAR; INTRAVENOUS; SUBCUTANEOUS at 02:03

## 2017-03-22 RX ADMIN — SODIUM CHLORIDE: 0.9 INJECTION, SOLUTION INTRAVENOUS at 03:03

## 2017-03-22 RX ADMIN — PHENYLEPHRINE HYDROCHLORIDE 100 MCG: 10 INJECTION INTRAVENOUS at 02:03

## 2017-03-22 RX ADMIN — SODIUM CHLORIDE: 0.9 INJECTION, SOLUTION INTRAVENOUS at 12:03

## 2017-03-22 RX ADMIN — MIDAZOLAM HYDROCHLORIDE 2 MG: 1 INJECTION, SOLUTION INTRAMUSCULAR; INTRAVENOUS at 12:03

## 2017-03-22 RX ADMIN — FENTANYL CITRATE 25 MCG: 50 INJECTION, SOLUTION INTRAMUSCULAR; INTRAVENOUS at 02:03

## 2017-03-22 RX ADMIN — POLYETHYLENE GLYCOL 3350 17 G: 17 POWDER, FOR SOLUTION ORAL at 04:03

## 2017-03-22 RX ADMIN — LIDOCAINE HYDROCHLORIDE 100 MG: 20 INJECTION, SOLUTION INTRAVENOUS at 12:03

## 2017-03-22 RX ADMIN — ACETAMINOPHEN 1000 MG: 10 INJECTION, SOLUTION INTRAVENOUS at 10:03

## 2017-03-22 RX ADMIN — SODIUM CHLORIDE, SODIUM GLUCONATE, SODIUM ACETATE, POTASSIUM CHLORIDE, MAGNESIUM CHLORIDE, SODIUM PHOSPHATE, DIBASIC, AND POTASSIUM PHOSPHATE: .53; .5; .37; .037; .03; .012; .00082 INJECTION, SOLUTION INTRAVENOUS at 12:03

## 2017-03-22 RX ADMIN — GLYCOPYRROLATE 0.2 MG: 0.2 INJECTION, SOLUTION INTRAMUSCULAR; INTRAVENOUS at 12:03

## 2017-03-22 RX ADMIN — CARVEDILOL 25 MG: 25 TABLET, FILM COATED ORAL at 05:03

## 2017-03-22 RX ADMIN — SODIUM CHLORIDE, SODIUM GLUCONATE, SODIUM ACETATE, POTASSIUM CHLORIDE, MAGNESIUM CHLORIDE, SODIUM PHOSPHATE, DIBASIC, AND POTASSIUM PHOSPHATE: .53; .5; .37; .037; .03; .012; .00082 INJECTION, SOLUTION INTRAVENOUS at 01:03

## 2017-03-22 RX ADMIN — ROCURONIUM BROMIDE 50 MG: 10 INJECTION, SOLUTION INTRAVENOUS at 12:03

## 2017-03-22 RX ADMIN — FENTANYL CITRATE 100 MCG: 50 INJECTION, SOLUTION INTRAMUSCULAR; INTRAVENOUS at 12:03

## 2017-03-22 RX ADMIN — PROPOFOL 140 MG: 10 INJECTION, EMULSION INTRAVENOUS at 12:03

## 2017-03-22 RX ADMIN — VERAPAMIL HYDROCHLORIDE 240 MG: 240 TABLET, FILM COATED, EXTENDED RELEASE ORAL at 10:03

## 2017-03-22 RX ADMIN — GLYCOPYRROLATE 0.4 MG: 0.2 INJECTION, SOLUTION INTRAMUSCULAR; INTRAVENOUS at 02:03

## 2017-03-22 RX ADMIN — OXYCODONE HYDROCHLORIDE 10 MG: 5 TABLET ORAL at 03:03

## 2017-03-22 RX ADMIN — PHENYLEPHRINE HYDROCHLORIDE 100 MCG: 10 INJECTION INTRAVENOUS at 01:03

## 2017-03-22 RX ADMIN — PANTOPRAZOLE SODIUM 40 MG: 40 INJECTION, POWDER, FOR SOLUTION INTRAVENOUS at 03:03

## 2017-03-22 RX ADMIN — ONDANSETRON 4 MG: 2 INJECTION INTRAMUSCULAR; INTRAVENOUS at 02:03

## 2017-03-22 RX ADMIN — HEPARIN SODIUM 5000 UNITS: 5000 INJECTION, SOLUTION INTRAVENOUS; SUBCUTANEOUS at 10:03

## 2017-03-22 RX ADMIN — LISINOPRIL 20 MG: 20 TABLET ORAL at 04:03

## 2017-03-22 RX ADMIN — ACETAMINOPHEN 1000 MG: 10 INJECTION, SOLUTION INTRAVENOUS at 12:03

## 2017-03-22 NOTE — PROGRESS NOTES
Pt stated that she took her xalerto sometime this week but not sure when. Dr Alfonzo Patterson called and notified.

## 2017-03-22 NOTE — ANESTHESIA RELEASE NOTE
"Anesthesia Release from PACU Note    Patient: Ida Santana    Procedure(s) Performed: Procedure(s) (LRB):  ADRENALECTOMY (Left)    Anesthesia type: general    Post pain: Adequate analgesia    Post assessment: no apparent anesthetic complications    Last Vitals:   Visit Vitals    /66    Pulse (!) 48    Temp 36.4 °C (97.6 °F) (Axillary)    Resp 20    Ht 5' 7" (1.702 m)    Wt 71.2 kg (157 lb)    LMP  (LMP Unknown)    SpO2 100%    Breastfeeding No    BMI 24.59 kg/m2       Post vital signs: stable    Level of consciousness: responds to stimulation    Nausea/Vomiting: no nausea/no vomiting    Complications: none    Airway Patency: patent    Respiratory: unassisted    Cardiovascular: stable and blood pressure at baseline    Hydration: euvolemic       "

## 2017-03-22 NOTE — NURSING TRANSFER
Nursing Transfer Note      3/22/2017     Transfer To: [address]    Transfer via stretcher    Transfer with room air    Transported by Pct    Medicines sent: Yes    Chart send with patient: Yes    Notified: brother

## 2017-03-22 NOTE — NURSING
Patient admitted to floor in stable condition. Pt AAOx3. Vital signs are stable. Patient instructed on how to use IS; return demonstration done. SCD's intact. Bite pain therapy menu reviewed. Will follow with plan of care.

## 2017-03-22 NOTE — IP AVS SNAPSHOT
[facility]  [address]  [facility] [address]  Phone: [phone]           Patient Discharge Instructions     Our goal is to set you up for success. This packet includes information on your condition, medications, and your home care. It will help you to care for yourself so you don't get sicker and need to go back to the hospital.     Please ask your nurse if you have any questions.        There are many details to remember when preparing to leave the hospital. Here is what you will need to do:    1. Take your medicine. If you are prescribed medications, review your Medication List in the following pages. You may have new medications to  at the pharmacy and others that you'll need to stop taking. Review the instructions for how and when to take your medications. Talk with your doctor or nurses if you are unsure of what to do.     2. Go to your follow-up appointments. Specific follow-up information is listed in the following pages. Your may be contacted by a transition nurse or clinical provider about future appointments. Be sure we have all of the phone numbers to reach you, if needed. Please contact your provider's office if you are unable to make an appointment.     3. Watch for warning signs. Your doctor or nurse will give you detailed warning signs to watch for and when to call for assistance. These instructions may also include educational information about your condition. If you experience any of warning signs to your health, call your doctor.               [facility]  Unless otherwise directed by your provider, please contact [facility], our nurse care line that is available for 24/7 assistance.     [phone] (toll-free)    Registered nurses in the [facility] provide clinical advisement, health education, appointment booking, and other advisory services.                    ** Verify the list of medication(s) below is accurate and up  to date. Carry this with you in case of emergency. If your medications have changed, please notify your healthcare provider.             Medication List      START taking these medications        Additional Info                      oxycodone-acetaminophen 5-325 mg per tablet   Commonly known as:  PERCOCET   Quantity:  51 tablet   Refills:  0   Dose:  1 tablet    Instructions:  Take 1 tablet by mouth every 4 (four) hours as needed for Pain.     Begin Date    AM    Noon    PM    Bedtime       polyethylene glycol 17 gram Pwpk   Commonly known as:  GLYCOLAX   Quantity:  30 packet   Refills:  0   Dose:  17 g    Last time this was given:  17 g on 3/24/2017  8:52 AM   Instructions:  Take 17 g by mouth once daily.     Begin Date    AM    Noon    PM    Bedtime         CONTINUE taking these medications        Additional Info                      carvedilol 25 MG tablet   Commonly known as:  COREG   Quantity:  60 tablet   Refills:  3   Dose:  25 mg    Last time this was given:  25 mg on 3/24/2017  8:45 AM   Instructions:  Take 1 tablet (25 mg total) by mouth 2 (two) times daily with meals.     Begin Date    AM    Noon    PM    Bedtime       CORDRAN 0.05 % lotion   Refills:  0   Generic drug:  flurandrenolide    Instructions:  Apply to  scalp one to two times daily as needed for dryness     Begin Date    AM    Noon    PM    Bedtime       dorzolamide-timolol 2-0.5% 22.3-6.8 mg/mL ophthalmic solution   Commonly known as:  COSOPT   Quantity:  10 mL   Refills:  6   Dose:  1 drop    Instructions:  Place 1 drop into the left eye 2 (two) times daily.     Begin Date    AM    Noon    PM    Bedtime       ketoconazole 2 % shampoo   Commonly known as:  NIZORAL   Quantity:  120 mL   Refills:  6    Instructions:  USE EVERY OTHER DAY LET SIT ON SCALP FOR FIVE MINUTES BEFORE RINSING     Begin Date    AM    Noon    PM    Bedtime       latanoprost 0.005 % ophthalmic solution   Refills:  1      Begin Date    AM    Noon    PM    Bedtime        lisinopril 20 MG tablet   Commonly known as:  PRINIVIL,ZESTRIL   Quantity:  30 tablet   Refills:  3   Dose:  20 mg    Last time this was given:  20 mg on 3/24/2017  8:52 AM   Instructions:  Take 1 tablet (20 mg total) by mouth once daily.     Begin Date    AM    Noon    PM    Bedtime       om-3-epa-dha-fish oil-flax-E [phone] iu-il-bo-unit Cap   Commonly known as:  THERA TEARS NUTRITION   Refills:  0   Dose:  3 capsule    Instructions:  Take 3 capsules by mouth every morning. Over-the-counter supplement     Begin Date    AM    Noon    PM    Bedtime       ONE-A-DAY 50 PLUS tablet   Refills:  0   Dose:  1 tablet   Generic drug:  geriatric multivitamin-min    Instructions:  Take 1 tablet by mouth once daily.     Begin Date    AM    Noon    PM    Bedtime       verapamil 240 MG CR tablet   Commonly known as:  CALAN-SR   Quantity:  30 tablet   Refills:  12    Last time this was given:  240 mg on 3/24/2017  8:52 AM   Instructions:  take 1 tablet by mouth twice a day     Begin Date    AM    Noon    PM    Bedtime       XARELTO 20 mg Tab   Refills:  0   Dose:  20 mg   Generic drug:  rivaroxaban    Instructions:  Take 20 mg by mouth once daily.     Begin Date    AM    Noon    PM    Bedtime            Where to Get Your Medications      You can get these medications from any pharmacy     Bring a paper prescription for each of these medications     oxycodone-acetaminophen 5-325 mg per tablet    polyethylene glycol 17 gram Pwpk                  Please bring to all follow up appointments:    1. A copy of your discharge instructions.  2. All medicines you are currently taking in their original bottles.  3. Identification and insurance card.    Please arrive 15 minutes ahead of scheduled appointment time.    Please call 24 hours in advance if you must reschedule your appointment and/or time.        Your Scheduled Appointments     Apr 10, 2017 10:20 AM CDT   Post OP with ELENA Friedman - Urology [facility]riy  ([address] )    [address]  [facility] [id number] [phone]            Apr 11, 2017  1:00 PM CDT   Post OP with MD Mansoor White bhargavi - Ophthalmology ([address] )    [address]  [facility] [address] [phone]              Follow-up Information     Follow up with Porfirio Arellano MD In 2 weeks.    Specialty:  Urology    Why:  post op    Contact information:    [address]  [facility] [address]  [phone]          Discharge Instructions     Future Orders    Call MD for:  difficulty breathing or increased cough     Call MD for:  increased confusion or weakness     Call MD for:  persistent dizziness, light-headedness, or visual disturbances     Call MD for:  persistent nausea and vomiting or diarrhea     Call MD for:  redness, tenderness, or signs of infection (pain, swelling, redness, odor or green/yellow discharge around incision site)     Call MD for:  severe persistent headache     Call MD for:  severe uncontrolled pain     Call MD for:  temperature >100.4     Call MD for:  worsening rash     Diet general     Questions:    Total calories:      Fat restriction, if any:      Protein restriction, if any:      Na restriction, if any:      Fluid restriction:      Additional restrictions:      No dressing needed     Other restrictions (specify):     Comments:    No driving while taking pain medications.  No lifting more than 10 pounds for 6 weeks.  Do not submerge incisions.        Primary Diagnosis     Your primary diagnosis was:  Mass Of Left Adrenal Gland      Admission Information     Date & Time Provider Department [facility]    3/22/2017  9:53 AM Porfirio Arellano MD [facility] [phone]      Care Providers     Provider Role Specialty Primary office phone    Porfirio Arellano MD Attending Provider Urology [phone]    Porfirio Arellano MD Surgeon  Urology [phone]      Your Vitals Were     BP Pulse Temp Resp Height Weight    100/52  "(BP Location: Left arm, Patient Position: Lying, BP Method: Automatic) 82 98 °F (36.7 °C) (Oral) 18 5' 7" (1.702 m) 71.2 kg (157 lb)    Last Period SpO2 BMI          (LMP Unknown) 91% 24.59 kg/m2        Recent Lab Values        5/1/2006 11/21/2007 7/29/2011 7/23/2012 7/30/2012 8/2/2013 9/23/2014 1/6/2017      8:05 AM  7:45 AM 11:00 AM  9:04 AM 10:25 AM  7:30 AM  2:55 PM 11:12 AM    A1C 5.6 5.8 5.8 6.2 6.2 5.9 5.8 6.0    Comment for A1C at 11:12 AM on 1/6/2017:  According to ADA guidelines, hemoglobin A1C <7.0% represents  optimal control in non-pregnant diabetic patients.  Different  metrics may apply to specific populations.   Standards of Medical Care in Diabetes - 2016.  For the purpose of screening for the presence of diabetes:  <5.7%     Consistent with the absence of diabetes  5.7-6.4%  Consistent with increasing risk for diabetes   (prediabetes)  >or=6.5%  Consistent with diabetes  Currently no consensus exists for use of hemoglobin A1C  for diagnosis of diabetes for children.        Pending Labs     Order Current Status    Specimen to Pathology - Surgery In process      Allergies as of 3/24/2017        Reactions    Adhesive Tape-silicones     Other reaction(s): pulling skin off    Atorvastatin     Other reaction(s): Muscle pain    Pravachol  [Pravastatin] Rash      Advance Directives     An advance directive is a document which, in the event you are no longer able to make decisions for yourself, tells your healthcare team what kind of treatment you do or do not want to receive, or who you would like to make those decisions for you.  If you do not currently have an advance directive, [facility] encourages you to create one.  For more information call:  [phone]), [phone] ([phone]),  or log on to [email or web address].        Language Assistance Services     ATTENTION: Language assistance services are available, free of charge. Please call [phone].      ATENCIÓN: Si habla " español, tiene a blancas disposición servicios gratuitos de asistencia lingüística. Ame al [phone].     CHÚ Ý: N?u b?n nói Ti?ng Vi?t, có các d?ch v? h? tr? ngôn ng? mi?n phí dành cho b?n. G?i s? [phone].        Pneumonmia Discharge Instructions                [employer] Information [facility] complies with applicable Federal civil rights laws and does not discriminate on the basis of race, color, national origin, age, disability, or sex.

## 2017-03-22 NOTE — OP NOTE
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  Left adrenal gland  2.  Fat surrounding adrenal bed    Staff Surgeon: MD Adan    Assistant Surgeon: Roland Zuleta MD    Anesthesia: General endotracheal anesthesia    Indications: Ida Santana is a 76 y.o. female with left adrenal mass suspicious for malignancy.  After thorough discussion of management options and risks and benefits associated with both, the patient has elected for a left adrenelectomy    Findings:   - Frozen section reveal a benign adrenal mass  - Did not perform partial nx secondary to benign frozen diagnosis    Estimated Blood Loss: 200 mL    Drains: 16F monsalve catheter    Procedure in Detail: After informed consent was obtained, the patient was brought to the operating suite and placed in the supine position. SCDs were applied and working. General anesthesia was administered. A monsalve cathter was placed in the standard fashion. The patient was then placed in supine position. The patient was appropriately padded and secured to the table. The patient was prepped and draped in the usual sterile fashion. Timeout was performed and preoperative antibiotics were confirmed.     A left subcostal incision was made and a small right subcostal incision was made with bovie.  We then dissected through the subcutaneous fat and muscle until we entered the abdomen.    The left colon was then mobilized from the pelvis to the splenic flecture.    The kidney  was mobilized off the psoas posteriorly and inferiorly leaving Gerota's surrounding the kidney. The superior attachments were taken carefully using the Bovie.      The Bookwalter was assembled and retraction optimized.     The gonadal vein was seen and ligated with 0 silk ties    The large adrenal gland was easily visible.    We then freed the kidney medially until the renal vein was identified.  We then identified the adrenal vein.    The renal artery was felt posterior.    A vessel loop was placed around the renal artery and vein in case we needed proximal control.    The left adrenal vein was then isolated and ligated with 0 silk suture.    We then freed the adrenal gland circumferentially.    All small vessels were taken with bovie electrocautery.    The upper pole of the kidney was seen and was used as our posterior plane for removing the adrenal gland intact.    The adrenal gland was then free and passed off the field for frozen section.    Fat surrounding the adrenal gland and the upper pole of the kidney was then removed with bovie.    There was minimal bleeding during this.    We then obtained hemostasis and awaited our frozen.    When the frozen came back as benign adrenal gland tissue we did not feel that we needed to remove the kidney or perform a partial nephrectomy on the pre-operative images that correlated to renal cysts.    We obtained further hemostasis and used surgicel over the resection bed.    The vessel loop was removed.    The fascia was closed using 0-PDS. The skin was closed with 4-0 monocryl in a running subcuticular fashion.    Dermabond was used on the skin.    The patient tolerated the procedure well and was transferred to the recovery room in stable condition.    Disposition:  The patient will be admitted overnight for monitoring.      Roland Zuleta MD    As the attending surgeon, Dr. Arellano was present for the entire procedure and performed all key aspects of the operation.

## 2017-03-22 NOTE — ANESTHESIA POSTPROCEDURE EVALUATION
"Anesthesia Post Evaluation    Patient: Ida Santana    Procedure(s) Performed: Procedure(s) (LRB):  ADRENALECTOMY (Left)    Final Anesthesia Type: general  Patient location during evaluation: PACU  Patient participation: Yes- Able to Participate  Level of consciousness: responds to stimulation  Post-procedure vital signs: reviewed and stable  Pain management: adequate  Airway patency: patent  PONV status at discharge: No PONV  Anesthetic complications: no      Cardiovascular status: blood pressure returned to baseline  Respiratory status: spontaneous ventilation  Hydration status: euvolemic  Follow-up not needed.        Visit Vitals    /66    Pulse (!) 48    Temp 36.4 °C (97.6 °F) (Axillary)    Resp 20    Ht 5' 7" (1.702 m)    Wt 71.2 kg (157 lb)    LMP  (LMP Unknown)    SpO2 100%    Breastfeeding No    BMI 24.59 kg/m2       Pain/Ellen Score: Pain Assessment Performed: Yes (3/22/2017  4:00 PM)  Presence of Pain: complains of pain/discomfort (3/22/2017  4:00 PM)  Pain Rating Prior to Med Admin: 6 (3/22/2017  3:40 PM)  Ellen Score: 9 (3/22/2017  4:00 PM)      "

## 2017-03-22 NOTE — TRANSFER OF CARE
"Anesthesia Transfer of Care Note    Patient: Ida Santana    Procedure(s) Performed: Procedure(s) (LRB):  ADRENALECTOMY (Left)    Patient location: PACU    Anesthesia Type: general    Transport from OR: Transported from OR on 6-10 L/min O2 by face mask with adequate spontaneous ventilation    Post pain: adequate analgesia    Post assessment: no apparent anesthetic complications    Post vital signs: stable    Level of consciousness: sedated and responds to stimulation    Nausea/Vomiting: no nausea/vomiting    Complications: none          Last vitals:   Visit Vitals    BP (!) 116/56 (BP Location: Right arm, Patient Position: Lying, BP Method: Automatic)    Pulse 81    Temp 36.6 °C (97.8 °F) (Oral)    Resp 16    Ht 5' 7" (1.702 m)    Wt 71.2 kg (157 lb)    LMP  (LMP Unknown)    SpO2 100%    Breastfeeding No    BMI 24.59 kg/m2     "

## 2017-03-22 NOTE — H&P (VIEW-ONLY)
Urology ([facility]) H&P  Staff: Dr. Adan MD    Is this patient in a research study?  No    CC: left adrenal mass and renal vein thrombus     HPI:  Ida Santana is a 76 y.o. female with an incidentally found left adrenal mass and left renal vein thromus. This was found during hospital admission for pneumonia.   CT showed a left renal vein thrombus as well as a 4 cm left adrenal mass. She was started on xarelto for the thrombus and repeat imaging shows resolution of this.     She is asymptomatic from this. She denies hematuria, dysuria, flank pain, changes in appetite, weight loss. Her functional workup was negative.      She has a family hx of kidney cancer in her mother. No smoking history.     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/2/2013    Osteopenia     Potassium depletion 1998    Retinal detachment of left eye with retinal break 3/13/2017       Past Surgical History:   Procedure Laterality Date    BREAST BIOPSY  2001    left breast DCIS    BREAST SURGERY  9/2001    left lumpectomy with SLN bx    COLONOSCOPY      COLONOSCOPY N/A 10/31/2016    Procedure: COLONOSCOPY;  Surgeon: YAMILETH Richards MD;  Location: [facility] ([address] [place] [address]);  Service: Endoscopy;  Laterality: N/A;       Social History     Social History    Marital status: Single     Spouse name: N/A    Number of children: N/A    Years of education: N/A     Social History Main Topics    Smoking status: Never Smoker    Smokeless tobacco: Never Used    Alcohol use No    Drug use: No    Sexual activity: No     Other Topics Concern    Not on file     Social History Narrative    Retired  (34 years) with [employer]       Family History   Problem Relation  Age of Onset    Cancer Mother      breast    Breast cancer Mother      40s and again in 50s (bilateral)    Breast cancer Other     Heart disease Father      bypass    Cancer Father      asbestos    Stroke Brother     Ovarian cancer Neg Hx     Amblyopia Neg Hx     Blindness Neg Hx     Cataracts Neg Hx     Diabetes Neg Hx     Glaucoma Neg Hx     Hypertension Neg Hx     Macular degeneration Neg Hx     Retinal detachment Neg Hx     Strabismus Neg Hx     Thyroid disease Neg Hx     Colon cancer Neg Hx        Review of patient's allergies indicates:   Allergen Reactions    Adhesive tape-silicones      Other reaction(s): pulling skin off    Atorvastatin      Other reaction(s): Muscle pain    Pravachol  [pravastatin] Rash       Current Outpatient Prescriptions on File Prior to Visit   Medication Sig Dispense Refill    carvedilol (COREG) 25 MG tablet Take 1 tablet (25 mg total) by mouth 2 (two) times daily with meals. 60 tablet 3    flurandrenolide (CORDRAN) 0.05 % lotion Apply to  scalp one to two times daily as needed for dryness      ketoconazole (NIZORAL) 2 % shampoo USE EVERY OTHER DAY LET SIT ON SCALP FOR FIVE MINUTES BEFORE RINSING 120 mL 6    latanoprost 0.005 % ophthalmic solution   1    lisinopril (PRINIVIL,ZESTRIL) 20 MG tablet Take 1 tablet (20 mg total) by mouth once daily. 30 tablet 3    MULTIVITAMIN WITH MINERALS (ONE-A-DAY 50 PLUS) Tab Take 1 tablet by mouth once daily.       om-3-epa-dha-fish oil-flax-E (THERA TEARS NUTRITION) [phone] xr-pl-un-unit Cap Take 3 capsules by mouth every morning. Over-the-counter supplement      ondansetron (ZOFRAN) 4 MG tablet Take 1 tablet (4 mg total) by mouth every 8 (eight) hours as needed for Nausea. 12 tablet 0    oxycodone-acetaminophen (PERCOCET) 5-325 mg per tablet Take 1 tablet by mouth every 6 (six) hours as needed for Pain. 12 tablet 0    rivaroxaban (XARELTO) 20 mg Tab Take 20 mg by mouth once daily.      verapamil (CALAN-SR)  240 MG CR tablet take 1 tablet by mouth twice a day 30 tablet 12     No current facility-administered medications on file prior to visit.        Anticoagulation:  Currently stopped the xarelto     Physical Exam:  weight 159 lb/72 kg  BMI 24.9    AAOx4, NAD, WDWN  NC/AT, EOMI, PER, sclerae anicteric, speech normal, tongue midline  Nl effort, CTAB  RRR  Soft, non-tender, non-distended    Labs:    Urine dipstick today - negative    Lab Results   Component Value Date    WBC 5.86 03/14/2017    HGB 13.6 03/14/2017    HCT 40.6 03/14/2017    MCV 92 03/14/2017     03/14/2017       BMP  Lab Results   Component Value Date     03/14/2017    K 3.7 03/14/2017     03/14/2017    CO2 26 03/14/2017    BUN 15 03/14/2017    CREATININE 1.1 03/14/2017    CALCIUM 10.0 03/14/2017    ANIONGAP 8 03/14/2017    ESTGFRAFRICA 57.2 (A) 03/14/2017    EGFRNONAA 49.6 (A) 03/14/2017       Imaging:      CT C/A/P 1/18/17:  There is a filling defect consistent with thrombus involving the left renal vein.  The left kidney is diffusely enlarged/edematous with adjacent perinephric fat stranding and perinephric edema.  Within the inferior pole of the left kidney there is a hypodensity measuring approximately 1.7 cm.  This does not demonstrate fluid attenuation. The left kidney demonstrates a striated nephrogram, but does appear to concentrated and excreted contrast, with renally excreted contrast seen in the left ureter.  Anterior to left kidney and superior to the left renal vein there is a solid, 4.2 x 3.1 x 3.3 cm mass.    US Retroperitoneal 1/24/17:  There is no nephrolithiasis, or hydronephrosis.  The there is a 2 cm cystic lesion in the lower pole of the left kidney which demonstrates internal complexity however without increased vascularity.  This likely represents a Bosniak IIF cyst.    CT abdomen without and with contrast 2/15/2017: Large heterogenous LEFT adrenal mass. 1.7 cm nonenhancing focal hypodensity in the LEFT kidney  with thick capsule not generally seen with simple cysts. Interval decrease in LEFT perinephric stranding.  Interval resolution of LEFT renal vein thrombosis.    Assessment: Ida Santana is a 76 y.o. 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 To OR on 3/28/17 for open left adrenalectomy and renal vein thrombectomy, possible left nephrectomy  2. Consents signed   3. I have explained the risk, benefits, and alternatives of the procedure in detail. The patient voices understanding and all questions have been answered. The patient agrees to proceed as planned.   4. Type and cross 2 units    Teresa Gordon MD

## 2017-03-22 NOTE — ANESTHESIA PROCEDURE NOTES
Arterial    Diagnosis: thrombosis left renal vein    Patient location during procedure: done in OR  Procedure start time: 3/22/2017 12:36 PM  Timeout: 3/22/2017 12:35 PM  Procedure end time: 3/22/2017 12:40 PM  Staffing  Performed by: other anesthesia staff   Preanesthetic Checklist  Completed: patient identified, site marked, surgical consent, pre-op evaluation, timeout performed, IV checked, risks and benefits discussed, monitors and equipment checked and anesthesia consent given  Arterial Line  Skin Prep: chlorhexidine gluconate  Local Infiltration: none  Orientation: right  Location: radial  Catheter Size: 20 G{[facility] ANESTHESIA BLOCK ART PLACEMENTInsertion Attempts: 1  Assessment  Dressing: secured with tape and tegaderm

## 2017-03-23 LAB
ANION GAP SERPL CALC-SCNC: 6 MMOL/L
BASOPHILS # BLD AUTO: 0 K/UL
BASOPHILS NFR BLD: 0 %
BUN SERPL-MCNC: 12 MG/DL
CALCIUM SERPL-MCNC: 8.8 MG/DL
CHLORIDE SERPL-SCNC: 110 MMOL/L
CO2 SERPL-SCNC: 23 MMOL/L
CREAT SERPL-MCNC: 0.8 MG/DL
DIFFERENTIAL METHOD: ABNORMAL
EOSINOPHIL # BLD AUTO: 0 K/UL
EOSINOPHIL NFR BLD: 0 %
ERYTHROCYTE [DISTWIDTH] IN BLOOD BY AUTOMATED COUNT: 14.3 %
EST. GFR  (AFRICAN AMERICAN): >60 ML/MIN/1.73 M^2
EST. GFR  (NON AFRICAN AMERICAN): >60 ML/MIN/1.73 M^2
GLUCOSE SERPL-MCNC: 165 MG/DL
HCT VFR BLD AUTO: 32.9 %
HGB BLD-MCNC: 11.1 G/DL
LYMPHOCYTES # BLD AUTO: 0.8 K/UL
LYMPHOCYTES NFR BLD: 5.7 %
MCH RBC QN AUTO: 30.7 PG
MCHC RBC AUTO-ENTMCNC: 33.7 %
MCV RBC AUTO: 91 FL
MONOCYTES # BLD AUTO: 0.6 K/UL
MONOCYTES NFR BLD: 4.5 %
NEUTROPHILS # BLD AUTO: 12.1 K/UL
NEUTROPHILS NFR BLD: 89.6 %
PLATELET # BLD AUTO: 225 K/UL
PMV BLD AUTO: 10.6 FL
POTASSIUM SERPL-SCNC: 4 MMOL/L
RBC # BLD AUTO: 3.62 M/UL
SODIUM SERPL-SCNC: 139 MMOL/L
WBC # BLD AUTO: 13.48 K/UL

## 2017-03-23 PROCEDURE — 25000003 PHARM REV CODE 250: Performed by: UROLOGY

## 2017-03-23 PROCEDURE — 85025 COMPLETE CBC W/AUTO DIFF WBC: CPT

## 2017-03-23 PROCEDURE — 80048 BASIC METABOLIC PNL TOTAL CA: CPT

## 2017-03-23 PROCEDURE — 94799 UNLISTED PULMONARY SVC/PX: CPT

## 2017-03-23 PROCEDURE — 11000001 HC ACUTE MED/SURG PRIVATE ROOM

## 2017-03-23 PROCEDURE — C9113 INJ PANTOPRAZOLE SODIUM, VIA: HCPCS | Performed by: UROLOGY

## 2017-03-23 PROCEDURE — 36415 COLL VENOUS BLD VENIPUNCTURE: CPT

## 2017-03-23 PROCEDURE — 63600175 PHARM REV CODE 636 W HCPCS: Performed by: UROLOGY

## 2017-03-23 RX ORDER — DORZOLAMIDE HCL 20 MG/ML
1 SOLUTION/ DROPS OPHTHALMIC 2 TIMES DAILY
Status: DISCONTINUED | OUTPATIENT
Start: 2017-03-23 | End: 2017-03-23

## 2017-03-23 RX ADMIN — CARVEDILOL 25 MG: 25 TABLET, FILM COATED ORAL at 06:03

## 2017-03-23 RX ADMIN — ACETAMINOPHEN 1000 MG: 10 INJECTION, SOLUTION INTRAVENOUS at 01:03

## 2017-03-23 RX ADMIN — VERAPAMIL HYDROCHLORIDE 240 MG: 240 TABLET, FILM COATED, EXTENDED RELEASE ORAL at 09:03

## 2017-03-23 RX ADMIN — POLYETHYLENE GLYCOL 3350 17 G: 17 POWDER, FOR SOLUTION ORAL at 09:03

## 2017-03-23 RX ADMIN — CARVEDILOL 25 MG: 25 TABLET, FILM COATED ORAL at 09:03

## 2017-03-23 RX ADMIN — HEPARIN SODIUM 5000 UNITS: 5000 INJECTION, SOLUTION INTRAVENOUS; SUBCUTANEOUS at 01:03

## 2017-03-23 RX ADMIN — PANTOPRAZOLE SODIUM 40 MG: 40 INJECTION, POWDER, FOR SOLUTION INTRAVENOUS at 09:03

## 2017-03-23 RX ADMIN — ACETAMINOPHEN 1000 MG: 10 INJECTION, SOLUTION INTRAVENOUS at 06:03

## 2017-03-23 RX ADMIN — HEPARIN SODIUM 5000 UNITS: 5000 INJECTION, SOLUTION INTRAVENOUS; SUBCUTANEOUS at 10:03

## 2017-03-23 RX ADMIN — HEPARIN SODIUM 5000 UNITS: 5000 INJECTION, SOLUTION INTRAVENOUS; SUBCUTANEOUS at 06:03

## 2017-03-23 RX ADMIN — LISINOPRIL 20 MG: 20 TABLET ORAL at 09:03

## 2017-03-23 RX ADMIN — VERAPAMIL HYDROCHLORIDE 240 MG: 240 TABLET, FILM COATED, EXTENDED RELEASE ORAL at 10:03

## 2017-03-23 NOTE — SUBJECTIVE & OBJECTIVE
Interval History:   No acute events overnight  Pain well controlled  Tolerating diet  Has not ambulated    Review of Systems  Objective:     Temp:  [95.2 °F (35.1 °C)-98.2 °F (36.8 °C)] 97.9 °F (36.6 °C)  Pulse:  [45-81] 79  Resp:  [12-22] 18  SpO2:  [94 %-100 %] 96 %  BP: ()/(52-67) 143/61  Arterial Line BP: (104-124)/(46-53) 124/53     Body mass index is 24.59 kg/(m^2).            Drains     Drain                 Urethral Catheter 03/22/17 1235 Non-latex 16 Fr. less than 1 day                Physical Exam   Constitutional: No distress.   HENT:   Head: Normocephalic and atraumatic.   Eyes: Conjunctivae are normal. No scleral icterus.   Neck: Normal range of motion.   Cardiovascular: Normal rate.    Pulmonary/Chest: Effort normal. No respiratory distress.   Abdominal: Soft. She exhibits no distension. There is tenderness (appropriate). There is no rebound and no guarding.   Incisions c/d/i  16 Fr monsalve draining clear yellow   Musculoskeletal: Normal range of motion.   SCDs in place   Neurological: She is alert.   Skin: Skin is warm and dry. She is not diaphoretic.     Psychiatric: She has a normal mood and affect.       Significant Labs:    BMP:    Recent Labs  Lab 03/22/17  1502 03/23/17  0449    139   K 3.6 4.0   * 110   CO2 20* 23   BUN 14 12   CREATININE 0.8 0.8   CALCIUM 7.5* 8.8       CBC:     Recent Labs  Lab 03/22/17  1502 03/23/17  0449   WBC 12.95* 13.48*   HGB 9.9* 11.1*   HCT 29.6* 32.9*    225       All pertinent labs results from the past 24 hours have been reviewed.    Significant Imaging:  All pertinent imaging results/findings from the past 24 hours have been reviewed.

## 2017-03-23 NOTE — ASSESSMENT & PLAN NOTE
- IV tylenol, PO oxycodone for pain control  - regular diet  - HLIV  - CBC, BMP daily  - Ambulate pm of surgery and qid  - Drains: dc monsalve  - Prophylaxis: IS, SCDs, GI ppx    Probable DC tomorrow

## 2017-03-23 NOTE — PLAN OF CARE
Problem: Patient Care Overview  Goal: Plan of Care Review  Patient progressing towards goals. Pain controlled  With  medsBed in low position and call bell in reach.

## 2017-03-23 NOTE — PROGRESS NOTES
[facility]  Urology  Progress Note    Patient Name: Ida Santana  MRN: [id number]  Admission Date: 3/22/2017  Hospital Length of Stay: 1 days  Code Status: Prior   Attending Provider: Porfirio Arellano MD   Primary Care Physician: Giovanna Murray MD    Subjective:     HPI:  Ida Santana is a 76 y.o. female POD 1 s/p left adrenalectomy    Interval History:   No acute events overnight  Pain well controlled  Tolerating diet  Has not ambulated    Review of Systems  Objective:     Temp:  [95.2 °F (35.1 °C)-98.2 °F (36.8 °C)] 97.9 °F (36.6 °C)  Pulse:  [45-81] 79  Resp:  [12-22] 18  SpO2:  [94 %-100 %] 96 %  BP: ()/(52-67) 143/61  Arterial Line BP: (104-124)/(46-53) 124/53     Body mass index is 24.59 kg/(m^2).            Drains     Drain                 Urethral Catheter 03/22/17 1235 Non-latex 16 Fr. less than 1 day                Physical Exam   Constitutional: No distress.   HENT:   Head: Normocephalic and atraumatic.   Eyes: Conjunctivae are normal. No scleral icterus.   Neck: Normal range of motion.   Cardiovascular: Normal rate.    Pulmonary/Chest: Effort normal. No respiratory distress.   Abdominal: Soft. She exhibits no distension. There is tenderness (appropriate). There is no rebound and no guarding.   Incisions c/d/i  16 Fr monsalve draining clear yellow   Musculoskeletal: Normal range of motion.   SCDs in place   Neurological: She is alert.   Skin: Skin is warm and dry. She is not diaphoretic.     Psychiatric: She has a normal mood and affect.       Significant Labs:    BMP:    Recent Labs  Lab 03/22/17  1502 03/23/17  0449    139   K 3.6 4.0   * 110   CO2 20* 23   BUN 14 12   CREATININE 0.8 0.8   CALCIUM 7.5* 8.8       CBC:     Recent Labs  Lab 03/22/17  1502 03/23/17  0449   WBC 12.95* 13.48*   HGB 9.9* 11.1*   HCT 29.6* 32.9*    225       All pertinent labs results from the past 24 hours have been reviewed.    Significant Imaging:  All pertinent imaging  results/findings from the past 24 hours have been reviewed.                  Assessment/Plan:     Left adrenal mass  - IV tylenol, PO oxycodone for pain control  - regular diet  - HLIV  - CBC, BMP daily  - Ambulate pm of surgery and qid  - Drains: dc monsalve  - Prophylaxis: IS, SCDs, GI ppx    Probable DC tomorrow         VTE Risk Mitigation         Ordered     heparin (porcine) injection 5,000 Units  Every 8 hours     Route:  Subcutaneous        03/22/17 8892          Teresa Gordon MD  Urology  [facility]

## 2017-03-23 NOTE — PLAN OF CARE
S/P Open left adrenelectomy on 3/22/17. CM visited with pt. Pt independent at home. Has family support. No dc needs noted. Will cont to follow        03/23/17 1450   Discharge Assessment   Assessment Type Discharge Planning Assessment   Confirmed/corrected address and phone number on facesheet? Yes   Assessment information obtained from? Patient   Expected Length of Stay (days) 2   Prior to hospitilization cognitive status: Alert/Oriented   Prior to hospitalization functional status: Independent   Current cognitive status: Alert/Oriented   Current Functional Status: Independent   Arrived From admitted as an inpatient   Lives With alone   Able to Return to Prior Arrangements yes   Is patient able to care for self after discharge? Yes   How many people do you have in your home that can help with your care after discharge? 1   Who are your caregiver(s) and their phone number(s)? Bhavna/sister [phone]   Patient's perception of discharge disposition admitted as an inpatient   Readmission Within The Last 30 Days no previous admission in last 30 days   Patient currently being followed by outpatient case management? No   Patient currently receives home health services? No   Does the patient currently use HME? No   Patient currently receives private duty nursing? No   Patient currently receives any other outside agency services? No   Equipment Currently Used at Home none   Do you have any problems affording any of your prescribed medications? No   Is the patient taking medications as prescribed? yes   Do you have any financial concerns preventing you from receiving the healthcare you need? No   Does the patient have transportation to healthcare appointments? Yes   Transportation Available family or friend will provide;car   On Dialysis? No   Does the patient receive services at the Coumadin Clinic? No   Discharge Plan A Home with family   Discharge Plan B Home with family;Home Health   Patient/Family In Agreement With  Plan yes

## 2017-03-24 VITALS
DIASTOLIC BLOOD PRESSURE: 52 MMHG | SYSTOLIC BLOOD PRESSURE: 100 MMHG | HEIGHT: 67 IN | OXYGEN SATURATION: 91 % | BODY MASS INDEX: 24.64 KG/M2 | RESPIRATION RATE: 18 BRPM | TEMPERATURE: 98 F | WEIGHT: 157 LBS | HEART RATE: 82 BPM

## 2017-03-24 LAB
BASOPHILS # BLD AUTO: 0.01 K/UL
BASOPHILS NFR BLD: 0.1 %
BLD PROD TYP BPU: NORMAL
BLD PROD TYP BPU: NORMAL
BLOOD UNIT EXPIRATION DATE: NORMAL
BLOOD UNIT EXPIRATION DATE: NORMAL
BLOOD UNIT TYPE CODE: 7300
BLOOD UNIT TYPE CODE: 7300
BLOOD UNIT TYPE: NORMAL
BLOOD UNIT TYPE: NORMAL
CODING SYSTEM: NORMAL
CODING SYSTEM: NORMAL
DIFFERENTIAL METHOD: ABNORMAL
DISPENSE STATUS: NORMAL
DISPENSE STATUS: NORMAL
EOSINOPHIL # BLD AUTO: 0 K/UL
EOSINOPHIL NFR BLD: 0.1 %
ERYTHROCYTE [DISTWIDTH] IN BLOOD BY AUTOMATED COUNT: 14.6 %
HCT VFR BLD AUTO: 26.8 %
HGB BLD-MCNC: 9 G/DL
LYMPHOCYTES # BLD AUTO: 1.7 K/UL
LYMPHOCYTES NFR BLD: 13.3 %
MCH RBC QN AUTO: 30.1 PG
MCHC RBC AUTO-ENTMCNC: 33.6 %
MCV RBC AUTO: 90 FL
MONOCYTES # BLD AUTO: 0.9 K/UL
MONOCYTES NFR BLD: 7.3 %
NEUTROPHILS # BLD AUTO: 9.8 K/UL
NEUTROPHILS NFR BLD: 78.9 %
PLATELET # BLD AUTO: 216 K/UL
PMV BLD AUTO: 11 FL
RBC # BLD AUTO: 2.99 M/UL
TRANS ERYTHROCYTES VOL PATIENT: NORMAL ML
TRANS ERYTHROCYTES VOL PATIENT: NORMAL ML
WBC # BLD AUTO: 12.41 K/UL

## 2017-03-24 PROCEDURE — 25000003 PHARM REV CODE 250: Performed by: UROLOGY

## 2017-03-24 PROCEDURE — 85025 COMPLETE CBC W/AUTO DIFF WBC: CPT

## 2017-03-24 PROCEDURE — 36415 COLL VENOUS BLD VENIPUNCTURE: CPT

## 2017-03-24 PROCEDURE — C9113 INJ PANTOPRAZOLE SODIUM, VIA: HCPCS | Performed by: UROLOGY

## 2017-03-24 PROCEDURE — 63600175 PHARM REV CODE 636 W HCPCS: Performed by: UROLOGY

## 2017-03-24 RX ORDER — POLYETHYLENE GLYCOL 3350 17 G/17G
17 POWDER, FOR SOLUTION ORAL DAILY
Qty: 30 PACKET | Refills: 0 | Status: SHIPPED | OUTPATIENT
Start: 2017-03-24 | End: 2017-10-03

## 2017-03-24 RX ORDER — OXYCODONE AND ACETAMINOPHEN 5; 325 MG/1; MG/1
1 TABLET ORAL EVERY 4 HOURS PRN
Qty: 51 TABLET | Refills: 0 | Status: SHIPPED | OUTPATIENT
Start: 2017-03-24 | End: 2017-05-23

## 2017-03-24 RX ADMIN — POLYETHYLENE GLYCOL 3350 17 G: 17 POWDER, FOR SOLUTION ORAL at 08:03

## 2017-03-24 RX ADMIN — PANTOPRAZOLE SODIUM 40 MG: 40 INJECTION, POWDER, FOR SOLUTION INTRAVENOUS at 08:03

## 2017-03-24 RX ADMIN — CARVEDILOL 25 MG: 25 TABLET, FILM COATED ORAL at 08:03

## 2017-03-24 RX ADMIN — LISINOPRIL 20 MG: 20 TABLET ORAL at 08:03

## 2017-03-24 RX ADMIN — VERAPAMIL HYDROCHLORIDE 240 MG: 240 TABLET, FILM COATED, EXTENDED RELEASE ORAL at 08:03

## 2017-03-24 RX ADMIN — HEPARIN SODIUM 5000 UNITS: 5000 INJECTION, SOLUTION INTRAVENOUS; SUBCUTANEOUS at 05:03

## 2017-03-24 NOTE — SUBJECTIVE & OBJECTIVE
Interval History:     - WILMAN  - pain controlled  - ambulating   - tolerating regular diet      Review of Systems  Objective:     Temp:  [96 °F (35.6 °C)-98.5 °F (36.9 °C)] 98.5 °F (36.9 °C)  Pulse:  [65-75] 75  Resp:  [18] 18  SpO2:  [97 %-99 %] 97 %  BP: (125-161)/(67-73) 154/71     Body mass index is 24.59 kg/(m^2).            Drains          No matching active lines, drains, or airways          Physical Exam   Constitutional: No distress.   HENT:   Head: Normocephalic and atraumatic.   Eyes: Conjunctivae are normal. No scleral icterus.   Neck: Normal range of motion.   Cardiovascular: Normal rate.    Pulmonary/Chest: Effort normal. No respiratory distress.   Abdominal: Soft. She exhibits no distension. There is tenderness (appropriate). There is no rebound and no guarding.   Incisions c/d/i   Musculoskeletal: Normal range of motion.   SCDs in place   Neurological: She is alert.   Skin: Skin is warm and dry. She is not diaphoretic.     Psychiatric: She has a normal mood and affect.       Significant Labs:    BMP:    Recent Labs  Lab 03/22/17  1502 03/23/17  0449    139   K 3.6 4.0   * 110   CO2 20* 23   BUN 14 12   CREATININE 0.8 0.8   CALCIUM 7.5* 8.8       CBC:     Recent Labs  Lab 03/22/17  1502 03/23/17  0449   WBC 12.95* 13.48*   HGB 9.9* 11.1*   HCT 29.6* 32.9*    225       All pertinent labs results from the past 24 hours have been reviewed.    Significant Imaging:  All pertinent imaging results/findings from the past 24 hours have been reviewed.

## 2017-03-24 NOTE — PROGRESS NOTES
[facility]  Urology  Progress Note    Patient Name: Ida Santana  MRN: [id number]  Admission Date: 3/22/2017  Hospital Length of Stay: 2 days  Code Status: Prior   Attending Provider: Porfirio Arellano MD   Primary Care Physician: Giovanna Murray MD    Subjective:     HPI:  Ida Santana is a 76 y.o. female POD 2 s/p left adrenalectomy    Interval History:     - WILMAN  - pain controlled  - ambulating   - tolerating regular diet      Review of Systems  Objective:     Temp:  [96 °F (35.6 °C)-98.5 °F (36.9 °C)] 98.5 °F (36.9 °C)  Pulse:  [65-75] 75  Resp:  [18] 18  SpO2:  [97 %-99 %] 97 %  BP: (125-161)/(67-73) 154/71     Body mass index is 24.59 kg/(m^2).            Drains          No matching active lines, drains, or airways          Physical Exam   Constitutional: No distress.   HENT:   Head: Normocephalic and atraumatic.   Eyes: Conjunctivae are normal. No scleral icterus.   Neck: Normal range of motion.   Cardiovascular: Normal rate.    Pulmonary/Chest: Effort normal. No respiratory distress.   Abdominal: Soft. She exhibits no distension. There is tenderness (appropriate). There is no rebound and no guarding.   Incisions c/d/i   Musculoskeletal: Normal range of motion.   SCDs in place   Neurological: She is alert.   Skin: Skin is warm and dry. She is not diaphoretic.     Psychiatric: She has a normal mood and affect.       Significant Labs:    BMP:    Recent Labs  Lab 03/22/17  1502 03/23/17  0449    139   K 3.6 4.0   * 110   CO2 20* 23   BUN 14 12   CREATININE 0.8 0.8   CALCIUM 7.5* 8.8       CBC:     Recent Labs  Lab 03/22/17  1502 03/23/17  0449   WBC 12.95* 13.48*   HGB 9.9* 11.1*   HCT 29.6* 32.9*    225       All pertinent labs results from the past 24 hours have been reviewed.    Significant Imaging:  All pertinent imaging results/findings from the past 24 hours have been reviewed.                  Assessment/Plan:     * Left adrenal mass  - PO oxycodone for pain  control  - regular diet  - HLIV  - CBC, BMP daily  - Ambulate pm of surgery and qid  - Prophylaxis: IS, SCDs, GI ppx    Dispo: home today         VTE Risk Mitigation         Ordered     heparin (porcine) injection 5,000 Units  Every 8 hours     Route:  Subcutaneous        03/22/17 4598          José Miguel Qiu MD  Urology  [facility]

## 2017-03-24 NOTE — DISCHARGE SUMMARY
[facility]  Urology  Discharge Summary      Patient Name: Ida Santana  MRN: [id number]  Admission Date: 3/22/2017  Hospital Length of Stay: 2 days  Discharge Date and Time:  03/24/2017 8:01 AM  Attending Physician: Porfirio Arellano MD   Discharging Provider: José Miguel Qiu MD  Primary Care Physician: Giovanna Murray MD    HPI:     POD 2 s/p:    Procedure(s) (LRB):  ADRENALECTOMY (Left)     Indwelling Lines/Drains at time of discharge:   Lines/Drains/Airways     Peripherally Inserted Central Catheter Line                 PICC Double Lumen 01/25/17 1541 right brachial 57 days                Hospital Course (synopsis of major diagnoses, care, treatment, and services provided during the course of the hospital stay): Patient was admitted on 3/22/2017 for above procedure.  Patient tolerated the procedure well, hospital course was uncomplicated, and patient was discharged home in good condition with pain well controlled on 3/24/2017 after ambulating, voiding, and eating.           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    Specialty:  Urology    Why:  post op    Contact information:    [facility] [id number] [phone]          Patient Instructions:     Diet general     Call MD for:  temperature >100.4     Call MD  for:  persistent nausea and vomiting or diarrhea     Call MD for:  severe uncontrolled pain     Call MD for:  redness, tenderness, or signs of infection (pain, swelling, redness, odor or green/yellow discharge around incision site)     Call MD for:  difficulty breathing or increased cough     Call MD for:  severe persistent headache     Call MD for:  worsening rash     Call MD for:  persistent dizziness, light-headedness, or visual disturbances     Call MD for:  increased confusion or weakness     No dressing needed     Other restrictions (specify):   Order Comments: No driving while taking pain medications.  No lifting more than 10 pounds for 6 weeks.  Do not submerge incisions.       Medications:  Reconciled Home Medications:   Current Discharge Medication List      START taking these medications    Details   oxycodone-acetaminophen (PERCOCET) 5-325 mg per tablet Take 1 tablet by mouth every 4 (four) hours as needed for Pain.  Qty: 51 tablet, Refills: 0      polyethylene glycol (GLYCOLAX) 17 gram PwPk Take 17 g by mouth once daily.  Qty: 30 packet, Refills: 0         CONTINUE these medications which have NOT CHANGED    Details   carvedilol (COREG) 25 MG tablet Take 1 tablet (25 mg total) by mouth 2 (two) times daily with meals.  Qty: 60 tablet, Refills: 3      dorzolamide-timolol 2-0.5% (COSOPT) 22.3-6.8 mg/mL ophthalmic solution Place 1 drop into the left eye 2 (two) times daily.  Qty: 10 mL, Refills: 6      lisinopril (PRINIVIL,ZESTRIL) 20 MG tablet Take 1 tablet (20 mg total) by mouth once daily.  Qty: 30 tablet, Refills: 3      MULTIVITAMIN WITH MINERALS (ONE-A-DAY 50 PLUS) Tab Take 1 tablet by mouth once daily.       om-3-epa-dha-fish oil-flax-E (THERA TEARS NUTRITION) [phone] tv-ls-yl-unit Cap Take 3 capsules by mouth every morning. Over-the-counter supplement    Associated Diagnoses: Dry eye syndrome, bilateral      rivaroxaban (XARELTO) 20 mg Tab Take 20 mg by mouth once daily.      verapamil  (CALAN-SR) 240 MG CR tablet take 1 tablet by mouth twice a day  Qty: 30 tablet, Refills: 12      flurandrenolide (CORDRAN) 0.05 % lotion Apply to  scalp one to two times daily as needed for dryness      ketoconazole (NIZORAL) 2 % shampoo USE EVERY OTHER DAY LET SIT ON SCALP FOR FIVE MINUTES BEFORE RINSING  Qty: 120 mL, Refills: 6      latanoprost 0.005 % ophthalmic solution Refills: 1             Time spent on the discharge of patient: 30 minutes    José Miguel Qiu MD  Urology  [facility]

## 2017-03-24 NOTE — ASSESSMENT & PLAN NOTE
- PO oxycodone for pain control  - regular diet  - HLIV  - CBC, BMP daily  - Ambulate pm of surgery and qid  - Prophylaxis: IS, SCDs, GI ppx    Dispo: home today

## 2017-03-24 NOTE — PLAN OF CARE
Problem: Patient Care Overview  Goal: Plan of Care Review  Outcome: Ongoing (interventions implemented as appropriate)  Patient without of complaint of pain. Incision intact, bello chow'merle. Plan of care reviewed with family and patient.

## 2017-03-27 RX ORDER — POTASSIUM CHLORIDE 20 MEQ/1
TABLET, EXTENDED RELEASE ORAL
Qty: 60 TABLET | Refills: 12 | Status: SHIPPED | OUTPATIENT
Start: 2017-03-27 | End: 2017-06-12

## 2017-03-28 ENCOUNTER — PATIENT OUTREACH (OUTPATIENT)
Dept: ADMINISTRATIVE | Facility: CLINIC | Age: 77
End: 2017-03-28
Payer: COMMERCIAL

## 2017-03-28 NOTE — PATIENT INSTRUCTIONS
Discharge Instructions: Caring for Your Abdominal Incision  You are going home with stitches (sutures), surgical staples, special strips of tape, or surgical skin glue. One of these items was used to close your incision, help stop bleeding, and speed healing. Follow the tips on this sheet to help your incision heal.   Home care  · Clean your work area:  ¨ Put pets in another room.  ¨ Use soap and water to clean the surface youll be working on.  ¨ Spread a clean cloth or paper towel over the surface.  ¨ Move away from the clean surface if you need to cough or sneeze.  · Gather your supplies:  ¨ Packaged dressing for your wound  ¨ Irrigation solutions (if using these)  ¨ Pair of scissors (cleaned with soap and water)  ¨ Medical tape  ¨ Disposable gloves (2 pairs)  ¨ Clean plastic trash bag (open it before you wash your hands)  · Wash your hands:  ¨ Use liquid soap.  ¨ Work up a good lather and scrub for 1 to 2 minutes.  ¨ Be sure to scrub between your fingers and under your nails.  ¨ Rinse with warm water, keeping your fingers pointed down.  ¨ Use a clean paper towel to dry your hands and turn off the faucet.  · Prepare your dressing supplies:  ¨ Peel back the edges of the dressing packages. Pour any irrigation solutions into solution cups.  ¨ Cut each piece of tape 4 inches longer than the dressing.  · Remove the old dressing:  ¨ Put on disposable gloves.  ¨ Loosen the tape on the dressing by pulling gently toward the incision. Remove the dressing one layer at a time. Put it in the plastic bag immediately.  ¨ Remove your gloves and put them in the plastic bag. Wash your hands.  ¨ Put on a new pair of gloves.  · Clean and dress the incision:  ¨ Clean the incision and apply a new dressing as directed.  ¨ Do not remove the special strips of tape even if they are starting to loosen.   ¨ Put all used supplies in the plastic bag. Remove your gloves last and put them in the plastic bag. Seal the bag and put it in the  trash.  ¨ Be sure to wash your hands again.  Care for specific closures  Follow these guidelines unless your healthcare provider tells you otherwise:  · Sutures or staples. Once you no longer need to keep these dry, clean the wound daily, using the instructions listed above. First remove the bandage using clean hands. Then wash the area gently with soap and warm water. Use a wet cotton swab to loosen and remove any blood or crust that forms. After cleaning, put a thin layer of antibiotic ointment on. Then put on a new bandage.  · Skin glue. Dont put liquid, ointment, or cream on your wound while the glue is in place. Avoid activities that cause heavy sweating. Protect the wound from sunlight. Do not scratch, rub, or pick at the glue. Do not put tape directly over the glue. The glue should peel off within 5 to 10 days.  · Surgical tape. Keep the area dry. If it gets wet, blot the area dry with a clean towel. Surgical tape usually falls off within 7 to 10 days. If it has not fallen off after 10 days, contact your healthcare provider before taking it off yourself. If you are told to remove the tape, put mineral oil or petroleum jelly on a cotton ball. Gently rub the tape until it is removed.  Follow-up care  Follow up with your healthcare provider to ask how long sutures or staples should be left in place. Be sure to return for suture or staple removal as directed.  If tape closures were used, remove them yourself when your provider recommends if they have not fallen off on their own. If skin glue was used, the glue will wear off by itself.     When to call your healthcare provider  Call your healthcare provider right away if you have any of the following:  · More pain, bleeding, redness, swelling, or foul-smelling discharge around the incision area  · Fever of 100.4°F (38°C) or higher, or as directed by your healthcare provider  · Shaking chills  · Vomiting or nausea that doesnt go away  · Numbness, coldness, or  tingling around the incision area, or changes in skin color  · Opening of sutures or wound  · Stitches or staples come apart or fall out or surgical tape falls off before 7 days, or as directed by your provider   Date Last Reviewed: 8/1/2016  © 9517-1321 [employer]. [address] [place] [address]. All rights reserved. This information is not intended as a substitute for professional medical care. Always follow your healthcare professional's instructions.

## 2017-04-10 ENCOUNTER — OFFICE VISIT (OUTPATIENT)
Dept: UROLOGY | Facility: CLINIC | Age: 77
End: 2017-04-10
Payer: COMMERCIAL

## 2017-04-10 VITALS
DIASTOLIC BLOOD PRESSURE: 53 MMHG | TEMPERATURE: 99 F | BODY MASS INDEX: 23.34 KG/M2 | HEART RATE: 56 BPM | SYSTOLIC BLOOD PRESSURE: 99 MMHG | WEIGHT: 149 LBS

## 2017-04-10 DIAGNOSIS — Z98.890 POST-OPERATIVE STATE: Primary | ICD-10-CM

## 2017-04-10 DIAGNOSIS — N28.89 KIDNEY MASS: ICD-10-CM

## 2017-04-10 DIAGNOSIS — E27.8 LEFT ADRENAL MASS: ICD-10-CM

## 2017-04-10 PROCEDURE — 99024 POSTOP FOLLOW-UP VISIT: CPT | Mod: S$GLB,,, | Performed by: NURSE PRACTITIONER

## 2017-04-10 PROCEDURE — 99999 PR PBB SHADOW E&M-EST. PATIENT-LVL IV: CPT | Mod: PBBFAC,,, | Performed by: NURSE PRACTITIONER

## 2017-04-10 NOTE — MR AVS SNAPSHOT
[facility]  [address]  [facility] [address]  Phone: [phone]                  Ida Santana   4/10/2017 10:20 AM   Office Visit    Description:  Female : 1940   Provider:  Becca Sims NP   Department:  [facility] - [facility]           Reason for Visit     Other           Diagnoses this Visit        Comments    Post-operative state    -  Primary     Left adrenal mass         Kidney mass                To Do List           Future Appointments        Provider Department Dept Phone    2017 1:00 PM JAKE Beaulieu MD [facility] - Ophthalmology [phone]    2017 4:30 PM Oleg Harden Jr., MD [facility] - Radiation Oncology [phone]      Goals (5 Years of Data)     None      [facility]     [facility] On Call Nurse Care Line -  Assistance  Unless otherwise directed by your provider, please contact [facility], our nurse care line that is available for  assistance.     Registered nurses in the [facility] On Call Center provide: appointment scheduling, clinical advisement, health education, and other advisory services.  Call: [phone] (toll free)               Medications           Message regarding Medications     Verify the changes and/or additions to your medication regime listed below are the same as discussed with your clinician today.  If any of these changes or additions are incorrect, please notify your healthcare provider.             Verify that the below list of medications is an accurate representation of the medications you are currently taking.  If none reported, the list may be blank. If incorrect, please contact your healthcare provider. Carry this list with you in case of emergency.           Current Medications     carvedilol (COREG) 25 MG tablet Take 1 tablet (25 mg total) by mouth 2 (two) times daily with meals.    dorzolamide-timolol 2-0.5% (COSOPT) 22.3-6.8 mg/mL ophthalmic solution Place 1 drop into the left  eye 2 (two) times daily.    flurandrenolide (CORDRAN) 0.05 % lotion Apply to  scalp one to two times daily as needed for dryness    ketoconazole (NIZORAL) 2 % shampoo USE EVERY OTHER DAY LET SIT ON SCALP FOR FIVE MINUTES BEFORE RINSING    latanoprost 0.005 % ophthalmic solution     lisinopril (PRINIVIL,ZESTRIL) 20 MG tablet Take 1 tablet (20 mg total) by mouth once daily.    MULTIVITAMIN WITH MINERALS (ONE-A-DAY 50 PLUS) Tab Take 1 tablet by mouth once daily.     om-3-epa-dha-fish oil-flax-E (THERA TEARS NUTRITION) [phone] qe-xp-by-unit Cap Take 3 capsules by mouth every morning. Over-the-counter supplement    oxycodone-acetaminophen (PERCOCET) 5-325 mg per tablet Take 1 tablet by mouth every 4 (four) hours as needed for Pain.    polyethylene glycol (GLYCOLAX) 17 gram PwPk Take 17 g by mouth once daily.    potassium chloride SA (K-DUR,KLOR-CON) 20 MEQ tablet take 1 tablet by mouth twice a day    rivaroxaban (XARELTO) 20 mg Tab Take 20 mg by mouth once daily.    verapamil (CALAN-SR) 240 MG CR tablet take 1 tablet by mouth twice a day           Clinical Reference Information           Your Vitals Were     BP Pulse Temp Weight Last Period BMI    99/53 56 98.9 °F (37.2 °C) (Oral) 67.6 kg (149 lb) (LMP Unknown) 23.34 kg/m2      Blood Pressure          Most Recent Value    BP  (!)  99/53      Allergies as of 4/10/2017     Adhesive Tape-silicones    Atorvastatin    Pravachol  [Pravastatin]      Immunizations Administered on Date of Encounter - 4/10/2017     None      [facility] Sign-Up     Activating your [facility] account is as easy as 1-2-3!     1) Visit [email or web address], select Sign Up Now, enter this activation code and your date of birth, then select Next.  Activation code not generated  Current Patient Portal Status: Account disabled      2) Create a username and password to use when you visit [facility] in the future and select a security question in case you lose your password and select Next.    3) Enter your  e-mail address and click Sign Up!    Additional Information  If you have questions, please e-mail [email or web address] or call [phone] to talk to our [facility] staff. Remember, [facility] is NOT to be used for urgent needs. For medical emergencies, dial 911.         Language Assistance Services     ATTENTION: Language assistance services are available, free of charge. Please call [phone].      ATENCIÓN: Si habla español, tiene a blancas disposición servicios gratuitos de asistencia lingüística. Llame al [phone].     CHÚ Ý: N?u b?n nói Ti?ng Vi?t, có các d?ch v? h? tr? ngôn ng? mi?n phí dành cho b?n. G?i s? [phone].         [facility] complies with applicable Federal civil rights laws and does not discriminate on the basis of race, color, national origin, age, disability, or sex.

## 2017-04-11 ENCOUNTER — OFFICE VISIT (OUTPATIENT)
Dept: OPHTHALMOLOGY | Facility: CLINIC | Age: 77
End: 2017-04-11
Payer: COMMERCIAL

## 2017-04-11 DIAGNOSIS — H33.002 RETINAL DETACHMENT OF LEFT EYE WITH RETINAL BREAK: Primary | ICD-10-CM

## 2017-04-11 DIAGNOSIS — H44.002 ENDOPHTHALMITIS, LEFT EYE: ICD-10-CM

## 2017-04-11 PROCEDURE — 99024 POSTOP FOLLOW-UP VISIT: CPT | Mod: S$GLB,,, | Performed by: OPHTHALMOLOGY

## 2017-04-11 PROCEDURE — 99999 PR PBB SHADOW E&M-EST. PATIENT-LVL III: CPT | Mod: PBBFAC,,, | Performed by: OPHTHALMOLOGY

## 2017-04-11 RX ORDER — PREDNISOLONE ACETATE 10 MG/ML
1 SUSPENSION/ DROPS OPHTHALMIC 4 TIMES DAILY
Qty: 5 ML | Refills: 4 | Status: SHIPPED | OUTPATIENT
Start: 2017-04-11 | End: 2017-07-10

## 2017-04-11 NOTE — MR AVS SNAPSHOT
[facility]  [address]  [facility] [address]  Phone: [phone]  Fax: [phone]                  Ida Santana   2017 1:00 PM   Office Visit    Description:  Female : 1940   Provider:  JAKE Beaulieu MD   Department:  [facility]           Reason for Visit     Post-op Evaluation           Diagnoses this Visit        Comments    Retinal detachment of left eye with retinal break    -  Primary     Endophthalmitis, left eye                To Do List           Future Appointments        Provider Department Dept Phone    2017 4:30 PM MD [facility] - Radiation Oncology [phone]      Goals (5 Years of Data)     None      Follow-Up and Disposition     Return in about 8 weeks (around 2017).       These Medications        Disp Refills Start End    prednisoLONE acetate (PRED FORTE) 1 % DrpS 5 mL 4 2017 7/10/2017    Place 1 drop into the right eye 4 (four) times daily. - Right Eye    Pharmacy: [facility] [place] [facility] [address] [address] [place] [address] [facility] Ph #: [phone]         [facility] On Call     [facility] On Call Nurse Care Line -  Assistance  Unless otherwise directed by your provider, please contact [facility], our nurse care line that is available for  assistance.     Registered nurses in the [facility] Center provide: appointment scheduling, clinical advisement, health education, and other advisory services.  Call: [phone] (toll free)               Medications           Message regarding Medications     Verify the changes and/or additions to your medication regime listed below are the same as discussed with your clinician today.  If any of these changes or additions are incorrect, please notify your healthcare provider.        START taking these NEW medications        Refills    prednisoLONE acetate (PRED FORTE) 1 % DrpS 4    Sig: Place 1 drop into the right eye 4 (four) times  daily.    Class: Normal    Route: Right Eye           Verify that the below list of medications is an accurate representation of the medications you are currently taking.  If none reported, the list may be blank. If incorrect, please contact your healthcare provider. Carry this list with you in case of emergency.           Current Medications     carvedilol (COREG) 25 MG tablet Take 1 tablet (25 mg total) by mouth 2 (two) times daily with meals.    dorzolamide-timolol 2-0.5% (COSOPT) 22.3-6.8 mg/mL ophthalmic solution Place 1 drop into the left eye 2 (two) times daily.    flurandrenolide (CORDRAN) 0.05 % lotion Apply to  scalp one to two times daily as needed for dryness    ketoconazole (NIZORAL) 2 % shampoo USE EVERY OTHER DAY LET SIT ON SCALP FOR FIVE MINUTES BEFORE RINSING    latanoprost 0.005 % ophthalmic solution     lisinopril (PRINIVIL,ZESTRIL) 20 MG tablet Take 1 tablet (20 mg total) by mouth once daily.    MULTIVITAMIN WITH MINERALS (ONE-A-DAY 50 PLUS) Tab Take 1 tablet by mouth once daily.     om-3-epa-dha-fish oil-flax-E (THERA TEARS NUTRITION) [phone] in-vd-ws-unit Cap Take 3 capsules by mouth every morning. Over-the-counter supplement    oxycodone-acetaminophen (PERCOCET) 5-325 mg per tablet Take 1 tablet by mouth every 4 (four) hours as needed for Pain.    polyethylene glycol (GLYCOLAX) 17 gram PwPk Take 17 g by mouth once daily.    potassium chloride SA (K-DUR,KLOR-CON) 20 MEQ tablet take 1 tablet by mouth twice a day    rivaroxaban (XARELTO) 20 mg Tab Take 20 mg by mouth once daily.    verapamil (CALAN-SR) 240 MG CR tablet take 1 tablet by mouth twice a day    prednisoLONE acetate (PRED FORTE) 1 % DrpS Place 1 drop into the right eye 4 (four) times daily.           Clinical Reference Information           Your Vitals Were     Last Period                   (LMP Unknown)           Allergies as of 4/11/2017     Adhesive Tape-silicones    Atorvastatin    Pravachol  [Pravastatin]      Immunizations  Administered on Date of Encounter - 4/11/2017     None      [facility] Sign-Up     Activating your [facility] account is as easy as 1-2-3!     1) Visit [email or web address], select Sign Up Now, enter this activation code and your date of birth, then select Next.  Activation code not generated  Current Patient Portal Status: Account disabled      2) Create a username and password to use when you visit [facility] in the future and select a security question in case you lose your password and select Next.    3) Enter your e-mail address and click Sign Up!    Additional Information  If you have questions, please e-mail [email or web address] or call [phone] to talk to our [facility] staff. Remember, [facility] is NOT to be used for urgent needs. For medical emergencies, dial 911.         Language Assistance Services     ATTENTION: Language assistance services are available, free of charge. Please call [phone].      ATENCIÓN: Si habla marcie, tiene a blancas disposición servicios gratuitos de asistencia lingüística. Llame al [phone].     CHÚ Ý: N?u b?n nói Ti?ng Vi?t, có các d?ch v? h? tr? ngôn ng? mi?n phí dành cho b?n. G?i s? [phone].         [facility] complies with applicable Federal civil rights laws and does not discriminate on the basis of race, color, national origin, age, disability, or sex.

## 2017-04-11 NOTE — PROGRESS NOTES
A/P  1. 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 NS OU    4. POAG OU  On Latanoprost OD qam  Started on Cosopt BID OS last visit, IOP improved today 30->16 today; continue current gtts    Patient had surgery on adrenal mass 03/22/2017      RTC 8 weeks

## 2017-04-24 RX ORDER — RIVAROXABAN 20 MG/1
TABLET, FILM COATED ORAL
Qty: 30 TABLET | Refills: 1 | Status: SHIPPED | OUTPATIENT
Start: 2017-04-24 | End: 2017-07-06 | Stop reason: ALTCHOICE

## 2017-05-23 ENCOUNTER — OFFICE VISIT (OUTPATIENT)
Dept: RADIATION ONCOLOGY | Facility: CLINIC | Age: 77
End: 2017-05-23
Payer: COMMERCIAL

## 2017-05-23 VITALS
HEIGHT: 67 IN | RESPIRATION RATE: 16 BRPM | SYSTOLIC BLOOD PRESSURE: 101 MMHG | BODY MASS INDEX: 23.25 KG/M2 | DIASTOLIC BLOOD PRESSURE: 52 MMHG | WEIGHT: 148.13 LBS | HEART RATE: 98 BPM

## 2017-05-23 DIAGNOSIS — C50.412 MALIGNANT NEOPLASM OF UPPER-OUTER QUADRANT OF LEFT FEMALE BREAST: Primary | ICD-10-CM

## 2017-05-23 PROCEDURE — 99999 PR PBB SHADOW E&M-EST. PATIENT-LVL III: CPT | Mod: PBBFAC,,, | Performed by: RADIOLOGY

## 2017-05-23 PROCEDURE — 1159F MED LIST DOCD IN RCRD: CPT | Mod: S$GLB,,, | Performed by: RADIOLOGY

## 2017-05-23 PROCEDURE — 1160F RVW MEDS BY RX/DR IN RCRD: CPT | Mod: S$GLB,,, | Performed by: RADIOLOGY

## 2017-05-23 PROCEDURE — 3074F SYST BP LT 130 MM HG: CPT | Mod: S$GLB,,, | Performed by: RADIOLOGY

## 2017-05-23 PROCEDURE — 1157F ADVNC CARE PLAN IN RCRD: CPT | Mod: 8P,S$GLB,, | Performed by: RADIOLOGY

## 2017-05-23 PROCEDURE — 99213 OFFICE O/P EST LOW 20 MIN: CPT | Mod: S$GLB,,, | Performed by: RADIOLOGY

## 2017-05-23 PROCEDURE — 3078F DIAST BP <80 MM HG: CPT | Mod: S$GLB,,, | Performed by: RADIOLOGY

## 2017-05-23 PROCEDURE — 1126F AMNT PAIN NOTED NONE PRSNT: CPT | Mod: S$GLB,,, | Performed by: RADIOLOGY

## 2017-05-23 RX ORDER — VALSARTAN AND HYDROCHLOROTHIAZIDE 320; 12.5 MG/1; MG/1
TABLET, FILM COATED ORAL
COMMUNITY
Start: 2017-05-13 | End: 2017-06-28 | Stop reason: ALTCHOICE

## 2017-05-23 NOTE — PROGRESS NOTES
Subjective:       Patient ID: Ida Santana is a 76 y.o. female.    Chief Complaint: Breast Cancer (1yr f/u)    This patient returns for follow up visit.      Ms. 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 The patient is status post segmental mastectomy followed by postoperative whole breast irradiation completed in December of 2001. The patient has not received adjuvant hormonal therapy. The patient has remained TABITHA since that time.  Today, the patient states she feels well.  Episode of pneumonia in January and was found to have an incidental adrenal mass.  She underwent open left adrenalectomy and renal vein thrombectomy.  Pathology revealed cortical adenoma.  The patient also developed detached retina which has improved.        Review of Systems   Constitutional: Negative for activity change, appetite change, chills, fatigue and fever.   HENT: Negative for sore throat and trouble swallowing.    Respiratory: Negative for cough and shortness of breath.    Cardiovascular: Negative for chest pain and palpitations.   Gastrointestinal: Negative for abdominal pain, nausea and vomiting.       Objective:      Physical Exam   Constitutional: She appears well-developed and well-nourished. No distress.   Neck: Normal range of motion. Neck supple.   Pulmonary/Chest: Right breast exhibits no inverted nipple, no mass, no nipple discharge, no skin change and no tenderness. Left breast exhibits no inverted nipple, no mass, no nipple discharge, no skin change and no tenderness.       Lymphadenopathy:     She has no cervical adenopathy.     She has no axillary adenopathy.        Right: No supraclavicular adenopathy present.        Left: No supraclavicular adenopathy present.         MMG in November was negative.   Assessment:       1. Malignant neoplasm of upper-outer quadrant of left female breast        Plan:       Doing well, remains TABITHA.  She is planned for follow  up at the [facility]  breast center in November.  plan follow up with us in one year.

## 2017-06-06 ENCOUNTER — OFFICE VISIT (OUTPATIENT)
Dept: OPHTHALMOLOGY | Facility: CLINIC | Age: 77
End: 2017-06-06
Payer: COMMERCIAL

## 2017-06-06 DIAGNOSIS — H33.002 RHEGMATOGENOUS RETINAL DETACHMENT OF LEFT EYE: Primary | ICD-10-CM

## 2017-06-06 DIAGNOSIS — H25.12 NS (NUCLEAR SCLEROSIS), LEFT: ICD-10-CM

## 2017-06-06 PROBLEM — H25.10 NS (NUCLEAR SCLEROSIS): Status: ACTIVE | Noted: 2017-06-06

## 2017-06-06 PROCEDURE — 99999 PR PBB SHADOW E&M-EST. PATIENT-LVL III: CPT | Mod: PBBFAC,,, | Performed by: OPHTHALMOLOGY

## 2017-06-06 PROCEDURE — 99024 POSTOP FOLLOW-UP VISIT: CPT | Mod: S$GLB,,, | Performed by: OPHTHALMOLOGY

## 2017-06-06 NOTE — PROGRESS NOTES
A/P  1. Rhegmatogenous Retinal Detachment OS s/p 25g PPV/EL/AFx/Silicone Oil on 3/16/17  - retina flat, IOP better on cosopt BID OS  -PF Q day OS      Plan 25g PPV/SO removal/phaco IOL combined with KL  LMA  LOC 60 min combined    Risks, benefits, and alternatives to treatment discussed in detail with the patient.  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 NS OU    4. POAG OU  On Latanoprost OD qam  Started on Cosopt BID OS 03/2017, IOP improved (30 prior to starting COsopt)  continue current gtts    Patient had surgery on adrenal mass 03/22/2017      Addendum    POD#1 - SOR/PCE combined case with Dr. Crawford  - Doing well. IOP 24, with residual cortex in AC. Restarting antihypertensive gtts and holding homatropine at this time.   - drops as direct by Dr Crawford. Shield qhs.    - No bending, swimming, heavy lifting.    - f/u with Dr. Beaulieu in 5 days at [facility]

## 2017-06-12 ENCOUNTER — TELEPHONE (OUTPATIENT)
Dept: INTERNAL MEDICINE | Facility: CLINIC | Age: 77
End: 2017-06-12

## 2017-06-12 ENCOUNTER — OFFICE VISIT (OUTPATIENT)
Dept: INTERNAL MEDICINE | Facility: CLINIC | Age: 77
End: 2017-06-12
Payer: COMMERCIAL

## 2017-06-12 VITALS
HEART RATE: 71 BPM | HEIGHT: 67 IN | WEIGHT: 147.5 LBS | OXYGEN SATURATION: 99 % | DIASTOLIC BLOOD PRESSURE: 78 MMHG | SYSTOLIC BLOOD PRESSURE: 126 MMHG | BODY MASS INDEX: 23.15 KG/M2

## 2017-06-12 DIAGNOSIS — I82.3 THROMBOSIS OF LEFT RENAL VEIN: Primary | ICD-10-CM

## 2017-06-12 DIAGNOSIS — I10 ESSENTIAL HYPERTENSION: ICD-10-CM

## 2017-06-12 PROBLEM — N28.89 KIDNEY MASS: Status: RESOLVED | Noted: 2017-01-25 | Resolved: 2017-06-12

## 2017-06-12 PROCEDURE — 99999 PR PBB SHADOW E&M-EST. PATIENT-LVL IV: CPT | Mod: PBBFAC,,, | Performed by: INTERNAL MEDICINE

## 2017-06-12 PROCEDURE — 1159F MED LIST DOCD IN RCRD: CPT | Mod: S$GLB,,, | Performed by: INTERNAL MEDICINE

## 2017-06-12 PROCEDURE — 99214 OFFICE O/P EST MOD 30 MIN: CPT | Mod: S$GLB,,, | Performed by: INTERNAL MEDICINE

## 2017-06-12 PROCEDURE — 1126F AMNT PAIN NOTED NONE PRSNT: CPT | Mod: S$GLB,,, | Performed by: INTERNAL MEDICINE

## 2017-06-12 NOTE — TELEPHONE ENCOUNTER
----- Message from Porfirio Arellano MD sent at 6/12/2017  4:18 PM CDT -----  Contact: Giovanna Murray  I agree that it can be stopped.  Her renal vein thrombus was likely due to sepsis.  Thanks    Talat  ----- Message -----  From: Giovanna Murray MD  Sent: 6/12/2017   4:08 PM  To: Porfirio Arellano MD    This is a lady that you had in the hospital and operated on. I was wondering whether she should be on the xarelto.  This was given for renal vein thrombus. Notes state that it is resolved.    Thanks,  Giovanna

## 2017-06-12 NOTE — PROGRESS NOTES
Subjective:       Patient ID: Ida Santana is a 76 y.o. female.    Chief Complaint: Follow-up    HPI   Patient was admitted to the hospital in 3/2017 for pneumonia and found to have left renal vein thrombus and a left adrenal mass. She was taken to the OR on 3/2017 for  Open left adrenalectomy and a renal vein thrombectomy . She remains on xarelto but it is noted in the discharge summary that that the renal vein thrombosis had resolved. She was seen for a post op visit with Ms. Sims but it was not clear as to whether the patient should remain on the xarelto. She asks today if she still needs the medication.    She was also taking verapamil  mg, carvedilol, valsartan hctz 320/12.5 and lisinopril 20 ?  Uncertain how this occurred.      Review of Systems   Constitutional: Negative for chills, fever and unexpected weight change.   HENT: Negative for trouble swallowing.    Respiratory: Negative for cough, shortness of breath and wheezing.    Cardiovascular: Negative for chest pain and palpitations.   Gastrointestinal: Negative for abdominal distention, abdominal pain, blood in stool and vomiting.   Musculoskeletal: Negative for back pain.       Objective:      Physical Exam   Constitutional: She is oriented to person, place, and time. She appears well-developed and well-nourished. No distress.   Neck: Carotid bruit is not present. No thyromegaly present.   Cardiovascular: Normal rate, regular rhythm and normal heart sounds.  PMI is not displaced.    Pulmonary/Chest: Effort normal and breath sounds normal. No respiratory distress.   Abdominal: Soft. Bowel sounds are normal. She exhibits no distension. There is no tenderness.   Musculoskeletal: She exhibits no edema.   Neurological: She is alert and oriented to person, place, and time.       Assessment:       1. 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 Essential hypertension        Plan:       Ida was seen today for follow-up.    Diagnoses and all  orders for this visit:    Thrombosis of left renal vein: Patient currently on Xarelto and it seems that this could be stopped.  -     Ambulatory consult to Vascular Surgery    Essential hypertension I have reevaluated the patient's medications and put her on the medications that she was on prior to her surgery and we will recheck an EKG in 2 weeks as well as a CMP she tells me that she is not taking her potassium and this will need to be checked  -     EKG 12-lead; Future  -     Comprehensive metabolic panel; Future      Return in about 2 weeks (around 6/26/2017) for FU with EKG and cmp prior to the appt.    New Prescriptions    No medications on file       Modified Medications    No medications on file       Orders Placed This Encounter   Procedures    Comprehensive metabolic panel     Standing Status:   Future     Standing Expiration Date:   8/11/2017    Ambulatory consult to Vascular Surgery     Referral Priority:   Routine     Referral Type:   Surgical     Referral Reason:   Specialty Services Required     Requested Specialty:   Vascular Surgery     Number of Visits Requested:   1    EKG 12-lead     Standing Status:   Future     Standing Expiration Date:   8/11/2017       Labs, studies and consults associated with this visit were reviewed

## 2017-06-13 ENCOUNTER — INITIAL CONSULT (OUTPATIENT)
Dept: VASCULAR SURGERY | Facility: CLINIC | Age: 77
End: 2017-06-13
Payer: COMMERCIAL

## 2017-06-13 VITALS
TEMPERATURE: 99 F | BODY MASS INDEX: 23.23 KG/M2 | SYSTOLIC BLOOD PRESSURE: 112 MMHG | HEIGHT: 67 IN | DIASTOLIC BLOOD PRESSURE: 58 MMHG | HEART RATE: 65 BPM | WEIGHT: 148 LBS

## 2017-06-13 DIAGNOSIS — I82.3 THROMBOSIS OF LEFT RENAL VEIN: Primary | ICD-10-CM

## 2017-06-13 PROCEDURE — 99205 OFFICE O/P NEW HI 60 MIN: CPT | Mod: S$GLB,,, | Performed by: SURGERY

## 2017-06-13 PROCEDURE — 99999 PR PBB SHADOW E&M-EST. PATIENT-LVL III: CPT | Mod: PBBFAC,,, | Performed by: SURGERY

## 2017-06-13 PROCEDURE — 1126F AMNT PAIN NOTED NONE PRSNT: CPT | Mod: S$GLB,,, | Performed by: SURGERY

## 2017-06-13 PROCEDURE — 1159F MED LIST DOCD IN RCRD: CPT | Mod: S$GLB,,, | Performed by: SURGERY

## 2017-06-13 NOTE — PROGRESS NOTES
DATE OF CONSULTATION:  06/13/2017    REQUESTING PHYSICIAN:  Dr. Giovanna Murray.    HISTORY OF PRESENT ILLNESS:  A 76-year-old female sent for evaluation of need   for continued Xarelto treatment of the left renal vein thrombus.  This patient   was admitted with severe pneumonia and sepsis in January 2017 and had an   incidental finding of left renal vein thrombus as well as a left adrenal mass.    In addition to treatment for pneumonia, she was started on Xarelto.  A month   later in February, followup CT scan showed resolution of the left renal vein   thrombus that she has been maintained on Xarelto ever since.  She had no prior   history of DVT, PE or thrombophilia.  She subsequently underwent an open   adrenalectomy of the left showed a benign adenoma.  In contradistinction to   subsequent clinic note, she never had a thrombectomy of the left renal vein.    PAST MEDICAL HISTORY:  1.  Hyperlipidemia.  2.  Hypertension.  3.  Glaucoma.  4.  Retinal detachment of the left eye.    PAST SURGICAL HISTORY:  1.  Adrenalectomy as above.  2.  Breast biopsy.  3.  Colonoscopy.    FAMILY HISTORY:  Positive for stroke and cancer.    SOCIAL HISTORY:  She is a nonsmoker.    MEDICATIONS:  Includes Xarelto, see EPIC for full list.    ALLERGIES:  ATORVASTATIN AND PRAVACHOL.    REVIEW OF SYSTEMS:  CARDIOVASCULAR:  Denies postprandial pain or DVT.  All other systems including eyes, ENT, respiratory, , musculoskeletal, breast,   psychiatric, lymph, allergy and immune are negative.    PHYSICAL EXAMINATION:  VITAL SIGNS:  See nursing notes.  GENERAL:  No acute distress.   RESPIRATORY:  Normal effort.  Clear to auscultation.  CARDIOVASCULAR:  Regular rhythm.  Nondisplaced PMI.  No murmur.  VASCULAR:  2+ radial, brachial and femoral pulses.  EXTREMITIES:  No edema, varicosities, ulcerations, gangrene or digital   petechiae.  ABDOMEN:  No masses or tenderness.  No hepatosplenomegaly.  Aorta cannot be   palpated.  EYE:  Normal conjunctivae  and lids.  ENT:  Good dentition.  NECK:  No JVD, no thyromegaly.  MUSCULOSKELETAL:  No kyphosis or scoliosis, no clubbing or cyanosis.  SKIN:  Warm and dry.  NEUROLOGIC:  Alert and oriented x3.  Normal mood and affect.  Midline tongue.    No speech difficulty or hoarseness, 5/5 motor strength in all extremities.    IMAGING:  CT scanning from January and February 2017 were personally reviewed.    January 2017 scan demonstrates a significant thrombus in the left renal vein,   but I do not believe that it is completely thrombosed, in contradistinction to   the official reading.  The February 2017 CT shows complete resolution of the   thrombus in the left renal vein.    ASSESSMENT:  History of left renal vein thrombus, likely sepsis mediated with   complete resolution by February 2017, on Xarelto.    RECOMMENDATIONS:  May stop Xarelto now, and substitute aspirin 81 mg daily.    Discussed this very clearly with the patient and she is excited to be off her   Xarelto.    Copy to Dr. Sebastian Murray.              / [id number] dagoberto(s)        FERMIN/JENY  dd: 06/13/2017 09:14:42 (CDT)  td: 06/14/2017 05:58:36 (CDT)  Doc ID   #[id number]  Job ID #[id number]    CC: Giovanna Murray MD

## 2017-06-13 NOTE — LETTER
June 13, 2017      Giovanna Murray MD  [address]  [facility] [address]           [facility]  [address]  [facility] [address]  Phone: [phone]  Fax: [phone]          Patient: Ida Santana   MR Number: [id number]   YOB: 1940   Date of Visit: 6/13/2017       Dear Dr. Giovanna Murray:    Thank you for referring Ida Santana to me for evaluation. Attached you will find relevant portions of my assessment and plan of care.    If you have questions, please do not hesitate to call me. I look forward to following Ida Santana along with you.    Sincerely,    GLORIA Ahmadi III, MD    Enclosure  CC:  No Recipients    If you would like to receive this communication electronically, please contact [email or web address] or [phone] to request more information on [employer] access.    For providers and/or their staff who would like to refer a patient to [facility], please contact us through our one-stop-shop provider referral line, [facility], at [phone].    If you feel you have received this communication in error or would no longer like to receive these types of communications, please e-mail [email or web address]

## 2017-06-14 RX ORDER — RIVAROXABAN 20 MG/1
TABLET, FILM COATED ORAL
Qty: 30 TABLET | Refills: 1 | OUTPATIENT
Start: 2017-06-14

## 2017-06-15 ENCOUNTER — OFFICE VISIT (OUTPATIENT)
Dept: OPHTHALMOLOGY | Facility: CLINIC | Age: 77
End: 2017-06-15
Payer: COMMERCIAL

## 2017-06-15 DIAGNOSIS — H57.03 SMALL PUPIL: ICD-10-CM

## 2017-06-15 DIAGNOSIS — H21.542 POSTERIOR SYNECHIAE, LEFT: ICD-10-CM

## 2017-06-15 DIAGNOSIS — H25.13 SENILE NUCLEAR SCLEROSIS, BILATERAL: ICD-10-CM

## 2017-06-15 DIAGNOSIS — H44.19 ENDOGENOUS ENDOPHTHALMITIS: ICD-10-CM

## 2017-06-15 DIAGNOSIS — H33.002 RHEGMATOGENOUS RETINAL DETACHMENT OF LEFT EYE: ICD-10-CM

## 2017-06-15 DIAGNOSIS — H40.1131 PRIMARY OPEN ANGLE GLAUCOMA OF BOTH EYES, MILD STAGE: Primary | ICD-10-CM

## 2017-06-15 DIAGNOSIS — M35.01 KCS (KERATOCONJUNCTIVITIS SICCA): ICD-10-CM

## 2017-06-15 DIAGNOSIS — H44.002 ENDOPHTHALMITIS, LEFT EYE: ICD-10-CM

## 2017-06-15 DIAGNOSIS — Q07.8 ANOMALOUS OPTIC NERVE: ICD-10-CM

## 2017-06-15 PROCEDURE — 92014 COMPRE OPH EXAM EST PT 1/>: CPT | Mod: S$GLB,,, | Performed by: OPHTHALMOLOGY

## 2017-06-15 PROCEDURE — 99999 PR PBB SHADOW E&M-EST. PATIENT-LVL II: CPT | Mod: PBBFAC,,, | Performed by: OPHTHALMOLOGY

## 2017-06-15 PROCEDURE — 92250 FUNDUS PHOTOGRAPHY W/I&R: CPT | Mod: S$GLB,,, | Performed by: OPHTHALMOLOGY

## 2017-06-15 NOTE — Clinical Note
Harry - this is another combined case with dick - he will first remove silicone oil then I will removed cataract - complex - small pupil - in his room as a first case of the day - please coordinate with his people  Phone [phone]

## 2017-06-15 NOTE — PROGRESS NOTES
HPI     DLS: 3/13/17    Pt here for follow up;    Med: Xalatan qam ou          PA qid od          Cosopt bid os     1. Primary open angle glaucoma of both eyes, mild stage  2. Anomalous optic nerve  3. KCS (keratoconjunctivitis sicca)  4. Senile nuclear sclerosis, bilateral     Last edited by Windy Mosqueda on 6/15/2017  3:07 PM. (History)            Assessment /Plan     For exam results, see Encounter Report.    Primary open angle glaucoma of both eyes, mild stage    Endogenous endophthalmitis    Endophthalmitis, left eye    Rhegmatogenous retinal detachment of left eye    Anomalous optic nerve    KCS (keratoconjunctivitis sicca)    Senile nuclear sclerosis, bilateral    Posterior synechiae, left    Small pupil          Glaucoma (type and duration)    Suspect 2/2 lrg cups ou    First HVF   2000   First photos   2013   Treatment / Drops started   xal 1/2016           Family history    none        Glaucoma meds   Latanoprost started 1/4/2016         H/O adverse rxn to glaucoma drops    none        LASERS    none        GLAUCOMA SURGERIES    none        OTHER EYE SURGERIES    none        CDR    0.75-0.8 / 0.7-0.75        Tbase    18-22 / 19-22          Tmax    22/22            Ttarget    ?              HVF    4 test 2000 to  2016 - near full od // ? NS os        Gonio    +2-3 ou         CCT    510/517        OCT    2 test 2013 to 2015 - RNFL - nl od // nl os        HRT    2 test 2016 to 2016 -MR -  Dec. I, bord S/T od // nl os /// CDR 0.74 od // 0.57 os  (large disc size ou)         Disc photos    2013, 2015, 2017  - OIS     - Ttoday    16/20  -   - Test done today    IOP check //  DFE // consider complex phaco /IOL os - dennise ring / trypan blue - after Mazzulla removes silicone oil     2. Anomaolous Optic nerves    - very large disc size ou    - HRT - 2.97 od // 3.04 os (nl is up to 2.8 ou)     3. Dry eyes     4. NS - mild - monitor   ++ NS post PPVx and endogenous endopthalmitis - plan on remocval with mazzulla - after  oil removed     5.H/O endogenous endophthalmitis    S/P PPVx / RD repair / silicone oil 3/16/2017 - Brittnee     PLAN  IOP ok ou - hold latanoprost OS for now given uveitis   NS OS improved but still with hint INS present on repeat HVF    Combined surgery - complex phaco / IOL os - trypan blue and dennise ring - after Juanllshola removes silicone oil   Do as a first case on a Wednesday - in his room   Can use IOL calc from other eye     Pt also with an adrenal mass S/p  sx on 3/22/2017    I have seen and personally examined the patient.  I agree with the findings, assessment and plan of the resident and/or fellow.     Lynda Crawford MD

## 2017-06-21 ENCOUNTER — HOSPITAL ENCOUNTER (OUTPATIENT)
Dept: CARDIOLOGY | Facility: CLINIC | Age: 77
Discharge: HOME | End: 2017-06-21
Payer: COMMERCIAL

## 2017-06-21 ENCOUNTER — TELEPHONE (OUTPATIENT)
Dept: INTERNAL MEDICINE | Facility: CLINIC | Age: 77
End: 2017-06-21

## 2017-06-21 ENCOUNTER — LAB VISIT (OUTPATIENT)
Dept: LAB | Facility: HOSPITAL | Age: 77
End: 2017-06-21
Attending: INTERNAL MEDICINE
Payer: COMMERCIAL

## 2017-06-21 DIAGNOSIS — I10 ESSENTIAL HYPERTENSION: ICD-10-CM

## 2017-06-21 LAB
ALBUMIN SERPL BCP-MCNC: 3.7 G/DL
ALP SERPL-CCNC: 73 U/L
ALT SERPL W/O P-5'-P-CCNC: 16 U/L
ANION GAP SERPL CALC-SCNC: 8 MMOL/L
AST SERPL-CCNC: 18 U/L
BILIRUB SERPL-MCNC: 0.7 MG/DL
BUN SERPL-MCNC: 21 MG/DL
CALCIUM SERPL-MCNC: 10 MG/DL
CHLORIDE SERPL-SCNC: 107 MMOL/L
CO2 SERPL-SCNC: 27 MMOL/L
CREAT SERPL-MCNC: 1.2 MG/DL
EST. GFR  (AFRICAN AMERICAN): 51.1 ML/MIN/1.73 M^2
EST. GFR  (NON AFRICAN AMERICAN): 44.3 ML/MIN/1.73 M^2
GLUCOSE SERPL-MCNC: 111 MG/DL
POTASSIUM SERPL-SCNC: 4.2 MMOL/L
PROT SERPL-MCNC: 7.6 G/DL
SODIUM SERPL-SCNC: 142 MMOL/L

## 2017-06-21 PROCEDURE — 93000 ELECTROCARDIOGRAM COMPLETE: CPT | Mod: S$GLB,,, | Performed by: INTERNAL MEDICINE

## 2017-06-21 PROCEDURE — 80053 COMPREHEN METABOLIC PANEL: CPT

## 2017-06-21 PROCEDURE — 36415 COLL VENOUS BLD VENIPUNCTURE: CPT

## 2017-06-21 NOTE — TELEPHONE ENCOUNTER
Please tell her that her EKG is normal and I would like her to drink a lot of water in come into the clinic for the day of her appointment well hydrated and I will repeat the kidney function.

## 2017-06-28 ENCOUNTER — OFFICE VISIT (OUTPATIENT)
Dept: INTERNAL MEDICINE | Facility: CLINIC | Age: 77
End: 2017-06-28
Payer: COMMERCIAL

## 2017-06-28 VITALS
DIASTOLIC BLOOD PRESSURE: 80 MMHG | BODY MASS INDEX: 23.18 KG/M2 | WEIGHT: 147.69 LBS | OXYGEN SATURATION: 98 % | HEIGHT: 67 IN | SYSTOLIC BLOOD PRESSURE: 126 MMHG | HEART RATE: 77 BPM

## 2017-06-28 DIAGNOSIS — I10 ESSENTIAL HYPERTENSION: Primary | ICD-10-CM

## 2017-06-28 DIAGNOSIS — Z86.718: ICD-10-CM

## 2017-06-28 DIAGNOSIS — R94.4 DECREASED GFR: ICD-10-CM

## 2017-06-28 DIAGNOSIS — E27.8 LEFT ADRENAL MASS: ICD-10-CM

## 2017-06-28 PROBLEM — I82.3 THROMBOSIS OF LEFT RENAL VEIN: Status: RESOLVED | Noted: 2017-01-24 | Resolved: 2017-06-28

## 2017-06-28 PROCEDURE — 99214 OFFICE O/P EST MOD 30 MIN: CPT | Mod: S$GLB,,, | Performed by: INTERNAL MEDICINE

## 2017-06-28 PROCEDURE — 1159F MED LIST DOCD IN RCRD: CPT | Mod: S$GLB,,, | Performed by: INTERNAL MEDICINE

## 2017-06-28 PROCEDURE — 1126F AMNT PAIN NOTED NONE PRSNT: CPT | Mod: S$GLB,,, | Performed by: INTERNAL MEDICINE

## 2017-06-28 PROCEDURE — 99999 PR PBB SHADOW E&M-EST. PATIENT-LVL III: CPT | Mod: PBBFAC,,, | Performed by: INTERNAL MEDICINE

## 2017-06-28 RX ORDER — VALSARTAN 320 MG/1
320 TABLET ORAL DAILY
Qty: 90 TABLET | Refills: 3 | Status: SHIPPED | OUTPATIENT
Start: 2017-06-28 | End: 2018-06-20 | Stop reason: SDUPTHER

## 2017-06-28 NOTE — PROGRESS NOTES
Subjective:      Patient ID: Ida Santana is a 77 y.o. female.    Chief Complaint: Follow-up    HPI:  Patient was last seen on : review vascular surgery appt and EKG and lab as well as blood pressure meds      Vascular Surgery: agreed with stopping xarelto, renal vein stenosis likely related to sepsis.    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    Open angle 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 eyes    Osteopenia    Neuropathy    Conjunctivitis    Retinal detachment of left eye with retinal break    Endophthalmitis, left eye    Rhegmatogenous retinal detachment of left eye    Left adrenal mass removed 3/2017    NS (nuclear sclerosis)    Hx of renal vein thrombosis    Decreased GFR     Past Medical History:   Diagnosis Date    Cancer 2001    ductal carcinoma in situ left breast 2001    History of uterine fibroid     Hyperlipidemia     dyslipidemia    Hypertension     Open angle 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 eyes 2013    Osteopenia     Potassium depletion 1998    Retinal detachment of left eye with retinal break 3/13/2017     Past Surgical History:   Procedure Laterality Date    BREAST BIOPSY      left breast DCIS    BREAST SURGERY  2001    left lumpectomy with SLN bx    COLONOSCOPY      COLONOSCOPY N/A 10/31/2016    Procedure: COLONOSCOPY;  Surgeon: YAMILETH Richards MD;  Location: [facility] ([address] [place] [address]);  Service: Endoscopy;  Laterality: N/A;    RETINAL DETACHMENT SURGERY       Family History   Problem Relation Age of Onset    Cancer Mother      breast    Breast cancer Mother      40s and again in  "50s (bilateral)    Breast cancer Other     Heart disease Father      bypass    Cancer Father      asbestos    Stroke Brother     Ovarian cancer Neg Hx     Amblyopia Neg Hx     Blindness Neg Hx     Cataracts Neg Hx     Diabetes Neg Hx     Glaucoma Neg Hx     Hypertension Neg Hx     Macular degeneration Neg Hx     Retinal detachment Neg Hx     Strabismus Neg Hx     Thyroid disease Neg Hx     Colon cancer Neg Hx      Review of Systems   Constitutional: Negative for chills, fatigue, fever and unexpected weight change.   HENT: Negative for trouble swallowing.    Respiratory: Negative for cough, shortness of breath and wheezing.    Cardiovascular: Negative for chest pain, palpitations and leg swelling.   Gastrointestinal: Negative for abdominal distention, abdominal pain, blood in stool and vomiting.     Objective:     Vitals:    06/28/17 0707   BP: 126/80   Pulse: 77   SpO2: 98%   Weight: 67 kg (147 lb 11.3 oz)   Height: 5' 7" (1.702 m)   PainSc: 0-No pain     Body mass index is 23.13 kg/m².  Physical Exam   Constitutional: She is oriented to person, place, and time. She appears well-developed and well-nourished. No distress.   Neck: Carotid bruit is not present. No thyromegaly present.   Cardiovascular: Normal rate, regular rhythm and normal heart sounds.  PMI is not displaced.    Pulmonary/Chest: Effort normal and breath sounds normal. No respiratory distress.   Abdominal: Soft. Bowel sounds are normal. She exhibits no distension. There is no tenderness.   Musculoskeletal: She exhibits no edema.   Neurological: She is alert and oriented to person, place, and time.     Assessment:     1. Essential hypertension    2. Left adrenal mass removed 3/2017    3. Hx of renal vein thrombosis    4. Decreased GFR      Plan:   Ida was seen today for follow-up.    Diagnoses and all orders for this visit:    Essential hypertension: stopped the valsartan/hct : now valsartan 320 and verapamil 240 bid    Left adrenal " mass removed 3/2017: stable    Hx of renal vein thrombosis: off xarelto and on 81 mg asa    Decreased GFR: has not improved since surgery and will remove hctz and follow up  Cbc and cmp in 2 months  Cbc and cmp prior    Other orders  -     valsartan (DIOVAN) 320 MG tablet; Take 1 tablet (320 mg total) by mouth once daily. Stop the Nszclkqsg640/hct 12.5    Return in about 2 months (around 8/28/2017) for lab prior to appt cbc and cmp.         Medication List          Accurate as of 6/28/17  7:59 AM. If you have any questions, ask your nurse or doctor.               START taking these medications    valsartan 320 MG tablet  Commonly known as:  DIOVAN  Take 1 tablet (320 mg total) by mouth once daily. Stop the Kbwekycwy748/hct 12.5  Started by:  Giovanna Murray MD        CONTINUE taking these medications    CORDRAN 0.05 % lotion  Generic drug:  flurandrenolide     dorzolamide-timolol 2-0.5% 22.3-6.8 mg/mL ophthalmic solution  Commonly known as:  COSOPT  Place 1 drop into the left eye 2 (two) times daily.     ketoconazole 2 % shampoo  Commonly known as:  NIZORAL  USE EVERY OTHER DAY LET SIT ON SCALP FOR FIVE MINUTES BEFORE RINSING     latanoprost 0.005 % ophthalmic solution     omeg-3-epa-dha-fish oil-flax-E [phone] mc-kv-uh-unit Cap  Commonly known as:  THERA TEARS NUTRITION  Take 3 capsules by mouth every morning. Over-the-counter supplement     ONE-A-DAY 50 PLUS tablet  Generic drug:  geriatric multivitamin-min     polyethylene glycol 17 gram Pwpk  Commonly known as:  GLYCOLAX  Take 17 g by mouth once daily.     prednisoLONE acetate 1 % Drps  Commonly known as:  PRED FORTE  Place 1 drop into the right eye 4 (four) times daily.     verapamil 240 MG CR tablet  Commonly known as:  CALAN-SR  take 1 tablet by mouth twice a day     XARELTO 20 mg Tab  Generic drug:  rivaroxaban  take 1 tablet by mouth once daily WITH DINNER OR EVENING MEALS        STOP taking these medications    valsartan-hydrochlorothiazide  320-12.5 mg per tablet  Commonly known as:  DIOVAN-HCT  Stopped by:  Giovanna Murray MD           Where to Get Your Medications      These medications were sent to [id number] - [place] - [address]  [address] [facility] [address]    Phone:  [phone]   · valsartan 320 MG tablet

## 2017-07-06 ENCOUNTER — TELEPHONE (OUTPATIENT)
Dept: INTERNAL MEDICINE | Facility: CLINIC | Age: 77
End: 2017-07-06

## 2017-07-06 ENCOUNTER — TELEPHONE (OUTPATIENT)
Dept: OPHTHALMOLOGY | Facility: CLINIC | Age: 77
End: 2017-07-06

## 2017-07-06 DIAGNOSIS — H33.002 RHEGMATOGENOUS RETINAL DETACHMENT OF LEFT EYE: Primary | ICD-10-CM

## 2017-07-06 NOTE — TELEPHONE ENCOUNTER
----- Message from Harry Vela sent at 7/6/2017  1:55 PM CDT -----  Contact: Harry Vela, Surgery Coordinator, Ophthalmology   Ms. Santana is scheduled for removal of silicone oil and cataract surgery of her left eye with   Sam Alvarado and Brittnee on Wednesday, August 2, 2017.  Pt will need medical clearance for this procedure.  Surgery is scheduled with local/MAC anesthesia.  Please call if you have any questions.    Thanks,   Harry Vela  Surgery Coordinator  Ophthalmology  [facility] [[id number]]  [phone]

## 2017-07-06 NOTE — TELEPHONE ENCOUNTER
Patient was just seen on 6/28/2017 and is now off Xarelto and is optimized for surgery schedule by Opthamology.

## 2017-07-10 ENCOUNTER — OFFICE VISIT (OUTPATIENT)
Dept: UROLOGY | Facility: CLINIC | Age: 77
End: 2017-07-10
Payer: COMMERCIAL

## 2017-07-10 VITALS
WEIGHT: 148 LBS | HEART RATE: 92 BPM | DIASTOLIC BLOOD PRESSURE: 72 MMHG | SYSTOLIC BLOOD PRESSURE: 138 MMHG | HEIGHT: 67 IN | RESPIRATION RATE: 15 BRPM | BODY MASS INDEX: 23.23 KG/M2

## 2017-07-10 DIAGNOSIS — E27.8 LEFT ADRENAL MASS: Primary | ICD-10-CM

## 2017-07-10 PROCEDURE — 99999 PR PBB SHADOW E&M-EST. PATIENT-LVL III: CPT | Mod: PBBFAC,,, | Performed by: UROLOGY

## 2017-07-10 PROCEDURE — 99213 OFFICE O/P EST LOW 20 MIN: CPT | Mod: S$GLB,,, | Performed by: UROLOGY

## 2017-07-10 PROCEDURE — 1159F MED LIST DOCD IN RCRD: CPT | Mod: S$GLB,,, | Performed by: UROLOGY

## 2017-07-10 PROCEDURE — 1126F AMNT PAIN NOTED NONE PRSNT: CPT | Mod: S$GLB,,, | Performed by: UROLOGY

## 2017-07-10 NOTE — PROGRESS NOTES
Subjective:       Patient ID: Ida Santana is a 77 y.o. female.    Chief Complaint: left renal mass (checkup per pt's request)      HPI: Ida Santana is a 77 y.o. Black or  female who returns today in follow-up for a left adrenal mass s/p left open adrenalectomy on 3/22/17 for a benign adenoma.    The mass was found incidentally during a recent hospitalization for pneumonia. During this evaluation, the patient was found to have a left adrenal mass, possible left renal mass, and a left renal vein thrombus. The mass measured 4.2 cm.    The patient underwent a functional adrenal evaluation which was negative for a biochemically active adrenal lesion. The patient denied gross hematuria and/or constitutional symptoms attributable to her recently discovered adrenal mass.    The patient denied a personal and family history of adrenal cortical cancer. The patient does not have a history of any other cancer.    The patient underwent surgery in March and did very well.    The excised adrenal mass was benign, and the renal vein thrombosis was likely due to her critical illness at the time of presentation.    She returns today for a interval routine visit and is doing very well.    She has no urologic complaints.    Review of patient's allergies indicates:   Allergen Reactions    Adhesive tape-silicones      Other reaction(s): pulling skin off    Atorvastatin      Other reaction(s): Muscle pain    Pravachol  [pravastatin] Rash       Current Outpatient Prescriptions   Medication Sig Dispense Refill    dorzolamide-timolol 2-0.5% (COSOPT) 22.3-6.8 mg/mL ophthalmic solution Place 1 drop into the left eye 2 (two) times daily. 10 mL 6    ketoconazole (NIZORAL) 2 % shampoo USE EVERY OTHER DAY LET SIT ON SCALP FOR FIVE MINUTES BEFORE RINSING 120 mL 6    latanoprost 0.005 % ophthalmic solution   1    MULTIVITAMIN WITH MINERALS (ONE-A-DAY 50 PLUS) Tab Take 1 tablet by mouth once daily.       om-3-epa-dha-fish  oil-flax-E (THERA TEARS NUTRITION) [phone] ao-fp-ei-unit Cap Take 3 capsules by mouth every morning. Over-the-counter supplement      prednisoLONE acetate (PRED FORTE) 1 % DrpS Place 1 drop into the right eye 4 (four) times daily. 5 mL 4    valsartan (DIOVAN) 320 MG tablet Take 1 tablet (320 mg total) by mouth once daily. Stop the Izekcseeh738/hct 12.5 90 tablet 3    verapamil (CALAN-SR) 240 MG CR tablet take 1 tablet by mouth twice a day 30 tablet 12    flurandrenolide (CORDRAN) 0.05 % lotion Apply to  scalp one to two times daily as needed for dryness      polyethylene glycol (GLYCOLAX) 17 gram PwPk Take 17 g by mouth once daily. 30 packet 0     No current facility-administered medications for this visit.        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    History of uterine fibroid     Hyperlipidemia     dyslipidemia    Hypertension     Open angle 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 eyes 5/2/2013    Osteopenia     Potassium depletion 1998    Retinal detachment of left eye with retinal break 3/13/2017       Past Surgical History:   Procedure Laterality Date    BREAST BIOPSY  2001    left breast DCIS    BREAST SURGERY  9/2001    left lumpectomy with SLN bx    COLONOSCOPY      COLONOSCOPY N/A 10/31/2016    Procedure: COLONOSCOPY;  Surgeon: YAMILETH Richards MD;  Location: [facility] ([address] [place] [address]);  Service: Endoscopy;  Laterality: N/A;    RETINAL DETACHMENT SURGERY         Family History   Problem Relation Age of Onset    Cancer Mother      breast    Breast cancer Mother      40s and again in 50s (bilateral)    Breast cancer Other     Heart disease Father      bypass    Cancer Father      asbestos    Stroke Brother     Ovarian cancer Neg Hx     Amblyopia Neg Hx     Blindness Neg Hx     Cataracts Neg Hx     Diabetes Neg Hx     Glaucoma Neg Hx     Hypertension Neg Hx     Macular degeneration Neg Hx      Retinal detachment Neg Hx     Strabismus Neg Hx     Thyroid disease Neg Hx     Colon cancer Neg Hx        Review of Systems    Review of Systems   Constitutional: Negative for fever, chills, activity change, appetite change and unexpected weight change.   HENT: Negative for congestion, nosebleeds, sneezing, sore throat and trouble swallowing.    Eyes: Negative for pain, discharge, redness and visual disturbance.   Respiratory: Negative for cough, choking, chest tightness and shortness of breath.    Cardiovascular: Negative for chest pain, palpitations and leg swelling.   Gastrointestinal: Negative for nausea, vomiting, abdominal pain, diarrhea, blood in stool, abdominal distention and anal bleeding.  Genitourinary: As documented per HPI   Endocrine: Negative for cold intolerance, heat intolerance, polydipsia, polyphagia and polyuria.   Musculoskeletal: Negative for myalgias, gait problem, neck pain and neck stiffness.   Skin: Negative for color change, pallor, rash and wound.   Allergic/Immunologic: Negative for immunocompromised state.   Neurological: Negative for seizures, facial asymmetry, speech difficulty, weakness and light-headedness.   Hematological: Negative for adenopathy. Does not bruise/bleed easily.   Psychiatric/Behavioral: Negative for hallucinations, behavioral problems, self-injury and agitation. The patient is not hyperactive.      All other systems were reviewed and were negative.    Objective:     Vitals:    07/10/17 0806   BP: 138/72   Pulse: 92   Resp: 15     Physical Exam   Vitals reviewed.  Constitutional: She is oriented to person, place, and time. She appears well-developed and well-nourished. No distress.   HENT:   Head: Normocephalic and atraumatic.   Right Ear: External ear normal.   Left Ear: External ear normal.   Nose: Nose normal.   Eyes: EOM are normal. Pupils are equal, round, and reactive to light. Right eye exhibits no discharge. Left eye exhibits no discharge.   Neck:  Normal range of motion. Neck supple. No tracheal deviation present. No thyroid enlargement or tenderness.  Cardiovascular: Normal heart sounds and intact distal pulses. No signs of peripheral edema.   Pulmonary/Chest: Effort normal and breath sounds normal. No respiratory distress. She has no wheezes.   Abdominal: Soft. Bowel sounds are normal. She exhibits no distension. There is no tenderness. There is no rebound. No hepatic or splenic enlargement or tenderness. Well-healed subcostal incision.  Musculoskeletal: Normal range of motion. She exhibits no edema.   Neurological: She is alert and oriented to person, place, and time. Coordination normal.   Skin: Skin is warm and dry. She is not diaphoretic.   Lymphatic: No palpable lymph nodes.  Psychiatric: She has a normal mood and affect. Her behavior is normal. Judgment and thought content normal.     Lab Results   Component Value Date    CREATININE 1.2 06/21/2017     Lab Results   Component Value Date    EGFRNONAA 44.3 (A) 06/21/2017     Lab Results   Component Value Date    ESTGFRAFRICA 51.1 (A) 06/21/2017     I personally reviewed all the patient's imaging studies.    CT abdomen/pelvis with contrast (1/18/17): Left renal vein thrombosis with associated left perinephric fat stranding and perinephric fluid with diffuse enlargement of the left kidney. There is a 1.7 cm hypodense lesion in the inferior pole of the left kidney which could represent renal infarction but underlying neoplastic lesion not excluded. Large soft tissue density lesion in the left upper abdomen, anterior to the left kidney and superior to the left renal vein.  This is of uncertain etiology and could represent enlarged lymph node and/or metastatic lesion.    CT abdomen without and with contrast (2/15/2017): Large heterogenous LEFT adrenal mass. Interval improvement of bibasilar consolidative changes with some residual nodular opacification that is presumably inflammatory in nature when compared  to prior CT examination 01/18/2017. Interval near-complete resolution of bilateral pleural effusions. 1.7 cm nonenhancing focal hypodensity in the LEFT kidney with thick capsule not generally seen with simple cysts.  Consider further evaluation with nonemergent ultrasound.  No enhancement of this nodule is noted. Interval decrease in LEFT perinephric stranding.  Interval resolution of LEFT renal vein thrombosis.    Assessment:       1. Left adrenal mass        Plan:     Ida was seen today for left renal mass.    Diagnoses and all orders for this visit:    Left adrenal mass    The patient is doing very well.    Her incisions have healed well.    Her pathology is benign, which is great.    I answered all her questions.    I encouraged her and/or any of her family members to call and/or email me with questions/concerns.    I spent 20 minutes with the patient of which more than half was spent in coordinating the patient's care as well as in direct consultation with the patient in regards to our treatment and plan.

## 2017-07-10 NOTE — PATIENT INSTRUCTIONS
Incision Care: Abdomen  Dressing your incision helps keep it clean, dry, and infection-free. That way it will heal faster. Follow the steps below.       1. Wash your hands and set up  · Use liquid soap. Lather for 1 to 2 minutes. Scrub between your fingers and under your nails.  · Rinse with warm water, keeping fingers pointing down. Use a clean paper towel to dry your hands and turn off the faucet.  · Put all your supplies on a clean cloth or paper towel. Open a plastic trash bag.  · Peel back the edges of the dressing packages. Pour any irrigation solutions into solution cups.  · Clean the scissors with soap and water. Cut each piece of tape 4 inches longer than the dressing.  2. Remove the old dressing  · Put on disposable gloves.  · Loosen the tape by pulling gently toward the incision. Remove the dressing one layer at a time. Put it right into the plastic bag.  · Remove your gloves and put them in the plastic bag. Wash your hands again, as described above. Then put on new gloves.  3. Clean and dress the incision  · Clean the incision and apply a new dressing as directed.  · Put all used supplies in the plastic bag.  · Remove your gloves last and put them in the bag.  · Seal the bag and put it in the trash.  · Be sure to wash your hands again.  When to seek medical care  Call your healthcare provider if any of these happen:  · Your incision suddenly opens  · You have bleeding from the incision, or an increase in its size  · Increased redness, swelling, or drainage  · Pain in or around the incision  · Change in the color of the incision  · Fever of 100.4°F (38°C) or higher, or as directed by your healthcare provider   Date Last Reviewed: 10/17/2014  © 9091-1522 [employer]. [address] [place] [address], [place] [address]. All rights reserved. This information is not intended as a substitute for professional medical care. Always follow your healthcare professional's instructions.

## 2017-07-21 ENCOUNTER — OFFICE VISIT (OUTPATIENT)
Dept: OPHTHALMOLOGY | Facility: CLINIC | Age: 77
End: 2017-07-21
Payer: COMMERCIAL

## 2017-07-21 DIAGNOSIS — H44.002 ENDOPHTHALMITIS, LEFT EYE: ICD-10-CM

## 2017-07-21 DIAGNOSIS — H33.002 RHEGMATOGENOUS RETINAL DETACHMENT OF LEFT EYE: Primary | ICD-10-CM

## 2017-07-21 DIAGNOSIS — H25.13 SENILE NUCLEAR SCLEROSIS, BILATERAL: ICD-10-CM

## 2017-07-21 DIAGNOSIS — Q07.8 ANOMALOUS OPTIC NERVE: ICD-10-CM

## 2017-07-21 DIAGNOSIS — H57.03 SMALL PUPIL: ICD-10-CM

## 2017-07-21 DIAGNOSIS — H21.542 POSTERIOR SYNECHIAE, LEFT: ICD-10-CM

## 2017-07-21 DIAGNOSIS — H40.1131 PRIMARY OPEN ANGLE GLAUCOMA OF BOTH EYES, MILD STAGE: ICD-10-CM

## 2017-07-21 DIAGNOSIS — M35.01 KCS (KERATOCONJUNCTIVITIS SICCA): ICD-10-CM

## 2017-07-21 PROCEDURE — 92012 INTRM OPH EXAM EST PATIENT: CPT | Mod: S$GLB,,, | Performed by: OPHTHALMOLOGY

## 2017-07-21 PROCEDURE — 99999 PR PBB SHADOW E&M-EST. PATIENT-LVL II: CPT | Mod: PBBFAC,,, | Performed by: OPHTHALMOLOGY

## 2017-07-21 PROCEDURE — 92136 OPHTHALMIC BIOMETRY: CPT | Mod: LT,S$GLB,, | Performed by: OPHTHALMOLOGY

## 2017-07-21 NOTE — PROGRESS NOTES
HPI     DLS: 6/15/17    Pt here for Pre-Op Complex phaco w/IOL- combined case with Dr. Beaulieu-   silicone oil removal (Surgery is 8/02/17)    Meds: Xalatan qhs od            Cosopt bid os            PA qday os    1. Primary open angle glaucoma of both eyes, mild stage  2. Endogenous endophthalmitis  3. Endophthalmitis, left eye  4. Rhegmatogenous retinal detachment of left eye  5. Anomalous optic nerve  6. KCS (keratoconjunctivitis sicca)  7. Senile nuclear sclerosis, bilateral   8. Posterior synechiae, left  9. Small pupil    Last edited by Windy Mosqueda on 7/21/2017  2:06 PM. (History)            Assessment /Plan     For exam results, see Encounter Report.    Rhegmatogenous retinal detachment of left eye    Primary open angle glaucoma of both eyes, mild stage    Endophthalmitis, left eye    Anomalous optic nerve    KCS (keratoconjunctivitis sicca)    Senile nuclear sclerosis, bilateral    Posterior synechiae, left    Small pupil               Glaucoma (type and duration)    Suspect 2/2 lrg cups ou    First HVF   2000   First photos   2013   Treatment / Drops started   xal 1/2016           Family history    none        Glaucoma meds   Latanoprost started 1/4/2016         H/O adverse rxn to glaucoma drops    none        LASERS    none        GLAUCOMA SURGERIES    none        OTHER EYE SURGERIES    none        CDR    0.75-0.8 / 0.7-0.75        Tbase    18-22 / 19-22          Tmax    22/22            Ttarget    ?              HVF    4 test 2000 to  2016 - near full od // ? NS os        Gonio    +2-3 ou         CCT    510/517        OCT    2 test 2013 to 2015 - RNFL - nl od // nl os        HRT    1 test 2016 to 2016 - MR - dec I od // wnl os (large disc size ou)         Disc photos    2013, 2015 - OIS     - Ttoday    16/16 - good resp to latanoprost  was  20/21  - Test done today    IOP check with latanoprost     2. Anomaolous Optic nerves    - very large disc size ou    - HRT - 2.97 od // 3.04 os (nl is up to 2.8 ou)      3. Dry eyes     4. NS - OS>OD plan for combined case with Fidelia with silicone oil removal // small pupil - Posterior synechiae / Dennise / Trypan Blue  Plan for Combined phaco / SO removal 8/2/2017 with Fidelia      IOL calc OS  - use OD for measurements given SO  PCB00 19.5 D  AC IOL 16.0 D    PLAN  Complex - phaco/IOL os with trypan blue and dennise ring - after the SO is removed by dick   IOP ok ou - good resp to latanoprost ou   NS OS improved but still with hint INS present on repeat HVF    F/U 2 weeks, Post op     Once stable can send back to dr pena or stay with me - dr pena is doing mostly children now     I have seen and personally examined the patient.  I agree with the findings, assessment and plan of the resident and/or fellow.     Lynda Crawford MD

## 2017-07-26 RX ORDER — LIDOCAINE HYDROCHLORIDE 10 MG/ML
1 INJECTION, SOLUTION EPIDURAL; INFILTRATION; INTRACAUDAL; PERINEURAL ONCE
Status: CANCELLED | OUTPATIENT
Start: 2017-07-26 | End: 2017-07-26

## 2017-07-26 RX ORDER — TROPICAMIDE 10 MG/ML
1 SOLUTION/ DROPS OPHTHALMIC
Status: CANCELLED | OUTPATIENT
Start: 2017-07-26

## 2017-07-26 RX ORDER — MOXIFLOXACIN 5 MG/ML
1 SOLUTION/ DROPS OPHTHALMIC
Status: CANCELLED | OUTPATIENT
Start: 2017-07-26

## 2017-07-26 RX ORDER — KETOROLAC TROMETHAMINE 5 MG/ML
1 SOLUTION OPHTHALMIC
Status: CANCELLED | OUTPATIENT
Start: 2017-07-26

## 2017-07-26 RX ORDER — PHENYLEPHRINE HYDROCHLORIDE 100 MG/ML
1 SOLUTION/ DROPS OPHTHALMIC
Status: CANCELLED | OUTPATIENT
Start: 2017-07-26

## 2017-07-26 RX ORDER — SODIUM CHLORIDE 9 MG/ML
INJECTION, SOLUTION INTRAVENOUS CONTINUOUS
Status: CANCELLED | OUTPATIENT
Start: 2017-07-26

## 2017-07-26 NOTE — H&P
Subjective:       Patient ID: Ida Santana is a 77 y.o. female.    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    History of uterine fibroid     Hyperlipidemia     dyslipidemia    Hypertension     Open angle 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 eyes 5/2/2013    Osteopenia     Potassium depletion 1998    Retinal detachment of left eye with retinal break 3/13/2017     Past Surgical History:   Procedure Laterality Date    BREAST BIOPSY  2001    left breast DCIS    BREAST SURGERY  9/2001    left lumpectomy with SLN bx    COLONOSCOPY      COLONOSCOPY N/A 10/31/2016    Procedure: COLONOSCOPY;  Surgeon: YAMILETH Richards MD;  Location: [facility] ([address] [place] [address]);  Service: Endoscopy;  Laterality: N/A;    RETINAL DETACHMENT SURGERY       Family History   Problem Relation Age of Onset    Cancer Mother      breast    Breast cancer Mother      40s and again in 50s (bilateral)    Breast cancer Other     Heart disease Father      bypass    Cancer Father      asbestos    Stroke Brother     Ovarian cancer Neg Hx     Amblyopia Neg Hx     Blindness Neg Hx     Cataracts Neg Hx     Diabetes Neg Hx     Glaucoma Neg Hx     Hypertension Neg Hx     Macular degeneration Neg Hx     Retinal detachment Neg Hx     Strabismus Neg Hx     Thyroid disease Neg Hx     Colon cancer Neg Hx      Social History     Social History    Marital status: Single     Spouse name: N/A    Number of children: N/A    Years of education: N/A     Social History Main Topics    Smoking status: Never Smoker    Smokeless tobacco: Never Used    Alcohol use No    Drug use: No    Sexual activity: No     Other Topics Concern    None     Social History Narrative    Retired  (34 years) with [employer]       Current Outpatient Prescriptions   Medication Sig Dispense Refill    dorzolamide-timolol  2-0.5% (COSOPT) 22.3-6.8 mg/mL ophthalmic solution Place 1 drop into the left eye 2 (two) times daily. 10 mL 6    flurandrenolide (CORDRAN) 0.05 % lotion Apply to  scalp one to two times daily as needed for dryness      ketoconazole (NIZORAL) 2 % shampoo USE EVERY OTHER DAY LET SIT ON SCALP FOR FIVE MINUTES BEFORE RINSING 120 mL 6    latanoprost 0.005 % ophthalmic solution   1    MULTIVITAMIN WITH MINERALS (ONE-A-DAY 50 PLUS) Tab Take 1 tablet by mouth once daily.       om-3-epa-dha-fish oil-flax-E (THERA TEARS NUTRITION) [id number] jg-nf-tn-unit Cap Take 3 capsules by mouth every morning. Over-the-counter supplement      polyethylene glycol (GLYCOLAX) 17 gram PwPk Take 17 g by mouth once daily. 30 packet 0    valsartan (DIOVAN) 320 MG tablet Take 1 tablet (320 mg total) by mouth once daily. Stop the Tyspcqbys454/hct 12.5 90 tablet 3    verapamil (CALAN-SR) 240 MG CR tablet take 1 tablet by mouth twice a day 30 tablet 12     No current facility-administered medications for this visit.      Review of patient's allergies indicates:   Allergen Reactions    Adhesive tape-silicones      Other reaction(s): pulling skin off    Atorvastatin      Other reaction(s): Muscle pain    Pravachol  [pravastatin] Rash       Review of Systems   Constitutional: Negative.    HENT: Negative.    Eyes: Positive for visual disturbance.   Respiratory: Negative.    Cardiovascular: Negative.    Neurological: Negative.    Psychiatric/Behavioral: Negative.        Objective:      There were no vitals filed for this visit.  Physical Exam   Constitutional: She is oriented to person, place, and time.   HENT:   Head: Normocephalic and atraumatic.   Eyes:   See eye exam   Cardiovascular: Normal rate.    Pulmonary/Chest: Effort normal.   Neurological: She is alert and oriented to person, place, and time.   Skin: Skin is warm and dry.   Psychiatric: She has a normal mood and affect.         Assessment:       1. Rhegmatogenous retinal  detachment of left eye    2. Primary open angle glaucoma of both eyes, mild stage    3. Endophthalmitis, left eye    4. Anomalous optic nerve    5. KCS (keratoconjunctivitis sicca)    6. Senile nuclear sclerosis, bilateral    7. Posterior synechiae, left    8. Small pupil        Plan:       Complex - phaco /IOL with dennise ring and trypan blue - after the silicon oil is removed by Dr jennings - Left eye

## 2017-08-01 ENCOUNTER — TELEPHONE (OUTPATIENT)
Dept: OPHTHALMOLOGY | Facility: CLINIC | Age: 77
End: 2017-08-01

## 2017-08-01 NOTE — H&P
Pre-Operative History & Physical  Ophthalmology      SUBJECTIVE:     History of Present Illness:  Patient is a 77 y.o. female presents with Rhegmatogenous retinal detachment of left eye [H33.002]  Rhegmatogenous retinal detachment of left eye [H33.002]  Retinal detachment, rhegmatogenous, left eye [H33.002] and cataract affecting vision.     MEDICATIONS:   PTA Medications   Medication Sig    aspirin (ECOTRIN) 81 MG EC tablet Take 81 mg by mouth once daily.    dorzolamide-timolol 2-0.5% (COSOPT) 22.3-6.8 mg/mL ophthalmic solution Place 1 drop into the left eye 2 (two) times daily.    latanoprost 0.005 % ophthalmic solution     valsartan (DIOVAN) 320 MG tablet Take 1 tablet (320 mg total) by mouth once daily. Stop the Ijlnkywjt511/hct 12.5    verapamil (CALAN-SR) 240 MG CR tablet take 1 tablet by mouth twice a day    flurandrenolide (CORDRAN) 0.05 % lotion Apply to  scalp one to two times daily as needed for dryness    ketoconazole (NIZORAL) 2 % shampoo USE EVERY OTHER DAY LET SIT ON SCALP FOR FIVE MINUTES BEFORE RINSING    MULTIVITAMIN WITH MINERALS (ONE-A-DAY 50 PLUS) Tab Take 1 tablet by mouth once daily.     om-3-epa-dha-fish oil-flax-E (THERA TEARS NUTRITION) [phone] ap-lh-yr-unit Cap Take 3 capsules by mouth every morning. Over-the-counter supplement    polyethylene glycol (GLYCOLAX) 17 gram PwPk Take 17 g by mouth once daily.       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    History of uterine fibroid     Hyperlipidemia     dyslipidemia    Hypertension     MRSA infection     Open angle 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 eyes 5/2/2013     Osteopenia     Potassium depletion 1998    Retinal detachment of left eye with retinal break 3/13/2017     PAST SURGICAL HISTORY:   Past Surgical History:   Procedure Laterality Date    BREAST BIOPSY  2001    left breast DCIS    BREAST SURGERY  9/2001    left lumpectomy with SLN bx    COLONOSCOPY      COLONOSCOPY N/A 10/31/2016    Procedure: COLONOSCOPY;  Surgeon: YAMILETH Richards MD;  Location: [facility] ([address] [place] [address]);  Service: Endoscopy;  Laterality: N/A;    KIDNEY SURGERY  03/27/2017    patient does not know what they did, but knows she does not have kidney cancer    RETINAL DETACHMENT SURGERY       PAST FAMILY HISTORY:   Family History   Problem Relation Age of Onset    Cancer Mother      breast    Breast cancer Mother      40s and again in 50s (bilateral)    Breast cancer Other     Heart disease Father      bypass    Cancer Father      asbestos    Stroke Brother     Ovarian cancer Neg Hx     Amblyopia Neg Hx     Blindness Neg Hx     Cataracts Neg Hx     Diabetes Neg Hx     Glaucoma Neg Hx     Hypertension Neg Hx     Macular degeneration Neg Hx     Retinal detachment Neg Hx     Strabismus Neg Hx     Thyroid disease Neg Hx     Colon cancer Neg Hx      SOCIAL HISTORY:   Social History   Substance Use Topics    Smoking status: Never Smoker    Smokeless tobacco: Never Used    Alcohol use No        MENTAL STATUS: Alert    REVIEW OF SYSTEMS: Negative    OBJECTIVE:     Vital Signs (Most Recent)  Temp: 97.8 °F (36.6 °C) (08/02/17 0648)  Pulse: 71 (08/02/17 0648)  Resp: 16 (08/02/17 0648)  BP: 131/70 (08/02/17 0648)  SpO2: 100 % (08/02/17 0648)    Physical Exam:  General: NAD  HEENT: Atraumatic.   Lungs: Adequate respirations  Heart: + pulses  Abdomen: Soft    ASSESSMENT/PLAN:     Patient is a 77 y.o. female with Rhegmatogenous retinal detachment of left eye [H33.002]  Rhegmatogenous retinal detachment of left eye [H33.002]  Retinal detachment, rhegmatogenous, left eye [H33.002] and Cataract of  "the left eye     - Plan for surgical correction Plan 25g PPV/SO removal/phaco IOL combined with KL  LMA  LOC 60 min combined with Dr. Crawford     - Risks/benefits/alternatives of the procedure including, but not limited to scarring, bleeding, infection, loss or decreased vision, and/or need for possible repeat surgery discussed with the patient and family.   - Informed consent obtained prior to surgery and the patient/family voiced good understanding.    ADDENDUM    H&P reviewed this morning.  No changes in symptoms.  Allergies reviewed with patient.  Physical exam:  HEENT atraumatic/normocephalic, cardiac: intact distal pulses, pulm: equal, unlabored respirations, abdomen: soft, nontender.  Will proceed as planned with "PPV/SO removal/phaco IOL combined with DAX Crawford, LMA, LOC 60 min combined" with Dr. Crawford.  All questions answered.    Lambert Cardenas MD  PGY2, Ophthalmology Resident  08/02/2017  7:23 AM      "

## 2017-08-02 ENCOUNTER — ANESTHESIA EVENT (OUTPATIENT)
Dept: SURGERY | Facility: HOSPITAL | Age: 77
End: 2017-08-02
Payer: COMMERCIAL

## 2017-08-02 ENCOUNTER — ANESTHESIA (OUTPATIENT)
Dept: SURGERY | Facility: HOSPITAL | Age: 77
End: 2017-08-02
Payer: COMMERCIAL

## 2017-08-02 ENCOUNTER — HOSPITAL ENCOUNTER (OUTPATIENT)
Facility: HOSPITAL | Age: 77
Discharge: HOME | End: 2017-08-02
Attending: OPHTHALMOLOGY | Admitting: OPHTHALMOLOGY
Payer: COMMERCIAL

## 2017-08-02 ENCOUNTER — SURGERY (OUTPATIENT)
Age: 77
End: 2017-08-02

## 2017-08-02 VITALS
DIASTOLIC BLOOD PRESSURE: 55 MMHG | OXYGEN SATURATION: 98 % | HEART RATE: 59 BPM | TEMPERATURE: 98 F | RESPIRATION RATE: 18 BRPM | BODY MASS INDEX: 23.07 KG/M2 | SYSTOLIC BLOOD PRESSURE: 113 MMHG | WEIGHT: 147 LBS | HEIGHT: 67 IN

## 2017-08-02 DIAGNOSIS — H33.002 RETINAL DETACHMENT, RHEGMATOGENOUS, LEFT EYE: ICD-10-CM

## 2017-08-02 DIAGNOSIS — H25.12 NS (NUCLEAR SCLEROSIS), LEFT: ICD-10-CM

## 2017-08-02 DIAGNOSIS — H25.13 SENILE NUCLEAR SCLEROSIS, BILATERAL: ICD-10-CM

## 2017-08-02 DIAGNOSIS — H57.03 SMALL PUPIL: ICD-10-CM

## 2017-08-02 DIAGNOSIS — H33.002 RHEGMATOGENOUS RETINAL DETACHMENT OF LEFT EYE: Primary | ICD-10-CM

## 2017-08-02 DIAGNOSIS — H44.002 ENDOPHTHALMITIS, LEFT EYE: ICD-10-CM

## 2017-08-02 DIAGNOSIS — H33.002 RETINAL DETACHMENT OF LEFT EYE WITH RETINAL BREAK: ICD-10-CM

## 2017-08-02 PROCEDURE — 37000008 HC ANESTHESIA 1ST 15 MINUTES: Performed by: OPHTHALMOLOGY

## 2017-08-02 PROCEDURE — S0020 INJECTION, BUPIVICAINE HYDRO: HCPCS | Performed by: OPHTHALMOLOGY

## 2017-08-02 PROCEDURE — 25000003 PHARM REV CODE 250: Performed by: OPHTHALMOLOGY

## 2017-08-02 PROCEDURE — 67036 REMOVAL OF INNER EYE FLUID: CPT | Mod: LT,,, | Performed by: OPHTHALMOLOGY

## 2017-08-02 PROCEDURE — 63600175 PHARM REV CODE 636 W HCPCS: Performed by: NURSE ANESTHETIST, CERTIFIED REGISTERED

## 2017-08-02 PROCEDURE — 25000003 PHARM REV CODE 250: Performed by: NURSE ANESTHETIST, CERTIFIED REGISTERED

## 2017-08-02 PROCEDURE — 36000706: Performed by: OPHTHALMOLOGY

## 2017-08-02 PROCEDURE — 25000003 PHARM REV CODE 250

## 2017-08-02 PROCEDURE — 27201423 OPTIME MED/SURG SUP & DEVICES STERILE SUPPLY: Performed by: OPHTHALMOLOGY

## 2017-08-02 PROCEDURE — 37000009 HC ANESTHESIA EA ADD 15 MINS: Performed by: OPHTHALMOLOGY

## 2017-08-02 PROCEDURE — C9447 INJ, PHENYLEPHRINE KETOROLAC: HCPCS | Performed by: OPHTHALMOLOGY

## 2017-08-02 PROCEDURE — 66982 XCAPSL CTRC RMVL CPLX WO ECP: CPT | Mod: LT,,, | Performed by: OPHTHALMOLOGY

## 2017-08-02 PROCEDURE — 71000033 HC RECOVERY, INTIAL HOUR: Performed by: OPHTHALMOLOGY

## 2017-08-02 PROCEDURE — D9220A PRA ANESTHESIA: Mod: ANES,,, | Performed by: ANESTHESIOLOGY

## 2017-08-02 PROCEDURE — V2632 POST CHMBR INTRAOCULAR LENS: HCPCS | Performed by: OPHTHALMOLOGY

## 2017-08-02 PROCEDURE — 27600004 OPTIME MED/SURG SUP & DEVICES INTRAOCULAR LENS: Performed by: OPHTHALMOLOGY

## 2017-08-02 PROCEDURE — C1784 OCULAR DEV, INTRAOP, DET RET: HCPCS | Performed by: OPHTHALMOLOGY

## 2017-08-02 PROCEDURE — 71000015 HC POSTOP RECOV 1ST HR: Performed by: OPHTHALMOLOGY

## 2017-08-02 PROCEDURE — 36000707: Performed by: OPHTHALMOLOGY

## 2017-08-02 PROCEDURE — 63600175 PHARM REV CODE 636 W HCPCS: Performed by: OPHTHALMOLOGY

## 2017-08-02 PROCEDURE — D9220A PRA ANESTHESIA: Mod: CRNA,,, | Performed by: NURSE ANESTHETIST, CERTIFIED REGISTERED

## 2017-08-02 DEVICE — LENS IOL ITEC PRELOAD 19.5D: Type: IMPLANTABLE DEVICE | Site: EYE | Status: FUNCTIONAL

## 2017-08-02 RX ORDER — EPINEPHRINE 1 MG/ML
INJECTION, SOLUTION INTRACARDIAC; INTRAMUSCULAR; INTRAVENOUS; SUBCUTANEOUS
Status: DISCONTINUED
Start: 2017-08-02 | End: 2017-08-02 | Stop reason: HOSPADM

## 2017-08-02 RX ORDER — ONDANSETRON 4 MG/1
4 TABLET, FILM COATED ORAL EVERY 8 HOURS PRN
Qty: 12 TABLET | Refills: 0 | Status: SHIPPED | OUTPATIENT
Start: 2017-08-02 | End: 2017-10-03

## 2017-08-02 RX ORDER — PHENYLEPHRINE HYDROCHLORIDE 100 MG/ML
1 SOLUTION/ DROPS OPHTHALMIC
Status: DISCONTINUED | OUTPATIENT
Start: 2017-08-02 | End: 2017-08-02 | Stop reason: HOSPADM

## 2017-08-02 RX ORDER — LIDOCAINE HYDROCHLORIDE 20 MG/ML
INJECTION, SOLUTION EPIDURAL; INFILTRATION; INTRACAUDAL; PERINEURAL
Status: DISCONTINUED
Start: 2017-08-02 | End: 2017-08-02 | Stop reason: HOSPADM

## 2017-08-02 RX ORDER — DEXAMETHASONE SODIUM PHOSPHATE 4 MG/ML
INJECTION, SOLUTION INTRA-ARTICULAR; INTRALESIONAL; INTRAMUSCULAR; INTRAVENOUS; SOFT TISSUE
Status: DISCONTINUED | OUTPATIENT
Start: 2017-08-02 | End: 2017-08-02 | Stop reason: HOSPADM

## 2017-08-02 RX ORDER — PHENYLEPHRINE HYDROCHLORIDE 10 MG/ML
INJECTION INTRAVENOUS
Status: DISCONTINUED | OUTPATIENT
Start: 2017-08-02 | End: 2017-08-02

## 2017-08-02 RX ORDER — DEXAMETHASONE SODIUM PHOSPHATE 4 MG/ML
INJECTION, SOLUTION INTRA-ARTICULAR; INTRALESIONAL; INTRAMUSCULAR; INTRAVENOUS; SOFT TISSUE
Status: DISCONTINUED
Start: 2017-08-02 | End: 2017-08-02 | Stop reason: HOSPADM

## 2017-08-02 RX ORDER — PHENYLEPHRINE HYDROCHLORIDE 25 MG/ML
1 SOLUTION/ DROPS OPHTHALMIC
Status: DISCONTINUED | OUTPATIENT
Start: 2017-08-02 | End: 2017-08-02 | Stop reason: HOSPADM

## 2017-08-02 RX ORDER — BUPIVACAINE HYDROCHLORIDE 7.5 MG/ML
INJECTION, SOLUTION EPIDURAL; RETROBULBAR
Status: DISCONTINUED
Start: 2017-08-02 | End: 2017-08-02 | Stop reason: HOSPADM

## 2017-08-02 RX ORDER — MIDAZOLAM HYDROCHLORIDE 1 MG/ML
INJECTION, SOLUTION INTRAMUSCULAR; INTRAVENOUS
Status: DISCONTINUED | OUTPATIENT
Start: 2017-08-02 | End: 2017-08-02

## 2017-08-02 RX ORDER — MOXIFLOXACIN 5 MG/ML
SOLUTION/ DROPS OPHTHALMIC
Status: DISCONTINUED | OUTPATIENT
Start: 2017-08-02 | End: 2017-08-02 | Stop reason: HOSPADM

## 2017-08-02 RX ORDER — KETOROLAC TROMETHAMINE 5 MG/ML
1 SOLUTION OPHTHALMIC
Status: DISCONTINUED | OUTPATIENT
Start: 2017-08-02 | End: 2017-08-02 | Stop reason: HOSPADM

## 2017-08-02 RX ORDER — LIDOCAINE HYDROCHLORIDE 10 MG/ML
1 INJECTION, SOLUTION EPIDURAL; INFILTRATION; INTRACAUDAL; PERINEURAL ONCE
Status: DISCONTINUED | OUTPATIENT
Start: 2017-08-02 | End: 2017-08-02 | Stop reason: HOSPADM

## 2017-08-02 RX ORDER — FENTANYL CITRATE 50 UG/ML
INJECTION, SOLUTION INTRAMUSCULAR; INTRAVENOUS
Status: DISCONTINUED | OUTPATIENT
Start: 2017-08-02 | End: 2017-08-02

## 2017-08-02 RX ORDER — TETRACAINE HYDROCHLORIDE 5 MG/ML
1 SOLUTION OPHTHALMIC
Status: DISCONTINUED | OUTPATIENT
Start: 2017-08-02 | End: 2017-08-02 | Stop reason: HOSPADM

## 2017-08-02 RX ORDER — PREDNISOLONE ACETATE 10 MG/ML
1 SUSPENSION/ DROPS OPHTHALMIC
Status: DISCONTINUED | OUTPATIENT
Start: 2017-08-02 | End: 2017-08-02 | Stop reason: HOSPADM

## 2017-08-02 RX ORDER — PREDNISOLONE ACETATE 10 MG/ML
SUSPENSION/ DROPS OPHTHALMIC
Status: DISCONTINUED | OUTPATIENT
Start: 2017-08-02 | End: 2017-08-02 | Stop reason: HOSPADM

## 2017-08-02 RX ORDER — LIDOCAINE HCL/PF 100 MG/5ML
SYRINGE (ML) INTRAVENOUS
Status: DISCONTINUED | OUTPATIENT
Start: 2017-08-02 | End: 2017-08-02

## 2017-08-02 RX ORDER — LIDOCAINE HYDROCHLORIDE 20 MG/ML
INJECTION, SOLUTION EPIDURAL; INFILTRATION; INTRACAUDAL; PERINEURAL
Status: DISCONTINUED | OUTPATIENT
Start: 2017-08-02 | End: 2017-08-02 | Stop reason: HOSPADM

## 2017-08-02 RX ORDER — EPINEPHRINE 1 MG/ML
INJECTION, SOLUTION INTRACARDIAC; INTRAMUSCULAR; INTRAVENOUS; SUBCUTANEOUS
Status: DISCONTINUED | OUTPATIENT
Start: 2017-08-02 | End: 2017-08-02 | Stop reason: HOSPADM

## 2017-08-02 RX ORDER — PREDNISOLONE ACETATE 10 MG/ML
SUSPENSION/ DROPS OPHTHALMIC
Status: DISCONTINUED
Start: 2017-08-02 | End: 2017-08-02 | Stop reason: HOSPADM

## 2017-08-02 RX ORDER — ACETAMINOPHEN 325 MG/1
650 TABLET ORAL EVERY 4 HOURS PRN
Status: DISCONTINUED | OUTPATIENT
Start: 2017-08-02 | End: 2017-08-02 | Stop reason: HOSPADM

## 2017-08-02 RX ORDER — MOXIFLOXACIN 5 MG/ML
1 SOLUTION/ DROPS OPHTHALMIC
Status: DISCONTINUED | OUTPATIENT
Start: 2017-08-02 | End: 2017-08-02 | Stop reason: HOSPADM

## 2017-08-02 RX ORDER — TROPICAMIDE 10 MG/ML
SOLUTION/ DROPS OPHTHALMIC
Status: COMPLETED
Start: 2017-08-02 | End: 2017-08-02

## 2017-08-02 RX ORDER — SODIUM CHLORIDE 9 MG/ML
INJECTION, SOLUTION INTRAVENOUS CONTINUOUS
Status: DISCONTINUED | OUTPATIENT
Start: 2017-08-02 | End: 2017-08-02 | Stop reason: HOSPADM

## 2017-08-02 RX ORDER — LIDOCAINE HYDROCHLORIDE 10 MG/ML
1 INJECTION, SOLUTION EPIDURAL; INFILTRATION; INTRACAUDAL; PERINEURAL ONCE
Status: COMPLETED | OUTPATIENT
Start: 2017-08-02 | End: 2017-08-02

## 2017-08-02 RX ORDER — PROPOFOL 10 MG/ML
VIAL (ML) INTRAVENOUS
Status: DISCONTINUED | OUTPATIENT
Start: 2017-08-02 | End: 2017-08-02

## 2017-08-02 RX ORDER — BUPIVACAINE HYDROCHLORIDE 7.5 MG/ML
INJECTION, SOLUTION EPIDURAL; RETROBULBAR
Status: DISCONTINUED | OUTPATIENT
Start: 2017-08-02 | End: 2017-08-02 | Stop reason: HOSPADM

## 2017-08-02 RX ORDER — HYDROCODONE BITARTRATE AND ACETAMINOPHEN 5; 325 MG/1; MG/1
1 TABLET ORAL EVERY 4 HOURS PRN
Status: DISCONTINUED | OUTPATIENT
Start: 2017-08-02 | End: 2017-08-02 | Stop reason: HOSPADM

## 2017-08-02 RX ORDER — LIDOCAINE HYDROCHLORIDE 10 MG/ML
INJECTION, SOLUTION EPIDURAL; INFILTRATION; INTRACAUDAL; PERINEURAL
Status: DISCONTINUED
Start: 2017-08-02 | End: 2017-08-02 | Stop reason: HOSPADM

## 2017-08-02 RX ORDER — VANCOMYCIN HYDROCHLORIDE 500 MG/10ML
INJECTION, POWDER, LYOPHILIZED, FOR SOLUTION INTRAVENOUS
Status: DISCONTINUED | OUTPATIENT
Start: 2017-08-02 | End: 2017-08-02 | Stop reason: HOSPADM

## 2017-08-02 RX ORDER — TROPICAMIDE 10 MG/ML
1 SOLUTION/ DROPS OPHTHALMIC
Status: DISCONTINUED | OUTPATIENT
Start: 2017-08-02 | End: 2017-08-02 | Stop reason: HOSPADM

## 2017-08-02 RX ORDER — ONDANSETRON 2 MG/ML
INJECTION INTRAMUSCULAR; INTRAVENOUS
Status: DISCONTINUED | OUTPATIENT
Start: 2017-08-02 | End: 2017-08-02

## 2017-08-02 RX ORDER — VANCOMYCIN HYDROCHLORIDE 500 MG/10ML
INJECTION, POWDER, LYOPHILIZED, FOR SOLUTION INTRAVENOUS
Status: DISCONTINUED
Start: 2017-08-02 | End: 2017-08-02 | Stop reason: HOSPADM

## 2017-08-02 RX ORDER — OXYCODONE AND ACETAMINOPHEN 5; 325 MG/1; MG/1
1 TABLET ORAL EVERY 6 HOURS PRN
Qty: 12 TABLET | Refills: 0 | Status: SHIPPED | OUTPATIENT
Start: 2017-08-02 | End: 2017-10-03

## 2017-08-02 RX ORDER — CYCLOPENTOLATE HYDROCHLORIDE 10 MG/ML
1 SOLUTION/ DROPS OPHTHALMIC
Status: DISCONTINUED | OUTPATIENT
Start: 2017-08-02 | End: 2017-08-02 | Stop reason: HOSPADM

## 2017-08-02 RX ORDER — ONDANSETRON 8 MG/1
8 TABLET, ORALLY DISINTEGRATING ORAL EVERY 8 HOURS PRN
Status: DISCONTINUED | OUTPATIENT
Start: 2017-08-02 | End: 2017-08-02 | Stop reason: HOSPADM

## 2017-08-02 RX ORDER — NEOMYCIN SULFATE, POLYMYXIN B SULFATE, AND DEXAMETHASONE 3.5; 10000; 1 MG/G; [USP'U]/G; MG/G
OINTMENT OPHTHALMIC
Status: DISCONTINUED
Start: 2017-08-02 | End: 2017-08-02 | Stop reason: HOSPADM

## 2017-08-02 RX ORDER — ASPIRIN 81 MG/1
81 TABLET ORAL DAILY
COMMUNITY
End: 2024-03-13

## 2017-08-02 RX ORDER — LIDOCAINE HYDROCHLORIDE 40 MG/ML
INJECTION, SOLUTION RETROBULBAR
Status: DISCONTINUED
Start: 2017-08-02 | End: 2017-08-02 | Stop reason: HOSPADM

## 2017-08-02 RX ORDER — NEOMYCIN SULFATE, POLYMYXIN B SULFATE, AND DEXAMETHASONE 3.5; 10000; 1 MG/G; [USP'U]/G; MG/G
OINTMENT OPHTHALMIC
Status: DISCONTINUED | OUTPATIENT
Start: 2017-08-02 | End: 2017-08-02 | Stop reason: HOSPADM

## 2017-08-02 RX ORDER — INDOCYANINE GREEN AND WATER 25 MG
KIT INJECTION
Status: DISCONTINUED
Start: 2017-08-02 | End: 2017-08-02 | Stop reason: WASHOUT

## 2017-08-02 RX ADMIN — MIDAZOLAM HYDROCHLORIDE 2 MG: 1 INJECTION, SOLUTION INTRAMUSCULAR; INTRAVENOUS at 08:08

## 2017-08-02 RX ADMIN — SODIUM CHLORIDE: 0.9 INJECTION, SOLUTION INTRAVENOUS at 08:08

## 2017-08-02 RX ADMIN — VANCOMYCIN HYDROCHLORIDE 25 MG: 500 INJECTION, POWDER, LYOPHILIZED, FOR SOLUTION INTRAVENOUS at 09:08

## 2017-08-02 RX ADMIN — SODIUM CHONDROITIN SULFATE / SODIUM HYALURONATE 1.05 ML: 0.55-0.5 INJECTION INTRAOCULAR at 08:08

## 2017-08-02 RX ADMIN — EPHEDRINE SULFATE 10 MG: 50 INJECTION, SOLUTION INTRAMUSCULAR; INTRAVENOUS; SUBCUTANEOUS at 09:08

## 2017-08-02 RX ADMIN — KETOROLAC TROMETHAMINE 1 DROP: 5 SOLUTION OPHTHALMIC at 06:08

## 2017-08-02 RX ADMIN — PHENYLEPHRINE HYDROCHLORIDE 100 MCG: 10 INJECTION INTRAVENOUS at 08:08

## 2017-08-02 RX ADMIN — LIDOCAINE HYDROCHLORIDE 0.1 MG: 10 INJECTION, SOLUTION EPIDURAL; INFILTRATION; INTRACAUDAL; PERINEURAL at 06:08

## 2017-08-02 RX ADMIN — PHENYLEPHRINE HYDROCHLORIDE 200 MCG: 10 INJECTION INTRAVENOUS at 08:08

## 2017-08-02 RX ADMIN — LIDOCAINE HYDROCHLORIDE 5 ML: 20 INJECTION, SOLUTION EPIDURAL; INFILTRATION; INTRACAUDAL; PERINEURAL at 08:08

## 2017-08-02 RX ADMIN — MOXIFLOXACIN HYDROCHLORIDE 1 DROP: 5 SOLUTION/ DROPS OPHTHALMIC at 07:08

## 2017-08-02 RX ADMIN — NEOMYCIN SULFATE, POLYMYXIN B SULFATE, AND DEXAMETHASONE 1 APPLICATION: 3.5; 10000; 1 OINTMENT OPHTHALMIC at 08:08

## 2017-08-02 RX ADMIN — CYCLOPENTOLATE HYDROCHLORIDE 1 DROP: 10 SOLUTION/ DROPS OPHTHALMIC at 07:08

## 2017-08-02 RX ADMIN — HOMATROPINE HYDROBROMIDE 1 DROP: 50 SOLUTION OPHTHALMIC at 06:08

## 2017-08-02 RX ADMIN — PROPOFOL 100 MG: 10 INJECTION, EMULSION INTRAVENOUS at 08:08

## 2017-08-02 RX ADMIN — PROPOFOL 30 MG: 10 INJECTION, EMULSION INTRAVENOUS at 08:08

## 2017-08-02 RX ADMIN — PHENYLEPHRINE HYDROCHLORIDE 0.25 MCG/KG/MIN: 10 INJECTION INTRAVENOUS at 09:08

## 2017-08-02 RX ADMIN — MOXIFLOXACIN HYDROCHLORIDE 1 DROP: 5 SOLUTION/ DROPS OPHTHALMIC at 06:08

## 2017-08-02 RX ADMIN — CYCLOPENTOLATE HYDROCHLORIDE 1 DROP: 10 SOLUTION/ DROPS OPHTHALMIC at 06:08

## 2017-08-02 RX ADMIN — TROPICAMIDE 1 DROP: 10 SOLUTION/ DROPS OPHTHALMIC at 07:08

## 2017-08-02 RX ADMIN — PREDNISOLONE ACETATE 1 DROP: 10 SUSPENSION/ DROPS OPHTHALMIC at 06:08

## 2017-08-02 RX ADMIN — TETRACAINE HYDROCHLORIDE 1 DROP: 5 SOLUTION OPHTHALMIC at 06:08

## 2017-08-02 RX ADMIN — ACETYLCHOLINE CHLORIDE 1 ML: KIT at 08:08

## 2017-08-02 RX ADMIN — TROPICAMIDE 1 DROP: 10 SOLUTION/ DROPS OPHTHALMIC at 06:08

## 2017-08-02 RX ADMIN — SODIUM CHLORIDE: 0.9 INJECTION, SOLUTION INTRAVENOUS at 06:08

## 2017-08-02 RX ADMIN — PHENYLEPHRINE AND KETOROLAC 4 ML: 10.16; 2.88 INJECTION, SOLUTION, CONCENTRATE INTRAOCULAR at 08:08

## 2017-08-02 RX ADMIN — BUPIVACAINE HYDROCHLORIDE 5 ML: 7.5 INJECTION, SOLUTION EPIDURAL; RETROBULBAR at 08:08

## 2017-08-02 RX ADMIN — PHENYLEPHRINE HYDROCHLORIDE 1 DROP: 25 SOLUTION/ DROPS OPHTHALMIC at 07:08

## 2017-08-02 RX ADMIN — PREDNISOLONE ACETATE 1 DROP: 10 SUSPENSION/ DROPS OPHTHALMIC at 07:08

## 2017-08-02 RX ADMIN — Medication 10 MG: at 08:08

## 2017-08-02 RX ADMIN — FENTANYL CITRATE 25 MCG: 50 INJECTION, SOLUTION INTRAMUSCULAR; INTRAVENOUS at 08:08

## 2017-08-02 RX ADMIN — LIDOCAINE HYDROCHLORIDE 40 MG: 20 INJECTION, SOLUTION INTRAVENOUS at 08:08

## 2017-08-02 RX ADMIN — HOMATROPINE HYDROBROMIDE 1 DROP: 50 SOLUTION OPHTHALMIC at 07:08

## 2017-08-02 RX ADMIN — DEXAMETHASONE SODIUM PHOSPHATE 2 MG: 4 INJECTION, SOLUTION INTRAMUSCULAR; INTRAVENOUS at 08:08

## 2017-08-02 RX ADMIN — PREDNISOLONE ACETATE 2 DROP: 10 SUSPENSION/ DROPS OPHTHALMIC at 08:08

## 2017-08-02 RX ADMIN — ONDANSETRON 4 MG: 2 INJECTION INTRAMUSCULAR; INTRAVENOUS at 08:08

## 2017-08-02 RX ADMIN — MOXIFLOXACIN HYDROCHLORIDE 1 DROP: 5 SOLUTION/ DROPS OPHTHALMIC at 08:08

## 2017-08-02 RX ADMIN — EPINEPHRINE 0.3 MG: 1 INJECTION, SOLUTION INTRAMUSCULAR; SUBCUTANEOUS at 08:08

## 2017-08-02 RX ADMIN — EPHEDRINE SULFATE 10 MG: 50 INJECTION, SOLUTION INTRAMUSCULAR; INTRAVENOUS; SUBCUTANEOUS at 08:08

## 2017-08-02 RX ADMIN — KETOROLAC TROMETHAMINE 1 DROP: 5 SOLUTION OPHTHALMIC at 07:08

## 2017-08-02 NOTE — ANESTHESIA PREPROCEDURE EVALUATION
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    History of uterine fibroid     Hyperlipidemia     dyslipidemia    Hypertension     MRSA infection     Open angle 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 eyes 5/2/2013    Osteopenia     Potassium depletion 1998    Retinal detachment of left eye with retinal break 3/13/2017     Past Surgical History:   Procedure Laterality Date    BREAST BIOPSY  2001    left breast DCIS    BREAST SURGERY  9/2001    left lumpectomy with SLN bx    COLONOSCOPY      COLONOSCOPY N/A 10/31/2016    Procedure: COLONOSCOPY;  Surgeon: YAMILETH Richards MD;  Location: [facility] ([address] [place] [address]);  Service: Endoscopy;  Laterality: N/A;    KIDNEY SURGERY  03/27/2017    patient does not know what they did, but knows she does not have kidney cancer    RETINAL DETACHMENT SURGERY       Patient Active Problem List   Diagnosis    Cancer    Hyperlipidemia    Hypertension    History of uterine fibroid    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    Open angle 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 eyes    Osteopenia    Neuropathy    Conjunctivitis    Retinal detachment of left eye with retinal break    Endophthalmitis, left eye    Rhegmatogenous retinal detachment of left eye    Left adrenal mass removed 3/2017    NS (nuclear sclerosis)    Hx of renal vein thrombosis    Decreased GFR    Retinal detachment, rhegmatogenous, left eye     Please See ROS/PMH and Active Problem List above                                                                                                              08/02/2017  Ida Santana is a 77 y.o., female.    Anesthesia Evaluation    I have reviewed the Patient Summary Reports.    I have reviewed the Nursing Notes.   I have reviewed the Medications.     Review of Systems  Anesthesia Hx:  No problems with previous Anesthesia Denies Hx of  Anesthetic complications  History of prior surgery of interest to airway management or planning: Previous anesthesia: General 3/22/17 adrenalectomy with general anesthesia.  Procedure performed at an [facility]. Denies Family Hx of Anesthesia complications.   Denies Personal Hx of Anesthesia complications.   Social:  Non-Smoker  Patient's occupation is Retired . Denies Tobacco Use. Denies Alcohol Use.   Hematology/Oncology:        Hematology Comments: S/P Sepsis/MRSA/Staph/pneumonia: 1/2017 --  Cancer in past history: s/p radiation therapy   Oncology Comments: Hx breast cancer: 6 weeks of radiation     EENT/Dental:   Eyes: Eye Disease: Glaucoma:   Denies Jaw Problems   Cardiovascular:   Hypertension, well controlled  Denies Angina.  Functional Capacity good / => 4 METS, goes to grocery store/walks around [facility]: denies CP/SOB  Denies Coronary Artery Disease.  Hypertension , Recent typical clinic B/P of 121/68 @ POC visit    Pulmonary:   Denies Shortness of breath.  Denies Asthma.  Denies Chronic Obstructive Pulmonary Disease (COPD).  Possible Obstructive Sleep Apnea , (STOP/BANG) Symptoms S - Snoring (loud), P - Pressure being treated for high BP and A - Age > 50  Pulmonary Infection:  Pneumonia.    Renal/:   Thrombosis of left renal vein  Diastolic dysfunction Neoplasm/Tumor (left renal mass).    Hepatic/GI:  Denies Esophageal / Stomach Disorders  Denies Liver Disease    Musculoskeletal:  Bone Disorders: Osteopenia    Neurological:  Denies Seizure Disorder  Denies CVA - Cerebrovasular Accident  Denies TIA - Transient Ischemic Attack    Endocrine:  Adrenal Disease, Left adrenal mass  Metabolic Disorders, Hyperlipoproteinemia      Physical Exam  General:  Well nourished    Airway/Jaw/Neck:  Airway Findings: Mouth Opening: Normal Mallampati: II  TM Distance: 4 - 6 cm  Jaw/Neck Findings:  Neck ROM: Normal ROM      Dental:  Dental Findings: lower partial dentures   Chest/Lungs:  Chest/Lungs  Findings: Normal Respiratory Rate, Clear to auscultation     Heart/Vascular:  Heart Findings: Rate: Normal  Heart Murmur  Systolic Heart Murmur Description: R Upper Sternal Border, Apical  Systolic Heart Murmur Grade: Grade II, Grade I  Vascular Findings: Normal       Mental Status:  Mental Status Findings:  Cooperative, Alert and Oriented         Anesthesia Plan  Type of Anesthesia, risks & benefits discussed:  Anesthesia Type:  general  Patient's Preference: gen  Intra-op Monitoring Plan:   Intra-op Monitoring Plan Comments:   Post Op Pain Control Plan:   Post Op Pain Control Plan Comments: Iv>po  Induction:   IV  Beta Blocker:  Patient is not currently on a Beta-Blocker (No further documentation required).       Informed Consent: Patient understands risks and agrees with Anesthesia plan.  Questions answered. Anesthesia consent signed with patient.  ASA Score: 3     Day of Surgery Review of History & Physical:    H&P update referred to the surgeon.         Ready For Surgery From Anesthesia Perspective.

## 2017-08-02 NOTE — PLAN OF CARE
Pt and family given dc instructions and verbalized understanding.   1005-Pt no S&s of distress noted. V/s stable. Per Anesthesia, Dr. Ashton, pt may be dc home. Will put release in computer. Family at bedside. Awaiting release to be put in computer.

## 2017-08-02 NOTE — BRIEF OP NOTE
Pre-Op Dx: RRD OD with Silicone and and cataract OS    Post Op Dx: same    Procedure Performed: combined phaco/IOL and 25g PPV/SO removal OS    Attending Surgeon: Yvette Beaulieu    Assistant Surgeon: Landy    Anesthesia: LMA, retrobulbar injection of 4.0cc mixture 0.75%Marcaine, 2% Xylocaine    Estimated blood loss: Minimal    Complication: None    Specimen: None    Disposition: Stable to recovery    Findings/Outcome: dense NS and intraocular silicone oil removed nicely. IOL well centered with capsular rent at 12:00.  Retina flat with ONH pallor.      Date of Discharge: 8/2/17    Discharge Disposition: stable to recovery then home    F/U: tomorrow

## 2017-08-02 NOTE — TRANSFER OF CARE
"Anesthesia Transfer of Care Note    Patient: Ida Santana    Procedure(s) Performed: Procedure(s) (LRB):  REPAIR-RETINA (VITRECTOMY) (Left)  INSERTION-INTRAOCULAR LENS (IOL) (Left)  PHACOEMULSIFICATION-ASPIRATION-CATARACT (Left)    Patient location: PACU    Anesthesia Type: general    Transport from OR: Transported from OR on room air with adequate spontaneous ventilation    Post pain: adequate analgesia    Post assessment: no apparent anesthetic complications    Post vital signs: stable    Level of consciousness: awake and alert    Nausea/Vomiting: no nausea/vomiting    Complications: none    Transfer of care protocol was followed      Last vitals:   Visit Vitals  /60   Pulse 80   Temp 36.5 °C (97.7 °F) (Temporal)   Resp 17   Ht 5' 7" (1.702 m)   Wt 66.7 kg (147 lb)   LMP  (LMP Unknown)   SpO2 100%   Breastfeeding? No   BMI 23.02 kg/m²     "

## 2017-08-02 NOTE — OP NOTE
DATE OF PROCEDURE:  08/02/2017.    PREOPERATIVE DIAGNOSES:  Rhegmatogenous retinal detachment, status post pars   plana vitrectomy with placement of silicone oil and cataract to the left eye.    POSTOPERATIVE DIAGNOSES:  Rhegmatogenous retinal detachment, status post pars   plana vitrectomy with placement of silicone oil and cataract to the left eye.    PROCEDURES PERFORMED:  Combined phacoemulsification of cataract with intraocular   lens implantation with Dr. Crawford and 25-gauge pars plana vitrectomy with   silicone oil removal with me to the left eye.    ATTENDING SURGEON:  JAKE Beaulieu M.D. and Lynda Crawford M.D.    ASSISTANT SURGEONS:  Fellow Marck Bill.    ANESTHESIA:  LMA with a retrobulbar injection of 4.0 mL mixture of 0.75%   Marcaine and 2% Xylocaine.    ESTIMATED BLOOD LOSS:  Minimal.    COMPLICATIONS:  None.    DISPOSITION:  Stable to recovery.    INDICATIONS FOR SURGERY:  This is a 77-year-old female who presented to me in   March for rhegmatogenous retinal detachment and presumed endogenous   endophthalmitis and is status post pars plana vitrectomy with placement of   silicone oil.  The patient developed a progressive cataract over the last five   months.  Decision was made to take the patient back to surgery for combined   surgery with cataract extraction and removal of silicone oil.  Risks, benefits,   and alternatives of surgery were discussed in detail; risks including loss of   vision, loss of eye, recurrent retinal detachment, recurrent infection,   hemorrhage, lens dislocation, glaucoma, hypotony, ptosis and diplopia.  The   patient voiced understanding and wished to proceed with the procedure.    DESCRIPTION OF PROCEDURE:  After proper informed consent was obtained, the   patient was brought back to the Operating Suite at [facility] where   LMA was induced.  Retrobulbar injection was provided as above without   complication.  The patient was prepped and draped  in normal sterile fashion for   ophthalmic surgery and lid speculum was placed in the left eye.  A 2-port pars   plana vitrectomy was set up with a 25-gauge trocar inserted inferotemporally.    The infusion cannula was turned on only after it was observed to be free and   clear of all underlying retinal tissue.  The supratemporal trocar was also   placed 3.5 mm posterior to the limbus.  The VFC device was used to remove the   silicone oil from the posterior segment and then the infusion was clamped and   Dr. Crawford took over for the cataract portion of the procedure.  Please refer   to her dictation for details.  Posterior segment examination following cataract   extraction and lens implantation revealed a flat retina with a pale optic   nerve.  The inferonasal area of prior infectious retinitis was stable.  There   was no proliferative vitreal retinopathy.  There were several small emulsified   oil bubbles scattered throughout the posterior segment.  The vitrector was used   to aspirate these smaller bubbles.  Gentle ciliary massage was performed to   release some of these bubbles from the sulcus.  Care was taken not to disturb   the lens.  The trocars were then removed from the eye and not leaking after   gentle massage.  The eye was normal pressure via palpation.  Subconjunctival   injections of vancomycin and Decadron were given to the patient.  The drapes   were removed from the patient.  She was washed free of Betadine prep solution.    Maxitrol ointment was placed in the left eye.  The eye was patch shielded.  LMA   was reversed and she was brought to Recovery Room in stable condition,   tolerating the procedure well.  Dr. Beaulieu was present for the entire case.      MARIANO  dd: 08/02/2017 09:44:53 (CDT)  td: 08/02/2017 10:02:37 (CDT)  Doc ID   #[id number]  Job ID #[id number]    CC:

## 2017-08-02 NOTE — ANESTHESIA RELEASE NOTE
"Anesthesia Release from PACU Note  Patient: Ida Santana    Procedure(s) Performed: Procedure(s) (LRB):  REPAIR-RETINA (VITRECTOMY) (Left)  INSERTION-INTRAOCULAR LENS (IOL) (Left)  PHACOEMULSIFICATION-ASPIRATION-CATARACT (Left)    Final Anesthesia Type: general  Patient location during evaluation: PACU  Patient participation: Yes- Able to Participate  Level of consciousness: awake and alert  Post-procedure vital signs: reviewed and stable  Pain management: adequate  Airway patency: patent  PONV status at discharge: No PONV  Anesthetic complications: no      Cardiovascular status: blood pressure returned to baseline  Respiratory status: unassisted  Hydration status: euvolemic  Follow-up not needed.        Visit Vitals  BP (!) 112/58   Pulse 82   Temp 36.5 °C (97.7 °F) (Temporal)   Resp 18   Ht 5' 7" (1.702 m)   Wt 66.7 kg (147 lb)   LMP  (LMP Unknown)   SpO2 100%   Breastfeeding? No   BMI 23.02 kg/m²       Pain/Ellen Score: Pain Assessment Performed: Yes (8/2/2017  9:44 AM)  Presence of Pain: non-verbal indicators absent (8/2/2017  9:44 AM)  Ellen Score: 8 (8/2/2017  9:44 AM)  "

## 2017-08-02 NOTE — DISCHARGE SUMMARY
Date of Procedure / Discharge: 08/02/2017     PreOp Diagnosis: Complex Cataract OS  // silicone oil in eye after previous retina surgery    PostOp Diagnosis:as above s/p procedure    Procedure:1. Complex Cataract Extraction with Phacoemulsification and Malyugin Ring w/ Posterior Chamber IOL Implantation - Yvette                    2. Removal of silicone oil / Pars plan vitrectiomy - reinal examination -  left eye - Dr. galeano     Attending:Lynda Crawford MD    Assistant:none     Anesthesia:General    Complications:: None    Blood loss: <5 CC    Specimens: none    Procedure Details:  See Dictation      -D/C home when patient meets anesthesia requirements  -Follow up tomorrow with [facility]  in [facility].

## 2017-08-02 NOTE — OP NOTE
DATE OF PROCEDURE: 08/02/2017    PREOPERATIVE DIAGNOSIS: 1. Complex/small pupil/floppy iris Cataract of the OS. 2. Rhegmatogenous retinal detachment of left eye     POSTOPERATIVE DIAGNOSIS: 1. Complex/small pupil/floppy iris Cataract of theOS, status post procedure.2.  Rhegmatogenous retinal detachment of left eye    PROCEDURE PERFORMED: 1. Complex Cataract extraction with trypan blue and  Malyugin ring ,phacoemulsification, and posterior chamber intraocular lens placement. - Dr. Crawford      2. Removal of silicone oil from the vitreous cavity and retinal examination - Dr. jennings    SURGEON: Lynda Crawford M.D.     ASSISTANT: none     COMPLICATIONS: None.     BLOOD LOSS: Less than 5 mL.     ANESTHESIA: General    IMPLANT DATA:   1. Olivares PCBoo , power 19.5 diopters    PROCEDURE IN DETAIL: After informed consent including risks, benefits, alternatives, the patient was brought to the operating room table, placed in supine position. General  anesthesia care was used throughout the entire procedure. Once adequate anesthesia was confirmed, the patient was then prepped and draped in usual sterile fashion for intraocular surgery. Wire speculum was used to hold the eyelids apart.     The retina service removed the silicone oil which had been placed during a previous surgery. - See the op note from Dr. jennings for this. Once the oil was removed the cataract surgery was initiated.   A partial thickness corneal wound with a peng blade temporally.  supersharp blade was then used to make a second entry into the eye via a paracentesis incision. Intracameral lidocaine followed by Viscoat were placed in the anterior chamber. A 2.4 mm blade was then used to make to complete the clear corneal incision temporally. A Malyugin ring was inserted to dilated and maintain pupillary dilation. The visco elastic was removed and the anterior capsule was stained with trypan blue.   A cystotome was used to make a tear in the  anterior capsular flap, which was continued around with Utrata forceps for continuous curvilinear capsulorrhexis. There was a anterior capsule tear present which did not extend. BSS on a Reis cannula was then used for hydrodissection and hydrodelineation of lens. The lens was noted to spin freely in the bag. Phacoemulsification handpiece was then used to remove the lens in a divide-and-conquer manner. Irrigation aspiration handpiece removed the remaining cortical material. Provisc was placed in the eye followed by the lens as mentioned above without any complications. Once the lens was in proper Position, The Malyugin was disengaged from the iris and removed from the eye.  The irrigation aspiration handpiece was used to remove the remaining Provisc. A 10-0 vicryl suture was placed in the corneal incision.  The wounds were then hydrated with BSS and noted to be watertight.     The patient was then turned back over to the retina service to complete the vitrectomy and examine the retina  Once they completed their portion of the surgery - the eye was patched and a shield was placed. The pt then went to the recovery room and will Follow up with retina service tomorrow .

## 2017-08-02 NOTE — DISCHARGE INSTRUCTIONS
Post Op Instructions:  Patient should Maintain Eye shield & Dressing until seen tomorrow in eye clinic  Tylenol as needed for general discomfort  Use Prescription for pain medication if pain is severe  Use Prescription for Nausea (Zofran) if nausea or vomiting  No excessive exercise   No Bending, Lifting or Straining  Call MD if significant pain or nausea / vomiting uncontrolled by medications  Call MD if temperature in excess of 101' F  Return to eye clinic for Post Op Examination tomorrow Morning.  Bring Medicine bag to tomorrow's appointment.

## 2017-08-03 ENCOUNTER — OFFICE VISIT (OUTPATIENT)
Dept: OPHTHALMOLOGY | Facility: CLINIC | Age: 77
End: 2017-08-03
Payer: COMMERCIAL

## 2017-08-03 VITALS — DIASTOLIC BLOOD PRESSURE: 80 MMHG | SYSTOLIC BLOOD PRESSURE: 136 MMHG | HEART RATE: 85 BPM

## 2017-08-03 DIAGNOSIS — H25.11 SENILE NUCLEAR SCLEROSIS, RIGHT: ICD-10-CM

## 2017-08-03 DIAGNOSIS — Z09 POSTOPERATIVE FOLLOW-UP: Primary | ICD-10-CM

## 2017-08-03 DIAGNOSIS — H44.002 ENDOPHTHALMITIS, LEFT EYE: ICD-10-CM

## 2017-08-03 DIAGNOSIS — H40.1131 PRIMARY OPEN ANGLE GLAUCOMA OF BOTH EYES, MILD STAGE: ICD-10-CM

## 2017-08-03 DIAGNOSIS — H33.002 RHEGMATOGENOUS RETINAL DETACHMENT OF LEFT EYE: ICD-10-CM

## 2017-08-03 DIAGNOSIS — Q07.8 ANOMALOUS OPTIC NERVE: ICD-10-CM

## 2017-08-03 DIAGNOSIS — M35.01 KCS (KERATOCONJUNCTIVITIS SICCA): ICD-10-CM

## 2017-08-03 PROCEDURE — 99024 POSTOP FOLLOW-UP VISIT: CPT | Mod: S$GLB,,, | Performed by: OPHTHALMOLOGY

## 2017-08-03 PROCEDURE — 99999 PR PBB SHADOW E&M-EST. PATIENT-LVL II: CPT | Mod: PBBFAC,,, | Performed by: OPHTHALMOLOGY

## 2017-08-03 NOTE — PROGRESS NOTES
HPI     Post-op Evaluation    Additional comments: Pt states no complaints this morning and slept   through the night           Comments   1 day po Combined Phaco IOL and 25g PPV/SO removal OS 8/2/17    MEDS:   PA QDAY OS  Cosopt BID OS  Latanoprost QHS OD       Last edited by Portia Stevens MA on 8/3/2017  8:07 AM. (History)            Assessment /Plan     For exam results, see Encounter Report.    Postoperative follow-up    Endophthalmitis, left eye    Rhegmatogenous retinal detachment of left eye    Primary open angle glaucoma of both eyes, mild stage    Anomalous optic nerve    KCS (keratoconjunctivitis sicca)    Senile nuclear sclerosis, right               Glaucoma (type and duration)    Suspect 2/2 lrg cups ou    First HVF   2000   First photos   2013   Treatment / Drops started   xal 1/2016           Family history    none        Glaucoma meds   Latanoprost started 1/4/2016         H/O adverse rxn to glaucoma drops    none        LASERS    none        GLAUCOMA SURGERIES    none        OTHER EYE SURGERIES    none        CDR    0.75-0.8 / 0.7-0.75        Tbase    18-22 / 19-22          Tmax    22/22            Ttarget    ?              HVF    4 test 2000 to  2016 - near full od // ? NS os        Gonio    +2-3 ou         CCT    510/517        OCT    2 test 2013 to 2015 - RNFL - nl od // nl os        HRT    1 test 2016 to 2016 - MR - dec I od // wnl os (large disc size ou)         Disc photos    2013, 2015 - OIS     - Ttoday    ?? / 26 (1 day post CE / silicone oil removal   - Test done today    Post op cataract and SO removal       2. Anomaolous Optic nerves    - very large disc size ou    - HRT - 2.97 od // 3.04 os (nl is up to 2.8 ou)     3. Dry eyes     4. NS - OD    5. H/O endogenous endophthalmitis  And secondary tract RD - S/P PPVx and Silicone Oil (Mazzulla)    Oil removed at the same time as the phaco/IOL done 8/2/2017     S/P complex phaco/IOL os with trypan blue and Leslye ring and SO removal  (done with  brittnee ) // 8/2/2017// PCB00 19.5  Doing well  Begin Pred Acetate q 2 hours while awake   Begin vigamox QID  Shield at night  Glasses day  No lifting, no bending, no eye rubbing  F/U 1 week, AR/MR ou    Cosopt bid os   Latanoprost q hs - od       IOL calc OS  - use OD for measurements given SO  PCB00 19.5 D  AC IOL 16.0 D    Seen with retina fellow 8/3/2017   Is to F/U with Brittnee 5 days   F/U me 2 weeks       Use to see  dr pena but she is doing mostly children now - can stay in my clinic     I have seen and personally examined the patient.  I agree with the findings, assessment and plan of the resident and/or fellow.     Lynda Crawford MD

## 2017-08-08 ENCOUNTER — OFFICE VISIT (OUTPATIENT)
Dept: OPHTHALMOLOGY | Facility: CLINIC | Age: 77
End: 2017-08-08
Payer: COMMERCIAL

## 2017-08-08 VITALS — SYSTOLIC BLOOD PRESSURE: 140 MMHG | HEART RATE: 88 BPM | DIASTOLIC BLOOD PRESSURE: 84 MMHG

## 2017-08-08 DIAGNOSIS — H25.12 NS (NUCLEAR SCLEROSIS), LEFT: ICD-10-CM

## 2017-08-08 DIAGNOSIS — H44.002 ENDOPHTHALMITIS, LEFT EYE: ICD-10-CM

## 2017-08-08 DIAGNOSIS — H33.002 RETINAL DETACHMENT, RHEGMATOGENOUS, LEFT EYE: Primary | ICD-10-CM

## 2017-08-08 PROCEDURE — 99999 PR PBB SHADOW E&M-EST. PATIENT-LVL III: CPT | Mod: PBBFAC,,, | Performed by: OPHTHALMOLOGY

## 2017-08-08 PROCEDURE — 99024 POSTOP FOLLOW-UP VISIT: CPT | Mod: S$GLB,,, | Performed by: OPHTHALMOLOGY

## 2017-08-08 NOTE — PROGRESS NOTES
HPI     5 day po Combined Phaco IOL and 25g PPV/SO removal OS 8/2/17     Pt sts no pain in eye vision seems to be doing okay.    MEDS:   PA Q2h  Vig QID  Oint PM  Cosopt BID OS  Latanoprost QHS OD             A/P  1. Rhegmatogenous Retinal Detachment OS s/p 25g PPV/EL/AFx/Silicone Oil on 3/16/17  - retina flat, IOP better on cosopt BID OS  -PF Q day OS      S/p  25g PPV/SO removal/phaco IOL combined with KL 8/2/17    Doing well, good IOP    Continue gtts QID x 2 days   Then Taper PF 3/2/1/0  Cosopt BID  Latanoprost OD    Sees MAHENDRA on 8/24/17      Me 6 weeks OCT    2. 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 NS OD  PCIOL OS    4. POAG OU  On Latanoprost OD qam  Started on Cosopt BID OS 03/2017, IOP improved (30 prior to starting COsopt)  continue current gtts    Patient had surgery on adrenal mass 03/22/2017

## 2017-08-22 ENCOUNTER — TELEPHONE (OUTPATIENT)
Dept: INTERNAL MEDICINE | Facility: CLINIC | Age: 77
End: 2017-08-22

## 2017-08-22 NOTE — TELEPHONE ENCOUNTER
----- Message from Gema Hrakins sent at 8/22/2017  2:47 PM CDT -----  Doctor appointment and lab have been scheduled.  Please link lab orders to the lab appointment.  Date of doctor appointment:  09/06/17  Physical or EP:  epp  Date of lab appointment:  08/29/17  Comments:

## 2017-08-24 ENCOUNTER — OFFICE VISIT (OUTPATIENT)
Dept: OPHTHALMOLOGY | Facility: CLINIC | Age: 77
End: 2017-08-24
Payer: COMMERCIAL

## 2017-08-24 DIAGNOSIS — Z48.810 POSTOPERATIVE CARE FOR CATARACT: Primary | ICD-10-CM

## 2017-08-24 DIAGNOSIS — H40.1131 PRIMARY OPEN ANGLE GLAUCOMA OF BOTH EYES, MILD STAGE: ICD-10-CM

## 2017-08-24 DIAGNOSIS — H25.12 NS (NUCLEAR SCLEROSIS), LEFT: ICD-10-CM

## 2017-08-24 DIAGNOSIS — Z98.49 POSTOPERATIVE CARE FOR CATARACT: Primary | ICD-10-CM

## 2017-08-24 DIAGNOSIS — M35.01 KCS (KERATOCONJUNCTIVITIS SICCA): ICD-10-CM

## 2017-08-24 DIAGNOSIS — H44.002 ENDOPHTHALMITIS, LEFT EYE: ICD-10-CM

## 2017-08-24 DIAGNOSIS — H33.002 RETINAL DETACHMENT, RHEGMATOGENOUS, LEFT EYE: ICD-10-CM

## 2017-08-24 DIAGNOSIS — Q07.8 ANOMALOUS OPTIC NERVE: ICD-10-CM

## 2017-08-24 DIAGNOSIS — H33.002 RHEGMATOGENOUS RETINAL DETACHMENT OF LEFT EYE: ICD-10-CM

## 2017-08-24 DIAGNOSIS — H25.11 SENILE NUCLEAR SCLEROSIS, RIGHT: ICD-10-CM

## 2017-08-24 DIAGNOSIS — Z96.1 PSEUDOPHAKIA: ICD-10-CM

## 2017-08-24 PROCEDURE — 99999 PR PBB SHADOW E&M-EST. PATIENT-LVL II: CPT | Mod: PBBFAC,,, | Performed by: OPHTHALMOLOGY

## 2017-08-24 PROCEDURE — 99024 POSTOP FOLLOW-UP VISIT: CPT | Mod: S$GLB,,, | Performed by: OPHTHALMOLOGY

## 2017-08-24 RX ORDER — PREDNISOLONE ACETATE 10 MG/ML
1 SUSPENSION/ DROPS OPHTHALMIC 4 TIMES DAILY
Qty: 10 ML | Refills: 2 | Status: SHIPPED | OUTPATIENT
Start: 2017-08-24 | End: 2017-10-03 | Stop reason: SDUPTHER

## 2017-08-24 NOTE — PROGRESS NOTES
HPI     po Combined Phaco IOL and 25g PPV/SO removal OS 8/2/17    Pt here for post op check OS.  Pt states doing well.  Pt denies pain OS.          MEDS:   PA Q2h OS  Cosopt BID OS  Latanoprost QHS OD        Last edited by Lynda Crawford MD on 8/24/2017  9:45 AM. (History)            Assessment /Plan     For exam results, see Encounter Report.    Postoperative care for cataract  -     prednisoLONE acetate (PRED FORTE) 1 % DrpS; Place 1 drop into the left eye 4 (four) times daily.  Dispense: 10 mL; Refill: 2    Retinal detachment, rhegmatogenous, left eye    Endophthalmitis, left eye    NS (nuclear sclerosis), left    Rhegmatogenous retinal detachment of left eye    Primary open angle glaucoma of both eyes, mild stage    Anomalous optic nerve    KCS (keratoconjunctivitis sicca)    Senile nuclear sclerosis, right    Pseudophakia               Glaucoma (type and duration)    Suspect 2/2 lrg cups ou    First HVF   2000   First photos   2013   Treatment / Drops started   xal 1/2016           Family history    none        Glaucoma meds   Latanoprost started 1/4/2016         H/O adverse rxn to glaucoma drops    none        LASERS    none        GLAUCOMA SURGERIES    none        OTHER EYE SURGERIES    none        CDR    0.75-0.8 / 0.7-0.75        Tbase    18-22 / 19-22          Tmax    22/22            Ttarget    ?              HVF    4 test 2000 to  2016 - near full od // ? NS os        Gonio    +2-3 ou         CCT    510/517        OCT    2 test 2013 to 2015 - RNFL - nl od // nl os        HRT    1 test 2016 to 2016 - MR - dec I od // wnl os (large disc size ou)         Disc photos    2013, 2015 - OIS     - Ttoday    ?? / 26 (1 day post CE / silicone oil removal   - Test done today    Post op cataract and SO removal       2. Anomaolous Optic nerves    - very large disc size ou    - HRT - 2.97 od // 3.04 os (nl is up to 2.8 ou)     3. Dry eyes     4. NS - OD    5. H/O endogenous endophthalmitis  And secondary tract RD  - S/P PPVx and Silicone Oil (Mazzulla)    Oil removed at the same time as the phaco/IOL done 8/2/2017     S/P complex phaco/IOL os with trypan blue and Leslye ring and SO removal  (done with mazzulla ) // 8/2/2017// PCB00 19.5  Doing well     Pred Acetate q 2 hours while awake // decrease to qid - still with significant iritis  Cosopt bid os   Latanoprost q hs - od       IOL calc OS  - use OD for measurements given SO  PCB00 19.5 D  AC IOL 16.0 D    F/U 1 month - AR/MR ou   Will need very slow taper of steroids - still with +3 C&F 8/24/2017  Keep F/U with Mazzulla     Use to see  dr pena but she is doing mostly children now - can stay in my clinic     I have seen and personally examined the patient.  I agree with the findings, assessment and plan of the resident and/or fellow.     Lynda Crawford MD

## 2017-08-29 ENCOUNTER — LAB VISIT (OUTPATIENT)
Dept: LAB | Facility: HOSPITAL | Age: 77
End: 2017-08-29
Attending: INTERNAL MEDICINE
Payer: COMMERCIAL

## 2017-08-29 DIAGNOSIS — R94.4 DECREASED GFR: ICD-10-CM

## 2017-08-29 LAB
ALBUMIN SERPL BCP-MCNC: 3.6 G/DL
ALP SERPL-CCNC: 73 U/L
ALT SERPL W/O P-5'-P-CCNC: 17 U/L
ANION GAP SERPL CALC-SCNC: 8 MMOL/L
AST SERPL-CCNC: 19 U/L
BASOPHILS # BLD AUTO: 0.02 K/UL
BASOPHILS NFR BLD: 0.4 %
BILIRUB SERPL-MCNC: 0.7 MG/DL
BUN SERPL-MCNC: 25 MG/DL
CALCIUM SERPL-MCNC: 9.5 MG/DL
CHLORIDE SERPL-SCNC: 109 MMOL/L
CO2 SERPL-SCNC: 24 MMOL/L
CREAT SERPL-MCNC: 0.8 MG/DL
DIFFERENTIAL METHOD: ABNORMAL
EOSINOPHIL # BLD AUTO: 0.1 K/UL
EOSINOPHIL NFR BLD: 2.8 %
ERYTHROCYTE [DISTWIDTH] IN BLOOD BY AUTOMATED COUNT: 13.5 %
EST. GFR  (AFRICAN AMERICAN): >60 ML/MIN/1.73 M^2
EST. GFR  (NON AFRICAN AMERICAN): >60 ML/MIN/1.73 M^2
GLUCOSE SERPL-MCNC: 93 MG/DL
HCT VFR BLD AUTO: 35.7 %
HGB BLD-MCNC: 12.2 G/DL
LYMPHOCYTES # BLD AUTO: 2.2 K/UL
LYMPHOCYTES NFR BLD: 44 %
MCH RBC QN AUTO: 31.6 PG
MCHC RBC AUTO-ENTMCNC: 34.2 G/DL
MCV RBC AUTO: 93 FL
MONOCYTES # BLD AUTO: 0.4 K/UL
MONOCYTES NFR BLD: 8.9 %
NEUTROPHILS # BLD AUTO: 2.2 K/UL
NEUTROPHILS NFR BLD: 43.9 %
PLATELET # BLD AUTO: 197 K/UL
PMV BLD AUTO: 11.9 FL
POTASSIUM SERPL-SCNC: 3.9 MMOL/L
PROT SERPL-MCNC: 7.1 G/DL
RBC # BLD AUTO: 3.86 M/UL
SODIUM SERPL-SCNC: 141 MMOL/L
WBC # BLD AUTO: 4.95 K/UL

## 2017-08-29 PROCEDURE — 80053 COMPREHEN METABOLIC PANEL: CPT

## 2017-08-29 PROCEDURE — 85025 COMPLETE CBC W/AUTO DIFF WBC: CPT

## 2017-08-29 PROCEDURE — 36415 COLL VENOUS BLD VENIPUNCTURE: CPT

## 2017-09-06 ENCOUNTER — OFFICE VISIT (OUTPATIENT)
Dept: INTERNAL MEDICINE | Facility: CLINIC | Age: 77
End: 2017-09-06
Payer: COMMERCIAL

## 2017-09-06 VITALS
OXYGEN SATURATION: 98 % | WEIGHT: 152.31 LBS | HEIGHT: 67 IN | HEART RATE: 73 BPM | DIASTOLIC BLOOD PRESSURE: 64 MMHG | BODY MASS INDEX: 23.91 KG/M2 | SYSTOLIC BLOOD PRESSURE: 122 MMHG

## 2017-09-06 DIAGNOSIS — Z86.718: ICD-10-CM

## 2017-09-06 DIAGNOSIS — C80.1 CANCER: ICD-10-CM

## 2017-09-06 DIAGNOSIS — Z00.00 PHYSICAL EXAM: Primary | ICD-10-CM

## 2017-09-06 DIAGNOSIS — E78.00 PURE HYPERCHOLESTEROLEMIA: ICD-10-CM

## 2017-09-06 DIAGNOSIS — I10 ESSENTIAL HYPERTENSION: ICD-10-CM

## 2017-09-06 PROBLEM — R94.4 DECREASED GFR: Status: RESOLVED | Noted: 2017-06-28 | Resolved: 2017-09-06

## 2017-09-06 PROCEDURE — 99999 PR PBB SHADOW E&M-EST. PATIENT-LVL III: CPT | Mod: PBBFAC,,, | Performed by: INTERNAL MEDICINE

## 2017-09-06 PROCEDURE — 99397 PER PM REEVAL EST PAT 65+ YR: CPT | Mod: S$GLB,,, | Performed by: INTERNAL MEDICINE

## 2017-09-06 NOTE — PROGRESS NOTES
Subjective:      Patient ID: Ida Santana is a 77 y.o. female.    Chief Complaint: Annual Exam    HPI:  HPI     Annual exam: 9/6/2017  Colonoscopy: 2005 follow up in 10 years : 10/31/2016  Mammogram: 11/10/2016  Gyn: Arabella Guallpa  10/8/2016 follow up  Optho: Dr. Boswell /Yvette 9/9/2016: follow up open angle glaucoma follow up 6 months: Recent eye surgery  Flu: advised  Tetanus :9/26/2016  Shingles: 10/30/2015  Pneumovax: done at age 73: documented  Prevnar: 9/23/2015  Bone Density:1/2017     Consultants  Dr. Yvette Dumont: 1/6/2017 retired  Dr. 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    Open angle 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 eyes    Osteopenia    Neuropathy    Conjunctivitis    Retinal detachment of left eye with retinal break    Endophthalmitis, left eye    Rhegmatogenous retinal detachment of left eye    Left adrenal mass removed 3/2017    NS (nuclear sclerosis)    Hx of renal vein thrombosis    Retinal detachment, rhegmatogenous, left eye     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    Cataract     History of uterine fibroid     Hyperlipidemia     dyslipidemia    Hypertension     MRSA infection     Open angle 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 eyes 5/2/2013    Osteopenia     Potassium depletion 1998    Retinal detachment of left eye with retinal break 3/13/2017     Past Surgical History:   Procedure Laterality Date    BREAST BIOPSY  2001    left breast DCIS    BREAST SURGERY  9/2001    left lumpectomy with SLN bx    CATARACT EXTRACTION W/  INTRAOCULAR LENS IMPLANT Left 08/02/2017    with PPV/SO removal ( AND )    COLONOSCOPY      COLONOSCOPY  "N/A 10/31/2016    Procedure: COLONOSCOPY;  Surgeon: YAMILETH Richards MD;  Location: [facility] ([address] [place] [address]);  Service: Endoscopy;  Laterality: N/A;    KIDNEY SURGERY  03/27/2017    patient does not know what they did, but knows she does not have kidney cancer    PARS PLANA VITRECTOMY Left 08/02/2017    25g PPV WITH SO REMOVAL AND PHACO IOL ( AND )    RETINAL DETACHMENT SURGERY Left 08/02/2017    WITH CE ( AND )     Family History   Problem Relation Age of Onset    Cancer Mother      breast    Breast cancer Mother      40s and again in 50s (bilateral)    Breast cancer Other     Heart disease Father      bypass    Cancer Father      asbestos    Stroke Brother     Ovarian cancer Neg Hx     Amblyopia Neg Hx     Blindness Neg Hx     Cataracts Neg Hx     Diabetes Neg Hx     Glaucoma Neg Hx     Hypertension Neg Hx     Macular degeneration Neg Hx     Retinal detachment Neg Hx     Strabismus Neg Hx     Thyroid disease Neg Hx     Colon cancer Neg Hx      Review of Systems   Constitutional: Negative for chills, fever and unexpected weight change.   HENT: Negative for trouble swallowing.    Respiratory: Negative for cough, shortness of breath and wheezing.    Cardiovascular: Negative for chest pain and palpitations.   Gastrointestinal: Negative for abdominal distention, abdominal pain, blood in stool and vomiting.   Musculoskeletal: Negative for back pain.     Objective:     Vitals:    09/06/17 0922   BP: 122/64   Pulse: 73   SpO2: 98%   Weight: 69.1 kg (152 lb 5.4 oz)   Height: 5' 7" (1.702 m)   PainSc: 0-No pain     Body mass index is 23.86 kg/m².  Physical Exam   Constitutional: She is oriented to person, place, and time. She appears well-developed and well-nourished. No distress.   Neck: Carotid bruit is not present. No thyromegaly present.   Cardiovascular: Normal rate, regular rhythm and normal heart sounds.  PMI is not displaced.    Pulmonary/Chest: Effort " normal and breath sounds normal. No respiratory distress.   Abdominal: Soft. Bowel sounds are normal. She exhibits no distension. There is no tenderness.   Musculoskeletal: She exhibits no edema.   Neurological: She is alert and oriented to person, place, and time.     Assessment:     1. Physical exam    2. Essential hypertension    3. Cancer    4. Hx of renal vein thrombosis    5. Pure hypercholesterolemia      Plan:   Ida was seen today for annual exam.    Diagnoses and all orders for this visit:    Physical exam: Updated and reviewed    Essential hypertension: Well controlled continue the same medication  -     CBC auto differential; Future  -     Comprehensive metabolic panel; Future    Cancer: Mammogram ordered for follow-up of breast cancer    Hx of renal vein thrombosis: Off of anticoagulants    Pure hypercholesterolemia: Continues on statin    Return in about 6 months (around 3/6/2018) for Follow up in 6 months.         Medication List          Accurate as of 9/6/17 10:13 AM. If you have any questions, ask your nurse or doctor.               CONTINUE taking these medications    aspirin 81 MG EC tablet  Commonly known as:  ECOTRIN     CORDRAN 0.05 % lotion  Generic drug:  flurandrenolide     dorzolamide-timolol 2-0.5% 22.3-6.8 mg/mL ophthalmic solution  Commonly known as:  COSOPT  Place 1 drop into the left eye 2 (two) times daily.     ketoconazole 2 % shampoo  Commonly known as:  NIZORAL  USE EVERY OTHER DAY LET SIT ON SCALP FOR FIVE MINUTES BEFORE RINSING     latanoprost 0.005 % ophthalmic solution     omeg-3-epa-dha-fish oil-flax-E [phone] bi-nh-mq-unit Cap  Commonly known as:  THERA TEARS NUTRITION  Take 3 capsules by mouth every morning. Over-the-counter supplement     ondansetron 4 MG tablet  Commonly known as:  ZOFRAN  Take 1 tablet (4 mg total) by mouth every 8 (eight) hours as needed for Nausea.     ONE-A-DAY 50 PLUS tablet  Generic drug:  geriatric multivitamin-min      oxycodone-acetaminophen 5-325 mg per tablet  Commonly known as:  PERCOCET  Take 1 tablet by mouth every 6 (six) hours as needed for Pain.     polyethylene glycol 17 gram Pwpk  Commonly known as:  GLYCOLAX  Take 17 g by mouth once daily.     prednisoLONE acetate 1 % Drps  Commonly known as:  PRED FORTE  Place 1 drop into the left eye 4 (four) times daily.     valsartan 320 MG tablet  Commonly known as:  DIOVAN  Take 1 tablet (320 mg total) by mouth once daily. Stop the [id number]/hct 12.5     verapamil 240 MG CR tablet  Commonly known as:  CALAN-SR  take 1 tablet by mouth twice a day

## 2017-09-19 ENCOUNTER — TELEPHONE (OUTPATIENT)
Dept: INTERNAL MEDICINE | Facility: CLINIC | Age: 77
End: 2017-09-19

## 2017-09-19 NOTE — TELEPHONE ENCOUNTER
----- Message from Linh Fonseca sent at 9/19/2017  8:35 AM CDT -----  Contact: [phone]  Patient would like to get a referral.  Does the patient already have the specialty clinic appointment scheduled:  no  If yes, what date is the appointment scheduled:     Referral to what specialty:  mammo  Reason (be specific):  annual  Does the patient want the referral with a specific physician:  no  Is this an [facility]:    Comments:

## 2017-09-22 ENCOUNTER — OFFICE VISIT (OUTPATIENT)
Dept: OPHTHALMOLOGY | Facility: CLINIC | Age: 77
End: 2017-09-22
Payer: COMMERCIAL

## 2017-09-22 DIAGNOSIS — H44.002 ENDOPHTHALMITIS, LEFT EYE: ICD-10-CM

## 2017-09-22 DIAGNOSIS — H33.002 RHEGMATOGENOUS RETINAL DETACHMENT OF LEFT EYE: Primary | ICD-10-CM

## 2017-09-22 PROCEDURE — 99024 POSTOP FOLLOW-UP VISIT: CPT | Mod: S$GLB,,, | Performed by: OPHTHALMOLOGY

## 2017-09-22 PROCEDURE — 99999 PR PBB SHADOW E&M-EST. PATIENT-LVL III: CPT | Mod: PBBFAC,,, | Performed by: OPHTHALMOLOGY

## 2017-09-22 NOTE — PROGRESS NOTES
HPI     Mrs. Prieto is here today for a 6 week post-op visit. She states she is not having any problems other than dry eyes which she thinks could be due to the drops she uses. She denies any eye pain or irritation. She is using Cosopt OS BID, Prednisone OS QID, and Latanoprost OD once daily.      Last edited by Tiffanie Tirado, PCT on 9/22/2017  9:01 AM. (History)        HPI     5 day po Combined Phaco IOL and 25g PPV/SO removal OS 8/2/17     Pt sts no pain in eye vision seems to be doing okay.    MEDS:   PA Q2h  Vig QID  Oint PM  Cosopt BID OS  Latanoprost QHS OD             A/P  1. Rhegmatogenous Retinal Detachment OS s/p 25g PPV/EL/AFx/Silicone Oil on 3/16/17  - retina flat, IOP better on cosopt BID OS  -PF Q day OS      S/p  25g PPV/SO removal/phaco IOL combined with KL 8/2/17    Doing well, good IOP  AC rxn much improved, mostly macrophages and pigment.  Ok for YAG when stable - some cortex with emulsified oil adherent to PC    Slow taper of PF 3/2/1/0 q 2 weeks    continue  Cosopt BID  Latanoprost OD    Sees MAHENDRA on 10/3/17      Me 3 months OCT    2. 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 NS OD  PCIOL OS    4. POAG OU  On Latanoprost OD qam  Started on Cosopt BID OS 03/2017, IOP improved (30 prior to starting COsopt)  continue current gtts    Patient had surgery on adrenal mass 03/22/2017

## 2017-09-29 ENCOUNTER — TELEPHONE (OUTPATIENT)
Dept: OBSTETRICS AND GYNECOLOGY | Facility: CLINIC | Age: 77
End: 2017-09-29

## 2017-09-29 NOTE — TELEPHONE ENCOUNTER
----- Message from Leilani Jarvis sent at 9/28/2017 11:30 AM CDT -----  _x  1st Request  _  2nd Request  _  3rd Request        Who: garret    Why: pt. Would like to schedule for [facility]. Please call to discuss    What Number to Call Back:[phone]    When to Expect a call back: (Before the end of the day)   -- if the call is after 12:00, the call back will be tomorrow.

## 2017-09-29 NOTE — TELEPHONE ENCOUNTER
Patient scheduled for her annual exam on 10/24/2017. Patient notified that the appointment will be at the [place] office. Patient verbalized understanding.

## 2017-10-03 ENCOUNTER — OFFICE VISIT (OUTPATIENT)
Dept: OPHTHALMOLOGY | Facility: CLINIC | Age: 77
End: 2017-10-03
Payer: COMMERCIAL

## 2017-10-03 DIAGNOSIS — H33.002 RETINAL DETACHMENT, RHEGMATOGENOUS, LEFT EYE: ICD-10-CM

## 2017-10-03 DIAGNOSIS — H40.1131 PRIMARY OPEN ANGLE GLAUCOMA OF BOTH EYES, MILD STAGE: ICD-10-CM

## 2017-10-03 DIAGNOSIS — Z98.49 POSTOPERATIVE CARE FOR CATARACT: Primary | ICD-10-CM

## 2017-10-03 DIAGNOSIS — Z48.810 POSTOPERATIVE CARE FOR CATARACT: Primary | ICD-10-CM

## 2017-10-03 DIAGNOSIS — H44.002 ENDOPHTHALMITIS, LEFT EYE: ICD-10-CM

## 2017-10-03 DIAGNOSIS — H33.002 RHEGMATOGENOUS RETINAL DETACHMENT OF LEFT EYE: ICD-10-CM

## 2017-10-03 PROCEDURE — 99024 POSTOP FOLLOW-UP VISIT: CPT | Mod: S$GLB,,, | Performed by: OPHTHALMOLOGY

## 2017-10-03 PROCEDURE — 99999 PR PBB SHADOW E&M-EST. PATIENT-LVL II: CPT | Mod: PBBFAC,,, | Performed by: OPHTHALMOLOGY

## 2017-10-03 RX ORDER — PREDNISOLONE ACETATE 10 MG/ML
1 SUSPENSION/ DROPS OPHTHALMIC 2 TIMES DAILY
Qty: 10 ML | Refills: 0 | Status: SHIPPED | OUTPATIENT
Start: 2017-10-03 | End: 2017-11-07 | Stop reason: SDUPTHER

## 2017-10-03 NOTE — PROGRESS NOTES
HPI     Post-op Evaluation    Additional comments: Pt states no changes since last exam           Comments   S/p Complex Phaco IOL and 25g PPV/SO removal OS 8/2/17    MEDS:    PA TID OS  Cosopt BID OS  Latanoprost QHS OD       Last edited by Lynda Crawford MD on 10/3/2017 11:01 AM. (History)            Assessment /Plan     For exam results, see Encounter Report.    Postoperative care for cataract    Primary open angle glaucoma of both eyes, mild stage    Rhegmatogenous retinal detachment of left eye    Endophthalmitis, left eye    Retinal detachment, rhegmatogenous, left eye           Glaucoma (type and duration)    Suspect 2/2 lrg cups ou    First HVF   2000   First photos   2013   Treatment / Drops started   xal 1/2016           Family history    none        Glaucoma meds   Latanoprost started 1/4/2016         H/O adverse rxn to glaucoma drops    none        LASERS    none        GLAUCOMA SURGERIES    none        OTHER EYE SURGERIES    none        CDR    0.75-0.8 / 0.7-0.75        Tbase    18-22 / 19-22          Tmax    22/22            Ttarget    ?              HVF    4 test 2000 to  2016 - near full od // ? NS os        Gonio    +2-3 ou         CCT    510/517        OCT    2 test 2013 to 2015 - RNFL - nl od // nl os        HRT    1 test 2016 to 2016 - MR - dec I od // wnl os (large disc size ou)         Disc photos    2013, 2015 - OIS     - Ttoday    ?? / 26 (1 day post CE / silicone oil removal   - Test done today    Post op cataract and SO removal       2. Anomaolous Optic nerves    - very large disc size ou    - HRT - 2.97 od // 3.04 os (nl is up to 2.8 ou)     3. Dry eyes     4. NS - OD    5. H/O endogenous endophthalmitis  And secondary tract RD - S/P PPVx and Silicone Oil (Mazzulla)    Oil removed at the same time as the phaco/IOL done 8/2/2017     S/P complex phaco/IOL os with trypan blue and Leslye ring and SO removal  (done with mazzulla ) // 8/2/2017// PCB00 19.5  Doing well     Pred Acetate  decrease to bid  - still with iritis   Cosopt bid os   Latanoprost q hs - od       F/U 1 month - AR/MR ou - post op    Will need very slow taper of steroids - still with +1 C&F 10/3/17  Keep F/U with Mazzulla     Use to see  dr pena but she is doing mostly children now - can stay in my clinic     I have seen and personally examined the patient.  I agree with the findings, assessment and plan of the resident and/or fellow.     Lynda Crawford MD

## 2017-10-04 ENCOUNTER — TELEPHONE (OUTPATIENT)
Dept: DERMATOLOGY | Facility: CLINIC | Age: 77
End: 2017-10-04

## 2017-10-04 NOTE — TELEPHONE ENCOUNTER
----- Message from Mini Jarvis sent at 10/4/2017  2:10 PM CDT -----  Contact: patient  Please call above patient need to get in soon has boils from shaving with a razor waiting on a call from the nurse

## 2017-10-04 NOTE — TELEPHONE ENCOUNTER
Spoke to patient.[facility] appt for a cyst/bump patient concern about requested Dr. Iglesias due to seeing her in the past years ago, does not want to see a Nurse Practitioner.Sent reminder to pt.

## 2017-10-12 ENCOUNTER — OFFICE VISIT (OUTPATIENT)
Dept: DERMATOLOGY | Facility: CLINIC | Age: 77
End: 2017-10-12
Payer: COMMERCIAL

## 2017-10-12 DIAGNOSIS — L02.429 FURUNCULOSIS OF AXILLA: Primary | ICD-10-CM

## 2017-10-12 PROCEDURE — 99999 PR PBB SHADOW E&M-EST. PATIENT-LVL II: CPT | Mod: PBBFAC,,, | Performed by: DERMATOLOGY

## 2017-10-12 PROCEDURE — 87077 CULTURE AEROBIC IDENTIFY: CPT

## 2017-10-12 PROCEDURE — 87070 CULTURE OTHR SPECIMN AEROBIC: CPT

## 2017-10-12 PROCEDURE — 11900 INJECT SKIN LESIONS </W 7: CPT | Mod: 51,S$GLB,, | Performed by: DERMATOLOGY

## 2017-10-12 PROCEDURE — 99201 PR OFFICE/OUTPT VISIT,NEW,LEVL I: CPT | Mod: 25,S$GLB,, | Performed by: DERMATOLOGY

## 2017-10-12 PROCEDURE — 87186 SC STD MICRODIL/AGAR DIL: CPT

## 2017-10-12 PROCEDURE — 10060 I&D ABSCESS SIMPLE/SINGLE: CPT | Mod: S$GLB,,, | Performed by: DERMATOLOGY

## 2017-10-12 RX ORDER — DOXYCYCLINE 100 MG/1
TABLET ORAL
Qty: 60 TABLET | Refills: 0 | Status: SHIPPED | OUTPATIENT
Start: 2017-10-12 | End: 2017-11-07 | Stop reason: ALTCHOICE

## 2017-10-12 NOTE — PATIENT INSTRUCTIONS
Patient instructed to perform warm compresses 2 - 3 times/daily.    Wash daily with hibiclens

## 2017-10-12 NOTE — PROGRESS NOTES
Subjective:       Patient ID:  Ida Santana is a 77 y.o. female who presents for   Chief Complaint   Patient presents with    Lesion     under right axilla     No h/o boils under arms   Has had 1 boil left groin many years ago      Lesion  - Initial  Affected locations: right axilla  Duration: 2 weeks  Signs / symptoms: tender and redness  Timing: started after shaving with new razor. unsure if cut skin.  Treatments tried: warm compresses and witch hazel and antibac soap.  Improvement on treatment: mild        Review of Systems   Constitutional: Negative for fever and chills.   Skin: Negative for itching and rash.        Objective:    Physical Exam   Constitutional: She appears well-developed and well-nourished. No distress.   Neurological: She is alert and oriented to person, place, and time. She is not disoriented.   Psychiatric: She has a normal mood and affect.   Skin:   Areas Examined (abnormalities noted in diagram):   Chest / Axilla Inspection Performed              Diagram Legend     Erythematous scaling macule/papule c/w actinic keratosis       Vascular papule c/w angioma      Pigmented verrucoid papule/plaque c/w seborrheic keratosis      Yellow umbilicated papule c/w sebaceous hyperplasia      Irregularly shaped tan macule c/w lentigo     1-2 mm smooth white papules consistent with Milia      Movable subcutaneous cyst with punctum c/w epidermal inclusion cyst      Subcutaneous movable cyst c/w pilar cyst      Firm pink to brown papule c/w dermatofibroma      Pedunculated fleshy papule(s) c/w skin tag(s)      Evenly pigmented macule c/w junctional nevus     Mildly variegated pigmented, slightly irregular-bordered macule c/w mildly atypical nevus      Flesh colored to evenly pigmented papule c/w intradermal nevus       Pink pearly papule/plaque c/w basal cell carcinoma      Erythematous hyperkeratotic cursted plaque c/w SCC      Surgical scar with no sign of skin cancer recurrence      Open and closed  comedones      Inflammatory papules and pustules      Verrucoid papule consistent consistent with wart     Erythematous eczematous patches and plaques     Dystrophic onycholytic nail with subungual debris c/w onychomycosis     Umbilicated papule    Erythematous-base heme-crusted tan verrucoid plaque consistent with inflamed seborrheic keratosis     Erythematous Silvery Scaling Plaque c/w Psoriasis     See annotation      Assessment / Plan:        Furunculosis of axilla - right  Lesion incised with #11 blade and drained on today's date. Bacterial culture performed.    Patient instructed to perform warm compresses 2 - 3 times/daily.  Wash daily with hibiclens     -     doxycycline monohydrate 100 mg Tab; Take 1 po bid  Dispense: 60 tablet; Refill: 0    Intralesional Kenalog 5mg/cc (1.0 cc total) injected into 6 lesions on the right axilla today after obtaining verbal consent including risk of surrounding hypopigmentation. Patient tolerated procedure well.               Return in about 4 weeks (around 11/9/2017).

## 2017-10-14 LAB — BACTERIA SPEC AEROBE CULT: NORMAL

## 2017-10-19 RX ORDER — KETOCONAZOLE 20 MG/ML
SHAMPOO, SUSPENSION TOPICAL
Qty: 120 ML | Refills: 6 | Status: SHIPPED | OUTPATIENT
Start: 2017-10-19 | End: 2020-04-30 | Stop reason: SDUPTHER

## 2017-10-20 ENCOUNTER — TELEPHONE (OUTPATIENT)
Dept: OBSTETRICS AND GYNECOLOGY | Facility: CLINIC | Age: 77
End: 2017-10-20

## 2017-10-20 NOTE — TELEPHONE ENCOUNTER
Called patient. No answer. Left message for patient to call the office.     Patient needs to be rescheduled.

## 2017-10-27 ENCOUNTER — OFFICE VISIT (OUTPATIENT)
Dept: OBSTETRICS AND GYNECOLOGY | Facility: CLINIC | Age: 77
End: 2017-10-27
Payer: COMMERCIAL

## 2017-10-27 VITALS — SYSTOLIC BLOOD PRESSURE: 136 MMHG | DIASTOLIC BLOOD PRESSURE: 74 MMHG | WEIGHT: 152 LBS | BODY MASS INDEX: 23.81 KG/M2

## 2017-10-27 DIAGNOSIS — Z01.419 ENCOUNTER FOR GYNECOLOGICAL EXAMINATION WITHOUT ABNORMAL FINDING: Primary | ICD-10-CM

## 2017-10-27 DIAGNOSIS — Z85.3 HISTORY OF BREAST CANCER: ICD-10-CM

## 2017-10-27 DIAGNOSIS — Z78.0 POSTMENOPAUSAL: ICD-10-CM

## 2017-10-27 PROCEDURE — 99999 PR PBB SHADOW E&M-EST. PATIENT-LVL III: CPT | Mod: PBBFAC,,, | Performed by: OBSTETRICS & GYNECOLOGY

## 2017-10-27 PROCEDURE — 99397 PER PM REEVAL EST PAT 65+ YR: CPT | Mod: S$GLB,,, | Performed by: OBSTETRICS & GYNECOLOGY

## 2017-10-27 NOTE — PROGRESS NOTES
CC: Well woman exam    Ida Santana is a 77 y.o. female  presents for a well woman exam.  LMP: No LMP recorded (lmp unknown). Patient is postmenopausal..  No GYN issues, problems, or complaints.      Past Medical History:   Diagnosis Date    Cancer 2001    ductal carcinoma in situ left breast 2001    Cataract     History of uterine fibroid     Hyperlipidemia     dyslipidemia    Hypertension     MRSA infection     Open angle 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 eyes 2013    Osteopenia     Potassium depletion     Retinal detachment of left eye with retinal break 3/13/2017     Past Surgical History:   Procedure Laterality Date    BREAST BIOPSY      left breast DCIS    BREAST SURGERY  2001    left lumpectomy with SLN bx    CATARACT EXTRACTION W/  INTRAOCULAR LENS IMPLANT Left 2017    with PPV/SO removal ( AND )    COLONOSCOPY      COLONOSCOPY N/A 10/31/2016    Procedure: COLONOSCOPY;  Surgeon: YAMILETH Richards MD;  Location: [facility] ([address] [place] [address]);  Service: Endoscopy;  Laterality: N/A;    KIDNEY SURGERY  2017    patient does not know what they did, but knows she does not have kidney cancer    PARS PLANA VITRECTOMY Left 2017    25g PPV WITH SO REMOVAL AND PHACO IOL ( AND )    RETINAL DETACHMENT SURGERY Left 2017    WITH CE ( AND )     Social History     Social History    Marital status: Single     Spouse name: N/A    Number of children: N/A    Years of education: N/A     Social History Main Topics    Smoking status: Never Smoker    Smokeless tobacco: Never Used    Alcohol use No    Drug use: No    Sexual activity: No     Other Topics Concern    None     Social History Narrative    Retired  (34 years) with [employer]     Family History   Problem Relation Age of Onset    Cancer Mother      breast    Breast cancer Mother      40s  and again in 50s (bilateral)    Breast cancer Other     Heart disease Father      bypass    Cancer Father      asbestos    Stroke Brother     Ovarian cancer Neg Hx     Amblyopia Neg Hx     Blindness Neg Hx     Cataracts Neg Hx     Diabetes Neg Hx     Glaucoma Neg Hx     Hypertension Neg Hx     Macular degeneration Neg Hx     Retinal detachment Neg Hx     Strabismus Neg Hx     Thyroid disease Neg Hx     Colon cancer Neg Hx     Melanoma Neg Hx      OB History      Para Term  AB Living    0              SAB TAB Ectopic Multiple Live Births                       /74   Wt 68.9 kg (152 lb)   LMP  (LMP Unknown)   BMI 23.81 kg/m²       ROS:    ROS:  GENERAL: Denies weight gain or weight loss. Feeling well overall.   SKIN: Denies rash or lesions.   HEAD: Denies head injury or headache.   NODES: Denies enlarged lymph nodes.   CHEST: Denies chest pain or shortness of breath.   CARDIOVASCULAR: Denies palpitations or left sided chest pain.   ABDOMEN: No abdominal pain, constipation, diarrhea, nausea, vomiting or rectal bleeding.   URINARY: No frequency, dysuria, hematuria, or burning on urination.  REPRODUCTIVE: See HPI.   BREASTS: The patient performs breast self-examination and denies pain, lumps, or nipple discharge.   HEMATOLOGIC: No easy bruisability or excessive bleeding.   MUSCULOSKELETAL: Denies joint pain or swelling.   NEUROLOGIC: Denies syncope or weakness.   PSYCHIATRIC: Denies depression, anxiety or mood swings.    PHYSICAL EXAM:    APPEARANCE: Well nourished, well developed, in no acute distress.  AFFECT: WNL, alert and oriented x 3  SKIN: No acne or hirsutism  NECK: Neck symmetric without masses or thyromegaly  NODES: No inguinal, cervical, axillary, or femoral lymph node enlargement  CHEST: Good respiratory effect  ABDOMEN: Soft.  No tenderness or masses.  No hepatosplenomegaly.  No hernias.  BREASTS: Symmetrical, no skin changes or visible lesions.  No palpable masses,  nipple discharge bilaterally.  PELVIC: Normal external genitalia without lesions.  Normal hair distribution.  Adequate perineal body, normal urethral meatus.  Vagina moist and well rugated without lesions or discharge.  Cervix pale pink, without lesions, discharge or tenderness.  No significant cystocele or rectocele.  Bimanual exam shows uterus to be normal size, regular, mobile and nontender.  Adnexa without masses or tenderness.    RECTAL: Rectovaginal exam confirms above with normal sphincter tone, no masses.  EXTREMITIES: No edema.    Diagnosis      ICD-10-CM ICD-9-CM    1. Encounter for gynecological examination without abnormal finding Z01.419 V72.31    2. Postmenopausal Z78.0 V49.81    3. History of breast cancer Z85.3 V10.3      Doing well    Patient was counseled today on A.C.S. Pap guidelines and recommendations for yearly pelvic exams, mammograms and monthly self breast exams; to see her PCP for other health maintenance.     F/U PRN

## 2017-10-30 ENCOUNTER — TELEPHONE (OUTPATIENT)
Dept: SURGERY | Facility: CLINIC | Age: 77
End: 2017-10-30

## 2017-10-30 DIAGNOSIS — C50.419 MALIGNANT NEOPLASM OF UPPER-OUTER QUADRANT OF FEMALE BREAST, UNSPECIFIED ESTROGEN RECEPTOR STATUS, UNSPECIFIED LATERALITY: Primary | ICD-10-CM

## 2017-10-30 NOTE — TELEPHONE ENCOUNTER
----- Message from Lucero Johnson sent at 10/30/2017  2:43 PM CDT -----  Contact: self  Ida States that she needs a call returned by a nurse in reference to needing appointment with Dr. Chisholm and also needs mammogram as well , Please call Ida  @ [phone].                                   Thanks :)

## 2017-11-07 ENCOUNTER — OFFICE VISIT (OUTPATIENT)
Dept: OPHTHALMOLOGY | Facility: CLINIC | Age: 77
End: 2017-11-07
Payer: COMMERCIAL

## 2017-11-07 DIAGNOSIS — H33.002 RHEGMATOGENOUS RETINAL DETACHMENT OF LEFT EYE: ICD-10-CM

## 2017-11-07 DIAGNOSIS — Z98.49 POSTOPERATIVE CARE FOR CATARACT: ICD-10-CM

## 2017-11-07 DIAGNOSIS — M35.01 KCS (KERATOCONJUNCTIVITIS SICCA): ICD-10-CM

## 2017-11-07 DIAGNOSIS — H44.002 ENDOPHTHALMITIS, LEFT EYE: ICD-10-CM

## 2017-11-07 DIAGNOSIS — H40.1131 PRIMARY OPEN ANGLE GLAUCOMA OF BOTH EYES, MILD STAGE: Primary | ICD-10-CM

## 2017-11-07 DIAGNOSIS — Q07.8 ANOMALOUS OPTIC NERVE: ICD-10-CM

## 2017-11-07 DIAGNOSIS — Z96.1 PSEUDOPHAKIA: ICD-10-CM

## 2017-11-07 DIAGNOSIS — Z48.810 POSTOPERATIVE CARE FOR CATARACT: ICD-10-CM

## 2017-11-07 DIAGNOSIS — H33.002 RETINAL DETACHMENT, RHEGMATOGENOUS, LEFT EYE: ICD-10-CM

## 2017-11-07 PROCEDURE — 99999 PR PBB SHADOW E&M-EST. PATIENT-LVL II: CPT | Mod: PBBFAC,,, | Performed by: OPHTHALMOLOGY

## 2017-11-07 PROCEDURE — 99024 POSTOP FOLLOW-UP VISIT: CPT | Mod: S$GLB,,, | Performed by: OPHTHALMOLOGY

## 2017-11-07 RX ORDER — PREDNISOLONE ACETATE 10 MG/ML
1 SUSPENSION/ DROPS OPHTHALMIC 2 TIMES DAILY
Qty: 10 ML | Refills: 1 | Status: SHIPPED | OUTPATIENT
Start: 2017-11-07 | End: 2018-03-23 | Stop reason: SDUPTHER

## 2017-11-07 RX ORDER — LATANOPROST 50 UG/ML
1 SOLUTION/ DROPS OPHTHALMIC NIGHTLY
Qty: 1 BOTTLE | Refills: 6 | Status: SHIPPED | OUTPATIENT
Start: 2017-11-07 | End: 2018-07-24 | Stop reason: SDUPTHER

## 2017-11-07 RX ORDER — DORZOLAMIDE HYDROCHLORIDE AND TIMOLOL MALEATE 20; 5 MG/ML; MG/ML
1 SOLUTION/ DROPS OPHTHALMIC 2 TIMES DAILY
Qty: 10 ML | Refills: 6 | Status: SHIPPED | OUTPATIENT
Start: 2017-11-07 | End: 2019-03-22 | Stop reason: SDUPTHER

## 2017-11-07 NOTE — PROGRESS NOTES
HPI     Post-op Evaluation    Additional comments: Pt states left eye is doing okay and has no   complaints today           Comments   S/p Complex Phaco IOL and PPV/SO removal OS 8/2/17    MEDS:    PA BID OS  Cosopt BID OS  Latanoprost QHS OD       Last edited by Portia Stevens MA on 11/7/2017  8:47 AM. (History)            Assessment /Plan     For exam results, see Encounter Report.    Primary open angle glaucoma of both eyes, mild stage    Rhegmatogenous retinal detachment of left eye    Endophthalmitis, left eye    Retinal detachment, rhegmatogenous, left eye    Anomalous optic nerve    KCS (keratoconjunctivitis sicca)    Pseudophakia             Glaucoma (type and duration)    Suspect 2/2 lrg cups ou    First HVF   2000   First photos   2013   Treatment / Drops started   xal 1/2016           Family history    none        Glaucoma meds   Latanoprost started 1/4/2016         H/O adverse rxn to glaucoma drops    none        LASERS    none        GLAUCOMA SURGERIES    none        OTHER EYE SURGERIES    none        CDR    0.75-0.8 / 0.7-0.75        Tbase    18-22 / 19-22          Tmax    22/22            Ttarget    ?              HVF    4 test 2000 to  2016 - near full od // ? NS os        Gonio    +2-3 ou         CCT    510/517        OCT    2 test 2013 to 2015 - RNFL - nl od // nl os        HRT    1 test 2016 to 2016 - MR - dec I od // wnl os (large disc size ou)         Disc photos    2013, 2015 - OIS     - Ttoday   15/15  - Test done today    Post op cataract and SO removal       2. Anomaolous Optic nerves    - very large disc size ou    - HRT - 2.97 od // 3.04 os (nl is up to 2.8 ou)     3. Dry eyes     4. NS - OD    5. H/O endogenous endophthalmitis  And secondary tract RD - S/P PPVx and Silicone Oil (Mazzulla)    Oil removed at the same time as the phaco/IOL done 8/2/2017     6. S/P complex phaco/IOL os with trypan blue and Leslye ring and SO removal     - (done with mazzulla ) // 8/2/2017// PCB00 19.5   - Doing  well - still with mild iritis      Pred Acetate decrease to bid  - still with iritis   Will need very slow taper of steroids -  Cosopt bid os   Latanoprost q hs - od         Keep F/U with Brittnee     Use to see  dr pena but she is doing mostly children now - can stay in my clinic     F/U 2 months - if eye quiet next time can cut back on steroid gtts os    Will be outside the post op period - update flow sheet if there is one     I have seen and personally examined the patient.  I agree with the findings, assessment and plan of the resident and/or fellow.     Lynda Crawford MD

## 2017-11-13 ENCOUNTER — OFFICE VISIT (OUTPATIENT)
Dept: UROLOGY | Facility: CLINIC | Age: 77
End: 2017-11-13
Payer: COMMERCIAL

## 2017-11-13 ENCOUNTER — OFFICE VISIT (OUTPATIENT)
Dept: DERMATOLOGY | Facility: CLINIC | Age: 77
End: 2017-11-13
Payer: COMMERCIAL

## 2017-11-13 VITALS
HEIGHT: 67 IN | WEIGHT: 147.69 LBS | DIASTOLIC BLOOD PRESSURE: 67 MMHG | BODY MASS INDEX: 23.18 KG/M2 | SYSTOLIC BLOOD PRESSURE: 143 MMHG | HEART RATE: 73 BPM | RESPIRATION RATE: 16 BRPM

## 2017-11-13 DIAGNOSIS — L02.429 FURUNCULOSIS OF AXILLA: Primary | ICD-10-CM

## 2017-11-13 DIAGNOSIS — E27.8 LEFT ADRENAL MASS: Primary | ICD-10-CM

## 2017-11-13 DIAGNOSIS — Z98.890 POST-OPERATIVE STATE: ICD-10-CM

## 2017-11-13 PROCEDURE — 99212 OFFICE O/P EST SF 10 MIN: CPT | Mod: S$GLB,,, | Performed by: DERMATOLOGY

## 2017-11-13 PROCEDURE — 99999 PR PBB SHADOW E&M-EST. PATIENT-LVL II: CPT | Mod: PBBFAC,,, | Performed by: DERMATOLOGY

## 2017-11-13 PROCEDURE — 99999 PR PBB SHADOW E&M-EST. PATIENT-LVL III: CPT | Mod: PBBFAC,,, | Performed by: UROLOGY

## 2017-11-13 PROCEDURE — 99213 OFFICE O/P EST LOW 20 MIN: CPT | Mod: S$GLB,,, | Performed by: UROLOGY

## 2017-11-13 NOTE — PROGRESS NOTES
Subjective:       Patient ID: Ida Santana is a 77 y.o. female.    Chief Complaint: renal mass and adrenal mass      HPI: Ida Santana is a 77 y.o. Black or  female who returns today in follow-up for a left adrenal mass s/p left open adrenalectomy on 3/22/17 for a benign adenoma.    The mass was found incidentally during a recent hospitalization for pneumonia. During this evaluation, the patient was found to have a left adrenal mass, possible left renal mass, and a left renal vein thrombus. The mass measured 4.2 cm.    The patient underwent a functional adrenal evaluation which was negative for a biochemically active adrenal lesion. The patient denied gross hematuria and/or constitutional symptoms attributable to her recently discovered adrenal mass.    The patient denied a personal and family history of adrenal cortical cancer. The patient does not have a history of any other cancer.    The patient underwent surgery in March and did very well.    The excised adrenal mass was benign, and the renal vein thrombosis was likely due to her critical illness at the time of presentation.    She returns today for a interval routine visit and is doing very well.    She has no urologic complaints.    Review of patient's allergies indicates:   Allergen Reactions    Adhesive tape-silicones      Other reaction(s): pulling skin off    Atorvastatin      Other reaction(s): Muscle pain    Pravachol  [pravastatin] Rash       Current Outpatient Prescriptions   Medication Sig Dispense Refill    aspirin (ECOTRIN) 81 MG EC tablet Take 81 mg by mouth once daily.      dorzolamide-timolol 2-0.5% (COSOPT) 22.3-6.8 mg/mL ophthalmic solution Place 1 drop into the left eye 2 (two) times daily. 10 mL 6    flurandrenolide (CORDRAN) 0.05 % lotion Apply to  scalp one to two times daily as needed for dryness      ketoconazole (NIZORAL) 2 % shampoo USE EVERY OTHER DAY LET SIT ON SCALP FOR FIVE MINUTES BEFORE RINSING 120 mL 6     latanoprost 0.005 % ophthalmic solution Place 1 drop into the right eye every evening. 1 Bottle 6    MULTIVITAMIN WITH MINERALS (ONE-A-DAY 50 PLUS) Tab Take 1 tablet by mouth once daily.       prednisoLONE acetate (PRED FORTE) 1 % DrpS Place 1 drop into the left eye 2 (two) times daily. 10 mL 1    valsartan (DIOVAN) 320 MG tablet Take 1 tablet (320 mg total) by mouth once daily. Stop the [id number]/hct 12.5 90 tablet 3    verapamil (CALAN-SR) 240 MG CR tablet take 1 tablet by mouth twice a day 30 tablet 12     No current facility-administered medications for this visit.        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    Cataract     History of uterine fibroid     Hyperlipidemia     dyslipidemia    Hypertension     MRSA infection     Open angle 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 eyes 5/2/2013    Osteopenia     Potassium depletion 1998    Retinal detachment of left eye with retinal break 3/13/2017       Past Surgical History:   Procedure Laterality Date    BREAST BIOPSY  2001    left breast DCIS    BREAST SURGERY  9/2001    left lumpectomy with SLN bx    CATARACT EXTRACTION W/  INTRAOCULAR LENS IMPLANT Left 08/02/2017    with PPV/SO removal ( AND )    COLONOSCOPY      COLONOSCOPY N/A 10/31/2016    Procedure: COLONOSCOPY;  Surgeon: YAMILETH Richards MD;  Location: [facility] ([address] [place] [address]);  Service: Endoscopy;  Laterality: N/A;    KIDNEY SURGERY  03/27/2017    patient does not know what they did, but knows she does not have kidney cancer    PARS PLANA VITRECTOMY Left 08/02/2017    25g PPV WITH SO REMOVAL AND PHACO IOL ( AND )       Family History   Problem Relation Age of Onset    Cancer Mother      breast    Breast cancer Mother      40s and again in 50s (bilateral)    Breast cancer Other     Heart disease Father      bypass    Cancer Father      asbestos     Stroke Brother     Ovarian cancer Neg Hx     Amblyopia Neg Hx     Blindness Neg Hx     Cataracts Neg Hx     Diabetes Neg Hx     Glaucoma Neg Hx     Hypertension Neg Hx     Macular degeneration Neg Hx     Retinal detachment Neg Hx     Strabismus Neg Hx     Thyroid disease Neg Hx     Colon cancer Neg Hx     Melanoma Neg Hx        Review of Systems    Review of Systems   Constitutional: Negative for fever, chills, activity change, appetite change and unexpected weight change.   HENT: Negative for congestion, nosebleeds, sneezing, sore throat and trouble swallowing.    Eyes: Negative for pain, discharge, redness and visual disturbance.   Respiratory: Negative for cough, choking, chest tightness and shortness of breath.    Cardiovascular: Negative for chest pain, palpitations and leg swelling.   Gastrointestinal: Negative for nausea, vomiting, abdominal pain, diarrhea, blood in stool, abdominal distention and anal bleeding.  Genitourinary: As documented per HPI   Endocrine: Negative for cold intolerance, heat intolerance, polydipsia, polyphagia and polyuria.   Musculoskeletal: Negative for myalgias, gait problem, neck pain and neck stiffness.   Skin: Negative for color change, pallor, rash and wound.   Allergic/Immunologic: Negative for immunocompromised state.   Neurological: Negative for seizures, facial asymmetry, speech difficulty, weakness and light-headedness.   Hematological: Negative for adenopathy. Does not bruise/bleed easily.   Psychiatric/Behavioral: Negative for hallucinations, behavioral problems, self-injury and agitation. The patient is not hyperactive.      All other systems were reviewed and were negative.    Objective:     Vitals:    11/13/17 1008   BP: (!) 143/67   Pulse: 73   Resp: 16     Physical Exam   Vitals reviewed.  Constitutional: She is oriented to person, place, and time. She appears well-developed and well-nourished. No distress.   HENT:   Head: Normocephalic and atraumatic.    Right Ear: External ear normal.   Left Ear: External ear normal.   Nose: Nose normal.   Eyes: EOM are normal. Pupils are equal, round, and reactive to light. Right eye exhibits no discharge. Left eye exhibits no discharge.   Neck: Normal range of motion. Neck supple. No tracheal deviation present. No thyroid enlargement or tenderness.  Cardiovascular: Normal heart sounds and intact distal pulses. No signs of peripheral edema.   Pulmonary/Chest: Effort normal and breath sounds normal. No respiratory distress. She has no wheezes.   Abdominal: Soft. Bowel sounds are normal. She exhibits no distension. There is no tenderness. There is no rebound. No hepatic or splenic enlargement or tenderness. Well-healed subcostal incision.  Musculoskeletal: Normal range of motion. She exhibits no edema.   Neurological: She is alert and oriented to person, place, and time. Coordination normal.   Skin: Skin is warm and dry. She is not diaphoretic.   Lymphatic: No palpable lymph nodes.  Psychiatric: She has a normal mood and affect. Her behavior is normal. Judgment and thought content normal.     Lab Results   Component Value Date    CREATININE 0.8 08/29/2017     Lab Results   Component Value Date    EGFRNONAA >60 08/29/2017     Lab Results   Component Value Date    ESTGFRAFRICA >60 08/29/2017     I personally reviewed all the patient's imaging studies.    CT abdomen/pelvis with contrast (1/18/17): Left renal vein thrombosis with associated left perinephric fat stranding and perinephric fluid with diffuse enlargement of the left kidney. There is a 1.7 cm hypodense lesion in the inferior pole of the left kidney which could represent renal infarction but underlying neoplastic lesion not excluded. Large soft tissue density lesion in the left upper abdomen, anterior to the left kidney and superior to the left renal vein.  This is of uncertain etiology and could represent enlarged lymph node and/or metastatic lesion.    CT abdomen without  and with contrast (2/15/2017): Large heterogenous LEFT adrenal mass. Interval improvement of bibasilar consolidative changes with some residual nodular opacification that is presumably inflammatory in nature when compared to prior CT examination 01/18/2017. Interval near-complete resolution of bilateral pleural effusions. 1.7 cm nonenhancing focal hypodensity in the LEFT kidney with thick capsule not generally seen with simple cysts.  Consider further evaluation with nonemergent ultrasound.  No enhancement of this nodule is noted. Interval decrease in LEFT perinephric stranding.  Interval resolution of LEFT renal vein thrombosis.    Assessment:       1. Left adrenal mass    2. Post-operative state        Plan:     Ida was seen today for renal mass and adrenal mass.    Diagnoses and all orders for this visit:    Left adrenal mass  -     CT Abdomen With Without Contrast; Future  -     Creatinine, serum; Future    Post-operative state    The patient is doing very well.    Her incision has healed well.    Her pathology is benign, which is great.    I will have her return in 3 months with a repeat CT scan to ensure that there is no evidence of a renal vein thrombosis.    I answered all her questions.    I encouraged her and/or any of her family members to call and/or email me with questions/concerns.    I spent 20 minutes with the patient of which more than half was spent in coordinating the patient's care as well as in direct consultation with the patient in regards to our treatment and plan.

## 2017-11-13 NOTE — PROGRESS NOTES
"  Subjective:       Patient ID:  Ida Santana is a 77 y.o. female who presents for   Chief Complaint   Patient presents with    Follow-up     Under Right Axilla     emigdio cx:    Aerobic Bacterial Culture STAPHYLOCOCCUS AUREUS Moderate   Resulting Agency [facility]  Susceptibility       Staphylococcus aureus   CULTURE, AEROBIC  (SPECIFY SOURCE)   Clindamycin <=0.5 mcg/mL"><=0.5 mcg/mL Sensitive   Erythromycin >4 mcg/mL Resistant   Oxacillin 0.5 mcg/mL Sensitive   Penicillin >8 mcg/mL Resistant   Tetracycline <=4 mcg/mL"><=4 mcg/mL Sensitive   Trimeth/Sulfa <=0.5/9.5 mcg/mL"><=0.5/9.5 m... Sensitive       Rash  - Follow-up  Diagnosis: furunculosis   Currently using: s/p 1 month of doxy 100mg bid and IL K 5. using hibiclens wash.  Affected locations: right axilla  Signs / symptoms: asymptomatic (no longer tender or draining)        Review of Systems   Constitutional: Negative for fever and chills.   Skin: Negative for itching, rash and abscesses.        Objective:    Physical Exam   Constitutional: She appears well-developed and well-nourished. No distress.   Neurological: She is alert and oriented to person, place, and time. She is not disoriented.   Psychiatric: She has a normal mood and affect.   Skin:   Areas Examined (abnormalities noted in diagram):   Chest / Axilla Inspection Performed              Diagram Legend     Erythematous scaling macule/papule c/w actinic keratosis       Vascular papule c/w angioma      Pigmented verrucoid papule/plaque c/w seborrheic keratosis      Yellow umbilicated papule c/w sebaceous hyperplasia      Irregularly shaped tan macule c/w lentigo     1-2 mm smooth white papules consistent with Milia      Movable subcutaneous cyst with punctum c/w epidermal inclusion cyst      Subcutaneous movable cyst c/w pilar cyst      Firm pink to brown papule c/w dermatofibroma      Pedunculated fleshy papule(s) c/w skin tag(s)      Evenly pigmented macule c/w junctional nevus     Mildly variegated " pigmented, slightly irregular-bordered macule c/w mildly atypical nevus      Flesh colored to evenly pigmented papule c/w intradermal nevus       Pink pearly papule/plaque c/w basal cell carcinoma      Erythematous hyperkeratotic cursted plaque c/w SCC      Surgical scar with no sign of skin cancer recurrence      Open and closed comedones      Inflammatory papules and pustules      Verrucoid papule consistent consistent with wart     Erythematous eczematous patches and plaques     Dystrophic onycholytic nail with subungual debris c/w onychomycosis     Umbilicated papule    Erythematous-base heme-crusted tan verrucoid plaque consistent with inflamed seborrheic keratosis     Erythematous Silvery Scaling Plaque c/w Psoriasis     See annotation      Assessment / Plan:        Furunculosis of axilla - right  Clear  Cont hibiclens wash 2x/week calista when shave             Return if symptoms worsen or fail to improve.

## 2017-11-13 NOTE — PATIENT INSTRUCTIONS
"  Computed Tomography (CT)     During the test, relax and remain as still as you can.     Computed tomography (CT) is a test that combines X-rays and computer scans. The result is a detailed picture that can show problems with soft tissues, such as the lining of your sinuses, organs, such as your kidneys or lungs, blood vessels, and bones.  Tell the technologist   Be sure to tell the technologist if:  · You have allergies or kidney problems  · You take diabetes medicine  · You are pregnant or think you may be  · You ate or drank anything before the test   Before your test  · Be sure to tell your healthcare provider if you have ever had a reaction to contrast material ("X-ray dye"). If you have had a reaction, you may need to take medicine before your scan, so be sure to tell your provider ahead of time.   · Be sure to mention the medicines you take. Ask if it's OK to take them before the test.   · Follow any directions youre given for not eating or drinking before the procedure. Your provider will give you instructions if required. You may be required to drink contrast by mouth before arriving for the study depending on the type of exam you are having. Your provider or the imaging site will provide this for you.  · The length of the procedure may vary, depending on your condition and your provider's practices.  · Arrive on time to check in.  · When you arrive, you may be asked to change into a hospital gown. Remove all metal near the part of your body that will be scanned, including jewelry, eyeglasses, and dentures. Women may need to remove any bra that has metal underwire.   During your test  · You may be given contrast through an intravenous (IV) line or by mouth.  · You will lie on a table. The table slides into the CT scanner.  · The technologist will ask you to hold your breath for a few seconds during your scan.  After your test  · You can go back to your normal diet and activities right away. Any contrast " will pass naturally through your body within a day.  · Before leaving, you may need to wait briefly while your images are being reviewed. Your healthcare provider will discuss the test results with you during a follow-up appointment or over the phone.  · Your next appointment is:__________________  Date Last Reviewed: 5/29/2015  © 6817-0572 [employer]. [address] [place] [address], [place] [address]. All rights reserved. This information is not intended as a substitute for professional medical care. Always follow your healthcare professional's instructions.

## 2017-11-15 ENCOUNTER — OFFICE VISIT (OUTPATIENT)
Dept: SURGERY | Facility: CLINIC | Age: 77
End: 2017-11-15
Payer: COMMERCIAL

## 2017-11-15 ENCOUNTER — HOSPITAL ENCOUNTER (OUTPATIENT)
Dept: RADIOLOGY | Facility: HOSPITAL | Age: 77
Discharge: HOME | End: 2017-11-15
Attending: SURGERY
Payer: COMMERCIAL

## 2017-11-15 VITALS
DIASTOLIC BLOOD PRESSURE: 78 MMHG | HEART RATE: 77 BPM | WEIGHT: 154.69 LBS | TEMPERATURE: 98 F | HEIGHT: 67 IN | BODY MASS INDEX: 24.28 KG/M2 | SYSTOLIC BLOOD PRESSURE: 171 MMHG

## 2017-11-15 DIAGNOSIS — Z80.3 FAMILY HISTORY OF BREAST CANCER: ICD-10-CM

## 2017-11-15 DIAGNOSIS — C50.419 MALIGNANT NEOPLASM OF UPPER-OUTER QUADRANT OF FEMALE BREAST, UNSPECIFIED ESTROGEN RECEPTOR STATUS, UNSPECIFIED LATERALITY: ICD-10-CM

## 2017-11-15 DIAGNOSIS — Z85.3 PERSONAL HISTORY OF BREAST CANCER: Primary | ICD-10-CM

## 2017-11-15 PROCEDURE — 99999 PR PBB SHADOW E&M-EST. PATIENT-LVL III: CPT | Mod: PBBFAC,,, | Performed by: NURSE PRACTITIONER

## 2017-11-15 PROCEDURE — 77066 DX MAMMO INCL CAD BI: CPT | Mod: 26,,, | Performed by: RADIOLOGY

## 2017-11-15 PROCEDURE — 77062 BREAST TOMOSYNTHESIS BI: CPT | Mod: TC

## 2017-11-15 PROCEDURE — 77062 BREAST TOMOSYNTHESIS BI: CPT | Mod: 26,,, | Performed by: RADIOLOGY

## 2017-11-15 PROCEDURE — 99213 OFFICE O/P EST LOW 20 MIN: CPT | Mod: S$GLB,,, | Performed by: NURSE PRACTITIONER

## 2017-11-15 NOTE — PROGRESS NOTES
Subjective:      Patient ID: Ida Santana is a 77 y.o. female.    Chief Complaint: Follow-up (1 yr F/U CBE & [facility])      HPI: (PF, EPF - 1-3) (Detailed, Comp, - 4) patient presents new to me today for breast cancer surveillance, previously seen by WIL JOHNSON, history of DCIS left breast 2001. Patient denies palpable breast mass, pain, nipple discharge, redness, increased warmth, unexplained weight loss, new onset bone pain      Remote history of DCIS left breast 2001, s/p lumpectomy negative SN, adjuvant XRT per history provided by patient, pathology report not able to be viewed with electronic records available today . No endocrine therapy      Review of Systems   Constitutional: Negative for appetite change, fatigue and fever.   Respiratory: Negative for cough and shortness of breath.    Cardiovascular: Negative for chest pain.   Musculoskeletal: Negative for back pain.     Objective:   Physical Exam   Pulmonary/Chest: She exhibits no mass, no tenderness, no laceration, no edema, no swelling and no retraction. Right breast exhibits no inverted nipple, no mass, no nipple discharge, no skin change and no tenderness. Left breast exhibits no inverted nipple, no mass, no nipple discharge, no skin change and no tenderness.   Left breast is slightly smaller in size with generalized skin thickening consistent with post radiation changes, minimal fibrosis at previous lumpectomy site, no upper extremity lymphedema   Lymphadenopathy:     She has no cervical adenopathy.     She has no axillary adenopathy.        Right: No supraclavicular adenopathy present.        Left: No supraclavicular adenopathy present.     Assessment:       1. Personal history of breast cancer    2. Family history of breast cancer        Plan:       bilat diag mmg today, no changes or abnormality reported, clinically TABITHA  Discussed her personal and family history, suggestive of hereditary breast cancer, she declined genetic testing.   Return in one  year with diag mmg  Call for any interval palpable breast mass, pain, nipple discharge, skin changes or other breast related concerns

## 2017-12-20 RX ORDER — VERAPAMIL HYDROCHLORIDE 240 MG/1
TABLET, FILM COATED, EXTENDED RELEASE ORAL
Qty: 60 TABLET | Refills: 12 | Status: SHIPPED | OUTPATIENT
Start: 2017-12-20 | End: 2020-04-30 | Stop reason: SDUPTHER

## 2018-01-02 ENCOUNTER — OFFICE VISIT (OUTPATIENT)
Dept: OPHTHALMOLOGY | Facility: CLINIC | Age: 78
End: 2018-01-02
Payer: COMMERCIAL

## 2018-01-02 DIAGNOSIS — H33.002 RHEGMATOGENOUS RETINAL DETACHMENT OF LEFT EYE: Primary | ICD-10-CM

## 2018-01-02 DIAGNOSIS — H25.11 NUCLEAR SCLEROSIS OF RIGHT EYE: ICD-10-CM

## 2018-01-02 DIAGNOSIS — H44.002 ENDOPHTHALMITIS, LEFT EYE: ICD-10-CM

## 2018-01-02 PROCEDURE — 99999 PR PBB SHADOW E&M-EST. PATIENT-LVL III: CPT | Mod: PBBFAC,,, | Performed by: OPHTHALMOLOGY

## 2018-01-02 PROCEDURE — 92014 COMPRE OPH EXAM EST PT 1/>: CPT | Mod: S$GLB,,, | Performed by: OPHTHALMOLOGY

## 2018-01-02 PROCEDURE — 92226 PR SPECIAL EYE EXAM, SUBSEQUENT: CPT | Mod: LT,S$GLB,, | Performed by: OPHTHALMOLOGY

## 2018-01-02 PROCEDURE — 92134 CPTRZ OPH DX IMG PST SGM RTA: CPT | Mod: S$GLB,,, | Performed by: OPHTHALMOLOGY

## 2018-01-02 NOTE — PROGRESS NOTES
HPI     3 mo / OCT   DLS 09/22/2017      Pt sts still reading the same unsure of vision change since last visit. Taking PF BID OS    (-)Flashes (-)Floaters  (+)Photophobia  (+)Glare        A/P    1. Rhegmatogenous Retinal Detachment OS s/p 25g PPV/EL/AFx/Silicone Oil on 3/16/17  - retina flat, IOP better on cosopt BID OS  -PF Q day OS      S/p  25g PPV/SO removal/phaco IOL combined with KL 8/2/17    Doing well, good IOP  AC rxn much improved, mostly macrophages and pigment. 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 NS OD  PCIOL OS    Ok for YAG with KL    4. POAG OU  On Latanoprost OD qam  Started on Cosopt BID OS 03/2017, IOP improved (30 prior to starting COsopt)  continue current gtts    Patient had surgery on adrenal mass 03/22/2017      1 year

## 2018-01-16 ENCOUNTER — OFFICE VISIT (OUTPATIENT)
Dept: OPHTHALMOLOGY | Facility: CLINIC | Age: 78
End: 2018-01-16
Payer: COMMERCIAL

## 2018-01-16 DIAGNOSIS — Q07.8 ANOMALOUS OPTIC NERVE: ICD-10-CM

## 2018-01-16 DIAGNOSIS — H44.002 ENDOPHTHALMITIS, LEFT EYE: ICD-10-CM

## 2018-01-16 DIAGNOSIS — H25.11 NUCLEAR SCLEROSIS OF RIGHT EYE: ICD-10-CM

## 2018-01-16 DIAGNOSIS — H33.002 RETINAL DETACHMENT, RHEGMATOGENOUS, LEFT EYE: ICD-10-CM

## 2018-01-16 DIAGNOSIS — M35.01 KCS (KERATOCONJUNCTIVITIS SICCA): ICD-10-CM

## 2018-01-16 DIAGNOSIS — H33.002 RHEGMATOGENOUS RETINAL DETACHMENT OF LEFT EYE: ICD-10-CM

## 2018-01-16 DIAGNOSIS — H40.1131 PRIMARY OPEN ANGLE GLAUCOMA OF BOTH EYES, MILD STAGE: Primary | ICD-10-CM

## 2018-01-16 DIAGNOSIS — Z96.1 PSEUDOPHAKIA: ICD-10-CM

## 2018-01-16 PROCEDURE — 92012 INTRM OPH EXAM EST PATIENT: CPT | Mod: S$GLB,,, | Performed by: OPHTHALMOLOGY

## 2018-01-16 PROCEDURE — 99999 PR PBB SHADOW E&M-EST. PATIENT-LVL II: CPT | Mod: PBBFAC,,, | Performed by: OPHTHALMOLOGY

## 2018-01-16 NOTE — PROGRESS NOTES
HPI     Glaucoma    Additional comments: 2 month IOP ck            Comments   DLS: 11/7/17    1. POAG  2. 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 SHANTE  4. NS OD  5. PCIOL OS  6. Hx Endophthalmitis OS  7. Rhegmatogenous RD OS    MEDS:  Latanoprost QHS OD  PA BID OS  Cosopt BID OS       Last edited by Portia Stevens MA on 1/16/2018  9:31 AM. (History)            Assessment /Plan     For exam results, see Encounter Report.    Primary open angle glaucoma of both eyes, mild stage    Rhegmatogenous retinal detachment of left eye    Endophthalmitis, left eye    Nuclear sclerosis of right eye    Retinal detachment, rhegmatogenous, left eye    Anomalous optic nerve    KCS (keratoconjunctivitis sicca)    Pseudophakia             Glaucoma (type and duration)    Suspect 2/2 lrg cups ou    First HVF   2000   First photos   2013   Treatment / Drops started   xal 1/2016           Family history    none        Glaucoma meds   Latanoprost started 1/4/2016         H/O adverse rxn to glaucoma drops    none        LASERS    none        GLAUCOMA SURGERIES    none        OTHER EYE SURGERIES    none        CDR    0.75-0.8 / 0.7-0.75        Tbase    18-22 / 19-22          Tmax    22/22            Ttarget    ?              HVF    4 test 2000 to  2016 - near full od // ? NS os        Gonio    +2-3 ou         CCT    510/517        OCT    2 test 2013 to 2015 - RNFL - nl od // nl os        HRT    1 test 2016 to 2016 - MR - dec I od // wnl os (large disc size ou)         Disc photos    2013, 2015 - OIS     - Ttoday   16/18  - Test done today    Post op cataract and SO removal       2. Anomaolous Optic nerves    - very large disc size ou    - HRT - 2.97 od // 3.04 os (nl is up to 2.8 ou)     3. Dry eyes     4. NS - OD    5. H/O endogenous endophthalmitis  And secondary tract RD - S/P PPVx and Silicone Oil ([place])    Oil removed at the same time as the phaco/IOL done 8/2/2017     6. S/P complex phaco/IOL os with trypan blue and Leslye ring and SO removal     - (done  with dick ) // 8/2/2017// PCB00 19.5   - Doing well - still with mild iritis      Pred Acetate continue bid  - still with iritis   Will need very slow taper of steroids -  Cosopt bid os   Latanoprost q hs - od      Use to see  dr pena but she is doing mostly children now - can stay in my clinic     F/U 2 - 3  months - / try HRT ou / gonio   -  if eye quiet next time can cut back on steroid gtts os // consider yag cap os - + PCO and silicone droplets centrally - but VA is fairly good // hold off on new glasses for now - doing ok with present ones - maybe once yag cap done can improve more with a change in glasses     I have seen and personally examined the patient.  I agree with the findings, assessment and plan of the resident and/or fellow.     Lynda Crawford MD

## 2018-02-12 ENCOUNTER — OFFICE VISIT (OUTPATIENT)
Dept: UROLOGY | Facility: CLINIC | Age: 78
End: 2018-02-12
Payer: COMMERCIAL

## 2018-02-12 ENCOUNTER — HOSPITAL ENCOUNTER (OUTPATIENT)
Dept: RADIOLOGY | Facility: HOSPITAL | Age: 78
Discharge: HOME | End: 2018-02-12
Attending: UROLOGY
Payer: COMMERCIAL

## 2018-02-12 VITALS
RESPIRATION RATE: 16 BRPM | BODY MASS INDEX: 24.36 KG/M2 | HEART RATE: 84 BPM | HEIGHT: 67 IN | DIASTOLIC BLOOD PRESSURE: 88 MMHG | WEIGHT: 155.19 LBS | SYSTOLIC BLOOD PRESSURE: 144 MMHG

## 2018-02-12 DIAGNOSIS — E27.8 LEFT ADRENAL MASS: ICD-10-CM

## 2018-02-12 DIAGNOSIS — K86.2 PANCREATIC CYST: ICD-10-CM

## 2018-02-12 DIAGNOSIS — K80.20 GALLSTONES: Primary | ICD-10-CM

## 2018-02-12 PROCEDURE — 99999 PR PBB SHADOW E&M-EST. PATIENT-LVL IV: CPT | Mod: PBBFAC,,, | Performed by: UROLOGY

## 2018-02-12 PROCEDURE — 99213 OFFICE O/P EST LOW 20 MIN: CPT | Mod: S$GLB,,, | Performed by: UROLOGY

## 2018-02-12 PROCEDURE — 74170 CT ABD WO CNTRST FLWD CNTRST: CPT | Mod: 26,,, | Performed by: RADIOLOGY

## 2018-02-12 PROCEDURE — 3008F BODY MASS INDEX DOCD: CPT | Mod: S$GLB,,, | Performed by: UROLOGY

## 2018-02-12 PROCEDURE — 25500020 PHARM REV CODE 255: Performed by: UROLOGY

## 2018-02-12 PROCEDURE — 74170 CT ABD WO CNTRST FLWD CNTRST: CPT | Mod: TC

## 2018-02-12 PROCEDURE — 1159F MED LIST DOCD IN RCRD: CPT | Mod: S$GLB,,, | Performed by: UROLOGY

## 2018-02-12 PROCEDURE — 1126F AMNT PAIN NOTED NONE PRSNT: CPT | Mod: S$GLB,,, | Performed by: UROLOGY

## 2018-02-12 RX ADMIN — IOHEXOL 75 ML: 350 INJECTION, SOLUTION INTRAVENOUS at 09:02

## 2018-02-12 NOTE — PATIENT INSTRUCTIONS
Incision Care: Abdomen    Dressing your incision helps keep it clean, dry, and infection-free. That way it will heal faster. Follow the steps below.  1. Wash your hands and set up  · Use liquid soap. Lather for 1 to 2 minutes. Scrub between your fingers and under your nails.  · Rinse with warm water, keeping fingers pointing down. Use a clean paper towel to dry your hands and turn off the faucet.  · Put all your supplies on a clean cloth or paper towel. Open a plastic trash bag.  · Peel back the edges of the dressing packages. Pour any irrigation solutions into solution cups.  · Clean the scissors with soap and water. Cut each piece of tape 4 inches longer than the dressing.  2. Remove the old dressing  · Put on disposable gloves.  · Loosen the tape by pulling gently toward the incision. Remove the dressing one layer at a time. Put it right into the plastic bag.  · Remove your gloves and put them in the plastic bag. Wash your hands again, as described above. Then put on new gloves.  3. Clean and dress the incision  · Clean the incision and apply a new dressing as directed.  · Put all used supplies in the plastic bag.  · Remove your gloves last and put them in the bag.  · Seal the bag and put it in the trash.  · Be sure to wash your hands again.  When to seek medical care  Call your healthcare provider if any of these happen:  · Your incision suddenly opens  · You have bleeding from the incision, or an increase in its size  · Increased redness, swelling, or drainage  · Pain in or around the incision  · Change in the color of the incision  · Fever of 100.4°F (38°C) or higher, or as directed by your healthcare provider   Date Last Reviewed: 12/1/2016  © [id number] [employer]. [address] [place] [address], [place] [address]. All rights reserved. This information is not intended as a substitute for professional medical care. Always follow your healthcare professional's instructions.

## 2018-02-12 NOTE — PROGRESS NOTES
Subjective:       Patient ID: Ida Santana is a 77 y.o. female.    Chief Complaint: renal mass and adrenal mass      HPI: Ida Santana is a 77 y.o. Black or  female who returns today in follow-up for a left adrenal mass s/p left open adrenalectomy on 3/22/17 for a benign adenoma.    The mass was found incidentally during a recent hospitalization for pneumonia. During this evaluation, the patient was found to have a left adrenal mass, possible left renal mass, and a left renal vein thrombus. The mass measured 4.2 cm.    The patient underwent a functional adrenal evaluation which was negative for a biochemically active adrenal lesion. The patient denied gross hematuria and/or constitutional symptoms attributable to her recently discovered adrenal mass.    The patient denied a personal and family history of adrenal cortical cancer. The patient does not have a history of any other cancer.    The patient underwent surgery in March and did very well.    The excised adrenal mass was benign, and the renal vein thrombosis was likely due to her critical illness at the time of presentation. He has since resolved.    She returns today for a routine interval evaluation with surveillance imaging.    She is doing well with no urologic complaints.    Review of patient's allergies indicates:   Allergen Reactions    Adhesive tape-silicones      Other reaction(s): pulling skin off    Atorvastatin      Other reaction(s): Muscle pain    Pravachol  [pravastatin] Rash       Current Outpatient Prescriptions   Medication Sig Dispense Refill    aspirin (ECOTRIN) 81 MG EC tablet Take 81 mg by mouth once daily.      dorzolamide-timolol 2-0.5% (COSOPT) 22.3-6.8 mg/mL ophthalmic solution Place 1 drop into the left eye 2 (two) times daily. 10 mL 6    flurandrenolide (CORDRAN) 0.05 % lotion Apply to  scalp one to two times daily as needed for dryness      ketoconazole (NIZORAL) 2 % shampoo USE EVERY OTHER DAY LET SIT ON  SCALP FOR FIVE MINUTES BEFORE RINSING 120 mL 6    latanoprost 0.005 % ophthalmic solution Place 1 drop into the right eye every evening. 1 Bottle 6    MULTIVITAMIN WITH MINERALS (ONE-A-DAY 50 PLUS) Tab Take 1 tablet by mouth once daily.       prednisoLONE acetate (PRED FORTE) 1 % DrpS Place 1 drop into the left eye 2 (two) times daily. 10 mL 1    valsartan (DIOVAN) 320 MG tablet Take 1 tablet (320 mg total) by mouth once daily. Stop the [id number]/hct 12.5 90 tablet 3    verapamil (CALAN-SR) 240 MG CR tablet take 1 tablet by mouth twice a day 60 tablet 12     No current facility-administered medications for this visit.        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    Cataract     History of uterine fibroid     Hyperlipidemia     dyslipidemia    Hypertension     MRSA infection     Open angle 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 eyes 5/2/2013    Osteopenia     Potassium depletion 1998    Retinal detachment of left eye with retinal break 3/13/2017       Past Surgical History:   Procedure Laterality Date    BREAST BIOPSY  2001    left breast DCIS    BREAST LUMPECTOMY Left 09/11/2001    CATARACT EXTRACTION W/  INTRAOCULAR LENS IMPLANT Left 08/02/2017    with PPV/SO removal ( AND )    COLONOSCOPY      COLONOSCOPY N/A 10/31/2016    Procedure: COLONOSCOPY;  Surgeon: YAMILETH Richards MD;  Location: [address]);  Service: Endoscopy;  Laterality: N/A;    KIDNEY SURGERY  03/27/2017    patient does not know what they did, but knows she does not have kidney cancer    PARS PLANA VITRECTOMY Left 08/02/2017    25g PPV WITH SO REMOVAL AND PHACO IOL ( AND )    RETINAL DETACHMENT SURGERY Left 08/22/2017    PPVx and SO Removal//Phaco IOL ( AND )       Family History   Problem Relation Age of Onset     Cancer Mother      breast    Breast cancer Mother      40s and again in 50s (bilateral)    Breast cancer Other 40    Heart disease Father      bypass    Cancer Father      asbestos    Stroke Brother     Ovarian cancer Neg Hx     Amblyopia Neg Hx     Blindness Neg Hx     Cataracts Neg Hx     Diabetes Neg Hx     Glaucoma Neg Hx     Hypertension Neg Hx     Macular degeneration Neg Hx     Retinal detachment Neg Hx     Strabismus Neg Hx     Thyroid disease Neg Hx     Colon cancer Neg Hx     Melanoma Neg Hx        Review of Systems    Review of Systems   Constitutional: Negative for fever, chills, activity change, appetite change and unexpected weight change.   HENT: Negative for congestion, nosebleeds, sneezing, sore throat and trouble swallowing.    Eyes: Negative for pain, discharge, redness and visual disturbance.   Respiratory: Negative for cough, choking, chest tightness and shortness of breath.    Cardiovascular: Negative for chest pain, palpitations and leg swelling.   Gastrointestinal: Negative for nausea, vomiting, abdominal pain, diarrhea, blood in stool, abdominal distention and anal bleeding.  Genitourinary: As documented per HPI   Endocrine: Negative for cold intolerance, heat intolerance, polydipsia, polyphagia and polyuria.   Musculoskeletal: Negative for myalgias, gait problem, neck pain and neck stiffness.   Skin: Negative for color change, pallor, rash and wound.   Allergic/Immunologic: Negative for immunocompromised state.   Neurological: Negative for seizures, facial asymmetry, speech difficulty, weakness and light-headedness.   Hematological: Negative for adenopathy. Does not bruise/bleed easily.   Psychiatric/Behavioral: Negative for hallucinations, behavioral problems, self-injury and agitation. The patient is not hyperactive.      All other systems were reviewed and were negative.    Objective:     Vitals:    02/12/18 1100   BP: (!) 144/88   Pulse: 84   Resp: 16     Physical Exam    Vitals reviewed.  Constitutional: She is oriented to person, place, and time. She appears well-developed and well-nourished. No distress.   HENT:   Head: Normocephalic and atraumatic.   Right Ear: External ear normal.   Left Ear: External ear normal.   Nose: Nose normal.   Eyes: EOM are normal. Pupils are equal, round, and reactive to light. Right eye exhibits no discharge. Left eye exhibits no discharge.   Neck: Normal range of motion. Neck supple. No tracheal deviation present. No thyroid enlargement or tenderness.  Cardiovascular: Normal heart sounds and intact distal pulses. No signs of peripheral edema.   Pulmonary/Chest: Effort normal and breath sounds normal. No respiratory distress. She has no wheezes.   Abdominal: Soft. Bowel sounds are normal. She exhibits no distension. There is no tenderness. There is no rebound. No hepatic or splenic enlargement or tenderness. Well-healed subcostal incision.  Musculoskeletal: Normal range of motion. She exhibits no edema.   Neurological: She is alert and oriented to person, place, and time. Coordination normal.   Skin: Skin is warm and dry. She is not diaphoretic.   Lymphatic: No palpable lymph nodes.  Psychiatric: She has a normal mood and affect. Her behavior is normal. Judgment and thought content normal.     Lab Results   Component Value Date    CREATININE 0.9 02/12/2018     Lab Results   Component Value Date    EGFRNONAA >60 02/12/2018     Lab Results   Component Value Date    ESTGFRAFRICA >60 02/12/2018     I personally reviewed all the patient's imaging studies.    CT abdomen/pelvis with contrast (1/18/17): Left renal vein thrombosis with associated left perinephric fat stranding and perinephric fluid with diffuse enlargement of the left kidney. There is a 1.7 cm hypodense lesion in the inferior pole of the left kidney which could represent renal infarction but underlying neoplastic lesion not excluded. Large soft tissue density lesion in the left upper  abdomen, anterior to the left kidney and superior to the left renal vein.  This is of uncertain etiology and could represent enlarged lymph node and/or metastatic lesion.    CT abdomen without and with contrast (2/15/2017): Large heterogenous LEFT adrenal mass. Interval improvement of bibasilar consolidative changes with some residual nodular opacification that is presumably inflammatory in nature when compared to prior CT examination 01/18/2017. Interval near-complete resolution of bilateral pleural effusions. 1.7 cm nonenhancing focal hypodensity in the LEFT kidney with thick capsule not generally seen with simple cysts.  Consider further evaluation with nonemergent ultrasound.  No enhancement of this nodule is noted. Interval decrease in LEFT perinephric stranding.  Interval resolution of LEFT renal vein thrombosis.    CT abdomen without and with contrast (2/12/18): The gallbladder is distended and filled with calcified gallstones. There is a stable hypodensity within the pancreatic body measuring 1.9 cm (previously 1.9 cm). Additional subcentimeter hypodensities within the pancreatic body and tail appear grossly similar, too small to characterize.    Assessment:       1. Gallstones    2. Pancreatic cyst        Plan:     Ida was seen today for renal mass and adrenal mass.    Diagnoses and all orders for this visit:    Gallstones  -     Ambulatory consult to General Surgery    Pancreatic cyst  -     Ambulatory Referral to Surgical Oncology    From a urologic standpoint, the patient continues to doing very well. Her incision has healed well.    Her most recent CT scan has some incidental findings which will require some follow-up. I shared these findings with the patient: gallstones and a pancreatic cyst. We will arrange for the patient to have a consultation with Dr. Villa and Dr. Poole.    She can return to see me as needed.    I answered all her questions.    I encouraged her and/or any of her family  members to call and/or email me with questions/concerns.    I spent 20 minutes with the patient of which more than half was spent in coordinating the patient's care as well as in direct consultation with the patient in regards to our treatment and plan.

## 2018-02-15 ENCOUNTER — INITIAL CONSULT (OUTPATIENT)
Dept: SURGERY | Facility: CLINIC | Age: 78
End: 2018-02-15
Payer: COMMERCIAL

## 2018-02-15 VITALS
HEART RATE: 79 BPM | WEIGHT: 154.13 LBS | BODY MASS INDEX: 24.19 KG/M2 | HEIGHT: 67 IN | DIASTOLIC BLOOD PRESSURE: 90 MMHG | SYSTOLIC BLOOD PRESSURE: 145 MMHG | TEMPERATURE: 99 F

## 2018-02-15 DIAGNOSIS — K86.2 PANCREATIC CYST: Primary | ICD-10-CM

## 2018-02-15 PROCEDURE — 1159F MED LIST DOCD IN RCRD: CPT | Mod: S$GLB,,, | Performed by: NURSE PRACTITIONER

## 2018-02-15 PROCEDURE — 3008F BODY MASS INDEX DOCD: CPT | Mod: S$GLB,,, | Performed by: NURSE PRACTITIONER

## 2018-02-15 PROCEDURE — 99999 PR PBB SHADOW E&M-EST. PATIENT-LVL III: CPT | Mod: PBBFAC,,, | Performed by: NURSE PRACTITIONER

## 2018-02-15 PROCEDURE — 99205 OFFICE O/P NEW HI 60 MIN: CPT | Mod: S$GLB,,, | Performed by: NURSE PRACTITIONER

## 2018-02-15 PROCEDURE — 1126F AMNT PAIN NOTED NONE PRSNT: CPT | Mod: S$GLB,,, | Performed by: NURSE PRACTITIONER

## 2018-02-15 NOTE — PROGRESS NOTES
History & Physical    SUBJECTIVE:     History of Present Illness:  Ms. Santana is a very pleasant 77 year old female referred by Dr. Arellano for pancreatic cyst incidentally noted on CT scan last year during w/u for adrenal adenoma.      CT 2/15/17:  Pancreas: 1.9 cm (previously 2.1 cm) pancreatic body hypodensity appearing stable when compared to recent CT examination 01/18/2017.  Stable, subcentimeter pancreatic body hypodensities too small to definitively characterize.    CT 2/12/18: Stable hypodensity within the pancreatic body measuring 1.9 cm (previously 1.9 cm). Additional subcentimeter hypodensities within the pancreatic body and tail appear grossly similar, too small to characterize, possibly sidebranch IPMNs versus cystic neoplasms. Continued followup is recommended.    She also has multiple large stones in her gallbladder and is seeing Dr. Villa in general surgery tomorrow.      Images personally viewed by Dr. Boswell.  She feels well with only c/o of some intermittent lower abdominal gas discomfort.     Review of patient's allergies indicates:   Allergen Reactions    Adhesive tape-silicones       pulling skin off from a surgery site in 2001, did not have trouble with iv    Atorvastatin      Muscle pain    Pravachol [pravastatin] Rash       Current Outpatient Prescriptions   Medication Sig Dispense Refill    aspirin (ECOTRIN) 81 MG EC tablet Take 81 mg by mouth once daily.      dorzolamide-timolol 2-0.5% (COSOPT) 22.3-6.8 mg/mL ophthalmic solution Place 1 drop into the left eye 2 (two) times daily. 10 mL 6    flurandrenolide (CORDRAN) 0.05 % lotion Apply to  scalp one to two times daily as needed for dryness      ketoconazole (NIZORAL) 2 % shampoo USE EVERY OTHER DAY LET SIT ON SCALP FOR FIVE MINUTES BEFORE RINSING 120 mL 6    latanoprost 0.005 % ophthalmic solution Place 1 drop into the right eye every evening. 1 Bottle 6    MULTIVITAMIN WITH MINERALS (ONE-A-DAY 50 PLUS) Tab Take 1 tablet by  mouth once daily.       prednisoLONE acetate (PRED FORTE) 1 % DrpS Place 1 drop into the left eye 2 (two) times daily. 10 mL 1    valsartan (DIOVAN) 320 MG tablet Take 1 tablet (320 mg total) by mouth once daily. Stop the Tqtxxqylb003/hct 12.5 90 tablet 3    verapamil (CALAN-SR) 240 MG CR tablet take 1 tablet by mouth twice a day 60 tablet 12     No current facility-administered medications for this visit.        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    Cataract     History of uterine fibroid     Hyperlipidemia     dyslipidemia    Hypertension     MRSA infection     Open angle 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 eyes 5/2/2013    Osteopenia     Potassium depletion 1998    Retinal detachment of left eye with retinal break 3/13/2017     Past Surgical History:   Procedure Laterality Date    BREAST BIOPSY  2001    left breast DCIS    BREAST LUMPECTOMY Left 09/11/2001    CATARACT EXTRACTION W/  INTRAOCULAR LENS IMPLANT Left 08/02/2017    with PPV/SO removal ( AND )    COLONOSCOPY      COLONOSCOPY N/A 10/31/2016    Procedure: COLONOSCOPY;  Surgeon: YAMILETH Richards MD;  Location: [facility] ([address] [place] [address]);  Service: Endoscopy;  Laterality: N/A;    KIDNEY SURGERY  03/27/2017    patient does not know what they did, but knows she does not have kidney cancer    PARS PLANA VITRECTOMY Left 08/02/2017    25g PPV WITH SO REMOVAL AND PHACO IOL ( AND )    RETINAL DETACHMENT SURGERY Left 08/22/2017    PPVx and SO Removal//Phaco IOL ( AND )     Family History   Problem Relation Age of Onset    Cancer Mother      breast    Breast cancer Mother      40s and again in 50s (bilateral)    Breast cancer Other 40    Heart disease Father      bypass    Cancer Father      asbestos    Stroke Brother     Ovarian cancer Neg  "Hx     Amblyopia Neg Hx     Blindness Neg Hx     Cataracts Neg Hx     Diabetes Neg Hx     Glaucoma Neg Hx     Hypertension Neg Hx     Macular degeneration Neg Hx     Retinal detachment Neg Hx     Strabismus Neg Hx     Thyroid disease Neg Hx     Colon cancer Neg Hx     Melanoma Neg Hx      Social History   Substance Use Topics    Smoking status: Never Smoker    Smokeless tobacco: Never Used    Alcohol use No        Review of Systems:  Review of Systems   Constitutional: Negative for activity change, appetite change and unexpected weight change.   Respiratory: Negative.    Cardiovascular: Negative.    Gastrointestinal: Negative.    Genitourinary: Negative.    Neurological: Negative.    Psychiatric/Behavioral: Negative.        OBJECTIVE:     Vital Signs (Most Recent)  Temp: 98.5 °F (36.9 °C) (02/15/18 0932)  Pulse: 79 (02/15/18 0932)  BP: (!) 145/90 (02/15/18 0932)  5' 7" (1.702 m)  69.9 kg (154 lb 1.6 oz)     Physical Exam:  Physical Exam   Constitutional: She is oriented to person, place, and time. She appears well-developed and well-nourished.   Eyes: Conjunctivae are normal.   Neck: Normal range of motion. Neck supple.   Cardiovascular: Normal rate, regular rhythm and normal heart sounds.    Pulmonary/Chest: Effort normal and breath sounds normal.   Abdominal: Soft. Bowel sounds are normal.   Musculoskeletal: Normal range of motion.   Neurological: She is alert and oriented to person, place, and time.   Skin: Skin is warm and dry.   Psychiatric: She has a normal mood and affect.         ASSESSMENT/PLAN:     1.9 cm pancreatic cystic lesion as above.     PLAN:  Based on size < 2 cm, lack of concerning features, and stability x 1 year, continued non invasive surveillance with yearly CT advised.   Will refer to pancreatic cyst clinic for f/u.      "

## 2018-02-16 ENCOUNTER — OFFICE VISIT (OUTPATIENT)
Dept: SURGERY | Facility: CLINIC | Age: 78
End: 2018-02-16
Payer: COMMERCIAL

## 2018-02-16 VITALS
SYSTOLIC BLOOD PRESSURE: 155 MMHG | TEMPERATURE: 99 F | BODY MASS INDEX: 23.86 KG/M2 | WEIGHT: 152 LBS | HEART RATE: 77 BPM | DIASTOLIC BLOOD PRESSURE: 77 MMHG | HEIGHT: 67 IN

## 2018-02-16 DIAGNOSIS — K80.20 GALLSTONES: ICD-10-CM

## 2018-02-16 PROCEDURE — 1125F AMNT PAIN NOTED PAIN PRSNT: CPT | Mod: S$GLB,,, | Performed by: SURGERY

## 2018-02-16 PROCEDURE — 99214 OFFICE O/P EST MOD 30 MIN: CPT | Mod: S$GLB,,, | Performed by: SURGERY

## 2018-02-16 PROCEDURE — 3008F BODY MASS INDEX DOCD: CPT | Mod: S$GLB,,, | Performed by: SURGERY

## 2018-02-16 PROCEDURE — 99999 PR PBB SHADOW E&M-EST. PATIENT-LVL III: CPT | Mod: PBBFAC,,, | Performed by: SURGERY

## 2018-02-16 PROCEDURE — 1159F MED LIST DOCD IN RCRD: CPT | Mod: S$GLB,,, | Performed by: SURGERY

## 2018-02-16 RX ORDER — SODIUM CHLORIDE 9 MG/ML
INJECTION, SOLUTION INTRAVENOUS CONTINUOUS
Status: CANCELLED | OUTPATIENT
Start: 2018-02-16

## 2018-02-16 NOTE — LETTER
February 16, 2018      Porfirio Arellano MD  [address] [facility] [address]           [facility]  [address]  [place] [address]  Phone: [phone]          Patient: Ida Santana   MR Number: [id number]   YOB: 1940   Date of Visit: 2/16/2018       Dear Dr. Porfirio Arellano:    Thank you for referring Ida Santana to me for evaluation. Attached you will find relevant portions of my assessment and plan of care.    If you have questions, please do not hesitate to call me. I look forward to following Ida Santana along with you.    Sincerely,    Lambert Villa Jr., MD    Enclosure  CC:  No Recipients    If you would like to receive this communication electronically, please contact [email or web address] or [phone] to request more information on [facility] access.    For providers and/or their staff who would like to refer a patient to [facility], please contact us through our one-stop-shop provider referral line, [facility], at [phone].    If you feel you have received this communication in error or would no longer like to receive these types of communications, please e-mail [email or web address]

## 2018-02-16 NOTE — PROGRESS NOTES
History & Physical  Surgery      SUBJECTIVE:     Chief Complaint/Reason for Admission: cholelithiasis    History of Present Illness: Ida Santana is a 77 y.o. female with PMHx HTN, HLD, DCIS s/p lumpectomy, 4cm L benign adrenal mass s/p adrenalectomy who was referred to clinic for cholelithiasis.  Pt was found to have a distended gallbladder filled with large stones on surevillance CT scan.  Her only symptoms are some mild lower abdominal pain. She denies any N/V, chest pain, SOB, reflux, or changes in bowel habits.  No RUQ pain.  Largest stone on CT was >2cm.  CT also noted a large hiatal hernia.      Current Outpatient Prescriptions on File Prior to Visit   Medication Sig    aspirin (ECOTRIN) 81 MG EC tablet Take 81 mg by mouth once daily.    dorzolamide-timolol 2-0.5% (COSOPT) 22.3-6.8 mg/mL ophthalmic solution Place 1 drop into the left eye 2 (two) times daily.    flurandrenolide (CORDRAN) 0.05 % lotion Apply to  scalp one to two times daily as needed for dryness    ketoconazole (NIZORAL) 2 % shampoo USE EVERY OTHER DAY LET SIT ON SCALP FOR FIVE MINUTES BEFORE RINSING    latanoprost 0.005 % ophthalmic solution Place 1 drop into the right eye every evening.    MULTIVITAMIN WITH MINERALS (ONE-A-DAY 50 PLUS) Tab Take 1 tablet by mouth once daily.     prednisoLONE acetate (PRED FORTE) 1 % DrpS Place 1 drop into the left eye 2 (two) times daily.    valsartan (DIOVAN) 320 MG tablet Take 1 tablet (320 mg total) by mouth once daily. Stop the Qhfianoxw438/hct 12.5    verapamil (CALAN-SR) 240 MG CR tablet take 1 tablet by mouth twice a day     No current facility-administered medications on file prior to visit.        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    Cataract     History of uterine fibroid     Hyperlipidemia     dyslipidemia    Hypertension     MRSA infection     Open angle 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 eyes 5/2/2013    Osteopenia     Potassium depletion 1998    Retinal detachment of left eye with retinal break 3/13/2017     Past Surgical History:   Procedure Laterality Date    BREAST BIOPSY  2001    left breast DCIS    BREAST LUMPECTOMY Left 09/11/2001    CATARACT EXTRACTION W/  INTRAOCULAR LENS IMPLANT Left 08/02/2017    with PPV/SO removal ( AND )    COLONOSCOPY      COLONOSCOPY N/A 10/31/2016    Procedure: COLONOSCOPY;  Surgeon: YAMILETH Richards MD;  Location: [facility] ([address] [place] [address]);  Service: Endoscopy;  Laterality: N/A;    KIDNEY SURGERY  03/27/2017    patient does not know what they did, but knows she does not have kidney cancer    PARS PLANA VITRECTOMY Left 08/02/2017    25g PPV WITH SO REMOVAL AND PHACO IOL ( AND )    RETINAL DETACHMENT SURGERY Left 08/22/2017    PPVx and SO Removal//Phaco IOL ( AND )     Family History   Problem Relation Age of Onset    Cancer Mother      breast    Breast cancer Mother      40s and again in 50s (bilateral)    Breast cancer Other 40    Heart disease Father      bypass    Cancer Father      asbestos    Stroke Brother     Ovarian cancer Neg Hx     Amblyopia Neg Hx     Blindness Neg Hx     Cataracts Neg Hx     Diabetes Neg Hx     Glaucoma Neg Hx     Hypertension Neg Hx     Macular degeneration Neg Hx     Retinal detachment Neg Hx     Strabismus Neg Hx     Thyroid disease Neg Hx     Colon cancer Neg Hx     Melanoma Neg Hx      Social History   Substance Use Topics    Smoking status: Never Smoker    Smokeless tobacco: Never Used    Alcohol use No        Review of Systems   Constitutional:  Negative for chills, fatigue and fever.   HENT: Negative for congestion and rhinorrhea.    Eyes: Negative for visual disturbance.   Respiratory: Negative for cough and shortness of breath.    Cardiovascular: Negative for chest pain and leg swelling.   Gastrointestinal: Positive for abdominal pain. Negative for constipation, diarrhea, nausea and vomiting.   Endocrine: Negative for polyuria.   Genitourinary: Negative for dysuria.   Musculoskeletal: Negative for arthralgias and myalgias.   Skin: Negative for wound.   Neurological: Negative for dizziness, light-headedness and headaches.   Psychiatric/Behavioral: Negative for agitation.     OBJECTIVE:     Vital Signs (Most Recent)  Temp: 98.6 °F (37 °C) (02/16/18 0932)    Physical Exam   Constitutional: She is oriented to person, place, and time. She appears well-developed and well-nourished. No distress.   HENT:   Head: Normocephalic and atraumatic.   Eyes: Conjunctivae and EOM are normal. Pupils are equal, round, and reactive to light.   Neck: Normal range of motion. Neck supple. No tracheal deviation present.   Cardiovascular: Normal rate, regular rhythm and normal heart sounds.    Pulmonary/Chest: Effort normal and breath sounds normal.   Abdominal: Soft. Bowel sounds are normal.   L subcostal surgical scar well healed.  No RUQ TTP, negative Melendez's   Musculoskeletal: Normal range of motion. She exhibits no edema.   Neurological: She is alert and oriented to person, place, and time.   Skin: Skin is warm and dry.   Psychiatric: She has a normal mood and affect.       Diagnostic Results:  CT reviewed    ASSESSMENT/PLAN:     A/P:  76 yo F with cholelithiasis, several large stones, largest measuring >2cm    Continue to monitor hiatal hernia as she currently has no symptoms    Recommend lap [place] as this patient is at increased risk for cholecystitis given stone burden and large stones  Preop  CBC, BMP, CXR, EKG  The procedure was described in detail to the patient and  all questions were answered.  The risks and benefits of the procedure were discussed and the patient wishes to proceed with surgery.    Oumar Harmon  General Surgery, PGY-5  Pager # [phone]    I have personally taken the history and examined this patient and agree with the resident's note as stated above.         Lambert Villa MD

## 2018-02-28 ENCOUNTER — TELEPHONE (OUTPATIENT)
Dept: SURGERY | Facility: CLINIC | Age: 78
End: 2018-02-28

## 2018-03-01 ENCOUNTER — ANESTHESIA EVENT (OUTPATIENT)
Dept: SURGERY | Facility: HOSPITAL | Age: 78
End: 2018-03-01
Payer: COMMERCIAL

## 2018-03-01 ENCOUNTER — ANESTHESIA (OUTPATIENT)
Dept: SURGERY | Facility: HOSPITAL | Age: 78
End: 2018-03-01
Payer: COMMERCIAL

## 2018-03-01 ENCOUNTER — SURGERY (OUTPATIENT)
Age: 78
End: 2018-03-01

## 2018-03-01 ENCOUNTER — HOSPITAL ENCOUNTER (OUTPATIENT)
Facility: HOSPITAL | Age: 78
Discharge: HOME | End: 2018-03-01
Attending: SURGERY | Admitting: SURGERY
Payer: COMMERCIAL

## 2018-03-01 VITALS
DIASTOLIC BLOOD PRESSURE: 60 MMHG | SYSTOLIC BLOOD PRESSURE: 131 MMHG | WEIGHT: 153 LBS | OXYGEN SATURATION: 99 % | TEMPERATURE: 98 F | BODY MASS INDEX: 24.01 KG/M2 | RESPIRATION RATE: 18 BRPM | HEART RATE: 63 BPM | HEIGHT: 67 IN

## 2018-03-01 DIAGNOSIS — K80.20 GALLSTONES: Primary | ICD-10-CM

## 2018-03-01 PROCEDURE — 63600175 PHARM REV CODE 636 W HCPCS: Performed by: NURSE ANESTHETIST, CERTIFIED REGISTERED

## 2018-03-01 PROCEDURE — 94761 N-INVAS EAR/PLS OXIMETRY MLT: CPT

## 2018-03-01 PROCEDURE — 27201423 OPTIME MED/SURG SUP & DEVICES STERILE SUPPLY: Performed by: SURGERY

## 2018-03-01 PROCEDURE — 71000015 HC POSTOP RECOV 1ST HR: Performed by: SURGERY

## 2018-03-01 PROCEDURE — 37000008 HC ANESTHESIA 1ST 15 MINUTES: Performed by: SURGERY

## 2018-03-01 PROCEDURE — 25000003 PHARM REV CODE 250: Performed by: ANESTHESIOLOGY

## 2018-03-01 PROCEDURE — 25000003 PHARM REV CODE 250: Performed by: SURGERY

## 2018-03-01 PROCEDURE — 71000033 HC RECOVERY, INTIAL HOUR: Performed by: SURGERY

## 2018-03-01 PROCEDURE — D9220A PRA ANESTHESIA: Mod: CRNA,,, | Performed by: NURSE ANESTHETIST, CERTIFIED REGISTERED

## 2018-03-01 PROCEDURE — 36000709 HC OR TIME LEV III EA ADD 15 MIN: Performed by: SURGERY

## 2018-03-01 PROCEDURE — 25000003 PHARM REV CODE 250: Performed by: NURSE ANESTHETIST, CERTIFIED REGISTERED

## 2018-03-01 PROCEDURE — 88304 TISSUE EXAM BY PATHOLOGIST: CPT | Performed by: PATHOLOGY

## 2018-03-01 PROCEDURE — 37000009 HC ANESTHESIA EA ADD 15 MINS: Performed by: SURGERY

## 2018-03-01 PROCEDURE — 25000003 PHARM REV CODE 250: Performed by: GENERAL PRACTICE

## 2018-03-01 PROCEDURE — 47562 LAPAROSCOPIC CHOLECYSTECTOMY: CPT | Mod: ,,, | Performed by: SURGERY

## 2018-03-01 PROCEDURE — 36000708 HC OR TIME LEV III 1ST 15 MIN: Performed by: SURGERY

## 2018-03-01 PROCEDURE — D9220A PRA ANESTHESIA: Mod: ANES,,, | Performed by: ANESTHESIOLOGY

## 2018-03-01 PROCEDURE — 88304 TISSUE EXAM BY PATHOLOGIST: CPT | Mod: 26,,, | Performed by: PATHOLOGY

## 2018-03-01 PROCEDURE — 63600175 PHARM REV CODE 636 W HCPCS: Performed by: SURGERY

## 2018-03-01 RX ORDER — ONDANSETRON 2 MG/ML
INJECTION INTRAMUSCULAR; INTRAVENOUS
Status: DISCONTINUED | OUTPATIENT
Start: 2018-03-01 | End: 2018-03-01

## 2018-03-01 RX ORDER — PROPOFOL 10 MG/ML
VIAL (ML) INTRAVENOUS
Status: DISCONTINUED | OUTPATIENT
Start: 2018-03-01 | End: 2018-03-01

## 2018-03-01 RX ORDER — CEFAZOLIN SODIUM 1 G/3ML
2 INJECTION, POWDER, FOR SOLUTION INTRAMUSCULAR; INTRAVENOUS
Status: DISCONTINUED | OUTPATIENT
Start: 2018-03-01 | End: 2018-03-01 | Stop reason: HOSPADM

## 2018-03-01 RX ORDER — BUPIVACAINE HYDROCHLORIDE 2.5 MG/ML
INJECTION, SOLUTION EPIDURAL; INFILTRATION; INTRACAUDAL
Status: DISCONTINUED | OUTPATIENT
Start: 2018-03-01 | End: 2018-03-01 | Stop reason: HOSPADM

## 2018-03-01 RX ORDER — LIDOCAINE HYDROCHLORIDE 10 MG/ML
1 INJECTION, SOLUTION EPIDURAL; INFILTRATION; INTRACAUDAL; PERINEURAL ONCE
Status: COMPLETED | OUTPATIENT
Start: 2018-03-01 | End: 2018-03-01

## 2018-03-01 RX ORDER — SODIUM CHLORIDE 9 MG/ML
INJECTION, SOLUTION INTRAVENOUS CONTINUOUS
Status: DISCONTINUED | OUTPATIENT
Start: 2018-03-01 | End: 2018-03-01 | Stop reason: HOSPADM

## 2018-03-01 RX ORDER — LIDOCAINE HCL/PF 100 MG/5ML
SYRINGE (ML) INTRAVENOUS
Status: DISCONTINUED | OUTPATIENT
Start: 2018-03-01 | End: 2018-03-01

## 2018-03-01 RX ORDER — ROCURONIUM BROMIDE 10 MG/ML
INJECTION, SOLUTION INTRAVENOUS
Status: DISCONTINUED | OUTPATIENT
Start: 2018-03-01 | End: 2018-03-01

## 2018-03-01 RX ORDER — OXYCODONE AND ACETAMINOPHEN 5; 325 MG/1; MG/1
1 TABLET ORAL ONCE AS NEEDED
Status: COMPLETED | OUTPATIENT
Start: 2018-03-01 | End: 2018-03-01

## 2018-03-01 RX ORDER — OXYCODONE AND ACETAMINOPHEN 5; 325 MG/1; MG/1
1 TABLET ORAL EVERY 4 HOURS PRN
Qty: 31 TABLET | Refills: 0 | Status: SHIPPED | OUTPATIENT
Start: 2018-03-01 | End: 2018-04-05

## 2018-03-01 RX ORDER — LIDOCAINE HYDROCHLORIDE AND EPINEPHRINE 10; 10 MG/ML; UG/ML
INJECTION, SOLUTION INFILTRATION; PERINEURAL
Status: DISCONTINUED | OUTPATIENT
Start: 2018-03-01 | End: 2018-03-01 | Stop reason: HOSPADM

## 2018-03-01 RX ORDER — FENTANYL CITRATE 50 UG/ML
25 INJECTION, SOLUTION INTRAMUSCULAR; INTRAVENOUS EVERY 5 MIN PRN
Status: DISCONTINUED | OUTPATIENT
Start: 2018-03-01 | End: 2018-03-01 | Stop reason: HOSPADM

## 2018-03-01 RX ORDER — DEXAMETHASONE SODIUM PHOSPHATE 4 MG/ML
INJECTION, SOLUTION INTRA-ARTICULAR; INTRALESIONAL; INTRAMUSCULAR; INTRAVENOUS; SOFT TISSUE
Status: DISCONTINUED | OUTPATIENT
Start: 2018-03-01 | End: 2018-03-01

## 2018-03-01 RX ORDER — PHENYLEPHRINE HYDROCHLORIDE 10 MG/ML
INJECTION INTRAVENOUS
Status: DISCONTINUED | OUTPATIENT
Start: 2018-03-01 | End: 2018-03-01

## 2018-03-01 RX ORDER — FENTANYL CITRATE 50 UG/ML
INJECTION, SOLUTION INTRAMUSCULAR; INTRAVENOUS
Status: DISCONTINUED | OUTPATIENT
Start: 2018-03-01 | End: 2018-03-01

## 2018-03-01 RX ORDER — OXYCODONE AND ACETAMINOPHEN 5; 325 MG/1; MG/1
TABLET ORAL
Status: DISCONTINUED
Start: 2018-03-01 | End: 2018-03-01 | Stop reason: HOSPADM

## 2018-03-01 RX ORDER — NEOSTIGMINE METHYLSULFATE 1 MG/ML
INJECTION, SOLUTION INTRAVENOUS
Status: DISCONTINUED | OUTPATIENT
Start: 2018-03-01 | End: 2018-03-01

## 2018-03-01 RX ORDER — ACETAMINOPHEN 10 MG/ML
INJECTION, SOLUTION INTRAVENOUS
Status: DISCONTINUED | OUTPATIENT
Start: 2018-03-01 | End: 2018-03-01

## 2018-03-01 RX ORDER — GLYCOPYRROLATE 0.2 MG/ML
INJECTION INTRAMUSCULAR; INTRAVENOUS
Status: DISCONTINUED | OUTPATIENT
Start: 2018-03-01 | End: 2018-03-01

## 2018-03-01 RX ADMIN — ACETAMINOPHEN 1000 MG: 10 INJECTION, SOLUTION INTRAVENOUS at 09:03

## 2018-03-01 RX ADMIN — ROCURONIUM BROMIDE 30 MG: 10 INJECTION, SOLUTION INTRAVENOUS at 08:03

## 2018-03-01 RX ADMIN — LIDOCAINE HYDROCHLORIDE 1 MG: 10 INJECTION, SOLUTION EPIDURAL; INFILTRATION; INTRACAUDAL; PERINEURAL at 07:03

## 2018-03-01 RX ADMIN — CEFAZOLIN 2 G: 330 INJECTION, POWDER, FOR SOLUTION INTRAMUSCULAR; INTRAVENOUS at 09:03

## 2018-03-01 RX ADMIN — GLYCOPYRROLATE 0.6 MG: 0.2 INJECTION, SOLUTION INTRAMUSCULAR; INTRAVENOUS at 10:03

## 2018-03-01 RX ADMIN — NEOSTIGMINE METHYLSULFATE 5 MG: 1 INJECTION INTRAVENOUS at 10:03

## 2018-03-01 RX ADMIN — OXYCODONE HYDROCHLORIDE AND ACETAMINOPHEN 1 TABLET: 5; 325 TABLET ORAL at 10:03

## 2018-03-01 RX ADMIN — SODIUM CHLORIDE, SODIUM GLUCONATE, SODIUM ACETATE, POTASSIUM CHLORIDE, MAGNESIUM CHLORIDE, SODIUM PHOSPHATE, DIBASIC, AND POTASSIUM PHOSPHATE: .53; .5; .37; .037; .03; .012; .00082 INJECTION, SOLUTION INTRAVENOUS at 09:03

## 2018-03-01 RX ADMIN — DEXAMETHASONE SODIUM PHOSPHATE 4 MG: 4 INJECTION, SOLUTION INTRAMUSCULAR; INTRAVENOUS at 08:03

## 2018-03-01 RX ADMIN — PROPOFOL 150 MG: 10 INJECTION, EMULSION INTRAVENOUS at 08:03

## 2018-03-01 RX ADMIN — EPHEDRINE SULFATE 5 MG: 50 INJECTION, SOLUTION INTRAMUSCULAR; INTRAVENOUS; SUBCUTANEOUS at 09:03

## 2018-03-01 RX ADMIN — PHENYLEPHRINE HYDROCHLORIDE 100 MCG: 10 INJECTION INTRAVENOUS at 09:03

## 2018-03-01 RX ADMIN — SODIUM CHLORIDE: 0.9 INJECTION, SOLUTION INTRAVENOUS at 07:03

## 2018-03-01 RX ADMIN — LIDOCAINE HYDROCHLORIDE 60 MG: 20 INJECTION, SOLUTION INTRAVENOUS at 08:03

## 2018-03-01 RX ADMIN — ONDANSETRON 4 MG: 2 INJECTION INTRAMUSCULAR; INTRAVENOUS at 10:03

## 2018-03-01 RX ADMIN — ROCURONIUM BROMIDE 10 MG: 10 INJECTION, SOLUTION INTRAVENOUS at 09:03

## 2018-03-01 RX ADMIN — FENTANYL CITRATE 100 MCG: 50 INJECTION, SOLUTION INTRAMUSCULAR; INTRAVENOUS at 08:03

## 2018-03-01 RX ADMIN — LIDOCAINE HYDROCHLORIDE AND EPINEPHRINE 7 ML: 10; 10 INJECTION, SOLUTION INFILTRATION; PERINEURAL at 10:03

## 2018-03-01 RX ADMIN — BUPIVACAINE HYDROCHLORIDE 7 ML: 2.5 INJECTION, SOLUTION EPIDURAL; INFILTRATION; INTRACAUDAL; PERINEURAL at 10:03

## 2018-03-01 NOTE — TRANSFER OF CARE
"Anesthesia Transfer of Care Note    Patient: Ida Santana    Procedure(s) Performed: Procedure(s) (LRB):  CHOLECYSTECTOMY-LAPAROSCOPIC (N/A)    Patient location: PACU    Anesthesia Type: general    Transport from OR: Transported from OR on 6-10 L/min O2 by face mask with adequate spontaneous ventilation    Post pain: adequate analgesia    Post assessment: no apparent anesthetic complications and tolerated procedure well    Post vital signs: stable    Level of consciousness: awake, alert and oriented    Nausea/Vomiting: no nausea/vomiting    Complications: none    Transfer of care protocol was followed      Last vitals:   Visit Vitals  /64   Pulse 70   Temp 36.6 °C (97.9 °F) (Temporal)   Resp 18   Ht 5' 7" (1.702 m)   Wt 69.4 kg (153 lb)   LMP  (LMP Unknown)   SpO2 100%   Breastfeeding? No   BMI 23.96 kg/m²     "

## 2018-03-01 NOTE — OP NOTE
DATE OF PROCEDURE: 03/01/2018     PREOPERATIVE DIAGNOSIS: Cholelithiasis    POSTOPERATIVE DIAGNOSIS: Cholelithiasis.     PROCEDURE PERFORMED: Laparoscopic cholecystectomy.     ATTENDING SURGEON: Lambert Villa Jr., M.D.     HOUSESTAFF SURGEON: Jj Parrish M.D. (RES)     ANESTHESIA: General endotracheal.     ESTIMATED BLOOD LOSS: 5 mL.     FINDINGS: Cholelithiasis and mild inflammation.     SPECIMEN: Gallbladder.     DRAINS: None.     COMPLICATIONS: None.     INDICATIONS: Ida Santana is a 77 y.o.female referred to General Surgery Clinic with a history of multiple large gallstones. We recommended laparoscopic cholecystectomy and the patient agreed to proceed. The patient signed informed consent and expressed understanding of the risks and benefits of surgery.     OPERATIVE PROCEDURE: The patient was identified in Preoperative Holding and brought back to the Operating Room. Placed supine on the operating table and padded appropriately. Monitors were applied and there was smooth induction of general endotracheal anesthesia. The patient's abdomen was prepped and draped  in the standard sterile surgical fashion. A time-out was performed and all team members present agreed this was the correct procedure on the correct patient. We also confirmed administration of appropriate preoperative antibiotics.    A 2-cm supraumbilical skin incision was made. Subcutaneous tissue was bluntly dissected with two S-retractors. The appropriate fascial layers were identified, controlled with Kocher clamps, and entered in a controlled fashion. A 0 Vicryl stay suture was placed around the fascial incision in a figure of eight fashion. A Gabbie trocar was placed and the abdomen was insufflated with carbon dioxide to a maximum pressure of 15 mmHg. A 10-mm laparoscope was placed and the abdomen was examined. There was no evidence of injury from the initial trocar placement. Three 5-mm trocars were placed under direct vision through  separate stab incisions, one subxiphoid and two in the right upper quadrant. We directed our attention to the right upper quadrant. The gallbladder was identified and noted to have mild significant inflammatory change. The fundus was grasped and retracted cranially and the infundibulum was grasped and retracted laterally. We bluntly dissected the peritoneal reflection off the infundibulum and neck of the gallbladder. With careful dissection in this area, we were able to identify the cystic duct. Further careful dissection identified the cystic artery and we did obtain a critical view of safety. Both duct and artery were triply clipped and divided. The gallbladder was dissected off the gallbladder fossa using Bovie electrocautery from infundibulum to fundus until free. It was placed into an EndoCatch bag and removed from the umbilical port site with no difficulty. We returned the laparoscope and Gabbie trocar to the umbilicus and reexamined the right upper quadrant. The gallbladder fossa was examined and no further bleeding or any bile leak were noted. The clips on the cystic duct and artery were examined and no bleeding or bile leak were noted. The right upper quadrant was irrigated with saline briefly until the returning effluent was clear. All ports were removed under direct vision and no bleeding from any port site was noted. The insufflation of the abdomen was evacuated and the laparoscope and Gabbie trocar were removed. The fascial incision at the umbilical port site was closed with the preexisting 0 Vicryl stitch. All port sites were closed in a subcuticular fashion. Sterile dressings were applied. The patient was extubated in the Operating Room and transported to the Recovery Room in stable condition. All sponge, instrument and needle counts were reported as correct at the end of the case.    Dr. Villa was present and scrubbed for the entire case.

## 2018-03-01 NOTE — H&P (VIEW-ONLY)
History & Physical  Surgery      SUBJECTIVE:     Chief Complaint/Reason for Admission: cholelithiasis    History of Present Illness: Ida Santana is a 77 y.o. female with PMHx HTN, HLD, DCIS s/p lumpectomy, 4cm L benign adrenal mass s/p adrenalectomy who was referred to clinic for cholelithiasis.  Pt was found to have a distended gallbladder filled with large stones on surevillance CT scan.  Her only symptoms are some mild lower abdominal pain. She denies any N/V, chest pain, SOB, reflux, or changes in bowel habits.  No RUQ pain.  Largest stone on CT was >2cm.  CT also noted a large hiatal hernia.      Current Outpatient Prescriptions on File Prior to Visit   Medication Sig    aspirin (ECOTRIN) 81 MG EC tablet Take 81 mg by mouth once daily.    dorzolamide-timolol 2-0.5% (COSOPT) 22.3-6.8 mg/mL ophthalmic solution Place 1 drop into the left eye 2 (two) times daily.    flurandrenolide (CORDRAN) 0.05 % lotion Apply to  scalp one to two times daily as needed for dryness    ketoconazole (NIZORAL) 2 % shampoo USE EVERY OTHER DAY LET SIT ON SCALP FOR FIVE MINUTES BEFORE RINSING    latanoprost 0.005 % ophthalmic solution Place 1 drop into the right eye every evening.    MULTIVITAMIN WITH MINERALS (ONE-A-DAY 50 PLUS) Tab Take 1 tablet by mouth once daily.     prednisoLONE acetate (PRED FORTE) 1 % DrpS Place 1 drop into the left eye 2 (two) times daily.    valsartan (DIOVAN) 320 MG tablet Take 1 tablet (320 mg total) by mouth once daily. Stop the Hcxvxbikh650/hct 12.5    verapamil (CALAN-SR) 240 MG CR tablet take 1 tablet by mouth twice a day     No current facility-administered medications on file prior to visit.        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    Cataract     History of uterine fibroid     Hyperlipidemia     dyslipidemia    Hypertension     MRSA infection     Open angle 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 eyes 5/2/2013    Osteopenia     Potassium depletion 1998    Retinal detachment of left eye with retinal break 3/13/2017     Past Surgical History:   Procedure Laterality Date    BREAST BIOPSY  2001    left breast DCIS    BREAST LUMPECTOMY Left 09/11/2001    CATARACT EXTRACTION W/  INTRAOCULAR LENS IMPLANT Left 08/02/2017    with PPV/SO removal ( AND )    COLONOSCOPY      COLONOSCOPY N/A 10/31/2016    Procedure: COLONOSCOPY;  Surgeon: YAMILETH Richards MD;  Location: [facility] ([address] [place] [address]);  Service: Endoscopy;  Laterality: N/A;    KIDNEY SURGERY  03/27/2017    patient does not know what they did, but knows she does not have kidney cancer    PARS PLANA VITRECTOMY Left 08/02/2017    25g PPV WITH SO REMOVAL AND PHACO IOL ( AND )    RETINAL DETACHMENT SURGERY Left 08/22/2017    PPVx and SO Removal//Phaco IOL ( AND )     Family History   Problem Relation Age of Onset    Cancer Mother      breast    Breast cancer Mother      40s and again in 50s (bilateral)    Breast cancer Other 40    Heart disease Father      bypass    Cancer Father      asbestos    Stroke Brother     Ovarian cancer Neg Hx     Amblyopia Neg Hx     Blindness Neg Hx     Cataracts Neg Hx     Diabetes Neg Hx     Glaucoma Neg Hx     Hypertension Neg Hx     Macular degeneration Neg Hx     Retinal detachment Neg Hx     Strabismus Neg Hx     Thyroid disease Neg Hx     Colon cancer Neg Hx     Melanoma Neg Hx      Social History   Substance Use Topics    Smoking status: Never Smoker    Smokeless tobacco: Never Used    Alcohol use No        Review of Systems   Constitutional:  Negative for chills, fatigue and fever.   HENT: Negative for congestion and rhinorrhea.    Eyes: Negative for visual disturbance.   Respiratory: Negative for cough and shortness of breath.    Cardiovascular: Negative for chest pain and leg swelling.   Gastrointestinal: Positive for abdominal pain. Negative for constipation, diarrhea, nausea and vomiting.   Endocrine: Negative for polyuria.   Genitourinary: Negative for dysuria.   Musculoskeletal: Negative for arthralgias and myalgias.   Skin: Negative for wound.   Neurological: Negative for dizziness, light-headedness and headaches.   Psychiatric/Behavioral: Negative for agitation.     OBJECTIVE:     Vital Signs (Most Recent)  Temp: 98.6 °F (37 °C) (02/16/18 0932)    Physical Exam   Constitutional: She is oriented to person, place, and time. She appears well-developed and well-nourished. No distress.   HENT:   Head: Normocephalic and atraumatic.   Eyes: Conjunctivae and EOM are normal. Pupils are equal, round, and reactive to light.   Neck: Normal range of motion. Neck supple. No tracheal deviation present.   Cardiovascular: Normal rate, regular rhythm and normal heart sounds.    Pulmonary/Chest: Effort normal and breath sounds normal.   Abdominal: Soft. Bowel sounds are normal.   L subcostal surgical scar well healed.  No RUQ TTP, negative Melendez's   Musculoskeletal: Normal range of motion. She exhibits no edema.   Neurological: She is alert and oriented to person, place, and time.   Skin: Skin is warm and dry.   Psychiatric: She has a normal mood and affect.       Diagnostic Results:  CT reviewed    ASSESSMENT/PLAN:     A/P:  78 yo F with cholelithiasis, several large stones, largest measuring >2cm    Continue to monitor hiatal hernia as she currently has no symptoms    Recommend lap [place] as this patient is at increased risk for cholecystitis given stone burden and large stones  Preop  CBC, BMP, CXR, EKG  The procedure was described in detail to the patient and  all questions were answered.  The risks and benefits of the procedure were discussed and the patient wishes to proceed with surgery.    Oumar Harmon  General Surgery, PGY-5  Pager # [phone]    I have personally taken the history and examined this patient and agree with the resident's note as stated above.         Lambert Villa MD

## 2018-03-01 NOTE — ANESTHESIA POSTPROCEDURE EVALUATION
"Anesthesia Post Evaluation    Patient: Ida Santana    Procedure(s) Performed: Procedure(s) (LRB):  CHOLECYSTECTOMY-LAPAROSCOPIC (N/A)    Final Anesthesia Type: general  Patient location during evaluation: PACU  Patient participation: Yes- Able to Participate  Level of consciousness: awake and alert  Post-procedure vital signs: reviewed and stable  Pain management: adequate  Airway patency: patent  PONV status at discharge: No PONV  Anesthetic complications: no      Cardiovascular status: blood pressure returned to baseline  Respiratory status: unassisted  Hydration status: euvolemic  Follow-up not needed.        Visit Vitals  /60   Pulse 63   Temp 36.4 °C (97.5 °F) (Temporal)   Resp 18   Ht 5' 7" (1.702 m)   Wt 69.4 kg (153 lb)   LMP  (LMP Unknown)   SpO2 99%   Breastfeeding? No   BMI 23.96 kg/m²       Pain/Ellen Score: Pain Assessment Performed: Yes (3/1/2018 12:00 PM)  Presence of Pain: denies (3/1/2018 12:00 PM)  Pain Rating Prior to Med Admin: 0 (3/1/2018 12:00 PM)  Pain Rating Post Med Admin: 0 (3/1/2018 12:00 PM)  Ellen Score: 10 (3/1/2018 12:00 PM)      "

## 2018-03-01 NOTE — DISCHARGE INSTRUCTIONS
Discharge Instructions for Laparoscopic Cholecystectomy  You have had a procedure known as a laparoscopic cholecystectomy. A laparoscopic cholecystectomy is a procedure to remove your gallbladder. People who have this procedure usually recover more quickly and have less pain than with open gallbladder surgery (called open cholecystectomy). Many surgeons recommend a low-fat diet, avoiding fried food in particular, for the first month after surgery.   You can live a full and healthy life without your gallbladder. This includes eating the foods and doing the things you enjoyed before your gallbladder problems started.  Home care  Recommendations for home care include the following:   · Ask someone to drive you to your appointments for the next 3 days. Dont drive until you are no longer taking pain medicine and are able to step on the brake pedal without hesitation.   · Wash the skin around your incision daily with mild soap and water. It's OK to shower the day after your surgery.  · Eat your regular diet. It is wise to stay away from rich, greasy, or spicy food for a few days.  · Remember, it takes at least 1 week for you to get most of your strength and energy back.  · Make an office visit to talk to your healthcare provider if the following symptoms dont go away within a week after your surgery:  ¨ Fatigue  ¨ Pain around the incision  ¨ Diarrhea or constipation  ¨ Loss of appetite     When to call your healthcare provider  Call your healthcare provider immediately if you have any of the following:  · Yellowing of your eyes or skin (jaundice)  · Chills  · Fever of 100.4°F (38.0°C) or higher, or as directed by your healthcare provider   · Redness, swelling, increasing pain, pus, or a foul smell at the incision site  · Dark or rust-colored urine  · Stool that is nat-colored or light in color instead of brown  · Increasing belly pain  · Rectal bleeding  · Leg swelling or shortness of breath   Date Last Reviewed:  7/1/2016  © 0321-9455 [employer]. [address] [place] [address], [place] [address]. All rights reserved. This information is not intended as a substitute for professional medical care. Always follow your healthcare professional's instructions.

## 2018-03-01 NOTE — DISCHARGE SUMMARY
[facility]  Discharge Summary  General Surgery      Admit Date: 3/1/2018    Discharge Date: 3/1/2018    Attending Physician: Lambert Villa Jr., MD     Discharge Provider: Jj Parrish MD    Reason for Admission: Elective surgery    Procedures Performed: Procedure(s) (LRB):  CHOLECYSTECTOMY-LAPAROSCOPIC (N/A)    Hospital Course:  Patient is a 77-year-old female with cholelithiasis who presented for planned outpatient procedure. She was taken to the Operating Room for laparoscopic cholecystectomy. See Operative Report for full details. She tolerated the procedure well with no apparent complications. She was discharged to home in good condition following an uneventful stay in Recovery.    Consults: None    Significant Diagnostic Studies: None    Final Diagnoses:   Principal Problem: Cholelithiasis   Secondary Diagnoses: None    Discharged Condition: Good    Disposition: Home or Self Care    Follow Up/Patient Instructions: Follow up with Dr. Villa in 2 weeks.    Medications:  Reconciled Home Medications:   Current Discharge Medication List      START taking these medications    Details   oxyCODONE-acetaminophen (PERCOCET) 5-325 mg per tablet Take 1 tablet by mouth every 4 (four) hours as needed.  Qty: 31 tablet, Refills: 0         CONTINUE these medications which have NOT CHANGED    Details   aspirin (ECOTRIN) 81 MG EC tablet Take 81 mg by mouth once daily.      dorzolamide-timolol 2-0.5% (COSOPT) 22.3-6.8 mg/mL ophthalmic solution Place 1 drop into the left eye 2 (two) times daily.  Qty: 10 mL, Refills: 6    Associated Diagnoses: Primary open angle glaucoma of both eyes, mild stage      flurandrenolide (CORDRAN) 0.05 % lotion Apply to  scalp one to two times daily as needed for dryness      ketoconazole (NIZORAL) 2 % shampoo USE EVERY OTHER DAY LET SIT ON SCALP FOR FIVE MINUTES BEFORE RINSING  Qty: 120 mL, Refills: 6      latanoprost 0.005 % ophthalmic solution Place 1 drop into the right eye  every evening.  Qty: 1 Bottle, Refills: 6    Associated Diagnoses: Primary open angle glaucoma of both eyes, mild stage      MULTIVITAMIN WITH MINERALS (ONE-A-DAY 50 PLUS) Tab Take 1 tablet by mouth once daily.       prednisoLONE acetate (PRED FORTE) 1 % DrpS Place 1 drop into the left eye 2 (two) times daily.  Qty: 10 mL, Refills: 1    Associated Diagnoses: Postoperative care for cataract      valsartan (DIOVAN) 320 MG tablet Take 1 tablet (320 mg total) by mouth once daily. Stop the Mgvtfubdn071/hct 12.5  Qty: 90 tablet, Refills: 3      verapamil (CALAN-SR) 240 MG CR tablet take 1 tablet by mouth twice a day  Qty: 60 tablet, Refills: 12             Discharge Procedure Orders  Diet Adult Regular     Activity as tolerated     Shower on day dressing removed (No bath)   Order Comments: No dressing required. White tape strips to remain in place. These will fall off on their own. If not, we will remove them in clinic at your follow up visit. May shower with soap and water in 48 hours. Dab dry. Do not scrub vigorously. Do not submerge incisions for 2 weeks.     Lifting restrictions   Order Comments: No lifting greater than 10 pounds for 4-6 weeks.     Notify your health care provider if you experience any of the following:  increased confusion or weakness     Notify your health care provider if you experience any of the following:  persistent dizziness, light-headedness, or visual disturbances     Notify your health care provider if you experience any of the following:  worsening rash     Notify your health care provider if you experience any of the following:  severe persistent headache     Notify your health care provider if you experience any of the following:  difficulty breathing or increased cough     Notify your health care provider if you experience any of the following:  redness, tenderness, or signs of infection (pain, swelling, redness, odor or green/yellow discharge around incision site)     Notify your  health care provider if you experience any of the following:  severe uncontrolled pain     Notify your health care provider if you experience any of the following:  persistent nausea and vomiting or diarrhea     Notify your health care provider if you experience any of the following:  temperature >100.4     No dressing needed       Follow-up Information     Lambert Villa Jr, MD In 2 weeks.    Specialties:  General Surgery, Bariatrics  Why:  Post-op laparoscopic cholecystectomy  Contact information:  [facility] [facility] [id number] [phone]           Activity: As tolerated. Lifting precautions.  Diet: Adult regular.  Wound Care: As above.

## 2018-03-01 NOTE — PLAN OF CARE
Discharge instructions given to patient and spouse. Educated patient on procedure and post op instructions, medications and when to follow up within designated time frame. Patient verbalized understanding. Patient denies pain and nausea at this time.  
VSS. Patient states pain is tolerable. No N&V. SCD's in place throughout duration in PACU. Transferred to the next Phase of Care.    
DISCHARGE

## 2018-03-01 NOTE — ANESTHESIA PREPROCEDURE EVALUATION
03/01/2018  Ida Santana is a 77 y.o., female.    Anesthesia Evaluation    I have reviewed the Patient Summary Reports.    I have reviewed the Nursing Notes.   I have reviewed the Medications.     Review of Systems  Anesthesia Hx:  No problems with previous Anesthesia Denies Hx of Anesthetic complications  History of prior surgery of interest to airway management or planning: Previous anesthesia: General 3/22/17 adrenalectomy with general anesthesia.  Procedure performed at an [facility]. Denies Family Hx of Anesthesia complications.   Denies Personal Hx of Anesthesia complications.   Social:  Non-Smoker, No Alcohol Use  Patient's occupation is Retired . Denies Tobacco Use. Denies Alcohol Use.   Hematology/Oncology:        Hematology Comments: S/P Sepsis/MRSA/Staph/pneumonia: 1/2017 --  Cancer in past history: s/p radiation therapy   Breast Oncology Comments: Hx breast cancer: 6 weeks of radiation     EENT/Dental:   Eyes: Eye Disease: Glaucoma:   Denies Jaw Problems   Cardiovascular:   Hypertension, well controlled  Denies Angina. hyperlipidemia  Functional Capacity good / => 4 METS, goes to grocery store/walks around [employer]: denies CP/SOB  Denies Coronary Artery Disease.  Hypertension , Recent typical clinic B/P of 121/68 @ POC visit    Pulmonary:   Denies Shortness of breath.  Denies Asthma.  Denies Chronic Obstructive Pulmonary Disease (COPD).  Possible Obstructive Sleep Apnea , (STOP/BANG) Symptoms S - Snoring (loud), P - Pressure being treated for high BP and A - Age > 50  Pulmonary Infection:  Pneumonia.    Renal/:   Thrombosis of left renal vein  Diastolic dysfunction Neoplasm/Tumor (left renal mass).    Hepatic/GI:  Denies Esophageal / Stomach Disorders  Denies Liver Disease    Musculoskeletal:  Bone Disorders: Osteopenia    Neurological:  Denies Seizure Disorder  Denies  CVA - Cerebrovasular Accident  Denies TIA - Transient Ischemic Attack    Endocrine:   Left adrenal mass removed 3/2017 Adrenal Disease, Left adrenal mass  Metabolic Disorders, Hyperlipoproteinemia      Physical Exam  General:  Well nourished    Airway/Jaw/Neck:  Airway Findings: Mouth Opening: Normal Tongue: Normal  General Airway Assessment: Adult  Mallampati: II  TM Distance: 4 - 6 cm            Mental Status:  Mental Status Findings:  Alert and Oriented, Cooperative         Anesthesia Plan  Type of Anesthesia, risks & benefits discussed:  Anesthesia Type:  general  Patient's Preference:   Intra-op Monitoring Plan: standard ASA monitors  Intra-op Monitoring Plan Comments:   Post Op Pain Control Plan:   Post Op Pain Control Plan Comments:   Induction:   IV  Beta Blocker:  Patient is not currently on a Beta-Blocker (No further documentation required).       Informed Consent: Patient understands risks and agrees with Anesthesia plan.  Questions answered. Anesthesia consent signed with patient.  ASA Score: 2     Day of Surgery Review of History & Physical:    H&P update referred to the surgeon.         Ready For Surgery From Anesthesia Perspective.

## 2018-03-13 ENCOUNTER — TELEPHONE (OUTPATIENT)
Dept: GASTROENTEROLOGY | Facility: CLINIC | Age: 78
End: 2018-03-13

## 2018-03-13 NOTE — TELEPHONE ENCOUNTER
Spoke with patient in regards to scheduling clinic appointment in the pancreas cyst clinic. She stated that she would call back. I provided my name and number.

## 2018-03-19 ENCOUNTER — OFFICE VISIT (OUTPATIENT)
Dept: SURGERY | Facility: CLINIC | Age: 78
End: 2018-03-19
Payer: COMMERCIAL

## 2018-03-19 VITALS
DIASTOLIC BLOOD PRESSURE: 72 MMHG | WEIGHT: 151.13 LBS | HEART RATE: 77 BPM | SYSTOLIC BLOOD PRESSURE: 143 MMHG | HEIGHT: 67 IN | BODY MASS INDEX: 23.72 KG/M2 | TEMPERATURE: 98 F

## 2018-03-19 DIAGNOSIS — Z09 POSTOP CHECK: Primary | ICD-10-CM

## 2018-03-19 PROCEDURE — 99999 PR PBB SHADOW E&M-EST. PATIENT-LVL III: CPT | Mod: PBBFAC,,, | Performed by: SURGERY

## 2018-03-19 PROCEDURE — 99024 POSTOP FOLLOW-UP VISIT: CPT | Mod: S$GLB,,, | Performed by: SURGERY

## 2018-03-19 NOTE — PROGRESS NOTES
[facility] - General Surgery  General Surgery  History & Physical      Subjective:     Chief Complaint: Post-operative follow up    History of Present Illness:  Patient is a 77 y.o. female with Hx of cholelithiasis s/p laparoscopic cholecystectomy (3/1/18) who presents to clinic today for post-operative follow up. She has done well since surgery. Minimal pain. Did not fill her Percocet. Took some OTC Aleve 1-2 days. Tolerating diet. Moving bowels. No issues with incisions. No redness or drainage. No fever. Pathology showed cholelithiasis and chronic cholecystitis.        Current Outpatient Prescriptions on File Prior to Visit   Medication Sig    aspirin (ECOTRIN) 81 MG EC tablet Take 81 mg by mouth once daily.    dorzolamide-timolol 2-0.5% (COSOPT) 22.3-6.8 mg/mL ophthalmic solution Place 1 drop into the left eye 2 (two) times daily.    flurandrenolide (CORDRAN) 0.05 % lotion Apply to  scalp one to two times daily as needed for dryness    ketoconazole (NIZORAL) 2 % shampoo USE EVERY OTHER DAY LET SIT ON SCALP FOR FIVE MINUTES BEFORE RINSING    latanoprost 0.005 % ophthalmic solution Place 1 drop into the right eye every evening.    MULTIVITAMIN WITH MINERALS (ONE-A-DAY 50 PLUS) Tab Take 1 tablet by mouth once daily.     prednisoLONE acetate (PRED FORTE) 1 % DrpS Place 1 drop into the left eye 2 (two) times daily.    valsartan (DIOVAN) 320 MG tablet Take 1 tablet (320 mg total) by mouth once daily. Stop the Ofxqdkthc593/hct 12.5    verapamil (CALAN-SR) 240 MG CR tablet take 1 tablet by mouth twice a day    oxyCODONE-acetaminophen (PERCOCET) 5-325 mg per tablet Take 1 tablet by mouth every 4 (four) hours as needed.     No current facility-administered medications on file prior to visit.      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    Cataract     History of uterine fibroid     Hyperlipidemia     dyslipidemia    Hypertension     MRSA infection     Open angle 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 eyes 5/2/2013    Osteopenia     Potassium depletion 1998    Retinal detachment of left eye with retinal break 3/13/2017     Past Surgical History:   Procedure Laterality Date    Adrenal Gland Surgery to right side      BREAST BIOPSY  2001    left breast DCIS    BREAST LUMPECTOMY Left 09/11/2001    CATARACT EXTRACTION W/  INTRAOCULAR LENS IMPLANT Left 08/02/2017    with PPV/SO removal ( AND )    COLONOSCOPY      COLONOSCOPY N/A 10/31/2016    Procedure: COLONOSCOPY;  Surgeon: YAMILETH Richards MD;  Location: [facility] ([address] [place] [address]);  Service: Endoscopy;  Laterality: N/A;    KIDNEY SURGERY  03/27/2017    patient does not know what they did, but knows she does not have kidney cancer    PARS PLANA VITRECTOMY Left 08/02/2017    25g PPV WITH SO REMOVAL AND PHACO IOL ( AND )    RETINAL DETACHMENT SURGERY Left 08/22/2017    PPVx and SO Removal//Phaco IOL ( AND )     Family History     Problem Relation (Age of Onset)    Breast cancer Mother, Other (40)    Cancer Mother, Father    Heart disease Father    Stroke Brother        Social History Main Topics    Smoking status: Never Smoker    Smokeless tobacco: Never Used    Alcohol use No    Drug use: No    Sexual activity: No     Review of Systems   Constitutional: Negative for activity change, chills, fatigue and fever.   HENT: Negative for congestion, rhinorrhea and sore throat.    Eyes: Negative for visual disturbance.   Respiratory: Negative for cough, shortness of breath and wheezing.    Cardiovascular: Negative for chest pain, palpitations and leg swelling.   Gastrointestinal:  Negative for abdominal distention, abdominal pain, constipation, diarrhea, nausea and vomiting.   Genitourinary: Negative for dysuria, frequency and hematuria.   Musculoskeletal: Negative for arthralgias and myalgias.   Skin: Negative for color change, rash and wound.   Neurological: Negative for syncope, weakness and numbness.   Hematological: Does not bruise/bleed easily.   Psychiatric/Behavioral: Negative for behavioral problems and confusion.        Objective:     Vital Signs (Most Recent):  Temp: 98.2 °F (36.8 °C) (03/19/18 1459)  Pulse: 77 (03/19/18 1459)  BP: (!) 143/72 (03/19/18 1459)     Weight: 68.6 kg (151 lb 2 oz)  Body mass index is 23.67 kg/m².    Physical Exam   Constitutional: She is oriented to person, place, and time. She appears well-developed and well-nourished. No distress.   HENT:   Head: Normocephalic and atraumatic.   Eyes: EOM are normal.   Neck: Normal range of motion.   Cardiovascular: Normal rate, regular rhythm and intact distal pulses.    Pulmonary/Chest: Effort normal. No respiratory distress. She has no wheezes.   Abdominal: Soft. She exhibits no distension. There is no tenderness.   Incisions well approximated and healing well, no erythema or induration, no drainage   Musculoskeletal: She exhibits no edema or deformity.   Neurological: She is alert and oriented to person, place, and time.   Skin: Skin is warm and dry. No rash noted. She is not diaphoretic. No erythema.   Psychiatric: She has a normal mood and affect. Her behavior is normal.   Nursing note and vitals reviewed.      Pathology:  Gallbladder (3/1/18): Chronic cholecystitis and cholelithiasis.      Assessment/Plan:   76 y/o female s/p laparoscopic cholecystectomy (3/1/18)    - Doing well post-operatively with no apparent complication  - Continue lifting precautions  - Continue local wound care  - Pathology reviewed and discussed with patient  - Return to clinic as needed      Jj Parrish MD  Surgery Resident,  PGY-III  Pager: [phone]  3/19/2018 3:33 PM    I have personally taken the history and examined this patient and agree with the resident's note as stated above.         Lambert Villa MD

## 2018-03-23 ENCOUNTER — OFFICE VISIT (OUTPATIENT)
Dept: OPHTHALMOLOGY | Facility: CLINIC | Age: 78
End: 2018-03-23
Payer: COMMERCIAL

## 2018-03-23 ENCOUNTER — CLINICAL SUPPORT (OUTPATIENT)
Dept: OPHTHALMOLOGY | Facility: CLINIC | Age: 78
End: 2018-03-23
Payer: COMMERCIAL

## 2018-03-23 DIAGNOSIS — M35.01 KCS (KERATOCONJUNCTIVITIS SICCA): ICD-10-CM

## 2018-03-23 DIAGNOSIS — H25.11 NUCLEAR SCLEROSIS OF RIGHT EYE: ICD-10-CM

## 2018-03-23 DIAGNOSIS — Z48.810 POSTOPERATIVE CARE FOR CATARACT: ICD-10-CM

## 2018-03-23 DIAGNOSIS — Z96.1 PSEUDOPHAKIA: ICD-10-CM

## 2018-03-23 DIAGNOSIS — Z98.49 POSTOPERATIVE CARE FOR CATARACT: ICD-10-CM

## 2018-03-23 DIAGNOSIS — H40.1131 PRIMARY OPEN ANGLE GLAUCOMA OF BOTH EYES, MILD STAGE: ICD-10-CM

## 2018-03-23 DIAGNOSIS — H33.002 RHEGMATOGENOUS RETINAL DETACHMENT OF LEFT EYE: ICD-10-CM

## 2018-03-23 DIAGNOSIS — H40.1131 PRIMARY OPEN ANGLE GLAUCOMA OF BOTH EYES, MILD STAGE: Primary | ICD-10-CM

## 2018-03-23 DIAGNOSIS — H44.002 ENDOPHTHALMITIS, LEFT EYE: ICD-10-CM

## 2018-03-23 DIAGNOSIS — Q07.8 ANOMALOUS OPTIC NERVE: ICD-10-CM

## 2018-03-23 PROCEDURE — 99999 PR PBB SHADOW E&M-EST. PATIENT-LVL II: CPT | Mod: PBBFAC,,, | Performed by: OPHTHALMOLOGY

## 2018-03-23 PROCEDURE — 92134 CPTRZ OPH DX IMG PST SGM RTA: CPT | Mod: S$GLB,,, | Performed by: OPHTHALMOLOGY

## 2018-03-23 PROCEDURE — 92012 INTRM OPH EXAM EST PATIENT: CPT | Mod: S$GLB,,, | Performed by: OPHTHALMOLOGY

## 2018-03-23 RX ORDER — PREDNISOLONE ACETATE 10 MG/ML
1 SUSPENSION/ DROPS OPHTHALMIC EVERY MORNING
Qty: 5 ML | Refills: 2 | Status: SHIPPED | OUTPATIENT
Start: 2018-03-23 | End: 2018-07-27

## 2018-03-23 NOTE — PROGRESS NOTES
HPI     DLS: 1/16/18    Pt here for HRT review;    Meds: Latanoprost qhs od             PA bid os             Cosopt bid os     1. POAG   2. 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 SHANTE   4. NS OD   5. PCIOL OS   6. Hx Endophthalmitis OS   7. Rhegmatogenous RD OS         Last edited by Windy Mosqueda on 3/23/2018  9:19 AM. (History)            Assessment /Plan     For exam results, see Encounter Report.    Primary open angle glaucoma of both eyes, mild stage    Rhegmatogenous retinal detachment of left eye    Endophthalmitis, left eye    Nuclear sclerosis of right eye    Anomalous optic nerve    KCS (keratoconjunctivitis sicca)    Pseudophakia               Glaucoma (type and duration)    Suspect 2/2 lrg cups ou    First HVF   2000   First photos   2013   Treatment / Drops started   xal 1/2016           Family history    none        Glaucoma meds   Latanoprost started 1/4/2016         H/O adverse rxn to glaucoma drops    none        LASERS    none        GLAUCOMA SURGERIES    none        OTHER EYE SURGERIES    none        CDR    0.75-0.8 / 0.7-0.75        Tbase    18-22 / 19-22          Tmax    22/22            Ttarget    ?              HVF    4 test 2000 to  2016 - near full od // ? NS os        Gonio    +2-3 ou         CCT    510/517        OCT    2 test 2013 to 2015 - RNFL - nl od // nl os        HRT    2 test 2016 to 2018 - MR -  Dec. I, bord T od // xx os /// CDR 0.71 od // xx os (large disc size )         Disc photos    2013, 2015 - OIS     - Ttoday   16/18  - Test done today    Post op cataract and SO removal       2. Anomaolous Optic nerves    - very large disc size ou    - HRT - 2.97 od // 3.04 os (nl is up to 2.8 ou)     3. Dry eyes     4. NS - OD    5. H/O endogenous endophthalmitis  And secondary tract RD - S/P PPVx and Silicone Oil (Mazzulla)    Oil removed at the same time as the phaco/IOL done 8/2/2017     6. S/P complex phaco/IOL os with trypan blue and Leslye ring and SO removal     - (done with mazzulla ) // 8/2/2017//  PCB00 19.5   - Doing well - still with mild iritis      Pred Acetate continue bid  - still with iritis - decrease to 1 x day   Will need very slow taper of steroids -  Cosopt bid os   Latanoprost q hs - od      Use to see  dr pena but she is doing mostly children now - can stay in my clinic     F/U 2 - 3  months - / try HRT ou / gonio   -  if eye quiet next time can cut back on steroid gtts os // consider yag cap os - + PCO and silicone droplets centrally - but [facility] is fairly good // hold off on new glasses for now - doing ok with present ones - maybe once yag cap done can improve more with a change in glasses     I have seen and personally examined the patient.  I agree with the findings, assessment and plan of the resident and/or fellow.     Lynda Crawford MD

## 2018-03-28 ENCOUNTER — LAB VISIT (OUTPATIENT)
Dept: LAB | Facility: HOSPITAL | Age: 78
End: 2018-03-28
Attending: INTERNAL MEDICINE
Payer: COMMERCIAL

## 2018-03-28 DIAGNOSIS — I10 ESSENTIAL HYPERTENSION: ICD-10-CM

## 2018-03-28 LAB
ALBUMIN SERPL BCP-MCNC: 4 G/DL
ALP SERPL-CCNC: 75 U/L
ALT SERPL W/O P-5'-P-CCNC: 26 U/L
ANION GAP SERPL CALC-SCNC: 8 MMOL/L
AST SERPL-CCNC: 21 U/L
BASOPHILS # BLD AUTO: 0.02 K/UL
BASOPHILS NFR BLD: 0.5 %
BILIRUB SERPL-MCNC: 0.8 MG/DL
BUN SERPL-MCNC: 15 MG/DL
CALCIUM SERPL-MCNC: 9.7 MG/DL
CHLORIDE SERPL-SCNC: 108 MMOL/L
CO2 SERPL-SCNC: 26 MMOL/L
CREAT SERPL-MCNC: 0.8 MG/DL
DIFFERENTIAL METHOD: ABNORMAL
EOSINOPHIL # BLD AUTO: 0.2 K/UL
EOSINOPHIL NFR BLD: 3.7 %
ERYTHROCYTE [DISTWIDTH] IN BLOOD BY AUTOMATED COUNT: 13.3 %
EST. GFR  (AFRICAN AMERICAN): >60 ML/MIN/1.73 M^2
EST. GFR  (NON AFRICAN AMERICAN): >60 ML/MIN/1.73 M^2
GLUCOSE SERPL-MCNC: 94 MG/DL
HCT VFR BLD AUTO: 38.4 %
HGB BLD-MCNC: 13.2 G/DL
LYMPHOCYTES # BLD AUTO: 1.8 K/UL
LYMPHOCYTES NFR BLD: 40.2 %
MCH RBC QN AUTO: 31.5 PG
MCHC RBC AUTO-ENTMCNC: 34.4 G/DL
MCV RBC AUTO: 92 FL
MONOCYTES # BLD AUTO: 0.5 K/UL
MONOCYTES NFR BLD: 10.3 %
NEUTROPHILS # BLD AUTO: 2 K/UL
NEUTROPHILS NFR BLD: 45.3 %
PLATELET # BLD AUTO: 209 K/UL
PMV BLD AUTO: 11.1 FL
POTASSIUM SERPL-SCNC: 4 MMOL/L
PROT SERPL-MCNC: 7.1 G/DL
RBC # BLD AUTO: 4.19 M/UL
SODIUM SERPL-SCNC: 142 MMOL/L
WBC # BLD AUTO: 4.38 K/UL

## 2018-03-28 PROCEDURE — 36415 COLL VENOUS BLD VENIPUNCTURE: CPT

## 2018-03-28 PROCEDURE — 85025 COMPLETE CBC W/AUTO DIFF WBC: CPT

## 2018-03-28 PROCEDURE — 80053 COMPREHEN METABOLIC PANEL: CPT

## 2018-04-05 ENCOUNTER — OFFICE VISIT (OUTPATIENT)
Dept: INTERNAL MEDICINE | Facility: CLINIC | Age: 78
End: 2018-04-05
Payer: COMMERCIAL

## 2018-04-05 VITALS
DIASTOLIC BLOOD PRESSURE: 68 MMHG | OXYGEN SATURATION: 98 % | SYSTOLIC BLOOD PRESSURE: 116 MMHG | BODY MASS INDEX: 24.53 KG/M2 | HEART RATE: 85 BPM | WEIGHT: 156.31 LBS | HEIGHT: 67 IN

## 2018-04-05 DIAGNOSIS — C50.419 MALIGNANT NEOPLASM OF UPPER-OUTER QUADRANT OF FEMALE BREAST, UNSPECIFIED ESTROGEN RECEPTOR STATUS, UNSPECIFIED LATERALITY: ICD-10-CM

## 2018-04-05 DIAGNOSIS — I10 ESSENTIAL HYPERTENSION: ICD-10-CM

## 2018-04-05 DIAGNOSIS — M85.80 OSTEOPENIA, UNSPECIFIED LOCATION: ICD-10-CM

## 2018-04-05 DIAGNOSIS — E78.00 PURE HYPERCHOLESTEROLEMIA: Primary | ICD-10-CM

## 2018-04-05 PROBLEM — K80.20 GALLSTONES: Status: RESOLVED | Noted: 2018-02-16 | Resolved: 2018-04-05

## 2018-04-05 PROBLEM — H10.9 CONJUNCTIVITIS: Status: RESOLVED | Noted: 2017-01-19 | Resolved: 2018-04-05

## 2018-04-05 PROCEDURE — 3078F DIAST BP <80 MM HG: CPT | Mod: CPTII,S$GLB,, | Performed by: INTERNAL MEDICINE

## 2018-04-05 PROCEDURE — 99214 OFFICE O/P EST MOD 30 MIN: CPT | Mod: S$GLB,,, | Performed by: INTERNAL MEDICINE

## 2018-04-05 PROCEDURE — 3074F SYST BP LT 130 MM HG: CPT | Mod: CPTII,S$GLB,, | Performed by: INTERNAL MEDICINE

## 2018-04-05 PROCEDURE — 99999 PR PBB SHADOW E&M-EST. PATIENT-LVL III: CPT | Mod: PBBFAC,,, | Performed by: INTERNAL MEDICINE

## 2018-04-05 NOTE — PROGRESS NOTES
Subjective:      Patient ID: Ida Santana is a 77 y.o. female.    Chief Complaint: Follow-up    HPI:  HPI   Patient was last seen on 9/6/2017: since that time she has seen.    Consultants:  Opthalmology: Dr. Beaulieu (9/2017 retinal detachment) 1/2/2018  Opthalmology: Dr. Crawford 10/3/2017 1/16/2018 3/23/2018  Dermatology: Dr. Iglesias furunculosis 11/13/2017  Gyn: Dr. Guallpa 10/2017 follow up in one year 11/7/2017  Urology: Dr. Arellano 11/13/2017 Left Adrenal mass 2/12/2018 gallstones/pancreatic cyst  Breast Surgery: mammogram Ms. Jiménez  Surgery: Dr. Slater/ Dr. Boswell Pancreatic cyst  Surgery: Dr. Disla gallstones post op 3/19/2018  Radiation Oncology: Dr. Harden    Annual exam: 9/6/2017  Colonoscopy: 2005 follow up in 10 years : 10/31/2016 follow up 10 years  Mammogram: 11/15/2017  Gyn: Arabella Guallpa    Optho: /Yvette : follow up open angle glaucoma follow up 6 months  Flu: due in the fall  Tetanus :9/26/2016  Shingles: 10/30/2015  Pneumovax: done at age 73: documented  Prevnar: 9/23/2015  Bone Density:1/2017    New shingles vaccine: SHINGRIX ( not live) 90% but will require 2 injection one at the start and the other 2-6 months later.  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    Open angle 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 eyes    Osteopenia    Neuropathy    Retinal detachment of left eye with retinal break    Endophthalmitis, left eye    Rhegmatogenous retinal detachment of left eye    Left adrenal mass removed 3/2017    NS (nuclear sclerosis)    Hx of renal vein thrombosis    Retinal detachment, rhegmatogenous, left eye     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    Cancer 9/2001    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    Cataract     History of uterine fibroid     Hyperlipidemia     dyslipidemia    Hypertension     MRSA infection     Open angle 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 eyes 5/2/2013    Osteopenia     Potassium depletion 1998    Retinal detachment of left eye with retinal break 3/13/2017     Past Surgical History:   Procedure Laterality Date    Adrenal Gland Surgery to right side      BREAST BIOPSY  2001    left breast DCIS    BREAST LUMPECTOMY Left 09/11/2001    CATARACT EXTRACTION W/  INTRAOCULAR LENS IMPLANT Left 08/02/2017    with PPV/SO removal ( AND )    COLONOSCOPY      COLONOSCOPY N/A 10/31/2016    Procedure: COLONOSCOPY;  Surgeon: YAMILETH Richards MD;  Location: [facility] ([address] [place] [address]);  Service: Endoscopy;  Laterality: N/A;    KIDNEY SURGERY  03/27/2017    patient does not know what they did, but knows she does not have kidney cancer    PARS PLANA VITRECTOMY Left 08/02/2017    25g PPV WITH SO REMOVAL AND PHACO IOL ( AND )    RETINAL DETACHMENT SURGERY Left 08/22/2017    PPVx and SO Removal//Phaco IOL ( AND )     Family History   Problem Relation Age of Onset    Cancer Mother      breast    Breast cancer Mother      40s and again in 50s (bilateral)    Breast cancer Other 40    Heart disease Father      bypass    Cancer Father      asbestos    Stroke Brother     Ovarian cancer Neg Hx     Amblyopia Neg Hx     Blindness Neg Hx     Cataracts Neg Hx     Diabetes Neg Hx     Glaucoma Neg Hx     Hypertension Neg Hx     Macular degeneration Neg Hx     Retinal detachment Neg Hx     Strabismus Neg Hx     Thyroid disease Neg Hx     Colon cancer Neg Hx     Melanoma Neg Hx      Review of Systems   Constitutional: Negative for chills, fever and unexpected weight change.   HENT: Negative for ear pain, nosebleeds, sore throat, trouble swallowing and voice change.    Eyes: Negative for  "photophobia and visual disturbance.   Respiratory: Negative for cough, shortness of breath and wheezing.    Cardiovascular: Negative for chest pain, palpitations and leg swelling.   Gastrointestinal: Negative for abdominal distention, abdominal pain and blood in stool.   Genitourinary: Negative for difficulty urinating, dysuria, flank pain and hematuria.   Musculoskeletal: Negative for back pain, gait problem and joint swelling.   Skin: Negative for color change and rash.   Neurological: Negative for seizures and headaches.   Hematological: Negative for adenopathy. Does not bruise/bleed easily.   Psychiatric/Behavioral: Negative for dysphoric mood and suicidal ideas.     Objective:     Vitals:    04/05/18 0830   BP: 138/78   Pulse: 85   SpO2: 98%   Weight: 70.9 kg (156 lb 4.9 oz)   Height: 5' 7" (1.702 m)   PainSc: 0-No pain     Body mass index is 24.48 kg/m².  Physical Exam   Constitutional: She is oriented to person, place, and time. She appears well-developed and well-nourished. No distress.   HENT:   Right Ear: Tympanic membrane and ear canal normal.   Left Ear: Tympanic membrane and ear canal normal.   Nose: No sinus tenderness or nasal deformity.   Mouth/Throat: No oropharyngeal exudate or posterior oropharyngeal erythema.   Eyes: Conjunctivae, EOM and lids are normal. Pupils are equal, round, and reactive to light.   Neck: Carotid bruit is not present. No thyromegaly present.   Cardiovascular: Normal rate, regular rhythm and intact distal pulses.    Pulmonary/Chest: Effort normal and breath sounds normal. No respiratory distress.   Abdominal: Soft. Bowel sounds are normal. There is no tenderness.   Musculoskeletal: She exhibits no edema or tenderness.   Lymphadenopathy:        Head (right side): No submandibular adenopathy present.        Head (left side): No submandibular adenopathy present.     She has no cervical adenopathy.     She has no axillary adenopathy.        Right: No inguinal adenopathy present. "        Left: No inguinal adenopathy present.   Neurological: She is alert and oriented to person, place, and time. She has normal strength.   Skin: Skin is intact. No lesion noted.   Psychiatric: She has a normal mood and affect. Her speech is normal and behavior is normal.     Assessment:     1. Pure hypercholesterolemia    2. Essential hypertension    3. Osteopenia, unspecified location    4. 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     Medication List          Accurate as of 4/5/18  9:23 AM. If you have any questions, ask your nurse or doctor.               CONTINUE taking these medications    aspirin 81 MG EC tablet  Commonly known as:  ECOTRIN     CORDRAN 0.05 % lotion  Generic drug:  flurandrenolide     dorzolamide-timolol 2-0.5% 22.3-6.8 mg/mL ophthalmic solution  Commonly known as:  COSOPT  Place 1 drop into the left eye 2 (two) times daily.     ketoconazole 2 % shampoo  Commonly known as:  NIZORAL  USE EVERY OTHER DAY LET SIT ON SCALP FOR FIVE MINUTES BEFORE RINSING     latanoprost 0.005 % ophthalmic solution  Place 1 drop into the right eye every evening.     ONE-A-DAY 50 PLUS tablet  Generic drug:  geriatric multivitamin-min     prednisoLONE acetate 1 % Drps  Commonly known as:  PRED FORTE  Place 1 drop into the left eye every morning.     valsartan 320 MG tablet  Commonly known as:  DIOVAN  Take 1 tablet (320 mg total) by mouth once daily. Stop the Lytroqcdj825/hct 12.5     verapamil 240 MG CR tablet  Commonly known as:   CALAN-SR  take 1 tablet by mouth twice a day        STOP taking these medications    oxyCODONE-acetaminophen 5-325 mg per tablet  Commonly known as:  PERCOCET  Stopped by:  Giovanna Murray MD

## 2018-04-05 NOTE — PATIENT INSTRUCTIONS
New shingles vaccine: SHINGRIX ( not live) 90% but will require 2 injection one at the start and the other 2-6 months later.  PHARMACY

## 2018-05-09 ENCOUNTER — TELEPHONE (OUTPATIENT)
Dept: SURGERY | Facility: CLINIC | Age: 78
End: 2018-05-09

## 2018-05-09 NOTE — TELEPHONE ENCOUNTER
----- Message from Lucero Johnson sent at 5/9/2018 11:48 AM CDT -----  Contact: self   Ida States that she needs a call returned by a nurse in reference to a missed call she had  , Please call Ida @ [phone] . Thanks :)

## 2018-05-17 ENCOUNTER — TELEPHONE (OUTPATIENT)
Dept: GASTROENTEROLOGY | Facility: CLINIC | Age: 78
End: 2018-05-17

## 2018-05-22 ENCOUNTER — OFFICE VISIT (OUTPATIENT)
Dept: RADIATION ONCOLOGY | Facility: CLINIC | Age: 78
End: 2018-05-22
Payer: COMMERCIAL

## 2018-05-22 VITALS
RESPIRATION RATE: 20 BRPM | HEIGHT: 69 IN | SYSTOLIC BLOOD PRESSURE: 173 MMHG | DIASTOLIC BLOOD PRESSURE: 81 MMHG | TEMPERATURE: 98 F | HEART RATE: 89 BPM | WEIGHT: 154.38 LBS | BODY MASS INDEX: 22.87 KG/M2

## 2018-05-22 DIAGNOSIS — Z85.3 HISTORY OF BREAST CANCER IN FEMALE: Primary | ICD-10-CM

## 2018-05-22 PROCEDURE — 99213 OFFICE O/P EST LOW 20 MIN: CPT | Mod: S$GLB,,, | Performed by: RADIOLOGY

## 2018-05-22 PROCEDURE — 99999 PR PBB SHADOW E&M-EST. PATIENT-LVL III: CPT | Mod: PBBFAC,,, | Performed by: RADIOLOGY

## 2018-05-22 NOTE — PROGRESS NOTES
Subjective:       Patient ID: Ida Santana is a 77 y.o. female.    Chief Complaint: Breast Cancer (f/u)    This patient presents for routine follow up visit.     Ms. 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 The patient is status post segmental mastectomy followed by postoperative whole breast irradiation completed in December of 2001. The patient has not received adjuvant hormonal therapy. The patient has remained TABITHA since that time.  Today the patient states she feels well.  Recovering from recent cholecystectomy.  No breast complaints.  Mammogram in November of 2017 was negative.       Review of Systems   Constitutional: Negative for activity change, appetite change, chills, fatigue and unexpected weight change.   Respiratory: Negative for cough, choking and shortness of breath.    Cardiovascular: Negative for chest pain and palpitations.   Gastrointestinal: Negative for abdominal pain, constipation, diarrhea and nausea.       Objective:      Physical Exam   Constitutional: She appears well-developed and well-nourished. No distress.   Neck: Normal range of motion.   Pulmonary/Chest: Right breast exhibits no inverted nipple, no mass, no nipple discharge, no skin change and no tenderness. Left breast exhibits no inverted nipple, no mass, no nipple discharge, no skin change and no tenderness. There is no breast swelling.   Lymphadenopathy:     She has no cervical adenopathy.     She has no axillary adenopathy.        Right: No supraclavicular adenopathy present.       Assessment:       1. History of breast cancer in female        Plan:       Doing well, remains TABITHA   Plan follow up in 1 year for CBE.

## 2018-05-25 ENCOUNTER — TELEPHONE (OUTPATIENT)
Dept: GASTROENTEROLOGY | Facility: CLINIC | Age: 78
End: 2018-05-25

## 2018-06-20 RX ORDER — VALSARTAN 320 MG/1
TABLET ORAL
Qty: 90 TABLET | Refills: 3 | Status: SHIPPED | OUTPATIENT
Start: 2018-06-20 | End: 2018-07-26 | Stop reason: ALTCHOICE

## 2018-06-21 ENCOUNTER — OFFICE VISIT (OUTPATIENT)
Dept: GASTROENTEROLOGY | Facility: CLINIC | Age: 78
End: 2018-06-21
Payer: COMMERCIAL

## 2018-06-21 VITALS
BODY MASS INDEX: 24.49 KG/M2 | SYSTOLIC BLOOD PRESSURE: 137 MMHG | DIASTOLIC BLOOD PRESSURE: 76 MMHG | HEIGHT: 67 IN | HEART RATE: 77 BPM | WEIGHT: 156.06 LBS

## 2018-06-21 DIAGNOSIS — K86.2 PANCREAS CYST: ICD-10-CM

## 2018-06-21 PROCEDURE — 3075F SYST BP GE 130 - 139MM HG: CPT | Mod: CPTII,S$GLB,, | Performed by: INTERNAL MEDICINE

## 2018-06-21 PROCEDURE — 99999 PR PBB SHADOW E&M-EST. PATIENT-LVL III: CPT | Mod: PBBFAC,,, | Performed by: INTERNAL MEDICINE

## 2018-06-21 PROCEDURE — 99204 OFFICE O/P NEW MOD 45 MIN: CPT | Mod: S$GLB,,, | Performed by: INTERNAL MEDICINE

## 2018-06-21 PROCEDURE — 3078F DIAST BP <80 MM HG: CPT | Mod: CPTII,S$GLB,, | Performed by: INTERNAL MEDICINE

## 2018-06-21 NOTE — PROGRESS NOTES
Gastroenterology: [facility]      SUBJECTIVE:         Chief Complaint: Here for evaluation of a pancreatic cyst     History of Present Illness:  Patient is a 78 y.o. female presents with a pancreatic cyst. The cyst was first noted 2017. It's located in the the body.  It measures 1.9 mm  in size.  It is not symptomatic.  Previous studies include CT scan and MRI.   Since that time the cyst has not increased in size.    Previous FNA of the cyst has not been done    There is not a family history of pancreatic cancer     The patient does not smoke.    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    Cataract     History of uterine fibroid     Hyperlipidemia     dyslipidemia    Hypertension     MRSA infection     Open angle 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 eyes 5/2/2013    Osteopenia     Potassium depletion 1998    Retinal detachment of left eye with retinal break 3/13/2017     Past Surgical History:   Procedure Laterality Date    Adrenal Gland Surgery to right side      BREAST BIOPSY  2001    left breast DCIS    BREAST LUMPECTOMY Left 09/11/2001    CATARACT EXTRACTION W/  INTRAOCULAR LENS IMPLANT Left 08/02/2017    with PPV/SO removal ( AND )    COLONOSCOPY      COLONOSCOPY N/A 10/31/2016    Procedure: COLONOSCOPY;  Surgeon: YAMILETH Richards MD;  Location: [facility] ([address] [place] [address]);  Service: Endoscopy;  Laterality: N/A;    KIDNEY SURGERY  03/27/2017    patient does not know what they did, but knows she does not have kidney cancer    PARS PLANA VITRECTOMY Left 08/02/2017    25g PPV  WITH SO REMOVAL AND PHACO IOL ( AND )    RETINAL DETACHMENT SURGERY Left 08/22/2017    PPVx and SO Removal//Phaco IOL ( AND )     Family History   Problem Relation Age of Onset    Cancer Mother         breast    Breast cancer Mother         40s and again in 50s (bilateral)    Breast cancer Other 40    Heart disease Father         bypass    Cancer Father         asbestos    Stroke Brother     Ovarian cancer Neg Hx     Amblyopia Neg Hx     Blindness Neg Hx     Cataracts Neg Hx     Diabetes Neg Hx     Glaucoma Neg Hx     Hypertension Neg Hx     Macular degeneration Neg Hx     Retinal detachment Neg Hx     Strabismus Neg Hx     Thyroid disease Neg Hx     Colon cancer Neg Hx     Melanoma Neg Hx      Social History   Substance Use Topics    Smoking status: Never Smoker    Smokeless tobacco: Never Used    Alcohol use No       Review of Systems:  A complete review of systems was performed and other than described in the HPI and below is negative       OBJECTIVE:     Vital Signs (Most Recent)  Pulse: 77 (06/21/18 0928)  BP: 137/76 (06/21/18 0928)    General Appearance: Well appearing in no acute distress  Eyes:    No scleral icterus  ENT: Neck supple  Lungs: CTA anteriorly  Heart:  Regular rate  Abdomen: Soft, non tender, non distended with normal bowel sounds.  Extremities: No edema  Skin: No rash            Diagnostic Results:  CT: Reviewed  MRI: Reviewed        ASSESSMENT/PLAN:     Pancreatic cyst in the the body. Measuring 1.9 mm in size,  unchanged  Most likely etiology at this point is a indeterminate    We discussed in detail the natural history of pancreatic cysts, the therapy involved, and the benefits of multimodality imaging including Ct, MRI, and EUS with FNA    Plan:   We will be discussing her case at the pancreactic cyst clinic multidisciplinary conference to finalize a treatment plan.Current ACR guidelines would suggest EUS for for FNA of  the cyst in February of next year    We spent 25 minute in today's clinic with greater than 50% of the time dedicated to counseling and arranging care.

## 2018-06-25 ENCOUNTER — TELEPHONE (OUTPATIENT)
Dept: GASTROENTEROLOGY | Facility: CLINIC | Age: 78
End: 2018-06-25

## 2018-07-24 DIAGNOSIS — H40.1131 PRIMARY OPEN ANGLE GLAUCOMA OF BOTH EYES, MILD STAGE: ICD-10-CM

## 2018-07-24 RX ORDER — LATANOPROST 50 UG/ML
1 SOLUTION/ DROPS OPHTHALMIC NIGHTLY
Qty: 7.5 BOTTLE | Refills: 3 | Status: SHIPPED | OUTPATIENT
Start: 2018-07-24 | End: 2019-03-22

## 2018-07-26 ENCOUNTER — TELEPHONE (OUTPATIENT)
Dept: INTERNAL MEDICINE | Facility: CLINIC | Age: 78
End: 2018-07-26

## 2018-07-26 RX ORDER — IRBESARTAN 300 MG/1
300 TABLET ORAL DAILY
Qty: 90 TABLET | Refills: 3 | Status: SHIPPED | OUTPATIENT
Start: 2018-07-26 | End: 2019-07-07 | Stop reason: SDUPTHER

## 2018-07-26 NOTE — TELEPHONE ENCOUNTER
----- Message from Be Urbina sent at 7/26/2018  3:32 PM CDT -----  Contact: [phone]  Patient  is calling to inform MD medication valsartan (DIOVAN) 320 MG tablet is on recall . Please advise, Thanks

## 2018-07-27 ENCOUNTER — CLINICAL SUPPORT (OUTPATIENT)
Dept: OPHTHALMOLOGY | Facility: CLINIC | Age: 78
End: 2018-07-27
Payer: COMMERCIAL

## 2018-07-27 ENCOUNTER — OFFICE VISIT (OUTPATIENT)
Dept: OPHTHALMOLOGY | Facility: CLINIC | Age: 78
End: 2018-07-27
Payer: COMMERCIAL

## 2018-07-27 DIAGNOSIS — H40.1131 PRIMARY OPEN ANGLE GLAUCOMA OF BOTH EYES, MILD STAGE: Primary | ICD-10-CM

## 2018-07-27 DIAGNOSIS — H44.002 ENDOPHTHALMITIS, LEFT EYE: ICD-10-CM

## 2018-07-27 DIAGNOSIS — H40.1131 PRIMARY OPEN ANGLE GLAUCOMA OF BOTH EYES, MILD STAGE: ICD-10-CM

## 2018-07-27 DIAGNOSIS — M35.01 KCS (KERATOCONJUNCTIVITIS SICCA): ICD-10-CM

## 2018-07-27 DIAGNOSIS — H25.11 NUCLEAR SCLEROSIS OF RIGHT EYE: ICD-10-CM

## 2018-07-27 DIAGNOSIS — Q07.8 ANOMALOUS OPTIC NERVE: ICD-10-CM

## 2018-07-27 DIAGNOSIS — H33.002 RHEGMATOGENOUS RETINAL DETACHMENT OF LEFT EYE: ICD-10-CM

## 2018-07-27 DIAGNOSIS — Z96.1 PSEUDOPHAKIA: ICD-10-CM

## 2018-07-27 PROCEDURE — 92083 EXTENDED VISUAL FIELD XM: CPT | Mod: S$GLB,,, | Performed by: OPHTHALMOLOGY

## 2018-07-27 PROCEDURE — 99999 PR PBB SHADOW E&M-EST. PATIENT-LVL II: CPT | Mod: PBBFAC,,, | Performed by: OPHTHALMOLOGY

## 2018-07-27 PROCEDURE — 92133 CPTRZD OPH DX IMG PST SGM ON: CPT | Mod: S$GLB,,, | Performed by: OPHTHALMOLOGY

## 2018-07-27 PROCEDURE — 92014 COMPRE OPH EXAM EST PT 1/>: CPT | Mod: S$GLB,,, | Performed by: OPHTHALMOLOGY

## 2018-07-27 RX ORDER — VERAPAMIL HYDROCHLORIDE 240 MG/1
CAPSULE, EXTENDED RELEASE ORAL 2 TIMES DAILY
Refills: 6 | COMMUNITY
Start: 2018-06-21 | End: 2018-11-09

## 2018-07-27 NOTE — PROGRESS NOTES
Assessment /Plan     For exam results, see Encounter Report.    Primary open angle glaucoma of both eyes, mild stage    Rhegmatogenous retinal detachment of left eye    Endophthalmitis, left eye    Nuclear sclerosis of right eye    Anomalous optic nerve    KCS (keratoconjunctivitis sicca)    Pseudophakia             Glaucoma (type and duration)    Suspect 2/2 lrg cups ou    First HVF   2000   First photos   2013   Treatment / Drops started   xal 1/2016           Family history    none        Glaucoma meds   Latanoprost started 1/4/2016  od // cosopt os         H/O adverse rxn to glaucoma drops    none        LASERS    none        GLAUCOMA SURGERIES    none        OTHER EYE SURGERIES    none        CDR    0.75-0.8 / 0.7-0.75        Tbase    18-22 / 19-22          Tmax    22/22            Ttarget    ?              HVF    6 test 2000 to  2018 - near full od // gen depression and  ? INS os        Gonio    +2-3 ou         CCT    510/517        OCT    3 test 2013 to 2018 - RNFL - bord TI od // nl os        HRT    3 test 2016 to 2018 - MR -  Dec. I, bord T od // xx os /// CDR 0.71 od // xx os (large disc size )         Disc photos    2013, 2015 - OIS     - Ttoday  15/16  - Test done today  HVF / DFE / OCT       2. Anomaolous Optic nerves    - very large disc size ou    - HRT - 2.97 od // 3.04 os (nl is up to 2.8 ou)     3. Dry eyes     4. NS - OD    5. H/O endogenous endophthalmitis  And secondary tract RD - S/P PPVx and Silicone Oil (Mazzulla)    Oil removed at the same time as the phaco/IOL done 8/2/2017     6. S/P complex phaco/IOL os with trypan blue and Leslye ring and SO removal     - (done with mazzulla ) // 8/2/2017// PCB00 19.5   - Doing well - still with mild iritis      Pred Acetate - stop     Cosopt bid os   Latanoprost q hs - od      Use to see  dr pena but she is doing mostly children now - can stay in my clinic     F/U 4  months - gonio     Hold off on yag cap to ant capsule phimosis and PCO OS  - [facility]  doing OK     I have seen and personally examined the patient.  I agree with the findings, assessment and plan of the resident and/or fellow.     Lynda Crawford MD

## 2018-09-19 ENCOUNTER — TELEPHONE (OUTPATIENT)
Dept: INTERNAL MEDICINE | Facility: CLINIC | Age: 78
End: 2018-09-19

## 2018-09-19 DIAGNOSIS — I10 ESSENTIAL HYPERTENSION: Primary | ICD-10-CM

## 2018-09-19 NOTE — TELEPHONE ENCOUNTER
----- Message from Byron Neal sent at 9/19/2018  3:26 PM CDT -----  Contact: Patient [phone]  Type: Orders Request    What orders/ testing are being requested? EP    Is there a future appointment scheduled for the patient with PCP? Yes    When? 10/3/18

## 2018-09-26 ENCOUNTER — LAB VISIT (OUTPATIENT)
Dept: LAB | Facility: HOSPITAL | Age: 78
End: 2018-09-26
Attending: INTERNAL MEDICINE
Payer: COMMERCIAL

## 2018-09-26 DIAGNOSIS — I10 ESSENTIAL HYPERTENSION: ICD-10-CM

## 2018-09-26 LAB
ALBUMIN SERPL BCP-MCNC: 3.7 G/DL
ALP SERPL-CCNC: 73 U/L
ALT SERPL W/O P-5'-P-CCNC: 15 U/L
ANION GAP SERPL CALC-SCNC: 8 MMOL/L
AST SERPL-CCNC: 19 U/L
BASOPHILS # BLD AUTO: 0.06 K/UL
BASOPHILS NFR BLD: 1.2 %
BILIRUB SERPL-MCNC: 0.7 MG/DL
BUN SERPL-MCNC: 10 MG/DL
CALCIUM SERPL-MCNC: 9.6 MG/DL
CHLORIDE SERPL-SCNC: 110 MMOL/L
CHOLEST SERPL-MCNC: 161 MG/DL
CHOLEST/HDLC SERPL: 4.9 {RATIO}
CO2 SERPL-SCNC: 24 MMOL/L
CREAT SERPL-MCNC: 0.8 MG/DL
DIFFERENTIAL METHOD: ABNORMAL
EOSINOPHIL # BLD AUTO: 0.2 K/UL
EOSINOPHIL NFR BLD: 3.5 %
ERYTHROCYTE [DISTWIDTH] IN BLOOD BY AUTOMATED COUNT: 13.2 %
EST. GFR  (AFRICAN AMERICAN): >60 ML/MIN/1.73 M^2
EST. GFR  (NON AFRICAN AMERICAN): >60 ML/MIN/1.73 M^2
GLUCOSE SERPL-MCNC: 95 MG/DL
HCT VFR BLD AUTO: 38.1 %
HDLC SERPL-MCNC: 33 MG/DL
HDLC SERPL: 20.5 %
HGB BLD-MCNC: 12.9 G/DL
LDLC SERPL CALC-MCNC: 108.8 MG/DL
LYMPHOCYTES # BLD AUTO: 1.9 K/UL
LYMPHOCYTES NFR BLD: 38.7 %
MCH RBC QN AUTO: 32.1 PG
MCHC RBC AUTO-ENTMCNC: 33.9 G/DL
MCV RBC AUTO: 95 FL
MONOCYTES # BLD AUTO: 0.5 K/UL
MONOCYTES NFR BLD: 9.7 %
NEUTROPHILS # BLD AUTO: 2.2 K/UL
NEUTROPHILS NFR BLD: 46.5 %
NONHDLC SERPL-MCNC: 128 MG/DL
NRBC BLD-RTO: 0 /100 WBC
PLATELET # BLD AUTO: 237 K/UL
PMV BLD AUTO: 11 FL
POTASSIUM SERPL-SCNC: 3.7 MMOL/L
PROT SERPL-MCNC: 7.3 G/DL
RBC # BLD AUTO: 4.02 M/UL
SODIUM SERPL-SCNC: 142 MMOL/L
TRIGL SERPL-MCNC: 96 MG/DL
WBC # BLD AUTO: 4.83 K/UL

## 2018-09-26 PROCEDURE — 85025 COMPLETE CBC W/AUTO DIFF WBC: CPT

## 2018-09-26 PROCEDURE — 80053 COMPREHEN METABOLIC PANEL: CPT

## 2018-09-26 PROCEDURE — 36415 COLL VENOUS BLD VENIPUNCTURE: CPT

## 2018-09-26 PROCEDURE — 80061 LIPID PANEL: CPT

## 2018-10-02 ENCOUNTER — TELEPHONE (OUTPATIENT)
Dept: INTERNAL MEDICINE | Facility: CLINIC | Age: 78
End: 2018-10-02

## 2018-10-02 NOTE — TELEPHONE ENCOUNTER
Spoke with pt to confirm upcoming appt and to advise of over due Health Maintenance. Pt agreed to additional services. Pt will receive the Influenza vaccine at her appt

## 2018-10-03 ENCOUNTER — OFFICE VISIT (OUTPATIENT)
Dept: INTERNAL MEDICINE | Facility: CLINIC | Age: 78
End: 2018-10-03
Payer: COMMERCIAL

## 2018-10-03 ENCOUNTER — IMMUNIZATION (OUTPATIENT)
Dept: INTERNAL MEDICINE | Facility: CLINIC | Age: 78
End: 2018-10-03
Payer: COMMERCIAL

## 2018-10-03 VITALS
DIASTOLIC BLOOD PRESSURE: 70 MMHG | HEIGHT: 67 IN | BODY MASS INDEX: 23.46 KG/M2 | HEART RATE: 87 BPM | WEIGHT: 149.5 LBS | SYSTOLIC BLOOD PRESSURE: 122 MMHG | OXYGEN SATURATION: 99 %

## 2018-10-03 DIAGNOSIS — Z12.83 SKIN CANCER SCREENING: ICD-10-CM

## 2018-10-03 DIAGNOSIS — Z00.00 WELLNESS EXAMINATION: ICD-10-CM

## 2018-10-03 DIAGNOSIS — Z00.00 PHYSICAL EXAM: Primary | ICD-10-CM

## 2018-10-03 DIAGNOSIS — E78.00 PURE HYPERCHOLESTEROLEMIA: ICD-10-CM

## 2018-10-03 DIAGNOSIS — I10 ESSENTIAL HYPERTENSION: ICD-10-CM

## 2018-10-03 DIAGNOSIS — Z85.3 HISTORY OF BREAST CANCER IN FEMALE: ICD-10-CM

## 2018-10-03 DIAGNOSIS — K86.2 PANCREAS CYST: ICD-10-CM

## 2018-10-03 PROCEDURE — 3078F DIAST BP <80 MM HG: CPT | Mod: CPTII,S$GLB,, | Performed by: INTERNAL MEDICINE

## 2018-10-03 PROCEDURE — 99999 PR PBB SHADOW E&M-EST. PATIENT-LVL IV: CPT | Mod: PBBFAC,,, | Performed by: INTERNAL MEDICINE

## 2018-10-03 PROCEDURE — 90471 IMMUNIZATION ADMIN: CPT | Mod: S$GLB,,, | Performed by: INTERNAL MEDICINE

## 2018-10-03 PROCEDURE — 3074F SYST BP LT 130 MM HG: CPT | Mod: CPTII,S$GLB,, | Performed by: INTERNAL MEDICINE

## 2018-10-03 PROCEDURE — 99397 PER PM REEVAL EST PAT 65+ YR: CPT | Mod: 25,S$GLB,, | Performed by: INTERNAL MEDICINE

## 2018-10-03 PROCEDURE — 90662 IIV NO PRSV INCREASED AG IM: CPT | Mod: S$GLB,,, | Performed by: INTERNAL MEDICINE

## 2018-10-03 NOTE — PROGRESS NOTES
Subjective:      Patient ID: Ida Santana is a 78 y.o. female.    Chief Complaint: Annual Exam    HPI:  HPI   Hypertension patient did not take irbesartan.  Nose bleeding on asa  Discussed bowel movements, takes more time      Annual exam: 10/3/2018  Colonoscopy:  10/31/2016 follow up 10 years  Mammogram: Scheduled by Ms. Jiménez 12/5  Gyn: Arabella Guallpa   appt made  Optho: Dr. Boswell /Yvette 9/9/2016: follow up open angle glaucoma follow up 6 months:   Flu: advised  Tetanus :9/26/2016  Shingles: 10/30/2015  Shingles  Pneumovax: done at age 73: documented  Prevnar: 9/23/2015  Bone Density:1/2017     Consultants  Dr. Yvette Dumont: 1/6/2017 retired  Dr. Harden: Left breast 4/2017 yearly  Dr. 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    Open angle 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 eyes    Osteopenia    Neuropathy    Retinal detachment of left eye with retinal break    Endophthalmitis, left eye    Rhegmatogenous retinal detachment of left eye    Left adrenal mass removed 3/2017    NS (nuclear sclerosis)    Hx of renal vein thrombosis    Retinal detachment, rhegmatogenous, left eye    History of breast cancer in female    Pancreas cyst     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    Cataract     History of uterine fibroid     Hyperlipidemia     dyslipidemia    Hypertension     MRSA infection     Open angle 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 eyes 5/2/2013    Osteopenia     Potassium depletion 1998    Retinal detachment of left eye with retinal break 3/13/2017     Past Surgical History:   Procedure Laterality Date    Adrenal Gland Surgery to right side       ADRENALECTOMY Left 3/22/2017    Performed by [facility] at [facility] OR 2ND FLR    BREAST BIOPSY  2001    left breast DCIS    BREAST LUMPECTOMY Left 09/11/2001    CATARACT EXTRACTION W/  INTRAOCULAR LENS IMPLANT Left 08/02/2017    with PPV/SO removal ( AND )    CHOLECYSTECTOMY-LAPAROSCOPIC N/A 3/1/2018    Performed by Lambert Villa Jr., MD at [facility] OR 2ND FLR    COLONOSCOPY      COLONOSCOPY N/A 10/31/2016    Procedure: COLONOSCOPY;  Surgeon: YAMILETH Richards MD;  Location: [facility] ENDO (4TH FLR);  Service: Endoscopy;  Laterality: N/A;    COLONOSCOPY N/A 10/31/2016    Performed by YAMILETH Richards MD at [facility] ENDO (4TH FLR)    INSERTION-INTRAOCULAR LENS (IOL) Left 8/2/2017    Performed by Lynda Crawford MD at [facility] OR 1ST FLR    KIDNEY SURGERY  03/27/2017    patient does not know what they did, but knows she does not have kidney cancer    PARS PLANA VITRECTOMY Left 08/02/2017    25g PPV WITH SO REMOVAL AND PHACO IOL ( AND )    PHACOEMULSIFICATION-ASPIRATION-CATARACT Left 8/2/2017    Performed by Lynda Crawford MD at [facility] OR 1ST FLR    REPAIR-RETINA (VITRECTOMY) Left 8/2/2017    Performed by JAKE Beaulieu MD at [facility] OR 1ST FLR    REPAIR-RETINA (VITRECTOMY) Left 3/15/2017    Performed by JA[facility]ieu [facility] at [facility] OR 1ST FLR    RETINAL DETACHMENT SURGERY Left 08/22/2017    PPVx and SO Removal//Phaco IOL ( AND )    TRANSESOPHAGEAL ECHOCARDIOGRAM (MIS) N/A 1/17/2017    Performed by Aubrey Auguste MD at [facility] CATH LAB     Family History   Problem Relation Age of Onset    Cancer Mother         breast    Breast cancer Mother         40s and again in 50s (bilateral)    Breast cancer Other 40    Heart disease Father         bypass    Cancer Father         asbestos    Stroke Brother     Ovarian cancer Neg Hx     Amblyopia Neg Hx     Blindness Neg Hx     Cataracts Neg Hx     Diabetes Neg Hx     Glaucoma Neg  "Hx     Hypertension Neg Hx     Macular degeneration Neg Hx     Retinal detachment Neg Hx     Strabismus Neg Hx     Thyroid disease Neg Hx     Colon cancer Neg Hx     Melanoma Neg Hx      Review of Systems   Constitutional: Negative for chills, fever and unexpected weight change.   HENT: Negative for trouble swallowing.    Respiratory: Negative for cough, shortness of breath and wheezing.    Cardiovascular: Negative for chest pain and palpitations.   Gastrointestinal: Negative for abdominal distention, abdominal pain, blood in stool and vomiting.   Musculoskeletal: Negative for back pain.     Objective:     Vitals:    10/03/18 0820 10/03/18 0913   BP: (!) 142/80 122/70   Pulse: 87    SpO2: 99%    Weight: 67.8 kg (149 lb 7.6 oz)    Height: 5' 7" (1.702 m)    PainSc: 0-No pain      Body mass index is 23.41 kg/m².  Physical Exam   Constitutional: She is oriented to person, place, and time. She appears well-developed and well-nourished. No distress.   HENT:   Right Ear: Tympanic membrane and ear canal normal.   Left Ear: Tympanic membrane and ear canal normal.   Nose: No sinus tenderness or nasal deformity.   Mouth/Throat: No oropharyngeal exudate or posterior oropharyngeal erythema.   Eyes: Conjunctivae, EOM and lids are normal. Pupils are equal, round, and reactive to light.   Neck: Carotid bruit is not present. No thyromegaly present.   Cardiovascular: Normal rate, regular rhythm, normal heart sounds and intact distal pulses. PMI is not displaced.   Pulmonary/Chest: Effort normal and breath sounds normal. No respiratory distress.   Abdominal: Soft. Bowel sounds are normal. She exhibits no distension. There is no tenderness.   Musculoskeletal: She exhibits no edema or tenderness.   Lymphadenopathy:        Head (right side): No submandibular adenopathy present.        Head (left side): No submandibular adenopathy present.     She has no cervical adenopathy.     She has no axillary adenopathy.        Right: No " inguinal adenopathy present.        Left: No inguinal adenopathy present.   Neurological: She is alert and oriented to person, place, and time. She has normal strength.   Skin: Skin is intact. No lesion noted.   Psychiatric: She has a normal mood and affect. Her speech is normal and behavior is normal.     Assessment:     1. Physical exam    2. Wellness examination    3. Pure hypercholesterolemia    4. Essential hypertension    5. Pancreas cyst    6. History of breast cancer in female    7. Skin cancer screening      Plan:   Ida was seen today for annual exam.  Follow-up in about 6 months (around 4/3/2019) for FU with lab: cbc, cmp.    .  Problem List Items Addressed This Visit     Hyperlipidemia    Hypertension    Relevant Orders    CBC auto differential    Comprehensive metabolic panel    History of breast cancer in female    Pancreas cyst      Other Visit Diagnoses     Physical exam    -  Primary    Wellness examination        Skin cancer screening        Relevant Orders    Ambulatory consult to Dermatology             Medication List           Accurate as of 10/3/18  8:30 PM. If you have any questions, ask your nurse or doctor.               CONTINUE taking these medications    aspirin 81 MG EC tablet  Commonly known as:  ECOTRIN     CORDRAN 0.05 % lotion  Generic drug:  flurandrenolide     dorzolamide-timolol 2-0.5% 22.3-6.8 mg/mL ophthalmic solution  Commonly known as:  COSOPT  Place 1 drop into the left eye 2 (two) times daily.     irbesartan 300 MG tablet  Commonly known as:  AVAPRO  Take 1 tablet (300 mg total) by mouth once daily. Cancel valsartan     ketoconazole 2 % shampoo  Commonly known as:  NIZORAL  USE EVERY OTHER DAY LET SIT ON SCALP FOR FIVE MINUTES BEFORE RINSING     latanoprost 0.005 % ophthalmic solution  Place 1 drop into the right eye every evening.     ONE-A-DAY 50 PLUS tablet  Generic drug:  geriatric multivitamin-min     * verapamil 240 MG CR tablet  Commonly known as:  CALAN-SR  take  1 tablet by mouth twice a day     * verapamil 240 MG C24p  Commonly known as:  VERELAN         * This list has 2 medication(s) that are the same as other medications prescribed for you. Read the directions carefully, and ask your doctor or other care provider to review them with you.

## 2018-10-03 NOTE — PATIENT INSTRUCTIONS
Please consider  New shingles vaccine: SHINGRIX ( 2018) not live, 90%,  2 shots, one at day zero and the 2nd at 2-6 months: any pharmacy can give it.

## 2018-10-30 ENCOUNTER — OFFICE VISIT (OUTPATIENT)
Dept: OBSTETRICS AND GYNECOLOGY | Facility: CLINIC | Age: 78
End: 2018-10-30
Payer: COMMERCIAL

## 2018-10-30 VITALS
SYSTOLIC BLOOD PRESSURE: 130 MMHG | DIASTOLIC BLOOD PRESSURE: 76 MMHG | HEIGHT: 67 IN | WEIGHT: 155.88 LBS | BODY MASS INDEX: 24.47 KG/M2

## 2018-10-30 DIAGNOSIS — Z01.419 ENCOUNTER FOR GYNECOLOGICAL EXAMINATION WITHOUT ABNORMAL FINDING: Primary | ICD-10-CM

## 2018-10-30 DIAGNOSIS — Z85.3 HISTORY OF BREAST CANCER: ICD-10-CM

## 2018-10-30 DIAGNOSIS — Z78.0 POSTMENOPAUSAL: ICD-10-CM

## 2018-10-30 PROCEDURE — 99397 PER PM REEVAL EST PAT 65+ YR: CPT | Mod: S$GLB,,, | Performed by: OBSTETRICS & GYNECOLOGY

## 2018-10-30 PROCEDURE — 99999 PR PBB SHADOW E&M-EST. PATIENT-LVL III: CPT | Mod: PBBFAC,,, | Performed by: OBSTETRICS & GYNECOLOGY

## 2018-10-30 PROCEDURE — 3078F DIAST BP <80 MM HG: CPT | Mod: CPTII,S$GLB,, | Performed by: OBSTETRICS & GYNECOLOGY

## 2018-10-30 PROCEDURE — 3075F SYST BP GE 130 - 139MM HG: CPT | Mod: CPTII,S$GLB,, | Performed by: OBSTETRICS & GYNECOLOGY

## 2018-11-09 ENCOUNTER — HOSPITAL ENCOUNTER (EMERGENCY)
Facility: HOSPITAL | Age: 78
Discharge: HOME | End: 2018-11-09
Attending: EMERGENCY MEDICINE
Payer: COMMERCIAL

## 2018-11-09 VITALS
RESPIRATION RATE: 18 BRPM | SYSTOLIC BLOOD PRESSURE: 184 MMHG | OXYGEN SATURATION: 98 % | TEMPERATURE: 98 F | HEIGHT: 67 IN | HEART RATE: 77 BPM | DIASTOLIC BLOOD PRESSURE: 60 MMHG | WEIGHT: 156 LBS | BODY MASS INDEX: 24.48 KG/M2

## 2018-11-09 DIAGNOSIS — R07.89 CHEST WALL PAIN: ICD-10-CM

## 2018-11-09 DIAGNOSIS — S20.219A CONTUSION OF CHEST WALL, UNSPECIFIED LATERALITY, INITIAL ENCOUNTER: Primary | ICD-10-CM

## 2018-11-09 PROCEDURE — 99284 EMERGENCY DEPT VISIT MOD MDM: CPT

## 2018-11-09 PROCEDURE — 99283 EMERGENCY DEPT VISIT LOW MDM: CPT | Mod: ,,, | Performed by: PHYSICIAN ASSISTANT
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
## 2018-11-09 NOTE — DISCHARGE INSTRUCTIONS
You may take Tylenol or ibuprofen as needed for pain. Follow up with her primary care doctor in the next 4-5 days for re-evaluation if her symptoms do not improve.
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
## 2018-11-12 ENCOUNTER — TELEPHONE (OUTPATIENT)
Dept: INTERNAL MEDICINE | Facility: CLINIC | Age: 78
End: 2018-11-12

## 2018-11-12 NOTE — TELEPHONE ENCOUNTER
----- Message from Wale Durham sent at 11/12/2018  8:05 AM CST -----  Contact: Patient [phone]  Sooner appointment than the  can schedule.  Did you offer to schedule the next available appointment and put the patient on the wait list?:  Yes, Declined  When is the first available appointment: 12/02/18  What is the nature of the appointment: Follow Up  What visit type: EP  Patient preference of timeframe to be scheduled:  11/13/18  Comments: Patient is wanting to be seen tomorrow, wants to know also if patient is well enough to go out of town for the Holiday, would like office to give a call back to inform, also is can be seen tomorrow 11/13/18.    Please call an advise  Thank you

## 2018-11-14 ENCOUNTER — TELEPHONE (OUTPATIENT)
Dept: INTERNAL MEDICINE | Facility: CLINIC | Age: 78
End: 2018-11-14
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
## 2018-11-14 NOTE — PROGRESS NOTES
Assessment /Plan     For exam results, see Encounter Report.    Primary open angle glaucoma of both eyes, mild stage    Rhegmatogenous retinal detachment of left eye    Endophthalmitis, left eye    Nuclear sclerosis of right eye    Anomalous optic nerve    KCS (keratoconjunctivitis sicca)    Pseudophakia             Glaucoma (type and duration)    Suspect 2/2 lrg cups ou    First HVF   2000   First photos   2013   Treatment / Drops started   xal 1/2016           Family history    none        Glaucoma meds   Latanoprost started 1/4/2016  od // cosopt os         H/O adverse rxn to glaucoma drops    none        LASERS    none        GLAUCOMA SURGERIES    none        OTHER EYE SURGERIES    none        CDR    0.75-0.8 / 0.7-0.75        Tbase    18-22 / 19-22          Tmax    22/22            Ttarget    ?              HVF    6 test 2000 to  2018 - near full od // gen depression and  ? INS os        Gonio    +2-3 ou         CCT    510/517        OCT    3 test 2013 to 2018 - RNFL - bord TI od // nl os        HRT    3 test 2016 to 2018 - MR -  Dec. I, bord T od // xx os /// CDR 0.71 od // xx os (large disc size )         Disc photos    2013, 2015 - OIS     - Ttoday  16/16  - Test done today  IOP // refraction      2. Anomaolous Optic nerves    - very large disc size ou    - HRT - 2.97 od // 3.04 os (nl is up to 2.8 ou)     3. Dry eyes     4. NS - OD    5. H/O endogenous endophthalmitis  And secondary tract RD - S/P PPVx and Silicone Oil (Mazzulla)    Oil removed at the same time as the phaco/IOL done 8/2/2017     6. 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  I agree with the findings, assessment and plan of the resident and/or fellow.     Lynda Crawford MD
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
## 2018-11-14 NOTE — TELEPHONE ENCOUNTER
----- Message from Alysha Lagos sent at 11/14/2018 10:21 AM CST -----  Contact: self/[phone]  ----- Message from Wale Marva sent at 11/12/2018  8:05 AM CST -----  Contact: Patient [phone]  Sooner appointment than the  can schedule.  Did you offer to schedule the next available appointment and put the patient on the wait list?:  Yes, Declined  When is the first available appointment: 12/02/18  What is the nature of the appointment: Follow Up  What visit type: EP  Patient preference of timeframe to be scheduled:  11/13/18  Comments: Patient is wanting to be seen tomorrow, wants to know also if patient is well enough to go out of town for the Holiday, would like office to give a call back to inform, also is can be seen tomorrow 11/13/18.     Please call an advise  Thank you    2nd request, patient states she did not receive a message.

## 2018-11-16 ENCOUNTER — OFFICE VISIT (OUTPATIENT)
Dept: OPHTHALMOLOGY | Facility: CLINIC | Age: 78
End: 2018-11-16
Payer: COMMERCIAL

## 2018-11-16 DIAGNOSIS — Q07.8 ANOMALOUS OPTIC NERVE: ICD-10-CM

## 2018-11-16 DIAGNOSIS — H44.002 ENDOPHTHALMITIS, LEFT EYE: ICD-10-CM

## 2018-11-16 DIAGNOSIS — H33.002 RHEGMATOGENOUS RETINAL DETACHMENT OF LEFT EYE: ICD-10-CM

## 2018-11-16 DIAGNOSIS — H40.1131 PRIMARY OPEN ANGLE GLAUCOMA OF BOTH EYES, MILD STAGE: Primary | ICD-10-CM

## 2018-11-16 DIAGNOSIS — H25.11 NUCLEAR SCLEROSIS OF RIGHT EYE: ICD-10-CM

## 2018-11-16 DIAGNOSIS — M35.01 KCS (KERATOCONJUNCTIVITIS SICCA): ICD-10-CM

## 2018-11-16 DIAGNOSIS — Z96.1 PSEUDOPHAKIA: ICD-10-CM

## 2018-11-16 PROCEDURE — 92012 INTRM OPH EXAM EST PATIENT: CPT | Mod: S$GLB,,, | Performed by: OPHTHALMOLOGY

## 2018-11-16 PROCEDURE — 99999 PR PBB SHADOW E&M-EST. PATIENT-LVL II: CPT | Mod: PBBFAC,,, | Performed by: OPHTHALMOLOGY

## 2018-12-03 ENCOUNTER — OFFICE VISIT (OUTPATIENT)
Dept: DERMATOLOGY | Facility: CLINIC | Age: 78
End: 2018-12-03
Payer: COMMERCIAL

## 2018-12-03 DIAGNOSIS — L82.1 SEBORRHEIC KERATOSIS: Primary | ICD-10-CM

## 2018-12-03 PROCEDURE — 99213 OFFICE O/P EST LOW 20 MIN: CPT | Mod: S$GLB,,, | Performed by: DERMATOLOGY

## 2018-12-03 PROCEDURE — 99999 PR PBB SHADOW E&M-EST. PATIENT-LVL II: CPT | Mod: PBBFAC,,, | Performed by: DERMATOLOGY

## 2018-12-03 PROCEDURE — 1101F PT FALLS ASSESS-DOCD LE1/YR: CPT | Mod: CPTII,S$GLB,, | Performed by: DERMATOLOGY

## 2018-12-03 RX ORDER — KETOCONAZOLE 20 MG/ML
SHAMPOO, SUSPENSION TOPICAL
Qty: 120 ML | Refills: 5 | Status: SHIPPED | OUTPATIENT
Start: 2018-12-03 | End: 2022-05-03 | Stop reason: SDUPTHER

## 2018-12-03 NOTE — PROGRESS NOTES
Subjective:       Patient ID:  Ida Santana is a 78 y.o. female who presents for   Chief Complaint   Patient presents with    Mole     Left Ear, 20+ years, itching, Tx. none     Patient complains of lesion(s)  Location: left ear  Duration: >20 years  Symptoms: itching  Relieving factors/Previous treatments: none    Pt is also c/o dark spots on her neck that itch occasionally.        Review of Systems   Skin: Positive for itching (left ear), daily sunscreen use and activity-related sunscreen use. Negative for rash.   Hematologic/Lymphatic: Does not bruise/bleed easily.        Objective:    Physical Exam   Constitutional: She appears well-developed and well-nourished. No distress.   Neurological: She is alert and oriented to person, place, and time. She is not disoriented.   Psychiatric: She has a normal mood and affect.   Skin:   Areas Examined (abnormalities noted in diagram):   Head / Face Inspection Performed  Neck Inspection Performed              Diagram Legend     Erythematous scaling macule/papule c/w actinic keratosis       Vascular papule c/w angioma      Pigmented verrucoid papule/plaque c/w seborrheic keratosis      Yellow umbilicated papule c/w sebaceous hyperplasia      Irregularly shaped tan macule c/w lentigo     1-2 mm smooth white papules consistent with Milia      Movable subcutaneous cyst with punctum c/w epidermal inclusion cyst      Subcutaneous movable cyst c/w pilar cyst      Firm pink to brown papule c/w dermatofibroma      Pedunculated fleshy papule(s) c/w skin tag(s)      Evenly pigmented macule c/w junctional nevus     Mildly variegated pigmented, slightly irregular-bordered macule c/w mildly atypical nevus      Flesh colored to evenly pigmented papule c/w intradermal nevus       Pink pearly papule/plaque c/w basal cell carcinoma      Erythematous hyperkeratotic cursted plaque c/w SCC      Surgical scar with no sign of skin cancer recurrence      Open and closed comedones       Inflammatory papules and pustules      Verrucoid papule consistent consistent with wart     Erythematous eczematous patches and plaques     Dystrophic onycholytic nail with subungual debris c/w onychomycosis     Umbilicated papule    Erythematous-base heme-crusted tan verrucoid plaque consistent with inflamed seborrheic keratosis     Erythematous Silvery Scaling Plaque c/w Psoriasis     See annotation      Assessment / Plan:        Seborrheic keratosis  These are benign inherited growths without a malignant potential. Reassurance given to patient. No treatment is necessary.     Rx for keto shampoo sent per pt request           Follow-up if symptoms worsen or fail to improve.

## 2018-12-03 NOTE — LETTER
December 3, 2018      Giovanna Murray MD  [address]  [facility] [address]           [facility]  [address]  [facility] [address]  Phone: [phone]  Fax: [phone]          Patient: Ida Santana   MR Number: [id number]   YOB: 1940   Date of Visit: 12/3/2018       Dear Dr. Giovanna Murray:    Thank you for referring Ida Santana to me for evaluation. Attached you will find relevant portions of my assessment and plan of care.    If you have questions, please do not hesitate to call me. I look forward to following Ida Santana along with you.    Sincerely,    Sujatha Iglesias MD    Enclosure  CC:  No Recipients    If you would like to receive this communication electronically, please contact [email or web address] or [phone] to request more information on [employer] access.    For providers and/or their staff who would like to refer a patient to [facility], please contact us through our one-stop-shop provider referral line, [facility], at [phone].    If you feel you have received this communication in error or would no longer like to receive these types of communications, please e-mail [email or web address]
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
## 2018-12-05 ENCOUNTER — OFFICE VISIT (OUTPATIENT)
Dept: SURGERY | Facility: CLINIC | Age: 78
End: 2018-12-05
Payer: COMMERCIAL

## 2018-12-05 ENCOUNTER — HOSPITAL ENCOUNTER (OUTPATIENT)
Dept: RADIOLOGY | Facility: HOSPITAL | Age: 78
Discharge: HOME | End: 2018-12-05
Attending: NURSE PRACTITIONER
Payer: COMMERCIAL

## 2018-12-05 VITALS
DIASTOLIC BLOOD PRESSURE: 64 MMHG | SYSTOLIC BLOOD PRESSURE: 136 MMHG | HEIGHT: 67 IN | TEMPERATURE: 98 F | HEART RATE: 74 BPM | WEIGHT: 156 LBS | BODY MASS INDEX: 24.48 KG/M2

## 2018-12-05 DIAGNOSIS — Z85.3 PERSONAL HISTORY OF BREAST CANCER: Primary | ICD-10-CM

## 2018-12-05 DIAGNOSIS — Z80.3 FAMILY HISTORY OF BREAST CANCER: ICD-10-CM

## 2018-12-05 DIAGNOSIS — Z85.3 PERSONAL HISTORY OF BREAST CANCER: ICD-10-CM

## 2018-12-05 PROCEDURE — 77066 DX MAMMO INCL CAD BI: CPT | Mod: 26,,, | Performed by: RADIOLOGY

## 2018-12-05 PROCEDURE — 1101F PT FALLS ASSESS-DOCD LE1/YR: CPT | Mod: CPTII,S$GLB,, | Performed by: NURSE PRACTITIONER

## 2018-12-05 PROCEDURE — 77062 BREAST TOMOSYNTHESIS BI: CPT | Mod: TC,PO

## 2018-12-05 PROCEDURE — 3075F SYST BP GE 130 - 139MM HG: CPT | Mod: CPTII,S$GLB,, | Performed by: NURSE PRACTITIONER

## 2018-12-05 PROCEDURE — 3078F DIAST BP <80 MM HG: CPT | Mod: CPTII,S$GLB,, | Performed by: NURSE PRACTITIONER

## 2018-12-05 PROCEDURE — 77062 BREAST TOMOSYNTHESIS BI: CPT | Mod: 26,,, | Performed by: RADIOLOGY

## 2018-12-05 PROCEDURE — 99999 PR PBB SHADOW E&M-EST. PATIENT-LVL III: CPT | Mod: PBBFAC,,, | Performed by: NURSE PRACTITIONER

## 2018-12-05 PROCEDURE — 99213 OFFICE O/P EST LOW 20 MIN: CPT | Mod: S$GLB,,, | Performed by: NURSE PRACTITIONER

## 2018-12-17 RX ORDER — VERAPAMIL HYDROCHLORIDE 240 MG/1
CAPSULE, EXTENDED RELEASE ORAL
Qty: 180 CAPSULE | Refills: 6 | Status: SHIPPED | OUTPATIENT
Start: 2018-12-17 | End: 2019-04-09 | Stop reason: SDUPTHER

## 2019-02-07 ENCOUNTER — OFFICE VISIT (OUTPATIENT)
Dept: ORTHOPEDICS | Facility: CLINIC | Age: 79
End: 2019-02-07
Payer: COMMERCIAL

## 2019-02-07 ENCOUNTER — HOSPITAL ENCOUNTER (OUTPATIENT)
Dept: RADIOLOGY | Facility: HOSPITAL | Age: 79
Discharge: HOME | End: 2019-02-07
Attending: ORTHOPAEDIC SURGERY
Payer: COMMERCIAL

## 2019-02-07 VITALS
WEIGHT: 159.31 LBS | BODY MASS INDEX: 24.95 KG/M2 | HEART RATE: 88 BPM | RESPIRATION RATE: 17 BRPM | SYSTOLIC BLOOD PRESSURE: 146 MMHG | DIASTOLIC BLOOD PRESSURE: 79 MMHG

## 2019-02-07 DIAGNOSIS — G89.29 CHRONIC PAIN OF RIGHT KNEE: Primary | ICD-10-CM

## 2019-02-07 DIAGNOSIS — M17.0 PRIMARY OSTEOARTHRITIS OF BOTH KNEES: ICD-10-CM

## 2019-02-07 DIAGNOSIS — G89.29 CHRONIC PAIN OF RIGHT KNEE: ICD-10-CM

## 2019-02-07 DIAGNOSIS — M25.561 CHRONIC PAIN OF RIGHT KNEE: ICD-10-CM

## 2019-02-07 DIAGNOSIS — M25.561 CHRONIC PAIN OF RIGHT KNEE: Primary | ICD-10-CM

## 2019-02-07 PROCEDURE — 3078F DIAST BP <80 MM HG: CPT | Mod: CPTII,S$GLB,, | Performed by: ORTHOPAEDIC SURGERY

## 2019-02-07 PROCEDURE — 73560 X-RAY EXAM OF KNEE 1 OR 2: CPT | Mod: 26,59,RT, | Performed by: RADIOLOGY

## 2019-02-07 PROCEDURE — 73562 X-RAY EXAM OF KNEE 3: CPT | Mod: TC,RT

## 2019-02-07 PROCEDURE — 3077F PR MOST RECENT SYSTOLIC BLOOD PRESSURE >= 140 MM HG: ICD-10-PCS | Mod: CPTII,S$GLB,, | Performed by: ORTHOPAEDIC SURGERY

## 2019-02-07 PROCEDURE — 73562 XR KNEE ORTHO RIGHT: ICD-10-PCS | Mod: 26,RT,, | Performed by: RADIOLOGY

## 2019-02-07 PROCEDURE — 99204 OFFICE O/P NEW MOD 45 MIN: CPT | Mod: S$GLB,,, | Performed by: ORTHOPAEDIC SURGERY

## 2019-02-07 PROCEDURE — 99999 PR PBB SHADOW E&M-EST. PATIENT-LVL III: CPT | Mod: PBBFAC,,, | Performed by: ORTHOPAEDIC SURGERY

## 2019-02-07 PROCEDURE — 1101F PT FALLS ASSESS-DOCD LE1/YR: CPT | Mod: CPTII,S$GLB,, | Performed by: ORTHOPAEDIC SURGERY

## 2019-02-07 PROCEDURE — 73560 X-RAY EXAM OF KNEE 1 OR 2: CPT | Mod: TC,RT

## 2019-02-07 PROCEDURE — 73562 X-RAY EXAM OF KNEE 3: CPT | Mod: 26,RT,, | Performed by: RADIOLOGY

## 2019-02-07 PROCEDURE — 1101F PR PT FALLS ASSESS DOC 0-1 FALLS W/OUT INJ PAST YR: ICD-10-PCS | Mod: CPTII,S$GLB,, | Performed by: ORTHOPAEDIC SURGERY

## 2019-02-07 PROCEDURE — 99204 PR OFFICE/OUTPT VISIT, NEW, LEVL IV, 45-59 MIN: ICD-10-PCS | Mod: S$GLB,,, | Performed by: ORTHOPAEDIC SURGERY

## 2019-02-07 PROCEDURE — 3078F PR MOST RECENT DIASTOLIC BLOOD PRESSURE < 80 MM HG: ICD-10-PCS | Mod: CPTII,S$GLB,, | Performed by: ORTHOPAEDIC SURGERY

## 2019-02-07 PROCEDURE — 73560 XR KNEE ORTHO RIGHT: ICD-10-PCS | Mod: 26,59,RT, | Performed by: RADIOLOGY

## 2019-02-07 PROCEDURE — 99999 PR PBB SHADOW E&M-EST. PATIENT-LVL III: ICD-10-PCS | Mod: PBBFAC,,, | Performed by: ORTHOPAEDIC SURGERY

## 2019-02-07 PROCEDURE — 3077F SYST BP >= 140 MM HG: CPT | Mod: CPTII,S$GLB,, | Performed by: ORTHOPAEDIC SURGERY

## 2019-02-07 NOTE — PROGRESS NOTES
HPI:    Ida Santana is a 78 y.o. female who is here today for right knee pain for 3 months after hitting the inside of her right knee against a concrete bleacher at a football game. She was seen in 2011 for right knee pain but it had subsided over time. Her pain now is  Causing decrease in activities and stiffness. She has not been taking NSAID or tylenol for symptoms. She does not use assistive device for ambulation  Chief Complaint   Patient presents with    Right Knee - Pain, Swelling    Consult   .     Duration: 3 months  Intensity: mild  Character of pain: sharp  Location: She reports that the pain is predominately  medial  Patient's pain increases with activity.  Pain is increased with weightbearing and interferes with activities of daily living.    She has not tried conservative management including NSAIDS, injections, and activity modification without relief.        PMSFSH reviewed per clinic record       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    Cataract     History of uterine fibroid     Hyperlipidemia     dyslipidemia    Hypertension     MRSA infection     Open angle 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 eyes 5/2/2013    Osteopenia     Potassium depletion 1998    Retinal detachment of left eye with retinal break 3/13/2017          Current Outpatient Medications:     aspirin (ECOTRIN) 81 MG EC tablet, Take 81 mg by mouth once daily., Disp: , Rfl:     dorzolamide-timolol 2-0.5% (COSOPT) 22.3-6.8 mg/mL ophthalmic solution, Place 1 drop into the left eye 2 (two) times daily., Disp: 10 mL, Rfl: 6    flurandrenolide (CORDRAN) 0.05 % lotion, Apply to  scalp one to two times daily as needed for dryness, Disp: , Rfl:     irbesartan (AVAPRO) 300 MG tablet, Take 1 tablet (300 mg total) by mouth once daily. Cancel valsartan, Disp: 90 tablet, Rfl: 3    ketoconazole  (NIZORAL) 2 % shampoo, USE EVERY OTHER DAY LET SIT ON SCALP FOR FIVE MINUTES BEFORE RINSING, Disp: 120 mL, Rfl: 6    ketoconazole (NIZORAL) 2 % shampoo, Wash hair with medicated shampoo at least 2x/week - let sit on scalp at least 5 minutes prior to rinsing, Disp: 120 mL, Rfl: 5    latanoprost 0.005 % ophthalmic solution, Place 1 drop into the right eye every evening., Disp: 7.5 Bottle, Rfl: 3    MULTIVITAMIN WITH MINERALS (ONE-A-DAY 50 PLUS) Tab, Take 1 tablet by mouth once daily. , Disp: , Rfl:     verapamil (CALAN-SR) 240 MG CR tablet, take 1 tablet by mouth twice a day, Disp: 60 tablet, Rfl: 12    verapamil (VERELAN) 240 MG C24P, TAKE 1 TABLET BY MOUTH TWICE A DAY, Disp: 180 capsule, Rfl: 6     Review of patient's allergies indicates:   Allergen Reactions    Adhesive tape-silicones      Use PAPER Tape / Tegaderm    Tapentadol Hives    Atorvastatin Other (See Comments)     Muscle pain    Pravastatin Rash        ROS  Constitutional: Negative for fever, Negative for weight loss  HENT Negative for congestion  Cardiovascular: Negative chest pain  Respiratory: Negative Shortness of breath  Heme: Negative excessive bleeding  Skin:NegativeItching, Negative breakdown  Musculoskeletal:Negative for back pain, Positive for joint pain, Negative muscle pain, Negative muscle weakness  Neurological: Negative for numbness and paresthesias   Psychiatric/Behavioral: Negative altered mental status, Negative for depression    Physical Exam:   BP (!) 146/79   Pulse 88   Resp 17   Wt 72.3 kg (159 lb 4.5 oz)   LMP  (LMP Unknown)   BMI 24.95 kg/m²   General appearance: This is a well-developed, Well nourished female No obvious acute distress.  Psychiatric: normal mood and affect;  pleasant and conversant; judgment and thought content normal  Gait is coordinated. Patient has antalgic gait to the right  Cardiovascular: There are no swelling or varicosities present.   Respiratory: normal respiratory effort   Lymphatic: no  adenopathy   Neurologic: alert and oriented to person, place, and time   Deep Tendon Reflexes are normal;  Coordination and Balance: Normal   Musculoskeletal   Neck    ROM shows normal flexion and extension and lateral rotation    Palpation: Non-tender    Stability is normal    Strength is normal    Skin is normal without masses and lesions    Sensation is intact to light touch   Back    ROM showsnot examined flexion, extension and     rotation    Palpation shows no masses    Stability is normal    Strength to flexion and extension well maintained    Core strength is diminished    Skin shows no rashes or cafe au lait spots;     Sensation is intact to light touch  Right hip   Range of motion normal    Left Hip  Range of motionnormal    Right Knee  Swelling None  TendernessMedial joint line  Range of Motion: 0-110  Motion is painfulYes  Crepitance presentYes    Right Leg  Neurologic Intact  Pulses Intact    Left Knee: Swelling None  TendernessNone  Range of Motion: 0-120   Motion is painful No    Left Leg   Neurologic Intact  Pulses Intact    Radiograph: Show mild degenerative arthritis with subchondral sclerosis, periarticular osteophytes and mild narrowing of joint space.  Angle: mild varus    Physical therapy is contraindicated due to potential bone loss on this severe arthritic joint.    Assessment:  Knee arthritis right   Patient has mild arthritis of her right knee, nothing that would require surgery at this time. Recommend tylenol and NSAID for symptoms and behavioral modification.  Patient instructed to do more aquatic therapy or low impact exercise. Mild joint effusion now, but minimal. Will not aspirate or inject at this time. She will follow up prn

## 2019-03-20 NOTE — PROGRESS NOTES
Assessment /Plan     For exam results, see Encounter Report.    Primary open angle glaucoma of both eyes, mild stage    Rhegmatogenous retinal detachment of left eye    Endophthalmitis, left eye    Nuclear sclerosis of right eye    Anomalous optic nerve    KCS (keratoconjunctivitis sicca)    Pseudophakia               Glaucoma (type and duration)    Suspect 2/2 lrg cups ou    First HVF   2000   First photos   2013   Treatment / Drops started   xal 1/2016           Family history    none        Glaucoma meds   Latanoprost started 1/4/2016  od // cosopt os         H/O adverse rxn to glaucoma drops    none        LASERS    none        GLAUCOMA SURGERIES    none        OTHER EYE SURGERIES    none        CDR    0.75-0.8 / 0.7-0.75        Tbase    18-22 / 19-22          Tmax    22/22            Ttarget    ?              HVF    6 test 2000 to  2018 - near full od // gen depression and  ? INS os        Gonio    +2-3 ou         CCT    510/517        OCT    3 test 2013 to 2018 - RNFL - bord TI od // nl os        HRT    4 test 2016 to 2019 -MR -  Dec I od // bord T os /// CDR 0.69 od // 0.63 os  (large disc size )         Disc photos    2013, 2015 - OIS     - Ttoday  15/15  - Test done today  IOP //  HRT       2. Anomaolous Optic nerves    - very large disc size ou    - HRT - 2.97 od // 3.04 os (nl is up to 2.8 ou)     3. Dry eyes     4. NS - OD    5. H/O endogenous endophthalmitis  OS  - And secondary tract RD - S/P PPVx and Silicone Oil (Mazzulla)    Oil removed at the same time as the phaco/IOL done 8/2/2017     6. S/P complex phaco/IOL os with trypan blue and Leslye ring and SO removal     - (done with mazzulla ) // 8/2/2017// PCB00 19.5   - Doing well - still with mild iritis       Cosopt bid os - change to OU - to simplify eye drop schedule - use same drop in both eyes   Latanoprost q hs - od - STOP (3/22/2019)      Happy with new glasses (3/22/2019)     F/U 4  months - HVF / DFE / OCT     Hold off on yag cap to  ant capsule phimosis and PCO OS  - VA doing OK     I have seen and personally examined the patient.  I agree with the findings, assessment and plan of the resident and/or fellow.     Lynda Crawford MD

## 2019-03-22 ENCOUNTER — OFFICE VISIT (OUTPATIENT)
Dept: OPHTHALMOLOGY | Facility: CLINIC | Age: 79
End: 2019-03-22
Payer: COMMERCIAL

## 2019-03-22 ENCOUNTER — CLINICAL SUPPORT (OUTPATIENT)
Dept: OPHTHALMOLOGY | Facility: CLINIC | Age: 79
End: 2019-03-22
Payer: COMMERCIAL

## 2019-03-22 DIAGNOSIS — H33.002 RHEGMATOGENOUS RETINAL DETACHMENT OF LEFT EYE: ICD-10-CM

## 2019-03-22 DIAGNOSIS — M35.01 KCS (KERATOCONJUNCTIVITIS SICCA): ICD-10-CM

## 2019-03-22 DIAGNOSIS — Q07.8 ANOMALOUS OPTIC NERVE: ICD-10-CM

## 2019-03-22 DIAGNOSIS — H25.11 NUCLEAR SCLEROSIS OF RIGHT EYE: ICD-10-CM

## 2019-03-22 DIAGNOSIS — Z96.1 PSEUDOPHAKIA: ICD-10-CM

## 2019-03-22 DIAGNOSIS — H44.002 ENDOPHTHALMITIS, LEFT EYE: ICD-10-CM

## 2019-03-22 DIAGNOSIS — H40.1131 PRIMARY OPEN ANGLE GLAUCOMA OF BOTH EYES, MILD STAGE: Primary | ICD-10-CM

## 2019-03-22 PROCEDURE — 92133 CPTRZD OPH DX IMG PST SGM ON: CPT | Mod: S$GLB,,, | Performed by: OPHTHALMOLOGY

## 2019-03-22 PROCEDURE — 92133 HEIDELBERG RETINA TOMOGRAPHY (HRT) - OU - BOTH EYES: ICD-10-PCS | Mod: S$GLB,,, | Performed by: OPHTHALMOLOGY

## 2019-03-22 PROCEDURE — 99999 PR PBB SHADOW E&M-EST. PATIENT-LVL II: ICD-10-PCS | Mod: PBBFAC,,, | Performed by: OPHTHALMOLOGY

## 2019-03-22 PROCEDURE — 92012 INTRM OPH EXAM EST PATIENT: CPT | Mod: S$GLB,,, | Performed by: OPHTHALMOLOGY

## 2019-03-22 PROCEDURE — 92012 PR EYE EXAM, EST PATIENT,INTERMED: ICD-10-PCS | Mod: S$GLB,,, | Performed by: OPHTHALMOLOGY

## 2019-03-22 PROCEDURE — 99999 PR PBB SHADOW E&M-EST. PATIENT-LVL II: CPT | Mod: PBBFAC,,, | Performed by: OPHTHALMOLOGY

## 2019-03-22 RX ORDER — DORZOLAMIDE HYDROCHLORIDE AND TIMOLOL MALEATE 20; 5 MG/ML; MG/ML
1 SOLUTION/ DROPS OPHTHALMIC 2 TIMES DAILY
Qty: 10 ML | Refills: 12 | Status: SHIPPED | OUTPATIENT
Start: 2019-03-22 | End: 2020-04-30

## 2019-04-03 ENCOUNTER — LAB VISIT (OUTPATIENT)
Dept: LAB | Facility: HOSPITAL | Age: 79
End: 2019-04-03
Attending: INTERNAL MEDICINE
Payer: COMMERCIAL

## 2019-04-03 DIAGNOSIS — I10 ESSENTIAL HYPERTENSION: ICD-10-CM

## 2019-04-03 LAB
ALBUMIN SERPL BCP-MCNC: 4.1 G/DL (ref 3.5–5.2)
ALP SERPL-CCNC: 75 U/L (ref 55–135)
ALT SERPL W/O P-5'-P-CCNC: 18 U/L (ref 10–44)
ANION GAP SERPL CALC-SCNC: 7 MMOL/L (ref 8–16)
AST SERPL-CCNC: 17 U/L (ref 10–40)
BASOPHILS # BLD AUTO: 0.02 K/UL (ref 0–0.2)
BASOPHILS NFR BLD: 0.4 % (ref 0–1.9)
BILIRUB SERPL-MCNC: 0.6 MG/DL (ref 0.1–1)
BUN SERPL-MCNC: 23 MG/DL (ref 8–23)
CALCIUM SERPL-MCNC: 10.1 MG/DL (ref 8.7–10.5)
CHLORIDE SERPL-SCNC: 105 MMOL/L (ref 95–110)
CO2 SERPL-SCNC: 28 MMOL/L (ref 23–29)
CREAT SERPL-MCNC: 0.9 MG/DL (ref 0.5–1.4)
DIFFERENTIAL METHOD: ABNORMAL
EOSINOPHIL # BLD AUTO: 0.3 K/UL (ref 0–0.5)
EOSINOPHIL NFR BLD: 4.8 % (ref 0–8)
ERYTHROCYTE [DISTWIDTH] IN BLOOD BY AUTOMATED COUNT: 12.8 % (ref 11.5–14.5)
EST. GFR  (AFRICAN AMERICAN): >60 ML/MIN/1.73 M^2
EST. GFR  (NON AFRICAN AMERICAN): >60 ML/MIN/1.73 M^2
GLUCOSE SERPL-MCNC: 99 MG/DL (ref 70–110)
HCT VFR BLD AUTO: 40.6 % (ref 37–48.5)
HGB BLD-MCNC: 13.5 G/DL (ref 12–16)
LYMPHOCYTES # BLD AUTO: 1.8 K/UL (ref 1–4.8)
LYMPHOCYTES NFR BLD: 35.2 % (ref 18–48)
MCH RBC QN AUTO: 31.5 PG (ref 27–31)
MCHC RBC AUTO-ENTMCNC: 33.3 G/DL (ref 32–36)
MCV RBC AUTO: 95 FL (ref 82–98)
MONOCYTES # BLD AUTO: 0.5 K/UL (ref 0.3–1)
MONOCYTES NFR BLD: 9 % (ref 4–15)
NEUTROPHILS # BLD AUTO: 2.6 K/UL (ref 1.8–7.7)
NEUTROPHILS NFR BLD: 50.4 % (ref 38–73)
PLATELET # BLD AUTO: 205 K/UL (ref 150–350)
PMV BLD AUTO: 11.1 FL (ref 9.2–12.9)
POTASSIUM SERPL-SCNC: 3.7 MMOL/L (ref 3.5–5.1)
PROT SERPL-MCNC: 7.5 G/DL (ref 6–8.4)
RBC # BLD AUTO: 4.29 M/UL (ref 4–5.4)
SODIUM SERPL-SCNC: 140 MMOL/L (ref 136–145)
WBC # BLD AUTO: 5.23 K/UL (ref 3.9–12.7)

## 2019-04-03 PROCEDURE — 80053 COMPREHEN METABOLIC PANEL: CPT

## 2019-04-03 PROCEDURE — 36415 COLL VENOUS BLD VENIPUNCTURE: CPT

## 2019-04-03 PROCEDURE — 85025 COMPLETE CBC W/AUTO DIFF WBC: CPT

## 2019-04-09 ENCOUNTER — TELEPHONE (OUTPATIENT)
Dept: ENDOSCOPY | Facility: HOSPITAL | Age: 79
End: 2019-04-09

## 2019-04-09 ENCOUNTER — OFFICE VISIT (OUTPATIENT)
Dept: INTERNAL MEDICINE | Facility: CLINIC | Age: 79
End: 2019-04-09
Payer: COMMERCIAL

## 2019-04-09 VITALS
BODY MASS INDEX: 25.19 KG/M2 | HEIGHT: 67 IN | HEART RATE: 83 BPM | SYSTOLIC BLOOD PRESSURE: 136 MMHG | OXYGEN SATURATION: 98 % | DIASTOLIC BLOOD PRESSURE: 62 MMHG | WEIGHT: 160.5 LBS

## 2019-04-09 DIAGNOSIS — K86.2 CYST AND PSEUDOCYST OF PANCREAS: Primary | ICD-10-CM

## 2019-04-09 DIAGNOSIS — C50.412 MALIGNANT NEOPLASM OF UPPER-OUTER QUADRANT OF LEFT FEMALE BREAST, UNSPECIFIED ESTROGEN RECEPTOR STATUS: ICD-10-CM

## 2019-04-09 DIAGNOSIS — L60.2 THICKENED NAILS: ICD-10-CM

## 2019-04-09 DIAGNOSIS — K86.3 CYST AND PSEUDOCYST OF PANCREAS: Primary | ICD-10-CM

## 2019-04-09 DIAGNOSIS — C80.1 CANCER: Primary | ICD-10-CM

## 2019-04-09 DIAGNOSIS — I10 ESSENTIAL HYPERTENSION: ICD-10-CM

## 2019-04-09 DIAGNOSIS — E78.00 PURE HYPERCHOLESTEROLEMIA: ICD-10-CM

## 2019-04-09 DIAGNOSIS — K86.2 PANCREAS CYST: ICD-10-CM

## 2019-04-09 PROBLEM — E27.8 LEFT ADRENAL MASS: Status: RESOLVED | Noted: 2017-03-22 | Resolved: 2019-04-09

## 2019-04-09 PROBLEM — G62.9 NEUROPATHY: Status: RESOLVED | Noted: 2017-01-06 | Resolved: 2019-04-09

## 2019-04-09 PROCEDURE — 3075F PR MOST RECENT SYSTOLIC BLOOD PRESS GE 130-139MM HG: ICD-10-PCS | Mod: CPTII,S$GLB,, | Performed by: INTERNAL MEDICINE

## 2019-04-09 PROCEDURE — 99999 PR PBB SHADOW E&M-EST. PATIENT-LVL IV: ICD-10-PCS | Mod: PBBFAC,,, | Performed by: INTERNAL MEDICINE

## 2019-04-09 PROCEDURE — 3078F DIAST BP <80 MM HG: CPT | Mod: CPTII,S$GLB,, | Performed by: INTERNAL MEDICINE

## 2019-04-09 PROCEDURE — 99214 PR OFFICE/OUTPT VISIT, EST, LEVL IV, 30-39 MIN: ICD-10-PCS | Mod: S$GLB,,, | Performed by: INTERNAL MEDICINE

## 2019-04-09 PROCEDURE — 3075F SYST BP GE 130 - 139MM HG: CPT | Mod: CPTII,S$GLB,, | Performed by: INTERNAL MEDICINE

## 2019-04-09 PROCEDURE — 99999 PR PBB SHADOW E&M-EST. PATIENT-LVL IV: CPT | Mod: PBBFAC,,, | Performed by: INTERNAL MEDICINE

## 2019-04-09 PROCEDURE — 1101F PR PT FALLS ASSESS DOC 0-1 FALLS W/OUT INJ PAST YR: ICD-10-PCS | Mod: CPTII,S$GLB,, | Performed by: INTERNAL MEDICINE

## 2019-04-09 PROCEDURE — 1101F PT FALLS ASSESS-DOCD LE1/YR: CPT | Mod: CPTII,S$GLB,, | Performed by: INTERNAL MEDICINE

## 2019-04-09 PROCEDURE — 99214 OFFICE O/P EST MOD 30 MIN: CPT | Mod: S$GLB,,, | Performed by: INTERNAL MEDICINE

## 2019-04-09 PROCEDURE — 3078F PR MOST RECENT DIASTOLIC BLOOD PRESSURE < 80 MM HG: ICD-10-PCS | Mod: CPTII,S$GLB,, | Performed by: INTERNAL MEDICINE

## 2019-04-09 NOTE — PROGRESS NOTES
Subjective:      Patient ID: Ida Santana is a 78 y.o. female.    Chief Complaint: Follow-up    HPI:  HPI   Follow up: 6 months:    Patient is here for follow up of medical problems.    Hypertension: with repeat blood pressure check it is in range. We have had to change BP meds because of recalls.  She has had no side effects to the medication.    Patient has a history of breast cancer.  This was in situ.  She was followed in the [facility] but now since she is 5 years out she brings in a letter that she is to be followed by her PCP or gynecologist.      Additionally the patient was seen by Dr. pena and it was recommended that she have a CT of a pancreatic cyst in February of 2019 and according to the notes of his nurse she was referred to cysts clinic and was to be contacted.  I remember sit to the nurse and will await follow-up instructions.          Patient Active Problem List   Diagnosis    Cancer    Hyperlipidemia    Hypertension    History of uterine fibroid    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    Open angle 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 eyes    Osteopenia    Retinal detachment of left eye with retinal break    Endophthalmitis, left eye    Rhegmatogenous retinal detachment of left eye    NS (nuclear sclerosis)    Hx of renal vein thrombosis    Retinal detachment, rhegmatogenous, left eye    History of breast cancer in female    Pancreas cyst     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    Cataract     History of uterine fibroid     Hyperlipidemia     dyslipidemia    Hypertension     MRSA infection     Open angle 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 eyes 5/2/2013    Osteopenia     Potassium depletion 1998    Retinal detachment of left eye with retinal break 3/13/2017     Past Surgical History:    Procedure Laterality Date    Adrenal Gland Surgery to right side      ADRENALECTOMY Left 3/22/2017    Performed [facility] Porfirio [facility]ll[facility]    BREAST BIOPSY Left 2001    left breast DCIS    BREAST LUMPECTOMY Left 09/11/2001    CATARACT EXTRACTION W/  INTRAOCULAR LENS IMPLANT Left 08/02/2017    with PPV/SO removal ( AND )    CHOLECYSTECTOMY-LAPAROSCOPIC N/A 3/1/2018    Performed by Lambert Villa Jr., [facility] at [facility]    COLONOSCOPY      COLONOSCOPY N/A 10/31/2016    Performed by YAMILETH Richards [facility] (4TH FLR)    INSERTION-INTRAOCULAR LENS (IOL) Left 8/2/2017    Performed by Lynda Crawford MD at [facility]    KIDNEY SURGERY  03/27/2017    patient does not know what they did, but knows she does not have kidney cancer    PARS PLANA VITRECTOMY Left 08/02/2017    25g PPV WITH SO REMOVAL AND PHACO IOL ( AND )    PHACOEMULSIFICATION-ASPIRATION-CATARACT Left 8/2/2017    Performed by Lynda Crawford [facility] at [facility]    REPAIR-RETINA (VITRECTOMY) Left 8/2/2017    Performed by JAKE Beaulieu [facility]    REPAIR-RETINA (VITRECTOMY) Left 3/15/2017    Performed by JAKE [facility]ulieu [facility]    RETINAL DETACHMENT SURGERY Left 08/22/2017    PPVx and SO Removal//Phaco IOL ( AND )    TRANSESOPHAGEAL ECHOCARDIOGRAM (MIS) N/A 1/17/2017    Performed by Aubrey Auguste [facility]     Family History   Problem Relation Age of Onset    Cancer Mother         breast    Breast cancer Mother         40s and again in 50s (bilateral)    Breast cancer Other 40    Heart disease Father         bypass    Cancer Father         asbestos    Stroke Brother     Ovarian cancer Neg Hx     Amblyopia Neg Hx     Blindness Neg Hx     Cataracts Neg Hx     Diabetes Neg Hx     Glaucoma Neg Hx     Hypertension Neg Hx     Macular degeneration Neg Hx     Retinal  "detachment Neg Hx     Strabismus Neg Hx     Thyroid disease Neg Hx     Colon cancer Neg Hx     Melanoma Neg Hx      Review of Systems   Constitutional: Negative for activity change, chills, fever and unexpected weight change.   Respiratory: Negative for cough, chest tightness, shortness of breath and wheezing.    Cardiovascular: Negative for chest pain, palpitations and leg swelling.     Objective:     Vitals:    04/09/19 0912 04/09/19 0937 04/09/19 0944   BP: (!) 152/70 130/60 136/62   Pulse: 83     SpO2: 98%     Weight: 72.8 kg (160 lb 7.9 oz)     Height: 5' 7" (1.702 m)     PainSc: 0-No pain       Body mass index is 25.14 kg/m².  Physical Exam   Constitutional: She is oriented to person, place, and time. She appears well-developed and well-nourished. No distress.   Neck: No JVD present. Carotid bruit is not present. No thyromegaly present.   Cardiovascular: Normal rate, regular rhythm, normal heart sounds and intact distal pulses. PMI is not displaced.   Pulmonary/Chest: Effort normal and breath sounds normal. No respiratory distress.   Abdominal: Soft. Bowel sounds are normal. She exhibits no distension. There is no tenderness.   Musculoskeletal: She exhibits no edema.   Neurological: She is alert and oriented to person, place, and time.     Assessment:     1. Cancer    2. Pure hypercholesterolemia    3. Malignant neoplasm of upper-outer quadrant of left female breast, unspecified estrogen receptor status    4. Pancreas cyst    5. Essential hypertension    6. Thickened nails      Plan:   Ida was seen today for follow-up.    Diagnoses and all orders for this visit:    Cancer:  Patient is now 17 years out from her in situ breast cancer mammogram orders will be placed yearly for the patient, yearly exam will be done.    Pure hypercholesterolemia:  Will monitor lipids    Malignant neoplasm of upper-outer quadrant of left female breast, unspecified estrogen receptor status:  See above    Pancreas cyst:  I " have messaged Dr. Boswell's nurse    Essential hypertension:  Well controlled same medication  -     CBC auto differential; Future  -     Comprehensive metabolic panel; Future  -     Lipid panel; Future    Thickened nails  -     Ambulatory consult to Podiatry        Problem List Items Addressed This Visit     Cancer - Primary    Overview     dcutal carcinoma in situ left breast september 2001         Hyperlipidemia    Overview     dyslipidemia         Hypertension    Relevant Orders    CBC auto differential    Comprehensive metabolic panel    Lipid panel    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    Stable nonenhancing hypoattenuating mid left kidney lesion measuring 1.5 cm (previously 1.5 cm).    Previously noted, Bosniak IIF lesion within the lower left kidney is not apparent on today's examination.    Stable hypodensity within the pancreatic body measuring 1.9 cm (previously 1.9 cm). Additional subcentimeter hypodensities within the pancreatic body and tail appear grossly similar, too small to characterize, possibly sidebranch IPMNs versus cystic neoplasms. Continued followup is recommended.    Large hiatal hernia.    Cholelithiasis without evidence of acute cholecystitis.      ______________________________________     Electronically signed by resident: FERNANDA CHINO MD  Date: 02/12/18  Time: 10:32                    Other Visit Diagnoses     Thickened nails        Relevant Orders    Ambulatory consult to Podiatry        Orders Placed This Encounter   Procedures    CBC auto differential     Standing Status:   Future     Standing Expiration Date:   11/9/2019    Comprehensive metabolic panel     Standing Status:   Future     Standing Expiration Date:   11/9/2019    Lipid panel     Standing Status:   Future     Standing Expiration Date:   11/9/2019     Ambulatory consult to Podiatry     Referral Priority:   Routine     Referral Type:   Consultation     Referral Reason:   Specialty Services Required     Requested Specialty:   Podiatry     Number of Visits Requested:   1     Follow up in about 6 months (around 10/9/2019) for Annual : cbc, cmp, lipid .     Medication List           Accurate as of 4/9/19  5:45 PM. If you have any questions, ask your nurse or doctor.               CONTINUE taking these medications    aspirin 81 MG EC tablet  Commonly known as:  ECOTRIN     CORDRAN 0.05 % lotion  Generic drug:  flurandrenolide     dorzolamide-timolol 2-0.5% 22.3-6.8 mg/mL ophthalmic solution  Commonly known as:  COSOPT  Place 1 drop into both eyes 2 (two) times daily. Use generic cosopt both eyes 2 x day. Discontinue latanoprost     irbesartan 300 MG tablet  Commonly known as:  AVAPRO  Take 1 tablet (300 mg total) by mouth once daily. Cancel valsartan     * ketoconazole 2 % shampoo  Commonly known as:  NIZORAL  USE EVERY OTHER DAY LET SIT ON SCALP FOR FIVE MINUTES BEFORE RINSING     * ketoconazole 2 % shampoo  Commonly known as:  NIZORAL  Wash hair with medicated shampoo at least 2x/week - let sit on scalp at least 5 minutes prior to rinsing     ONE-A-DAY 50 PLUS tablet  Generic drug:  geriatric multivitamin-min     verapamil 240 MG CR tablet  Commonly known as:  CALAN-SR  take 1 tablet by mouth twice a day         * This list has 2 medication(s) that are the same as other medications prescribed for you. Read the directions carefully, and ask your doctor or other care provider to review them with you.

## 2019-04-11 ENCOUNTER — TELEPHONE (OUTPATIENT)
Dept: ENDOSCOPY | Facility: HOSPITAL | Age: 79
End: 2019-04-11

## 2019-04-11 NOTE — TELEPHONE ENCOUNTER
"Attempted to contact patient to schedule EUS. Someone answered the phone and I repeatedly said "hello' , but no one responded. Phone then disconnected  "

## 2019-04-18 ENCOUNTER — TELEPHONE (OUTPATIENT)
Dept: ENDOSCOPY | Facility: HOSPITAL | Age: 79
End: 2019-04-18

## 2019-05-01 ENCOUNTER — TELEPHONE (OUTPATIENT)
Dept: ENDOSCOPY | Facility: HOSPITAL | Age: 79
End: 2019-05-01

## 2019-05-01 NOTE — TELEPHONE ENCOUNTER
Spoke with patient in regards to scheduling EUS. She wants to be seen in clinic first. Appointment scheduled.

## 2019-05-09 ENCOUNTER — OFFICE VISIT (OUTPATIENT)
Dept: GASTROENTEROLOGY | Facility: CLINIC | Age: 79
End: 2019-05-09
Payer: COMMERCIAL

## 2019-05-09 VITALS
HEIGHT: 67 IN | WEIGHT: 159.38 LBS | BODY MASS INDEX: 25.01 KG/M2 | TEMPERATURE: 99 F | RESPIRATION RATE: 16 BRPM | SYSTOLIC BLOOD PRESSURE: 143 MMHG | HEART RATE: 96 BPM | DIASTOLIC BLOOD PRESSURE: 85 MMHG

## 2019-05-09 DIAGNOSIS — K86.2 PANCREAS CYST: Primary | ICD-10-CM

## 2019-05-09 PROCEDURE — 1101F PT FALLS ASSESS-DOCD LE1/YR: CPT | Mod: CPTII,S$GLB,, | Performed by: INTERNAL MEDICINE

## 2019-05-09 PROCEDURE — 99999 PR PBB SHADOW E&M-EST. PATIENT-LVL III: CPT | Mod: PBBFAC,,, | Performed by: INTERNAL MEDICINE

## 2019-05-09 PROCEDURE — 3077F PR MOST RECENT SYSTOLIC BLOOD PRESSURE >= 140 MM HG: ICD-10-PCS | Mod: CPTII,S$GLB,, | Performed by: INTERNAL MEDICINE

## 2019-05-09 PROCEDURE — 99214 PR OFFICE/OUTPT VISIT, EST, LEVL IV, 30-39 MIN: ICD-10-PCS | Mod: S$GLB,,, | Performed by: INTERNAL MEDICINE

## 2019-05-09 PROCEDURE — 99214 OFFICE O/P EST MOD 30 MIN: CPT | Mod: S$GLB,,, | Performed by: INTERNAL MEDICINE

## 2019-05-09 PROCEDURE — 3077F SYST BP >= 140 MM HG: CPT | Mod: CPTII,S$GLB,, | Performed by: INTERNAL MEDICINE

## 2019-05-09 PROCEDURE — 3079F PR MOST RECENT DIASTOLIC BLOOD PRESSURE 80-89 MM HG: ICD-10-PCS | Mod: CPTII,S$GLB,, | Performed by: INTERNAL MEDICINE

## 2019-05-09 PROCEDURE — 3079F DIAST BP 80-89 MM HG: CPT | Mod: CPTII,S$GLB,, | Performed by: INTERNAL MEDICINE

## 2019-05-09 PROCEDURE — 99999 PR PBB SHADOW E&M-EST. PATIENT-LVL III: ICD-10-PCS | Mod: PBBFAC,,, | Performed by: INTERNAL MEDICINE

## 2019-05-09 PROCEDURE — 1101F PR PT FALLS ASSESS DOC 0-1 FALLS W/OUT INJ PAST YR: ICD-10-PCS | Mod: CPTII,S$GLB,, | Performed by: INTERNAL MEDICINE

## 2019-05-09 NOTE — PROGRESS NOTES
Subjective:      Patient ID: Ida Santana is a 78 y.o. female.    Chief Complaint: No chief complaint on file.      HPI:  Ida Santana is a 78 y.o. female presents with a pancreatic cyst. The cyst was first noted 2017. It's located in the the body.  It measures 1.9 mm  in size.  It is not symptomatic.  Previous studies include CT scan and MRI.   Since that time the cyst has not increased in size.     Previous FNA of the cyst has not been done     There is not a family history of pancreatic cancer     The patient does not smoke.     ECOG status 0 - Asymptomatic     We discussed the case with surg onc in our cyst conference and it was decided to do an EUS. Patient was called to schedule but she didn't have any recollection of the visit with Dr. Calhoun back in June 2018 and wanted to come again and discuss.           Review of patient's allergies indicates:   Allergen Reactions    Adhesive tape-silicones      Use PAPER Tape / Tegaderm    Tapentadol Hives    Atorvastatin Other (See Comments)     Muscle pain    Pravastatin Rash       Current Outpatient Medications   Medication Sig Dispense Refill    aspirin (ECOTRIN) 81 MG EC tablet Take 81 mg by mouth once daily.      dorzolamide-timolol 2-0.5% (COSOPT) 22.3-6.8 mg/mL ophthalmic solution Place 1 drop into both eyes 2 (two) times daily. Use generic cosopt both eyes 2 x day. Discontinue latanoprost 10 mL 12    flurandrenolide (CORDRAN) 0.05 % lotion Apply to  scalp one to two times daily as needed for dryness      irbesartan (AVAPRO) 300 MG tablet Take 1 tablet (300 mg total) by mouth once daily. Cancel valsartan 90 tablet 3    ketoconazole (NIZORAL) 2 % shampoo USE EVERY OTHER DAY LET SIT ON SCALP FOR FIVE MINUTES BEFORE RINSING 120 mL 6    ketoconazole (NIZORAL) 2 % shampoo Wash hair with medicated shampoo at least 2x/week - let sit on scalp at least 5 minutes prior to rinsing 120 mL 5    MULTIVITAMIN WITH MINERALS (ONE-A-DAY 50 PLUS) Tab Take 1 tablet by  mouth once daily.       verapamil (CALAN-SR) 240 MG CR tablet take 1 tablet by mouth twice a day 60 tablet 12     No current facility-administered medications for this visit.            Objective:     Physical Exam   Abdominal: Soft. Bowel sounds are normal. There is no tenderness.       Assessment:   Pancreatic cyst in the the body. Measuring 1.9 mm in size,  unchanged  Discussed natural history of panc cysts in general and mucinous cysts in specific.      Plan:     Schedule  EUS-FNA. Patient said she will call in June to schedule. If she doesn't call then will call her in July.

## 2019-05-31 ENCOUNTER — OFFICE VISIT (OUTPATIENT)
Dept: RADIATION ONCOLOGY | Facility: CLINIC | Age: 79
End: 2019-05-31
Payer: COMMERCIAL

## 2019-05-31 VITALS
BODY MASS INDEX: 25.06 KG/M2 | SYSTOLIC BLOOD PRESSURE: 140 MMHG | HEART RATE: 81 BPM | RESPIRATION RATE: 20 BRPM | WEIGHT: 160 LBS | DIASTOLIC BLOOD PRESSURE: 63 MMHG | TEMPERATURE: 98 F

## 2019-05-31 DIAGNOSIS — Z17.0 MALIGNANT NEOPLASM OF UPPER-OUTER QUADRANT OF LEFT BREAST IN FEMALE, ESTROGEN RECEPTOR POSITIVE: Primary | ICD-10-CM

## 2019-05-31 DIAGNOSIS — C50.412 MALIGNANT NEOPLASM OF UPPER-OUTER QUADRANT OF LEFT BREAST IN FEMALE, ESTROGEN RECEPTOR POSITIVE: Primary | ICD-10-CM

## 2019-05-31 PROCEDURE — 3078F DIAST BP <80 MM HG: CPT | Mod: CPTII,S$GLB,, | Performed by: RADIOLOGY

## 2019-05-31 PROCEDURE — 99213 PR OFFICE/OUTPT VISIT, EST, LEVL III, 20-29 MIN: ICD-10-PCS | Mod: S$GLB,,, | Performed by: RADIOLOGY

## 2019-05-31 PROCEDURE — 1101F PR PT FALLS ASSESS DOC 0-1 FALLS W/OUT INJ PAST YR: ICD-10-PCS | Mod: CPTII,S$GLB,, | Performed by: RADIOLOGY

## 2019-05-31 PROCEDURE — 1101F PT FALLS ASSESS-DOCD LE1/YR: CPT | Mod: CPTII,S$GLB,, | Performed by: RADIOLOGY

## 2019-05-31 PROCEDURE — 99999 PR PBB SHADOW E&M-EST. PATIENT-LVL III: CPT | Mod: PBBFAC,,, | Performed by: RADIOLOGY

## 2019-05-31 PROCEDURE — 99999 PR PBB SHADOW E&M-EST. PATIENT-LVL III: ICD-10-PCS | Mod: PBBFAC,,, | Performed by: RADIOLOGY

## 2019-05-31 PROCEDURE — 3077F PR MOST RECENT SYSTOLIC BLOOD PRESSURE >= 140 MM HG: ICD-10-PCS | Mod: CPTII,S$GLB,, | Performed by: RADIOLOGY

## 2019-05-31 PROCEDURE — 99213 OFFICE O/P EST LOW 20 MIN: CPT | Mod: S$GLB,,, | Performed by: RADIOLOGY

## 2019-05-31 PROCEDURE — 3077F SYST BP >= 140 MM HG: CPT | Mod: CPTII,S$GLB,, | Performed by: RADIOLOGY

## 2019-05-31 PROCEDURE — 3078F PR MOST RECENT DIASTOLIC BLOOD PRESSURE < 80 MM HG: ICD-10-PCS | Mod: CPTII,S$GLB,, | Performed by: RADIOLOGY

## 2019-05-31 NOTE — PROGRESS NOTES
Subjective:       Patient ID: Ida Santana is a 78 y.o. female.    Chief Complaint: Breast Cancer (f/u)    This patient returns for follow up visit.    Ms. 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 The patient is status post segmental mastectomy followed by postoperative whole breast irradiation completed in December of 2001. The patient has not received adjuvant hormonal therapy. The patient has remained TABITHA since that time.  Today the patient states she feels well.  No complaints.  MMG in December of 2018 was negative.      Review of Systems   Constitutional: Negative for activity change, appetite change, chills and fatigue.   HENT: Negative for sore throat and trouble swallowing.    Respiratory: Negative for cough and shortness of breath.    Cardiovascular: Negative for chest pain and palpitations.   Gastrointestinal: Negative for abdominal pain, constipation and diarrhea.       Objective:      Physical Exam   Constitutional: She appears well-developed and well-nourished. No distress.   Neck: Normal range of motion. Neck supple.   Pulmonary/Chest: Right breast exhibits no inverted nipple, no mass, no nipple discharge, no skin change and no tenderness. Left breast exhibits no inverted nipple, no mass, no nipple discharge, no skin change and no tenderness. Breasts are asymmetrical (Lt. breast smaller than Rt. ).       Lymphadenopathy:     She has no cervical adenopathy.     She has no axillary adenopathy.        Right: No supraclavicular adenopathy present.        Left: No supraclavicular adenopathy present.       Assessment:       1. Malignant neoplasm of upper-outer quadrant of left breast in female, estrogen receptor positive        Plan:       Patient doing well, no evidence of recurrent disease.  Plan follow up in 1 year.

## 2019-06-24 ENCOUNTER — TELEPHONE (OUTPATIENT)
Dept: ENDOSCOPY | Facility: HOSPITAL | Age: 79
End: 2019-06-24

## 2019-06-24 DIAGNOSIS — K86.3 CYST AND PSEUDOCYST OF PANCREAS: Primary | ICD-10-CM

## 2019-06-24 DIAGNOSIS — K86.2 CYST AND PSEUDOCYST OF PANCREAS: Primary | ICD-10-CM

## 2019-06-27 ENCOUNTER — TELEPHONE (OUTPATIENT)
Dept: ENDOSCOPY | Facility: HOSPITAL | Age: 79
End: 2019-06-27

## 2019-06-27 NOTE — TELEPHONE ENCOUNTER
Spoke with patient. EUS scheduled for 8/7 at 9a. Reviewed prep instructions. Ms Santana verbalized understanding

## 2019-06-27 NOTE — TELEPHONE ENCOUNTER
----- Message from Maisha Cameron sent at 6/27/2019  2:06 PM CDT -----  Contact: pt   Needs Advice    Reason for call: pt want someone to call her. She is not sure what Dr. Tej Wells need her to do before she schedule her procedure        Communication Preference: [phone]    Additional Information:

## 2019-07-08 RX ORDER — IRBESARTAN 300 MG/1
TABLET ORAL
Qty: 90 TABLET | Refills: 3 | Status: SHIPPED | OUTPATIENT
Start: 2019-07-08 | End: 2020-04-30 | Stop reason: SDUPTHER

## 2019-07-16 NOTE — PROGRESS NOTES
HPI     DLS: 3/22/19    Pt here for HVF review;    Meds: Dorzolamide-Timolol bid ou                  1. POAG   2. 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 SHANTE   4. NS OD   5. PCIOL OS   6. Hx Endophthalmitis OS   7. Rhegmatogenous RD OS         Last edited by Lynda Crawford MD on 7/19/2019 10:32 AM. (History)            Assessment /Plan     For exam results, see Encounter Report.    Primary open angle glaucoma of both eyes, mild stage    Rhegmatogenous retinal detachment of left eye    Nuclear sclerosis of right eye    Anomalous optic nerve    KCS (keratoconjunctivitis sicca)    Pseudophakia    Endophthalmitis, left eye           Glaucoma (type and duration)    Suspect 2/2 lrg cups ou    First HVF   2000   First photos   2013   Treatment / Drops started   xal 1/2016           Family history    none        Glaucoma meds   Latanoprost started 1/4/2016  od // cosopt os         H/O adverse rxn to glaucoma drops    none        LASERS    none        GLAUCOMA SURGERIES    none        OTHER EYE SURGERIES    none        CDR    0.75-0.8 / 0.7-0.75        Tbase    18-22 / 19-22          Tmax    22/22            Ttarget    ?              HVF    7 test 2000 to  2019 - near full od // gen depression and  ? INS os        Gonio    +2-3 ou         CCT    510/517        OCT    4 test 2013 to 2019 - RNFL - nl od // nl os         HRT    4 test 2016 to 2019 -MR -  Dec I od // bord T os /// CDR 0.69 od // 0.63 os  (large disc size )         Disc photos    2013, 2015 - OIS     - Ttoday  15/15  - Test done today  IOP //  HVF / DFE / OCT       2. Anomaolous Optic nerves    - very large disc size ou    - HRT - 2.97 od // 3.04 os (nl is up to 2.8 ou)     3. Dry eyes     4. NS - OD    5. H/O endogenous endophthalmitis  OS  - And secondary tract RD - S/P PPVx and Silicone Oil (Mazzulla)    Oil removed at the same time as the phaco/IOL done 8/2/2017     6. S/P complex phaco/IOL os with trypan blue and Leslye ring and SO removal     - (done with mazzulla ) //  8/2/2017// PCB00 19.5   - Doing well - still with mild iritis       Cosopt bid  OU -   Latanoprost - STOP (3/22/2019)      Happy with new glasses (3/22/2019)     F/U 4  months - gonio     Hold off on yag cap to ant capsule phimosis and PCO OS  - VA doing OK     I have seen and personally examined the patient.  I agree with the findings, assessment and plan of the resident and/or fellow.     Lynda Crawford MD

## 2019-07-19 ENCOUNTER — OFFICE VISIT (OUTPATIENT)
Dept: OPHTHALMOLOGY | Facility: CLINIC | Age: 79
End: 2019-07-19
Payer: COMMERCIAL

## 2019-07-19 ENCOUNTER — CLINICAL SUPPORT (OUTPATIENT)
Dept: OPHTHALMOLOGY | Facility: CLINIC | Age: 79
End: 2019-07-19
Payer: COMMERCIAL

## 2019-07-19 DIAGNOSIS — H25.11 NUCLEAR SCLEROSIS OF RIGHT EYE: ICD-10-CM

## 2019-07-19 DIAGNOSIS — H40.1131 PRIMARY OPEN ANGLE GLAUCOMA OF BOTH EYES, MILD STAGE: ICD-10-CM

## 2019-07-19 DIAGNOSIS — H33.002 RHEGMATOGENOUS RETINAL DETACHMENT OF LEFT EYE: ICD-10-CM

## 2019-07-19 DIAGNOSIS — Z96.1 PSEUDOPHAKIA: ICD-10-CM

## 2019-07-19 DIAGNOSIS — M35.01 KCS (KERATOCONJUNCTIVITIS SICCA): ICD-10-CM

## 2019-07-19 DIAGNOSIS — H40.1131 PRIMARY OPEN ANGLE GLAUCOMA OF BOTH EYES, MILD STAGE: Primary | ICD-10-CM

## 2019-07-19 DIAGNOSIS — Q07.8 ANOMALOUS OPTIC NERVE: ICD-10-CM

## 2019-07-19 DIAGNOSIS — H44.002 ENDOPHTHALMITIS, LEFT EYE: ICD-10-CM

## 2019-07-19 PROCEDURE — 92083 EXTENDED VISUAL FIELD XM: CPT | Mod: S$GLB,,, | Performed by: OPHTHALMOLOGY

## 2019-07-19 PROCEDURE — 92014 PR EYE EXAM, EST PATIENT,COMPREHESV: ICD-10-PCS | Mod: S$GLB,,, | Performed by: OPHTHALMOLOGY

## 2019-07-19 PROCEDURE — 99999 PR PBB SHADOW E&M-EST. PATIENT-LVL II: CPT | Mod: PBBFAC,,, | Performed by: OPHTHALMOLOGY

## 2019-07-19 PROCEDURE — 92133 CPTRZD OPH DX IMG PST SGM ON: CPT | Mod: S$GLB,,, | Performed by: OPHTHALMOLOGY

## 2019-07-19 PROCEDURE — 92083 HUMPHREY VISUAL FIELD - OU - BOTH EYES: ICD-10-PCS | Mod: S$GLB,,, | Performed by: OPHTHALMOLOGY

## 2019-07-19 PROCEDURE — 92014 COMPRE OPH EXAM EST PT 1/>: CPT | Mod: S$GLB,,, | Performed by: OPHTHALMOLOGY

## 2019-07-19 PROCEDURE — 92133 POSTERIOR SEGMENT OCT OPTIC NERVE(OCULAR COHERENCE TOMOGRAPHY) - OU - BOTH EYES: ICD-10-PCS | Mod: S$GLB,,, | Performed by: OPHTHALMOLOGY

## 2019-07-19 PROCEDURE — 99999 PR PBB SHADOW E&M-EST. PATIENT-LVL II: ICD-10-PCS | Mod: PBBFAC,,, | Performed by: OPHTHALMOLOGY

## 2019-07-19 NOTE — PROGRESS NOTES
24-2  Ss done ou     Rel & Fix =  Good ou       Coop =   Good     Patient has allergies to eye patch/latex    I used pirate patch for test today     jthomas      Dr GARAY patient

## 2019-07-29 ENCOUNTER — TELEPHONE (OUTPATIENT)
Dept: ENDOSCOPY | Facility: HOSPITAL | Age: 79
End: 2019-07-29

## 2019-08-07 ENCOUNTER — ANESTHESIA (OUTPATIENT)
Dept: ENDOSCOPY | Facility: HOSPITAL | Age: 79
End: 2019-08-07
Payer: COMMERCIAL

## 2019-08-07 ENCOUNTER — ANESTHESIA EVENT (OUTPATIENT)
Dept: ENDOSCOPY | Facility: HOSPITAL | Age: 79
End: 2019-08-07
Payer: COMMERCIAL

## 2019-08-07 ENCOUNTER — HOSPITAL ENCOUNTER (OUTPATIENT)
Facility: HOSPITAL | Age: 79
Discharge: HOME | End: 2019-08-07
Attending: INTERNAL MEDICINE | Admitting: INTERNAL MEDICINE
Payer: COMMERCIAL

## 2019-08-07 VITALS
RESPIRATION RATE: 20 BRPM | OXYGEN SATURATION: 100 % | TEMPERATURE: 99 F | HEIGHT: 67 IN | HEART RATE: 62 BPM | WEIGHT: 160 LBS | SYSTOLIC BLOOD PRESSURE: 140 MMHG | BODY MASS INDEX: 25.11 KG/M2 | DIASTOLIC BLOOD PRESSURE: 67 MMHG

## 2019-08-07 DIAGNOSIS — K86.2 PANCREAS CYST: Primary | ICD-10-CM

## 2019-08-07 PROCEDURE — 88112 CYTOLOGY SPECIMEN- MEDICAL CYTOLOGY (FLUID/WASH/BRUSH): ICD-10-PCS | Mod: 26,,, | Performed by: PATHOLOGY

## 2019-08-07 PROCEDURE — 37000009 HC ANESTHESIA EA ADD 15 MINS: Performed by: INTERNAL MEDICINE

## 2019-08-07 PROCEDURE — 63600175 PHARM REV CODE 636 W HCPCS: Performed by: INTERNAL MEDICINE

## 2019-08-07 PROCEDURE — 43242 PR UPGI ENDOSCOPY,FN NEEDLE BX,GUIDED: ICD-10-PCS | Mod: ,,, | Performed by: INTERNAL MEDICINE

## 2019-08-07 PROCEDURE — 43242 EGD US FINE NEEDLE BX/ASPIR: CPT | Performed by: INTERNAL MEDICINE

## 2019-08-07 PROCEDURE — 82150 ASSAY OF AMYLASE: CPT

## 2019-08-07 PROCEDURE — 63600175 PHARM REV CODE 636 W HCPCS: Performed by: NURSE ANESTHETIST, CERTIFIED REGISTERED

## 2019-08-07 PROCEDURE — 88112 CYTOPATH CELL ENHANCE TECH: CPT | Performed by: PATHOLOGY

## 2019-08-07 PROCEDURE — D9220A PRA ANESTHESIA: ICD-10-PCS | Mod: CRNA,,, | Performed by: NURSE ANESTHETIST, CERTIFIED REGISTERED

## 2019-08-07 PROCEDURE — 88112 CYTOPATH CELL ENHANCE TECH: CPT | Mod: 26,,, | Performed by: PATHOLOGY

## 2019-08-07 PROCEDURE — D9220A PRA ANESTHESIA: Mod: CRNA,,, | Performed by: NURSE ANESTHETIST, CERTIFIED REGISTERED

## 2019-08-07 PROCEDURE — 82378 CARCINOEMBRYONIC ANTIGEN: CPT

## 2019-08-07 PROCEDURE — D9220A PRA ANESTHESIA: Mod: ANES,,, | Performed by: ANESTHESIOLOGY

## 2019-08-07 PROCEDURE — 27202059 HC NEEDLE, FNA (ANY): Performed by: INTERNAL MEDICINE

## 2019-08-07 PROCEDURE — 43242 EGD US FINE NEEDLE BX/ASPIR: CPT | Mod: ,,, | Performed by: INTERNAL MEDICINE

## 2019-08-07 PROCEDURE — 37000008 HC ANESTHESIA 1ST 15 MINUTES: Performed by: INTERNAL MEDICINE

## 2019-08-07 PROCEDURE — D9220A PRA ANESTHESIA: ICD-10-PCS | Mod: ANES,,, | Performed by: ANESTHESIOLOGY

## 2019-08-07 RX ORDER — PROPOFOL 10 MG/ML
VIAL (ML) INTRAVENOUS CONTINUOUS PRN
Status: DISCONTINUED | OUTPATIENT
Start: 2019-08-07 | End: 2019-08-07

## 2019-08-07 RX ORDER — SODIUM CHLORIDE 0.9 % (FLUSH) 0.9 %
10 SYRINGE (ML) INJECTION
Status: DISCONTINUED | OUTPATIENT
Start: 2019-08-07 | End: 2019-08-07 | Stop reason: HOSPADM

## 2019-08-07 RX ORDER — SODIUM CHLORIDE 9 MG/ML
INJECTION, SOLUTION INTRAVENOUS CONTINUOUS
Status: DISCONTINUED | OUTPATIENT
Start: 2019-08-07 | End: 2019-08-07 | Stop reason: HOSPADM

## 2019-08-07 RX ORDER — ONDANSETRON 2 MG/ML
4 INJECTION INTRAMUSCULAR; INTRAVENOUS DAILY PRN
Status: DISCONTINUED | OUTPATIENT
Start: 2019-08-07 | End: 2019-08-07 | Stop reason: HOSPADM

## 2019-08-07 RX ORDER — CIPROFLOXACIN 500 MG/1
500 TABLET ORAL 2 TIMES DAILY
Qty: 6 TABLET | Refills: 0 | Status: SHIPPED | OUTPATIENT
Start: 2019-08-07 | End: 2019-08-10

## 2019-08-07 RX ORDER — LIDOCAINE HCL/PF 100 MG/5ML
SYRINGE (ML) INTRAVENOUS
Status: DISCONTINUED | OUTPATIENT
Start: 2019-08-07 | End: 2019-08-07

## 2019-08-07 RX ORDER — PROPOFOL 10 MG/ML
VIAL (ML) INTRAVENOUS
Status: DISCONTINUED | OUTPATIENT
Start: 2019-08-07 | End: 2019-08-07

## 2019-08-07 RX ORDER — CIPROFLOXACIN 2 MG/ML
INJECTION, SOLUTION INTRAVENOUS
Status: DISCONTINUED | OUTPATIENT
Start: 2019-08-07 | End: 2019-08-07

## 2019-08-07 RX ORDER — SODIUM CHLORIDE 0.9 % (FLUSH) 0.9 %
3 SYRINGE (ML) INJECTION
Status: CANCELLED | OUTPATIENT
Start: 2019-08-07

## 2019-08-07 RX ORDER — HYDROMORPHONE HYDROCHLORIDE 1 MG/ML
0.2 INJECTION, SOLUTION INTRAMUSCULAR; INTRAVENOUS; SUBCUTANEOUS EVERY 5 MIN PRN
Status: DISCONTINUED | OUTPATIENT
Start: 2019-08-07 | End: 2019-08-07 | Stop reason: HOSPADM

## 2019-08-07 RX ADMIN — PROPOFOL 150 MCG/KG/MIN: 10 INJECTION, EMULSION INTRAVENOUS at 08:08

## 2019-08-07 RX ADMIN — LIDOCAINE HYDROCHLORIDE 50 MG: 20 INJECTION, SOLUTION INTRAVENOUS at 08:08

## 2019-08-07 RX ADMIN — PROPOFOL 60 MG: 10 INJECTION, EMULSION INTRAVENOUS at 08:08

## 2019-08-07 RX ADMIN — SODIUM CHLORIDE: 0.9 INJECTION, SOLUTION INTRAVENOUS at 07:08

## 2019-08-07 RX ADMIN — CIPROFLOXACIN 400 MG: 2 INJECTION, SOLUTION INTRAVENOUS at 08:08

## 2019-08-07 RX ADMIN — PROPOFOL 20 MG: 10 INJECTION, EMULSION INTRAVENOUS at 08:08

## 2019-08-07 RX ADMIN — SODIUM CHLORIDE: 0.9 INJECTION, SOLUTION INTRAVENOUS at 08:08

## 2019-08-07 NOTE — [employer]ATION PATIENT INSTRUCTIONS
Discharge Summary/Instructions after an Endoscopic Procedure  Patient Name: Ida Santana  Patient MRN: [id number]  Patient YOB: 1940  Wednesday, August 07, 2019  Ladonna Wells MD  RESTRICTIONS:  During your procedure today, you received medications for sedation.  These   medications may affect your judgment, balance and coordination.  Therefore,   for 24 hours, you have the following restrictions:   - DO NOT drive a car, operate machinery, make legal/financial decisions,   sign important papers or drink alcohol.    ACTIVITY:  Today: no heavy lifting, straining or running due to procedural   sedation/anesthesia.  The following day: return to full activity including work.  DIET:  Eat and drink normally unless instructed otherwise.     TREATMENT FOR COMMON SIDE EFFECTS:  - Mild abdominal pain, nausea, belching, bloating or excessive gas:  rest,   eat lightly and use a heating pad.  - Sore Throat: treat with throat lozenges and/or gargle with warm salt   water.  - Because air was used during the procedure, expelling large amounts of air   from your rectum or belching is normal.  - If a bowel prep was taken, you may not have a bowel movement for 1-3 days.    This is normal.  SYMPTOMS TO WATCH FOR AND REPORT TO YOUR PHYSICIAN:  1. Abdominal pain or bloating, other than gas cramps.  2. Chest pain.  3. Back pain.  4. Signs of infection such as: chills or fever occurring within 24 hours   after the procedure.  5. Rectal bleeding, which would show as bright red, maroon, or black stools.   (A tablespoon of blood from the rectum is not serious, especially if   hemorrhoids are present.)  6. Vomiting.  7. Weakness or dizziness.  GO DIRECTLY TO THE NEAREST EMERGENCY ROOM IF YOU HAVE ANY OF THE FOLLOWING:      Difficulty breathing              Chills and/or fever over 101 F   Persistent vomiting and/or vomiting blood   Severe abdominal pain   Severe chest pain   Black, tarry stools   Bleeding- more than one  tablespoon   Any other symptom or condition that you feel may need urgent attention  Your doctor recommends these additional instructions:  If any biopsies were taken, your doctors clinic will contact you in 1 to 2   weeks with any results.  - Discharge patient to home.   - Resume previous diet.   - Continue present medications.   - Return to referring physician.   - Await cytology results.   - Ciprofloxacin 500 mg PO Bid for 3 days.  For questions, problems or results please call your physician - Ladonna Wells MD at Work:  [phone].  [facility], EMERGENCY ROOM PHONE NUMBER: [phone]  IF A COMPLICATION OR EMERGENCY SITUATION ARISES AND YOU ARE UNABLE TO REACH   YOUR PHYSICIAN - GO DIRECTLY TO THE EMERGENCY ROOM.  Ladonna Wells MD  8/7/2019 9:36:23 AM  This report has been verified and signed electronically.  [employer]

## 2019-08-07 NOTE — H&P
History & Physical - Short Stay  Gastroenterology      SUBJECTIVE:     Procedure: EUS    Chief Complaint/Indication for Procedure: pancreas cyst      History of Present Illness:  Patient is a 79 y.o. female with pancreas cyst coming for EUS    PTA Medications   Medication Sig    aspirin (ECOTRIN) 81 MG EC tablet Take 81 mg by mouth once daily.    irbesartan (AVAPRO) 300 MG tablet TAKE 1 TABLET BY MOUTH EVERY DAY. STOP VALSARTAN    MULTIVITAMIN WITH MINERALS (ONE-A-DAY 50 PLUS) Tab Take 1 tablet by mouth once daily.     verapamil (CALAN-SR) 240 MG CR tablet take 1 tablet by mouth twice a day    dorzolamide-timolol 2-0.5% (COSOPT) 22.3-6.8 mg/mL ophthalmic solution Place 1 drop into both eyes 2 (two) times daily. Use generic cosopt both eyes 2 x day. Discontinue latanoprost    flurandrenolide (CORDRAN) 0.05 % lotion Apply to  scalp one to two times daily as needed for dryness    ketoconazole (NIZORAL) 2 % shampoo USE EVERY OTHER DAY LET SIT ON SCALP FOR FIVE MINUTES BEFORE RINSING    ketoconazole (NIZORAL) 2 % shampoo Wash hair with medicated shampoo at least 2x/week - let sit on scalp at least 5 minutes prior to rinsing       Review of patient's allergies indicates:   Allergen Reactions    Adhesive tape-silicones      Use PAPER Tape / Tegaderm    Tapentadol Hives    Atorvastatin Other (See Comments)     Muscle pain    Pravastatin Rash        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    Cataract     History of uterine fibroid     Hyperlipidemia     dyslipidemia    Hypertension     MRSA infection     Open angle 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 eyes 5/2/2013    Osteopenia     Pancreas cyst     Potassium depletion 1998    Retinal detachment of left eye with retinal break 3/13/2017     Past Surgical History:   Procedure Laterality Date    Adrenal Gland Surgery to  right side      ADRENALECTOMY Left 3/22/2017    Performed by Porfirio Arellano MD at [facility] OR 2ND FLR    BREAST BIOPSY Left 2001    left breast DCIS    BREAST LUMPECTOMY Left 09/11/2001    CATARACT EXTRACTION W/  INTRAOCULAR LENS IMPLANT Left 08/02/2017    with PPV/SO removal ( AND )    CHOLECYSTECTOMY-LAPAROSCOPIC N/A 3/1/2018    Performed by Lambert Villa Jr., MD at [facility] OR 2ND FLR    COLONOSCOPY      COLONOSCOPY N/A 10/31/2016    Performed by YAMILETH Richards MD at [facility] ENDO (4TH FLR)    INSERTION-INTRAOCULAR LENS (IOL) Left 8/2/2017    Performed by Lynda Crawford MD at [facility] OR 1ST FLR    KIDNEY SURGERY  03/27/2017    patient does not know what they did, but knows she does not have kidney cancer    PARS PLANA VITRECTOMY Left 08/02/2017    25g PPV WITH SO REMOVAL AND PHACO IOL ( AND )    PHACOEMULSIFICATION-ASPIRATION-CATARACT Left 8/2/2017    Performed by Lynda Crawford MD at [facility] OR 1ST FLR    REPAIR-RETINA (VITRECTOMY) Left 8/2/2017    Performed by JAKE Beaulieu MD at [facility] OR 1ST FLR    REPAIR-RETINA (VITRECTOMY) Left 3/15/2017    Performed by JAKE Beaulieu MD at [facility] OR 1ST FLR    RETINAL DETACHMENT SURGERY Left 08/22/2017    PPVx and SO Removal//Phaco IOL ( AND )    TRANSESOPHAGEAL ECHOCARDIOGRAM (MIS) N/A 1/17/2017    Performed by Aubrey Auguste MD at [facility] CATH LAB     Family History   Problem Relation Age of Onset    Cancer Mother         breast    Breast cancer Mother         40s and again in 50s (bilateral)    Breast cancer Other 40    Heart disease Father         bypass    Cancer Father         asbestos    Stroke Brother     Ovarian cancer Neg Hx     Amblyopia Neg Hx     Blindness Neg Hx     Cataracts Neg Hx     Diabetes Neg Hx     Glaucoma Neg Hx     Hypertension Neg Hx     Macular degeneration Neg Hx     Retinal detachment Neg Hx     Strabismus Neg Hx     Thyroid disease  Neg Hx     Colon cancer Neg Hx     Melanoma Neg Hx      Social History     Tobacco Use    Smoking status: Never Smoker    Smokeless tobacco: Never Used   Substance Use Topics    Alcohol use: No    Drug use: No          OBJECTIVE:     Vital Signs (Most Recent)  Temp: 97.7 °F (36.5 °C) (08/07/19 0732)  Pulse: 72 (08/07/19 0732)  Resp: 16 (08/07/19 0732)  BP: (!) 152/73 (08/07/19 0732)  SpO2: 99 % (08/07/19 0732)         ASSESSMENT/PLAN:     Patient is a 79 y.o. female with pancreas cyst coming for EUS    Plan: EUS    Anesthesia Plan: Moderate Sedation    ASA Grade: ASA 2 - Patient with mild systemic disease with no functional limitations

## 2019-08-07 NOTE — ANESTHESIA PREPROCEDURE EVALUATION
08/07/2019  Ida Santana is a 79 y.o., female with a pancreatic cyst for EUS    Patient Active Problem List   Diagnosis    Cancer    Hyperlipidemia    Hypertension    History of uterine fibroid    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    Open angle 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 eyes    Osteopenia    Retinal detachment of left eye with retinal break    Endophthalmitis, left eye    Rhegmatogenous retinal detachment of left eye    NS (nuclear sclerosis)    Hx of renal vein thrombosis    Retinal detachment, rhegmatogenous, left eye    History of breast cancer in female    Pancreas cyst         Anesthesia Evaluation    I have reviewed the Patient Summary Reports.    I have reviewed the Nursing Notes.   I have reviewed the Medications.     Review of Systems  Social:  Non-Smoker, No Alcohol Use    Hematology/Oncology:  Hematology Normal   Oncology Normal     EENT/Dental:EENT/Dental Normal   Cardiovascular:   Hypertension NYHA Classification I    Pulmonary:  Pulmonary Normal    Renal/:  Renal/ Normal     Hepatic/GI:   GERD    Musculoskeletal:  Musculoskeletal Normal    Neurological:  Neurology Normal    Endocrine:  Endocrine Normal    Dermatological:  Skin Normal    Psych:  Psychiatric Normal           Physical Exam  General:  Well nourished    Airway/Jaw/Neck:  Airway Findings: Mouth Opening: Normal Tongue: Normal  General Airway Assessment: Adult  Mallampati: I  Improves to I with phonation.  TM Distance: Normal, at least 6 cm        Eyes/Ears/Nose:  EYES/EARS/NOSE FINDINGS: Normal   Dental:  DENTAL FINDINGS: Normal   Chest/Lungs:  Chest/Lungs Findings: Clear to auscultation, Normal Respiratory Rate     Heart/Vascular:  Heart Findings: Rate: Normal  Rhythm: Regular Rhythm  Sounds: Normal     Abdomen:  Abdomen Findings: Normal     Musculoskeletal:  Musculoskeletal Findings: Normal   Skin:  Skin Findings: Normal    Mental Status:  Mental Status Findings:  Cooperative, Alert and Oriented         Anesthesia Plan  Type of Anesthesia, risks & benefits discussed:  Anesthesia Type:  general  Patient's Preference:   Intra-op Monitoring Plan: standard ASA monitors  Intra-op Monitoring Plan Comments:   Post Op Pain Control Plan: per primary service following discharge from PACU  Post Op Pain Control Plan Comments:   Induction:   IV  Beta Blocker:  Patient is not currently on a Beta-Blocker (No further documentation required).       Informed Consent: Patient understands risks and agrees with Anesthesia plan.  Questions answered. Anesthesia consent signed with patient.  ASA Score: 3     Day of Surgery Review of History & Physical:    H&P update referred to the surgeon.         Ready For Surgery From Anesthesia Perspective.

## 2019-08-07 NOTE — TRANSFER OF CARE
"Anesthesia Transfer of Care Note    Patient: Ida Santana    Procedure(s) Performed: Procedure(s) (LRB):  ULTRASOUND, UPPER GI TRACT, ENDOSCOPIC (N/A)    Patient location: [facility]    Anesthesia Type: MAC    Transport from OR: Transported from OR on 2-3 L/min O2 by NC with adequate spontaneous ventilation    Post pain: adequate analgesia    Post assessment: no apparent anesthetic complications and tolerated procedure well    Post vital signs: stable    Level of consciousness: awake    Nausea/Vomiting: no nausea/vomiting    Complications: none    Transfer of care protocol was followed      Last vitals:   Visit Vitals  BP (!) 115/67 (Patient Position: Lying)   Pulse 67   Temp 36.5 °C (97.7 °F) (Temporal)   Resp 16   Ht 5' 7" (1.702 m)   Wt 72.6 kg (160 lb)   LMP  (LMP Unknown)   SpO2 99%   Breastfeeding? No   BMI 25.06 kg/m²     "

## 2019-08-07 NOTE — PLAN OF CARE
Patient states they are ready to be discharged. Instructions and prescription given to patient and family. Both verbalize understanding. Patient tolerating po liquids with no difficulty. Patient states pain is at a tolerable level for them. Anesthesia consent and surgical consent in chart upon patient's discharge from [facility].

## 2019-08-07 NOTE — ANESTHESIA POSTPROCEDURE EVALUATION
Anesthesia Post Evaluation    Patient: Ida Santana    Procedure(s) Performed: Procedure(s) (LRB):  ULTRASOUND, UPPER GI TRACT, ENDOSCOPIC (N/A)    Final Anesthesia Type: general  Patient location during evaluation: PACU  Patient participation: Yes- Able to Participate  Level of consciousness: awake and alert and awake  Post-procedure vital signs: reviewed and stable  Pain management: adequate  Airway patency: patent  PONV status at discharge: No PONV  Anesthetic complications: no      Cardiovascular status: blood pressure returned to baseline  Respiratory status: unassisted and spontaneous ventilation  Hydration status: euvolemic  Follow-up not needed.          Vitals Value Taken Time   /67 8/7/2019 10:02 AM   Temp 36.9 °C (98.5 °F) 8/7/2019 10:00 AM   Pulse 63 8/7/2019 10:02 AM   Resp 31 8/7/2019 10:02 AM   SpO2 100 % 8/7/2019 10:02 AM   Vitals shown include unvalidated device data.      No case tracking events are documented in the log.      Pain/Ellen Score: Ellen Score: 10 (8/7/2019  8:50 AM)

## 2019-08-09 LAB
AMYLASE, PANCREATIC FLUID: 3414 U/L
BDY SITE: NORMAL
BDY SITE: NORMAL
CEA FLD-MCNC: 425 NG/ML

## 2019-09-09 ENCOUNTER — TELEPHONE (OUTPATIENT)
Dept: INTERNAL MEDICINE | Facility: CLINIC | Age: 79
End: 2019-09-09

## 2019-09-09 NOTE — TELEPHONE ENCOUNTER
----- Message from Vernon Valdez sent at 9/9/2019  2:24 PM CDT -----  Contact: self    Doctor appointment and lab have been scheduled.  Please link lab orders to the lab appointment.  Date of doctor appointment:  10/02  Date of lab appointment:  9/25  Physical or EP: follow up  Comments:

## 2019-09-25 ENCOUNTER — LAB VISIT (OUTPATIENT)
Dept: LAB | Facility: HOSPITAL | Age: 79
End: 2019-09-25
Attending: INTERNAL MEDICINE
Payer: COMMERCIAL

## 2019-09-25 DIAGNOSIS — I10 ESSENTIAL HYPERTENSION: ICD-10-CM

## 2019-09-25 LAB
ALBUMIN SERPL BCP-MCNC: 3.9 G/DL (ref 3.5–5.2)
ALP SERPL-CCNC: 74 U/L (ref 55–135)
ALT SERPL W/O P-5'-P-CCNC: 12 U/L (ref 10–44)
ANION GAP SERPL CALC-SCNC: 8 MMOL/L (ref 8–16)
AST SERPL-CCNC: 16 U/L (ref 10–40)
BASOPHILS # BLD AUTO: 0.03 K/UL (ref 0–0.2)
BASOPHILS NFR BLD: 0.6 % (ref 0–1.9)
BILIRUB SERPL-MCNC: 0.7 MG/DL (ref 0.1–1)
BUN SERPL-MCNC: 15 MG/DL (ref 8–23)
CALCIUM SERPL-MCNC: 9.5 MG/DL (ref 8.7–10.5)
CHLORIDE SERPL-SCNC: 109 MMOL/L (ref 95–110)
CHOLEST SERPL-MCNC: 149 MG/DL (ref 120–199)
CHOLEST/HDLC SERPL: 3.5 {RATIO} (ref 2–5)
CO2 SERPL-SCNC: 24 MMOL/L (ref 23–29)
CREAT SERPL-MCNC: 0.8 MG/DL (ref 0.5–1.4)
DIFFERENTIAL METHOD: ABNORMAL
EOSINOPHIL # BLD AUTO: 0.2 K/UL (ref 0–0.5)
EOSINOPHIL NFR BLD: 3.6 % (ref 0–8)
ERYTHROCYTE [DISTWIDTH] IN BLOOD BY AUTOMATED COUNT: 13 % (ref 11.5–14.5)
EST. GFR  (AFRICAN AMERICAN): >60 ML/MIN/1.73 M^2
EST. GFR  (NON AFRICAN AMERICAN): >60 ML/MIN/1.73 M^2
GLUCOSE SERPL-MCNC: 101 MG/DL (ref 70–110)
HCT VFR BLD AUTO: 38.1 % (ref 37–48.5)
HDLC SERPL-MCNC: 43 MG/DL (ref 40–75)
HDLC SERPL: 28.9 % (ref 20–50)
HGB BLD-MCNC: 13.4 G/DL (ref 12–16)
LDLC SERPL CALC-MCNC: 86.8 MG/DL (ref 63–159)
LYMPHOCYTES # BLD AUTO: 2.1 K/UL (ref 1–4.8)
LYMPHOCYTES NFR BLD: 39.7 % (ref 18–48)
MCH RBC QN AUTO: 32.4 PG (ref 27–31)
MCHC RBC AUTO-ENTMCNC: 35.2 G/DL (ref 32–36)
MCV RBC AUTO: 92 FL (ref 82–98)
MONOCYTES # BLD AUTO: 0.5 K/UL (ref 0.3–1)
MONOCYTES NFR BLD: 9.2 % (ref 4–15)
NEUTROPHILS # BLD AUTO: 2.5 K/UL (ref 1.8–7.7)
NEUTROPHILS NFR BLD: 46.9 % (ref 38–73)
NONHDLC SERPL-MCNC: 106 MG/DL
PLATELET # BLD AUTO: 207 K/UL (ref 150–350)
PMV BLD AUTO: 10.8 FL (ref 9.2–12.9)
POTASSIUM SERPL-SCNC: 4.2 MMOL/L (ref 3.5–5.1)
PROT SERPL-MCNC: 7 G/DL (ref 6–8.4)
RBC # BLD AUTO: 4.14 M/UL (ref 4–5.4)
SODIUM SERPL-SCNC: 141 MMOL/L (ref 136–145)
TRIGL SERPL-MCNC: 96 MG/DL (ref 30–150)
WBC # BLD AUTO: 5.32 K/UL (ref 3.9–12.7)

## 2019-09-25 PROCEDURE — 85025 COMPLETE CBC W/AUTO DIFF WBC: CPT

## 2019-09-25 PROCEDURE — 80053 COMPREHEN METABOLIC PANEL: CPT

## 2019-09-25 PROCEDURE — 36415 COLL VENOUS BLD VENIPUNCTURE: CPT

## 2019-09-25 PROCEDURE — 80061 LIPID PANEL: CPT

## 2019-10-02 ENCOUNTER — OFFICE VISIT (OUTPATIENT)
Dept: INTERNAL MEDICINE | Facility: CLINIC | Age: 79
End: 2019-10-02
Payer: COMMERCIAL

## 2019-10-02 ENCOUNTER — IMMUNIZATION (OUTPATIENT)
Dept: PHARMACY | Facility: CLINIC | Age: 79
End: 2019-10-02
Payer: COMMERCIAL

## 2019-10-02 VITALS
BODY MASS INDEX: 25.08 KG/M2 | WEIGHT: 159.81 LBS | SYSTOLIC BLOOD PRESSURE: 134 MMHG | OXYGEN SATURATION: 98 % | DIASTOLIC BLOOD PRESSURE: 72 MMHG | HEIGHT: 67 IN | HEART RATE: 74 BPM

## 2019-10-02 DIAGNOSIS — Z00.00 WELLNESS EXAMINATION: ICD-10-CM

## 2019-10-02 DIAGNOSIS — L85.3 DRY SKIN: ICD-10-CM

## 2019-10-02 DIAGNOSIS — K86.2 PANCREAS CYST: ICD-10-CM

## 2019-10-02 DIAGNOSIS — Z00.00 PHYSICAL EXAM: Primary | ICD-10-CM

## 2019-10-02 DIAGNOSIS — I10 ESSENTIAL HYPERTENSION: ICD-10-CM

## 2019-10-02 DIAGNOSIS — Z85.3 HISTORY OF BREAST CANCER IN FEMALE: ICD-10-CM

## 2019-10-02 DIAGNOSIS — C50.412 MALIGNANT NEOPLASM OF UPPER-OUTER QUADRANT OF LEFT FEMALE BREAST, UNSPECIFIED ESTROGEN RECEPTOR STATUS: ICD-10-CM

## 2019-10-02 DIAGNOSIS — E78.5 HYPERLIPIDEMIA, UNSPECIFIED HYPERLIPIDEMIA TYPE: ICD-10-CM

## 2019-10-02 PROCEDURE — 1101F PT FALLS ASSESS-DOCD LE1/YR: CPT | Mod: CPTII,S$GLB,, | Performed by: INTERNAL MEDICINE

## 2019-10-02 PROCEDURE — 3075F PR MOST RECENT SYSTOLIC BLOOD PRESS GE 130-139MM HG: ICD-10-PCS | Mod: CPTII,S$GLB,, | Performed by: INTERNAL MEDICINE

## 2019-10-02 PROCEDURE — 3078F PR MOST RECENT DIASTOLIC BLOOD PRESSURE < 80 MM HG: ICD-10-PCS | Mod: CPTII,S$GLB,, | Performed by: INTERNAL MEDICINE

## 2019-10-02 PROCEDURE — 99999 PR PBB SHADOW E&M-EST. PATIENT-LVL V: CPT | Mod: PBBFAC,,, | Performed by: INTERNAL MEDICINE

## 2019-10-02 PROCEDURE — 99999 PR PBB SHADOW E&M-EST. PATIENT-LVL V: ICD-10-PCS | Mod: PBBFAC,,, | Performed by: INTERNAL MEDICINE

## 2019-10-02 PROCEDURE — 3078F DIAST BP <80 MM HG: CPT | Mod: CPTII,S$GLB,, | Performed by: INTERNAL MEDICINE

## 2019-10-02 PROCEDURE — 99214 PR OFFICE/OUTPT VISIT, EST, LEVL IV, 30-39 MIN: ICD-10-PCS | Mod: S$GLB,,, | Performed by: INTERNAL MEDICINE

## 2019-10-02 PROCEDURE — 3075F SYST BP GE 130 - 139MM HG: CPT | Mod: CPTII,S$GLB,, | Performed by: INTERNAL MEDICINE

## 2019-10-02 PROCEDURE — 99214 OFFICE O/P EST MOD 30 MIN: CPT | Mod: S$GLB,,, | Performed by: INTERNAL MEDICINE

## 2019-10-02 PROCEDURE — 1101F PR PT FALLS ASSESS DOC 0-1 FALLS W/OUT INJ PAST YR: ICD-10-PCS | Mod: CPTII,S$GLB,, | Performed by: INTERNAL MEDICINE

## 2019-10-02 NOTE — ASSESSMENT & PLAN NOTE
Result Notes for Cytology Specimen-Medical Cytology (Fluid/Wash/Brush)     Notes recorded by Ladonna Wells MD on 8/12/2019 at 2:04 PM CDT  Cyst fluid analysis is suggestive of a mucinous cyst. This cyst is benign but can transform into tumor so we need to check yearly with MRCP. .

## 2019-10-02 NOTE — PROGRESS NOTES
Subjective:      Patient ID: Ida Santana is a 79 y.o. female.    Chief Complaint: Annual Exam    HPI:  HPI   Reviewed pancreatic EUS: needs MRCP yearly.     Hyeprtension well controlled     The following assessments were completed    Living situation: independent  CAGE: no  Depression screening: no  Timed Get Up and Go: very active  Whisper Test: normal  Cognitive Function Screening: normal  Nutrition Screening: normal  ADL Screening: self        Hypertension patient did not take irbesartan.  Nose bleeding on asa  Discussed bowel movements, takes more time        Annual exam: 10/3/2018  Colonoscopy:  10/31/2016 follow up 10 years  Mammogram: I scheduled mammo  Gyn: Arabella Guallpa   appt made  Optho: Dr. Boswell /Yvette : follow up open angle glaucoma follow up 6 months:   Flu: today  Tetanus :9/26/2016  Shingles: 10/30/2015  Shingles  Pneumovax: done at age 73: documented  Prevnar: 9/23/2015  Bone Density:1/2017  MRCP yearly     Consultants  Dr. Yvette Dumont: 1/6/2017 retired  Dr. Harden: Left breast 4/2017 yearly  Dr. 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    Open angle 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 eyes    Osteopenia    Retinal detachment of left eye with retinal break    Endophthalmitis, left eye    Rhegmatogenous retinal detachment of left eye    NS (nuclear sclerosis)    Hx of renal vein thrombosis    Retinal detachment, rhegmatogenous, left eye    History of breast cancer in female    Pancreas cyst     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    Cataract     History of uterine fibroid     Hyperlipidemia     dyslipidemia    Hypertension     MRSA infection     Open angle  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 eyes 5/2/2013    Osteopenia     Pancreas cyst     Potassium depletion 1998    Retinal detachment of left eye with retinal break 3/13/2017     Past Surgical History:   Procedure Laterality Date    Adrenal Gland Surgery to right side      BREAST BIOPSY Left 2001    left breast DCIS    BREAST LUMPECTOMY Left 09/11/2001    CATARACT EXTRACTION W/  INTRAOCULAR LENS IMPLANT Left 08/02/2017    with PPV/SO removal ( AND )    COLONOSCOPY      COLONOSCOPY N/A 10/31/2016    Procedure: COLONOSCOPY;  Surgeon: YAMILETH Richards MD;  Location: [facility] ENDO (4TH FLR);  Service: Endoscopy;  Laterality: N/A;    ENDOSCOPIC ULTRASOUND OF UPPER GASTROINTESTINAL TRACT N/A 8/7/2019    Procedure: ULTRASOUND, UPPER GI TRACT, ENDOSCOPIC;  Surgeon: Ladonna Wells MD;  Location: [facility] ENDO (2ND FLR);  Service: Endoscopy;  Laterality: N/A;    KIDNEY SURGERY  03/27/2017    patient does not know what they did, but knows she does not have kidney cancer    PARS PLANA VITRECTOMY Left 08/02/2017    25g PPV WITH SO REMOVAL AND PHACO IOL ( AND )    RETINAL DETACHMENT SURGERY Left 08/22/2017    PPVx and SO Removal//Phaco IOL ( AND )     Family History   Problem Relation Age of Onset    Cancer Mother         breast    Breast cancer Mother         40s and again in 50s (bilateral)    Breast cancer Other 40    Heart disease Father         bypass    Cancer Father         asbestos    Stroke Brother     Ovarian cancer Neg Hx     Amblyopia Neg Hx     Blindness Neg Hx     Cataracts Neg Hx     Diabetes Neg Hx     Glaucoma Neg Hx     Hypertension Neg Hx     Macular degeneration Neg Hx     Retinal detachment Neg Hx     Strabismus Neg Hx     Thyroid disease Neg Hx     Colon cancer Neg Hx     Melanoma Neg Hx      Review of Systems   Constitutional: Negative for chills, fever and unexpected weight change.   HENT:  "Negative for trouble swallowing.    Respiratory: Negative for cough, shortness of breath and wheezing.    Cardiovascular: Negative for chest pain and palpitations.   Gastrointestinal: Negative for abdominal distention, abdominal pain, blood in stool and vomiting.   Musculoskeletal: Negative for back pain.     Objective:     Vitals:    10/02/19 1036 10/02/19 1106   BP: (!) 148/80 134/72   Pulse: 74    SpO2: 98%    Weight: 72.5 kg (159 lb 13.3 oz)    Height: 5' 7" (1.702 m)    PainSc: 0-No pain      Body mass index is 25.03 kg/m².  Physical Exam   Constitutional: She is oriented to person, place, and time. She appears well-developed and well-nourished. No distress.   Neck: Carotid bruit is not present. No thyromegaly present.   Cardiovascular: Normal rate, regular rhythm and normal heart sounds. PMI is not displaced.   Pulmonary/Chest: Effort normal and breath sounds normal. No respiratory distress.   Abdominal: Soft. Bowel sounds are normal. She exhibits no distension. There is no tenderness.   Musculoskeletal: She exhibits no edema.   Neurological: She is alert and oriented to person, place, and time.     Assessment:     1. Physical exam    2. Pancreas cyst    3. Malignant neoplasm of upper-outer quadrant of left female breast, unspecified estrogen receptor status    4. Wellness examination    5. History of breast cancer in female    6. Hyperlipidemia, unspecified hyperlipidemia type    7. Essential hypertension    8. Dry skin      Plan:   Ida was seen today for annual exam.    Diagnoses and all orders for this visit:    Physical exam    Pancreas cyst  Comments:  MRCP yearly    Malignant neoplasm of upper-outer quadrant of left female breast, unspecified estrogen receptor status  Comments:  Set up mammogram  Orders:  -     Mammo Digital Diagnostic Bilat w/ Weston; Future    Wellness examination  Comments:  No new finding on exam    History of breast cancer in female  Comments:  Mammo ordered and FU in  " [facility]    Hyperlipidemia, unspecified hyperlipidemia type  Comments:  not on statin    Essential hypertension  Comments:  well controlled    Dry skin  -     Ambulatory consult to Dermatology        Problem List Items Addressed This Visit     Hyperlipidemia    Overview     dyslipidemia         Hypertension    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    Stable nonenhancing hypoattenuating mid left kidney lesion measuring 1.5 cm (previously 1.5 cm).    Previously noted, Bosniak IIF lesion within the lower left kidney is not apparent on today's examination.    Stable hypodensity within the pancreatic body measuring 1.9 cm (previously 1.9 cm). Additional subcentimeter hypodensities within the pancreatic body and tail appear grossly similar, too small to characterize, possibly sidebranch IPMNs versus cystic neoplasms. Continued followup is recommended.    Large hiatal hernia.    Cholelithiasis without evidence of acute cholecystitis.      ______________________________________     Electronically signed by resident: FERNANDA CHINO MD  Date: 02/12/18  Time: 10:32                  Current Assessment & Plan     Result Notes for Cytology Specimen-Medical Cytology (Fluid/Wash/Brush)     Notes recorded by Ladonna Wells MD on 8/12/2019 at 2:04 PM CDT  Cyst fluid analysis is suggestive of a mucinous cyst. This cyst is benign but can transform into tumor so we need to check yearly with MRCP. .                Other Visit Diagnoses     Physical exam    -  Primary    Wellness examination        No new finding on exam    Dry skin        Relevant Orders    Ambulatory consult to Dermatology        Orders Placed This Encounter   Procedures    Mammo Digital Diagnostic  Ulices w/ [facility]     Standing Status:   Future     Standing Expiration Date:   12/2/2020     Order Specific Question:   May the Radiologist modify the order per protocol to meet the clinical needs of the patient?     Answer:   Yes    Ambulatory consult to Dermatology     Referral Priority:   Routine     Referral Type:   Consultation     Referral Reason:   Specialty Services Required     Requested Specialty:   Dermatology     Number of Visits Requested:   1     Follow up in about 6 months (around 4/2/2020) for Follow up.     Medication List           Accurate as of October 2, 2019  8:19 PM. If you have any questions, ask your nurse or doctor.               START taking these medications    FLUZONE HIGH-DOSE 2019-20 (PF) 180 mcg/0.5 mL Syrg  Generic drug:  flu vacc [id number](65yr up)PF  Inject 0.5 mLs into the muscle once. For one dose. for 1 dose        CONTINUE taking these medications    aspirin 81 MG EC tablet  Commonly known as:  ECOTRIN     CORDRAN 0.05 % lotion  Generic drug:  flurandrenolide     dorzolamide-timolol 2-0.5% 22.3-6.8 mg/mL ophthalmic solution  Commonly known as:  COSOPT  Place 1 drop into both eyes 2 (two) times daily. Use generic cosopt both eyes 2 x day. Discontinue latanoprost     irbesartan 300 MG tablet  Commonly known as:  AVAPRO  TAKE 1 TABLET BY MOUTH EVERY DAY. STOP VALSARTAN     * ketoconazole 2 % shampoo  Commonly known as:  NIZORAL  USE EVERY OTHER DAY LET SIT ON SCALP FOR FIVE MINUTES BEFORE RINSING     * ketoconazole 2 % shampoo  Commonly known as:  NIZORAL  Wash hair with medicated shampoo at least 2x/week - let sit on scalp at least 5 minutes prior to rinsing     ONE-A-DAY 50 PLUS tablet  Generic drug:  geriatric multivitamin-min     verapamil 240 MG CR tablet  Commonly known as:  CALAN-SR  take 1 tablet by mouth twice a day         * This list has 2 medication(s) that are the same as other medications prescribed for you. Read the directions carefully, and ask your doctor or other  care provider to review them with you.               Where to Get Your Medications      These medications were sent to [facility]  [address] [facility] [address]    Hours:  Mon-Fri, 8a-5:30p Phone:  [phone]   · FLUZONE HIGH-DOSE 2019-20 (PF) 180 mcg/0.5 mL Syrg

## 2019-10-31 ENCOUNTER — OFFICE VISIT (OUTPATIENT)
Dept: OBSTETRICS AND GYNECOLOGY | Facility: CLINIC | Age: 79
End: 2019-10-31
Payer: COMMERCIAL

## 2019-10-31 VITALS
DIASTOLIC BLOOD PRESSURE: 60 MMHG | SYSTOLIC BLOOD PRESSURE: 116 MMHG | BODY MASS INDEX: 25.71 KG/M2 | WEIGHT: 163.81 LBS | HEIGHT: 67 IN

## 2019-10-31 DIAGNOSIS — Z78.0 POSTMENOPAUSAL: ICD-10-CM

## 2019-10-31 DIAGNOSIS — Z01.419 ENCOUNTER FOR GYNECOLOGICAL EXAMINATION WITHOUT ABNORMAL FINDING: Primary | ICD-10-CM

## 2019-10-31 PROCEDURE — 99999 PR PBB SHADOW E&M-EST. PATIENT-LVL III: CPT | Mod: PBBFAC,,, | Performed by: OBSTETRICS & GYNECOLOGY

## 2019-10-31 PROCEDURE — 99397 PER PM REEVAL EST PAT 65+ YR: CPT | Mod: S$GLB,,, | Performed by: OBSTETRICS & GYNECOLOGY

## 2019-10-31 PROCEDURE — 3074F PR MOST RECENT SYSTOLIC BLOOD PRESSURE < 130 MM HG: ICD-10-PCS | Mod: CPTII,S$GLB,, | Performed by: OBSTETRICS & GYNECOLOGY

## 2019-10-31 PROCEDURE — 99397 PR PREVENTIVE VISIT,EST,65 & OVER: ICD-10-PCS | Mod: S$GLB,,, | Performed by: OBSTETRICS & GYNECOLOGY

## 2019-10-31 PROCEDURE — 3074F SYST BP LT 130 MM HG: CPT | Mod: CPTII,S$GLB,, | Performed by: OBSTETRICS & GYNECOLOGY

## 2019-10-31 PROCEDURE — 99999 PR PBB SHADOW E&M-EST. PATIENT-LVL III: ICD-10-PCS | Mod: PBBFAC,,, | Performed by: OBSTETRICS & GYNECOLOGY

## 2019-10-31 PROCEDURE — 3078F DIAST BP <80 MM HG: CPT | Mod: CPTII,S$GLB,, | Performed by: OBSTETRICS & GYNECOLOGY

## 2019-10-31 PROCEDURE — 3078F PR MOST RECENT DIASTOLIC BLOOD PRESSURE < 80 MM HG: ICD-10-PCS | Mod: CPTII,S$GLB,, | Performed by: OBSTETRICS & GYNECOLOGY

## 2019-11-14 NOTE — PROGRESS NOTES
HPI     Glaucoma      Additional comments: 4 month ck today               Comments     DLS: 7/19/19    1. POAG  2. 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 SHANTE  4. NS OD  5. PCIOL OS  6. Hx Endophthalmitis OS  7. Rhegmatogenous RD OS    MEDS:  Cosopt BID OU              Last edited by Portia Stevens MA on 11/15/2019  8:21 AM. (History)          Assessment /Plan     For exam results, see Encounter Report.    Primary open angle glaucoma of both eyes, mild stage    Rhegmatogenous retinal detachment of left eye    Nuclear sclerosis of right eye    Anomalous optic nerve    KCS (keratoconjunctivitis sicca)    Pseudophakia    Endophthalmitis, left eye             Glaucoma (type and duration)    Suspect 2/2 lrg cups ou    First HVF   2000   First photos   2013   Treatment / Drops started   xal 1/2016           Family history    none        Glaucoma meds   Cosopt ou bid // ((Latanoprost started 1/4/2016  - Stopped 3/22/2019  Od))          H/O adverse rxn to glaucoma drops    none        LASERS    none        GLAUCOMA SURGERIES    none        OTHER EYE SURGERIES    PC IOL os 8/2/2019 (done  W/ Lane - SO removal)    TR repair w/ PPVx and yas oil - os - 2/2 endogen endopth os - 3/15/2017 - Lane        CDR    0.75-0.8 / 0.7-0.75        Tbase    18-22 / 19-22          Tmax    22/30             Ttarget    ?              HVF    7 test 2000 to  2019 - near full od // gen depression and  ? INS os        Gonio    +2-3 ou w/ yas oil drop sup os         CCT    510/517        OCT    4 test 2013 to 2019 - RNFL - nl od // nl os         HRT    4 test 2016 to 2019 -MR -  Dec I od // bord T os /// CDR 0.69 od // 0.63 os  (large disc size )         Disc photos    2013, 2015 - OIS     - Ttoday  13/16  - Test done today  IOP // gonio       2. Anomaolous Optic nerves    - very large disc size ou    - HRT - 2.97 od // 3.04 os (nl is up to 2.8 ou)     3. Dry eyes     4. NS - OD    5. H/O endogenous endophthalmitis  OS  - And secondary tract RD - S/P PPVx and Silicone  Oil (Mazzulla)    Oil removed at the same time as the phaco/IOL done 8/2/2017     6. S/P complex phaco/IOL os with trypan blue and Leslye ring and SO removal     - (done with mazzulla ) // 8/2/2017// PCB00 19.5   - Doing well - still with mild iritis       Cosopt bid  OU -   Latanoprost - STOP/ OFF  (3/22/2019)      Happy with new glasses (3/22/2019)     F/U 4  months - HRT     Hold off on yag cap to ant capsule phimosis and PCO OS  - VA doing OK     I have seen and personally examined the patient.  I agree with the findings, assessment and plan of the resident and/or fellow.     Lynda Crawford MD

## 2019-11-15 ENCOUNTER — OFFICE VISIT (OUTPATIENT)
Dept: OPHTHALMOLOGY | Facility: CLINIC | Age: 79
End: 2019-11-15
Payer: COMMERCIAL

## 2019-11-15 DIAGNOSIS — M35.01 KCS (KERATOCONJUNCTIVITIS SICCA): ICD-10-CM

## 2019-11-15 DIAGNOSIS — H44.002 ENDOPHTHALMITIS, LEFT EYE: ICD-10-CM

## 2019-11-15 DIAGNOSIS — Z96.1 PSEUDOPHAKIA: ICD-10-CM

## 2019-11-15 DIAGNOSIS — H25.11 NUCLEAR SCLEROSIS OF RIGHT EYE: ICD-10-CM

## 2019-11-15 DIAGNOSIS — H40.1131 PRIMARY OPEN ANGLE GLAUCOMA OF BOTH EYES, MILD STAGE: Primary | ICD-10-CM

## 2019-11-15 DIAGNOSIS — H33.002 RHEGMATOGENOUS RETINAL DETACHMENT OF LEFT EYE: ICD-10-CM

## 2019-11-15 DIAGNOSIS — Q07.8 ANOMALOUS OPTIC NERVE: ICD-10-CM

## 2019-11-15 PROCEDURE — 92012 PR EYE EXAM, EST PATIENT,INTERMED: ICD-10-PCS | Mod: S$GLB,,, | Performed by: OPHTHALMOLOGY

## 2019-11-15 PROCEDURE — 92012 INTRM OPH EXAM EST PATIENT: CPT | Mod: S$GLB,,, | Performed by: OPHTHALMOLOGY

## 2019-11-15 PROCEDURE — 99999 PR PBB SHADOW E&M-EST. PATIENT-LVL II: CPT | Mod: PBBFAC,,, | Performed by: OPHTHALMOLOGY

## 2019-11-15 PROCEDURE — 99999 PR PBB SHADOW E&M-EST. PATIENT-LVL II: ICD-10-PCS | Mod: PBBFAC,,, | Performed by: OPHTHALMOLOGY

## 2019-11-15 PROCEDURE — 92020 PR SPECIAL EYE EVAL,GONIOSCOPY: ICD-10-PCS | Mod: S$GLB,,, | Performed by: OPHTHALMOLOGY

## 2019-11-15 PROCEDURE — 92020 GONIOSCOPY: CPT | Mod: S$GLB,,, | Performed by: OPHTHALMOLOGY

## 2019-11-19 ENCOUNTER — OFFICE VISIT (OUTPATIENT)
Dept: DERMATOLOGY | Facility: CLINIC | Age: 79
End: 2019-11-19
Payer: COMMERCIAL
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
## 2019-11-19 PROCEDURE — 99999 PR PBB SHADOW E&M-EST. PATIENT-LVL II: CPT | Mod: PBBFAC,,, | Performed by: DERMATOLOGY

## 2019-11-19 PROCEDURE — 99999 PR PBB SHADOW E&M-EST. PATIENT-LVL II: ICD-10-PCS | Mod: PBBFAC,,, | Performed by: DERMATOLOGY

## 2019-11-19 PROCEDURE — 1159F MED LIST DOCD IN RCRD: CPT | Mod: S$GLB,,, | Performed by: DERMATOLOGY

## 2019-11-19 PROCEDURE — 1125F AMNT PAIN NOTED PAIN PRSNT: CPT | Mod: S$GLB,,, | Performed by: DERMATOLOGY

## 2019-11-19 PROCEDURE — 1159F PR MEDICATION LIST DOCUMENTED IN MEDICAL RECORD: ICD-10-PCS | Mod: S$GLB,,, | Performed by: DERMATOLOGY

## 2019-11-19 PROCEDURE — 99214 OFFICE O/P EST MOD 30 MIN: CPT | Mod: S$GLB,,, | Performed by: DERMATOLOGY

## 2019-11-19 PROCEDURE — 1101F PR PT FALLS ASSESS DOC 0-1 FALLS W/OUT INJ PAST YR: ICD-10-PCS | Mod: CPTII,S$GLB,, | Performed by: DERMATOLOGY

## 2019-11-19 PROCEDURE — 99214 PR OFFICE/OUTPT VISIT, EST, LEVL IV, 30-39 MIN: ICD-10-PCS | Mod: S$GLB,,, | Performed by: DERMATOLOGY

## 2019-11-19 PROCEDURE — 1125F PR PAIN SEVERITY QUANTIFIED, PAIN PRESENT: ICD-10-PCS | Mod: S$GLB,,, | Performed by: DERMATOLOGY

## 2019-11-19 PROCEDURE — 1101F PT FALLS ASSESS-DOCD LE1/YR: CPT | Mod: CPTII,S$GLB,, | Performed by: DERMATOLOGY

## 2019-11-19 RX ORDER — BETAMETHASONE DIPROPIONATE 0.5 MG/G
CREAM TOPICAL
Qty: 60 G | Refills: 0 | Status: SHIPPED | OUTPATIENT
Start: 2019-11-19 | End: 2023-10-27

## 2019-11-19 NOTE — LETTER
November 19, 2019      Giovanna Murray MD  [address]  [facility] [address]           [facility]  [address]  [facility] [address]  Phone: [phone]  Fax: [phone]          Patient: Ida Santana   MR Number: [id number]   YOB: 1940   Date of Visit: 11/19/2019       Dear Dr. Giovanna Murray:    Thank you for referring Ida Santana to me for evaluation. Attached you will find relevant portions of my assessment and plan of care.    If you have questions, please do not hesitate to call me. I look forward to following Ida Santana along with you.    Sincerely,    Sujatha Iglesias MD    Enclosure  CC:  No Recipients    If you would like to receive this communication electronically, please contact [email or web address] or [phone] to request more information on [employer] access.    For providers and/or their staff who would like to refer a patient to [facility], please contact us through our one-stop-shop provider referral line, [facility], at [phone].    If you feel you have received this communication in error or would no longer like to receive these types of communications, please e-mail [email or web address]

## 2019-11-20 ENCOUNTER — TELEPHONE (OUTPATIENT)
Dept: DERMATOLOGY | Facility: CLINIC | Age: 79
End: 2019-11-20

## 2019-11-20 NOTE — TELEPHONE ENCOUNTER
Spoke with patient.  Notified her the she may have to order the Urea cream from [facility] or check with a different pharmacy.  She verbalized understanding.

## 2019-11-20 NOTE — TELEPHONE ENCOUNTER
----- Message from Ruma Sousa sent at 11/20/2019  2:54 PM CST -----  Contact: pt  Pt states she was told that she could get a cream for her heels without a prescription but pharmacy states that she will need a prescription. Please give pt a call to clarify at [phone] .

## 2019-12-09 ENCOUNTER — HOSPITAL ENCOUNTER (OUTPATIENT)
Dept: RADIOLOGY | Facility: HOSPITAL | Age: 79
Discharge: HOME | End: 2019-12-09
Attending: INTERNAL MEDICINE
Payer: COMMERCIAL

## 2019-12-09 DIAGNOSIS — C50.412 MALIGNANT NEOPLASM OF UPPER-OUTER QUADRANT OF LEFT FEMALE BREAST, UNSPECIFIED ESTROGEN RECEPTOR STATUS: ICD-10-CM

## 2019-12-09 PROCEDURE — 77062 MAMMO DIGITAL DIAGNOSTIC BILAT WITH TOMOSYNTHESIS_CAD: ICD-10-PCS | Mod: 26,,, | Performed by: RADIOLOGY

## 2019-12-09 PROCEDURE — 77066 DX MAMMO INCL CAD BI: CPT | Mod: TC,PO

## 2019-12-09 PROCEDURE — 77062 BREAST TOMOSYNTHESIS BI: CPT | Mod: 26,,, | Performed by: RADIOLOGY

## 2019-12-09 PROCEDURE — 77066 DX MAMMO INCL CAD BI: CPT | Mod: 26,,, | Performed by: RADIOLOGY

## 2019-12-09 PROCEDURE — 77066 MAMMO DIGITAL DIAGNOSTIC BILAT WITH TOMOSYNTHESIS_CAD: ICD-10-PCS | Mod: 26,,, | Performed by: RADIOLOGY

## 2019-12-29 NOTE — PROGRESS NOTES
CC: Well woman exam    Ida Santana is a 79 y.o. female  presents for a well woman exam.  She has no issues, problems, or complaints.    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    Osteopenia     Pancreas cyst     Potassium depletion     Retinal detachment of left eye with retinal break 3/13/2017       Past Surgical History:   Procedure Laterality Date    Adrenal Gland Surgery to right side      BREAST BIOPSY Left     left breast DCIS    BREAST LUMPECTOMY Left 2001    CATARACT EXTRACTION W/  INTRAOCULAR LENS IMPLANT Left 2017    with PPV/SO removal ( AND )    COLONOSCOPY      COLONOSCOPY N/A 10/31/2016    Procedure: COLONOSCOPY;  Surgeon: YAMILETH Richards MD;  Location: [facility] ENDO (4TH FLR);  Service: Endoscopy;  Laterality: N/A;    ENDOSCOPIC ULTRASOUND OF UPPER GASTROINTESTINAL TRACT N/A 2019    Procedure: ULTRASOUND, UPPER GI TRACT, ENDOSCOPIC;  Surgeon: Ladonna Wells MD;  Location: [facility] ENDO (2ND FLR);  Service: Endoscopy;  Laterality: N/A;    KIDNEY SURGERY  2017    patient does not know what they did, but knows she does not have kidney cancer    PARS PLANA VITRECTOMY Left 2017    25g PPV WITH SO REMOVAL AND PHACO IOL ( AND )    RETINAL DETACHMENT SURGERY Left 2017    PPVx and SO Removal//Phaco IOL ( AND )       OB History    Para Term  AB Living   0   0         SAB TAB Ectopic Multiple Live Births                   Family History   Problem Relation Age of Onset    Cancer Mother         breast    Breast cancer Mother         40s and again in 50s  "(bilateral)    Breast cancer Other 40    Heart disease Father         bypass    Cancer Father         asbestos    Stroke Brother     Ovarian cancer Neg Hx     Amblyopia Neg Hx     Blindness Neg Hx     Cataracts Neg Hx     Diabetes Neg Hx     Glaucoma Neg Hx     Hypertension Neg Hx     Macular degeneration Neg Hx     Retinal detachment Neg Hx     Strabismus Neg Hx     Thyroid disease Neg Hx     Colon cancer Neg Hx     Melanoma Neg Hx        Social History     Tobacco Use    Smoking status: Never Smoker    Smokeless tobacco: Never Used   Substance Use Topics    Alcohol use: No    Drug use: No       /60   Ht 5' 7" (1.702 m)   Wt 74.3 kg (163 lb 12.8 oz)   LMP  (LMP Unknown)   BMI 25.66 kg/m²     ROS:  GENERAL: Denies weight gain or weight loss. Feeling well overall.   SKIN: Denies rash or lesions.   HEAD: Denies head injury or headache.   NODES: Denies enlarged lymph nodes.   CHEST: Denies chest pain or shortness of breath.   CARDIOVASCULAR: Denies palpitations or left sided chest pain.   ABDOMEN: No abdominal pain, constipation, diarrhea, nausea, vomiting or rectal bleeding.   URINARY: No frequency, dysuria, hematuria, or burning on urination.  REPRODUCTIVE: See HPI.   BREASTS: The patient performs breast self-examination and denies pain, lumps, or nipple discharge.   HEMATOLOGIC: No easy bruisability or excessive bleeding.  MUSCULOSKELETAL: Denies joint pain or swelling.   NEUROLOGIC: Denies syncope or weakness.   PSYCHIATRIC: Denies depression, anxiety or mood swings.    Physical Exam:    APPEARANCE: Well nourished, well developed, in no acute distress.  AFFECT: WNL, alert and oriented x 3  SKIN: No acne or hirsutism  NECK: Neck symmetric without masses or thyromegaly  NODES: No inguinal, cervical, axillary, or femoral lymph node enlargement  CHEST: Good respiratory effect  ABDOMEN: Soft.  No tenderness or masses.  No hepatosplenomegaly.  No hernias.  BREASTS: Symmetrical, no skin " changes or visible lesions.  No palpable masses, nipple discharge bilaterally.  PELVIC: Normal external genitalia without lesions.  Normal hair distribution.  Adequate perineal body, normal urethral meatus.  Vagina moist and well rugated without lesions or discharge.  Cervix pink, without lesions, discharge or tenderness.  No significant cystocele or rectocele.  Bimanual exam shows uterus to be normal size, regular, mobile and nontender.  Adnexa without masses or tenderness.    EXTREMITIES: No edema.    ASSESSMENT AND PLAN  1. Encounter for gynecological examination without abnormal finding     2. Postmenopausal         Patient was counseled today on A.C.S. Pap guidelines and recommendations for yearly pelvic exams, mammograms and monthly self breast exams; to see her PCP for other health maintenance.     Follow up in about 1 year (around 10/31/2020).

## 2019-12-30 RX ORDER — VERAPAMIL HYDROCHLORIDE 240 MG/1
CAPSULE, EXTENDED RELEASE ORAL
Qty: 180 CAPSULE | Refills: 6 | Status: SHIPPED | OUTPATIENT
Start: 2019-12-30 | End: 2020-04-30 | Stop reason: SDUPTHER

## 2020-02-17 ENCOUNTER — TELEPHONE (OUTPATIENT)
Dept: INTERNAL MEDICINE | Facility: CLINIC | Age: 80
End: 2020-02-17

## 2020-02-17 DIAGNOSIS — I10 ESSENTIAL HYPERTENSION: Primary | ICD-10-CM

## 2020-02-17 DIAGNOSIS — E78.5 HYPERLIPIDEMIA, UNSPECIFIED HYPERLIPIDEMIA TYPE: ICD-10-CM

## 2020-02-17 DIAGNOSIS — E55.9 MILD VITAMIN D DEFICIENCY: ICD-10-CM

## 2020-02-17 NOTE — TELEPHONE ENCOUNTER
----- Message from Wale Durham sent at 2/14/2020 11:26 AM CST -----  Contact: Patient [phone]  Patient would like to get medical advice.    Comments: Patient has an appt set for 04/02/20 follow up, wants to know if Lab work will be needed prior to appt, call to inform.    Please call an advise  Thank you

## 2020-03-10 NOTE — PROGRESS NOTES
HPI     DLS: 11/15/19    Pt here for HRT review;    Meds:   Cosopt BID OU   Thera Tears QDAY OU    1. POAG   2. 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 SHANTE   4. NS OD   5. PCIOL OS   6. Hx Endophthalmitis OS   7. Rhegmatogenous RD OS     Last edited by Windy Mosqueda on 3/16/2020  8:55 AM. (History)              Assessment /Plan     For exam results, see Encounter Report.    Primary open angle glaucoma of both eyes, mild stage    Rhegmatogenous retinal detachment of left eye    Nuclear sclerosis of right eye    Anomalous optic nerve    KCS (keratoconjunctivitis sicca)    Pseudophakia    Endophthalmitis, left eye               Glaucoma (type and duration)    Suspect 2/2 lrg cups ou    First HVF   2000   First photos   2013   Treatment / Drops started   xal 1/2016           Family history    none        Glaucoma meds   Cosopt ou bid // ((Latanoprost started 1/4/2016  - Stopped 3/22/2019  Od))          H/O adverse rxn to glaucoma drops    none        LASERS    none        GLAUCOMA SURGERIES    none        OTHER EYE SURGERIES    PC IOL os 8/2/2019 (done  W/ [address] - SO removal)    TR repair w/ PPVx and yas oil - os - 2/2 endogen endopth os - 3/15/2017 - Lane        CDR    0.75-0.8 / 0.7-0.75        Tbase    18-22 / 19-22          Tmax    22/30             Ttarget    ? 17/17            HVF    7 test 2000 to  2019 - near full od // gen depression and  ? INS os        Gonio    +2-3 ou w/ yas oil drop sup os         CCT    510/517        OCT    4 test 2013 to 2019 - RNFL - nl od // nl os         HRT    5 test 2016 to 2020 -MR -MR -  Dec. IT od // nl os /// CDR 0.69 od // 0.50 os  (large disc size )         Disc photos    2013, 2015 - OIS     - Ttoday   14/14  - Test done today  IOP // HRT      2. Anomaolous Optic nerves    - very large disc size ou    - HRT - 2.97 od // 3.04 os (nl is up to 2.8 ou)     3. Dry eyes     4. NS - OD   Becoming vis sign // consider removal prn     5. H/O endogenous endophthalmitis  OS  - And secondary tract RD - S/P  PPVx and Silicone Oil (Mazzulla)    Oil removed at the same time as the phaco/IOL done 8/2/2017     6. S/P complex phaco/IOL os with trypan blue and Leslye ring and SO removal     - (done with mazzulla ) // 8/2/2017// PCB00 19.5   - Doing well - still with mild iritis       Cosopt bid  OU -   Latanoprost - STOP/ OFF  (3/22/2019)      Happy with new glasses (3/22/2019)     F/U 4  months -  consider phaco/IOL od - check AR/MR/BAT - next visit     Hold off on yag cap to ant capsule phimosis and PCO OS  - VA doing OK     I have seen and personally examined the patient.  I agree with the findings, assessment and plan of the resident and/or fellow.     Lynda Crawford MD

## 2020-03-16 ENCOUNTER — OFFICE VISIT (OUTPATIENT)
Dept: OPHTHALMOLOGY | Facility: CLINIC | Age: 80
End: 2020-03-16
Payer: COMMERCIAL

## 2020-03-16 DIAGNOSIS — H25.11 NUCLEAR SCLEROSIS OF RIGHT EYE: ICD-10-CM

## 2020-03-16 DIAGNOSIS — Z96.1 PSEUDOPHAKIA: ICD-10-CM

## 2020-03-16 DIAGNOSIS — Q07.8 ANOMALOUS OPTIC NERVE: ICD-10-CM

## 2020-03-16 DIAGNOSIS — H44.002 ENDOPHTHALMITIS, LEFT EYE: ICD-10-CM

## 2020-03-16 DIAGNOSIS — M35.01 KCS (KERATOCONJUNCTIVITIS SICCA): ICD-10-CM

## 2020-03-16 DIAGNOSIS — H40.1131 PRIMARY OPEN ANGLE GLAUCOMA OF BOTH EYES, MILD STAGE: Primary | ICD-10-CM

## 2020-03-16 DIAGNOSIS — H33.002 RHEGMATOGENOUS RETINAL DETACHMENT OF LEFT EYE: ICD-10-CM

## 2020-03-16 PROCEDURE — 92133 CPTRZD OPH DX IMG PST SGM ON: CPT | Mod: S$GLB,,, | Performed by: OPHTHALMOLOGY

## 2020-03-16 PROCEDURE — 99999 PR PBB SHADOW E&M-EST. PATIENT-LVL II: CPT | Mod: PBBFAC,,, | Performed by: OPHTHALMOLOGY

## 2020-03-16 PROCEDURE — 92133 HEIDELBERG RETINA TOMOGRAPHY (HRT) - OU - BOTH EYES: ICD-10-PCS | Mod: S$GLB,,, | Performed by: OPHTHALMOLOGY

## 2020-03-16 PROCEDURE — 99999 PR PBB SHADOW E&M-EST. PATIENT-LVL II: ICD-10-PCS | Mod: PBBFAC,,, | Performed by: OPHTHALMOLOGY

## 2020-03-16 PROCEDURE — 92012 PR EYE EXAM, EST PATIENT,INTERMED: ICD-10-PCS | Mod: S$GLB,,, | Performed by: OPHTHALMOLOGY

## 2020-03-16 PROCEDURE — 92012 INTRM OPH EXAM EST PATIENT: CPT | Mod: S$GLB,,, | Performed by: OPHTHALMOLOGY

## 2020-03-26 ENCOUNTER — TELEPHONE (OUTPATIENT)
Dept: INTERNAL MEDICINE | Facility: CLINIC | Age: 80
End: 2020-03-26

## 2020-03-26 ENCOUNTER — LAB VISIT (OUTPATIENT)
Dept: LAB | Facility: HOSPITAL | Age: 80
End: 2020-03-26
Attending: INTERNAL MEDICINE
Payer: COMMERCIAL

## 2020-03-26 DIAGNOSIS — E78.5 HYPERLIPIDEMIA, UNSPECIFIED HYPERLIPIDEMIA TYPE: ICD-10-CM

## 2020-03-26 DIAGNOSIS — I10 ESSENTIAL HYPERTENSION: ICD-10-CM

## 2020-03-26 DIAGNOSIS — E55.9 MILD VITAMIN D DEFICIENCY: ICD-10-CM

## 2020-03-26 LAB
25(OH)D3+25(OH)D2 SERPL-MCNC: 39 NG/ML (ref 30–96)
ALBUMIN SERPL BCP-MCNC: 3.9 G/DL (ref 3.5–5.2)
ALP SERPL-CCNC: 76 U/L (ref 55–135)
ALT SERPL W/O P-5'-P-CCNC: 16 U/L (ref 10–44)
ANION GAP SERPL CALC-SCNC: 7 MMOL/L (ref 8–16)
AST SERPL-CCNC: 20 U/L (ref 10–40)
BASOPHILS # BLD AUTO: 0.04 K/UL (ref 0–0.2)
BASOPHILS NFR BLD: 0.9 % (ref 0–1.9)
BILIRUB SERPL-MCNC: 0.7 MG/DL (ref 0.1–1)
BUN SERPL-MCNC: 19 MG/DL (ref 8–23)
CALCIUM SERPL-MCNC: 9.6 MG/DL (ref 8.7–10.5)
CHLORIDE SERPL-SCNC: 108 MMOL/L (ref 95–110)
CHOLEST SERPL-MCNC: 163 MG/DL (ref 120–199)
CHOLEST/HDLC SERPL: 3.9 {RATIO} (ref 2–5)
CO2 SERPL-SCNC: 26 MMOL/L (ref 23–29)
CREAT SERPL-MCNC: 1 MG/DL (ref 0.5–1.4)
DIFFERENTIAL METHOD: ABNORMAL
EOSINOPHIL # BLD AUTO: 0.3 K/UL (ref 0–0.5)
EOSINOPHIL NFR BLD: 5.8 % (ref 0–8)
ERYTHROCYTE [DISTWIDTH] IN BLOOD BY AUTOMATED COUNT: 12.6 % (ref 11.5–14.5)
EST. GFR  (AFRICAN AMERICAN): >60 ML/MIN/1.73 M^2
EST. GFR  (NON AFRICAN AMERICAN): 54.5 ML/MIN/1.73 M^2
GLUCOSE SERPL-MCNC: 99 MG/DL (ref 70–110)
HCT VFR BLD AUTO: 41 % (ref 37–48.5)
HDLC SERPL-MCNC: 42 MG/DL (ref 40–75)
HDLC SERPL: 25.8 % (ref 20–50)
HGB BLD-MCNC: 13.5 G/DL (ref 12–16)
IMM GRANULOCYTES # BLD AUTO: 0.01 K/UL (ref 0–0.04)
IMM GRANULOCYTES NFR BLD AUTO: 0.2 % (ref 0–0.5)
LDLC SERPL CALC-MCNC: 105.8 MG/DL (ref 63–159)
LYMPHOCYTES # BLD AUTO: 1.8 K/UL (ref 1–4.8)
LYMPHOCYTES NFR BLD: 40.8 % (ref 18–48)
MCH RBC QN AUTO: 31.9 PG (ref 27–31)
MCHC RBC AUTO-ENTMCNC: 32.9 G/DL (ref 32–36)
MCV RBC AUTO: 97 FL (ref 82–98)
MONOCYTES # BLD AUTO: 0.4 K/UL (ref 0.3–1)
MONOCYTES NFR BLD: 8.4 % (ref 4–15)
NEUTROPHILS # BLD AUTO: 2 K/UL (ref 1.8–7.7)
NEUTROPHILS NFR BLD: 43.9 % (ref 38–73)
NONHDLC SERPL-MCNC: 121 MG/DL
NRBC BLD-RTO: 0 /100 WBC
PLATELET # BLD AUTO: 201 K/UL (ref 150–350)
PMV BLD AUTO: 11.1 FL (ref 9.2–12.9)
POTASSIUM SERPL-SCNC: 4 MMOL/L (ref 3.5–5.1)
PROT SERPL-MCNC: 7.4 G/DL (ref 6–8.4)
RBC # BLD AUTO: 4.23 M/UL (ref 4–5.4)
SODIUM SERPL-SCNC: 141 MMOL/L (ref 136–145)
TRIGL SERPL-MCNC: 76 MG/DL (ref 30–150)
WBC # BLD AUTO: 4.51 K/UL (ref 3.9–12.7)

## 2020-03-26 PROCEDURE — 36415 COLL VENOUS BLD VENIPUNCTURE: CPT

## 2020-03-26 PROCEDURE — 85025 COMPLETE CBC W/AUTO DIFF WBC: CPT

## 2020-03-26 PROCEDURE — 80053 COMPREHEN METABOLIC PANEL: CPT

## 2020-03-26 PROCEDURE — 80061 LIPID PANEL: CPT

## 2020-03-26 PROCEDURE — 82306 VITAMIN D 25 HYDROXY: CPT

## 2020-04-30 ENCOUNTER — OFFICE VISIT (OUTPATIENT)
Dept: INTERNAL MEDICINE | Facility: CLINIC | Age: 80
End: 2020-04-30
Payer: COMMERCIAL

## 2020-04-30 DIAGNOSIS — E55.9 MILD VITAMIN D DEFICIENCY: ICD-10-CM

## 2020-04-30 DIAGNOSIS — E78.5 HYPERLIPIDEMIA, UNSPECIFIED HYPERLIPIDEMIA TYPE: ICD-10-CM

## 2020-04-30 DIAGNOSIS — K86.2 PANCREAS CYST: ICD-10-CM

## 2020-04-30 DIAGNOSIS — L85.3 DRY SKIN: ICD-10-CM

## 2020-04-30 DIAGNOSIS — H40.1131 PRIMARY OPEN ANGLE GLAUCOMA OF BOTH EYES, MILD STAGE: ICD-10-CM

## 2020-04-30 DIAGNOSIS — M81.0 POSTMENOPAUSAL BONE LOSS: ICD-10-CM

## 2020-04-30 DIAGNOSIS — I10 ESSENTIAL HYPERTENSION: Primary | ICD-10-CM

## 2020-04-30 DIAGNOSIS — C50.419 MALIGNANT NEOPLASM OF UPPER-OUTER QUADRANT OF FEMALE BREAST, UNSPECIFIED ESTROGEN RECEPTOR STATUS, UNSPECIFIED LATERALITY: ICD-10-CM

## 2020-04-30 PROCEDURE — 99442 PR PHYSICIAN TELEPHONE EVALUATION 11-20 MIN: CPT | Mod: 95,,, | Performed by: INTERNAL MEDICINE

## 2020-04-30 PROCEDURE — 99442 PR PHYSICIAN TELEPHONE EVALUATION 11-20 MIN: ICD-10-PCS | Mod: 95,,, | Performed by: INTERNAL MEDICINE

## 2020-04-30 RX ORDER — FLURANDRENOLIDE 0.5 MG/ML
LOTION TOPICAL 2 TIMES DAILY
Qty: 120 ML | Refills: 12 | Status: SHIPPED | OUTPATIENT
Start: 2020-04-30 | End: 2023-10-27

## 2020-04-30 RX ORDER — IRBESARTAN 300 MG/1
TABLET ORAL
Qty: 90 TABLET | Refills: 3 | Status: SHIPPED | OUTPATIENT
Start: 2020-04-30 | End: 2021-04-05 | Stop reason: SDUPTHER

## 2020-04-30 RX ORDER — VERAPAMIL HYDROCHLORIDE 240 MG/1
240 CAPSULE, EXTENDED RELEASE ORAL 2 TIMES DAILY
Qty: 180 CAPSULE | Refills: 3 | Status: SHIPPED | OUTPATIENT
Start: 2020-04-30 | End: 2021-04-05 | Stop reason: SDUPTHER

## 2020-04-30 RX ORDER — DORZOLAMIDE HYDROCHLORIDE AND TIMOLOL MALEATE 20; 5 MG/ML; MG/ML
SOLUTION/ DROPS OPHTHALMIC
Qty: 10 ML | Refills: 12 | Status: SHIPPED | OUTPATIENT
Start: 2020-04-30 | End: 2020-08-13 | Stop reason: SDUPTHER

## 2020-04-30 RX ORDER — KETOCONAZOLE 20 MG/ML
SHAMPOO, SUSPENSION TOPICAL
Qty: 120 ML | Refills: 6 | Status: SHIPPED | OUTPATIENT
Start: 2020-04-30 | End: 2022-02-16 | Stop reason: SDUPTHER

## 2020-04-30 NOTE — PROGRESS NOTES
Subjective:      Patient ID: Ida Santana is a 79 y.o. female.    Chief Complaint: Follow-up    HPI:  HPI   History of patient from  Notes 10/2/2019  Reviewed pancreatic EUS: needs MRCP yearly.     Hyeprtension well controlled     The following assessments were completed    Living situation: independent  CAGE: no  Depression screening: no  Timed Get Up and Go: very active  Whisper Test: normal  Cognitive Function Screening: normal  Nutrition Screening: normal  ADL Screening: self        Annual review updated 10/2019  Colonoscopy:  10/31/2016 follow up 10 years  Mammogram: 12/2019  Gyn: Arabella Guallpa     Optho: Dr. Boswell /Yvette : follow up open angle glaucoma follow up 6 months:   Flu: yearly  Tetanus :9/26/2016  Shingles: 10/30/2015  Shingles  Pneumovax: done at age 73: documented  Prevnar: 9/23/2015  Bone Density:1/2017  MRCP yearly     Consultants  Dr. Yvette Dumont: 1/6/2017 retired  Dr. Harden: Left breast 4/2017 yearly  Dr. Ramu Calhoun 2/2018 CT    Interval history for 4/30/2020  Hypertension: patient encouraged to check her blood pressure    Breast cancer:no new masses    Pancreatic Cyst:Last EUS8/2019 sent message as to when next one would be to [facility] : patient will need follow up bone density.    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    Open angle 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 eyes    Osteopenia    Retinal detachment of left eye with retinal break    Endophthalmitis, left eye    Rhegmatogenous retinal detachment of left eye    NS (nuclear sclerosis)    Hx of renal vein thrombosis    Retinal detachment, rhegmatogenous, left eye    History of breast cancer in female    Pancreas cyst     Past  Medical History:   Diagnosis Date    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    Cancer 9/2001    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    Cataract     History of uterine fibroid     Hyperlipidemia     dyslipidemia    Hypertension     MRSA infection     Open angle 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 eyes 5/2/2013    Osteopenia     Pancreas cyst     Potassium depletion 1998    Retinal detachment of left eye with retinal break 3/13/2017     Past Surgical History:   Procedure Laterality Date    Adrenal Gland Surgery to right side      BREAST BIOPSY Left 2001    left breast DCIS    BREAST LUMPECTOMY Left 09/11/2001    CATARACT EXTRACTION W/  INTRAOCULAR LENS IMPLANT Left 08/02/2017    with PPV/SO removal ( AND )    COLONOSCOPY      COLONOSCOPY N/A 10/31/2016    Procedure: COLONOSCOPY;  Surgeon: YAMILETH Richards MD;  Location: [facility] ENDO (4TH FLR);  Service: Endoscopy;  Laterality: N/A;    ENDOSCOPIC ULTRASOUND OF UPPER GASTROINTESTINAL TRACT N/A 8/7/2019    Procedure: ULTRASOUND, UPPER GI TRACT, ENDOSCOPIC;  Surgeon: Ladonna Wells MD;  Location: [facility] ENDO (2ND FLR);  Service: Endoscopy;  Laterality: N/A;    KIDNEY SURGERY  03/27/2017    patient does not know what they did, but knows she does not have kidney cancer    PARS PLANA VITRECTOMY Left 08/02/2017    25g PPV WITH SO REMOVAL AND PHACO IOL ( AND )    RETINAL DETACHMENT SURGERY Left 08/22/2017    PPVx and SO Removal//Phaco IOL ( AND )     Family History   Problem Relation Age of Onset    Cancer Mother         breast    Breast cancer Mother         40s and again in 50s (bilateral)    Breast cancer Other 40    Heart disease Father         bypass    Cancer Father         asbestos    Stroke Brother     Ovarian cancer Neg Hx     Amblyopia Neg Hx     Blindness Neg Hx     Cataracts Neg Hx     Diabetes Neg Hx     Glaucoma Neg Hx      Hypertension Neg Hx     Macular degeneration Neg Hx     Retinal detachment Neg Hx     Strabismus Neg Hx     Thyroid disease Neg Hx     Colon cancer Neg Hx     Melanoma Neg Hx      Review of Systems   Constitutional: Negative for chills, fever and unexpected weight change.   HENT: Negative for trouble swallowing.    Respiratory: Negative for cough, shortness of breath and wheezing.    Cardiovascular: Negative for chest pain and palpitations.   Gastrointestinal: Negative for abdominal distention, abdominal pain, blood in stool and vomiting.   Musculoskeletal: Negative for back pain.     Objective:     There were no vitals filed for this visit.  There is no height or weight on file to calculate BMI.  Physical Exam  Assessment:     1. Essential hypertension    2. Hyperlipidemia, unspecified hyperlipidemia type    3. Mild vitamin D deficiency    4. Postmenopausal bone loss    5. Dry skin    6. 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 Pancreas cyst      Plan:   Ida was seen today for follow-up.    Diagnoses and all orders for this visit:    Essential hypertension  Comments:  Encouraged to check BP  Orders:  -     verapamiL (VERELAN) 240 MG C24P; Take 1 capsule (240 mg total) by mouth 2 (two) times daily.  -     irbesartan (AVAPRO) 300 MG tablet; TAKE 1 TABLET BY MOUTH EVERY DAY. STOP VALSARTAN    Hyperlipidemia, unspecified hyperlipidemia type  Comments:  No med: [id number]  Orders:  -     CBC auto differential; Future  -     Comprehensive metabolic panel; Future  -     Lipid panel; Future    Mild vitamin D deficiency  Comments:  in normal range  Orders:  -     Vitamin D; Future    Postmenopausal bone loss  Comments:  Bone Density  Orders:  -     DXA Bone Density Spine And Hip; Future    Dry skin  Comments:  meds renewed  Orders:  -     flurandrenolide (CORDRAN) 0.05 % lotion; Apply topically 2 (two) times daily. 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, unspecified estrogen receptor status, unspecified laterality  Comments:  Mammo up to date    Pancreas cyst  Comments:  Sent [employer] a message as to when the next EUS is due        Problem List Items Addressed This Visit     Hyperlipidemia    Overview     dyslipidemia         Relevant Orders    CBC auto differential    Comprehensive metabolic panel    Lipid panel    Hypertension - Primary    Relevant Medications    verapamiL (VERELAN) 240 MG C24P    irbesartan (AVAPRO) 300 MG tablet    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    Stable nonenhancing hypoattenuating mid left kidney lesion measuring 1.5 cm (previously 1.5 cm).    Previously noted, Bosniak IIF lesion within the lower left kidney is not apparent on today's examination.    Stable hypodensity within the pancreatic body measuring 1.9 cm (previously 1.9 cm). Additional subcentimeter hypodensities within the pancreatic body and tail appear grossly similar, too small to characterize, possibly sidebranch IPMNs versus cystic neoplasms. Continued followup is recommended.    Large hiatal hernia.    Cholelithiasis without evidence of acute cholecystitis.      ______________________________________     Electronically signed by resident: FERNANDA CHINO MD  Date: 02/12/18  Time: 10:32                    Other Visit Diagnoses     Mild vitamin D deficiency        in normal range    Relevant Orders    Vitamin D    Postmenopausal bone loss        Bone Density    Relevant Orders    DXA Bone Density Spine And Hip    Dry skin        meds renewed    Relevant Medications    flurandrenolide (CORDRAN)  0.05 % lotion    ketoconazole (NIZORAL) 2 % shampoo        Orders Placed This Encounter   Procedures    DXA Bone Density Spine And Hip     Standing Status:   Future     Standing Expiration Date:   4/30/2021     Order Specific Question:   Reason for Exam:     Answer:   screening for osteoporosis    CBC auto differential     Standing Status:   Future     Standing Expiration Date:   7/30/2021    Comprehensive metabolic panel     Standing Status:   Future     Standing Expiration Date:   7/30/2021    Lipid panel     Standing Status:   Future     Standing Expiration Date:   7/30/2021    Vitamin D     Standing Status:   Future     Standing Expiration Date:   7/30/2021     Follow up in about 5 months (around 10/2/2020) for Annual exam with lab: cbc, cmp, lipid and vit D.     Medication List           Accurate as of April 30, 2020  8:24 AM. If you have any questions, ask your nurse or doctor.               CHANGE how you take these medications    flurandrenolide 0.05 % lotion  Commonly known as:  CORDRAN  Apply topically 2 (two) times daily. Apply to  scalp one to two times daily as needed for dryness  What changed:    · how to take this  · when to take this  Changed by:  Giovanna Murray MD        CONTINUE taking these medications    aspirin 81 MG EC tablet  Commonly known as:  ECOTRIN     betamethasone dipropionate 0.05 % cream  Commonly known as:  DIPROLENE  AAA bid     dorzolamide-timolol 2-0.5% 22.3-6.8 mg/mL ophthalmic solution  Commonly known as:  COSOPT  Place 1 drop into both eyes 2 (two) times daily. Use generic cosopt both eyes 2 x day. Discontinue latanoprost     irbesartan 300 MG tablet  Commonly known as:  AVAPRO  TAKE 1 TABLET BY MOUTH EVERY DAY. STOP VALSARTAN     * ketoconazole 2 % shampoo  Commonly known as:  NIZORAL  Wash hair with medicated shampoo at least 2x/week - let sit on scalp at least 5 minutes prior to rinsing     * ketoconazole 2 % shampoo  Commonly known as:  NIZORAL  USE EVERY OTHER DAY  LET SIT ON SCALP FOR FIVE MINUTES BEFORE RINSING     ONE-A-DAY 50 PLUS tablet  Generic drug:  geriatric multivitamin-min     verapamiL 240 MG C24p  Commonly known as:  VERELAN  Take 1 capsule (240 mg total) by mouth 2 (two) times daily.         * This list has 2 medication(s) that are the same as other medications prescribed for you. Read the directions carefully, and ask your doctor or other care provider to review them with you.               Where to Get Your Medications      These medications were sent to [id number] - [place]  [address] AT [facility] [address]  [address] [address] [address]    Phone:  [phone]   · flurandrenolide 0.05 % lotion  · irbesartan 300 MG tablet  · ketoconazole 2 % shampoo  · verapamiL 240 MG C24p       Established Patient - Audio Only Telehealth Visit     The patient location is: home  The chief complaint leading to consultation is: follow up of medical problems  Visit type: Virtual visit with audio only (telephone)     The reason for the audio only service rather than synchronous audio and video virtual visit was related to technical difficulties or patient preference/necessity.     Each patient to whom I provide medical services by telemedicine is:  (1) informed of the relationship between the physician and patient and the respective role of any other health care provider with respect to management of the patient; and (2) notified that they may decline to receive medical services by telemedicine and may withdraw from such care at any time. Patient verbally consented to receive this service via voice-only telephone call.    20 minutes       HPI: above     Assessment and plan:  above                        This service was not originating from a related E/M service provided within the previous 7 days nor will  to an E/M service or procedure within the next 24 hours or my soonest available appointment.  Prevailing standard of care was  able to be met in this audio-only visit.

## 2020-06-04 ENCOUNTER — TELEPHONE (OUTPATIENT)
Dept: ENDOSCOPY | Facility: HOSPITAL | Age: 80
End: 2020-06-04

## 2020-06-04 DIAGNOSIS — K86.2 PANCREATIC CYST: ICD-10-CM

## 2020-06-19 ENCOUNTER — HOSPITAL ENCOUNTER (OUTPATIENT)
Dept: RADIOLOGY | Facility: HOSPITAL | Age: 80
Discharge: HOME | End: 2020-06-19
Attending: INTERNAL MEDICINE
Payer: COMMERCIAL

## 2020-06-19 DIAGNOSIS — K86.2 PANCREATIC CYST: ICD-10-CM

## 2020-06-19 LAB
CREAT SERPL-MCNC: 0.7 MG/DL (ref 0.5–1.4)
SAMPLE: NORMAL

## 2020-06-19 PROCEDURE — 76376 3D RENDER W/INTRP POSTPROCES: CPT | Mod: TC

## 2020-06-19 PROCEDURE — A9585 GADOBUTROL INJECTION: HCPCS | Performed by: INTERNAL MEDICINE

## 2020-06-19 PROCEDURE — 76376 3D RENDER W/INTRP POSTPROCES: CPT | Mod: 26,,, | Performed by: RADIOLOGY

## 2020-06-19 PROCEDURE — 76376 MRI ABDOMEN WITH AND WO_INC MRCP: ICD-10-PCS | Mod: 26,,, | Performed by: RADIOLOGY

## 2020-06-19 PROCEDURE — 25500020 PHARM REV CODE 255: Performed by: INTERNAL MEDICINE

## 2020-06-19 PROCEDURE — 74183 MRI ABD W/O CNTR FLWD CNTR: CPT | Mod: 26,,, | Performed by: RADIOLOGY

## 2020-06-19 PROCEDURE — 74183 MRI ABDOMEN WITH AND WO_INC MRCP: ICD-10-PCS | Mod: 26,,, | Performed by: RADIOLOGY

## 2020-06-19 RX ORDER — GADOBUTROL 604.72 MG/ML
10 INJECTION INTRAVENOUS
Status: COMPLETED | OUTPATIENT
Start: 2020-06-19 | End: 2020-06-19

## 2020-06-19 RX ADMIN — GADOBUTROL 10 ML: 604.72 INJECTION INTRAVENOUS at 08:06

## 2020-06-25 ENCOUNTER — TELEPHONE (OUTPATIENT)
Dept: ENDOSCOPY | Facility: HOSPITAL | Age: 80
End: 2020-06-25

## 2020-06-25 NOTE — TELEPHONE ENCOUNTER
----- Message from Layla Childs sent at 6/25/2020  9:47 AM CDT -----  Regarding: results  Contact: pt  Pt called to get results from MRI. Pt asked for a call back.          Pt contact # [phone].        Thanks

## 2020-07-06 ENCOUNTER — TELEPHONE (OUTPATIENT)
Dept: ENDOSCOPY | Facility: HOSPITAL | Age: 80
End: 2020-07-06

## 2020-07-06 DIAGNOSIS — R93.3 ABNORMAL FINDING ON GI TRACT IMAGING: Primary | ICD-10-CM

## 2020-07-06 NOTE — TELEPHONE ENCOUNTER
----- Message from Ladonna Wells MD sent at 7/6/2020  1:52 PM CDT -----  Eus + ercp next available.

## 2020-07-09 ENCOUNTER — TELEPHONE (OUTPATIENT)
Dept: ENDOSCOPY | Facility: HOSPITAL | Age: 80
End: 2020-07-09

## 2020-07-17 ENCOUNTER — OFFICE VISIT (OUTPATIENT)
Dept: DERMATOLOGY | Facility: CLINIC | Age: 80
End: 2020-07-17
Payer: COMMERCIAL

## 2020-07-17 DIAGNOSIS — L30.4 INTERTRIGO: ICD-10-CM

## 2020-07-17 DIAGNOSIS — L82.1 SK (SEBORRHEIC KERATOSIS): Primary | ICD-10-CM

## 2020-07-17 PROCEDURE — 99999 PR PBB SHADOW E&M-EST. PATIENT-LVL III: ICD-10-PCS | Mod: PBBFAC,,, | Performed by: DERMATOLOGY

## 2020-07-17 PROCEDURE — 1126F AMNT PAIN NOTED NONE PRSNT: CPT | Mod: S$GLB,,, | Performed by: DERMATOLOGY

## 2020-07-17 PROCEDURE — 1159F MED LIST DOCD IN RCRD: CPT | Mod: S$GLB,,, | Performed by: DERMATOLOGY

## 2020-07-17 PROCEDURE — 99999 PR PBB SHADOW E&M-EST. PATIENT-LVL III: CPT | Mod: PBBFAC,,, | Performed by: DERMATOLOGY

## 2020-07-17 PROCEDURE — 1101F PR PT FALLS ASSESS DOC 0-1 FALLS W/OUT INJ PAST YR: ICD-10-PCS | Mod: CPTII,S$GLB,, | Performed by: DERMATOLOGY

## 2020-07-17 PROCEDURE — 99214 PR OFFICE/OUTPT VISIT, EST, LEVL IV, 30-39 MIN: ICD-10-PCS | Mod: S$GLB,,, | Performed by: DERMATOLOGY

## 2020-07-17 PROCEDURE — 1101F PT FALLS ASSESS-DOCD LE1/YR: CPT | Mod: CPTII,S$GLB,, | Performed by: DERMATOLOGY

## 2020-07-17 PROCEDURE — 99214 OFFICE O/P EST MOD 30 MIN: CPT | Mod: S$GLB,,, | Performed by: DERMATOLOGY

## 2020-07-17 PROCEDURE — 1126F PR PAIN SEVERITY QUANTIFIED, NO PAIN PRESENT: ICD-10-PCS | Mod: S$GLB,,, | Performed by: DERMATOLOGY

## 2020-07-17 PROCEDURE — 1159F PR MEDICATION LIST DOCUMENTED IN MEDICAL RECORD: ICD-10-PCS | Mod: S$GLB,,, | Performed by: DERMATOLOGY

## 2020-07-17 RX ORDER — TRIAMCINOLONE ACETONIDE 0.25 MG/G
CREAM TOPICAL
Qty: 30 G | Refills: 3 | Status: SHIPPED | OUTPATIENT
Start: 2020-07-17 | End: 2023-10-27

## 2020-07-17 RX ORDER — ECONAZOLE NITRATE 10 MG/G
CREAM TOPICAL
Qty: 30 G | Refills: 2 | Status: SHIPPED | OUTPATIENT
Start: 2020-07-17 | End: 2023-10-27

## 2020-07-17 NOTE — PATIENT INSTRUCTIONS
water compresses 2x/day followed by cool blow dry and then application of mixed tubes of prescription medication.

## 2020-07-17 NOTE — PROGRESS NOTES
Subjective:       Patient ID:  Ida Santana is a 80 y.o. female who presents for   Chief Complaint   Patient presents with    Lesion     groin     Lesion - Initial  Affected locations: groin (left groin)  Duration: 1 month  Signs / symptoms: itching  Timing: started as mole that she nicked when shaving then evolved into itch and used witch hazel and hibiclens.  Aggravated by: shaving        Review of Systems   Skin: Positive for itching and rash.   Hematologic/Lymphatic: Does not bruise/bleed easily.        Objective:    Physical Exam   Constitutional: She appears well-developed and well-nourished. No distress.   Neurological: She is alert and oriented to person, place, and time. She is not disoriented.   Psychiatric: She has a normal mood and affect.   Skin:   Areas Examined (abnormalities noted in diagram):   Genitals / Buttocks / Groin Inspection Performed              Diagram Legend     Erythematous scaling macule/papule c/w actinic keratosis       Vascular papule c/w angioma      Pigmented verrucoid papule/plaque c/w seborrheic keratosis      Yellow umbilicated papule c/w sebaceous hyperplasia      Irregularly shaped tan macule c/w lentigo     1-2 mm smooth white papules consistent with Milia      Movable subcutaneous cyst with punctum c/w epidermal inclusion cyst      Subcutaneous movable cyst c/w pilar cyst      Firm pink to brown papule c/w dermatofibroma      Pedunculated fleshy papule(s) c/w skin tag(s)      Evenly pigmented macule c/w junctional nevus     Mildly variegated pigmented, slightly irregular-bordered macule c/w mildly atypical nevus      Flesh colored to evenly pigmented papule c/w intradermal nevus       Pink pearly papule/plaque c/w basal cell carcinoma      Erythematous hyperkeratotic cursted plaque c/w SCC      Surgical scar with no sign of skin cancer recurrence      Open and closed comedones      Inflammatory papules and pustules      Verrucoid papule consistent consistent with wart      Erythematous eczematous patches and plaques     Dystrophic onycholytic nail with subungual debris c/w onychomycosis     Umbilicated papule    Erythematous-base heme-crusted tan verrucoid plaque consistent with inflamed seborrheic keratosis     Erythematous Silvery Scaling Plaque c/w Psoriasis     See annotation      Assessment / Plan:        SK (seborrheic keratosis)  These are benign inherited growths without a malignant potential. Reassurance given to patient. No treatment is necessary.       Intertrigo  -     econazole nitrate 1 % cream; AAA bid  Dispense: 30 g; Refill: 2  -     triamcinolone acetonide 0.025% (KENALOG) 0.025 % cream; AAA bid  Dispense: 30 g; Refill: 3   water compresses 2x/day followed by cool blow dry and then application of mixed tubes of prescription medication.                    Follow up if symptoms worsen or fail to improve.

## 2020-07-31 ENCOUNTER — TELEPHONE (OUTPATIENT)
Dept: ENDOSCOPY | Facility: HOSPITAL | Age: 80
End: 2020-07-31

## 2020-07-31 NOTE — TELEPHONE ENCOUNTER
----- Message from Stephanie Sidhu MA sent at 7/30/2020  8:15 PM CDT -----      
Spoke with patient. EUS/ERCP scheduled for 9/9 at 10:30a. REviewed prep instructions. Ms Santana verbalized understanding.  
Normal

## 2020-08-10 ENCOUNTER — TELEPHONE (OUTPATIENT)
Dept: RADIATION ONCOLOGY | Facility: CLINIC | Age: 80
End: 2020-08-10

## 2020-08-10 NOTE — TELEPHONE ENCOUNTER
Called to schedule follow up; no answer, appointment scheduled. Will reach out to patient again later today.

## 2020-08-10 NOTE — TELEPHONE ENCOUNTER
----- Message from Rupal Zheng sent at 8/7/2020  4:04 PM CDT -----  Regarding: Appointment  Pt said she was waiting to hear from you on weather or not Dr Recinos wants to see her. Please call at [phone].

## 2020-08-13 ENCOUNTER — OFFICE VISIT (OUTPATIENT)
Dept: OPHTHALMOLOGY | Facility: CLINIC | Age: 80
End: 2020-08-13
Payer: COMMERCIAL

## 2020-08-13 ENCOUNTER — OFFICE VISIT (OUTPATIENT)
Dept: RADIATION ONCOLOGY | Facility: CLINIC | Age: 80
End: 2020-08-13
Payer: COMMERCIAL

## 2020-08-13 VITALS
HEART RATE: 88 BPM | BODY MASS INDEX: 25.87 KG/M2 | HEIGHT: 67 IN | RESPIRATION RATE: 16 BRPM | WEIGHT: 164.81 LBS | SYSTOLIC BLOOD PRESSURE: 184 MMHG | DIASTOLIC BLOOD PRESSURE: 78 MMHG

## 2020-08-13 DIAGNOSIS — H40.1131 PRIMARY OPEN ANGLE GLAUCOMA OF BOTH EYES, MILD STAGE: Primary | ICD-10-CM

## 2020-08-13 DIAGNOSIS — Z96.1 PSEUDOPHAKIA: ICD-10-CM

## 2020-08-13 DIAGNOSIS — M35.01 KCS (KERATOCONJUNCTIVITIS SICCA): ICD-10-CM

## 2020-08-13 DIAGNOSIS — Z85.3 HISTORY OF BREAST CANCER IN FEMALE: Primary | ICD-10-CM

## 2020-08-13 DIAGNOSIS — H25.11 NUCLEAR SCLEROSIS OF RIGHT EYE: ICD-10-CM

## 2020-08-13 DIAGNOSIS — Q07.8 ANOMALOUS OPTIC NERVE: ICD-10-CM

## 2020-08-13 DIAGNOSIS — H33.002 RHEGMATOGENOUS RETINAL DETACHMENT OF LEFT EYE: ICD-10-CM

## 2020-08-13 PROCEDURE — 3078F PR MOST RECENT DIASTOLIC BLOOD PRESSURE < 80 MM HG: ICD-10-PCS | Mod: CPTII,S$GLB,, | Performed by: RADIOLOGY

## 2020-08-13 PROCEDURE — 99213 OFFICE O/P EST LOW 20 MIN: CPT | Mod: S$GLB,,, | Performed by: RADIOLOGY

## 2020-08-13 PROCEDURE — 92012 INTRM OPH EXAM EST PATIENT: CPT | Mod: S$GLB,,, | Performed by: OPHTHALMOLOGY

## 2020-08-13 PROCEDURE — 1126F AMNT PAIN NOTED NONE PRSNT: CPT | Mod: S$GLB,,, | Performed by: RADIOLOGY

## 2020-08-13 PROCEDURE — 99213 PR OFFICE/OUTPT VISIT, EST, LEVL III, 20-29 MIN: ICD-10-PCS | Mod: S$GLB,,, | Performed by: RADIOLOGY

## 2020-08-13 PROCEDURE — 1101F PR PT FALLS ASSESS DOC 0-1 FALLS W/OUT INJ PAST YR: ICD-10-PCS | Mod: CPTII,S$GLB,, | Performed by: RADIOLOGY

## 2020-08-13 PROCEDURE — 1126F PR PAIN SEVERITY QUANTIFIED, NO PAIN PRESENT: ICD-10-PCS | Mod: S$GLB,,, | Performed by: RADIOLOGY

## 2020-08-13 PROCEDURE — 99999 PR PBB SHADOW E&M-EST. PATIENT-LVL III: CPT | Mod: PBBFAC,,, | Performed by: RADIOLOGY

## 2020-08-13 PROCEDURE — 1159F MED LIST DOCD IN RCRD: CPT | Mod: S$GLB,,, | Performed by: RADIOLOGY

## 2020-08-13 PROCEDURE — 3078F DIAST BP <80 MM HG: CPT | Mod: CPTII,S$GLB,, | Performed by: RADIOLOGY

## 2020-08-13 PROCEDURE — 99999 PR PBB SHADOW E&M-EST. PATIENT-LVL III: ICD-10-PCS | Mod: PBBFAC,,, | Performed by: RADIOLOGY

## 2020-08-13 PROCEDURE — 99999 PR PBB SHADOW E&M-EST. PATIENT-LVL III: ICD-10-PCS | Mod: PBBFAC,,, | Performed by: OPHTHALMOLOGY

## 2020-08-13 PROCEDURE — 3077F PR MOST RECENT SYSTOLIC BLOOD PRESSURE >= 140 MM HG: ICD-10-PCS | Mod: CPTII,S$GLB,, | Performed by: RADIOLOGY

## 2020-08-13 PROCEDURE — 1101F PT FALLS ASSESS-DOCD LE1/YR: CPT | Mod: CPTII,S$GLB,, | Performed by: RADIOLOGY

## 2020-08-13 PROCEDURE — 3077F SYST BP >= 140 MM HG: CPT | Mod: CPTII,S$GLB,, | Performed by: RADIOLOGY

## 2020-08-13 PROCEDURE — 1159F PR MEDICATION LIST DOCUMENTED IN MEDICAL RECORD: ICD-10-PCS | Mod: S$GLB,,, | Performed by: RADIOLOGY

## 2020-08-13 PROCEDURE — 99999 PR PBB SHADOW E&M-EST. PATIENT-LVL III: CPT | Mod: PBBFAC,,, | Performed by: OPHTHALMOLOGY

## 2020-08-13 PROCEDURE — 92012 PR EYE EXAM, EST PATIENT,INTERMED: ICD-10-PCS | Mod: S$GLB,,, | Performed by: OPHTHALMOLOGY

## 2020-08-13 RX ORDER — DORZOLAMIDE HYDROCHLORIDE AND TIMOLOL MALEATE 20; 5 MG/ML; MG/ML
1 SOLUTION/ DROPS OPHTHALMIC 2 TIMES DAILY
Qty: 3 BOTTLE | Refills: 3 | Status: SHIPPED | OUTPATIENT
Start: 2020-08-13 | End: 2020-12-14 | Stop reason: SDUPTHER

## 2020-08-13 NOTE — PROGRESS NOTES
Subjective:       Patient ID: Ida Santana is a 80 y.o. female.    Chief Complaint: Breast Cancer (yrly eval)    This patient returns for her annual follow up visit.     Ms. 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 The patient is status post segmental mastectomy followed by postoperative whole breast irradiation completed in December of 2001. The patient has not received adjuvant hormonal therapy. The patient has remained TABITHA since that time.  Today the patient states she feels well.  MMG in 2019 was negative.      Review of Systems   Constitutional: Negative for activity change, appetite change, chills and fatigue.   Respiratory: Negative for cough and shortness of breath.    Cardiovascular: Negative for chest pain and palpitations.         Objective:      Physical Exam  Constitutional:       Appearance: Normal appearance.   Neck:      Musculoskeletal: Normal range of motion and neck supple.   Chest:      Breasts:         Right: No swelling, bleeding, inverted nipple, mass or nipple discharge.         Left: No swelling, bleeding, inverted nipple or mass.       Neurological:      Mental Status: She is alert.         Assessment:       1. History of breast cancer in female        Plan:       Doing well  no evidence of recurrent disease.  Plan follow up in 1 year.

## 2020-08-18 ENCOUNTER — TELEPHONE (OUTPATIENT)
Dept: ENDOSCOPY | Facility: HOSPITAL | Age: 80
End: 2020-08-18

## 2020-09-08 ENCOUNTER — TELEPHONE (OUTPATIENT)
Dept: ENDOSCOPY | Facility: HOSPITAL | Age: 80
End: 2020-09-08

## 2020-09-08 DIAGNOSIS — Z01.818 PRE-OP TESTING: ICD-10-CM

## 2020-09-08 NOTE — TELEPHONE ENCOUNTER
Received request to schedule patient for EUS/ERCP on 9/14/20 at 7:30am. Spoke with Patient. Reviewed medical history and medications. Instructed on procedure and prep. Patient verbalized understanding of instructions. Mailed copy of instructions to address in chart.Pt does not have e-mail access.

## 2020-09-11 ENCOUNTER — LAB VISIT (OUTPATIENT)
Dept: SURGERY | Facility: CLINIC | Age: 80
End: 2020-09-11
Payer: COMMERCIAL

## 2020-09-11 DIAGNOSIS — Z01.818 PRE-OP TESTING: ICD-10-CM

## 2020-09-11 LAB — SARS-COV-2 RNA RESP QL NAA+PROBE: NOT DETECTED

## 2020-09-11 PROCEDURE — U0003 INFECTIOUS AGENT DETECTION BY NUCLEIC ACID (DNA OR RNA); SEVERE ACUTE RESPIRATORY SYNDROME CORONAVIRUS 2 (SARS-COV-2) (CORONAVIRUS DISEASE [COVID-19]), AMPLIFIED PROBE TECHNIQUE, MAKING USE OF HIGH THROUGHPUT TECHNOLOGIES AS DESCRIBED BY CMS-2020-01-R: HCPCS

## 2020-09-14 ENCOUNTER — HOSPITAL ENCOUNTER (OUTPATIENT)
Facility: HOSPITAL | Age: 80
Discharge: HOME | End: 2020-09-14
Attending: INTERNAL MEDICINE | Admitting: INTERNAL MEDICINE
Payer: COMMERCIAL

## 2020-09-14 ENCOUNTER — ANESTHESIA EVENT (OUTPATIENT)
Dept: ENDOSCOPY | Facility: HOSPITAL | Age: 80
End: 2020-09-14
Payer: COMMERCIAL

## 2020-09-14 ENCOUNTER — ANESTHESIA (OUTPATIENT)
Dept: ENDOSCOPY | Facility: HOSPITAL | Age: 80
End: 2020-09-14
Payer: COMMERCIAL

## 2020-09-14 VITALS
HEART RATE: 86 BPM | BODY MASS INDEX: 25.74 KG/M2 | SYSTOLIC BLOOD PRESSURE: 160 MMHG | RESPIRATION RATE: 16 BRPM | OXYGEN SATURATION: 97 % | DIASTOLIC BLOOD PRESSURE: 88 MMHG | WEIGHT: 164 LBS | HEIGHT: 67 IN | TEMPERATURE: 97 F

## 2020-09-14 DIAGNOSIS — K80.50 CHOLEDOCHOLITHIASIS: Primary | ICD-10-CM

## 2020-09-14 PROCEDURE — 37000008 HC ANESTHESIA 1ST 15 MINUTES: Performed by: INTERNAL MEDICINE

## 2020-09-14 PROCEDURE — D9220A PRA ANESTHESIA: ICD-10-PCS | Mod: ANES,,, | Performed by: STUDENT IN AN ORGANIZED HEALTH CARE EDUCATION/TRAINING PROGRAM

## 2020-09-14 PROCEDURE — 37000009 HC ANESTHESIA EA ADD 15 MINS: Performed by: INTERNAL MEDICINE

## 2020-09-14 PROCEDURE — D9220A PRA ANESTHESIA: Mod: ANES,,, | Performed by: STUDENT IN AN ORGANIZED HEALTH CARE EDUCATION/TRAINING PROGRAM

## 2020-09-14 PROCEDURE — 63600175 PHARM REV CODE 636 W HCPCS: Performed by: STUDENT IN AN ORGANIZED HEALTH CARE EDUCATION/TRAINING PROGRAM

## 2020-09-14 PROCEDURE — 43259 PR ENDOSCOPIC ULTRASOUND EXAM: ICD-10-PCS | Mod: ,,, | Performed by: INTERNAL MEDICINE

## 2020-09-14 PROCEDURE — D9220A PRA ANESTHESIA: Mod: CRNA,,, | Performed by: STUDENT IN AN ORGANIZED HEALTH CARE EDUCATION/TRAINING PROGRAM

## 2020-09-14 PROCEDURE — 43259 EGD US EXAM DUODENUM/JEJUNUM: CPT | Mod: ,,, | Performed by: INTERNAL MEDICINE

## 2020-09-14 PROCEDURE — 43259 EGD US EXAM DUODENUM/JEJUNUM: CPT | Performed by: INTERNAL MEDICINE

## 2020-09-14 PROCEDURE — 25000003 PHARM REV CODE 250: Performed by: INTERNAL MEDICINE

## 2020-09-14 PROCEDURE — D9220A PRA ANESTHESIA: ICD-10-PCS | Mod: CRNA,,, | Performed by: STUDENT IN AN ORGANIZED HEALTH CARE EDUCATION/TRAINING PROGRAM

## 2020-09-14 RX ORDER — LIDOCAINE HYDROCHLORIDE 20 MG/ML
INJECTION INTRAVENOUS
Status: DISCONTINUED | OUTPATIENT
Start: 2020-09-14 | End: 2020-09-14

## 2020-09-14 RX ORDER — ONDANSETRON 2 MG/ML
INJECTION INTRAMUSCULAR; INTRAVENOUS
Status: DISCONTINUED | OUTPATIENT
Start: 2020-09-14 | End: 2020-09-14

## 2020-09-14 RX ORDER — SUCCINYLCHOLINE CHLORIDE 20 MG/ML
INJECTION INTRAMUSCULAR; INTRAVENOUS
Status: DISCONTINUED | OUTPATIENT
Start: 2020-09-14 | End: 2020-09-14

## 2020-09-14 RX ORDER — SODIUM CHLORIDE 0.9 % (FLUSH) 0.9 %
10 SYRINGE (ML) INJECTION
Status: DISCONTINUED | OUTPATIENT
Start: 2020-09-14 | End: 2020-09-14 | Stop reason: HOSPADM

## 2020-09-14 RX ORDER — PROPOFOL 10 MG/ML
VIAL (ML) INTRAVENOUS CONTINUOUS PRN
Status: DISCONTINUED | OUTPATIENT
Start: 2020-09-14 | End: 2020-09-14

## 2020-09-14 RX ORDER — SODIUM CHLORIDE 9 MG/ML
INJECTION, SOLUTION INTRAVENOUS CONTINUOUS
Status: DISCONTINUED | OUTPATIENT
Start: 2020-09-14 | End: 2020-09-14 | Stop reason: HOSPADM

## 2020-09-14 RX ORDER — PROPOFOL 10 MG/ML
VIAL (ML) INTRAVENOUS
Status: DISCONTINUED | OUTPATIENT
Start: 2020-09-14 | End: 2020-09-14

## 2020-09-14 RX ORDER — ONDANSETRON 2 MG/ML
4 INJECTION INTRAMUSCULAR; INTRAVENOUS DAILY PRN
Status: DISCONTINUED | OUTPATIENT
Start: 2020-09-14 | End: 2020-09-14 | Stop reason: HOSPADM

## 2020-09-14 RX ADMIN — PROPOFOL 100 MG: 10 INJECTION, EMULSION INTRAVENOUS at 09:09

## 2020-09-14 RX ADMIN — PROPOFOL 40 MG: 10 INJECTION, EMULSION INTRAVENOUS at 09:09

## 2020-09-14 RX ADMIN — SUCCINYLCHOLINE CHLORIDE 100 MG: 20 INJECTION, SOLUTION INTRAMUSCULAR; INTRAVENOUS at 09:09

## 2020-09-14 RX ADMIN — ONDANSETRON 4 MG: 2 INJECTION, SOLUTION INTRAMUSCULAR; INTRAVENOUS at 10:09

## 2020-09-14 RX ADMIN — LIDOCAINE HYDROCHLORIDE 100 MG: 20 INJECTION, SOLUTION INTRAVENOUS at 09:09

## 2020-09-14 RX ADMIN — PROPOFOL 20 MG: 10 INJECTION, EMULSION INTRAVENOUS at 09:09

## 2020-09-14 RX ADMIN — PROPOFOL 150 MCG/KG/MIN: 10 INJECTION, EMULSION INTRAVENOUS at 09:09

## 2020-09-14 RX ADMIN — SODIUM CHLORIDE: 0.9 INJECTION, SOLUTION INTRAVENOUS at 07:09

## 2020-09-14 NOTE — H&P
History & Physical - Short Stay  Gastroenterology      SUBJECTIVE:     Procedure: EUS and ERCP    Chief Complaint/Indication for Procedure: cbd stone    History of Present Illness:  Patient is a 80 y.o. female with cbd stone coming for EUS and ERCP.     PTA Medications   Medication Sig    aspirin (ECOTRIN) 81 MG EC tablet Take 81 mg by mouth once daily.    irbesartan (AVAPRO) 300 MG tablet TAKE 1 TABLET BY MOUTH EVERY DAY. STOP VALSARTAN    MULTIVITAMIN WITH MINERALS (ONE-A-DAY 50 PLUS) Tab Take 1 tablet by mouth once daily.     verapamiL (VERELAN) 240 MG C24P Take 1 capsule (240 mg total) by mouth 2 (two) times daily.    betamethasone dipropionate (DIPROLENE) 0.05 % cream AAA bid    dorzolamide-timolol 2-0.5% (COSOPT) 22.3-6.8 mg/mL ophthalmic solution Place 1 drop into both eyes 2 (two) times daily.    econazole nitrate 1 % cream AAA bid    flurandrenolide (CORDRAN) 0.05 % lotion Apply topically 2 (two) times daily. Apply to  scalp one to two times daily as needed for dryness    ketoconazole (NIZORAL) 2 % shampoo Wash hair with medicated shampoo at least 2x/week - let sit on scalp at least 5 minutes prior to rinsing    ketoconazole (NIZORAL) 2 % shampoo USE EVERY OTHER DAY LET SIT ON SCALP FOR FIVE MINUTES BEFORE RINSING    triamcinolone acetonide 0.025% (KENALOG) 0.025 % cream AAA bid       Review of patient's allergies indicates:   Allergen Reactions    Adhesive tape-silicones      Use PAPER Tape / Tegaderm    Atorvastatin      Muscle pain    Pravastatin Rash    Tapentadol Hives        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    Cataract     History of uterine fibroid     Hyperlipidemia     dyslipidemia    Hypertension     MRSA infection     Open angle 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 eyes 5/2/2013    Osteopenia     Pancreas cyst     Potassium depletion  1998    Retinal detachment of left eye with retinal break 3/13/2017     Past Surgical History:   Procedure Laterality Date    Adrenal Gland Surgery to right side      BREAST BIOPSY Left 2001    left breast DCIS    BREAST LUMPECTOMY Left 09/11/2001    CATARACT EXTRACTION W/  INTRAOCULAR LENS IMPLANT Left 08/02/2017    with PPV/SO removal ( AND )    COLONOSCOPY      COLONOSCOPY N/A 10/31/2016    Procedure: COLONOSCOPY;  Surgeon: YAMILETH Richards MD;  Location: [facility] ENDO (4TH FLR);  Service: Endoscopy;  Laterality: N/A;    ENDOSCOPIC ULTRASOUND OF UPPER GASTROINTESTINAL TRACT N/A 8/7/2019    Procedure: ULTRASOUND, UPPER GI TRACT, ENDOSCOPIC;  Surgeon: Ladonna Wells MD;  Location: [facility] ENDO (2ND FLR);  Service: Endoscopy;  Laterality: N/A;    KIDNEY SURGERY  03/27/2017    patient does not know what they did, but knows she does not have kidney cancer    PARS PLANA VITRECTOMY Left 08/02/2017    25g PPV WITH SO REMOVAL AND PHACO IOL ( AND )    RETINAL DETACHMENT SURGERY Left 08/22/2017    PPVx and SO Removal//Phaco IOL ( AND )     Family History   Problem Relation Age of Onset    Cancer Mother         breast    Breast cancer Mother         40s and again in 50s (bilateral)    Breast cancer Other 40    Heart disease Father         bypass    Cancer Father         asbestos    Stroke Brother     Ovarian cancer Neg Hx     Amblyopia Neg Hx     Blindness Neg Hx     Cataracts Neg Hx     Diabetes Neg Hx     Glaucoma Neg Hx     Hypertension Neg Hx     Macular degeneration Neg Hx     Retinal detachment Neg Hx     Strabismus Neg Hx     Thyroid disease Neg Hx     Colon cancer Neg Hx     Melanoma Neg Hx      Social History     Tobacco Use    Smoking status: Never Smoker    Smokeless tobacco: Never Used   Substance Use Topics    Alcohol use: No    Drug use: No          OBJECTIVE:     Vital Signs (Most Recent)  Temp: 98 °F (36.7 °C)  (09/14/20 0733)  Pulse: 82 (09/14/20 0733)  Resp: 14 (09/14/20 0733)  BP: (!) 160/83 (09/14/20 0733)  SpO2: 100 % (09/14/20 0733)         ASSESSMENT/PLAN:     Patient is a 80 y.o. female with cbd stone coming for EUS and ERCP.     Plan: EUS and ERCP    Anesthesia Plan: Moderate Sedation    ASA Grade: ASA 2 - Patient with mild systemic disease with no functional limitations

## 2020-09-14 NOTE — [employer] PATIENT INSTRUCTIONS
Discharge Summary/Instructions after an Endoscopic Procedure  Patient Name: Ida Santana  Patient MRN: [id number]  Patient YOB: 1940  Monday, September 14, 2020  Ladonna Wells MD  RESTRICTIONS:  During your procedure today, you received medications for sedation.  These   medications may affect your judgment, balance and coordination.  Therefore,   for 24 hours, you have the following restrictions:   - DO NOT drive a car, operate machinery, make legal/financial decisions,   sign important papers or drink alcohol.    ACTIVITY:  Today: no heavy lifting, straining or running due to procedural   sedation/anesthesia.  The following day: return to full activity including work.  DIET:  Eat and drink normally unless instructed otherwise.     TREATMENT FOR COMMON SIDE EFFECTS:  - Mild abdominal pain, nausea, belching, bloating or excessive gas:  rest,   eat lightly and use a heating pad.  - Sore Throat: treat with throat lozenges and/or gargle with warm salt   water.  - Because air was used during the procedure, expelling large amounts of air   from your rectum or belching is normal.  - If a bowel prep was taken, you may not have a bowel movement for 1-3 days.    This is normal.  SYMPTOMS TO WATCH FOR AND REPORT TO YOUR PHYSICIAN:  1. Abdominal pain or bloating, other than gas cramps.  2. Chest pain.  3. Back pain.  4. Signs of infection such as: chills or fever occurring within 24 hours   after the procedure.  5. Rectal bleeding, which would show as bright red, maroon, or black stools.   (A tablespoon of blood from the rectum is not serious, especially if   hemorrhoids are present.)  6. Vomiting.  7. Weakness or dizziness.  GO DIRECTLY TO THE NEAREST EMERGENCY ROOM IF YOU HAVE ANY OF THE FOLLOWING:      Difficulty breathing              Chills and/or fever over 101 F   Persistent vomiting and/or vomiting blood   Severe abdominal pain   Severe chest pain   Black, tarry stools   Bleeding- more than one  tablespoon   Any other symptom or condition that you feel may need urgent attention  Your doctor recommends these additional instructions:  If any biopsies were taken, your doctors clinic will contact you in 1 to 2   weeks with any results.  - Discharge patient to home.   - Resume previous diet.   - Continue present medications.   - Return to referring physician.   - Patient has a contact number available for emergencies.  The signs and   symptoms of potential delayed complications were discussed with the   patient.  Return to normal activities tomorrow.  Written discharge   instructions were provided to the patient.   - MRCP in 1 year for pancreas cyst surveillance.   For questions, problems or results please call your physician - Ladonna Wells MD at Work:  [phone].  [facility], EMERGENCY ROOM PHONE NUMBER: [phone]  IF A COMPLICATION OR EMERGENCY SITUATION ARISES AND YOU ARE UNABLE TO REACH   YOUR PHYSICIAN - GO DIRECTLY TO THE EMERGENCY ROOM.  Ladonna Wells MD  9/14/2020 11:43:15 AM  This report has been verified and signed electronically.  [employer]

## 2020-09-14 NOTE — ANESTHESIA PREPROCEDURE EVALUATION
09/14/2020  Ida Santana is a 80 y.o., female.  Pre-operative evaluation for Procedure(s) (LRB):  ULTRASOUND, UPPER GI TRACT, ENDOSCOPIC (N/A)  ERCP (ENDOSCOPIC RETROGRADE CHOLANGIOPANCREATOGRAPHY) (N/A)    Ida Santana is a 80 y.o. 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    Open angle 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 eyes    Osteopenia    Retinal detachment of left eye with retinal break    Endophthalmitis, left eye    Rhegmatogenous retinal detachment of left eye    NS (nuclear sclerosis)    Hx of renal vein thrombosis    Retinal detachment, rhegmatogenous, left eye    History of breast cancer in female    Pancreas cyst       Review of patient's allergies indicates:   Allergen Reactions    Adhesive tape-silicones      Use PAPER Tape / Tegaderm    Atorvastatin      Muscle pain    Pravastatin Rash    Tapentadol Hives       No current facility-administered medications on file prior to encounter.      Current Outpatient Medications on File Prior to Encounter   Medication Sig Dispense Refill    aspirin (ECOTRIN) 81 MG EC tablet Take 81 mg by mouth once daily.      betamethasone dipropionate (DIPROLENE) 0.05 % cream AAA bid 60 g 0    flurandrenolide (CORDRAN) 0.05 % lotion Apply topically 2 (two) times daily. Apply to  scalp one to two times daily as needed for dryness 120 mL 12    irbesartan (AVAPRO) 300 MG tablet TAKE 1 TABLET BY MOUTH EVERY DAY. STOP VALSARTAN 90 tablet 3    ketoconazole (NIZORAL) 2 % shampoo Wash hair with medicated shampoo at least 2x/week - let sit on scalp at least 5 minutes prior to rinsing 120 mL 5    ketoconazole (NIZORAL) 2 % shampoo USE EVERY OTHER DAY LET SIT ON SCALP FOR FIVE MINUTES  BEFORE RINSING 120 mL 6    MULTIVITAMIN WITH MINERALS (ONE-A-DAY 50 PLUS) Tab Take 1 tablet by mouth once daily.       verapamiL (VERELAN) 240 MG C24P Take 1 capsule (240 mg total) by mouth 2 (two) times daily. 180 capsule 3       Past Surgical History:   Procedure Laterality Date    Adrenal Gland Surgery to right side      BREAST BIOPSY Left 2001    left breast DCIS    BREAST LUMPECTOMY Left 09/11/2001    CATARACT EXTRACTION W/  INTRAOCULAR LENS IMPLANT Left 08/02/2017    with PPV/SO removal ( AND )    COLONOSCOPY      COLONOSCOPY N/A 10/31/2016    Procedure: COLONOSCOPY;  Surgeon: YAMILETH Richards MD;  Location: [facility] ENDO (4TH FLR);  Service: Endoscopy;  Laterality: N/A;    ENDOSCOPIC ULTRASOUND OF UPPER GASTROINTESTINAL TRACT N/A 8/7/2019    Procedure: ULTRASOUND, UPPER GI TRACT, ENDOSCOPIC;  Surgeon: Ladonna Wells MD;  Location: [facility] ENDO (2ND FLR);  Service: Endoscopy;  Laterality: N/A;    KIDNEY SURGERY  03/27/2017    patient does not know what they did, but knows she does not have kidney cancer    PARS PLANA VITRECTOMY Left 08/02/2017    25g PPV WITH SO REMOVAL AND PHACO IOL ( AND )    RETINAL DETACHMENT SURGERY Left 08/22/2017    PPVx and SO Removal//Phaco IOL ( AND )       Social History     Socioeconomic History    Marital status: Single     Spouse name: Not on file    Number of children: Not on file    Years of education: Not on file    Highest education level: Not on file   Occupational History    Not on file   Social Needs    Financial resource strain: Not on file    Food insecurity     Worry: Not on file     Inability: Not on file    Transportation needs     Medical: Not on file     Non-medical: Not on file   Tobacco Use    Smoking status: Never Smoker    Smokeless tobacco: Never Used   Substance and Sexual Activity    Alcohol use: No    Drug use: No    Sexual activity: Never     Birth control/protection:  Post-menopausal   Lifestyle    Physical activity     Days per week: Not on file     Minutes per session: Not on file    Stress: Not on file   Relationships    Social connections     Talks on phone: Not on file     Gets together: Not on file     Attends Judaism service: Not on file     Active member of club or organization: Not on file     Attends meetings of clubs or organizations: Not on file     Relationship status: Not on file   Other Topics Concern    Are you pregnant or think you may be? Not Asked    Breast-feeding Not Asked   Social History Narrative    Retired  (34 years) with [employer]         CBC: No results for input(s): WBC, RBC, HGB, HCT, PLT, MCV, MCH, MCHC in the last 72 hours.    CMP: No results for input(s): NA, K, CL, CO2, BUN, CREATININE, GLU, MG, PHOS, CALCIUM, ALBUMIN, PROT, ALKPHOS, ALT, AST, BILITOT in the last 72 hours.    INR  No results for input(s): PT, INR, PROTIME, APTT in the last 72 hours.        Diagnostic Studies:      EKD Echo:  No results found for this or any previous visit.      Anesthesia Evaluation    I have reviewed the Patient Summary Reports.    I have reviewed the Nursing Notes. I have reviewed the NPO Status.   I have reviewed the Medications.     Review of Systems      Physical Exam  General:  Well nourished    Airway/Jaw/Neck:  Airway Findings: Mouth Opening: Normal Tongue: Normal  General Airway Assessment: Adult  Mallampati: II  TM Distance: Normal, at least 6 cm            Mental Status:  Mental Status Findings:  Cooperative         Anesthesia Plan  Type of Anesthesia, risks & benefits discussed:  Anesthesia Type:  general  Patient's Preference:   Intra-op Monitoring Plan: standard ASA monitors  Intra-op Monitoring Plan Comments:   Post Op Pain Control Plan: multimodal analgesia  Post Op Pain Control Plan Comments:   Induction:   IV  Beta Blocker:  Patient is not currently on a Beta-Blocker (No further documentation  required).       Informed Consent: Patient understands risks and agrees with Anesthesia plan.  Questions answered. Anesthesia consent signed with patient.  ASA Score: 3     Day of Surgery Review of History & Physical: I have interviewed and examined the patient. I have reviewed the patient's H&P dated:            Ready For Surgery From Anesthesia Perspective.

## 2020-09-14 NOTE — ANESTHESIA PROCEDURE NOTES
Intubation  Performed by: Becca Cerda CRNA  Authorized by: Ronald Duenas MD     Intubation:     Induction:  Intravenous    Intubated:  Postinduction    Mask Ventilation:  Easy with oral airway    Attempts:  1    Attempted By:  CRNA    Blade:  Wu 2    Laryngeal View Grade: Grade IIA - cords partially seen      Difficult Airway Encountered?: No      Complications:  None    Airway Device:  Oral endotracheal tube    Airway Device Size:  7.0    Style/Cuff Inflation:  Cuffed (inflated to minimal occlusive pressure)    Tube secured:  20    Secured at:  The lips    Placement Verified By:  Capnometry    Complicating Factors:  None    Findings Post-Intubation:  BS equal bilateral and atraumatic/condition of teeth unchanged

## 2020-09-14 NOTE — DISCHARGE INSTRUCTIONS
ERCP (Endoscopic Retrograde Cholangiopancreatography)     A balloon at the tip of a catheter opens above the stone. The stone is pulled out of the duct and leaves your body through stool.     ERCP stands for endoscopic retrograde cholangiopancreatography. This procedure is used to view the biliary and pancreatic ducts.  It is used to evaluate diseases that affect the ducts and to help locate and treat blockages that may be present.  How do I get ready for ERCP?  · Talk to your doctor about any health problems or allergies you have.  · Ask your doctor about the risks of ERCP. These include pancreatitis, infection, bleeding, and tearing the bowel.  · Be sure your doctor knows about all medicines you take. You may be told to stop taking some or all of them before the test. This includes:  · All prescription medicines  · Over-the-counter medicines that don't need a prescription  ·  Any street drugs you may use   · Herbs, vitamins, kelp, seaweed, cough syrups, and other supplements  · You may be asked to take antibiotics ahead of time.  · Avoid blood-thinning medicines for 1 week before ERCP.  · Do not eat or drink for 8 to 12 hours before ERCP.  · Have someone ready to take you home.  What happens during the procedure?  · You may be given medicine through an IV to help you relax.  · Your throat is numbed.  · A thin tube (endoscope) is placed into your throat. It is advanced from the throat through the upper digestive tract, to the common bile duct opening. The endoscope lets the doctor see the common bile and pancreatic ducts on a video screen.  · A cut may be made where the common bile duct opens to the duodenum to make it easier to remove stones.  · As blockages are located and removed, X-rays are taken.  · Contrast dye is injected through a catheter to make the duct show up better on the X-rays.  · An imaging technique that uses X-rays to obtain real-time moving images of internal organs is called fluoroscopy.  Fluoroscopy is used to watch and guide progress of the procedure.   · In some cases, a plastic tube (stent) is placed to hold the ducts open. This stent may be replaced or removed in 6 to 8 weeks. Or it may be left to fall out on its own and be passed in the stool.  What happens after ERCP?  Your doctor may discuss the test results right away or a return visit may be scheduled. You may go home the same day or spend the night in the hospital. Follow these tips:  · You can return to a normal routine the day after the ERCP.  · If a cut was made in the duct, avoid blood-thinning medicines such as aspirin for 5 to 7 days.  · Call your doctor right away if you have a fever or abdominal pain. These may be signs of an infection or torn bowel.   Date Last Reviewed: 6/19/2015 © 2000-2017 [employer]. [address] [place] [address]. All rights reserved. This information is not intended as a substitute for professional medical care. Always follow your healthcare professional's instructions.        Endoscopic Ultrasound (EUS)    An endoscopic ultrasound (EUS) is a test to look at the inside of your gastrointestinal (GI) tract. It's commonly used to look for cancers or growths in the esophagus, stomach, pancreas, liver, and rectum. It can help to stage cancer (see how advanced a cancer is). EUS may also be used to help diagnose certain diseases or to drain cysts or abscesses.  What is EUS?  EUS shows both ultrasound images and live video of the GI tract. During the test, a flexible tube called an endoscope (scope) is used. At the end of the scope is a tiny video camera and light. The video camera sends live images to a monitor. The scope also contains a very small ultrasound device. This uses sound waves to create images and send them to a monitor.  A needle is passed through the scope. The needle can be used take a small sample of tissue for testing. This is called a biopsy. The needle can be used to  take a sample of fluid. This is called fine-needle aspiration (FNA).  Risks and possible complications of EUS  Risks and possible complications include the following:  · Bleeding  · Infection  · A perforation (hole) in the digestive tract   · Risks of sedation or anesthesia   Before the test  Be prepared prior to the test:  · Tell your healthcare provider what medicine you take. This includes vitamins, herbs, and over-the-counter medicine. It also includes any blood thinners, such as warfarin, clopidogrel, ibuprofen, or daily aspirin. Ask your healthcare provider if you need to stop taking some or all of them before the test.  · You may be prescribed antibiotics to take before or after the test. This depends on the area being studied and what is done during the test. These medicines help prevent infection.  · Carefully follow the instructions for preparing for the test to make sure results are accurate. Instructions may include:  ¨ If youre having an EUS of the upper GI tract (esophagus, stomach, duodenum, pancreas, liver):  § Do not eat or drink for 6 hours before the test.  ¨ If youre having an EUS of the lower GI tract (rectum):  § Before the test, do bowel prep as instructed to clean your rectum of stool. This may involve a clear liquid diet and using a laxative (liquid or pills) the night before the test. Or it may mean doing one or more enemas the morning of the test.  § Do not eat or drink for 6 hours before the test.  · Be sure to arrive on time at the facility. Bring your identification and health insurance card. Leave valuables at home. If you have them, bring X-rays or other test results with you.  Let the healthcare provider know  For your safety, tell the healthcare provider if you:  · Take insulin. Your dose may need to be changed on the day of your test.  · Are allergic to latex.  · Have any other allergies.  · Are taking blood thinners.   During the test  An endoscopic ultrasound usually takes  place in a hospital. The procedure itself may take 1 to 2 hours. You will likely go home soon afterward. During the test:  · You lie on your left side on an exam table.  · An intravenous (IV) line will be put into a vein in your arm or hand. This line supplies fluids and medicines. To keep you comfortable during the test, you will be given a sedative medicine. This medicine prevents discomfort and will make you sleepy.  · If you are having an EUS of the upper GI tract, local anesthetic may be sprayed in your throat. This will help you be more comfortable as the healthcare provider inserts the scope. The healthcare provider then gently puts the flexible scope into your mouth or nose and down your throat.  · If youre having an EUS of the lower GI tract, the healthcare provider gently puts the flexible scope into your anus.  · During the test, the scope sends live video and ultrasound images from inside your body to nearby monitors. These are used to examine your GI tract. Specialized procedures, such as drainage, are done as needed.  · The healthcare provider may discuss the results with you soon after the test. Biopsy results take several  days.  · In most cases, you can go home within a few hours of the test. When you leave the facility, have an adult family member or friend drive you, even if you don't feel that sleepy.  After the test  Here is what to expect after the test:  · You may feel tired from the sedative. This should wear off by the end of the day.  · If you had an upper digestive endoscopy, your throat may feel sore for a day or two. Over-the-counter sore throat lozenges and spray should help.  · You can eat and drink normally as soon as the test is done.  When to call the healthcare provider  Call your healthcare provider if you notice any of the following:  · Fever of 100.4°F (38.0°C) or higher, or as advised by your healthcare provider  · Shortness of breath  · Vomiting blood, blood in stool, or  black stools  · Coughing or hoarse voice that wont go away   Date Last Reviewed: 7/1/2016  © 4901-6101 [employer]. [address] [place] [address], [place] [address]. All rights reserved. This information is not intended as a substitute for professional medical care. Always follow your healthcare professional's instructions.

## 2020-09-14 NOTE — TRANSFER OF CARE
"Anesthesia Transfer of Care Note    Patient: Ida Santana    Procedure(s) Performed: Procedure(s) (LRB):  ULTRASOUND, UPPER GI TRACT, ENDOSCOPIC (N/A)  ERCP (ENDOSCOPIC RETROGRADE CHOLANGIOPANCREATOGRAPHY) (N/A)    Patient location: [facility]    Anesthesia Type: general    Transport from OR: Transported from OR on 6-10 L/min O2 by face mask with adequate spontaneous ventilation    Post pain: adequate analgesia    Post assessment: no apparent anesthetic complications and tolerated procedure well    Post vital signs: stable    Level of consciousness: awake, alert and oriented    Nausea/Vomiting: no nausea/vomiting    Complications: none    Transfer of care protocol was followed      Last vitals:   Visit Vitals  BP (!) 158/79 (BP Location: Right arm, Patient Position: Lying)   Pulse 96   Temp 36.2 °C (97.2 °F) (Temporal)   Resp 16   Ht 5' 7" (1.702 m)   Wt 74.4 kg (164 lb)   LMP  (LMP Unknown)   SpO2 100%   Breastfeeding No   BMI 25.69 kg/m²     "

## 2020-09-14 NOTE — OR NURSING
Intubated per anesthesia. Mucosal tear noted to hard palate and upper lip. Upper lip bleeding. Bleeding controlled with thalia per anesthesia

## 2020-09-15 NOTE — ANESTHESIA POSTPROCEDURE EVALUATION
Anesthesia Post Evaluation    Patient: Ida Santana    Procedure(s) Performed: Procedure(s) (LRB):  ULTRASOUND, UPPER GI TRACT, ENDOSCOPIC (N/A)  ERCP (ENDOSCOPIC RETROGRADE CHOLANGIOPANCREATOGRAPHY) (N/A)    Final Anesthesia Type: general    Patient location during evaluation: PACU  Patient participation: Yes- Able to Participate  Level of consciousness: awake and alert, awake and oriented  Post-procedure vital signs: reviewed and stable  Pain management: adequate  Airway patency: patent    PONV status at discharge: No PONV  Anesthetic complications: no      Cardiovascular status: blood pressure returned to baseline, hemodynamically stable and stable  Respiratory status: unassisted, spontaneous ventilation and room air  Hydration status: euvolemic  Follow-up not needed.          Vitals Value Taken Time   /85 09/14/20 1101   Temp 36.2 °C (97.2 °F) 09/14/20 1029   Pulse 84 09/14/20 1102   Resp 16 09/14/20 1045   SpO2 97 % 09/14/20 1102   Vitals shown include unvalidated device data.      No case tracking events are documented in the log.      Pain/Ellen Score: No data recorded

## 2020-10-05 ENCOUNTER — PATIENT OUTREACH (OUTPATIENT)
Dept: ADMINISTRATIVE | Facility: OTHER | Age: 80
End: 2020-10-05

## 2020-10-05 ENCOUNTER — OFFICE VISIT (OUTPATIENT)
Dept: INTERNAL MEDICINE | Facility: CLINIC | Age: 80
End: 2020-10-05
Payer: COMMERCIAL

## 2020-10-05 ENCOUNTER — IMMUNIZATION (OUTPATIENT)
Dept: INTERNAL MEDICINE | Facility: CLINIC | Age: 80
End: 2020-10-05
Payer: COMMERCIAL

## 2020-10-05 ENCOUNTER — LAB VISIT (OUTPATIENT)
Dept: LAB | Facility: HOSPITAL | Age: 80
End: 2020-10-05
Attending: INTERNAL MEDICINE
Payer: COMMERCIAL

## 2020-10-05 VITALS
HEIGHT: 67 IN | DIASTOLIC BLOOD PRESSURE: 80 MMHG | WEIGHT: 158.75 LBS | OXYGEN SATURATION: 98 % | BODY MASS INDEX: 24.92 KG/M2 | HEART RATE: 73 BPM | SYSTOLIC BLOOD PRESSURE: 122 MMHG

## 2020-10-05 DIAGNOSIS — E55.9 MILD VITAMIN D DEFICIENCY: ICD-10-CM

## 2020-10-05 DIAGNOSIS — Z85.3 HISTORY OF BREAST CANCER IN FEMALE: ICD-10-CM

## 2020-10-05 DIAGNOSIS — I10 ESSENTIAL HYPERTENSION: Primary | ICD-10-CM

## 2020-10-05 DIAGNOSIS — E78.5 HYPERLIPIDEMIA, UNSPECIFIED HYPERLIPIDEMIA TYPE: ICD-10-CM

## 2020-10-05 DIAGNOSIS — M85.80 OSTEOPENIA, UNSPECIFIED LOCATION: ICD-10-CM

## 2020-10-05 LAB
25(OH)D3+25(OH)D2 SERPL-MCNC: 37 NG/ML (ref 30–96)
ALBUMIN SERPL BCP-MCNC: 3.8 G/DL (ref 3.5–5.2)
ALP SERPL-CCNC: 76 U/L (ref 55–135)
ALT SERPL W/O P-5'-P-CCNC: 15 U/L (ref 10–44)
ANION GAP SERPL CALC-SCNC: 7 MMOL/L (ref 8–16)
AST SERPL-CCNC: 16 U/L (ref 10–40)
BASOPHILS # BLD AUTO: 0.05 K/UL (ref 0–0.2)
BASOPHILS NFR BLD: 1.1 % (ref 0–1.9)
BILIRUB SERPL-MCNC: 0.9 MG/DL (ref 0.1–1)
BUN SERPL-MCNC: 13 MG/DL (ref 8–23)
CALCIUM SERPL-MCNC: 9 MG/DL (ref 8.7–10.5)
CHLORIDE SERPL-SCNC: 109 MMOL/L (ref 95–110)
CHOLEST SERPL-MCNC: 154 MG/DL (ref 120–199)
CHOLEST/HDLC SERPL: 3.8 {RATIO} (ref 2–5)
CO2 SERPL-SCNC: 27 MMOL/L (ref 23–29)
CREAT SERPL-MCNC: 0.8 MG/DL (ref 0.5–1.4)
DIFFERENTIAL METHOD: ABNORMAL
EOSINOPHIL # BLD AUTO: 0.2 K/UL (ref 0–0.5)
EOSINOPHIL NFR BLD: 5.3 % (ref 0–8)
ERYTHROCYTE [DISTWIDTH] IN BLOOD BY AUTOMATED COUNT: 12.6 % (ref 11.5–14.5)
EST. GFR  (AFRICAN AMERICAN): >60 ML/MIN/1.73 M^2
EST. GFR  (NON AFRICAN AMERICAN): >60 ML/MIN/1.73 M^2
GLUCOSE SERPL-MCNC: 118 MG/DL (ref 70–110)
HCT VFR BLD AUTO: 41.5 % (ref 37–48.5)
HDLC SERPL-MCNC: 41 MG/DL (ref 40–75)
HDLC SERPL: 26.6 % (ref 20–50)
HGB BLD-MCNC: 13.8 G/DL (ref 12–16)
IMM GRANULOCYTES # BLD AUTO: 0.01 K/UL (ref 0–0.04)
IMM GRANULOCYTES NFR BLD AUTO: 0.2 % (ref 0–0.5)
LDLC SERPL CALC-MCNC: 99.4 MG/DL (ref 63–159)
LYMPHOCYTES # BLD AUTO: 1.9 K/UL (ref 1–4.8)
LYMPHOCYTES NFR BLD: 43.8 % (ref 18–48)
MCH RBC QN AUTO: 32 PG (ref 27–31)
MCHC RBC AUTO-ENTMCNC: 33.3 G/DL (ref 32–36)
MCV RBC AUTO: 96 FL (ref 82–98)
MONOCYTES # BLD AUTO: 0.4 K/UL (ref 0.3–1)
MONOCYTES NFR BLD: 9.6 % (ref 4–15)
NEUTROPHILS # BLD AUTO: 1.7 K/UL (ref 1.8–7.7)
NEUTROPHILS NFR BLD: 40 % (ref 38–73)
NONHDLC SERPL-MCNC: 113 MG/DL
NRBC BLD-RTO: 0 /100 WBC
PLATELET # BLD AUTO: 198 K/UL (ref 150–350)
PMV BLD AUTO: 11.8 FL (ref 9.2–12.9)
POTASSIUM SERPL-SCNC: 3.8 MMOL/L (ref 3.5–5.1)
PROT SERPL-MCNC: 7 G/DL (ref 6–8.4)
RBC # BLD AUTO: 4.31 M/UL (ref 4–5.4)
SODIUM SERPL-SCNC: 143 MMOL/L (ref 136–145)
TRIGL SERPL-MCNC: 68 MG/DL (ref 30–150)
WBC # BLD AUTO: 4.36 K/UL (ref 3.9–12.7)

## 2020-10-05 PROCEDURE — 80061 LIPID PANEL: CPT

## 2020-10-05 PROCEDURE — 85025 COMPLETE CBC W/AUTO DIFF WBC: CPT

## 2020-10-05 PROCEDURE — 80053 COMPREHEN METABOLIC PANEL: CPT

## 2020-10-05 PROCEDURE — 36415 COLL VENOUS BLD VENIPUNCTURE: CPT

## 2020-10-05 PROCEDURE — 90471 FLU VACCINE - QUADRIVALENT - ADJUVANTED: ICD-10-PCS | Mod: S$GLB,,, | Performed by: INTERNAL MEDICINE

## 2020-10-05 PROCEDURE — 99999 PR PBB SHADOW E&M-EST. PATIENT-LVL V: CPT | Mod: PBBFAC,,, | Performed by: INTERNAL MEDICINE

## 2020-10-05 PROCEDURE — 99214 PR OFFICE/OUTPT VISIT, EST, LEVL IV, 30-39 MIN: ICD-10-PCS | Mod: S$GLB,25,, | Performed by: INTERNAL MEDICINE

## 2020-10-05 PROCEDURE — 90694 VACC AIIV4 NO PRSRV 0.5ML IM: CPT | Mod: S$GLB,,, | Performed by: INTERNAL MEDICINE

## 2020-10-05 PROCEDURE — 90694 FLU VACCINE - QUADRIVALENT - ADJUVANTED: ICD-10-PCS | Mod: S$GLB,,, | Performed by: INTERNAL MEDICINE

## 2020-10-05 PROCEDURE — 3074F SYST BP LT 130 MM HG: CPT | Mod: CPTII,S$GLB,, | Performed by: INTERNAL MEDICINE

## 2020-10-05 PROCEDURE — 1101F PR PT FALLS ASSESS DOC 0-1 FALLS W/OUT INJ PAST YR: ICD-10-PCS | Mod: CPTII,S$GLB,, | Performed by: INTERNAL MEDICINE

## 2020-10-05 PROCEDURE — 1126F AMNT PAIN NOTED NONE PRSNT: CPT | Mod: S$GLB,,, | Performed by: INTERNAL MEDICINE

## 2020-10-05 PROCEDURE — 3074F PR MOST RECENT SYSTOLIC BLOOD PRESSURE < 130 MM HG: ICD-10-PCS | Mod: CPTII,S$GLB,, | Performed by: INTERNAL MEDICINE

## 2020-10-05 PROCEDURE — 90471 IMMUNIZATION ADMIN: CPT | Mod: S$GLB,,, | Performed by: INTERNAL MEDICINE

## 2020-10-05 PROCEDURE — 3079F PR MOST RECENT DIASTOLIC BLOOD PRESSURE 80-89 MM HG: ICD-10-PCS | Mod: CPTII,S$GLB,, | Performed by: INTERNAL MEDICINE

## 2020-10-05 PROCEDURE — 1101F PT FALLS ASSESS-DOCD LE1/YR: CPT | Mod: CPTII,S$GLB,, | Performed by: INTERNAL MEDICINE

## 2020-10-05 PROCEDURE — 3079F DIAST BP 80-89 MM HG: CPT | Mod: CPTII,S$GLB,, | Performed by: INTERNAL MEDICINE

## 2020-10-05 PROCEDURE — 1126F PR PAIN SEVERITY QUANTIFIED, NO PAIN PRESENT: ICD-10-PCS | Mod: S$GLB,,, | Performed by: INTERNAL MEDICINE

## 2020-10-05 PROCEDURE — 99999 PR PBB SHADOW E&M-EST. PATIENT-LVL V: ICD-10-PCS | Mod: PBBFAC,,, | Performed by: INTERNAL MEDICINE

## 2020-10-05 PROCEDURE — 99214 OFFICE O/P EST MOD 30 MIN: CPT | Mod: S$GLB,25,, | Performed by: INTERNAL MEDICINE

## 2020-10-05 PROCEDURE — 1159F MED LIST DOCD IN RCRD: CPT | Mod: S$GLB,,, | Performed by: INTERNAL MEDICINE

## 2020-10-05 PROCEDURE — 1159F PR MEDICATION LIST DOCUMENTED IN MEDICAL RECORD: ICD-10-PCS | Mod: S$GLB,,, | Performed by: INTERNAL MEDICINE

## 2020-10-05 PROCEDURE — 82306 VITAMIN D 25 HYDROXY: CPT

## 2020-10-05 NOTE — PATIENT INSTRUCTIONS
New shingles vaccine: SHINGRIX ( 2018) not live, 90%,  2 shots, one at day zero and the 2nd at 2-6 months: any pharmacy can give it.  Cannot have it within a month of the flu shot      Outpatient Procedures Ordered This Visit     Ambulatory referral/consult to Endocrinology          Mammo Digital Diagnostic Bilat with [facility]        Bone Density

## 2020-10-05 NOTE — PROGRESS NOTES
Subjective:      Patient ID: Ida Santana is a 80 y.o. female.    Chief Complaint: Follow-up    HPI:  HPI     Reviewed pancreatic EUS: needs MRCP yearly.      Hyeprtension well controlled      The following assessments were completed     Living situation: independent  CAGE: no  Depression screening: no  Timed Get Up and Go: very active  Whisper Test: normal  Cognitive Function Screening: normal  Nutrition Screening: normal  ADL Screening: self           Hypertension patient did not take irbesartan.  Nose bleeding on asa  Discussed bowel movements, takes more time        Annual exam: 10/5/2020  Pancreatic MRCP: 2020  Colonoscopy:  10/31/2016 follow up 10 years  Mammogram: Mammo scheduled  Gyn: Arabella Guallpa   appt made  Optho: Dr. Boswell /Yvette : follow up open angle glaucoma follow up 6 months: due in 12/2020  Flu: today  Tetanus :9/26/2016  Shingles: 10/30/2015  Shingrix  Pneumovax: done at age 73: documented  Prevnar: 9/23/2015  Bone Density:1/2017  MRCP yearly     Consultants  Dr. Yvette Dumont: 1/6/2017 retired  Dr. Harden: Left breast 2020  Dr. 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    Open angle 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 eyes    Osteopenia    Retinal detachment of left eye with retinal break    Endophthalmitis, left eye    Rhegmatogenous retinal detachment of left eye    NS (nuclear sclerosis)    Hx of renal vein thrombosis    Retinal detachment, rhegmatogenous, left eye    History of breast cancer in female    Pancreas cyst    Choledocholithiasis     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    Cataract     History of uterine fibroid     Hyperlipidemia      dyslipidemia    Hypertension     MRSA infection     Open angle 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 eyes 5/2/2013    Osteopenia     Pancreas cyst     Potassium depletion 1998    Retinal detachment of left eye with retinal break 3/13/2017     Past Surgical History:   Procedure Laterality Date    Adrenal Gland Surgery to right side      BREAST BIOPSY Left 2001    left breast DCIS    BREAST LUMPECTOMY Left 09/11/2001    CATARACT EXTRACTION W/  INTRAOCULAR LENS IMPLANT Left 08/02/2017    with PPV/SO removal ( AND )    COLONOSCOPY      COLONOSCOPY N/A 10/31/2016    Procedure: COLONOSCOPY;  Surgeon: YAMILETH Richards MD;  Location: [facility] ENDO (4TH FLR);  Service: Endoscopy;  Laterality: N/A;    ENDOSCOPIC ULTRASOUND OF UPPER GASTROINTESTINAL TRACT N/A 8/7/2019    Procedure: ULTRASOUND, UPPER GI TRACT, ENDOSCOPIC;  Surgeon: Ladonna Wells MD;  Location: [facility] ENDO (2ND FLR);  Service: Endoscopy;  Laterality: N/A;    ENDOSCOPIC ULTRASOUND OF UPPER GASTROINTESTINAL TRACT N/A 9/14/2020    Procedure: ULTRASOUND, UPPER GI TRACT, ENDOSCOPIC;  Surgeon: Ladonna Wells MD;  Location: [facility] ENDO (2ND FLR);  Service: Endoscopy;  Laterality: N/A;  9/11-covid [address]    ERCP N/A 9/14/2020    Procedure: ERCP (ENDOSCOPIC RETROGRADE CHOLANGIOPANCREATOGRAPHY);  Surgeon: Ladonna Wells MD;  Location: [facility] ENDO (2ND FLR);  Service: Endoscopy;  Laterality: N/A;    KIDNEY SURGERY  03/27/2017    patient does not know what they did, but knows she does not have kidney cancer    PARS PLANA VITRECTOMY Left 08/02/2017    25g PPV WITH SO REMOVAL AND PHACO IOL ( AND )    RETINAL DETACHMENT SURGERY Left 08/22/2017    PPVx and SO Removal//Phaco IOL ( AND )     Family History   Problem Relation Age of Onset    Cancer Mother         breast    Breast cancer Mother         40s and again in 50s (bilateral)    Breast cancer Other 40  "   Heart disease Father         bypass    Cancer Father         asbestos    Stroke Brother     Ovarian cancer Neg Hx     Amblyopia Neg Hx     Blindness Neg Hx     Cataracts Neg Hx     Diabetes Neg Hx     Glaucoma Neg Hx     Hypertension Neg Hx     Macular degeneration Neg Hx     Retinal detachment Neg Hx     Strabismus Neg Hx     Thyroid disease Neg Hx     Colon cancer Neg Hx     Melanoma Neg Hx      Review of Systems   Constitutional: Negative for chills, fever and unexpected weight change.   HENT: Negative for trouble swallowing.    Respiratory: Negative for cough, shortness of breath and wheezing.    Cardiovascular: Negative for chest pain and palpitations.   Gastrointestinal: Negative for abdominal distention, abdominal pain, blood in stool and vomiting.   Musculoskeletal: Negative for back pain.     Objective:     Vitals:    10/05/20 0823   BP: 122/80   Pulse: 73   SpO2: 98%   Weight: 72 kg (158 lb 11.7 oz)   Height: 5' 7" (1.702 m)   PainSc: 0-No pain     Body mass index is 24.86 kg/m².  Physical Exam  Constitutional:       General: She is not in acute distress.     Appearance: Normal appearance. She is well-developed.   Neck:      Thyroid: No thyromegaly.      Vascular: No carotid bruit.   Cardiovascular:      Rate and Rhythm: Normal rate and regular rhythm.      Chest Wall: PMI is not displaced.      Heart sounds: Normal heart sounds.   Pulmonary:      Effort: Pulmonary effort is normal. No respiratory distress.      Breath sounds: Normal breath sounds.   Chest:      Comments: No abnormalities noted  Abdominal:      General: Bowel sounds are normal. There is no distension.      Palpations: Abdomen is soft.      Tenderness: There is no abdominal tenderness.   Neurological:      Mental Status: She is alert and oriented to person, place, and time.       Assessment:     1. Essential hypertension    2. History of breast cancer in female    3. Osteopenia, unspecified location    4. Hyperlipidemia, " unspecified hyperlipidemia type      Plan:   Ida was seen today for follow-up.    Diagnoses and all orders for this visit:    Essential hypertension  Comments:  Well controlled    History of breast cancer in female  Comments:  Mammo ordered  Orders:  -     Mammo Digital Diagnostic Bilat with [facility]; Future    Osteopenia, unspecified location  Comments:  Endo follow up  Orders:  -     Ambulatory referral/consult to Endocrinology; Future    Hyperlipidemia, unspecified hyperlipidemia type  Comments:  Monitor        Problem List Items Addressed This Visit     Hyperlipidemia    Overview     dyslipidemia         Hypertension - Primary    Osteopenia    Relevant Orders    Ambulatory referral/consult to Endocrinology    History of breast cancer in female    Relevant Orders    Mammo Digital Diagnostic Bilat with [facility]        Orders Placed This Encounter   Procedures    Mammo Digital Diagnostic Bilat with [facility]     Standing Status:   Future     Standing Expiration Date:   12/5/2021     Order Specific Question:   May the Radiologist modify the order per protocol to meet the clinical needs of the patient?     Answer:   Yes    Ambulatory referral/consult to Endocrinology     Standing Status:   Future     Standing Expiration Date:   11/5/2021     Referral Priority:   Routine     Referral Type:   Consultation     Requested Specialty:   Endocrinology     Number of Visits Requested:   1     Follow up in about 6 months (around 4/5/2021) for Follow up in 6 months hypertension.     Medication List          Accurate as of October 5, 2020  8:33 PM. If you have any questions, ask your nurse or doctor.            CONTINUE taking these medications    aspirin 81 MG EC tablet  Commonly known as: ECOTRIN     betamethasone dipropionate 0.05 % cream  Commonly known as: DIPROLENE  AAA bid     dorzolamide-timolol 2-0.5% 22.3-6.8 mg/mL ophthalmic solution  Commonly known as: COSOPT  Place 1 drop into both eyes 2 (two) times daily.     econazole  nitrate 1 % cream  AAA bid     flurandrenolide 0.05 % lotion  Commonly known as: CORDRAN  Apply topically 2 (two) times daily. Apply to  scalp one to two times daily as needed for dryness     irbesartan 300 MG tablet  Commonly known as: AVAPRO  TAKE 1 TABLET BY MOUTH EVERY DAY. STOP VALSARTAN     * ketoconazole 2 % shampoo  Commonly known as: NIZORAL  Wash hair with medicated shampoo at least 2x/week - let sit on scalp at least 5 minutes prior to rinsing     * ketoconazole 2 % shampoo  Commonly known as: NIZORAL  USE EVERY OTHER DAY LET SIT ON SCALP FOR FIVE MINUTES BEFORE RINSING     ONE-A-DAY 50 PLUS tablet  Generic drug: geriatric multivitamin-min     triamcinolone acetonide 0.025% 0.025 % cream  Commonly known as: KENALOG  AAA bid     verapamiL 240 MG C24p  Commonly known as: VERELAN  Take 1 capsule (240 mg total) by mouth 2 (two) times daily.         * This list has 2 medication(s) that are the same as other medications prescribed for you. Read the directions carefully, and ask your doctor or other care provider to review them with you.

## 2020-10-06 ENCOUNTER — OFFICE VISIT (OUTPATIENT)
Dept: ENDOCRINOLOGY | Facility: CLINIC | Age: 80
End: 2020-10-06
Payer: COMMERCIAL

## 2020-10-06 VITALS
DIASTOLIC BLOOD PRESSURE: 78 MMHG | BODY MASS INDEX: 25.64 KG/M2 | WEIGHT: 163.38 LBS | HEIGHT: 67 IN | SYSTOLIC BLOOD PRESSURE: 128 MMHG

## 2020-10-06 DIAGNOSIS — I10 ESSENTIAL HYPERTENSION: ICD-10-CM

## 2020-10-06 DIAGNOSIS — M85.80 OSTEOPENIA, UNSPECIFIED LOCATION: Primary | ICD-10-CM

## 2020-10-06 DIAGNOSIS — M85.80 OSTEOPENIA, UNSPECIFIED LOCATION: ICD-10-CM

## 2020-10-06 PROCEDURE — 3074F PR MOST RECENT SYSTOLIC BLOOD PRESSURE < 130 MM HG: ICD-10-PCS | Mod: CPTII,S$GLB,, | Performed by: INTERNAL MEDICINE

## 2020-10-06 PROCEDURE — 1101F PR PT FALLS ASSESS DOC 0-1 FALLS W/OUT INJ PAST YR: ICD-10-PCS | Mod: CPTII,S$GLB,, | Performed by: INTERNAL MEDICINE

## 2020-10-06 PROCEDURE — 1159F MED LIST DOCD IN RCRD: CPT | Mod: S$GLB,,, | Performed by: INTERNAL MEDICINE

## 2020-10-06 PROCEDURE — 3078F DIAST BP <80 MM HG: CPT | Mod: CPTII,S$GLB,, | Performed by: INTERNAL MEDICINE

## 2020-10-06 PROCEDURE — 1159F PR MEDICATION LIST DOCUMENTED IN MEDICAL RECORD: ICD-10-PCS | Mod: S$GLB,,, | Performed by: INTERNAL MEDICINE

## 2020-10-06 PROCEDURE — 99204 PR OFFICE/OUTPT VISIT, NEW, LEVL IV, 45-59 MIN: ICD-10-PCS | Mod: S$GLB,,, | Performed by: INTERNAL MEDICINE

## 2020-10-06 PROCEDURE — 99204 OFFICE O/P NEW MOD 45 MIN: CPT | Mod: S$GLB,,, | Performed by: INTERNAL MEDICINE

## 2020-10-06 PROCEDURE — 3074F SYST BP LT 130 MM HG: CPT | Mod: CPTII,S$GLB,, | Performed by: INTERNAL MEDICINE

## 2020-10-06 PROCEDURE — 99999 PR PBB SHADOW E&M-EST. PATIENT-LVL IV: ICD-10-PCS | Mod: PBBFAC,,, | Performed by: INTERNAL MEDICINE

## 2020-10-06 PROCEDURE — 99999 PR PBB SHADOW E&M-EST. PATIENT-LVL IV: CPT | Mod: PBBFAC,,, | Performed by: INTERNAL MEDICINE

## 2020-10-06 PROCEDURE — 1101F PT FALLS ASSESS-DOCD LE1/YR: CPT | Mod: CPTII,S$GLB,, | Performed by: INTERNAL MEDICINE

## 2020-10-06 PROCEDURE — 1126F PR PAIN SEVERITY QUANTIFIED, NO PAIN PRESENT: ICD-10-PCS | Mod: S$GLB,,, | Performed by: INTERNAL MEDICINE

## 2020-10-06 PROCEDURE — 3078F PR MOST RECENT DIASTOLIC BLOOD PRESSURE < 80 MM HG: ICD-10-PCS | Mod: CPTII,S$GLB,, | Performed by: INTERNAL MEDICINE

## 2020-10-06 PROCEDURE — 1126F AMNT PAIN NOTED NONE PRSNT: CPT | Mod: S$GLB,,, | Performed by: INTERNAL MEDICINE

## 2020-10-06 NOTE — PROGRESS NOTES
Subjective:      Patient ID: Ida Santana is a 80 y.o. female.    Chief Complaint:  Osteopenia      History of Present Illness  Ms. Santana presents for evaluation and management of osteopenia. Previous patient of Dr. Dumont with last visit in 1/2017. New patient to me.     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    First diagnosed with osteopenia in 2006.     Current osteopenia regimen:  Takes womens MVI with calcium and vitamin D    Osteopenia medication history:  Fosamax from [id number]    Risk factors for osteoporosis  -Personal history of fracture: none  -Family history of hip fracture or osteoporosis: none  -Premature/Early menopause: age 52, no hx of HRT  -Chronic steroid therapy: none  -History of rheumatoid arthritis: none  -Smoking History: never smoked  -Current EtOH use: rare EtOH  -Fall Risk: good balance, no falls    Risk for Secondary Osteoporosis:  -TSH: normal  -Calcium: normal  -Vitamin D: normal  -Anemia and renal insufficiency: none  -Signs/symptoms of malabsorption: none  -PPI use: none  -Diabetes: hx of IGT, prediabetes  -Chronic liver disease: none  -Hx gastric bypass surgery: none    Height loss:  None    Exercise:  Walks for exercise a few days per week     Pending dental work:  None    Last Bone Density Scan dated 1/2017:  BONE MINERAL DENSITY RESULTS:  Lumbar Spine: Lumbar bone mineral density L1-L4 is 1.057g/cm2, which is a t-score of 0.1. The z-score is 1.8.     Total Hip: The total hip bone mineral density is 0.839g/cm2.  The t-score is -0.8, and the z-score is 0.0.  Femoral neck BMD is 0.672g/cm2 and the t-score is -1.6.     COMPARISONS:  Date                                Location                          BMD                  T-score  10/11/13                         L-spine               1.013     -0.3                                         Total Hip                         0.888     -0.4  IMPRESSION:      Osteopenia of the femoral neck. Total  hip BMD declined 6% since 2013 and 12% since 2009. Lumbar spine BMD increased 4%. FRAX calculation does not support treatment for osteoporosis      Review of Systems   Constitutional: Negative for unexpected weight change.   HENT: Negative for voice change.    Eyes: Negative for visual disturbance.   Respiratory: Negative for shortness of breath.    Cardiovascular: Negative for chest pain.   Gastrointestinal: Negative for abdominal pain.   Endocrine: Negative for cold intolerance.   Genitourinary: Negative for frequency.   Musculoskeletal: Negative for myalgias.   Skin: Negative for rash.       Objective:   Physical Exam  Vitals signs reviewed.   Constitutional:       Appearance: She is well-developed.   HENT:      Head: Normocephalic and atraumatic.      Right Ear: External ear normal.      Left Ear: External ear normal.      Nose: Nose normal.   Neck:      Thyroid: No thyromegaly.      Trachea: No tracheal deviation.   Cardiovascular:      Rate and Rhythm: Normal rate and regular rhythm.      Heart sounds: Normal heart sounds.      Comments: No edema  Pulmonary:      Effort: Pulmonary effort is normal.      Breath sounds: Normal breath sounds.   Abdominal:      General: Bowel sounds are normal.      Palpations: Abdomen is soft.      Tenderness: There is no abdominal tenderness.   Musculoskeletal:      Comments: Normal gait, no cyanosis or clubbing   Skin:     General: Skin is warm and dry.      Findings: No rash.      Comments: No nodules, no ulcers   Neurological:      Mental Status: She is alert and oriented to person, place, and time.      Deep Tendon Reflexes: Reflexes are normal and symmetric.      Comments: Vibration sense intact   Psychiatric:         Judgment: Judgment normal.       BP Readings from Last 3 Encounters:   10/06/20 128/78   10/05/20 122/80   09/14/20 (!) 160/88     Wt Readings from Last 1 Encounters:   10/06/20 1027 74.1 kg (163 lb 5.8 oz)       Body mass index is 25.59 kg/m².    Lab Review:    Lab Results   Component Value Date    HGBA1C 6.0 01/06/2017     Lab Results   Component Value Date    CHOL 154 10/05/2020    HDL 41 10/05/2020    LDLCALC 99.4 10/05/2020    TRIG 68 10/05/2020    CHOLHDL 26.6 10/05/2020     Lab Results   Component Value Date     10/05/2020    K 3.8 10/05/2020     10/05/2020    CO2 27 10/05/2020     (H) 10/05/2020    BUN 13 10/05/2020    CREATININE 0.8 10/05/2020    CALCIUM 9.0 10/05/2020    PROT 7.0 10/05/2020    ALBUMIN 3.8 10/05/2020    BILITOT 0.9 10/05/2020    ALKPHOS 76 10/05/2020    AST 16 10/05/2020    ALT 15 10/05/2020    ANIONGAP 7 (L) 10/05/2020    ESTGFRAFRICA >60.0 10/05/2020    EGFRNONAA >60.0 10/05/2020    TSH 1.760 03/14/2017         Assessment and Plan     Osteopenia  --Patient with osteopenia  --No hx of fragility fracture  --Was on Fosamax for 5 years from 2006 to 2011  --Previously followed by Dr. Dumont  --Last BMD in 2017 showed some decline, but bone density remained in osteopenia range and FRAX did not indicate a need to treat  --Due for repeat BMD now  --Gets calcium and vitamin D from multivitamin  --Walks for exercise  --Discussed indications to restart Fosamax (osteoporosis or FRAX recommending therapy) pending repeat BMD    Hypertension  --Bp at goal on current regimen      Bone density scheduled, RTC in one year      Magdi Leyva M.D. Staff Endocrinology

## 2020-10-06 NOTE — ASSESSMENT & PLAN NOTE
--Patient with osteopenia  --No hx of fragility fracture  --Was on Fosamax for 5 years from 2006 to 2011  --Previously followed by Dr. Dumont  --Last BMD in 2017 showed some decline, but bone density remained in osteopenia range and FRAX did not indicate a need to treat  --Due for repeat BMD now  --Gets calcium and vitamin D from multivitamin  --Walks for exercise  --Discussed indications to restart Fosamax (osteoporosis or FRAX recommending therapy) pending repeat BMD

## 2020-10-06 NOTE — LETTER
October 6, 2020      Giovanna Murray MD  [address]  [facility] [address]           [facility] 6th [address]  [address]  [facility] [address]  Phone: [phone]  Fax: [phone]          Patient: Ida Santana   MR Number: [id number]   YOB: 1940   Date of Visit: 10/6/2020       Dear Dr. Giovanna Murray:    Thank you for referring Ida Santana to me for evaluation. Attached you will find relevant portions of my assessment and plan of care.    If you have questions, please do not hesitate to call me. I look forward to following Ida Santana along with you.    Sincerely,    Magdi Leyva MD    Enclosure  CC:  No Recipients    If you would like to receive this communication electronically, please contact [email or web address] or [phone] to request more information on [employer] Link access.    For providers and/or their staff who would like to refer a patient to [facility], please contact us through our one-stop-shop provider referral line, [facility], at [phone].    If you feel you have received this communication in error or would no longer like to receive these types of communications, please e-mail [email or web address]

## 2020-10-11 ENCOUNTER — TELEPHONE (OUTPATIENT)
Dept: INTERNAL MEDICINE | Facility: CLINIC | Age: 80
End: 2020-10-11

## 2020-10-19 ENCOUNTER — HOSPITAL ENCOUNTER (OUTPATIENT)
Dept: RADIOLOGY | Facility: CLINIC | Age: 80
Discharge: HOME | End: 2020-10-19
Attending: INTERNAL MEDICINE
Payer: COMMERCIAL

## 2020-10-19 DIAGNOSIS — M81.0 POSTMENOPAUSAL BONE LOSS: ICD-10-CM

## 2020-10-19 PROCEDURE — 77080 DXA BONE DENSITY AXIAL: CPT | Mod: TC

## 2020-10-19 PROCEDURE — 77080 DXA BONE DENSITY AXIAL: CPT | Mod: 26,,, | Performed by: INTERNAL MEDICINE

## 2020-10-19 PROCEDURE — 77080 DEXA BONE DENSITY SPINE HIP: ICD-10-PCS | Mod: 26,,, | Performed by: INTERNAL MEDICINE

## 2020-10-29 NOTE — TELEPHONE ENCOUNTER
LVM for patient to return call to [facility] an appt.   [Consultation] : a consultation visit [FreeTextEntry1] : Referred by Dr. Lisker

## 2020-11-02 ENCOUNTER — TELEPHONE (OUTPATIENT)
Dept: OBSTETRICS AND GYNECOLOGY | Facility: CLINIC | Age: 80
End: 2020-11-02

## 2020-11-02 ENCOUNTER — TELEPHONE (OUTPATIENT)
Dept: INTERNAL MEDICINE | Facility: CLINIC | Age: 80
End: 2020-11-02

## 2020-11-02 NOTE — TELEPHONE ENCOUNTER
----- Message from Bebe Son sent at 11/2/2020 12:59 PM CST -----  Regarding: appointment  Name of Who is Calling: OSIRIS SORIANO [[id number]] What is the request in detail: Patient request a call back to scheduled an appointment sooner than December. Can the clinic reply by [facility]: No What Number to Call Back if not in [facility]: [phone]

## 2020-11-03 ENCOUNTER — OFFICE VISIT (OUTPATIENT)
Dept: OBSTETRICS AND GYNECOLOGY | Facility: CLINIC | Age: 80
End: 2020-11-03
Payer: COMMERCIAL

## 2020-11-03 VITALS
WEIGHT: 162.25 LBS | DIASTOLIC BLOOD PRESSURE: 68 MMHG | BODY MASS INDEX: 25.47 KG/M2 | SYSTOLIC BLOOD PRESSURE: 122 MMHG | HEIGHT: 67 IN

## 2020-11-03 DIAGNOSIS — Z01.419 ENCOUNTER FOR GYNECOLOGICAL EXAMINATION WITHOUT ABNORMAL FINDING: Primary | ICD-10-CM

## 2020-11-03 DIAGNOSIS — Z78.0 POSTMENOPAUSAL: ICD-10-CM

## 2020-11-03 PROCEDURE — 99397 PER PM REEVAL EST PAT 65+ YR: CPT | Mod: S$GLB,,, | Performed by: OBSTETRICS & GYNECOLOGY

## 2020-11-03 PROCEDURE — 99999 PR PBB SHADOW E&M-EST. PATIENT-LVL III: CPT | Mod: PBBFAC,,, | Performed by: OBSTETRICS & GYNECOLOGY

## 2020-11-03 PROCEDURE — 99397 PR PREVENTIVE VISIT,EST,65 & OVER: ICD-10-PCS | Mod: S$GLB,,, | Performed by: OBSTETRICS & GYNECOLOGY

## 2020-11-03 PROCEDURE — 99999 PR PBB SHADOW E&M-EST. PATIENT-LVL III: ICD-10-PCS | Mod: PBBFAC,,, | Performed by: OBSTETRICS & GYNECOLOGY

## 2020-11-05 ENCOUNTER — TELEPHONE (OUTPATIENT)
Dept: ENDOCRINOLOGY | Facility: CLINIC | Age: 80
End: 2020-11-05

## 2020-11-05 NOTE — TELEPHONE ENCOUNTER
Please advise patient that I reviewed her bone density scan. It showed osteopenia that is relatively stable compared to the prior scan. She does not need medication for this presently. I recommend she have a repeat bone density in 2 years.

## 2020-12-07 ENCOUNTER — HOSPITAL ENCOUNTER (OUTPATIENT)
Dept: RADIOLOGY | Facility: HOSPITAL | Age: 80
Discharge: HOME | End: 2020-12-07
Attending: INTERNAL MEDICINE
Payer: COMMERCIAL

## 2020-12-07 DIAGNOSIS — Z85.3 HISTORY OF BREAST CANCER IN FEMALE: ICD-10-CM

## 2020-12-07 PROCEDURE — 77062 BREAST TOMOSYNTHESIS BI: CPT | Mod: 26,,, | Performed by: RADIOLOGY

## 2020-12-07 PROCEDURE — 77066 MAMMO DIGITAL DIAGNOSTIC BILAT WITH TOMO: ICD-10-PCS | Mod: 26,,, | Performed by: RADIOLOGY

## 2020-12-07 PROCEDURE — 77062 MAMMO DIGITAL DIAGNOSTIC BILAT WITH TOMO: ICD-10-PCS | Mod: 26,,, | Performed by: RADIOLOGY

## 2020-12-07 PROCEDURE — 77066 DX MAMMO INCL CAD BI: CPT | Mod: TC

## 2020-12-07 PROCEDURE — 77066 DX MAMMO INCL CAD BI: CPT | Mod: 26,,, | Performed by: RADIOLOGY

## 2020-12-14 ENCOUNTER — OFFICE VISIT (OUTPATIENT)
Dept: OPHTHALMOLOGY | Facility: CLINIC | Age: 80
End: 2020-12-14
Payer: COMMERCIAL

## 2020-12-14 ENCOUNTER — CLINICAL SUPPORT (OUTPATIENT)
Dept: OPHTHALMOLOGY | Facility: CLINIC | Age: 80
End: 2020-12-14
Payer: COMMERCIAL

## 2020-12-14 DIAGNOSIS — Q07.8 ANOMALOUS OPTIC NERVE: ICD-10-CM

## 2020-12-14 DIAGNOSIS — Z96.1 PSEUDOPHAKIA: ICD-10-CM

## 2020-12-14 DIAGNOSIS — H25.11 NUCLEAR SCLEROSIS OF RIGHT EYE: ICD-10-CM

## 2020-12-14 DIAGNOSIS — H26.499 ANTERIOR CAPSULAR OPACIFICATION FOLLOWING EXTRACTION OF CATARACT: ICD-10-CM

## 2020-12-14 DIAGNOSIS — H44.002 ENDOPHTHALMITIS, LEFT EYE: ICD-10-CM

## 2020-12-14 DIAGNOSIS — H33.002 RHEGMATOGENOUS RETINAL DETACHMENT OF LEFT EYE: ICD-10-CM

## 2020-12-14 DIAGNOSIS — H40.1131 PRIMARY OPEN ANGLE GLAUCOMA OF BOTH EYES, MILD STAGE: Primary | ICD-10-CM

## 2020-12-14 DIAGNOSIS — M35.01 KCS (KERATOCONJUNCTIVITIS SICCA): ICD-10-CM

## 2020-12-14 PROCEDURE — 92250 FUNDUS PHOTOGRAPHY W/I&R: CPT | Mod: S$GLB,,, | Performed by: OPHTHALMOLOGY

## 2020-12-14 PROCEDURE — 92250 COLOR FUNDUS PHOTOGRAPHY - OU - BOTH EYES: ICD-10-PCS | Mod: S$GLB,,, | Performed by: OPHTHALMOLOGY

## 2020-12-14 PROCEDURE — 1101F PT FALLS ASSESS-DOCD LE1/YR: CPT | Mod: CPTII,S$GLB,, | Performed by: OPHTHALMOLOGY

## 2020-12-14 PROCEDURE — 99999 PR PBB SHADOW E&M-EST. PATIENT-LVL III: ICD-10-PCS | Mod: PBBFAC,,, | Performed by: OPHTHALMOLOGY

## 2020-12-14 PROCEDURE — 92083 HUMPHREY VISUAL FIELD - OU - BOTH EYES: ICD-10-PCS | Mod: S$GLB,,, | Performed by: OPHTHALMOLOGY

## 2020-12-14 PROCEDURE — 1126F AMNT PAIN NOTED NONE PRSNT: CPT | Mod: S$GLB,,, | Performed by: OPHTHALMOLOGY

## 2020-12-14 PROCEDURE — 92014 COMPRE OPH EXAM EST PT 1/>: CPT | Mod: S$GLB,,, | Performed by: OPHTHALMOLOGY

## 2020-12-14 PROCEDURE — 1101F PR PT FALLS ASSESS DOC 0-1 FALLS W/OUT INJ PAST YR: ICD-10-PCS | Mod: CPTII,S$GLB,, | Performed by: OPHTHALMOLOGY

## 2020-12-14 PROCEDURE — 1126F PR PAIN SEVERITY QUANTIFIED, NO PAIN PRESENT: ICD-10-PCS | Mod: S$GLB,,, | Performed by: OPHTHALMOLOGY

## 2020-12-14 PROCEDURE — 3288F PR FALLS RISK ASSESSMENT DOCUMENTED: ICD-10-PCS | Mod: CPTII,S$GLB,, | Performed by: OPHTHALMOLOGY

## 2020-12-14 PROCEDURE — 92014 PR EYE EXAM, EST PATIENT,COMPREHESV: ICD-10-PCS | Mod: S$GLB,,, | Performed by: OPHTHALMOLOGY

## 2020-12-14 PROCEDURE — 99999 PR PBB SHADOW E&M-EST. PATIENT-LVL III: CPT | Mod: PBBFAC,,, | Performed by: OPHTHALMOLOGY

## 2020-12-14 PROCEDURE — 3288F FALL RISK ASSESSMENT DOCD: CPT | Mod: CPTII,S$GLB,, | Performed by: OPHTHALMOLOGY

## 2020-12-14 PROCEDURE — 92083 EXTENDED VISUAL FIELD XM: CPT | Mod: S$GLB,,, | Performed by: OPHTHALMOLOGY

## 2020-12-14 RX ORDER — DORZOLAMIDE HYDROCHLORIDE AND TIMOLOL MALEATE 20; 5 MG/ML; MG/ML
1 SOLUTION/ DROPS OPHTHALMIC 2 TIMES DAILY
Qty: 3 BOTTLE | Refills: 3 | Status: SHIPPED | OUTPATIENT
Start: 2020-12-14 | End: 2021-10-05 | Stop reason: SDUPTHER

## 2020-12-14 NOTE — PROGRESS NOTES
HVF done/rel/fix/coop. Good ou/chart checked for latex allergy, pirate patch was used for HVF testing. Rx used: -.75 + 1.00 x 35/od -.75 + 1.75 x 140/os-smh

## 2020-12-14 NOTE — PROGRESS NOTES
HPI     DLS: 8/13/20    Pt here for HVF review;    Meds;   Cosopt BID OU   Thera Tears QDAY OU     1. POAG   2. 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 SHANTE   4. NS OD   5. PCIOL OS   6. Hx Endophthalmitis OS   7. Rhegmatogenous RD OS     Last edited by Windy Mosqueda on 12/14/2020  9:09 AM. (History)            Assessment /Plan     For exam results, see Encounter Report.    Primary open angle glaucoma of both eyes, mild stage  -     Jenkins Visual Field - OU - Extended - Both Eyes  -     Color Fundus Photography - OU - Both Eyes    Rhegmatogenous retinal detachment of left eye    Nuclear sclerosis of right eye    Anomalous optic nerve    KCS (keratoconjunctivitis sicca)    Pseudophakia    Endophthalmitis, left eye    Anterior capsular opacification following extraction of cataract                 Glaucoma (type and duration)    Suspect 2/2 lrg cups ou    First HVF   2000   First photos   2013   Treatment / Drops started   xal 1/2016           Family history    none        Glaucoma meds   Cosopt ou bid // ((Latanoprost started 1/4/2016  - Stopped 3/22/2019  Od))          H/O adverse rxn to glaucoma drops    none        LASERS    none        GLAUCOMA SURGERIES    none        OTHER EYE SURGERIES    PC IOL os 8/2/2019 (done  W/ Lane - SO removal)    TR repair w/ PPVx and yas oil - os - 2/2 endogen endopth os - 3/15/2017 - Lane        CDR    0.75-0.8 / 0.7-0.75        Tbase    18-22 / 19-22          Tmax    22/30             Ttarget    ? 17/17            HVF    8 test 2000 to  2020 - near full od // gen depression        Gonio    +2-3 ou w/ yas oil drop sup os         CCT    510/517        OCT    4 test 2013 to 2019 - RNFL - nl od // nl os         HRT    5 test 2016 to 2020 -MR -MR -  Dec. IT od // nl os /// CDR 0.69 od // 0.50 os  (large disc size )         Disc photos    2013, 2015 - OIS     - Ttoday   14/13  - Test done today  IOP // HVF / DFE / photos       2. Anomaolous Optic nerves    - very large disc size ou    - HRT - 2.97 od //  3.04 os (nl is up to 2.8 ou)     3. Dry eyes     4. NS - OD   Becoming vis sign // consider removal prn     5. H/O endogenous endophthalmitis  OS  - And secondary tract RD - S/P PPVx and Silicone Oil (Mazzulla)    Oil removed at the same time as the phaco/IOL done 8/2/2017     6. S/P complex phaco/IOL os with trypan blue and Leslye ring and SO removal     - (done with mazzulla ) // 8/2/2017// PCB00 19.5   - Doing well - consider yag to ant capsule phimosis / opacification       Cosopt bid  OU -   Latanoprost - STOP/ OFF  (3/22/2019)      Happy with new glasses (3/22/2019)     F/U 4  months -  HRT     Hold off on yag cap to ant capsule phimosis and PCO OS  - [facility] doing OK     I have seen and personally examined the patient.  I agree with the findings, assessment and plan of the resident and/or fellow.     Lynda Crawford MD

## 2021-02-10 ENCOUNTER — IMMUNIZATION (OUTPATIENT)
Dept: PRIMARY CARE CLINIC | Facility: CLINIC | Age: 81
End: 2021-02-10
Payer: COMMERCIAL

## 2021-02-10 DIAGNOSIS — Z23 NEED FOR VACCINATION: Primary | ICD-10-CM

## 2021-02-10 PROCEDURE — 0001A PR IMMUNIZ ADMIN, SARS-COV-2 COVID-19 VACC, 30MCG/0.3ML, 1ST DOSE: CPT | Mod: PBBFAC | Performed by: INTERNAL MEDICINE

## 2021-02-10 PROCEDURE — 91300 PR SARS-COV- 2 COVID-19 VACCINE, NO PRSV, 30MCG/0.3ML, IM: CPT | Mod: PBBFAC | Performed by: INTERNAL MEDICINE

## 2021-02-10 RX ADMIN — RNA INGREDIENT BNT-162B2 0.3 ML: 0.23 INJECTION, SUSPENSION INTRAMUSCULAR at 11:02

## 2021-03-03 ENCOUNTER — IMMUNIZATION (OUTPATIENT)
Dept: PRIMARY CARE CLINIC | Facility: CLINIC | Age: 81
End: 2021-03-03
Payer: COMMERCIAL

## 2021-03-03 DIAGNOSIS — Z23 NEED FOR VACCINATION: Primary | ICD-10-CM

## 2021-03-03 PROCEDURE — 0002A PR IMMUNIZ ADMIN, SARS-COV-2 COVID-19 VACC, 30MCG/0.3ML, 2ND DOSE: CPT | Mod: CV19,S$GLB,, | Performed by: INTERNAL MEDICINE

## 2021-03-03 PROCEDURE — 0002A PR IMMUNIZ ADMIN, SARS-COV-2 COVID-19 VACC, 30MCG/0.3ML, 2ND DOSE: ICD-10-PCS | Mod: CV19,S$GLB,, | Performed by: INTERNAL MEDICINE

## 2021-03-03 PROCEDURE — 91300 PR SARS-COV- 2 COVID-19 VACCINE, NO PRSV, 30MCG/0.3ML, IM: ICD-10-PCS | Mod: S$GLB,,, | Performed by: INTERNAL MEDICINE

## 2021-03-03 PROCEDURE — 91300 PR SARS-COV- 2 COVID-19 VACCINE, NO PRSV, 30MCG/0.3ML, IM: CPT | Mod: S$GLB,,, | Performed by: INTERNAL MEDICINE

## 2021-03-03 RX ADMIN — Medication 0.3 ML: at 12:03

## 2021-04-05 ENCOUNTER — TELEPHONE (OUTPATIENT)
Dept: INTERNAL MEDICINE | Facility: CLINIC | Age: 81
End: 2021-04-05

## 2021-04-05 ENCOUNTER — LAB VISIT (OUTPATIENT)
Dept: LAB | Facility: HOSPITAL | Age: 81
End: 2021-04-05
Attending: INTERNAL MEDICINE
Payer: COMMERCIAL

## 2021-04-05 ENCOUNTER — OFFICE VISIT (OUTPATIENT)
Dept: INTERNAL MEDICINE | Facility: CLINIC | Age: 81
End: 2021-04-05
Payer: COMMERCIAL

## 2021-04-05 VITALS
WEIGHT: 160.69 LBS | BODY MASS INDEX: 24.35 KG/M2 | HEART RATE: 96 BPM | OXYGEN SATURATION: 99 % | DIASTOLIC BLOOD PRESSURE: 72 MMHG | HEIGHT: 68 IN | SYSTOLIC BLOOD PRESSURE: 126 MMHG

## 2021-04-05 DIAGNOSIS — E78.5 HYPERLIPIDEMIA, UNSPECIFIED HYPERLIPIDEMIA TYPE: ICD-10-CM

## 2021-04-05 DIAGNOSIS — I10 ESSENTIAL HYPERTENSION: Primary | ICD-10-CM

## 2021-04-05 DIAGNOSIS — I10 ESSENTIAL HYPERTENSION: ICD-10-CM

## 2021-04-05 DIAGNOSIS — Z85.3 HISTORY OF BREAST CANCER IN FEMALE: ICD-10-CM

## 2021-04-05 DIAGNOSIS — K86.2 PANCREAS CYST: ICD-10-CM

## 2021-04-05 LAB
ALBUMIN SERPL BCP-MCNC: 3.9 G/DL (ref 3.5–5.2)
ALP SERPL-CCNC: 78 U/L (ref 55–135)
ALT SERPL W/O P-5'-P-CCNC: 14 U/L (ref 10–44)
ANION GAP SERPL CALC-SCNC: 9 MMOL/L (ref 8–16)
AST SERPL-CCNC: 18 U/L (ref 10–40)
BASOPHILS # BLD AUTO: 0.04 K/UL (ref 0–0.2)
BASOPHILS NFR BLD: 0.8 % (ref 0–1.9)
BILIRUB SERPL-MCNC: 0.8 MG/DL (ref 0.1–1)
BUN SERPL-MCNC: 15 MG/DL (ref 8–23)
CALCIUM SERPL-MCNC: 9.3 MG/DL (ref 8.7–10.5)
CHLORIDE SERPL-SCNC: 106 MMOL/L (ref 95–110)
CHOLEST SERPL-MCNC: 162 MG/DL (ref 120–199)
CHOLEST/HDLC SERPL: 3.9 {RATIO} (ref 2–5)
CO2 SERPL-SCNC: 25 MMOL/L (ref 23–29)
CREAT SERPL-MCNC: 0.8 MG/DL (ref 0.5–1.4)
DIFFERENTIAL METHOD: ABNORMAL
EOSINOPHIL # BLD AUTO: 0.3 K/UL (ref 0–0.5)
EOSINOPHIL NFR BLD: 4.9 % (ref 0–8)
ERYTHROCYTE [DISTWIDTH] IN BLOOD BY AUTOMATED COUNT: 12.8 % (ref 11.5–14.5)
EST. GFR  (AFRICAN AMERICAN): >60 ML/MIN/1.73 M^2
EST. GFR  (NON AFRICAN AMERICAN): >60 ML/MIN/1.73 M^2
GLUCOSE SERPL-MCNC: 108 MG/DL (ref 70–110)
HCT VFR BLD AUTO: 41.4 % (ref 37–48.5)
HDLC SERPL-MCNC: 42 MG/DL (ref 40–75)
HDLC SERPL: 25.9 % (ref 20–50)
HGB BLD-MCNC: 13.9 G/DL (ref 12–16)
IMM GRANULOCYTES # BLD AUTO: 0.01 K/UL (ref 0–0.04)
IMM GRANULOCYTES NFR BLD AUTO: 0.2 % (ref 0–0.5)
LDLC SERPL CALC-MCNC: 98.2 MG/DL (ref 63–159)
LYMPHOCYTES # BLD AUTO: 2 K/UL (ref 1–4.8)
LYMPHOCYTES NFR BLD: 39.3 % (ref 18–48)
MCH RBC QN AUTO: 31.9 PG (ref 27–31)
MCHC RBC AUTO-ENTMCNC: 33.6 G/DL (ref 32–36)
MCV RBC AUTO: 95 FL (ref 82–98)
MONOCYTES # BLD AUTO: 0.4 K/UL (ref 0.3–1)
MONOCYTES NFR BLD: 7.8 % (ref 4–15)
NEUTROPHILS # BLD AUTO: 2.4 K/UL (ref 1.8–7.7)
NEUTROPHILS NFR BLD: 47 % (ref 38–73)
NONHDLC SERPL-MCNC: 120 MG/DL
NRBC BLD-RTO: 0 /100 WBC
PLATELET # BLD AUTO: 206 K/UL (ref 150–450)
PMV BLD AUTO: 11.5 FL (ref 9.2–12.9)
POTASSIUM SERPL-SCNC: 4.1 MMOL/L (ref 3.5–5.1)
PROT SERPL-MCNC: 7.3 G/DL (ref 6–8.4)
RBC # BLD AUTO: 4.36 M/UL (ref 4–5.4)
SODIUM SERPL-SCNC: 140 MMOL/L (ref 136–145)
TRIGL SERPL-MCNC: 109 MG/DL (ref 30–150)
WBC # BLD AUTO: 5.11 K/UL (ref 3.9–12.7)

## 2021-04-05 PROCEDURE — 1159F MED LIST DOCD IN RCRD: CPT | Mod: S$GLB,,, | Performed by: INTERNAL MEDICINE

## 2021-04-05 PROCEDURE — 99999 PR PBB SHADOW E&M-EST. PATIENT-LVL IV: CPT | Mod: PBBFAC,,, | Performed by: INTERNAL MEDICINE

## 2021-04-05 PROCEDURE — 99214 OFFICE O/P EST MOD 30 MIN: CPT | Mod: S$GLB,,, | Performed by: INTERNAL MEDICINE

## 2021-04-05 PROCEDURE — 3288F PR FALLS RISK ASSESSMENT DOCUMENTED: ICD-10-PCS | Mod: CPTII,S$GLB,, | Performed by: INTERNAL MEDICINE

## 2021-04-05 PROCEDURE — 80061 LIPID PANEL: CPT | Performed by: INTERNAL MEDICINE

## 2021-04-05 PROCEDURE — 1101F PT FALLS ASSESS-DOCD LE1/YR: CPT | Mod: CPTII,S$GLB,, | Performed by: INTERNAL MEDICINE

## 2021-04-05 PROCEDURE — 99999 PR PBB SHADOW E&M-EST. PATIENT-LVL IV: ICD-10-PCS | Mod: PBBFAC,,, | Performed by: INTERNAL MEDICINE

## 2021-04-05 PROCEDURE — 3074F PR MOST RECENT SYSTOLIC BLOOD PRESSURE < 130 MM HG: ICD-10-PCS | Mod: CPTII,S$GLB,, | Performed by: INTERNAL MEDICINE

## 2021-04-05 PROCEDURE — 3078F DIAST BP <80 MM HG: CPT | Mod: CPTII,S$GLB,, | Performed by: INTERNAL MEDICINE

## 2021-04-05 PROCEDURE — 80053 COMPREHEN METABOLIC PANEL: CPT | Performed by: INTERNAL MEDICINE

## 2021-04-05 PROCEDURE — 1101F PR PT FALLS ASSESS DOC 0-1 FALLS W/OUT INJ PAST YR: ICD-10-PCS | Mod: CPTII,S$GLB,, | Performed by: INTERNAL MEDICINE

## 2021-04-05 PROCEDURE — 1126F PR PAIN SEVERITY QUANTIFIED, NO PAIN PRESENT: ICD-10-PCS | Mod: S$GLB,,, | Performed by: INTERNAL MEDICINE

## 2021-04-05 PROCEDURE — 3078F PR MOST RECENT DIASTOLIC BLOOD PRESSURE < 80 MM HG: ICD-10-PCS | Mod: CPTII,S$GLB,, | Performed by: INTERNAL MEDICINE

## 2021-04-05 PROCEDURE — 1126F AMNT PAIN NOTED NONE PRSNT: CPT | Mod: S$GLB,,, | Performed by: INTERNAL MEDICINE

## 2021-04-05 PROCEDURE — 3288F FALL RISK ASSESSMENT DOCD: CPT | Mod: CPTII,S$GLB,, | Performed by: INTERNAL MEDICINE

## 2021-04-05 PROCEDURE — 3074F SYST BP LT 130 MM HG: CPT | Mod: CPTII,S$GLB,, | Performed by: INTERNAL MEDICINE

## 2021-04-05 PROCEDURE — 99214 PR OFFICE/OUTPT VISIT, EST, LEVL IV, 30-39 MIN: ICD-10-PCS | Mod: S$GLB,,, | Performed by: INTERNAL MEDICINE

## 2021-04-05 PROCEDURE — 85025 COMPLETE CBC W/AUTO DIFF WBC: CPT | Performed by: INTERNAL MEDICINE

## 2021-04-05 PROCEDURE — 1159F PR MEDICATION LIST DOCUMENTED IN MEDICAL RECORD: ICD-10-PCS | Mod: S$GLB,,, | Performed by: INTERNAL MEDICINE

## 2021-04-05 PROCEDURE — 36415 COLL VENOUS BLD VENIPUNCTURE: CPT | Performed by: INTERNAL MEDICINE

## 2021-04-05 RX ORDER — IRBESARTAN 300 MG/1
TABLET ORAL
Qty: 90 TABLET | Refills: 3 | Status: SHIPPED | OUTPATIENT
Start: 2021-04-05 | End: 2021-10-06 | Stop reason: SDUPTHER

## 2021-04-05 RX ORDER — VERAPAMIL HYDROCHLORIDE 240 MG/1
240 CAPSULE, EXTENDED RELEASE ORAL 2 TIMES DAILY
Qty: 180 CAPSULE | Refills: 3 | Status: SHIPPED | OUTPATIENT
Start: 2021-04-05 | End: 2021-10-06 | Stop reason: SDUPTHER

## 2021-04-12 ENCOUNTER — OFFICE VISIT (OUTPATIENT)
Dept: OPHTHALMOLOGY | Facility: CLINIC | Age: 81
End: 2021-04-12
Payer: COMMERCIAL

## 2021-04-12 DIAGNOSIS — H25.11 NUCLEAR SCLEROSIS OF RIGHT EYE: ICD-10-CM

## 2021-04-12 DIAGNOSIS — H44.002 ENDOPHTHALMITIS, LEFT EYE: ICD-10-CM

## 2021-04-12 DIAGNOSIS — H33.002 RHEGMATOGENOUS RETINAL DETACHMENT OF LEFT EYE: ICD-10-CM

## 2021-04-12 DIAGNOSIS — Q07.8 ANOMALOUS OPTIC NERVE: ICD-10-CM

## 2021-04-12 DIAGNOSIS — H40.1131 PRIMARY OPEN ANGLE GLAUCOMA OF BOTH EYES, MILD STAGE: Primary | ICD-10-CM

## 2021-04-12 DIAGNOSIS — M35.01 KCS (KERATOCONJUNCTIVITIS SICCA): ICD-10-CM

## 2021-04-12 DIAGNOSIS — Z96.1 PSEUDOPHAKIA: ICD-10-CM

## 2021-04-12 PROCEDURE — 1126F PR PAIN SEVERITY QUANTIFIED, NO PAIN PRESENT: ICD-10-PCS | Mod: S$GLB,,, | Performed by: OPHTHALMOLOGY

## 2021-04-12 PROCEDURE — 99999 PR PBB SHADOW E&M-EST. PATIENT-LVL III: ICD-10-PCS | Mod: PBBFAC,,, | Performed by: OPHTHALMOLOGY

## 2021-04-12 PROCEDURE — 1101F PT FALLS ASSESS-DOCD LE1/YR: CPT | Mod: CPTII,S$GLB,, | Performed by: OPHTHALMOLOGY

## 2021-04-12 PROCEDURE — 1126F AMNT PAIN NOTED NONE PRSNT: CPT | Mod: S$GLB,,, | Performed by: OPHTHALMOLOGY

## 2021-04-12 PROCEDURE — 1101F PR PT FALLS ASSESS DOC 0-1 FALLS W/OUT INJ PAST YR: ICD-10-PCS | Mod: CPTII,S$GLB,, | Performed by: OPHTHALMOLOGY

## 2021-04-12 PROCEDURE — 99999 PR PBB SHADOW E&M-EST. PATIENT-LVL III: CPT | Mod: PBBFAC,,, | Performed by: OPHTHALMOLOGY

## 2021-04-12 PROCEDURE — 3288F PR FALLS RISK ASSESSMENT DOCUMENTED: ICD-10-PCS | Mod: CPTII,S$GLB,, | Performed by: OPHTHALMOLOGY

## 2021-04-12 PROCEDURE — 92012 PR EYE EXAM, EST PATIENT,INTERMED: ICD-10-PCS | Mod: S$GLB,,, | Performed by: OPHTHALMOLOGY

## 2021-04-12 PROCEDURE — 3288F FALL RISK ASSESSMENT DOCD: CPT | Mod: CPTII,S$GLB,, | Performed by: OPHTHALMOLOGY

## 2021-04-12 PROCEDURE — 92012 INTRM OPH EXAM EST PATIENT: CPT | Mod: S$GLB,,, | Performed by: OPHTHALMOLOGY

## 2021-05-13 ENCOUNTER — TELEPHONE (OUTPATIENT)
Dept: INTERNAL MEDICINE | Facility: CLINIC | Age: 81
End: 2021-05-13

## 2021-06-08 ENCOUNTER — TELEPHONE (OUTPATIENT)
Dept: ENDOCRINOLOGY | Facility: CLINIC | Age: 81
End: 2021-06-08

## 2021-06-26 ENCOUNTER — TELEPHONE (OUTPATIENT)
Dept: ENDOSCOPY | Facility: HOSPITAL | Age: 81
End: 2021-06-26

## 2021-06-26 DIAGNOSIS — K86.2 PANCREATIC CYST: ICD-10-CM

## 2021-08-12 ENCOUNTER — PATIENT OUTREACH (OUTPATIENT)
Dept: ADMINISTRATIVE | Facility: OTHER | Age: 81
End: 2021-08-12

## 2021-08-13 ENCOUNTER — OFFICE VISIT (OUTPATIENT)
Dept: OPHTHALMOLOGY | Facility: CLINIC | Age: 81
End: 2021-08-13
Payer: COMMERCIAL

## 2021-08-13 DIAGNOSIS — M35.01 KCS (KERATOCONJUNCTIVITIS SICCA): ICD-10-CM

## 2021-08-13 DIAGNOSIS — H33.002 RHEGMATOGENOUS RETINAL DETACHMENT OF LEFT EYE: ICD-10-CM

## 2021-08-13 DIAGNOSIS — H25.11 NUCLEAR SCLEROSIS OF RIGHT EYE: ICD-10-CM

## 2021-08-13 DIAGNOSIS — H40.1131 PRIMARY OPEN ANGLE GLAUCOMA OF BOTH EYES, MILD STAGE: Primary | ICD-10-CM

## 2021-08-13 DIAGNOSIS — Q07.8 ANOMALOUS OPTIC NERVE: ICD-10-CM

## 2021-08-13 DIAGNOSIS — Z96.1 PSEUDOPHAKIA: ICD-10-CM

## 2021-08-13 PROCEDURE — 92012 PR EYE EXAM, EST PATIENT,INTERMED: ICD-10-PCS | Mod: S$GLB,,, | Performed by: OPHTHALMOLOGY

## 2021-08-13 PROCEDURE — 1101F PT FALLS ASSESS-DOCD LE1/YR: CPT | Mod: CPTII,S$GLB,, | Performed by: OPHTHALMOLOGY

## 2021-08-13 PROCEDURE — 1101F PR PT FALLS ASSESS DOC 0-1 FALLS W/OUT INJ PAST YR: ICD-10-PCS | Mod: CPTII,S$GLB,, | Performed by: OPHTHALMOLOGY

## 2021-08-13 PROCEDURE — 92012 INTRM OPH EXAM EST PATIENT: CPT | Mod: S$GLB,,, | Performed by: OPHTHALMOLOGY

## 2021-08-13 PROCEDURE — 1126F AMNT PAIN NOTED NONE PRSNT: CPT | Mod: CPTII,S$GLB,, | Performed by: OPHTHALMOLOGY

## 2021-08-13 PROCEDURE — 1159F MED LIST DOCD IN RCRD: CPT | Mod: CPTII,S$GLB,, | Performed by: OPHTHALMOLOGY

## 2021-08-13 PROCEDURE — 92020 GONIOSCOPY: CPT | Mod: S$GLB,,, | Performed by: OPHTHALMOLOGY

## 2021-08-13 PROCEDURE — 3288F FALL RISK ASSESSMENT DOCD: CPT | Mod: CPTII,S$GLB,, | Performed by: OPHTHALMOLOGY

## 2021-08-13 PROCEDURE — 1160F RVW MEDS BY RX/DR IN RCRD: CPT | Mod: CPTII,S$GLB,, | Performed by: OPHTHALMOLOGY

## 2021-08-13 PROCEDURE — 99999 PR PBB SHADOW E&M-EST. PATIENT-LVL III: ICD-10-PCS | Mod: PBBFAC,,, | Performed by: OPHTHALMOLOGY

## 2021-08-13 PROCEDURE — 92020 PR SPECIAL EYE EVAL,GONIOSCOPY: ICD-10-PCS | Mod: S$GLB,,, | Performed by: OPHTHALMOLOGY

## 2021-08-13 PROCEDURE — 1126F PR PAIN SEVERITY QUANTIFIED, NO PAIN PRESENT: ICD-10-PCS | Mod: CPTII,S$GLB,, | Performed by: OPHTHALMOLOGY

## 2021-08-13 PROCEDURE — 1159F PR MEDICATION LIST DOCUMENTED IN MEDICAL RECORD: ICD-10-PCS | Mod: CPTII,S$GLB,, | Performed by: OPHTHALMOLOGY

## 2021-08-13 PROCEDURE — 3288F PR FALLS RISK ASSESSMENT DOCUMENTED: ICD-10-PCS | Mod: CPTII,S$GLB,, | Performed by: OPHTHALMOLOGY

## 2021-08-13 PROCEDURE — 1160F PR REVIEW ALL MEDS BY PRESCRIBER/CLIN PHARMACIST DOCUMENTED: ICD-10-PCS | Mod: CPTII,S$GLB,, | Performed by: OPHTHALMOLOGY

## 2021-08-13 PROCEDURE — 99999 PR PBB SHADOW E&M-EST. PATIENT-LVL III: CPT | Mod: PBBFAC,,, | Performed by: OPHTHALMOLOGY

## 2021-08-16 ENCOUNTER — TELEPHONE (OUTPATIENT)
Dept: INTERNAL MEDICINE | Facility: CLINIC | Age: 81
End: 2021-08-16

## 2021-08-16 DIAGNOSIS — I10 ESSENTIAL HYPERTENSION: Primary | ICD-10-CM

## 2021-08-17 ENCOUNTER — OFFICE VISIT (OUTPATIENT)
Dept: RADIATION ONCOLOGY | Facility: CLINIC | Age: 81
End: 2021-08-17
Payer: COMMERCIAL

## 2021-08-17 VITALS
DIASTOLIC BLOOD PRESSURE: 68 MMHG | TEMPERATURE: 99 F | SYSTOLIC BLOOD PRESSURE: 140 MMHG | OXYGEN SATURATION: 98 % | HEIGHT: 67 IN | RESPIRATION RATE: 16 BRPM | WEIGHT: 158 LBS | BODY MASS INDEX: 24.8 KG/M2 | HEART RATE: 91 BPM

## 2021-08-17 DIAGNOSIS — Z85.3 HISTORY OF BREAST CANCER IN FEMALE: Primary | ICD-10-CM

## 2021-08-17 PROCEDURE — 1126F AMNT PAIN NOTED NONE PRSNT: CPT | Mod: CPTII,S$GLB,, | Performed by: RADIOLOGY

## 2021-08-17 PROCEDURE — 1126F PR PAIN SEVERITY QUANTIFIED, NO PAIN PRESENT: ICD-10-PCS | Mod: CPTII,S$GLB,, | Performed by: RADIOLOGY

## 2021-08-17 PROCEDURE — 1160F RVW MEDS BY RX/DR IN RCRD: CPT | Mod: CPTII,S$GLB,, | Performed by: RADIOLOGY

## 2021-08-17 PROCEDURE — 99213 PR OFFICE/OUTPT VISIT, EST, LEVL III, 20-29 MIN: ICD-10-PCS | Mod: S$GLB,,, | Performed by: RADIOLOGY

## 2021-08-17 PROCEDURE — 1101F PR PT FALLS ASSESS DOC 0-1 FALLS W/OUT INJ PAST YR: ICD-10-PCS | Mod: CPTII,S$GLB,, | Performed by: RADIOLOGY

## 2021-08-17 PROCEDURE — 3078F PR MOST RECENT DIASTOLIC BLOOD PRESSURE < 80 MM HG: ICD-10-PCS | Mod: CPTII,S$GLB,, | Performed by: RADIOLOGY

## 2021-08-17 PROCEDURE — 1160F PR REVIEW ALL MEDS BY PRESCRIBER/CLIN PHARMACIST DOCUMENTED: ICD-10-PCS | Mod: CPTII,S$GLB,, | Performed by: RADIOLOGY

## 2021-08-17 PROCEDURE — 99999 PR PBB SHADOW E&M-EST. PATIENT-LVL IV: CPT | Mod: PBBFAC,,, | Performed by: RADIOLOGY

## 2021-08-17 PROCEDURE — 1159F MED LIST DOCD IN RCRD: CPT | Mod: CPTII,S$GLB,, | Performed by: RADIOLOGY

## 2021-08-17 PROCEDURE — 3077F PR MOST RECENT SYSTOLIC BLOOD PRESSURE >= 140 MM HG: ICD-10-PCS | Mod: CPTII,S$GLB,, | Performed by: RADIOLOGY

## 2021-08-17 PROCEDURE — 3077F SYST BP >= 140 MM HG: CPT | Mod: CPTII,S$GLB,, | Performed by: RADIOLOGY

## 2021-08-17 PROCEDURE — 1159F PR MEDICATION LIST DOCUMENTED IN MEDICAL RECORD: ICD-10-PCS | Mod: CPTII,S$GLB,, | Performed by: RADIOLOGY

## 2021-08-17 PROCEDURE — 99213 OFFICE O/P EST LOW 20 MIN: CPT | Mod: S$GLB,,, | Performed by: RADIOLOGY

## 2021-08-17 PROCEDURE — 3288F PR FALLS RISK ASSESSMENT DOCUMENTED: ICD-10-PCS | Mod: CPTII,S$GLB,, | Performed by: RADIOLOGY

## 2021-08-17 PROCEDURE — 3078F DIAST BP <80 MM HG: CPT | Mod: CPTII,S$GLB,, | Performed by: RADIOLOGY

## 2021-08-17 PROCEDURE — 1101F PT FALLS ASSESS-DOCD LE1/YR: CPT | Mod: CPTII,S$GLB,, | Performed by: RADIOLOGY

## 2021-08-17 PROCEDURE — 3288F FALL RISK ASSESSMENT DOCD: CPT | Mod: CPTII,S$GLB,, | Performed by: RADIOLOGY

## 2021-08-17 PROCEDURE — 99999 PR PBB SHADOW E&M-EST. PATIENT-LVL IV: ICD-10-PCS | Mod: PBBFAC,,, | Performed by: RADIOLOGY

## 2021-10-05 DIAGNOSIS — H40.1131 PRIMARY OPEN ANGLE GLAUCOMA OF BOTH EYES, MILD STAGE: ICD-10-CM

## 2021-10-05 RX ORDER — DORZOLAMIDE HYDROCHLORIDE AND TIMOLOL MALEATE 20; 5 MG/ML; MG/ML
1 SOLUTION/ DROPS OPHTHALMIC 2 TIMES DAILY
Qty: 3 BOTTLE | Refills: 3 | Status: SHIPPED | OUTPATIENT
Start: 2021-10-05 | End: 2022-09-23 | Stop reason: SDUPTHER

## 2021-10-06 ENCOUNTER — LAB VISIT (OUTPATIENT)
Dept: LAB | Facility: HOSPITAL | Age: 81
End: 2021-10-06
Attending: INTERNAL MEDICINE
Payer: COMMERCIAL

## 2021-10-06 ENCOUNTER — TELEPHONE (OUTPATIENT)
Dept: INTERNAL MEDICINE | Facility: CLINIC | Age: 81
End: 2021-10-06

## 2021-10-06 ENCOUNTER — OFFICE VISIT (OUTPATIENT)
Dept: INTERNAL MEDICINE | Facility: CLINIC | Age: 81
End: 2021-10-06
Payer: COMMERCIAL

## 2021-10-06 VITALS
DIASTOLIC BLOOD PRESSURE: 68 MMHG | HEART RATE: 85 BPM | BODY MASS INDEX: 24.22 KG/M2 | HEIGHT: 67 IN | OXYGEN SATURATION: 96 % | SYSTOLIC BLOOD PRESSURE: 126 MMHG | WEIGHT: 154.31 LBS

## 2021-10-06 DIAGNOSIS — I10 ESSENTIAL HYPERTENSION: ICD-10-CM

## 2021-10-06 DIAGNOSIS — E78.5 HYPERLIPIDEMIA, UNSPECIFIED HYPERLIPIDEMIA TYPE: ICD-10-CM

## 2021-10-06 DIAGNOSIS — I10 HYPERTENSION, UNSPECIFIED TYPE: Primary | ICD-10-CM

## 2021-10-06 DIAGNOSIS — Z85.3 HISTORY OF CANCER OF LEFT BREAST: ICD-10-CM

## 2021-10-06 LAB
ALBUMIN SERPL BCP-MCNC: 4.2 G/DL (ref 3.5–5.2)
ALP SERPL-CCNC: 76 U/L (ref 55–135)
ALT SERPL W/O P-5'-P-CCNC: 14 U/L (ref 10–44)
ANION GAP SERPL CALC-SCNC: 12 MMOL/L (ref 8–16)
AST SERPL-CCNC: 17 U/L (ref 10–40)
BASOPHILS # BLD AUTO: 0.05 K/UL (ref 0–0.2)
BASOPHILS NFR BLD: 1 % (ref 0–1.9)
BILIRUB SERPL-MCNC: 1 MG/DL (ref 0.1–1)
BUN SERPL-MCNC: 12 MG/DL (ref 8–23)
CALCIUM SERPL-MCNC: 9.9 MG/DL (ref 8.7–10.5)
CHLORIDE SERPL-SCNC: 106 MMOL/L (ref 95–110)
CHOLEST SERPL-MCNC: 160 MG/DL (ref 120–199)
CHOLEST/HDLC SERPL: 3.7 {RATIO} (ref 2–5)
CO2 SERPL-SCNC: 24 MMOL/L (ref 23–29)
CREAT SERPL-MCNC: 0.8 MG/DL (ref 0.5–1.4)
DIFFERENTIAL METHOD: ABNORMAL
EOSINOPHIL # BLD AUTO: 0.1 K/UL (ref 0–0.5)
EOSINOPHIL NFR BLD: 1.7 % (ref 0–8)
ERYTHROCYTE [DISTWIDTH] IN BLOOD BY AUTOMATED COUNT: 12.9 % (ref 11.5–14.5)
EST. GFR  (AFRICAN AMERICAN): >60 ML/MIN/1.73 M^2
EST. GFR  (NON AFRICAN AMERICAN): >60 ML/MIN/1.73 M^2
GLUCOSE SERPL-MCNC: 97 MG/DL (ref 70–110)
HCT VFR BLD AUTO: 40.7 % (ref 37–48.5)
HDLC SERPL-MCNC: 43 MG/DL (ref 40–75)
HDLC SERPL: 26.9 % (ref 20–50)
HGB BLD-MCNC: 14.3 G/DL (ref 12–16)
IMM GRANULOCYTES # BLD AUTO: 0.01 K/UL (ref 0–0.04)
IMM GRANULOCYTES NFR BLD AUTO: 0.2 % (ref 0–0.5)
LDLC SERPL CALC-MCNC: 96 MG/DL (ref 63–159)
LYMPHOCYTES # BLD AUTO: 1.8 K/UL (ref 1–4.8)
LYMPHOCYTES NFR BLD: 34.2 % (ref 18–48)
MCH RBC QN AUTO: 32.9 PG (ref 27–31)
MCHC RBC AUTO-ENTMCNC: 35.1 G/DL (ref 32–36)
MCV RBC AUTO: 94 FL (ref 82–98)
MONOCYTES # BLD AUTO: 0.4 K/UL (ref 0.3–1)
MONOCYTES NFR BLD: 7.8 % (ref 4–15)
NEUTROPHILS # BLD AUTO: 2.9 K/UL (ref 1.8–7.7)
NEUTROPHILS NFR BLD: 55.1 % (ref 38–73)
NONHDLC SERPL-MCNC: 117 MG/DL
NRBC BLD-RTO: 0 /100 WBC
PLATELET # BLD AUTO: 196 K/UL (ref 150–450)
PMV BLD AUTO: 11.4 FL (ref 9.2–12.9)
POTASSIUM SERPL-SCNC: 3.9 MMOL/L (ref 3.5–5.1)
PROT SERPL-MCNC: 7.5 G/DL (ref 6–8.4)
RBC # BLD AUTO: 4.35 M/UL (ref 4–5.4)
SODIUM SERPL-SCNC: 142 MMOL/L (ref 136–145)
TRIGL SERPL-MCNC: 105 MG/DL (ref 30–150)
WBC # BLD AUTO: 5.23 K/UL (ref 3.9–12.7)

## 2021-10-06 PROCEDURE — 80061 LIPID PANEL: CPT | Performed by: INTERNAL MEDICINE

## 2021-10-06 PROCEDURE — 99214 PR OFFICE/OUTPT VISIT, EST, LEVL IV, 30-39 MIN: ICD-10-PCS | Mod: S$GLB,,, | Performed by: INTERNAL MEDICINE

## 2021-10-06 PROCEDURE — 1126F PR PAIN SEVERITY QUANTIFIED, NO PAIN PRESENT: ICD-10-PCS | Mod: CPTII,S$GLB,, | Performed by: INTERNAL MEDICINE

## 2021-10-06 PROCEDURE — 1159F MED LIST DOCD IN RCRD: CPT | Mod: CPTII,S$GLB,, | Performed by: INTERNAL MEDICINE

## 2021-10-06 PROCEDURE — 3074F SYST BP LT 130 MM HG: CPT | Mod: CPTII,S$GLB,, | Performed by: INTERNAL MEDICINE

## 2021-10-06 PROCEDURE — 99999 PR PBB SHADOW E&M-EST. PATIENT-LVL IV: ICD-10-PCS | Mod: PBBFAC,,, | Performed by: INTERNAL MEDICINE

## 2021-10-06 PROCEDURE — 3074F PR MOST RECENT SYSTOLIC BLOOD PRESSURE < 130 MM HG: ICD-10-PCS | Mod: CPTII,S$GLB,, | Performed by: INTERNAL MEDICINE

## 2021-10-06 PROCEDURE — 1101F PR PT FALLS ASSESS DOC 0-1 FALLS W/OUT INJ PAST YR: ICD-10-PCS | Mod: CPTII,S$GLB,, | Performed by: INTERNAL MEDICINE

## 2021-10-06 PROCEDURE — 99999 PR PBB SHADOW E&M-EST. PATIENT-LVL IV: CPT | Mod: PBBFAC,,, | Performed by: INTERNAL MEDICINE

## 2021-10-06 PROCEDURE — 36415 COLL VENOUS BLD VENIPUNCTURE: CPT | Performed by: INTERNAL MEDICINE

## 2021-10-06 PROCEDURE — 3288F PR FALLS RISK ASSESSMENT DOCUMENTED: ICD-10-PCS | Mod: CPTII,S$GLB,, | Performed by: INTERNAL MEDICINE

## 2021-10-06 PROCEDURE — 1159F PR MEDICATION LIST DOCUMENTED IN MEDICAL RECORD: ICD-10-PCS | Mod: CPTII,S$GLB,, | Performed by: INTERNAL MEDICINE

## 2021-10-06 PROCEDURE — 1101F PT FALLS ASSESS-DOCD LE1/YR: CPT | Mod: CPTII,S$GLB,, | Performed by: INTERNAL MEDICINE

## 2021-10-06 PROCEDURE — 85025 COMPLETE CBC W/AUTO DIFF WBC: CPT | Performed by: INTERNAL MEDICINE

## 2021-10-06 PROCEDURE — 1126F AMNT PAIN NOTED NONE PRSNT: CPT | Mod: CPTII,S$GLB,, | Performed by: INTERNAL MEDICINE

## 2021-10-06 PROCEDURE — 3078F DIAST BP <80 MM HG: CPT | Mod: CPTII,S$GLB,, | Performed by: INTERNAL MEDICINE

## 2021-10-06 PROCEDURE — 99214 OFFICE O/P EST MOD 30 MIN: CPT | Mod: S$GLB,,, | Performed by: INTERNAL MEDICINE

## 2021-10-06 PROCEDURE — 3078F PR MOST RECENT DIASTOLIC BLOOD PRESSURE < 80 MM HG: ICD-10-PCS | Mod: CPTII,S$GLB,, | Performed by: INTERNAL MEDICINE

## 2021-10-06 PROCEDURE — 80053 COMPREHEN METABOLIC PANEL: CPT | Performed by: INTERNAL MEDICINE

## 2021-10-06 PROCEDURE — 3288F FALL RISK ASSESSMENT DOCD: CPT | Mod: CPTII,S$GLB,, | Performed by: INTERNAL MEDICINE

## 2021-10-06 RX ORDER — VERAPAMIL HYDROCHLORIDE 240 MG/1
240 CAPSULE, EXTENDED RELEASE ORAL 2 TIMES DAILY
Qty: 180 CAPSULE | Refills: 3 | Status: SHIPPED | OUTPATIENT
Start: 2021-10-06 | End: 2022-04-01 | Stop reason: SDUPTHER

## 2021-10-06 RX ORDER — IRBESARTAN 300 MG/1
TABLET ORAL
Qty: 90 TABLET | Refills: 3 | Status: SHIPPED | OUTPATIENT
Start: 2021-10-06 | End: 2022-04-01 | Stop reason: SDUPTHER

## 2021-10-08 ENCOUNTER — TELEPHONE (OUTPATIENT)
Dept: INTERNAL MEDICINE | Facility: CLINIC | Age: 81
End: 2021-10-08

## 2021-10-12 ENCOUNTER — OFFICE VISIT (OUTPATIENT)
Dept: ENDOCRINOLOGY | Facility: CLINIC | Age: 81
End: 2021-10-12
Payer: COMMERCIAL

## 2021-10-12 VITALS
DIASTOLIC BLOOD PRESSURE: 80 MMHG | BODY MASS INDEX: 24.35 KG/M2 | HEIGHT: 67 IN | SYSTOLIC BLOOD PRESSURE: 128 MMHG | WEIGHT: 155.13 LBS

## 2021-10-12 DIAGNOSIS — I10 HYPERTENSION, UNSPECIFIED TYPE: ICD-10-CM

## 2021-10-12 DIAGNOSIS — M85.80 OSTEOPENIA, UNSPECIFIED LOCATION: Primary | ICD-10-CM

## 2021-10-12 PROCEDURE — 1126F PR PAIN SEVERITY QUANTIFIED, NO PAIN PRESENT: ICD-10-PCS | Mod: CPTII,S$GLB,, | Performed by: INTERNAL MEDICINE

## 2021-10-12 PROCEDURE — 3079F DIAST BP 80-89 MM HG: CPT | Mod: CPTII,S$GLB,, | Performed by: INTERNAL MEDICINE

## 2021-10-12 PROCEDURE — 3074F SYST BP LT 130 MM HG: CPT | Mod: CPTII,S$GLB,, | Performed by: INTERNAL MEDICINE

## 2021-10-12 PROCEDURE — 99999 PR PBB SHADOW E&M-EST. PATIENT-LVL III: CPT | Mod: PBBFAC,,, | Performed by: INTERNAL MEDICINE

## 2021-10-12 PROCEDURE — 1159F PR MEDICATION LIST DOCUMENTED IN MEDICAL RECORD: ICD-10-PCS | Mod: CPTII,S$GLB,, | Performed by: INTERNAL MEDICINE

## 2021-10-12 PROCEDURE — 99213 PR OFFICE/OUTPT VISIT, EST, LEVL III, 20-29 MIN: ICD-10-PCS | Mod: S$GLB,,, | Performed by: INTERNAL MEDICINE

## 2021-10-12 PROCEDURE — 3074F PR MOST RECENT SYSTOLIC BLOOD PRESSURE < 130 MM HG: ICD-10-PCS | Mod: CPTII,S$GLB,, | Performed by: INTERNAL MEDICINE

## 2021-10-12 PROCEDURE — 99213 OFFICE O/P EST LOW 20 MIN: CPT | Mod: S$GLB,,, | Performed by: INTERNAL MEDICINE

## 2021-10-12 PROCEDURE — 1160F PR REVIEW ALL MEDS BY PRESCRIBER/CLIN PHARMACIST DOCUMENTED: ICD-10-PCS | Mod: CPTII,S$GLB,, | Performed by: INTERNAL MEDICINE

## 2021-10-12 PROCEDURE — 99999 PR PBB SHADOW E&M-EST. PATIENT-LVL III: ICD-10-PCS | Mod: PBBFAC,,, | Performed by: INTERNAL MEDICINE

## 2021-10-12 PROCEDURE — 1159F MED LIST DOCD IN RCRD: CPT | Mod: CPTII,S$GLB,, | Performed by: INTERNAL MEDICINE

## 2021-10-12 PROCEDURE — 3079F PR MOST RECENT DIASTOLIC BLOOD PRESSURE 80-89 MM HG: ICD-10-PCS | Mod: CPTII,S$GLB,, | Performed by: INTERNAL MEDICINE

## 2021-10-12 PROCEDURE — 1160F RVW MEDS BY RX/DR IN RCRD: CPT | Mod: CPTII,S$GLB,, | Performed by: INTERNAL MEDICINE

## 2021-10-12 PROCEDURE — 1126F AMNT PAIN NOTED NONE PRSNT: CPT | Mod: CPTII,S$GLB,, | Performed by: INTERNAL MEDICINE

## 2021-10-14 ENCOUNTER — TELEPHONE (OUTPATIENT)
Dept: INTERNAL MEDICINE | Facility: CLINIC | Age: 81
End: 2021-10-14
Payer: COMMERCIAL

## 2021-11-09 ENCOUNTER — IMMUNIZATION (OUTPATIENT)
Dept: INTERNAL MEDICINE | Facility: CLINIC | Age: 81
End: 2021-11-09
Payer: COMMERCIAL

## 2021-11-09 DIAGNOSIS — Z23 NEED FOR VACCINATION: Primary | ICD-10-CM

## 2021-11-09 PROCEDURE — 91300 COVID-19, MRNA, LNP-S, PF, 30 MCG/0.3 ML DOSE VACCINE: CPT | Mod: PBBFAC | Performed by: INTERNAL MEDICINE

## 2021-11-24 ENCOUNTER — TELEPHONE (OUTPATIENT)
Dept: OBSTETRICS AND GYNECOLOGY | Facility: CLINIC | Age: 81
End: 2021-11-24
Payer: COMMERCIAL

## 2021-11-30 ENCOUNTER — PATIENT OUTREACH (OUTPATIENT)
Dept: ADMINISTRATIVE | Facility: OTHER | Age: 81
End: 2021-11-30
Payer: COMMERCIAL

## 2021-12-02 ENCOUNTER — OFFICE VISIT (OUTPATIENT)
Dept: OBSTETRICS AND GYNECOLOGY | Facility: CLINIC | Age: 81
End: 2021-12-02
Payer: COMMERCIAL

## 2021-12-02 VITALS
SYSTOLIC BLOOD PRESSURE: 116 MMHG | DIASTOLIC BLOOD PRESSURE: 62 MMHG | BODY MASS INDEX: 24.26 KG/M2 | HEIGHT: 67 IN | WEIGHT: 154.56 LBS

## 2021-12-02 DIAGNOSIS — Z01.419 ENCOUNTER FOR GYNECOLOGICAL EXAMINATION WITHOUT ABNORMAL FINDING: Primary | ICD-10-CM

## 2021-12-02 DIAGNOSIS — Z78.0 POSTMENOPAUSAL: ICD-10-CM

## 2021-12-02 PROCEDURE — 99999 PR PBB SHADOW E&M-EST. PATIENT-LVL III: CPT | Mod: PBBFAC,,, | Performed by: OBSTETRICS & GYNECOLOGY

## 2021-12-02 PROCEDURE — 99397 PR PREVENTIVE VISIT,EST,65 & OVER: ICD-10-PCS | Mod: S$GLB,,, | Performed by: OBSTETRICS & GYNECOLOGY

## 2021-12-02 PROCEDURE — 99397 PER PM REEVAL EST PAT 65+ YR: CPT | Mod: S$GLB,,, | Performed by: OBSTETRICS & GYNECOLOGY

## 2021-12-02 PROCEDURE — 99999 PR PBB SHADOW E&M-EST. PATIENT-LVL III: ICD-10-PCS | Mod: PBBFAC,,, | Performed by: OBSTETRICS & GYNECOLOGY

## 2021-12-13 ENCOUNTER — HOSPITAL ENCOUNTER (OUTPATIENT)
Dept: RADIOLOGY | Facility: HOSPITAL | Age: 81
Discharge: HOME | End: 2021-12-13
Attending: INTERNAL MEDICINE
Payer: COMMERCIAL

## 2021-12-13 VITALS — WEIGHT: 154 LBS | BODY MASS INDEX: 24.17 KG/M2 | HEIGHT: 67 IN

## 2021-12-13 DIAGNOSIS — Z85.3 HISTORY OF CANCER OF LEFT BREAST: ICD-10-CM

## 2021-12-13 PROCEDURE — 77066 DX MAMMO INCL CAD BI: CPT | Mod: 26,,, | Performed by: RADIOLOGY

## 2021-12-13 PROCEDURE — 77066 MAMMO DIGITAL DIAGNOSTIC BILAT WITH TOMO: ICD-10-PCS | Mod: 26,,, | Performed by: RADIOLOGY

## 2021-12-13 PROCEDURE — 77062 BREAST TOMOSYNTHESIS BI: CPT | Mod: 26,,, | Performed by: RADIOLOGY

## 2021-12-13 PROCEDURE — 77062 MAMMO DIGITAL DIAGNOSTIC BILAT WITH TOMO: ICD-10-PCS | Mod: 26,,, | Performed by: RADIOLOGY

## 2021-12-13 PROCEDURE — 77062 BREAST TOMOSYNTHESIS BI: CPT | Mod: TC

## 2022-01-12 NOTE — PROGRESS NOTES
HPI     DLS: 8/13/2021    Pt here for HVF review/OCT;    Meds;   Cosopt BID OU   AT's PRN OU     1. POAG   2. 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 SHANTE   4. NS OD   5. PCIOL OS   6. Hx Endophthalmitis OS   7. Rhegmatogenous RD OS     Last edited by Windy Mosqueda on 1/18/2022 10:47 AM. (History)              Assessment /Plan     For exam results, see Encounter Report.    Primary open angle glaucoma of both eyes, mild stage    Rhegmatogenous retinal detachment of left eye    Nuclear sclerosis of right eye    Anomalous optic nerve    KCS (keratoconjunctivitis sicca)    Pseudophakia    Endophthalmitis, left eye                 Glaucoma (type and duration)    Suspect 2/2 lrg cups ou    First HVF   2000   First photos   2013   Treatment / Drops started   xal 1/2016           Family history    none        Glaucoma meds   Cosopt ou bid // ((Latanoprost started 1/4/2016  - Stopped 3/22/2019  Od))          H/O adverse rxn to glaucoma drops    none        LASERS    none        GLAUCOMA SURGERIES    none        OTHER EYE SURGERIES    PC IOL os 8/2/2019 (done  W/ [address] - SO removal)    TR repair w/ PPVx and yas oil - os - 2/2 endogen endopth os - 3/15/2017 - Lane        CDR    0.75-0.8 / 0.7-0.75        Tbase    18-22 / 19-22          Tmax    22/30             Ttarget    ? 17/17            HVF    8 test 2000 to  2020 - near full od // gen depression        Gonio    +2-3 ou w/ yas oil drop sup os         CCT    510/517        OCT    4 test 2013 to 2019 - RNFL - nl od // nl os         HRT    5 test 2016 to 2020 -MR -MR -  Dec. IT od // nl os /// CDR 0.69 od // 0.50 os  (large disc size )         Disc photos    2013, 2015 - OIS     - Ttoday   14/16  - Test done today  IOP // 24-2 OU // OCT ONH OU // DFE OU      2. Anomaolous Optic nerves    - very large disc size ou    - HRT - 2.97 od // 3.04 os (nl is up to 2.8 ou)     3. Dry eyes     4. NS - OD   Becoming vis sign // consider removal prn     5. H/O endogenous endophthalmitis  OS  - And secondary  tract RD - S/P PPVx and Silicone Oil (Mazzulla)    Oil removed at the same time as the phaco/IOL done 8/2/2017     6. S/P complex phaco/IOL os with trypan blue and Leslye ring and SO removal     - (done with mazzulla ) // 8/2/2017// PCB00 19.5   - Doing well - consider yag to ant capsule phimosis / opacification       Cosopt bid  OU -   Latanoprost - STOP/ OFF  (3/22/2019) // re-start 1/18/2022 - ?? OCT - RNFL prog os and ? VF prog os      Happy with new glasses (3/22/2019)     F/U 3  months -   IOP check after re-starting latnoprost ou     Hold off on yag cap to ant capsule phimosis and PCO OS  - [facility] doing OK - may be affecting the VF os     Ok to use OTC - zaditor or pataday for allergies - bid prn   Cont AT's prn

## 2022-01-18 ENCOUNTER — OFFICE VISIT (OUTPATIENT)
Dept: OPHTHALMOLOGY | Facility: CLINIC | Age: 82
End: 2022-01-18
Payer: COMMERCIAL

## 2022-01-18 ENCOUNTER — CLINICAL SUPPORT (OUTPATIENT)
Dept: OPHTHALMOLOGY | Facility: CLINIC | Age: 82
End: 2022-01-18
Payer: COMMERCIAL

## 2022-01-18 DIAGNOSIS — Q07.8 ANOMALOUS OPTIC NERVE: ICD-10-CM

## 2022-01-18 DIAGNOSIS — H25.11 NUCLEAR SCLEROSIS OF RIGHT EYE: ICD-10-CM

## 2022-01-18 DIAGNOSIS — H44.002 ENDOPHTHALMITIS, LEFT EYE: ICD-10-CM

## 2022-01-18 DIAGNOSIS — Z96.1 PSEUDOPHAKIA: ICD-10-CM

## 2022-01-18 DIAGNOSIS — H33.002 RHEGMATOGENOUS RETINAL DETACHMENT OF LEFT EYE: ICD-10-CM

## 2022-01-18 DIAGNOSIS — M35.01 KCS (KERATOCONJUNCTIVITIS SICCA): ICD-10-CM

## 2022-01-18 DIAGNOSIS — H40.1131 PRIMARY OPEN ANGLE GLAUCOMA OF BOTH EYES, MILD STAGE: Primary | ICD-10-CM

## 2022-01-18 PROCEDURE — 92083 EXTENDED VISUAL FIELD XM: CPT | Mod: S$GLB,,, | Performed by: OPHTHALMOLOGY

## 2022-01-18 PROCEDURE — 92014 COMPRE OPH EXAM EST PT 1/>: CPT | Mod: S$GLB,,, | Performed by: OPHTHALMOLOGY

## 2022-01-18 PROCEDURE — 1160F PR REVIEW ALL MEDS BY PRESCRIBER/CLIN PHARMACIST DOCUMENTED: ICD-10-PCS | Mod: CPTII,S$GLB,, | Performed by: OPHTHALMOLOGY

## 2022-01-18 PROCEDURE — 92133 POSTERIOR SEGMENT OCT OPTIC NERVE(OCULAR COHERENCE TOMOGRAPHY) - OU - BOTH EYES: ICD-10-PCS | Mod: S$GLB,,, | Performed by: OPHTHALMOLOGY

## 2022-01-18 PROCEDURE — 99999 PR PBB SHADOW E&M-EST. PATIENT-LVL II: CPT | Mod: PBBFAC,,, | Performed by: OPHTHALMOLOGY

## 2022-01-18 PROCEDURE — 99999 PR PBB SHADOW E&M-EST. PATIENT-LVL II: ICD-10-PCS | Mod: PBBFAC,,, | Performed by: OPHTHALMOLOGY

## 2022-01-18 PROCEDURE — 92133 CPTRZD OPH DX IMG PST SGM ON: CPT | Mod: S$GLB,,, | Performed by: OPHTHALMOLOGY

## 2022-01-18 PROCEDURE — 92083 HUMPHREY VISUAL FIELD - OU - BOTH EYES: ICD-10-PCS | Mod: S$GLB,,, | Performed by: OPHTHALMOLOGY

## 2022-01-18 PROCEDURE — 1160F RVW MEDS BY RX/DR IN RCRD: CPT | Mod: CPTII,S$GLB,, | Performed by: OPHTHALMOLOGY

## 2022-01-18 PROCEDURE — 1159F PR MEDICATION LIST DOCUMENTED IN MEDICAL RECORD: ICD-10-PCS | Mod: CPTII,S$GLB,, | Performed by: OPHTHALMOLOGY

## 2022-01-18 PROCEDURE — 92014 PR EYE EXAM, EST PATIENT,COMPREHESV: ICD-10-PCS | Mod: S$GLB,,, | Performed by: OPHTHALMOLOGY

## 2022-01-18 PROCEDURE — 1159F MED LIST DOCD IN RCRD: CPT | Mod: CPTII,S$GLB,, | Performed by: OPHTHALMOLOGY

## 2022-01-18 RX ORDER — LATANOPROST 50 UG/ML
1 SOLUTION/ DROPS OPHTHALMIC NIGHTLY
Qty: 7.5 ML | Refills: 3 | Status: SHIPPED | OUTPATIENT
Start: 2022-01-18 | End: 2023-04-28

## 2022-01-18 NOTE — PROGRESS NOTES
24-2  SS DONE OU     REL & FIX =  GOOD OU      COOP =      GOOD     PATIENT DENIES ANY ALLERGIES TO LATEX OR ADHESIVES AT THIS TIME    JTHOMAS      MRX     -.75 1.00 X 32   OD     -1.00 + 1.75 X 144   OS

## 2022-02-16 ENCOUNTER — OFFICE VISIT (OUTPATIENT)
Dept: INTERNAL MEDICINE | Facility: CLINIC | Age: 82
End: 2022-02-16
Payer: COMMERCIAL

## 2022-02-16 VITALS
BODY MASS INDEX: 24.48 KG/M2 | WEIGHT: 156 LBS | DIASTOLIC BLOOD PRESSURE: 64 MMHG | HEIGHT: 67 IN | HEART RATE: 80 BPM | OXYGEN SATURATION: 97 % | SYSTOLIC BLOOD PRESSURE: 130 MMHG

## 2022-02-16 DIAGNOSIS — I10 HYPERTENSION, UNSPECIFIED TYPE: ICD-10-CM

## 2022-02-16 DIAGNOSIS — Z85.3 HISTORY OF BREAST CANCER IN FEMALE: ICD-10-CM

## 2022-02-16 DIAGNOSIS — I10 ESSENTIAL HYPERTENSION: ICD-10-CM

## 2022-02-16 DIAGNOSIS — Z00.00 ANNUAL PHYSICAL EXAM: Primary | ICD-10-CM

## 2022-02-16 DIAGNOSIS — M85.80 OSTEOPENIA, UNSPECIFIED LOCATION: ICD-10-CM

## 2022-02-16 DIAGNOSIS — K86.2 PANCREAS CYST: ICD-10-CM

## 2022-02-16 PROCEDURE — 3078F DIAST BP <80 MM HG: CPT | Mod: CPTII,S$GLB,, | Performed by: INTERNAL MEDICINE

## 2022-02-16 PROCEDURE — 99999 PR PBB SHADOW E&M-EST. PATIENT-LVL IV: ICD-10-PCS | Mod: PBBFAC,,, | Performed by: INTERNAL MEDICINE

## 2022-02-16 PROCEDURE — 99397 PR PREVENTIVE VISIT,EST,65 & OVER: ICD-10-PCS | Mod: S$GLB,,, | Performed by: INTERNAL MEDICINE

## 2022-02-16 PROCEDURE — 99999 PR PBB SHADOW E&M-EST. PATIENT-LVL IV: CPT | Mod: PBBFAC,,, | Performed by: INTERNAL MEDICINE

## 2022-02-16 PROCEDURE — 1159F MED LIST DOCD IN RCRD: CPT | Mod: CPTII,S$GLB,, | Performed by: INTERNAL MEDICINE

## 2022-02-16 PROCEDURE — 3288F FALL RISK ASSESSMENT DOCD: CPT | Mod: CPTII,S$GLB,, | Performed by: INTERNAL MEDICINE

## 2022-02-16 PROCEDURE — 3078F PR MOST RECENT DIASTOLIC BLOOD PRESSURE < 80 MM HG: ICD-10-PCS | Mod: CPTII,S$GLB,, | Performed by: INTERNAL MEDICINE

## 2022-02-16 PROCEDURE — 1101F PR PT FALLS ASSESS DOC 0-1 FALLS W/OUT INJ PAST YR: ICD-10-PCS | Mod: CPTII,S$GLB,, | Performed by: INTERNAL MEDICINE

## 2022-02-16 PROCEDURE — 1126F PR PAIN SEVERITY QUANTIFIED, NO PAIN PRESENT: ICD-10-PCS | Mod: CPTII,S$GLB,, | Performed by: INTERNAL MEDICINE

## 2022-02-16 PROCEDURE — 99397 PER PM REEVAL EST PAT 65+ YR: CPT | Mod: S$GLB,,, | Performed by: INTERNAL MEDICINE

## 2022-02-16 PROCEDURE — 3288F PR FALLS RISK ASSESSMENT DOCUMENTED: ICD-10-PCS | Mod: CPTII,S$GLB,, | Performed by: INTERNAL MEDICINE

## 2022-02-16 PROCEDURE — 1101F PT FALLS ASSESS-DOCD LE1/YR: CPT | Mod: CPTII,S$GLB,, | Performed by: INTERNAL MEDICINE

## 2022-02-16 PROCEDURE — 1160F PR REVIEW ALL MEDS BY PRESCRIBER/CLIN PHARMACIST DOCUMENTED: ICD-10-PCS | Mod: CPTII,S$GLB,, | Performed by: INTERNAL MEDICINE

## 2022-02-16 PROCEDURE — 3075F SYST BP GE 130 - 139MM HG: CPT | Mod: CPTII,S$GLB,, | Performed by: INTERNAL MEDICINE

## 2022-02-16 PROCEDURE — 3075F PR MOST RECENT SYSTOLIC BLOOD PRESS GE 130-139MM HG: ICD-10-PCS | Mod: CPTII,S$GLB,, | Performed by: INTERNAL MEDICINE

## 2022-02-16 PROCEDURE — 1126F AMNT PAIN NOTED NONE PRSNT: CPT | Mod: CPTII,S$GLB,, | Performed by: INTERNAL MEDICINE

## 2022-02-16 PROCEDURE — 1159F PR MEDICATION LIST DOCUMENTED IN MEDICAL RECORD: ICD-10-PCS | Mod: CPTII,S$GLB,, | Performed by: INTERNAL MEDICINE

## 2022-02-16 PROCEDURE — 1160F RVW MEDS BY RX/DR IN RCRD: CPT | Mod: CPTII,S$GLB,, | Performed by: INTERNAL MEDICINE
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
## 2022-03-02 NOTE — LETTER
February 15, 2018      Porfirio Arellano MD  [address]  [facility] [address]           [facility]  [address]  [facility] [address]  Phone: [phone]          Patient: Ida Santana   MR Number: [id number]   YOB: 1940   Date of Visit: 2/15/2018       Dear Dr. Porfirio Arellano:    Thank you for referring Ida Santana to Dr. Boswell for evaluation. Attached you will find relevant portions of my assessment and plan of care.    If you have questions, please do not hesitate to call me. I look forward to following Ida Santana along with you.    Sincerely,    Lita Slater, NP    Enclosure  CC:  No Recipients    If you would like to receive this communication electronically, please contact [email or web address] or [phone] to request more information on [facility] access.    For providers and/or their staff who would like to refer a patient to [facility], please contact us through our one-stop-shop provider referral line, [facility], at [phone].    If you feel you have received this communication in error or would no longer like to receive these types of communications, please e-mail [email or web address]         
Yes

## 2022-03-23 ENCOUNTER — LAB VISIT (OUTPATIENT)
Dept: LAB | Facility: HOSPITAL | Age: 82
End: 2022-03-23
Attending: INTERNAL MEDICINE
Payer: COMMERCIAL

## 2022-03-23 DIAGNOSIS — Z00.00 ANNUAL PHYSICAL EXAM: ICD-10-CM

## 2022-03-23 LAB
ALBUMIN SERPL BCP-MCNC: 4.1 G/DL (ref 3.5–5.2)
ALP SERPL-CCNC: 83 U/L (ref 55–135)
ALT SERPL W/O P-5'-P-CCNC: 14 U/L (ref 10–44)
ANION GAP SERPL CALC-SCNC: 6 MMOL/L (ref 8–16)
AST SERPL-CCNC: 17 U/L (ref 10–40)
BILIRUB SERPL-MCNC: 0.5 MG/DL (ref 0.1–1)
BUN SERPL-MCNC: 16 MG/DL (ref 8–23)
CALCIUM SERPL-MCNC: 10.1 MG/DL (ref 8.7–10.5)
CHLORIDE SERPL-SCNC: 108 MMOL/L (ref 95–110)
CO2 SERPL-SCNC: 27 MMOL/L (ref 23–29)
CREAT SERPL-MCNC: 0.8 MG/DL (ref 0.5–1.4)
ERYTHROCYTE [DISTWIDTH] IN BLOOD BY AUTOMATED COUNT: 12.5 % (ref 11.5–14.5)
EST. GFR  (AFRICAN AMERICAN): >60 ML/MIN/1.73 M^2
EST. GFR  (NON AFRICAN AMERICAN): >60 ML/MIN/1.73 M^2
GLUCOSE SERPL-MCNC: 92 MG/DL (ref 70–110)
HCT VFR BLD AUTO: 42.7 % (ref 37–48.5)
HGB BLD-MCNC: 14.2 G/DL (ref 12–16)
MCH RBC QN AUTO: 32.4 PG (ref 27–31)
MCHC RBC AUTO-ENTMCNC: 33.3 G/DL (ref 32–36)
MCV RBC AUTO: 98 FL (ref 82–98)
PLATELET # BLD AUTO: 239 K/UL (ref 150–450)
PMV BLD AUTO: 11.4 FL (ref 9.2–12.9)
POTASSIUM SERPL-SCNC: 4.1 MMOL/L (ref 3.5–5.1)
PROT SERPL-MCNC: 7.7 G/DL (ref 6–8.4)
RBC # BLD AUTO: 4.38 M/UL (ref 4–5.4)
SODIUM SERPL-SCNC: 141 MMOL/L (ref 136–145)
WBC # BLD AUTO: 5.17 K/UL (ref 3.9–12.7)

## 2022-03-23 PROCEDURE — 85027 COMPLETE CBC AUTOMATED: CPT | Performed by: INTERNAL MEDICINE

## 2022-03-23 PROCEDURE — 36415 COLL VENOUS BLD VENIPUNCTURE: CPT | Performed by: INTERNAL MEDICINE

## 2022-03-23 PROCEDURE — 80053 COMPREHEN METABOLIC PANEL: CPT | Performed by: INTERNAL MEDICINE

## 2022-04-01 DIAGNOSIS — I10 ESSENTIAL HYPERTENSION: ICD-10-CM

## 2022-04-01 RX ORDER — VERAPAMIL HYDROCHLORIDE 240 MG/1
240 CAPSULE, EXTENDED RELEASE ORAL 2 TIMES DAILY
Qty: 180 CAPSULE | Refills: 3 | Status: SHIPPED | OUTPATIENT
Start: 2022-04-01 | End: 2022-05-30 | Stop reason: SDUPTHER

## 2022-04-01 RX ORDER — IRBESARTAN 300 MG/1
TABLET ORAL
Qty: 90 TABLET | Refills: 3 | Status: SHIPPED | OUTPATIENT
Start: 2022-04-01 | End: 2022-05-17 | Stop reason: SDUPTHER

## 2022-04-01 NOTE — TELEPHONE ENCOUNTER
No new care gaps identified.  Powered by "[employer] by [employer]. Reference number: [id number].   4/01/2022 4:53:45 PM CDT

## 2022-04-01 NOTE — TELEPHONE ENCOUNTER
Reviewed lab info w/ pt and reiterated message from your PCP... results are normal. She semed settled w/ that.    ----- Message from Sirisha Blancas sent at 4/1/2022  4:05 PM CDT -----  Contact: [phone]  Pt is calling in regards to her lab results she received in the mail please advise and give return call she states she has some questions

## 2022-04-11 NOTE — Clinical Note
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  This needs urgent repair. You are planning surgery for her adrenal mass in 2 weeks.  I can place an silicone oil bubble in the eye rather than gas so there will be no problems with inhalational anesthesia.   She is concerned about this procedure affecting your surgery and wanted me to discuss with you.  Thanks,  Lloyd CALLE - She would like you to call her at home circa 5:00 at [phone] Pt resting in cot. Sitter at bedside per protocol. Mother at bedside. VSS. Will monitor.       Julisa Salinas RN  04/11/22 [id number]

## 2022-04-29 ENCOUNTER — OFFICE VISIT (OUTPATIENT)
Dept: OPHTHALMOLOGY | Facility: CLINIC | Age: 82
End: 2022-04-29
Payer: COMMERCIAL

## 2022-04-29 DIAGNOSIS — H33.002 RHEGMATOGENOUS RETINAL DETACHMENT OF LEFT EYE: ICD-10-CM

## 2022-04-29 DIAGNOSIS — M35.01 KCS (KERATOCONJUNCTIVITIS SICCA): ICD-10-CM

## 2022-04-29 DIAGNOSIS — H40.1131 PRIMARY OPEN ANGLE GLAUCOMA OF BOTH EYES, MILD STAGE: Primary | ICD-10-CM

## 2022-04-29 DIAGNOSIS — Q07.8 ANOMALOUS OPTIC NERVE: ICD-10-CM

## 2022-04-29 DIAGNOSIS — H25.11 NUCLEAR SCLEROSIS OF RIGHT EYE: ICD-10-CM

## 2022-04-29 DIAGNOSIS — H26.499 ANTERIOR CAPSULAR OPACIFICATION FOLLOWING EXTRACTION OF CATARACT: ICD-10-CM

## 2022-04-29 DIAGNOSIS — Z96.1 PSEUDOPHAKIA: ICD-10-CM

## 2022-04-29 DIAGNOSIS — H44.002 ENDOPHTHALMITIS, LEFT EYE: ICD-10-CM

## 2022-04-29 PROCEDURE — 3288F FALL RISK ASSESSMENT DOCD: CPT | Mod: CPTII,S$GLB,, | Performed by: OPHTHALMOLOGY

## 2022-04-29 PROCEDURE — 1160F PR REVIEW ALL MEDS BY PRESCRIBER/CLIN PHARMACIST DOCUMENTED: ICD-10-PCS | Mod: CPTII,S$GLB,, | Performed by: OPHTHALMOLOGY

## 2022-04-29 PROCEDURE — 1126F PR PAIN SEVERITY QUANTIFIED, NO PAIN PRESENT: ICD-10-PCS | Mod: CPTII,S$GLB,, | Performed by: OPHTHALMOLOGY

## 2022-04-29 PROCEDURE — 99999 PR PBB SHADOW E&M-EST. PATIENT-LVL III: ICD-10-PCS | Mod: PBBFAC,,, | Performed by: OPHTHALMOLOGY

## 2022-04-29 PROCEDURE — 92012 PR EYE EXAM, EST PATIENT,INTERMED: ICD-10-PCS | Mod: S$GLB,,, | Performed by: OPHTHALMOLOGY

## 2022-04-29 PROCEDURE — 1126F AMNT PAIN NOTED NONE PRSNT: CPT | Mod: CPTII,S$GLB,, | Performed by: OPHTHALMOLOGY

## 2022-04-29 PROCEDURE — 1160F RVW MEDS BY RX/DR IN RCRD: CPT | Mod: CPTII,S$GLB,, | Performed by: OPHTHALMOLOGY

## 2022-04-29 PROCEDURE — 1101F PR PT FALLS ASSESS DOC 0-1 FALLS W/OUT INJ PAST YR: ICD-10-PCS | Mod: CPTII,S$GLB,, | Performed by: OPHTHALMOLOGY

## 2022-04-29 PROCEDURE — 1159F PR MEDICATION LIST DOCUMENTED IN MEDICAL RECORD: ICD-10-PCS | Mod: CPTII,S$GLB,, | Performed by: OPHTHALMOLOGY

## 2022-04-29 PROCEDURE — 1159F MED LIST DOCD IN RCRD: CPT | Mod: CPTII,S$GLB,, | Performed by: OPHTHALMOLOGY

## 2022-04-29 PROCEDURE — 92012 INTRM OPH EXAM EST PATIENT: CPT | Mod: S$GLB,,, | Performed by: OPHTHALMOLOGY

## 2022-04-29 PROCEDURE — 99999 PR PBB SHADOW E&M-EST. PATIENT-LVL III: CPT | Mod: PBBFAC,,, | Performed by: OPHTHALMOLOGY

## 2022-04-29 PROCEDURE — 3288F PR FALLS RISK ASSESSMENT DOCUMENTED: ICD-10-PCS | Mod: CPTII,S$GLB,, | Performed by: OPHTHALMOLOGY

## 2022-04-29 PROCEDURE — 1101F PT FALLS ASSESS-DOCD LE1/YR: CPT | Mod: CPTII,S$GLB,, | Performed by: OPHTHALMOLOGY

## 2022-04-29 NOTE — PROGRESS NOTES
HPI     DLS: 1/18/2022    Pt here for 3 Month check;    Meds;  Cosopt BID OU   Latanoprost QHS OU  AT's PRN OU     1. POAG   2. 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 SHANTE   4. NS OD   5. PCIOL OS   6. Hx Endophthalmitis OS   7. Rhegmatogenous RD OS     Last edited by Windy Mosqueda on 4/29/2022  8:20 AM. (History)            Assessment /Plan     For exam results, see Encounter Report.    Primary open angle glaucoma of both eyes, mild stage    Rhegmatogenous retinal detachment of left eye    Nuclear sclerosis of right eye    Anomalous optic nerve    KCS (keratoconjunctivitis sicca)    Pseudophakia    Endophthalmitis, left eye    Anterior capsular opacification following extraction of cataract             Glaucoma (type and duration)    Suspect 2/2 lrg cups ou    First HVF   2000   First photos   2013   Treatment / Drops started   xal 1/2016           Family history    none        Glaucoma meds   Cosopt ou bid // ((Latanoprost started 1/4/2016  - Stopped 3/22/2019  Od))  // re-started 1/18/2022 ou for OCT and ? VF prog        H/O adverse rxn to glaucoma drops    none        LASERS    none        GLAUCOMA SURGERIES    none        OTHER EYE SURGERIES    PC IOL os 8/2/2019 (done  W/ [address] - SO removal)    TR repair w/ PPVx and yas oil - os - 2/2 endogen endopth os - 3/15/2017 - Lane        CDR    0.75-0.8 / 0.7-0.75        Tbase    18-22 / 19-22          Tmax    22/30             Ttarget    ? 17/17            HVF    8 test 2000 to  2020 - near full od // gen depression        Gonio    +2-3 ou w/ yas oil drop sup os         CCT    510/517        OCT    4 test 2013 to 2019 - RNFL - nl od // nl os         HRT    5 test 2016 to 2020 -MR -MR -  Dec. IT od // nl os /// CDR 0.69 od // 0.50 os  (large disc size )         Disc photos    2013, 2015 - OIS     - Ttoday   14/12  - Test done today  IOP after re-starting latanoprost ou on 1/18/2022      2. Anomaolous Optic nerves    - very large disc size ou    - HRT - 2.97 od // 3.04 os (nl is up to 2.8  ou)     3. Dry eyes     4. NS - OD   Becoming vis sign // consider removal prn     5. H/O endogenous endophthalmitis  OS  - And secondary tract RD - S/P PPVx and Silicone Oil (Mazzulla)    Oil removed at the same time as the phaco/IOL done 8/2/2017     6. S/P complex phaco/IOL os with trypan blue and Leslye ring and SO removal     - (done with mazzulla ) // 8/2/2017// PCB00 19.5   - Doing well - consider yag to ant capsule phimosis / opacification       Cosopt bid  OU -   Latanoprost - STOP/ OFF  (3/22/2019) // re-start 1/18/2022 - ?? OCT - RNFL prog os and ? VF prog os     Minimal change in IOP with re-starting latanoprost    Will cont as it may minimize fluctuations      Happy with new glasses (3/22/2019)     Hold off on yag cap to ant capsule phimosis and PCO OS  - [facility] doing OK - may be affecting the VF os     Ok to use OTC - zaditor or pataday for allergies - bid prn   Cont AT's prn       F/U 4 months with gonio

## 2022-05-03 ENCOUNTER — OFFICE VISIT (OUTPATIENT)
Dept: DERMATOLOGY | Facility: CLINIC | Age: 82
End: 2022-05-03
Payer: COMMERCIAL

## 2022-05-03 DIAGNOSIS — L30.4 INTERTRIGO: Primary | ICD-10-CM

## 2022-05-03 PROCEDURE — 1159F MED LIST DOCD IN RCRD: CPT | Mod: CPTII,S$GLB,, | Performed by: DERMATOLOGY

## 2022-05-03 PROCEDURE — 3288F PR FALLS RISK ASSESSMENT DOCUMENTED: ICD-10-PCS | Mod: CPTII,S$GLB,, | Performed by: DERMATOLOGY

## 2022-05-03 PROCEDURE — 3288F FALL RISK ASSESSMENT DOCD: CPT | Mod: CPTII,S$GLB,, | Performed by: DERMATOLOGY

## 2022-05-03 PROCEDURE — 1160F RVW MEDS BY RX/DR IN RCRD: CPT | Mod: CPTII,S$GLB,, | Performed by: DERMATOLOGY

## 2022-05-03 PROCEDURE — 1160F PR REVIEW ALL MEDS BY PRESCRIBER/CLIN PHARMACIST DOCUMENTED: ICD-10-PCS | Mod: CPTII,S$GLB,, | Performed by: DERMATOLOGY

## 2022-05-03 PROCEDURE — 99214 PR OFFICE/OUTPT VISIT, EST, LEVL IV, 30-39 MIN: ICD-10-PCS | Mod: S$GLB,,, | Performed by: DERMATOLOGY

## 2022-05-03 PROCEDURE — 99999 PR PBB SHADOW E&M-EST. PATIENT-LVL III: ICD-10-PCS | Mod: PBBFAC,,, | Performed by: DERMATOLOGY

## 2022-05-03 PROCEDURE — 99999 PR PBB SHADOW E&M-EST. PATIENT-LVL III: CPT | Mod: PBBFAC,,, | Performed by: DERMATOLOGY

## 2022-05-03 PROCEDURE — 1101F PT FALLS ASSESS-DOCD LE1/YR: CPT | Mod: CPTII,S$GLB,, | Performed by: DERMATOLOGY

## 2022-05-03 PROCEDURE — 1159F PR MEDICATION LIST DOCUMENTED IN MEDICAL RECORD: ICD-10-PCS | Mod: CPTII,S$GLB,, | Performed by: DERMATOLOGY

## 2022-05-03 PROCEDURE — 1126F PR PAIN SEVERITY QUANTIFIED, NO PAIN PRESENT: ICD-10-PCS | Mod: CPTII,S$GLB,, | Performed by: DERMATOLOGY

## 2022-05-03 PROCEDURE — 99214 OFFICE O/P EST MOD 30 MIN: CPT | Mod: S$GLB,,, | Performed by: DERMATOLOGY

## 2022-05-03 PROCEDURE — 1126F AMNT PAIN NOTED NONE PRSNT: CPT | Mod: CPTII,S$GLB,, | Performed by: DERMATOLOGY

## 2022-05-03 PROCEDURE — 1101F PR PT FALLS ASSESS DOC 0-1 FALLS W/OUT INJ PAST YR: ICD-10-PCS | Mod: CPTII,S$GLB,, | Performed by: DERMATOLOGY

## 2022-05-03 RX ORDER — KETOCONAZOLE 20 MG/ML
SHAMPOO, SUSPENSION TOPICAL
Qty: 120 ML | Refills: 5 | Status: SHIPPED | OUTPATIENT
Start: 2022-05-03 | End: 2023-11-13 | Stop reason: SDUPTHER

## 2022-05-03 NOTE — PATIENT INSTRUCTIONS
Flares:  Recommend white vinegar: water 1:1 compresses 2x/day followed by cool blow dry and then application of prescription medication.     Maintenance:  Cool blow dry after showering 1x/day then apply Zeasorb AF powder.

## 2022-05-03 NOTE — PROGRESS NOTES
Subjective:       Patient ID:  Ida Santana is a 81 y.o. female who presents for   Chief Complaint   Patient presents with    Itching     waistline     Pt states she had her first shingles vaccine 2/16 and the booster 4/21. Also states she has been eating a lot of strawberries and feels either of both of these may have caused the itching.       Itching - Initial  Affected locations: under abdomen.  Duration: 3 months  Signs / symptoms: itching  Severity: mild  Timing: intermittent (had same in past)  Aggravated by: heat and sweating  Treatments tried: cetaphil gentle wash.  Improvement on treatment: mild        Review of Systems   Skin: Positive for itching.        Objective:    Physical Exam   Constitutional: She appears well-developed and well-nourished. No distress.   Neurological: She is alert and oriented to person, place, and time. She is not disoriented.   Psychiatric: She has a normal mood and affect.   Skin:   Areas Examined (abnormalities noted in diagram):   Genitals / Buttocks / Groin Inspection Performed         Diagram Legend     Erythematous scaling macule/papule c/w actinic keratosis       Vascular papule c/w angioma      Pigmented verrucoid papule/plaque c/w seborrheic keratosis      Yellow umbilicated papule c/w sebaceous hyperplasia      Irregularly shaped tan macule c/w lentigo     1-2 mm smooth white papules consistent with Milia      Movable subcutaneous cyst with punctum c/w epidermal inclusion cyst      Subcutaneous movable cyst c/w pilar cyst      Firm pink to brown papule c/w dermatofibroma      Pedunculated fleshy papule(s) c/w skin tag(s)      Evenly pigmented macule c/w junctional nevus     Mildly variegated pigmented, slightly irregular-bordered macule c/w mildly atypical nevus      Flesh colored to evenly pigmented papule c/w intradermal nevus       Pink pearly papule/plaque c/w basal cell carcinoma      Erythematous hyperkeratotic cursted plaque c/w SCC      Surgical scar with no  sign of skin cancer recurrence      Open and closed comedones      Inflammatory papules and pustules      Verrucoid papule consistent consistent with wart     Erythematous eczematous patches and plaques     Dystrophic onycholytic nail with subungual debris c/w onychomycosis     Umbilicated papule    Erythematous-base heme-crusted tan verrucoid plaque consistent with inflamed seborrheic keratosis     Erythematous Silvery Scaling Plaque c/w Psoriasis     See annotation      Assessment / Plan:        Intertrigo  Flares:  Recommend white vinegar: water 1:1 compresses 2x/day followed by cool blow dry and then application of prescription medication (shake lotion).     Maintenance:  Cool blow dry after showering 1x/day then apply Zeasorb AF powder.    -     triamcinolone acetonide 0.1% (KENALOG) 0.1 % cream; AAA bid after cool blow dry  Dispense: 1 each; Refill: 3    Other orders - per pt request  -     ketoconazole (NIZORAL) 2 % shampoo; Wash hair with medicated shampoo at least 2x/week - let sit on scalp at least 5 minutes prior to rinsing  Dispense: 120 mL; Refill: 5             Follow up if symptoms worsen or fail to improve.

## 2022-05-09 ENCOUNTER — TELEPHONE (OUTPATIENT)
Dept: DERMATOLOGY | Facility: CLINIC | Age: 82
End: 2022-05-09
Payer: COMMERCIAL

## 2022-05-09 NOTE — TELEPHONE ENCOUNTER
----- Message from Nereyda Kamara MA sent at 5/5/2022  4:55 PM CDT -----  Regarding: FW: Pt meds    ----- Message -----  From: Becka Durbin  Sent: 5/5/2022   4:48 PM CDT  To: [facility] Staff  Subject: Pt meds                                          Pt calling speak with staff regarding mail over meds  [phone] (home)

## 2022-05-09 NOTE — TELEPHONE ENCOUNTER
Spoke to pt/informed pt that [facility] have been trying to reach out to get an approval okay with the medication price $45, gave pt phone number pt will call today to approve, once that's done the pharm tech will be able to ship medication out today so that pt can receive it somewhere this week.

## 2022-05-17 DIAGNOSIS — I10 ESSENTIAL HYPERTENSION: ICD-10-CM

## 2022-05-17 RX ORDER — IRBESARTAN 300 MG/1
TABLET ORAL
Qty: 90 TABLET | Refills: 3 | Status: SHIPPED | OUTPATIENT
Start: 2022-05-17 | End: 2023-02-22 | Stop reason: SDUPTHER

## 2022-05-17 NOTE — TELEPHONE ENCOUNTER
----- Message from Kelli Chakraborty sent at 5/17/2022 11:16 AM CDT -----  Contact: [phone]  Requesting an RX refill or new RX.  Is this a refill or new RX: refill  RX name and strength (copy/paste from chart):    Is this a 30 day or 90 day RX: irbesartan (AVAPRO) 300 MG tablet  Pharmacy name and phone # (copy/paste from chart):    [facility] #[id number] - [place] - [address] AT [facility] [address] [place]  [address]  [place] [address]  Phone: [phone] Fax: [phone]      The doctors have asked that we provide their patients with the following 2 reminders -- prescription refills can take up to 72 hours, and a friendly reminder that in the future you can use your [facility] account to request refills: yes

## 2022-05-17 NOTE — TELEPHONE ENCOUNTER
No new care gaps identified.  [facility] Embedded Care Gaps. Reference number: [id number]. 5/17/2022   12:38:13 PM CDT

## 2022-05-30 DIAGNOSIS — I10 ESSENTIAL HYPERTENSION: ICD-10-CM

## 2022-05-30 RX ORDER — VERAPAMIL HYDROCHLORIDE 240 MG/1
240 CAPSULE, EXTENDED RELEASE ORAL 2 TIMES DAILY
Qty: 180 CAPSULE | Refills: 3 | Status: SHIPPED | OUTPATIENT
Start: 2022-05-30 | End: 2023-02-22 | Stop reason: SDUPTHER

## 2022-05-30 NOTE — TELEPHONE ENCOUNTER
----- Message from Sirisha Blancsa sent at 5/30/2022 10:57 AM CDT -----  Contact: [phone]  Requesting an RX refill or new RX.  Is this a refill or new RX: refill  RX name and strength (copy/paste from chart):  verapamiL (VERELAN) 240 MG C24P  Is this a 30 day or 90 day RX: 30  Pharmacy name and phone # (copy/paste from chart):      [facility] #[id number] - [place] - [address] AT [facility] OF [address] [place]  [address]  [place] [address]  Phone: [phone] Fax: [phone]     The doctors have asked that we provide their patients with the following 2 reminders -- prescription refills can take up to 72 hours, and a friendly reminder that in the future you can use your [facility] account to request refills: yes

## 2022-05-30 NOTE — TELEPHONE ENCOUNTER
No new care gaps identified.  [facility] Embedded Care Gaps. Reference number: [id number]. 5/30/2022   11:01:42 AM ALEXAT

## 2022-08-31 ENCOUNTER — IMMUNIZATION (OUTPATIENT)
Dept: INTERNAL MEDICINE | Facility: CLINIC | Age: 82
End: 2022-08-31
Payer: COMMERCIAL

## 2022-08-31 DIAGNOSIS — Z23 NEED FOR VACCINATION: Primary | ICD-10-CM

## 2022-08-31 PROCEDURE — 91305 COVID-19, MRNA, LNP-S, PF, 30 MCG/0.3 ML DOSE VACCINE (PFIZER): CPT | Mod: PBBFAC | Performed by: INTERNAL MEDICINE

## 2022-09-18 NOTE — PROGRESS NOTES
HPI    DLS: 4/29/2022    Pt here for 4 Month Check;  Pt states no eye pain or discomfort.     Meds;  Cosopt BID OU   Latanoprost QHS OU   AT's PRN OU     1. POAG   2. 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 SHANTE   4. NS OD   5. PCIOL OS   6. Hx Endophthalmitis OS   7. Rhegmatogenous RD OS       Last edited by Windy Mosqueda on 9/23/2022  8:06 AM.            Assessment /Plan     For exam results, see Encounter Report.    Primary open angle glaucoma of both eyes, mild stage    Rhegmatogenous retinal detachment of left eye    Nuclear sclerosis of right eye    Anomalous optic nerve    KCS (keratoconjunctivitis sicca)    Pseudophakia    Endophthalmitis, left eye    Anterior capsular opacification following extraction of cataract           Glaucoma (type and duration)    Suspect 2/2 lrg cups ou    First HVF   2000   First photos   2013   Treatment / Drops started   xal 1/2016           Family history    none        Glaucoma meds   Cosopt ou bid // ((Latanoprost started 1/4/2016  - Stopped 3/22/2019  Od))  // re-started 1/18/2022 ou for OCT and ? VF prog        H/O adverse rxn to glaucoma drops    none        LASERS    none        GLAUCOMA SURGERIES    none        OTHER EYE SURGERIES    PC IOL os 8/2/2019 (done  W/ [address] - SO removal)    TR repair w/ PPVx and yas oil - os - 2/2 endogen endopth os - 3/15/2017 - [address]        CDR    0.75-0.8 / 0.7-0.75        Tbase    18-22 / 19-22          Tmax    22/30             Ttarget    ? 17/17            HVF    8 test 2000 to  2020 - near full od // gen depression        Gonio    +2-3 ou w/ yas oil drop sup os         CCT    510/517        OCT    4 test 2013 to 2019 - RNFL - nl od // nl os         HRT    5 test 2016 to 2020 -MR -MR -  Dec. IT od // nl os /// CDR 0.69 od // 0.50 os  (large disc size )         Disc photos    2013, 2015 - OIS     - Ttoday   14/13  - Test done today  IOP after re-starting latanoprost ou on 1/18/2022 // gonio      2. Anomaolous Optic nerves    - very large disc size ou    - HRT  - 2.97 od // 3.04 os (nl is up to 2.8 ou)     3. Dry eyes     4. NS - OD   vis sign // consider removal prn     5. H/O endogenous endophthalmitis  OS  - And secondary tract RD - S/P PPVx and Silicone Oil (Mazzulla)    Oil removed at the same time as the phaco/IOL done 8/2/2017     6. S/P complex phaco/IOL os with trypan blue and Leslye ring and SO removal     - (done with mazzulla ) // 8/2/2017// PCB00 19.5   - Doing well - consider yag to ant capsule phimosis / opacification       Cosopt bid  OU -   Latanoprost - STOP/ OFF  (3/22/2019) // re-start 1/18/2022 - ?? OCT - RNFL prog os and ? VF prog os     Minimal change in IOP with re-starting latanoprost    Will cont as it may minimize fluctuations      Happy with new glasses (3/22/2019)     Hold off on yag cap to ant capsule phimosis and PCO OS  - [facility] doing OK - may be affecting the VF os     Ok to use OTC - zaditor or pataday for allergies - bid prn   Cont AT's prn     Recommend phaco/IOL od prn pts wishes - she is not bothered by the vision - no problems with every day activities. But does notice decrease vision  right eye when taking the eye exam. It may help open the angle some - pt will think about it     F/U 4 months with HVF / DFE / OCT - discuss possible CE od

## 2022-09-23 ENCOUNTER — OFFICE VISIT (OUTPATIENT)
Dept: OPHTHALMOLOGY | Facility: CLINIC | Age: 82
End: 2022-09-23
Payer: COMMERCIAL

## 2022-09-23 DIAGNOSIS — H33.002 RHEGMATOGENOUS RETINAL DETACHMENT OF LEFT EYE: ICD-10-CM

## 2022-09-23 DIAGNOSIS — H44.002 ENDOPHTHALMITIS, LEFT EYE: ICD-10-CM

## 2022-09-23 DIAGNOSIS — M35.01 KCS (KERATOCONJUNCTIVITIS SICCA): ICD-10-CM

## 2022-09-23 DIAGNOSIS — H40.1131 PRIMARY OPEN ANGLE GLAUCOMA OF BOTH EYES, MILD STAGE: Primary | ICD-10-CM

## 2022-09-23 DIAGNOSIS — H26.499 ANTERIOR CAPSULAR OPACIFICATION FOLLOWING EXTRACTION OF CATARACT: ICD-10-CM

## 2022-09-23 DIAGNOSIS — Z96.1 PSEUDOPHAKIA: ICD-10-CM

## 2022-09-23 DIAGNOSIS — H25.11 NUCLEAR SCLEROSIS OF RIGHT EYE: ICD-10-CM

## 2022-09-23 DIAGNOSIS — Q07.8 ANOMALOUS OPTIC NERVE: ICD-10-CM

## 2022-09-23 PROCEDURE — 1101F PR PT FALLS ASSESS DOC 0-1 FALLS W/OUT INJ PAST YR: ICD-10-PCS | Mod: CPTII,S$GLB,, | Performed by: OPHTHALMOLOGY

## 2022-09-23 PROCEDURE — 1159F PR MEDICATION LIST DOCUMENTED IN MEDICAL RECORD: ICD-10-PCS | Mod: CPTII,S$GLB,, | Performed by: OPHTHALMOLOGY

## 2022-09-23 PROCEDURE — 1101F PT FALLS ASSESS-DOCD LE1/YR: CPT | Mod: CPTII,S$GLB,, | Performed by: OPHTHALMOLOGY

## 2022-09-23 PROCEDURE — 92020 GONIOSCOPY: CPT | Mod: S$GLB,,, | Performed by: OPHTHALMOLOGY

## 2022-09-23 PROCEDURE — 3288F FALL RISK ASSESSMENT DOCD: CPT | Mod: CPTII,S$GLB,, | Performed by: OPHTHALMOLOGY

## 2022-09-23 PROCEDURE — 99999 PR PBB SHADOW E&M-EST. PATIENT-LVL III: ICD-10-PCS | Mod: PBBFAC,,, | Performed by: OPHTHALMOLOGY

## 2022-09-23 PROCEDURE — 92012 INTRM OPH EXAM EST PATIENT: CPT | Mod: S$GLB,,, | Performed by: OPHTHALMOLOGY

## 2022-09-23 PROCEDURE — 3288F PR FALLS RISK ASSESSMENT DOCUMENTED: ICD-10-PCS | Mod: CPTII,S$GLB,, | Performed by: OPHTHALMOLOGY

## 2022-09-23 PROCEDURE — 1159F MED LIST DOCD IN RCRD: CPT | Mod: CPTII,S$GLB,, | Performed by: OPHTHALMOLOGY

## 2022-09-23 PROCEDURE — 92012 PR EYE EXAM, EST PATIENT,INTERMED: ICD-10-PCS | Mod: S$GLB,,, | Performed by: OPHTHALMOLOGY

## 2022-09-23 PROCEDURE — 99999 PR PBB SHADOW E&M-EST. PATIENT-LVL III: CPT | Mod: PBBFAC,,, | Performed by: OPHTHALMOLOGY

## 2022-09-23 PROCEDURE — 92020 PR SPECIAL EYE EVAL,GONIOSCOPY: ICD-10-PCS | Mod: S$GLB,,, | Performed by: OPHTHALMOLOGY

## 2022-09-23 PROCEDURE — 1126F PR PAIN SEVERITY QUANTIFIED, NO PAIN PRESENT: ICD-10-PCS | Mod: CPTII,S$GLB,, | Performed by: OPHTHALMOLOGY

## 2022-09-23 PROCEDURE — 1126F AMNT PAIN NOTED NONE PRSNT: CPT | Mod: CPTII,S$GLB,, | Performed by: OPHTHALMOLOGY

## 2022-09-23 PROCEDURE — 1160F RVW MEDS BY RX/DR IN RCRD: CPT | Mod: CPTII,S$GLB,, | Performed by: OPHTHALMOLOGY

## 2022-09-23 PROCEDURE — 1160F PR REVIEW ALL MEDS BY PRESCRIBER/CLIN PHARMACIST DOCUMENTED: ICD-10-PCS | Mod: CPTII,S$GLB,, | Performed by: OPHTHALMOLOGY

## 2022-09-23 RX ORDER — DORZOLAMIDE HYDROCHLORIDE AND TIMOLOL MALEATE 20; 5 MG/ML; MG/ML
1 SOLUTION/ DROPS OPHTHALMIC 2 TIMES DAILY
Qty: 3 EACH | Refills: 3 | Status: SHIPPED | OUTPATIENT
Start: 2022-09-23 | End: 2023-06-23 | Stop reason: SDUPTHER

## 2022-10-06 NOTE — PROGRESS NOTES
[facility] Hospital Medicine  Progress Note    Patient Name: Ida Santana  MRN: [id number]  Patient Class: IP- Inpatient   Admission Date: 1/12/2017  Length of Stay: 12 days  Attending Physician: Diogenes Maier MD  Primary Care Provider: Giovanna Murray MD    [facility] Medicine Team: [facility] HOSP MED 1 Diann Lopez MD    Subjective:     Principal Problem:Severe sepsis    Interval History: No acute events overnight. Pt feels well this morning, eating breakfast, tired. Pt denies SOB, CP, abd pain.       Review of Systems   HENT: Negative for sore throat.    Eyes: Positive for redness. Negative for pain and visual disturbance.   Respiratory: Negative for shortness of breath.    Cardiovascular: Negative for chest pain.   Gastrointestinal: Negative for abdominal pain and constipation.   Neurological: Negative for headaches.   Psychiatric/Behavioral: Negative for confusion.     Objective:     Vital Signs (Most Recent):  Temp: 98.4 °F (36.9 °C) (01/24/17 1145)  Pulse: 74 (01/24/17 1145)  Resp: 18 (01/24/17 1145)  BP: (!) 145/62 (01/24/17 1145)  SpO2: (!) 94 % (01/24/17 1145) Vital Signs (24h Range):  Temp:  [98.4 °F (36.9 °C)-99 °F (37.2 °C)] 98.4 °F (36.9 °C)  Pulse:  [66-76] 74  Resp:  [18] 18  SpO2:  [90 %-94 %] 94 %  BP: (125-152)/(60-70) 145/62        Physical Exam   Constitutional: She is oriented to person, place, and time. No distress.   HENT:   Head: Normocephalic.   Eyes: EOM are normal. Right conjunctiva is not injected. Left conjunctiva is injected.   Injection improving.    Cardiovascular: Normal rate and regular rhythm.  Exam reveals no gallop and no friction rub.    Murmur heard.  Pulmonary/Chest: Effort normal and breath sounds normal. No respiratory distress.   Abdominal: Soft. Bowel sounds are normal. She exhibits no distension. There is no tenderness.   Musculoskeletal: She exhibits no edema.   Neurological: She is alert and oriented to person, place, and time. No cranial nerve  deficit.   Skin: She is not diaphoretic.   Psychiatric: She has a normal mood and affect.   Vitals reviewed.      Significant Labs:   CBC:     Recent Labs  Lab 01/23/17 0415 01/24/17  0457   WBC 15.42* 14.18*   HGB 9.8* 9.4*   HCT 28.1* 28.0*    327     CMP:     Recent Labs  Lab 01/23/17 0415 01/24/17 0457    141   K 3.4* 3.5    110   CO2 26 23   * 112*   BUN 11 12   CREATININE 0.8 0.8   CALCIUM 8.4* 8.3*   PROT 6.4 6.5   ALBUMIN 1.9* 1.9*   BILITOT 0.6 0.6   ALKPHOS 71 71   AST 35 34   ALT 46* 47*   ANIONGAP 6* 8   EGFRNONAA >60.0 >60.0     Significant Imaging: I have reviewed all pertinent imaging results/findings within the past 24 hours.    Assessment/Plan:      * Severe sepsis  -See MRSA bacteremia.     Hypertension  - continue verapamil  - restarted home lisinopril 20mg and coreg  25mg    Staphylococcus aureus sepsis  - has had continued fevers and bcx grew MRSA, concerned that 2/2 continued + bcx patient may have staph seeded somewhere, possible endocarditis. She does not have any recent significant hospital contact.   - MIS negative for endocarditis  - repeat bcx shown staph again, repeating bcx  - ID following, FU continued recs  - CT chest, abdomen, pelvis with contrast: septic emboli to multiple organs (lungs w/ cavitary lesions, pancreas, kidneys...) among other findings that will require follow up. (eventually urology for concern for renal cancer).    - MRI spine c/t/l - no evidence of spinal abscess  - CT chest also shows bilateral pleural effusions and a periaortic fluid collection   -No IR drainage due to low volumes. No Pulm dx thoracentesis due to low volumes.    -Will re-address with ID and thoracic surgery.   - Per ID, switch from vanc to ceftaroline. 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  Followup with ultrasound in 6 months should be obtained. Hyperechoic round mass adjacent to the superior aspect of the left  kidney, nonspecific and better evaluated on recent CT exam. Bilateral medical renal disease with elevated arterial resistive indices.     MRSA bacteremia  -See staph aureus sepsis.     Hypophosphatemia  - replete PRN  -Continue to monitor.     Conjunctivitis  Developed conjunctivitis of left eye 1/18. Pt denies pain, drainage, decreased vision.   - Erythromycin x7days   - Spoke with Ophthalmology. Call if she develops pain, drainage, decreased vision, or pain with eye movement.   - Cont to monitor, is improving  - Expected stop date would be 1/24; however given that injection persists, will continue erythromycin for now.       MRSA pneumonia  -See staph aureus bacteremia.     Hypokalemia  -Replete PRN  -Continue to monitor.     VTE Risk Mitigation         Ordered     Low Risk of VTE  Once      01/13/17 0158          Ppx: hep gtt  Diet: [id number], boost  PT/OT: ordered  Dispo: Pending hospital    Discussed with staff, Dr. Maier.   THEO Lopez MD MPH PGY-1  Department of Hospital Medicine   [facility]   none

## 2022-10-11 ENCOUNTER — OFFICE VISIT (OUTPATIENT)
Dept: ENDOCRINOLOGY | Facility: CLINIC | Age: 82
End: 2022-10-11
Payer: COMMERCIAL

## 2022-10-11 VITALS
DIASTOLIC BLOOD PRESSURE: 78 MMHG | WEIGHT: 155.88 LBS | SYSTOLIC BLOOD PRESSURE: 130 MMHG | BODY MASS INDEX: 24.47 KG/M2 | OXYGEN SATURATION: 99 % | HEART RATE: 63 BPM | HEIGHT: 67 IN

## 2022-10-11 DIAGNOSIS — M85.80 OSTEOPENIA, UNSPECIFIED LOCATION: Primary | ICD-10-CM

## 2022-10-11 DIAGNOSIS — I10 HYPERTENSION, UNSPECIFIED TYPE: ICD-10-CM

## 2022-10-11 DIAGNOSIS — Z85.3 HISTORY OF BREAST CANCER IN FEMALE: ICD-10-CM

## 2022-10-11 PROCEDURE — 99999 PR PBB SHADOW E&M-EST. PATIENT-LVL III: CPT | Mod: PBBFAC,,, | Performed by: INTERNAL MEDICINE

## 2022-10-11 PROCEDURE — 1126F PR PAIN SEVERITY QUANTIFIED, NO PAIN PRESENT: ICD-10-PCS | Mod: CPTII,S$GLB,, | Performed by: INTERNAL MEDICINE

## 2022-10-11 PROCEDURE — 1159F PR MEDICATION LIST DOCUMENTED IN MEDICAL RECORD: ICD-10-PCS | Mod: CPTII,S$GLB,, | Performed by: INTERNAL MEDICINE

## 2022-10-11 PROCEDURE — 1160F RVW MEDS BY RX/DR IN RCRD: CPT | Mod: CPTII,S$GLB,, | Performed by: INTERNAL MEDICINE

## 2022-10-11 PROCEDURE — 1101F PR PT FALLS ASSESS DOC 0-1 FALLS W/OUT INJ PAST YR: ICD-10-PCS | Mod: CPTII,S$GLB,, | Performed by: INTERNAL MEDICINE

## 2022-10-11 PROCEDURE — 99214 OFFICE O/P EST MOD 30 MIN: CPT | Mod: S$GLB,,, | Performed by: INTERNAL MEDICINE

## 2022-10-11 PROCEDURE — 99214 PR OFFICE/OUTPT VISIT, EST, LEVL IV, 30-39 MIN: ICD-10-PCS | Mod: S$GLB,,, | Performed by: INTERNAL MEDICINE

## 2022-10-11 PROCEDURE — 1126F AMNT PAIN NOTED NONE PRSNT: CPT | Mod: CPTII,S$GLB,, | Performed by: INTERNAL MEDICINE

## 2022-10-11 PROCEDURE — 1159F MED LIST DOCD IN RCRD: CPT | Mod: CPTII,S$GLB,, | Performed by: INTERNAL MEDICINE

## 2022-10-11 PROCEDURE — 99999 PR PBB SHADOW E&M-EST. PATIENT-LVL III: ICD-10-PCS | Mod: PBBFAC,,, | Performed by: INTERNAL MEDICINE

## 2022-10-11 PROCEDURE — 3078F PR MOST RECENT DIASTOLIC BLOOD PRESSURE < 80 MM HG: ICD-10-PCS | Mod: CPTII,S$GLB,, | Performed by: INTERNAL MEDICINE

## 2022-10-11 PROCEDURE — 3078F DIAST BP <80 MM HG: CPT | Mod: CPTII,S$GLB,, | Performed by: INTERNAL MEDICINE

## 2022-10-11 PROCEDURE — 1160F PR REVIEW ALL MEDS BY PRESCRIBER/CLIN PHARMACIST DOCUMENTED: ICD-10-PCS | Mod: CPTII,S$GLB,, | Performed by: INTERNAL MEDICINE

## 2022-10-11 PROCEDURE — 1101F PT FALLS ASSESS-DOCD LE1/YR: CPT | Mod: CPTII,S$GLB,, | Performed by: INTERNAL MEDICINE

## 2022-10-11 PROCEDURE — 3075F PR MOST RECENT SYSTOLIC BLOOD PRESS GE 130-139MM HG: ICD-10-PCS | Mod: CPTII,S$GLB,, | Performed by: INTERNAL MEDICINE

## 2022-10-11 PROCEDURE — 3075F SYST BP GE 130 - 139MM HG: CPT | Mod: CPTII,S$GLB,, | Performed by: INTERNAL MEDICINE

## 2022-10-11 PROCEDURE — 3288F PR FALLS RISK ASSESSMENT DOCUMENTED: ICD-10-PCS | Mod: CPTII,S$GLB,, | Performed by: INTERNAL MEDICINE

## 2022-10-11 PROCEDURE — 3288F FALL RISK ASSESSMENT DOCD: CPT | Mod: CPTII,S$GLB,, | Performed by: INTERNAL MEDICINE

## 2022-10-11 NOTE — ASSESSMENT & PLAN NOTE
--Patient with osteopenia  --No hx of fragility fracture  --Was on Fosamax for 5 years from 2006 to 2011  --Previously followed by Dr. Dumont  --Last BMD in 2020 showed continued osteopenia with FRAX not indicating a need to treat  --Gets calcium and vitamin D from multivitamin  --Increased calcium intake in diet since last visit  --Continue weight bearing exercise as tolerated  --Discussed indications to restart medical therapy in the future (osteoporosis or FRAX recommending therapy)   --Will repeat BMD now

## 2022-10-11 NOTE — PROGRESS NOTES
Subjective:      Patient ID: Ida Santana is a 82 y.o. female.    Chief Complaint:  Osteopenia      History of Present Illness  Ms. Santana presents for evaluation and management of osteopenia. Previous patient of Dr. Dumont. Last visit with me in 10/2021.    No significant medical changes since last visit.      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     First diagnosed with osteopenia in 2006.      Current osteopenia regimen:  Takes womens MVI with calcium and vitamin D  Since her last visit she has increased the amount of calcium in her diet     Osteopenia medication history:  Fosamax from 7452-1335     Risk factors for osteoporosis  -Personal history of fracture: none  -Family history of hip fracture or osteoporosis: none  -Premature/Early menopause: age 52, no hx of HRT  -Chronic steroid therapy: none  -History of rheumatoid arthritis: none  -Smoking History: never smoked  -Current EtOH use: rare EtOH  -Fall Risk: good balance, no recent falls     Risk for Secondary Osteoporosis:  -TSH: normal  -Calcium: normal  -Vitamin D: normal  -Anemia and renal insufficiency: none  -Signs/symptoms of malabsorption: none  -PPI use: none  -Diabetes: hx of IGT, prediabetes  -Chronic liver disease: none  -Hx gastric bypass surgery: none     Height loss:  None     Exercise:  Continues to walk a few days per week    No falls, good balance.      Pending dental work:  None      Last Bone Density Scan dated 10/2020:  Comparison study done on 01/06/2017.     Lumbar spine:  BMD 1.057 g/cm2 and T-score 0.1.     Total Hip:        BMD 0.839 g/cm2 and T-score -0.8.     FINDINGS:  Lumbar spine (L1-L4):              BMD is 0.998 g/cm2, T-score is -0.4, and Z-score is 1.5.     Total hip:                               BMD is 0.837 g/cm2, T-score is -0.9, and Z-score is 0.2.     Femoral neck:                         BMD is 0.623 g/cm2, T-score is -2.0, and Z-score is -0.6.     FRAX:     7.0% risk of a  major osteoporotic fracture in the next 10 years.     1.9% risk of hip fracture in the next 10 years.     Impression:     1. Osteopenia; FRAX calculations do not support treatment as osteoporosis.  2. Compared with previous DXA, BMD at the lumbar spine has declined by 5.6%, and the BMD at the total hip has remained stable.    Review of Systems   Constitutional:  Negative for chills and fever.   Gastrointestinal:  Negative for nausea.   No CP  No SOB    Objective:   Physical Exam  Vitals and nursing note reviewed.   No thyromegaly, no thyroid nodules palpated    BP Readings from Last 3 Encounters:   10/11/22 130/78   02/16/22 130/64   12/02/21 116/62     Wt Readings from Last 1 Encounters:   10/11/22 0746 70.7 kg (155 lb 13.8 oz)       Body mass index is 24.41 kg/m².    Lab Review:   Lab Results   Component Value Date    HGBA1C 6.0 01/06/2017     Lab Results   Component Value Date    CHOL 160 10/06/2021    HDL 43 10/06/2021    LDLCALC 96.0 10/06/2021    TRIG 105 10/06/2021    CHOLHDL 26.9 10/06/2021     Lab Results   Component Value Date     03/23/2022    K 4.1 03/23/2022     03/23/2022    CO2 27 03/23/2022    GLU 92 03/23/2022    BUN 16 03/23/2022    CREATININE 0.8 03/23/2022    CALCIUM 10.1 03/23/2022    PROT 7.7 03/23/2022    ALBUMIN 4.1 03/23/2022    BILITOT 0.5 03/23/2022    ALKPHOS 83 03/23/2022    AST 17 03/23/2022    ALT 14 03/23/2022    ANIONGAP 6 (L) 03/23/2022    ESTGFRAFRICA >60.0 03/23/2022    EGFRNONAA >60.0 03/23/2022    TSH 1.760 03/14/2017         Assessment and Plan     Osteopenia  --Patient with osteopenia  --No hx of fragility fracture  --Was on Fosamax for 5 years from 2006 to 2011  --Previously followed by Dr. uDmont  --Last BMD in 2020 showed continued osteopenia with FRAX not indicating a need to treat  --Gets calcium and vitamin D from multivitamin  --Increased calcium intake in diet since last visit  --Continue weight bearing exercise as tolerated  --Discussed indications to  restart medical therapy in the future (osteoporosis or FRAX recommending therapy)   --Will repeat BMD now    Hypertension  --Bp at goal on current regimen    History of breast cancer in female  --Follows with Dr. Harden   --Remains in remission      Bone density now, RTC in one year      Magdi Leyva M.D. Staff Endocrinology

## 2022-10-12 ENCOUNTER — IMMUNIZATION (OUTPATIENT)
Dept: PHARMACY | Facility: CLINIC | Age: 82
End: 2022-10-12
Payer: COMMERCIAL

## 2022-10-12 ENCOUNTER — TELEPHONE (OUTPATIENT)
Dept: RADIATION ONCOLOGY | Facility: CLINIC | Age: 82
End: 2022-10-12
Payer: COMMERCIAL

## 2022-10-12 NOTE — TELEPHONE ENCOUNTER
Message left on answer machine that patient no longer needs to follow up with Dr Harden as long as she sees her PCP yearly

## 2022-10-12 NOTE — TELEPHONE ENCOUNTER
----- Message from Roxie Sanderson sent at 10/12/2022 12:38 PM CDT -----  Regarding: appointment  Pt called to setup a appointment with  she says her yearly exam is over due.She can be reached at [phone]

## 2022-10-24 ENCOUNTER — TELEPHONE (OUTPATIENT)
Dept: INTERNAL MEDICINE | Facility: CLINIC | Age: 82
End: 2022-10-24
Payer: COMMERCIAL

## 2022-10-24 ENCOUNTER — TELEPHONE (OUTPATIENT)
Dept: OBSTETRICS AND GYNECOLOGY | Facility: CLINIC | Age: 82
End: 2022-10-24
Payer: COMMERCIAL

## 2022-10-24 DIAGNOSIS — Z12.31 ENCOUNTER FOR SCREENING MAMMOGRAM FOR MALIGNANT NEOPLASM OF BREAST: Primary | ICD-10-CM

## 2022-10-24 NOTE — TELEPHONE ENCOUNTER
----- Message from Hamlet Ho sent at 10/24/2022 10:23 AM CDT -----  PLEASE CALL PT SHE HAS A QUESTION [phone]

## 2022-10-24 NOTE — TELEPHONE ENCOUNTER
Ordered [facility] mamm[facility] diagnostic per pt hx. Pt has hx of breast ca. Dr Murray usually orders but retired. Please advise

## 2022-10-24 NOTE — TELEPHONE ENCOUNTER
----- Message from Yanique Styles sent at 10/24/2022 10:39 AM CDT -----  Contact: Pt  Pt. Is calling to orders put in for a mammo  Pt. Would like a call back when order is in.  Pt is trying to get the appt. Before the end of December.     Confirmed contact below:   Contact Name:Ida Santana  Phone Number: [phone]

## 2022-10-25 ENCOUNTER — TELEPHONE (OUTPATIENT)
Dept: OBSTETRICS AND GYNECOLOGY | Facility: CLINIC | Age: 82
End: 2022-10-25
Payer: COMMERCIAL

## 2022-10-25 NOTE — TELEPHONE ENCOUNTER
"----- Message from Virginia Cassius sent at 10/25/2022  1:19 PM CDT -----  Regarding: Returning Call                Name of Who is Calling: OSIRIS SORIANO    Who Left The Message: OSIRIS SORIANO      What is the request in detail:      Patient called stating,  "She's returning the Office's call and would like you to please call again."  Please further advise.   Thank you!       Reply by [facility]: NO      Preferred Call Back :     [phone] (R)                                          "

## 2022-10-27 ENCOUNTER — TELEPHONE (OUTPATIENT)
Dept: RADIOLOGY | Facility: HOSPITAL | Age: 82
End: 2022-10-27
Payer: COMMERCIAL

## 2022-10-27 NOTE — TELEPHONE ENCOUNTER
----- Message from Maci Valles sent at 10/24/2022 12:43 PM CDT -----  Regarding: appt  Contact: [phone]  Pt calling to schedule a diagnostic mammogram, has order in [facility]. Please call

## 2022-10-31 ENCOUNTER — APPOINTMENT (OUTPATIENT)
Dept: RADIOLOGY | Facility: CLINIC | Age: 82
End: 2022-10-31
Attending: INTERNAL MEDICINE
Payer: COMMERCIAL

## 2022-10-31 DIAGNOSIS — M85.80 OSTEOPENIA, UNSPECIFIED LOCATION: ICD-10-CM

## 2022-10-31 PROCEDURE — 77080 DXA BONE DENSITY AXIAL: CPT | Mod: 26,,, | Performed by: INTERNAL MEDICINE

## 2022-10-31 PROCEDURE — 77080 DEXA BONE DENSITY SPINE HIP: ICD-10-PCS | Mod: 26,,, | Performed by: INTERNAL MEDICINE

## 2022-10-31 PROCEDURE — 77080 DXA BONE DENSITY AXIAL: CPT | Mod: TC,PO

## 2022-11-04 ENCOUNTER — TELEPHONE (OUTPATIENT)
Dept: ENDOCRINOLOGY | Facility: CLINIC | Age: 82
End: 2022-11-04
Payer: COMMERCIAL

## 2022-11-04 NOTE — TELEPHONE ENCOUNTER
Please advise patient that I reviewed her bone density. Her bone density shows osteopenia and is stable compared to the scan 2 years ago. We will continue to monitor with a repeat bone density in 2 years.

## 2022-11-04 NOTE — TELEPHONE ENCOUNTER
Left message informing her Dr. Leyva  reviewed her bone density. Her bone density shows osteopenia and is stable compared to the scan 2 years ago. We will continue to monitor with a repeat bone density in 2 years.

## 2022-11-21 ENCOUNTER — IMMUNIZATION (OUTPATIENT)
Dept: INTERNAL MEDICINE | Facility: CLINIC | Age: 82
End: 2022-11-21
Payer: COMMERCIAL

## 2022-11-21 DIAGNOSIS — Z23 NEED FOR VACCINATION: Primary | ICD-10-CM

## 2022-11-21 PROCEDURE — 0124A COVID-19, MRNA, LNP-S, BIVALENT BOOSTER, PF, 30 MCG/0.3 ML DOSE: CPT | Mod: CV19,PBBFAC | Performed by: INTERNAL MEDICINE

## 2022-11-21 PROCEDURE — 91312 COVID-19, MRNA, LNP-S, BIVALENT BOOSTER, PF, 30 MCG/0.3 ML DOSE: CPT | Mod: S$GLB,,, | Performed by: INTERNAL MEDICINE

## 2022-11-21 PROCEDURE — 91312 COVID-19, MRNA, LNP-S, BIVALENT BOOSTER, PF, 30 MCG/0.3 ML DOSE: ICD-10-PCS | Mod: S$GLB,,, | Performed by: INTERNAL MEDICINE

## 2022-12-08 ENCOUNTER — OFFICE VISIT (OUTPATIENT)
Dept: OBSTETRICS AND GYNECOLOGY | Facility: CLINIC | Age: 82
End: 2022-12-08
Payer: COMMERCIAL

## 2022-12-08 VITALS
HEIGHT: 67 IN | DIASTOLIC BLOOD PRESSURE: 78 MMHG | WEIGHT: 154.56 LBS | SYSTOLIC BLOOD PRESSURE: 126 MMHG | BODY MASS INDEX: 24.26 KG/M2

## 2022-12-08 DIAGNOSIS — Z78.0 POSTMENOPAUSAL: ICD-10-CM

## 2022-12-08 DIAGNOSIS — Z01.419 ENCOUNTER FOR GYNECOLOGICAL EXAMINATION WITHOUT ABNORMAL FINDING: Primary | ICD-10-CM

## 2022-12-08 PROCEDURE — 1126F PR PAIN SEVERITY QUANTIFIED, NO PAIN PRESENT: ICD-10-PCS | Mod: CPTII,S$GLB,, | Performed by: OBSTETRICS & GYNECOLOGY

## 2022-12-08 PROCEDURE — 3074F SYST BP LT 130 MM HG: CPT | Mod: CPTII,S$GLB,, | Performed by: OBSTETRICS & GYNECOLOGY

## 2022-12-08 PROCEDURE — 99999 PR PBB SHADOW E&M-EST. PATIENT-LVL III: CPT | Mod: PBBFAC,,, | Performed by: OBSTETRICS & GYNECOLOGY

## 2022-12-08 PROCEDURE — 1160F PR REVIEW ALL MEDS BY PRESCRIBER/CLIN PHARMACIST DOCUMENTED: ICD-10-PCS | Mod: CPTII,S$GLB,, | Performed by: OBSTETRICS & GYNECOLOGY

## 2022-12-08 PROCEDURE — 99397 PR PREVENTIVE VISIT,EST,65 & OVER: ICD-10-PCS | Mod: S$GLB,,, | Performed by: OBSTETRICS & GYNECOLOGY

## 2022-12-08 PROCEDURE — 1101F PR PT FALLS ASSESS DOC 0-1 FALLS W/OUT INJ PAST YR: ICD-10-PCS | Mod: CPTII,S$GLB,, | Performed by: OBSTETRICS & GYNECOLOGY

## 2022-12-08 PROCEDURE — 3078F DIAST BP <80 MM HG: CPT | Mod: CPTII,S$GLB,, | Performed by: OBSTETRICS & GYNECOLOGY

## 2022-12-08 PROCEDURE — 3078F PR MOST RECENT DIASTOLIC BLOOD PRESSURE < 80 MM HG: ICD-10-PCS | Mod: CPTII,S$GLB,, | Performed by: OBSTETRICS & GYNECOLOGY

## 2022-12-08 PROCEDURE — 1159F PR MEDICATION LIST DOCUMENTED IN MEDICAL RECORD: ICD-10-PCS | Mod: CPTII,S$GLB,, | Performed by: OBSTETRICS & GYNECOLOGY

## 2022-12-08 PROCEDURE — 1159F MED LIST DOCD IN RCRD: CPT | Mod: CPTII,S$GLB,, | Performed by: OBSTETRICS & GYNECOLOGY

## 2022-12-08 PROCEDURE — 1160F RVW MEDS BY RX/DR IN RCRD: CPT | Mod: CPTII,S$GLB,, | Performed by: OBSTETRICS & GYNECOLOGY

## 2022-12-08 PROCEDURE — 99397 PER PM REEVAL EST PAT 65+ YR: CPT | Mod: S$GLB,,, | Performed by: OBSTETRICS & GYNECOLOGY

## 2022-12-08 PROCEDURE — 3074F PR MOST RECENT SYSTOLIC BLOOD PRESSURE < 130 MM HG: ICD-10-PCS | Mod: CPTII,S$GLB,, | Performed by: OBSTETRICS & GYNECOLOGY

## 2022-12-08 PROCEDURE — 1101F PT FALLS ASSESS-DOCD LE1/YR: CPT | Mod: CPTII,S$GLB,, | Performed by: OBSTETRICS & GYNECOLOGY

## 2022-12-08 PROCEDURE — 3288F FALL RISK ASSESSMENT DOCD: CPT | Mod: CPTII,S$GLB,, | Performed by: OBSTETRICS & GYNECOLOGY

## 2022-12-08 PROCEDURE — 3288F PR FALLS RISK ASSESSMENT DOCUMENTED: ICD-10-PCS | Mod: CPTII,S$GLB,, | Performed by: OBSTETRICS & GYNECOLOGY

## 2022-12-08 PROCEDURE — 1126F AMNT PAIN NOTED NONE PRSNT: CPT | Mod: CPTII,S$GLB,, | Performed by: OBSTETRICS & GYNECOLOGY

## 2022-12-08 PROCEDURE — 99999 PR PBB SHADOW E&M-EST. PATIENT-LVL III: ICD-10-PCS | Mod: PBBFAC,,, | Performed by: OBSTETRICS & GYNECOLOGY

## 2022-12-08 NOTE — PROGRESS NOTES
CC: Well woman exam    Ida Santana is a 82 y.o. female  presents for a well woman exam.  LMP: No LMP recorded (lmp unknown). Patient is postmenopausal..  No issues, problems, or complaints.    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    Osteopenia     Pancreas cyst     Potassium depletion     Retinal detachment of left eye with retinal break 3/13/2017     Past Surgical History:   Procedure Laterality Date    Adrenal Gland Surgery to right side      BREAST BIOPSY Left     left breast DCIS    BREAST LUMPECTOMY Left 2001    CATARACT EXTRACTION W/  INTRAOCULAR LENS IMPLANT Left 2017    with PPV/SO removal ( AND )    COLONOSCOPY      COLONOSCOPY N/A 10/31/2016    Procedure: COLONOSCOPY;  Surgeon: YAMILETH Richards MD;  Location: [facility] (4TH FLR);  Service: Endoscopy;  Laterality: N/A;    ENDOSCOPIC ULTRASOUND OF UPPER GASTROINTESTINAL TRACT N/A 2019    Procedure: ULTRASOUND, UPPER GI TRACT, ENDOSCOPIC;  Surgeon: Ladonna Wells MD;  Location: [facility] (2ND FLR);  Service: Endoscopy;  Laterality: N/A;    ENDOSCOPIC ULTRASOUND OF UPPER GASTROINTESTINAL TRACT N/A 2020    Procedure: ULTRASOUND, UPPER GI TRACT, ENDOSCOPIC;  Surgeon: Ladonna Wells MD;  Location: [facility] (2ND FLR);  Service: Endoscopy;  Laterality: N/A;  -[address]    ERCP N/A 2020    Procedure: ERCP (ENDOSCOPIC RETROGRADE CHOLANGIOPANCREATOGRAPHY);  Surgeon: Ladonna Wells MD;  Location: [facility] ENDO (2ND FLR);  Service: Endoscopy;  Laterality: N/A;    KIDNEY SURGERY  2017    patient does not know what they did, but knows she does not have kidney cancer     "PARS PLANA VITRECTOMY Left 2017    25g PPV WITH SO REMOVAL AND PHACO IOL ( AND )    RETINAL DETACHMENT SURGERY Left 2017    PPVx and SO Removal//Phaco IOL ( AND )     Social History     Socioeconomic History    Marital status: Single   Tobacco Use    Smoking status: Never    Smokeless tobacco: Never   Substance and Sexual Activity    Alcohol use: No    Drug use: No    Sexual activity: Never     Birth control/protection: Post-menopausal   Social History Narrative    Retired  (34 years) with [employer]    Masters in Early Childhood Education        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        Exercise:  Walks several days a week.     Family History   Problem Relation Age of Onset    Cancer Mother         breast    Breast cancer Mother         40s and again in 50s (bilateral)    Breast cancer Other 40    Heart disease Father         bypass    Cancer Father         asbestos    Stroke Brother     Hypertension Brother     Ovarian cancer Neg Hx     Amblyopia Neg Hx     Blindness Neg Hx     Cataracts Neg Hx     Diabetes Neg Hx     Glaucoma Neg Hx     Macular degeneration Neg Hx     Retinal detachment Neg Hx     Strabismus Neg Hx     Thyroid disease Neg Hx     Colon cancer Neg Hx     Melanoma Neg Hx      OB History          0    Para        Term   0            AB        Living             SAB        IAB        Ectopic        Multiple        Live Births                     /78   Ht 5' 7" (1.702 m)   Wt 70.1 kg (154 lb 8.7 oz)   LMP  (LMP Unknown)   BMI 24.20 kg/m²       ROS:    ROS:  GENERAL: Denies weight gain or weight loss. Feeling well overall.   SKIN: Denies rash or lesions.   HEAD: Denies head injury or headache.   NODES: Denies enlarged lymph nodes.   CHEST: Denies chest pain or shortness of breath.   CARDIOVASCULAR: Denies palpitations or left sided chest pain.   ABDOMEN: No abdominal pain, constipation, diarrhea, nausea, " vomiting or rectal bleeding.   URINARY: No frequency, dysuria, hematuria, or burning on urination.  REPRODUCTIVE: See HPI.   BREASTS: The patient performs breast self-examination and denies pain, lumps, or nipple discharge.   HEMATOLOGIC: No easy bruisability or excessive bleeding.   MUSCULOSKELETAL: Denies joint pain or swelling.   NEUROLOGIC: Denies syncope or weakness.   PSYCHIATRIC: Denies depression, anxiety or mood swings.    PHYSICAL EXAM:    APPEARANCE: Well nourished, well developed, in no acute distress.  AFFECT: WNL, alert and oriented x 3  SKIN: No acne or hirsutism  NECK: Neck symmetric without masses or thyromegaly  NODES: No inguinal, cervical, axillary, or femoral lymph node enlargement  CHEST: Good respiratory effect  ABDOMEN: Soft.  No tenderness or masses.  No hepatosplenomegaly.  No hernias.  BREASTS: Symmetrical, no skin changes or visible lesions.  No palpable masses, nipple discharge bilaterally.  PELVIC: Normal external genitalia without lesions.  Normal hair distribution.  Adequate perineal body, normal urethral meatus.  Vagina moist and well rugated without lesions or discharge.  Cervix pink, without lesions, discharge or tenderness.  No significant cystocele or rectocele.  Bimanual exam shows uterus to be normal size, regular, mobile and nontender.  Adnexa without masses or tenderness.    RECTAL: Rectovaginal exam confirms above with normal sphincter tone, no masses.  EXTREMITIES: No edema.      ICD-10-CM ICD-9-CM    1. Encounter for gynecological examination without abnormal finding  Z01.419 V72.31       2. Postmenopausal  Z78.0 V49.81             Patient was counseled today on A.C.S. Pap guidelines and recommendations for yearly pelvic exams, mammograms and monthly self breast exams; to see her PCP for other health maintenance.       Follow up in about 1 year (around 12/8/2023), or if symptoms worsen or fail to improve.

## 2022-12-15 ENCOUNTER — HOSPITAL ENCOUNTER (OUTPATIENT)
Dept: RADIOLOGY | Facility: HOSPITAL | Age: 82
Discharge: HOME | End: 2022-12-15
Attending: INTERNAL MEDICINE
Payer: COMMERCIAL

## 2022-12-15 DIAGNOSIS — Z12.31 ENCOUNTER FOR SCREENING MAMMOGRAM FOR MALIGNANT NEOPLASM OF BREAST: ICD-10-CM

## 2022-12-15 PROCEDURE — 77062 BREAST TOMOSYNTHESIS BI: CPT | Mod: 26,,, | Performed by: RADIOLOGY

## 2022-12-15 PROCEDURE — 77066 DX MAMMO INCL CAD BI: CPT | Mod: TC

## 2022-12-15 PROCEDURE — 77066 DX MAMMO INCL CAD BI: CPT | Mod: 26,,, | Performed by: RADIOLOGY

## 2022-12-15 PROCEDURE — 77062 MAMMO DIGITAL DIAGNOSTIC BILAT WITH TOMO: ICD-10-PCS | Mod: 26,,, | Performed by: RADIOLOGY

## 2022-12-15 PROCEDURE — 77066 MAMMO DIGITAL DIAGNOSTIC BILAT WITH TOMO: ICD-10-PCS | Mod: 26,,, | Performed by: RADIOLOGY

## 2023-01-01 NOTE — PROGRESS NOTES
Subjective:       Patient ID: Ida Santana is a 76 y.o. female.    Chief Complaint: Other (2 week post op)      HPI: Ida Santana is a 76 y.o. Black or  female who returns today in follow-up for a left adrenal mass and a potential left renal mass.    The mass was found incidentally during a recent hospitalization for pneumonia. During this evaluation, the patient was found to have a left adrenal mass, possible left renal mass, and a left renal vein thrombus. The patient has been discharged from the hospital and is doing well. She is due to finish her antibiotics this week. The patient is taking blood thinners for her left renal vein thrombus.    The mass measures 4.2 cm.    The patient has undergone a functional adrenal evaluation which was negative for a biochemically active adrenal lesion.    The patient denies gross hematuria and/or constitutional symptoms attributable to her recently discovered adrenal mass.    The patient denies a personal and family history of adrenal cortical cancer.    The patient does not have a history of any other cancer.    The patient returns today for post op visit.   Her only complaint is incisional soreness. She states it is not painful and not on pain score.   She is not taking pain medication.   Reports that her incision is healing well. Denies drainage.    No urinary complaints.   Denies f/c or n/v.   Reports good BM.      3/22/17 Procedure(s) Performed:   Open left adrenelectomy     Specimen(s):  1. Left adrenal gland  2. Fat surrounding adrenal bed    3/22/17 FINAL PATHOLOGIC DIAGNOSIS:  1. 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 FRAGMENTS OF NONNEOPLASTIC LEFT ADRENAL GLAND TISSUE  NOTE: THIS CASE WAS REVIEWED BY  WHO CONCURS WITH THE DIAGNOSIS.      Review of patient's allergies indicates:   Allergen Reactions    Adhesive tape-silicones      Other reaction(s): pulling skin off    Atorvastatin      Other reaction(s): Muscle pain     Pravachol  [pravastatin] Rash       Current Outpatient Prescriptions   Medication Sig Dispense Refill    carvedilol (COREG) 25 MG tablet Take 1 tablet (25 mg total) by mouth 2 (two) times daily with meals. 60 tablet 3    dorzolamide-timolol 2-0.5% (COSOPT) 22.3-6.8 mg/mL ophthalmic solution Place 1 drop into the left eye 2 (two) times daily. 10 mL 6    flurandrenolide (CORDRAN) 0.05 % lotion Apply to  scalp one to two times daily as needed for dryness      ketoconazole (NIZORAL) 2 % shampoo USE EVERY OTHER DAY LET SIT ON SCALP FOR FIVE MINUTES BEFORE RINSING 120 mL 6    latanoprost 0.005 % ophthalmic solution   1    lisinopril (PRINIVIL,ZESTRIL) 20 MG tablet Take 1 tablet (20 mg total) by mouth once daily. 30 tablet 3    MULTIVITAMIN WITH MINERALS (ONE-A-DAY 50 PLUS) Tab Take 1 tablet by mouth once daily.       om-3-epa-dha-fish oil-flax-E (THERA TEARS NUTRITION) [phone] cz-db-fe-unit Cap Take 3 capsules by mouth every morning. Over-the-counter supplement      oxycodone-acetaminophen (PERCOCET) 5-325 mg per tablet Take 1 tablet by mouth every 4 (four) hours as needed for Pain. 51 tablet 0    polyethylene glycol (GLYCOLAX) 17 gram PwPk Take 17 g by mouth once daily. 30 packet 0    potassium chloride SA (K-DUR,KLOR-CON) 20 MEQ tablet take 1 tablet by mouth twice a day 60 tablet 12    rivaroxaban (XARELTO) 20 mg Tab Take 20 mg by mouth once daily.      verapamil (CALAN-SR) 240 MG CR tablet take 1 tablet by mouth twice a day 30 tablet 12     No current facility-administered medications for this visit.        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    History of uterine fibroid     Hyperlipidemia     dyslipidemia    Hypertension     Open angle 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 eyes 5/2/2013    Osteopenia     Potassium depletion 1998    Retinal detachment of left eye with retinal break 3/13/2017        Past Surgical History:   Procedure Laterality Date    BREAST BIOPSY  2001    left breast DCIS    BREAST SURGERY  9/2001    left lumpectomy with SLN bx    COLONOSCOPY      COLONOSCOPY N/A 10/31/2016    Procedure: COLONOSCOPY;  Surgeon: YAMILETH Richards MD;  Location: [facility] ([address] [place] [address]);  Service: Endoscopy;  Laterality: N/A;       Family History   Problem Relation Age of Onset    Cancer Mother      breast    Breast cancer Mother      40s and again in 50s (bilateral)    Breast cancer Other     Heart disease Father      bypass    Cancer Father      asbestos    Stroke Brother     Ovarian cancer Neg Hx     Amblyopia Neg Hx     Blindness Neg Hx     Cataracts Neg Hx     Diabetes Neg Hx     Glaucoma Neg Hx     Hypertension Neg Hx     Macular degeneration Neg Hx     Retinal detachment Neg Hx     Strabismus Neg Hx     Thyroid disease Neg Hx     Colon cancer Neg Hx        Review of Systems    Review of Systems   Constitutional: Negative for fever, chills, activity change, appetite change and unexpected weight change.   HENT: Negative for congestion, nosebleeds, sneezing, sore throat and trouble swallowing.    Eyes: Negative for pain, discharge, redness and visual disturbance.   Respiratory: Negative for cough, choking, chest tightness and shortness of breath.    Cardiovascular: Negative for chest pain, palpitations and leg swelling.   Gastrointestinal: Negative for nausea, vomiting, abdominal pain, diarrhea, blood in stool, abdominal distention and anal bleeding.  Genitourinary: As documented per HPI   Endocrine: Negative for cold intolerance, heat intolerance, polydipsia, polyphagia and polyuria.   Musculoskeletal: Negative for myalgias, gait problem, neck pain and neck stiffness.   Skin: Negative for color change, pallor, rash and wound.   Allergic/Immunologic: Negative for immunocompromised state.   Neurological: Negative for seizures, facial asymmetry, speech difficulty, weakness and  light-headedness.   Hematological: Negative for adenopathy. Does not bruise/bleed easily.   Psychiatric/Behavioral: Negative for hallucinations, behavioral problems, self-injury and agitation. The patient is not hyperactive.      All other systems were reviewed and were negative.    Objective:     Vitals:    04/10/17 1048   BP: (!) 99/53   Pulse: (!) 56   Temp: 98.9 °F (37.2 °C)     Physical Exam   Vitals reviewed.  Constitutional: She is oriented to person, place, and time. She appears well-developed and well-nourished. No distress.   HENT:   Head: Normocephalic and atraumatic.   Right Ear: External ear normal.   Left Ear: External ear normal.   Nose: Nose normal.   Eyes: EOM are normal. Pupils are equal, round, and reactive to light. Right eye exhibits no discharge. Left eye exhibits no discharge.   Neck: Normal range of motion. Neck supple. No tracheal deviation present. No thyroid enlargement or tenderness.  Cardiovascular: Normal heart sounds and intact distal pulses. No signs of peripheral edema.   Pulmonary/Chest: Effort normal and breath sounds normal. No respiratory distress. She has no wheezes.   Abdominal: Soft. Bowel sounds are normal. She exhibits no distension. There is no tenderness. Incision healing well. No drainage or erythema.   Musculoskeletal: Normal range of motion. She exhibits no edema.   Neurological: She is alert and oriented to person, place, and time. Coordination normal.   Skin: Skin is warm and dry. She is not diaphoretic.   Lymphatic: No palpable lymph nodes.  Psychiatric: She has a normal mood and affect. Her behavior is normal. Judgment and thought content normal.     Lab Results   Component Value Date    CREATININE 0.8 03/23/2017     Lab Results   Component Value Date    EGFRNONAA >60.0 03/23/2017     Lab Results   Component Value Date    ESTGFRAFRICA >60.0 03/23/2017     CT abdomen/pelvis with contrast (1/18/17): Left renal vein thrombosis with associated left perinephric fat  stranding and perinephric fluid with diffuse enlargement of the left kidney. There is a 1.7 cm hypodense lesion in the inferior pole of the left kidney which could represent renal infarction but underlying neoplastic lesion not excluded. Large soft tissue density lesion in the left upper abdomen, anterior to the left kidney and superior to the left renal vein.  This is of uncertain etiology and could represent enlarged lymph node and/or metastatic lesion.    CT abdomen without and with contrast (2/15/2017): Large heterogenous LEFT adrenal mass. Interval improvement of bibasilar consolidative changes with some residual nodular opacification that is presumably inflammatory in nature when compared to prior CT examination 01/18/2017. Interval near-complete resolution of bilateral pleural effusions. 1.7 cm nonenhancing focal hypodensity in the LEFT kidney with thick capsule not generally seen with simple cysts.  Consider further evaluation with nonemergent ultrasound.  No enhancement of this nodule is noted. Interval decrease in LEFT perinephric stranding.  Interval resolution of LEFT renal vein thrombosis.    Assessment:       1. Post-operative state    2. Left adrenal mass    3. Kidney mass        Plan:     Ida was seen today for other.    Diagnoses and all orders for this visit:    Post-operative state    Left adrenal mass    Kidney mass    -Discussed post op expectations  -Discussed pathology.   -RTC prn       negative

## 2023-01-08 ENCOUNTER — TELEPHONE (OUTPATIENT)
Dept: ENDOSCOPY | Facility: HOSPITAL | Age: 83
End: 2023-01-08
Payer: COMMERCIAL

## 2023-01-08 DIAGNOSIS — R93.89 ABNORMAL FINDING ON IMAGING: Primary | ICD-10-CM

## 2023-01-19 ENCOUNTER — HOSPITAL ENCOUNTER (OUTPATIENT)
Dept: RADIOLOGY | Facility: HOSPITAL | Age: 83
Discharge: HOME | End: 2023-01-19
Attending: INTERNAL MEDICINE
Payer: COMMERCIAL

## 2023-01-19 DIAGNOSIS — R93.89 ABNORMAL FINDING ON IMAGING: ICD-10-CM

## 2023-01-19 PROCEDURE — 74181 MRI ABDOMEN W/O CONTRAST: CPT | Mod: 26,,, | Performed by: RADIOLOGY

## 2023-01-19 PROCEDURE — 76376 3D RENDER W/INTRP POSTPROCES: CPT | Mod: 26,,, | Performed by: RADIOLOGY

## 2023-01-19 PROCEDURE — 76376 MRI ABDOMEN WITHOUT CONTRAST MRCP: ICD-10-PCS | Mod: 26,,, | Performed by: RADIOLOGY

## 2023-01-19 PROCEDURE — 74181 MRI ABDOMEN W/O CONTRAST: CPT | Mod: TC

## 2023-01-19 PROCEDURE — 74181 MRI ABDOMEN WITHOUT CONTRAST MRCP: ICD-10-PCS | Mod: 26,,, | Performed by: RADIOLOGY

## 2023-01-20 NOTE — PROGRESS NOTES
HPI    DLS: 9/23/2022    Pt here for HVF review/OCT;  Pt states no eye pain or discomfort.     Meds;  Cosopt BID OU   Latanoprost QHS OU   AT's PRN OU     1. POAG   2. 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 SHANTE   4. NS OD   5. PCIOL OS   6. Hx Endophthalmitis OS   7. Rhegmatogenou s RD OS         Last edited by Windy Mosqueda on 1/23/2023  9:54 AM.            Assessment /Plan     For exam results, see Encounter Report.    Primary open angle glaucoma of both eyes, mild stage    Rhegmatogenous retinal detachment of left eye    Nuclear sclerosis of right eye    Anomalous optic nerve    KCS (keratoconjunctivitis sicca)    Pseudophakia    Endophthalmitis, left eye           Glaucoma (type and duration)    Suspect 2/2 lrg cups ou    First HVF   2000   First photos   2013   Treatment / Drops started   xal 1/2016           Family history    none        Glaucoma meds   Cosopt ou bid // ((Latanoprost started 1/4/2016  - Stopped 3/22/2019  Od))  // re-started 1/18/2022 ou for OCT and ? VF prog        H/O adverse rxn to glaucoma drops    none        LASERS    none        GLAUCOMA SURGERIES    none        OTHER EYE SURGERIES    PC IOL os 8/2/2019 (done  W/ [address] - SO removal)    TR repair w/ PPVx and yas oil - os - 2/2 endogen endopth os - 3/15/2017 - Lane        CDR    0.75-0.8 / 0.7-0.75        Tbase    18-22 / 19-22          Tmax    22/30             Ttarget    ? 17/17            HVF    10 test 2000 to  20203- near full od // gen depression - INS         Gonio    +2-3 ou w/ yas oil drop sup os         CCT    510/517        OCT    5  test 2013 to 2023 - RNFL - bord TI  od //  dec G/TS/I, bord NS/N os (+prog os)         HRT    5 test 2016 to 2020 -MR -MR -  Dec. IT od // nl os /// CDR 0.69 od // 0.50 os  (large disc size )         Disc photos    2013, 2015 - OIS     - Ttoday   14/14  - Test done today  IOP // HVF / DFE / OCT       2. Anomaolous Optic nerves    - very large disc size ou    - HRT - 2.97 od // 3.04 os (nl is up to 2.8 ou)     3. Dry  eyes     4. NS - OD   vis sign // consider removal prn     5. H/O endogenous endophthalmitis  OS  - And secondary tract RD - S/P PPVx and Silicone Oil (Mazzulla)    Oil removed at the same time as the phaco/IOL done 8/2/2017     6. S/P complex phaco/IOL os with trypan blue and Leslye ring and SO removal     - (done with mazzulla ) // 8/2/2017// PCB00 19.5   - Doing well - consider yag to ant capsule phimosis / opacification       Cosopt bid  OU -   Latanoprost - STOP/ OFF  (3/22/2019) // re-start 1/18/2022 - ?? OCT - RNFL prog os and ? VF prog os     Minimal change in IOP with re-starting latanoprost    Will cont as it may minimize fluctuations      Happy with new glasses (3/22/2019)     Hold off on yag cap to ant capsule phimosis and PCO OS  - [facility] doing OK - may be affecting the VF os     Ok to use OTC - zaditor or pataday for allergies - bid prn   Cont AT's prn     Recommend phaco/IOL od prn pts wishes - she is not bothered by the vision - no problems with every day activities. But does notice decrease vision  right eye when taking the eye exam. It may help open the angle some - pt will think about it     F/U 4 months AR//BAY - cataract check od

## 2023-01-23 ENCOUNTER — CLINICAL SUPPORT (OUTPATIENT)
Dept: OPHTHALMOLOGY | Facility: CLINIC | Age: 83
End: 2023-01-23
Payer: COMMERCIAL

## 2023-01-23 ENCOUNTER — OFFICE VISIT (OUTPATIENT)
Dept: OPHTHALMOLOGY | Facility: CLINIC | Age: 83
End: 2023-01-23
Payer: COMMERCIAL

## 2023-01-23 DIAGNOSIS — H25.11 NUCLEAR SCLEROSIS OF RIGHT EYE: ICD-10-CM

## 2023-01-23 DIAGNOSIS — H44.002 ENDOPHTHALMITIS, LEFT EYE: ICD-10-CM

## 2023-01-23 DIAGNOSIS — H40.1122 PRIMARY OPEN-ANGLE GLAUCOMA, LEFT EYE, MODERATE STAGE: ICD-10-CM

## 2023-01-23 DIAGNOSIS — Z96.1 PSEUDOPHAKIA: ICD-10-CM

## 2023-01-23 DIAGNOSIS — M35.01 KCS (KERATOCONJUNCTIVITIS SICCA): ICD-10-CM

## 2023-01-23 DIAGNOSIS — H33.002 RHEGMATOGENOUS RETINAL DETACHMENT OF LEFT EYE: ICD-10-CM

## 2023-01-23 DIAGNOSIS — Q07.8 ANOMALOUS OPTIC NERVE: ICD-10-CM

## 2023-01-23 DIAGNOSIS — H40.1131 PRIMARY OPEN ANGLE GLAUCOMA OF BOTH EYES, MILD STAGE: ICD-10-CM

## 2023-01-23 DIAGNOSIS — H40.1111 PRIMARY OPEN-ANGLE GLAUCOMA, RIGHT EYE, MILD STAGE: Primary | ICD-10-CM

## 2023-01-23 PROCEDURE — 1159F PR MEDICATION LIST DOCUMENTED IN MEDICAL RECORD: ICD-10-PCS | Mod: CPTII,S$GLB,, | Performed by: OPHTHALMOLOGY

## 2023-01-23 PROCEDURE — 3288F PR FALLS RISK ASSESSMENT DOCUMENTED: ICD-10-PCS | Mod: CPTII,S$GLB,, | Performed by: OPHTHALMOLOGY

## 2023-01-23 PROCEDURE — 99999 PR PBB SHADOW E&M-EST. PATIENT-LVL III: ICD-10-PCS | Mod: PBBFAC,,, | Performed by: OPHTHALMOLOGY

## 2023-01-23 PROCEDURE — 1160F PR REVIEW ALL MEDS BY PRESCRIBER/CLIN PHARMACIST DOCUMENTED: ICD-10-PCS | Mod: CPTII,S$GLB,, | Performed by: OPHTHALMOLOGY

## 2023-01-23 PROCEDURE — 92014 PR EYE EXAM, EST PATIENT,COMPREHESV: ICD-10-PCS | Mod: S$GLB,,, | Performed by: OPHTHALMOLOGY

## 2023-01-23 PROCEDURE — 3288F FALL RISK ASSESSMENT DOCD: CPT | Mod: CPTII,S$GLB,, | Performed by: OPHTHALMOLOGY

## 2023-01-23 PROCEDURE — 1101F PR PT FALLS ASSESS DOC 0-1 FALLS W/OUT INJ PAST YR: ICD-10-PCS | Mod: CPTII,S$GLB,, | Performed by: OPHTHALMOLOGY

## 2023-01-23 PROCEDURE — 1159F MED LIST DOCD IN RCRD: CPT | Mod: CPTII,S$GLB,, | Performed by: OPHTHALMOLOGY

## 2023-01-23 PROCEDURE — 1101F PT FALLS ASSESS-DOCD LE1/YR: CPT | Mod: CPTII,S$GLB,, | Performed by: OPHTHALMOLOGY

## 2023-01-23 PROCEDURE — 99999 PR PBB SHADOW E&M-EST. PATIENT-LVL III: CPT | Mod: PBBFAC,,, | Performed by: OPHTHALMOLOGY

## 2023-01-23 PROCEDURE — 1126F PR PAIN SEVERITY QUANTIFIED, NO PAIN PRESENT: ICD-10-PCS | Mod: CPTII,S$GLB,, | Performed by: OPHTHALMOLOGY

## 2023-01-23 PROCEDURE — 92014 COMPRE OPH EXAM EST PT 1/>: CPT | Mod: S$GLB,,, | Performed by: OPHTHALMOLOGY

## 2023-01-23 PROCEDURE — 1160F RVW MEDS BY RX/DR IN RCRD: CPT | Mod: CPTII,S$GLB,, | Performed by: OPHTHALMOLOGY

## 2023-01-23 PROCEDURE — 1126F AMNT PAIN NOTED NONE PRSNT: CPT | Mod: CPTII,S$GLB,, | Performed by: OPHTHALMOLOGY

## 2023-01-23 NOTE — PROGRESS NOTES
Rel/fix/coop good od fair os chart checked for allergies pirate patch used-TL  -0.75+1.00X33  -1.00+1.75X142

## 2023-02-14 ENCOUNTER — TELEPHONE (OUTPATIENT)
Dept: ENDOSCOPY | Facility: HOSPITAL | Age: 83
End: 2023-02-14

## 2023-02-14 NOTE — TELEPHONE ENCOUNTER
----- Message from Bebe Brooks sent at 2/14/2023 11:16 AM CST -----  .Type:  Test Results    Who Called: pt   Name of Test (Lab/Mammo/Etc): MRI   Date of Test: 1/19  Ordering Provider: Saroj   Where the test was performed: [facility]  Would the patient rather a call back or a response via [facility]? call  Best Call Back Number: [phone]  Additional Information:

## 2023-02-22 ENCOUNTER — LAB VISIT (OUTPATIENT)
Dept: LAB | Facility: HOSPITAL | Age: 83
End: 2023-02-22
Attending: INTERNAL MEDICINE
Payer: COMMERCIAL

## 2023-02-22 ENCOUNTER — OFFICE VISIT (OUTPATIENT)
Dept: INTERNAL MEDICINE | Facility: CLINIC | Age: 83
End: 2023-02-22
Payer: COMMERCIAL

## 2023-02-22 VITALS
OXYGEN SATURATION: 98 % | HEIGHT: 67 IN | BODY MASS INDEX: 24.01 KG/M2 | HEART RATE: 82 BPM | WEIGHT: 153 LBS | SYSTOLIC BLOOD PRESSURE: 132 MMHG | DIASTOLIC BLOOD PRESSURE: 68 MMHG

## 2023-02-22 DIAGNOSIS — Z85.3 HISTORY OF BREAST CANCER IN FEMALE: ICD-10-CM

## 2023-02-22 DIAGNOSIS — Z00.00 ANNUAL PHYSICAL EXAM: Primary | ICD-10-CM

## 2023-02-22 DIAGNOSIS — I10 ESSENTIAL HYPERTENSION: ICD-10-CM

## 2023-02-22 DIAGNOSIS — I10 HYPERTENSION, UNSPECIFIED TYPE: ICD-10-CM

## 2023-02-22 DIAGNOSIS — K86.89 DILATED PANCREATIC DUCT: ICD-10-CM

## 2023-02-22 DIAGNOSIS — Z00.00 ANNUAL PHYSICAL EXAM: ICD-10-CM

## 2023-02-22 LAB
ALBUMIN SERPL BCP-MCNC: 4.1 G/DL (ref 3.5–5.2)
ALP SERPL-CCNC: 77 U/L (ref 55–135)
ALT SERPL W/O P-5'-P-CCNC: 17 U/L (ref 10–44)
ANION GAP SERPL CALC-SCNC: 9 MMOL/L (ref 8–16)
AST SERPL-CCNC: 19 U/L (ref 10–40)
BILIRUB SERPL-MCNC: 0.7 MG/DL (ref 0.1–1)
BUN SERPL-MCNC: 13 MG/DL (ref 8–23)
CALCIUM SERPL-MCNC: 10.3 MG/DL (ref 8.7–10.5)
CHLORIDE SERPL-SCNC: 107 MMOL/L (ref 95–110)
CO2 SERPL-SCNC: 25 MMOL/L (ref 23–29)
CREAT SERPL-MCNC: 0.8 MG/DL (ref 0.5–1.4)
ERYTHROCYTE [DISTWIDTH] IN BLOOD BY AUTOMATED COUNT: 12.7 % (ref 11.5–14.5)
EST. GFR  (NO RACE VARIABLE): >60 ML/MIN/1.73 M^2
GLUCOSE SERPL-MCNC: 85 MG/DL (ref 70–110)
HCT VFR BLD AUTO: 42.3 % (ref 37–48.5)
HGB BLD-MCNC: 13.9 G/DL (ref 12–16)
MCH RBC QN AUTO: 32 PG (ref 27–31)
MCHC RBC AUTO-ENTMCNC: 32.9 G/DL (ref 32–36)
MCV RBC AUTO: 97 FL (ref 82–98)
PLATELET # BLD AUTO: 208 K/UL (ref 150–450)
PMV BLD AUTO: 11.5 FL (ref 9.2–12.9)
POTASSIUM SERPL-SCNC: 4.1 MMOL/L (ref 3.5–5.1)
PROT SERPL-MCNC: 7.3 G/DL (ref 6–8.4)
RBC # BLD AUTO: 4.35 M/UL (ref 4–5.4)
SODIUM SERPL-SCNC: 141 MMOL/L (ref 136–145)
WBC # BLD AUTO: 4.95 K/UL (ref 3.9–12.7)

## 2023-02-22 PROCEDURE — 1160F RVW MEDS BY RX/DR IN RCRD: CPT | Mod: CPTII,S$GLB,, | Performed by: INTERNAL MEDICINE

## 2023-02-22 PROCEDURE — 36415 COLL VENOUS BLD VENIPUNCTURE: CPT | Performed by: INTERNAL MEDICINE

## 2023-02-22 PROCEDURE — 3078F PR MOST RECENT DIASTOLIC BLOOD PRESSURE < 80 MM HG: ICD-10-PCS | Mod: CPTII,S$GLB,, | Performed by: INTERNAL MEDICINE

## 2023-02-22 PROCEDURE — 1126F PR PAIN SEVERITY QUANTIFIED, NO PAIN PRESENT: ICD-10-PCS | Mod: CPTII,S$GLB,, | Performed by: INTERNAL MEDICINE

## 2023-02-22 PROCEDURE — 1159F MED LIST DOCD IN RCRD: CPT | Mod: CPTII,S$GLB,, | Performed by: INTERNAL MEDICINE

## 2023-02-22 PROCEDURE — 85027 COMPLETE CBC AUTOMATED: CPT | Performed by: INTERNAL MEDICINE

## 2023-02-22 PROCEDURE — 1160F PR REVIEW ALL MEDS BY PRESCRIBER/CLIN PHARMACIST DOCUMENTED: ICD-10-PCS | Mod: CPTII,S$GLB,, | Performed by: INTERNAL MEDICINE

## 2023-02-22 PROCEDURE — 99999 PR PBB SHADOW E&M-EST. PATIENT-LVL V: CPT | Mod: PBBFAC,,, | Performed by: INTERNAL MEDICINE

## 2023-02-22 PROCEDURE — 1101F PR PT FALLS ASSESS DOC 0-1 FALLS W/OUT INJ PAST YR: ICD-10-PCS | Mod: CPTII,S$GLB,, | Performed by: INTERNAL MEDICINE

## 2023-02-22 PROCEDURE — 3288F FALL RISK ASSESSMENT DOCD: CPT | Mod: CPTII,S$GLB,, | Performed by: INTERNAL MEDICINE

## 2023-02-22 PROCEDURE — 1159F PR MEDICATION LIST DOCUMENTED IN MEDICAL RECORD: ICD-10-PCS | Mod: CPTII,S$GLB,, | Performed by: INTERNAL MEDICINE

## 2023-02-22 PROCEDURE — 1126F AMNT PAIN NOTED NONE PRSNT: CPT | Mod: CPTII,S$GLB,, | Performed by: INTERNAL MEDICINE

## 2023-02-22 PROCEDURE — 99397 PER PM REEVAL EST PAT 65+ YR: CPT | Mod: S$GLB,,, | Performed by: INTERNAL MEDICINE

## 2023-02-22 PROCEDURE — 3078F DIAST BP <80 MM HG: CPT | Mod: CPTII,S$GLB,, | Performed by: INTERNAL MEDICINE

## 2023-02-22 PROCEDURE — 3075F SYST BP GE 130 - 139MM HG: CPT | Mod: CPTII,S$GLB,, | Performed by: INTERNAL MEDICINE

## 2023-02-22 PROCEDURE — 3288F PR FALLS RISK ASSESSMENT DOCUMENTED: ICD-10-PCS | Mod: CPTII,S$GLB,, | Performed by: INTERNAL MEDICINE

## 2023-02-22 PROCEDURE — 3075F PR MOST RECENT SYSTOLIC BLOOD PRESS GE 130-139MM HG: ICD-10-PCS | Mod: CPTII,S$GLB,, | Performed by: INTERNAL MEDICINE

## 2023-02-22 PROCEDURE — 99397 PR PREVENTIVE VISIT,EST,65 & OVER: ICD-10-PCS | Mod: S$GLB,,, | Performed by: INTERNAL MEDICINE

## 2023-02-22 PROCEDURE — 1101F PT FALLS ASSESS-DOCD LE1/YR: CPT | Mod: CPTII,S$GLB,, | Performed by: INTERNAL MEDICINE

## 2023-02-22 PROCEDURE — 99999 PR PBB SHADOW E&M-EST. PATIENT-LVL V: ICD-10-PCS | Mod: PBBFAC,,, | Performed by: INTERNAL MEDICINE

## 2023-02-22 PROCEDURE — 80053 COMPREHEN METABOLIC PANEL: CPT | Performed by: INTERNAL MEDICINE

## 2023-02-22 RX ORDER — VERAPAMIL HYDROCHLORIDE 240 MG/1
240 CAPSULE, EXTENDED RELEASE ORAL 2 TIMES DAILY
Qty: 90 CAPSULE | Refills: 3 | Status: SHIPPED | OUTPATIENT
Start: 2023-02-22 | End: 2023-02-27 | Stop reason: SDUPTHER

## 2023-02-22 RX ORDER — IRBESARTAN 300 MG/1
TABLET ORAL
Qty: 90 TABLET | Refills: 3 | Status: SHIPPED | OUTPATIENT
Start: 2023-02-22 | End: 2024-02-28

## 2023-02-22 NOTE — PROGRESS NOTES
Chief Complaint  Chief Complaint   Patient presents with    Annual Exam       HPI  Ida Santana is a 82 y.o. female with multiple co morbidities including hypertension, hyperlipidemia, osteopenia, history of breast cancer in 2001, that presents for annual exam.     Patient has long history of hypertension, which has been stable and well controlled. She does not monitor blood pressure at home.     She has daily bowel movements, but states that her stool has been hard recently. She requests something to soften her stool. She denies abdominal pain.    She states her mood is good overall, and denies depression. She has been taking care of several family member's health recently, but has not been stressed and is feels like she is managing well.    She denies any chest pain, shortness of breath, change in appetite or weight loss.    History of breast cancer 2001, followed by Dr. Harden.     History of osteopenia, most recent DXA scan was October 2022. She is on calcium and vitamin D supplements, but does not require any further pharmacologic management at this time.     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 She does not have a gastroenterologist to follow up for further management.    Overall patient is feeling well with no complaints.      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    Cataract     History of uterine fibroid     Hyperlipidemia     dyslipidemia    Hypertension     MRSA infection     Open angle 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 eyes 5/2/2013    Osteopenia     Pancreas cyst     Potassium depletion 1998    Retinal detachment of left eye with retinal break 3/13/2017       PAST SURGICAL HISTORY:  Past Surgical History:   Procedure  Laterality Date    Adrenal Gland Surgery to right side      BREAST BIOPSY Left 2001    left breast DCIS    BREAST LUMPECTOMY Left 09/11/2001    CATARACT EXTRACTION W/  INTRAOCULAR LENS IMPLANT Left 08/02/2017    with PPV/SO removal ( AND )    COLONOSCOPY      COLONOSCOPY N/A 10/31/2016    Procedure: COLONOSCOPY;  Surgeon: YAMILETH Richards MD;  Location: [facility] ENDO (4TH FLR);  Service: Endoscopy;  Laterality: N/A;    ENDOSCOPIC ULTRASOUND OF UPPER GASTROINTESTINAL TRACT N/A 8/7/2019    Procedure: ULTRASOUND, UPPER GI TRACT, ENDOSCOPIC;  Surgeon: Ladonna Wells MD;  Location: [facility] ENDO (2ND FLR);  Service: Endoscopy;  Laterality: N/A;    ENDOSCOPIC ULTRASOUND OF UPPER GASTROINTESTINAL TRACT N/A 9/14/2020    Procedure: ULTRASOUND, UPPER GI TRACT, ENDOSCOPIC;  Surgeon: Ladonna Wells MD;  Location: [facility] ENDO (2ND FLR);  Service: Endoscopy;  Laterality: N/A;  9/11-[address]    ERCP N/A 9/14/2020    Procedure: ERCP (ENDOSCOPIC RETROGRADE CHOLANGIOPANCREATOGRAPHY);  Surgeon: Ladonna Wells MD;  Location: [facility] ENDO (2ND FLR);  Service: Endoscopy;  Laterality: N/A;    KIDNEY SURGERY  03/27/2017    patient does not know what they did, but knows she does not have kidney cancer    PARS PLANA VITRECTOMY Left 08/02/2017    25g PPV WITH SO REMOVAL AND PHACO IOL ( AND )    RETINAL DETACHMENT SURGERY Left 08/22/2017    PPVx and SO Removal//Phaco IOL ( AND )       SOCIAL HISTORY:  Social History     Socioeconomic History    Marital status: Single   Tobacco Use    Smoking status: Never    Smokeless tobacco: Never   Substance and Sexual Activity    Alcohol use: No    Drug use: No    Sexual activity: Never     Birth control/protection: Post-menopausal   Social History Narrative    Retired  (34 years) with [employer]    Masters in Early Childhood Education        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        Exercise:  Walks several days  a week.       FAMILY HISTORY:  Family History   Problem Relation Age of Onset    Cancer Mother         breast    Breast cancer Mother         40s and again in 50s (bilateral)    Breast cancer Other 40    Heart disease Father         bypass    Cancer Father         asbestos    Stroke Brother     Hypertension Brother     Ovarian cancer Neg Hx     Amblyopia Neg Hx     Blindness Neg Hx     Cataracts Neg Hx     Diabetes Neg Hx     Glaucoma Neg Hx     Macular degeneration Neg Hx     Retinal detachment Neg Hx     Strabismus Neg Hx     Thyroid disease Neg Hx     Colon cancer Neg Hx     Melanoma Neg Hx        ALLERGIES AND MEDICATIONS: updated and reviewed.  Review of patient's allergies indicates:   Allergen Reactions    Adhesive tape-silicones      Use PAPER Tape / Tegaderm    Atorvastatin      Muscle pain    Pravastatin Rash    Tapentadol Hives     Current Outpatient Medications   Medication Sig Dispense Refill    aspirin (ECOTRIN) 81 MG EC tablet Take 81 mg by mouth once daily.      betamethasone dipropionate (DIPROLENE) 0.05 % cream AAA bid 60 g 0    dorzolamide-timolol 2-0.5% (COSOPT) 22.3-6.8 mg/mL ophthalmic solution Place 1 drop into both eyes 2 (two) times daily. 3 each 3    econazole nitrate 1 % cream AAA bid 30 g 2    flurandrenolide (CORDRAN) 0.05 % lotion Apply topically 2 (two) times daily. Apply to  scalp one to two times daily as needed for dryness 120 mL 12    ketoconazole (NIZORAL) 2 % shampoo Wash hair with medicated shampoo at least 2x/week - let sit on scalp at least 5 minutes prior to rinsing 120 mL 5    latanoprost 0.005 % ophthalmic solution Place 1 drop into both eyes every evening. 7.5 mL 3    MULTIVITAMIN WITH MINERALS (ONE-A-DAY 50 PLUS) Tab Take 1 tablet by mouth once daily.       TAC 0.1% cream, Loprox, MOM apply to affected area twice a day and then blow dry on the cool setting afterwards 60 g 3    triamcinolone acetonide 0.025% (KENALOG) 0.025 % cream AAA bid 30 g 3    irbesartan (AVAPRO) 300  "MG tablet TAKE 1 TABLET BY MOUTH EVERY DAY. STOP VALSARTAN 90 tablet 3    verapamiL (VERELAN) 240 MG C24P Take 1 capsule (240 mg total) by mouth 2 (two) times daily. 90 capsule 3     No current facility-administered medications for this visit.         ROS  Review of Systems   Constitutional:  Negative for activity change, appetite change, fatigue and unexpected weight change.   HENT:  Negative for congestion and sore throat.    Eyes:  Negative for visual disturbance.   Respiratory:  Negative for shortness of breath and wheezing.    Cardiovascular:  Negative for chest pain, palpitations and leg swelling.   Gastrointestinal:  Positive for constipation. Negative for abdominal pain.   Musculoskeletal:  Negative for arthralgias and myalgias.   Skin:  Negative for rash.   Neurological:  Negative for light-headedness and headaches.   Psychiatric/Behavioral:  Negative for dysphoric mood and sleep disturbance. The patient is not nervous/anxious.          Physical Exam  Vitals:    02/22/23 0942   BP: 132/68   BP Location: Right arm   Patient Position: Sitting   BP Method: Medium (Manual)   Pulse: 82   SpO2: 98%   Weight: 69.4 kg (153 lb)   Height: 5' 7" (1.702 m)    Body mass index is 23.96 kg/m².  Weight: 69.4 kg (153 lb)   Height: 5' 7" (170.2 cm)   Physical Exam  Constitutional:       Appearance: Normal appearance.   HENT:      Head: Normocephalic and atraumatic.      Nose: Nose normal.      Mouth/Throat:      Mouth: Mucous membranes are moist.   Eyes:      Extraocular Movements: Extraocular movements intact.      Pupils: Pupils are equal, round, and reactive to light.   Cardiovascular:      Rate and Rhythm: Normal rate and regular rhythm.      Pulses: Normal pulses.      Heart sounds: Normal heart sounds. No murmur heard.    No friction rub. No gallop.   Pulmonary:      Effort: Pulmonary effort is normal.      Breath sounds: Normal breath sounds. No wheezing.   Abdominal:      General: Bowel sounds are normal.      " Palpations: Abdomen is soft.      Tenderness: There is no abdominal tenderness.   Musculoskeletal:         General: Normal range of motion.      Cervical back: Normal range of motion and neck supple.   Skin:     General: Skin is warm and dry.      Capillary Refill: Capillary refill takes less than 2 seconds.   Neurological:      General: No focal deficit present.      Mental Status: She is alert and oriented to person, place, and time.   Psychiatric:         Mood and Affect: Mood normal.         Behavior: Behavior normal.         Health Maintenance         Date Due Completion Date    Mammogram 12/15/2023 12/15/2022    DEXA Scan 10/31/2024 10/31/2022    TETANUS VACCINE 09/26/2026 9/26/2016    Lipid Panel 10/06/2026 10/6/2021              Assessment and Plan:    Ida Santana is a 82 year old female with multiple co morbidities including hypertension, hyperlipidemia, history of breast cancer in 2001, osteopenia, presenting for annual exam.    Annual physical exam  -     Comprehensive Metabolic Panel; Future; Expected date: 02/22/2023    Hypertension, unspecified type    Essential hypertension  Comments:  Encouraged to check BP  Orders:  -     irbesartan (AVAPRO) 300 MG tablet; TAKE 1 TABLET BY MOUTH EVERY DAY. STOP VALSARTAN  Dispense: 90 tablet; Refill: 3  -     verapamiL (VERELAN) 240 MG C24P; Take 1 capsule (240 mg total) by mouth 2 (two) times daily.  Dispense: 90 capsule; Refill: 3    Dilated pancreatic duct  -     Ambulatory referral/consult to Gastroenterology; Future; Expected date: 03/01/2023    BMI 23.0-23.9, adult  -     CBC Without Differential; Future; Expected date: 02/22/2023    History of breast cancer in female  -     Mammo Digital Screening Bilat w/ [facility]; Future; Expected date: 02/22/2023    Decr veramapil to once a day, in hopes of reducing constipation.  BP check Nuha 1mo  Me - 1 year.  MG 1 year.  Labs now.

## 2023-02-24 ENCOUNTER — TELEPHONE (OUTPATIENT)
Dept: INTERNAL MEDICINE | Facility: CLINIC | Age: 83
End: 2023-02-24
Payer: COMMERCIAL

## 2023-02-24 DIAGNOSIS — I10 ESSENTIAL HYPERTENSION: ICD-10-CM

## 2023-02-24 NOTE — TELEPHONE ENCOUNTER
----- Message from Roma Green sent at 2/24/2023 10:43 AM CST -----  Contact: [phone]  Pt states she was told her verapamiL (VERELAN) 240 MG C24P was going to be decreased but the pharmacy is telling her the script they received is still stating twice a day. Pt states she was instructed to take the medication once a day now. Can you please call her and let her know what she should be taking and update the pharmacy?          [id number] - [place] - [address] AT [facility] [address] [place]  [address]  [place] [address]  Phone: [phone] Fax: [phone]

## 2023-02-27 RX ORDER — VERAPAMIL HYDROCHLORIDE 240 MG/1
240 CAPSULE, EXTENDED RELEASE ORAL DAILY
Qty: 90 CAPSULE | Refills: 3 | Status: SHIPPED | OUTPATIENT
Start: 2023-02-27 | End: 2024-03-13 | Stop reason: SDUPTHER

## 2023-02-28 NOTE — TELEPHONE ENCOUNTER
Called and spoke to pt. Relayed message from PCP to reduce verapamil to once a day.  New rx sent.    Pt verbalized understanding.

## 2023-03-22 ENCOUNTER — OFFICE VISIT (OUTPATIENT)
Dept: INTERNAL MEDICINE | Facility: CLINIC | Age: 83
End: 2023-03-22
Payer: COMMERCIAL

## 2023-03-22 VITALS
DIASTOLIC BLOOD PRESSURE: 60 MMHG | SYSTOLIC BLOOD PRESSURE: 118 MMHG | HEIGHT: 67 IN | HEART RATE: 88 BPM | BODY MASS INDEX: 24.01 KG/M2 | WEIGHT: 153 LBS | OXYGEN SATURATION: 97 %

## 2023-03-22 DIAGNOSIS — K86.2 PANCREAS CYST: ICD-10-CM

## 2023-03-22 DIAGNOSIS — I10 HYPERTENSION, UNSPECIFIED TYPE: Primary | ICD-10-CM

## 2023-03-22 DIAGNOSIS — M85.80 OSTEOPENIA, UNSPECIFIED LOCATION: ICD-10-CM

## 2023-03-22 PROCEDURE — 1126F AMNT PAIN NOTED NONE PRSNT: CPT | Mod: CPTII,S$GLB,, | Performed by: PHYSICIAN ASSISTANT

## 2023-03-22 PROCEDURE — 99213 PR OFFICE/OUTPT VISIT, EST, LEVL III, 20-29 MIN: ICD-10-PCS | Mod: S$GLB,,, | Performed by: PHYSICIAN ASSISTANT

## 2023-03-22 PROCEDURE — 1126F PR PAIN SEVERITY QUANTIFIED, NO PAIN PRESENT: ICD-10-PCS | Mod: CPTII,S$GLB,, | Performed by: PHYSICIAN ASSISTANT

## 2023-03-22 PROCEDURE — 3288F FALL RISK ASSESSMENT DOCD: CPT | Mod: CPTII,S$GLB,, | Performed by: PHYSICIAN ASSISTANT

## 2023-03-22 PROCEDURE — 1159F MED LIST DOCD IN RCRD: CPT | Mod: CPTII,S$GLB,, | Performed by: PHYSICIAN ASSISTANT

## 2023-03-22 PROCEDURE — 3074F PR MOST RECENT SYSTOLIC BLOOD PRESSURE < 130 MM HG: ICD-10-PCS | Mod: CPTII,S$GLB,, | Performed by: PHYSICIAN ASSISTANT

## 2023-03-22 PROCEDURE — 3078F DIAST BP <80 MM HG: CPT | Mod: CPTII,S$GLB,, | Performed by: PHYSICIAN ASSISTANT

## 2023-03-22 PROCEDURE — 99999 PR PBB SHADOW E&M-EST. PATIENT-LVL IV: ICD-10-PCS | Mod: PBBFAC,,, | Performed by: PHYSICIAN ASSISTANT

## 2023-03-22 PROCEDURE — 3074F SYST BP LT 130 MM HG: CPT | Mod: CPTII,S$GLB,, | Performed by: PHYSICIAN ASSISTANT

## 2023-03-22 PROCEDURE — 1101F PR PT FALLS ASSESS DOC 0-1 FALLS W/OUT INJ PAST YR: ICD-10-PCS | Mod: CPTII,S$GLB,, | Performed by: PHYSICIAN ASSISTANT

## 2023-03-22 PROCEDURE — 99999 PR PBB SHADOW E&M-EST. PATIENT-LVL IV: CPT | Mod: PBBFAC,,, | Performed by: PHYSICIAN ASSISTANT

## 2023-03-22 PROCEDURE — 3078F PR MOST RECENT DIASTOLIC BLOOD PRESSURE < 80 MM HG: ICD-10-PCS | Mod: CPTII,S$GLB,, | Performed by: PHYSICIAN ASSISTANT

## 2023-03-22 PROCEDURE — 3288F PR FALLS RISK ASSESSMENT DOCUMENTED: ICD-10-PCS | Mod: CPTII,S$GLB,, | Performed by: PHYSICIAN ASSISTANT

## 2023-03-22 PROCEDURE — 1159F PR MEDICATION LIST DOCUMENTED IN MEDICAL RECORD: ICD-10-PCS | Mod: CPTII,S$GLB,, | Performed by: PHYSICIAN ASSISTANT

## 2023-03-22 PROCEDURE — 1101F PT FALLS ASSESS-DOCD LE1/YR: CPT | Mod: CPTII,S$GLB,, | Performed by: PHYSICIAN ASSISTANT

## 2023-03-22 PROCEDURE — 1160F PR REVIEW ALL MEDS BY PRESCRIBER/CLIN PHARMACIST DOCUMENTED: ICD-10-PCS | Mod: CPTII,S$GLB,, | Performed by: PHYSICIAN ASSISTANT

## 2023-03-22 PROCEDURE — 99213 OFFICE O/P EST LOW 20 MIN: CPT | Mod: S$GLB,,, | Performed by: PHYSICIAN ASSISTANT

## 2023-03-22 PROCEDURE — 1160F RVW MEDS BY RX/DR IN RCRD: CPT | Mod: CPTII,S$GLB,, | Performed by: PHYSICIAN ASSISTANT

## 2023-03-23 ENCOUNTER — TELEPHONE (OUTPATIENT)
Dept: ENDOCRINOLOGY | Facility: CLINIC | Age: 83
End: 2023-03-23
Payer: COMMERCIAL

## 2023-03-23 NOTE — TELEPHONE ENCOUNTER
Called and left  explaining  to pt that I received her message about not wanting to see the NP and that she canceled that appointment which was scheduled for tomorrow 03/24/23. I also informed pt that Dr. Leyva doesn't have any IN Office opening until August, I will be able to schedule pt April 1 for August.

## 2023-03-23 NOTE — TELEPHONE ENCOUNTER
----- Message from Rachel Walker MA sent at 3/22/2023  4:56 PM CDT -----  Regarding: FW: PT CALLED    ----- Message -----  From: Catia Ruiz  Sent: 3/22/2023  12:56 PM CDT  To: [facility] Staff  Subject: PT CALLED                                        Name of Who is Calling:    PTCALLED      What is the request in detail:     EPT DOES NOT WANT TO BE SEEN BY DR FLORES AND WOULD LIKE TO HAVE A FOLLOW UP APPT WITH THIS OFFICE.       Can the clinic reply by [facility]:  NO         What Number to Call Back if not in [facility]: [phone] (home)

## 2023-03-30 NOTE — PROGRESS NOTES
Subjective:       Patient ID: Ida Santana is a 82 y.o. female.        Chief Complaint: Blood Pressure Check    Ida Santana is an established patient of Elly Kruger MD here today for follow up visit.    Retired   teacher.  Previous patient of Dr. Murray.      Previously seen by Dr. Kruger for constipation.  Verapmil dose reduced and constipation has resolved.  BP remains stable on lower dose.      She is supposed to see pancreatic cyst clinic.  Will reach out to assist in getting her scheduled.         Review of Systems   Constitutional:  Negative for chills, diaphoresis, fatigue and fever.   HENT:  Negative for congestion and sore throat.    Eyes:  Negative for visual disturbance.   Respiratory:  Negative for cough, chest tightness and shortness of breath.    Cardiovascular:  Negative for chest pain, palpitations and leg swelling.   Gastrointestinal:  Negative for abdominal pain, blood in stool, constipation, diarrhea, nausea and vomiting.   Genitourinary:  Negative for dysuria, frequency, hematuria and urgency.   Musculoskeletal:  Negative for arthralgias and back pain.   Skin:  Negative for rash.   Neurological:  Negative for dizziness, syncope, weakness and headaches.   Psychiatric/Behavioral:  Negative for dysphoric mood and sleep disturbance. The patient is not nervous/anxious.      Objective:      Physical Exam  Vitals and nursing note reviewed.   Constitutional:       Appearance: Normal appearance. She is well-developed.   HENT:      Head: Normocephalic.      Right Ear: External ear normal.      Left Ear: External ear normal.   Eyes:      Pupils: Pupils are equal, round, and reactive to light.   Cardiovascular:      Rate and Rhythm: Normal rate and regular rhythm.      Heart sounds: Normal heart sounds. No murmur heard.    No friction rub. No gallop.   Pulmonary:      Effort: Pulmonary effort is normal. No respiratory distress.      Breath sounds: Normal breath sounds.   Abdominal:       "Palpations: Abdomen is soft.      Tenderness: There is no abdominal tenderness.   Skin:     General: Skin is warm and dry.   Neurological:      Mental Status: She is alert.       Assessment:       1. Hypertension, unspecified type    2. Osteopenia, unspecified location    3. Pancreas cyst        Plan:       Ida was seen today for blood pressure check.    Diagnoses and all orders for this visit:    Hypertension, unspecified type - stable and controlled on lower dose of verapamil  BP Readings from Last 5 Encounters:   03/22/23 118/60   02/22/23 132/68   12/08/22 126/78   10/11/22 130/78   02/16/22 130/64      Osteopenia, unspecified location - followed by Dr. Leyva    Pancreas cyst - will reach out to assist in scheduling an appointment    Pt has been given instructions populated from patient instructions database and has verbalized understanding of the after visit summary and information contained wherein.    Follow up with a primary care provider. May go to ER for acute shortness of breath, lightheadedness, fever, or any other emergent complaints or changes in condition.    "This note will be shared with the patient"    Future Appointments   Date Time Provider Department Center   4/27/2023 10:00 AM Saroj Gonzales MD [facility] [place] [facility]   5/22/2023 10:00 AM Lynda Crawford MD [facility]   12/19/2023  8:30 AM [facility]                 "

## 2023-04-27 DIAGNOSIS — H40.1131 PRIMARY OPEN ANGLE GLAUCOMA OF BOTH EYES, MILD STAGE: ICD-10-CM

## 2023-04-28 RX ORDER — LATANOPROST 50 UG/ML
SOLUTION/ DROPS OPHTHALMIC
Qty: 7.5 ML | Refills: 3 | Status: SHIPPED | OUTPATIENT
Start: 2023-04-28

## 2023-05-17 NOTE — PROGRESS NOTES
HPI     Glaucoma     Additional comments: 4 month ck and pt states no changes since last exam.   Pt feels vision is doing okay and states she is still reading fine           Comments    DLS: 1/23/23    1. 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 SHANTE  4. NS OD  5. PCIOL OS  6. Hx Endophthalmitis OS  7. Rhegmatogenous RD OS    MEDS:  Cosopt BID OU  Latanoprost QHS OU            Last edited by Portia Stevens MA on 5/22/2023 10:01 AM.            Assessment /Plan     For exam results, see Encounter Report.    Primary open-angle glaucoma, right eye, mild stage    Primary open-angle glaucoma, left eye, moderate stage    Rhegmatogenous retinal detachment of left eye    Nuclear sclerosis of right eye    Anomalous optic nerve    KCS (keratoconjunctivitis sicca)    Pseudophakia    Endophthalmitis, left eye             Glaucoma (type and duration)    Suspect 2/2 lrg cups ou    First HVF   2000   First photos   2013   Treatment / Drops started   xal 1/2016           Family history    none        Glaucoma meds   Cosopt ou bid // ((Latanoprost started 1/4/2016  - Stopped 3/22/2019  Od))  // re-started 1/18/2022 ou for OCT and ? VF prog        H/O adverse rxn to glaucoma drops    none        LASERS    none        GLAUCOMA SURGERIES    none        OTHER EYE SURGERIES    PC IOL os 8/2/2019 (done  W/ Lane - SO removal)    TR repair w/ PPVx and yas oil - os - 2/2 endogen endopth os - 3/15/2017 - Lane        CDR    0.75-0.8 / 0.7-0.75        Tbase    18-22 / 19-22          Tmax    22/30             Ttarget    ? 17/17            HVF    10 test 2000 to  20203- near full od // gen depression - INS         Gonio    +2-3 ou w/ yas oil drop sup os         CCT    510/517        OCT    5  test 2013 to 2023 - RNFL - bord TI  od //  dec G/TS/I, bord NS/N os (+prog os)         HRT    5 test 2016 to 2020 -MR -MR -  Dec. IT od // nl os /// CDR 0.69 od // 0.50 os  (large disc size )         Disc photos    2013, 2015 - OIS     - Ttoday   15/19   - Test done  today  IOP // cataract check       2. Anomaolous Optic nerves    - very large disc size ou    - HRT - 2.97 od // 3.04 os (nl is up to 2.8 ou)     3. Dry eyes     4. NS - OD   vis sign // consider removal prn     5. H/O endogenous endophthalmitis  OS  - And secondary tract RD - S/P PPVx and Silicone Oil (Mazzulla)    Oil removed at the same time as the phaco/IOL done 8/2/2017     6. S/P complex phaco/IOL os with trypan blue and Dennise ring and SO removal     - (done with mazzulla ) // 8/2/2017// PCB00 19.5   - Doing well - consider yag to ant capsule phimosis / opacification       Cosopt bid  OU -   Latanoprost - STOP/ OFF  (3/22/2019) // re-start 1/18/2022 - ?? OCT - RNFL prog os and ? VF prog os     Minimal change in IOP with re-starting latanoprost    Will cont as it may minimize fluctuations      Happy with new glasses (3/22/2019)     Hold off on yag cap to ant capsule phimosis and PCO OS  - [facility] doing OK - may be affecting the VF os     Ok to use OTC - zaditor or pataday for allergies - bid prn   Cont AT's prn     Recommend phaco/IOL od (possible dennise ring) - pt would like to schedule - C/O blurry vision od   F/U for pre-op

## 2023-05-22 ENCOUNTER — OFFICE VISIT (OUTPATIENT)
Dept: OPHTHALMOLOGY | Facility: CLINIC | Age: 83
End: 2023-05-22
Payer: COMMERCIAL

## 2023-05-22 DIAGNOSIS — Q07.8 ANOMALOUS OPTIC NERVE: ICD-10-CM

## 2023-05-22 DIAGNOSIS — H40.1111 PRIMARY OPEN-ANGLE GLAUCOMA, RIGHT EYE, MILD STAGE: Primary | ICD-10-CM

## 2023-05-22 DIAGNOSIS — Z96.1 PSEUDOPHAKIA: ICD-10-CM

## 2023-05-22 DIAGNOSIS — M35.01 KCS (KERATOCONJUNCTIVITIS SICCA): ICD-10-CM

## 2023-05-22 DIAGNOSIS — H33.002 RHEGMATOGENOUS RETINAL DETACHMENT OF LEFT EYE: ICD-10-CM

## 2023-05-22 DIAGNOSIS — H25.11 NUCLEAR SCLEROSIS OF RIGHT EYE: ICD-10-CM

## 2023-05-22 DIAGNOSIS — H44.002 ENDOPHTHALMITIS, LEFT EYE: ICD-10-CM

## 2023-05-22 DIAGNOSIS — H40.1122 PRIMARY OPEN-ANGLE GLAUCOMA, LEFT EYE, MODERATE STAGE: ICD-10-CM

## 2023-05-22 PROCEDURE — 1126F AMNT PAIN NOTED NONE PRSNT: CPT | Mod: CPTII,S$GLB,, | Performed by: OPHTHALMOLOGY

## 2023-05-22 PROCEDURE — 1101F PT FALLS ASSESS-DOCD LE1/YR: CPT | Mod: CPTII,S$GLB,, | Performed by: OPHTHALMOLOGY

## 2023-05-22 PROCEDURE — 1101F PR PT FALLS ASSESS DOC 0-1 FALLS W/OUT INJ PAST YR: ICD-10-PCS | Mod: CPTII,S$GLB,, | Performed by: OPHTHALMOLOGY

## 2023-05-22 PROCEDURE — 99214 OFFICE O/P EST MOD 30 MIN: CPT | Mod: S$GLB,,, | Performed by: OPHTHALMOLOGY

## 2023-05-22 PROCEDURE — 99999 PR PBB SHADOW E&M-EST. PATIENT-LVL III: ICD-10-PCS | Mod: PBBFAC,,, | Performed by: OPHTHALMOLOGY

## 2023-05-22 PROCEDURE — 99999 PR PBB SHADOW E&M-EST. PATIENT-LVL III: CPT | Mod: PBBFAC,,, | Performed by: OPHTHALMOLOGY

## 2023-05-22 PROCEDURE — 3288F FALL RISK ASSESSMENT DOCD: CPT | Mod: CPTII,S$GLB,, | Performed by: OPHTHALMOLOGY

## 2023-05-22 PROCEDURE — 3288F PR FALLS RISK ASSESSMENT DOCUMENTED: ICD-10-PCS | Mod: CPTII,S$GLB,, | Performed by: OPHTHALMOLOGY

## 2023-05-22 PROCEDURE — 1159F MED LIST DOCD IN RCRD: CPT | Mod: CPTII,S$GLB,, | Performed by: OPHTHALMOLOGY

## 2023-05-22 PROCEDURE — 1159F PR MEDICATION LIST DOCUMENTED IN MEDICAL RECORD: ICD-10-PCS | Mod: CPTII,S$GLB,, | Performed by: OPHTHALMOLOGY

## 2023-05-22 PROCEDURE — 99214 PR OFFICE/OUTPT VISIT, EST, LEVL IV, 30-39 MIN: ICD-10-PCS | Mod: S$GLB,,, | Performed by: OPHTHALMOLOGY

## 2023-05-22 PROCEDURE — 1126F PR PAIN SEVERITY QUANTIFIED, NO PAIN PRESENT: ICD-10-PCS | Mod: CPTII,S$GLB,, | Performed by: OPHTHALMOLOGY

## 2023-05-22 PROCEDURE — 1160F PR REVIEW ALL MEDS BY PRESCRIBER/CLIN PHARMACIST DOCUMENTED: ICD-10-PCS | Mod: CPTII,S$GLB,, | Performed by: OPHTHALMOLOGY

## 2023-05-22 PROCEDURE — 1160F RVW MEDS BY RX/DR IN RCRD: CPT | Mod: CPTII,S$GLB,, | Performed by: OPHTHALMOLOGY

## 2023-05-25 ENCOUNTER — TELEPHONE (OUTPATIENT)
Dept: OPHTHALMOLOGY | Facility: CLINIC | Age: 83
End: 2023-05-25
Payer: COMMERCIAL

## 2023-05-25 DIAGNOSIS — H40.1111 PRIMARY OPEN-ANGLE GLAUCOMA, RIGHT EYE, MILD STAGE: Primary | ICD-10-CM

## 2023-06-02 ENCOUNTER — OFFICE VISIT (OUTPATIENT)
Dept: OPHTHALMOLOGY | Facility: CLINIC | Age: 83
End: 2023-06-02
Payer: COMMERCIAL

## 2023-06-02 DIAGNOSIS — M35.01 KCS (KERATOCONJUNCTIVITIS SICCA): ICD-10-CM

## 2023-06-02 DIAGNOSIS — Z96.1 PSEUDOPHAKIA: ICD-10-CM

## 2023-06-02 DIAGNOSIS — H44.002 ENDOPHTHALMITIS, LEFT EYE: ICD-10-CM

## 2023-06-02 DIAGNOSIS — H25.11 NUCLEAR SCLEROSIS OF RIGHT EYE: Primary | ICD-10-CM

## 2023-06-02 DIAGNOSIS — Q07.8 ANOMALOUS OPTIC NERVE: ICD-10-CM

## 2023-06-02 DIAGNOSIS — H57.03 SMALL PUPIL: ICD-10-CM

## 2023-06-02 DIAGNOSIS — H40.1122 PRIMARY OPEN-ANGLE GLAUCOMA, LEFT EYE, MODERATE STAGE: ICD-10-CM

## 2023-06-02 DIAGNOSIS — H33.002 RHEGMATOGENOUS RETINAL DETACHMENT OF LEFT EYE: ICD-10-CM

## 2023-06-02 DIAGNOSIS — H40.1111 PRIMARY OPEN-ANGLE GLAUCOMA, RIGHT EYE, MILD STAGE: ICD-10-CM

## 2023-06-02 DIAGNOSIS — H26.499 ANTERIOR CAPSULAR OPACIFICATION FOLLOWING EXTRACTION OF CATARACT: ICD-10-CM

## 2023-06-02 PROCEDURE — 92136 IOL MASTER - OD - RIGHT EYE: ICD-10-PCS | Mod: RT,S$GLB,, | Performed by: OPHTHALMOLOGY

## 2023-06-02 PROCEDURE — 99499 NO LOS: ICD-10-PCS | Mod: S$GLB,,, | Performed by: OPHTHALMOLOGY

## 2023-06-02 PROCEDURE — 99999 PR PBB SHADOW E&M-EST. PATIENT-LVL III: ICD-10-PCS | Mod: PBBFAC,,, | Performed by: OPHTHALMOLOGY

## 2023-06-02 PROCEDURE — 99499 UNLISTED E&M SERVICE: CPT | Mod: S$GLB,,, | Performed by: OPHTHALMOLOGY

## 2023-06-02 PROCEDURE — 99999 PR PBB SHADOW E&M-EST. PATIENT-LVL III: CPT | Mod: PBBFAC,,, | Performed by: OPHTHALMOLOGY

## 2023-06-02 PROCEDURE — 92136 OPHTHALMIC BIOMETRY: CPT | Mod: RT,S$GLB,, | Performed by: OPHTHALMOLOGY

## 2023-06-06 RX ORDER — MOXIFLOXACIN 5 MG/ML
1 SOLUTION/ DROPS OPHTHALMIC
Status: CANCELLED | OUTPATIENT
Start: 2023-06-06

## 2023-06-06 RX ORDER — PHENYLEPHRINE HYDROCHLORIDE 100 MG/ML
1 SOLUTION/ DROPS OPHTHALMIC
Status: CANCELLED | OUTPATIENT
Start: 2023-06-06

## 2023-06-06 RX ORDER — KETOROLAC TROMETHAMINE 5 MG/ML
1 SOLUTION OPHTHALMIC
Status: CANCELLED | OUTPATIENT
Start: 2023-06-06

## 2023-06-06 RX ORDER — SODIUM CHLORIDE 0.9 % (FLUSH) 0.9 %
10 SYRINGE (ML) INJECTION
Status: DISCONTINUED | OUTPATIENT
Start: 2023-06-06 | End: 2023-06-14 | Stop reason: HOSPADM

## 2023-06-06 RX ORDER — TROPICAMIDE 10 MG/ML
1 SOLUTION/ DROPS OPHTHALMIC
Status: CANCELLED | OUTPATIENT
Start: 2023-06-06

## 2023-06-06 RX ORDER — PROPARACAINE HYDROCHLORIDE 5 MG/ML
1 SOLUTION/ DROPS OPHTHALMIC
Status: CANCELLED | OUTPATIENT
Start: 2023-06-06

## 2023-06-06 NOTE — PROGRESS NOTES
HPI    Preop Phaco IOL OD (sx 6/14/23)    1. 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 SHANTE  4. NS OD  5. PCIOL OS  6. Hx Endophthalmitis OS  7. Rhegmatogenous RD OS    MEDS:  Cosopt BID OU  Latanoprost QHS OU    Last edited by Portia Stevens MA on 6/2/2023  1:53 PM.            Assessment /Plan     For exam results, see Encounter Report.    Nuclear sclerosis of right eye    Primary open-angle glaucoma, right eye, mild stage    Primary open-angle glaucoma, left eye, moderate stage    Rhegmatogenous retinal detachment of left eye    Anomalous optic nerve    KCS (keratoconjunctivitis sicca)    Anterior capsular opacification following extraction of cataract    Pseudophakia    Endophthalmitis, left eye               Glaucoma (type and duration)    Suspect 2/2 lrg cups ou    First HVF   2000   First photos   2013   Treatment / Drops started   xal 1/2016           Family history    none        Glaucoma meds   Cosopt ou bid // ((Latanoprost started 1/4/2016  - Stopped 3/22/2019  Od))  // re-started 1/18/2022 ou for OCT and ? VF prog        H/O adverse rxn to glaucoma drops    none        LASERS    none        GLAUCOMA SURGERIES    none        OTHER EYE SURGERIES    PC IOL os 8/2/2019 (done  W/ [address] - SO removal)    TR repair w/ PPVx and yas oil - os - 2/2 endogen endopth os - 3/15/2017 - Lane        CDR    0.75-0.8 / 0.7-0.75        Tbase    18-22 / 19-22          Tmax    22/30             Ttarget    ? 17/17            HVF    10 test 2000 to  20203- near full od // gen depression - INS         Gonio    +2-3 ou w/ yas oil drop sup os         CCT    510/517        OCT    5  test 2013 to 2023 - RNFL - bord TI  od //  dec G/TS/I, bord NS/N os (+prog os)         HRT    5 test 2016 to 2020 -MR -MR -  Dec. IT od // nl os /// CDR 0.69 od // 0.50 os  (large disc size )         Disc photos    2013, 2015 - OIS     - Ttoday   17/20  - Test done today  pre-op phaco/IOL od       2. Anomaolous Optic nerves    - very large disc size ou    - HRT - 2.97  od // 3.04 os (nl is up to 2.8 ou)     3. Dry eyes     4. NS - OD   vis sign // consider removal prn     5. H/O endogenous endophthalmitis  OS  - And secondary tract RD - S/P PPVx and Silicone Oil (Mazzulla)    Oil removed at the same time as the phaco/IOL done 8/2/2017     6. S/P complex phaco/IOL os with trypan blue and Leslye ring and SO removal     - (done with mazzulla ) // 8/2/2017// PCB00 19.5   - Doing well - consider yag to ant capsule phimosis / opacification       Cosopt bid  OU -   Latanoprost - STOP/ OFF  (3/22/2019) // re-start 1/18/2022 - ?? OCT - RNFL prog os and ? VF prog os     Minimal change in IOP with re-starting latanoprost    Will cont as it may minimize fluctuations      Happy with new glasses (3/22/2019)     Hold off on yag cap to ant capsule phimosis and PCO OS  - VA doing OK - may be affecting the VF os     Ok to use OTC - zaditor or pataday for allergies - bid prn   Cont AT's prn     Pre-op phaco/IOL od   IOL calcs - OD  DIB00 19.5  MTA4U0 16.9    Pt is aware that she will still need to use glasses for best distance and near vision     Risks and benefits discussed and consent signed.

## 2023-06-06 NOTE — H&P (VIEW-ONLY)
Subjective     Patient ID: Ida Santana is a 82 y.o. female.    Chief Complaint: Pre-op Exam    HPI  Review of Systems   Constitutional: Negative.    HENT: Negative.     Eyes:  Positive for visual disturbance.   Respiratory: Negative.     Cardiovascular: Negative.    Neurological: Negative.    Psychiatric/Behavioral: Negative.          Objective     Physical Exam  Constitutional:       Appearance: She is well-developed.   HENT:      Head: Normocephalic.   Eyes:      Comments: See eye exam   Cardiovascular:      Rate and Rhythm: Normal rate and regular rhythm.   Pulmonary:      Effort: Pulmonary effort is normal.   Neurological:      Mental Status: She is oriented to person, place, and time.          Assessment and Plan     1. Nuclear sclerosis of right eye    2. Primary open-angle glaucoma, right eye, mild stage    3. Primary open-angle glaucoma, left eye, moderate stage    4. Rhegmatogenous retinal detachment of left eye    5. Anomalous optic nerve    6. KCS (keratoconjunctivitis sicca)    7. Anterior capsular opacification following extraction of cataract    8. Pseudophakia    9. Endophthalmitis, left eye        Complex phaco/IOL - OD - small pupil -         No follow-ups on file.

## 2023-06-08 ENCOUNTER — OFFICE VISIT (OUTPATIENT)
Dept: GASTROENTEROLOGY | Facility: CLINIC | Age: 83
End: 2023-06-08
Payer: COMMERCIAL

## 2023-06-08 VITALS
SYSTOLIC BLOOD PRESSURE: 156 MMHG | DIASTOLIC BLOOD PRESSURE: 80 MMHG | HEART RATE: 85 BPM | HEIGHT: 67 IN | BODY MASS INDEX: 24.29 KG/M2 | WEIGHT: 154.75 LBS

## 2023-06-08 DIAGNOSIS — K86.89 DILATED PANCREATIC DUCT: ICD-10-CM

## 2023-06-08 PROCEDURE — 1159F PR MEDICATION LIST DOCUMENTED IN MEDICAL RECORD: ICD-10-PCS | Mod: CPTII,S$GLB,, | Performed by: INTERNAL MEDICINE

## 2023-06-08 PROCEDURE — 99214 PR OFFICE/OUTPT VISIT, EST, LEVL IV, 30-39 MIN: ICD-10-PCS | Mod: S$GLB,,, | Performed by: INTERNAL MEDICINE

## 2023-06-08 PROCEDURE — 1159F MED LIST DOCD IN RCRD: CPT | Mod: CPTII,S$GLB,, | Performed by: INTERNAL MEDICINE

## 2023-06-08 PROCEDURE — 3288F PR FALLS RISK ASSESSMENT DOCUMENTED: ICD-10-PCS | Mod: CPTII,S$GLB,, | Performed by: INTERNAL MEDICINE

## 2023-06-08 PROCEDURE — 3077F PR MOST RECENT SYSTOLIC BLOOD PRESSURE >= 140 MM HG: ICD-10-PCS | Mod: CPTII,S$GLB,, | Performed by: INTERNAL MEDICINE

## 2023-06-08 PROCEDURE — 99214 OFFICE O/P EST MOD 30 MIN: CPT | Mod: S$GLB,,, | Performed by: INTERNAL MEDICINE

## 2023-06-08 PROCEDURE — 3077F SYST BP >= 140 MM HG: CPT | Mod: CPTII,S$GLB,, | Performed by: INTERNAL MEDICINE

## 2023-06-08 PROCEDURE — 1160F RVW MEDS BY RX/DR IN RCRD: CPT | Mod: CPTII,S$GLB,, | Performed by: INTERNAL MEDICINE

## 2023-06-08 PROCEDURE — 1101F PT FALLS ASSESS-DOCD LE1/YR: CPT | Mod: CPTII,S$GLB,, | Performed by: INTERNAL MEDICINE

## 2023-06-08 PROCEDURE — 99999 PR PBB SHADOW E&M-EST. PATIENT-LVL IV: ICD-10-PCS | Mod: PBBFAC,,, | Performed by: INTERNAL MEDICINE

## 2023-06-08 PROCEDURE — 3079F DIAST BP 80-89 MM HG: CPT | Mod: CPTII,S$GLB,, | Performed by: INTERNAL MEDICINE

## 2023-06-08 PROCEDURE — 1160F PR REVIEW ALL MEDS BY PRESCRIBER/CLIN PHARMACIST DOCUMENTED: ICD-10-PCS | Mod: CPTII,S$GLB,, | Performed by: INTERNAL MEDICINE

## 2023-06-08 PROCEDURE — 1126F AMNT PAIN NOTED NONE PRSNT: CPT | Mod: CPTII,S$GLB,, | Performed by: INTERNAL MEDICINE

## 2023-06-08 PROCEDURE — 3079F PR MOST RECENT DIASTOLIC BLOOD PRESSURE 80-89 MM HG: ICD-10-PCS | Mod: CPTII,S$GLB,, | Performed by: INTERNAL MEDICINE

## 2023-06-08 PROCEDURE — 1101F PR PT FALLS ASSESS DOC 0-1 FALLS W/OUT INJ PAST YR: ICD-10-PCS | Mod: CPTII,S$GLB,, | Performed by: INTERNAL MEDICINE

## 2023-06-08 PROCEDURE — 3288F FALL RISK ASSESSMENT DOCD: CPT | Mod: CPTII,S$GLB,, | Performed by: INTERNAL MEDICINE

## 2023-06-08 PROCEDURE — 1126F PR PAIN SEVERITY QUANTIFIED, NO PAIN PRESENT: ICD-10-PCS | Mod: CPTII,S$GLB,, | Performed by: INTERNAL MEDICINE

## 2023-06-08 PROCEDURE — 99999 PR PBB SHADOW E&M-EST. PATIENT-LVL IV: CPT | Mod: PBBFAC,,, | Performed by: INTERNAL MEDICINE

## 2023-06-08 NOTE — PROGRESS NOTES
Gastroenterology: [facility]      SUBJECTIVE:         Chief Complaint: Here for evaluation of a pancreatic cyst     History of Present Illness:  Patient is a 82 y.o. female presents with a pancreatic cyst. The cyst was first noted 2018. It's located in the the genu.  It measures 18 mm  in size.  It is not symptomatic.  Previous studies include MRI and endoscopic US. Last in 2020.   Since that time the cyst has not increased in size.    Previous FNA of the cyst has not been done    There is not a family history of pancreatic cancer     The patient does not smoke.      OBJECTIVE:       Diagnostic Results:  MRI: Reviewed    Anechoic lesions suggestive of multiple cysts were identified in the        genu of the pancreas, pancreatic body and pancreatic tail. The first        cyst measured 16 mm by 11 mm.        There was no sign of significant endosonographic abnormality in the     ASSESSMENT/PLAN:     Pancreatic cyst in the the genu. Measuring 18mm in size,  unchanged    Year 5  We discussed in detail the natural history of pancreatic cysts, the therapy involved, and the benefits of multimodality imaging including Ct, MRI, and EUS with FNA    Plan:   We will be discussing her case at the pancreactic cyst clinic multidisciplinary conference to finalize a treatment plan.Current ACR guidelines would suggest EUS in 6 months and if no change can increase interval to 2 years.    We spent 30 minutes in today's clinic with greater than 50% of the time dedicated to counseling and arranging care.

## 2023-06-13 ENCOUNTER — TELEPHONE (OUTPATIENT)
Dept: OPHTHALMOLOGY | Facility: CLINIC | Age: 83
End: 2023-06-13
Payer: COMMERCIAL

## 2023-06-13 NOTE — PRE-PROCEDURE INSTRUCTIONS
PreOp Instructions given:   - Verbal medication information (what to hold and what to take)   - NPO guidelines   - Arrival place directions given; time to be given the day before procedure by the   Surgeon's Office   - Bathing with antibacterial soap   - Don't wear any jewelry or bring any valuables AM of surgery   - No makeup or moisturizer to face   - No perfume/cologne, powder, lotions or aftershave   Pt. verbalized understanding.   Pt denies any h/o Anesthesia/Sedation complications or side effects.  Pt aware that instructions apply to second eye surgery to be scheduled. Pt verbalized an understanding.

## 2023-06-14 ENCOUNTER — HOSPITAL ENCOUNTER (OUTPATIENT)
Facility: HOSPITAL | Age: 83
Discharge: HOME | End: 2023-06-14
Attending: OPHTHALMOLOGY | Admitting: OPHTHALMOLOGY
Payer: COMMERCIAL

## 2023-06-14 ENCOUNTER — ANESTHESIA EVENT (OUTPATIENT)
Dept: SURGERY | Facility: HOSPITAL | Age: 83
End: 2023-06-14
Payer: COMMERCIAL

## 2023-06-14 ENCOUNTER — ANESTHESIA (OUTPATIENT)
Dept: SURGERY | Facility: HOSPITAL | Age: 83
End: 2023-06-14
Payer: COMMERCIAL

## 2023-06-14 VITALS
RESPIRATION RATE: 16 BRPM | HEART RATE: 59 BPM | SYSTOLIC BLOOD PRESSURE: 156 MMHG | HEIGHT: 67 IN | BODY MASS INDEX: 24.33 KG/M2 | WEIGHT: 155 LBS | DIASTOLIC BLOOD PRESSURE: 76 MMHG | OXYGEN SATURATION: 100 % | TEMPERATURE: 98 F

## 2023-06-14 DIAGNOSIS — H25.041 POSTERIOR SUBCAPSULAR AGE-RELATED CATARACT, RIGHT EYE: ICD-10-CM

## 2023-06-14 DIAGNOSIS — H57.89 ABNORMAL RED REFLEX OF EYE: ICD-10-CM

## 2023-06-14 DIAGNOSIS — H25.11 NUCLEAR SCLEROSIS OF RIGHT EYE: Primary | ICD-10-CM

## 2023-06-14 DIAGNOSIS — H57.03 SMALL PUPIL: ICD-10-CM

## 2023-06-14 PROCEDURE — 66982 XCAPSL CTRC RMVL CPLX WO ECP: CPT | Mod: RT,,, | Performed by: OPHTHALMOLOGY

## 2023-06-14 PROCEDURE — 71000015 HC POSTOP RECOV 1ST HR: Performed by: OPHTHALMOLOGY

## 2023-06-14 PROCEDURE — 25000003 PHARM REV CODE 250: Performed by: NURSE ANESTHETIST, CERTIFIED REGISTERED

## 2023-06-14 PROCEDURE — 63600175 PHARM REV CODE 636 W HCPCS: Performed by: NURSE ANESTHETIST, CERTIFIED REGISTERED

## 2023-06-14 PROCEDURE — 25000003 PHARM REV CODE 250: Performed by: OPHTHALMOLOGY

## 2023-06-14 PROCEDURE — 36000707: Performed by: OPHTHALMOLOGY

## 2023-06-14 PROCEDURE — 66982 PR REMOVAL, CATARACT, W/INSRT INTRAOC LENS, W/O ENDO CYCLO, CMPLX: ICD-10-PCS | Mod: RT,,, | Performed by: OPHTHALMOLOGY

## 2023-06-14 PROCEDURE — V2632 POST CHMBR INTRAOCULAR LENS: HCPCS | Performed by: OPHTHALMOLOGY

## 2023-06-14 PROCEDURE — 71000044 HC DOSC ROUTINE RECOVERY FIRST HOUR: Performed by: OPHTHALMOLOGY

## 2023-06-14 PROCEDURE — D9220A PRA ANESTHESIA: Mod: ANES,,, | Performed by: ANESTHESIOLOGY

## 2023-06-14 PROCEDURE — D9220A PRA ANESTHESIA: ICD-10-PCS | Mod: CRNA,,, | Performed by: NURSE ANESTHETIST, CERTIFIED REGISTERED

## 2023-06-14 PROCEDURE — 37000009 HC ANESTHESIA EA ADD 15 MINS: Performed by: OPHTHALMOLOGY

## 2023-06-14 PROCEDURE — D9220A PRA ANESTHESIA: Mod: CRNA,,, | Performed by: NURSE ANESTHETIST, CERTIFIED REGISTERED

## 2023-06-14 PROCEDURE — 36000706: Performed by: OPHTHALMOLOGY

## 2023-06-14 PROCEDURE — 37000008 HC ANESTHESIA 1ST 15 MINUTES: Performed by: OPHTHALMOLOGY

## 2023-06-14 PROCEDURE — 25000003 PHARM REV CODE 250

## 2023-06-14 PROCEDURE — D9220A PRA ANESTHESIA: ICD-10-PCS | Mod: ANES,,, | Performed by: ANESTHESIOLOGY

## 2023-06-14 DEVICE — LENS EYHANCE +19.5D: Type: IMPLANTABLE DEVICE | Site: EYE | Status: FUNCTIONAL

## 2023-06-14 RX ORDER — ACETAMINOPHEN 325 MG/1
650 TABLET ORAL EVERY 6 HOURS PRN
Status: DISCONTINUED | OUTPATIENT
Start: 2023-06-14 | End: 2023-06-14 | Stop reason: HOSPADM

## 2023-06-14 RX ORDER — LIDOCAINE HYDROCHLORIDE 40 MG/ML
INJECTION, SOLUTION RETROBULBAR
Status: DISCONTINUED | OUTPATIENT
Start: 2023-06-14 | End: 2023-06-14 | Stop reason: HOSPADM

## 2023-06-14 RX ORDER — MOXIFLOXACIN 5 MG/ML
SOLUTION/ DROPS OPHTHALMIC
Status: DISCONTINUED | OUTPATIENT
Start: 2023-06-14 | End: 2023-06-14 | Stop reason: HOSPADM

## 2023-06-14 RX ORDER — LIDOCAINE HYDROCHLORIDE 10 MG/ML
INJECTION, SOLUTION EPIDURAL; INFILTRATION; INTRACAUDAL; PERINEURAL
Status: DISCONTINUED
Start: 2023-06-14 | End: 2023-06-14 | Stop reason: HOSPADM

## 2023-06-14 RX ORDER — KETOROLAC TROMETHAMINE 5 MG/ML
1 SOLUTION OPHTHALMIC
Status: DISCONTINUED | OUTPATIENT
Start: 2023-06-14 | End: 2023-06-14 | Stop reason: HOSPADM

## 2023-06-14 RX ORDER — LIDOCAINE HYDROCHLORIDE 40 MG/ML
INJECTION, SOLUTION RETROBULBAR
Status: DISCONTINUED
Start: 2023-06-14 | End: 2023-06-14 | Stop reason: HOSPADM

## 2023-06-14 RX ORDER — ACETAZOLAMIDE 500 MG/1
CAPSULE, EXTENDED RELEASE ORAL
Status: DISCONTINUED
Start: 2023-06-14 | End: 2023-06-14 | Stop reason: HOSPADM

## 2023-06-14 RX ORDER — PHENYLEPHRINE HYDROCHLORIDE 100 MG/ML
1 SOLUTION/ DROPS OPHTHALMIC
Status: DISCONTINUED | OUTPATIENT
Start: 2023-06-14 | End: 2023-06-14 | Stop reason: HOSPADM

## 2023-06-14 RX ORDER — LIDOCAINE HYDROCHLORIDE 20 MG/ML
JELLY TOPICAL
Status: DISCONTINUED
Start: 2023-06-14 | End: 2023-06-14 | Stop reason: HOSPADM

## 2023-06-14 RX ORDER — MIDAZOLAM HYDROCHLORIDE 1 MG/ML
INJECTION, SOLUTION INTRAMUSCULAR; INTRAVENOUS
Status: DISCONTINUED | OUTPATIENT
Start: 2023-06-14 | End: 2023-06-14

## 2023-06-14 RX ORDER — TROPICAMIDE 10 MG/ML
1 SOLUTION/ DROPS OPHTHALMIC
Status: DISCONTINUED | OUTPATIENT
Start: 2023-06-14 | End: 2023-06-14 | Stop reason: HOSPADM

## 2023-06-14 RX ORDER — ACETAZOLAMIDE 500 MG/1
500 CAPSULE, EXTENDED RELEASE ORAL ONCE
Status: COMPLETED | OUTPATIENT
Start: 2023-06-14 | End: 2023-06-14

## 2023-06-14 RX ORDER — PROPARACAINE HYDROCHLORIDE 5 MG/ML
1 SOLUTION/ DROPS OPHTHALMIC
Status: DISCONTINUED | OUTPATIENT
Start: 2023-06-14 | End: 2023-06-14 | Stop reason: HOSPADM

## 2023-06-14 RX ORDER — SODIUM CHLORIDE 0.9 % (FLUSH) 0.9 %
10 SYRINGE (ML) INJECTION
Status: DISCONTINUED | OUTPATIENT
Start: 2023-06-14 | End: 2023-06-14 | Stop reason: HOSPADM

## 2023-06-14 RX ORDER — MOXIFLOXACIN 5 MG/ML
1 SOLUTION/ DROPS OPHTHALMIC
Status: DISCONTINUED | OUTPATIENT
Start: 2023-06-14 | End: 2023-06-14 | Stop reason: HOSPADM

## 2023-06-14 RX ORDER — MOXIFLOXACIN 5 MG/ML
SOLUTION/ DROPS OPHTHALMIC
Status: DISCONTINUED
Start: 2023-06-14 | End: 2023-06-14 | Stop reason: HOSPADM

## 2023-06-14 RX ORDER — PREDNISOLONE ACETATE 10 MG/ML
SUSPENSION/ DROPS OPHTHALMIC
Status: DISCONTINUED
Start: 2023-06-14 | End: 2023-06-14 | Stop reason: HOSPADM

## 2023-06-14 RX ORDER — PREDNISOLONE ACETATE 10 MG/ML
SUSPENSION/ DROPS OPHTHALMIC
Status: DISCONTINUED | OUTPATIENT
Start: 2023-06-14 | End: 2023-06-14 | Stop reason: HOSPADM

## 2023-06-14 RX ADMIN — TROPICAMIDE 1 DROP: 10 SOLUTION/ DROPS OPHTHALMIC at 07:06

## 2023-06-14 RX ADMIN — MOXIFLOXACIN OPHTHALMIC 1 DROP: 5 SOLUTION/ DROPS OPHTHALMIC at 07:06

## 2023-06-14 RX ADMIN — ACETAZOLAMIDE 500 MG: 500 CAPSULE ORAL at 09:06

## 2023-06-14 RX ADMIN — SODIUM CHLORIDE: 0.9 INJECTION, SOLUTION INTRAVENOUS at 08:06

## 2023-06-14 RX ADMIN — PROPARACAINE HYDROCHLORIDE 1 DROP: 5 SOLUTION/ DROPS OPHTHALMIC at 07:06

## 2023-06-14 RX ADMIN — KETOROLAC TROMETHAMINE 1 DROP: 5 SOLUTION OPHTHALMIC at 07:06

## 2023-06-14 RX ADMIN — PHENYLEPHRINE HYDROCHLORIDE 1 DROP: 100 SOLUTION/ DROPS OPHTHALMIC at 07:06

## 2023-06-14 RX ADMIN — MIDAZOLAM HYDROCHLORIDE 2 MG: 1 INJECTION, SOLUTION INTRAMUSCULAR; INTRAVENOUS at 08:06

## 2023-06-14 NOTE — ANESTHESIA POSTPROCEDURE EVALUATION
Anesthesia Post Evaluation    Patient: Ida Santana    Procedure(s) Performed: Procedure(s) (LRB):  PHACOEMULSIFICATION, CATARACT (Right)  INSERTION, IOL PROSTHESIS (Right)    Final Anesthesia Type: MAC      Patient location during evaluation: PACU  Patient participation: Yes- Able to Participate  Level of consciousness: awake and alert  Post-procedure vital signs: reviewed and stable  Pain management: adequate  Airway patency: patent    PONV status at discharge: No PONV  Anesthetic complications: no      Cardiovascular status: hemodynamically stable  Respiratory status: unassisted, spontaneous ventilation and room air  Hydration status: euvolemic  Follow-up not needed.          Vitals Value Taken Time   /81 06/14/23 0932   Temp 36.7 °C (98.1 °F) 06/14/23 0910   Pulse 55 06/14/23 0945   Resp 16 06/14/23 0925   SpO2 98 % 06/14/23 0945   Vitals shown include unvalidated device data.      No case tracking events are documented in the log.      Pain/Ellen Score: Ellen Score: 10 (6/14/2023  9:28 AM)

## 2023-06-14 NOTE — ANESTHESIA PREPROCEDURE EVALUATION
06/14/2023  Ida Santana is a 82 y.o., female.    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    Cataract     Glaucoma     History of uterine fibroid     Hyperlipidemia     dyslipidemia    Hypertension     MRSA infection     Open angle 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 eyes 05/02/2013    Osteopenia     Pancreas cyst     Potassium depletion 1998    Retinal detachment of left eye with retinal break 03/13/2017     Past Surgical History:   Procedure Laterality Date    Adrenal Gland Surgery to right side      BREAST BIOPSY Left 2001    left breast DCIS    BREAST LUMPECTOMY Left 09/11/2001    CATARACT EXTRACTION W/  INTRAOCULAR LENS IMPLANT Left 08/02/2017    with PPV/SO removal ( AND )    COLONOSCOPY      COLONOSCOPY N/A 10/31/2016    Procedure: COLONOSCOPY;  Surgeon: YAMILETH Richards MD;  Location: [facility] (4TH FLR);  Service: Endoscopy;  Laterality: N/A;    ENDOSCOPIC ULTRASOUND OF UPPER GASTROINTESTINAL TRACT N/A 8/7/2019    Procedure: ULTRASOUND, UPPER GI TRACT, ENDOSCOPIC;  Surgeon: Ladonna Wells MD;  Location: [facility] (2ND FLR);  Service: Endoscopy;  Laterality: N/A;    ENDOSCOPIC ULTRASOUND OF UPPER GASTROINTESTINAL TRACT N/A 9/14/2020    Procedure: ULTRASOUND, UPPER GI TRACT, ENDOSCOPIC;  Surgeon: Ladonna Wells MD;  Location: [facility] (2ND FLR);  Service: Endoscopy;  Laterality: N/A;  9/11-covid [address]    ERCP N/A 9/14/2020    Procedure: ERCP (ENDOSCOPIC RETROGRADE CHOLANGIOPANCREATOGRAPHY);  Surgeon: Ladonna Wells MD;  Location: [facility] (2ND FLR);  Service: Endoscopy;  Laterality: N/A;    KIDNEY SURGERY  03/27/2017    patient does not know what they did, but knows she does not have kidney cancer     PARS PLANA VITRECTOMY Left 08/02/2017    25g PPV WITH SO REMOVAL AND PHACO IOL ( AND )    RETINAL DETACHMENT SURGERY Left 08/22/2017    PPVx and SO Removal//Phaco IOL ( AND )         Pre-op Assessment    I have reviewed the Patient Summary Reports.    I have reviewed the NPO Status.   I have reviewed the Medications.     Review of Systems  Anesthesia Hx:  No problems with previous Anesthesia  History of prior surgery of interest to airway management or planning: Denies Family Hx of Anesthesia complications.   Denies Personal Hx of Anesthesia complications.   Social:  Non-Smoker    Cardiovascular:   Hypertension    Pulmonary:  Pulmonary Normal    Hepatic/GI:  Hepatic/GI Normal    Neurological:  Neurology Normal        Physical Exam  General: Well nourished    Airway:  Mallampati: I   Mouth Opening: Normal  Tongue: Normal    Dental:  Intact    Chest/Lungs:  Normal Respiratory Rate    Heart:  Rate: Normal        Anesthesia Plan  Type of Anesthesia, risks & benefits discussed:    Anesthesia Type: MAC  Intra-op Monitoring Plan: Standard ASA Monitors  Induction:  IV  Informed Consent: Informed consent signed with the Patient and all parties understand the risks and agree with anesthesia plan.  All questions answered.   ASA Score: 2  Day of Surgery Review of History & Physical: H&P Update referred to the surgeon/provider.    Ready For Surgery From Anesthesia Perspective.     .

## 2023-06-14 NOTE — OP NOTE
DATE OF PROCEDURE: 6/14/2023    PREOPERATIVE DIAGNOSIS: mixed Cataract - posterior subcapsular and nuclear - poor red reflex // Nuclear sclerosis of right eye OD.     POSTOPERATIVE DIAGNOSIS: mixed Cataract - posterior subcapsular and nuclear - poor red reflex // Nuclear sclerosis of right eyeOD, status post procedure.     PROCEDURE PERFORMED: Cataract extraction with trypan blue and  phacoemulsification, posterior   chamber intraocular lens placement.     SURGEON: Lynda Crawford M.D.     ASSISTANT:  none      COMPLICATIONS: None.     BLOOD LOSS: Less than 5 mL.     ANESTHESIA: Local MAC    IMPLANT DATA:   DIB00 , power 19.5 diopters, serial #    PROCEDURE IN DETAIL: After informed consent including risks, benefits,   alternatives, the patient was brought to the operating room table, placed in   supine position. Monitored anesthesia care was used throughout the entire   procedure. Once adequate anesthesia was confirmed, the patient was then prepped   and draped in usual sterile fashion for intraocular surgery. Wire speculum was   used to hold the eyelids apart and the procedure was initiated by making   a partial thickness corneal wound with a peng blade temporally.   A supersharp blade was then used to make a second entry into the eye via a paracentesis incision.  Intracameral lidocaine followed by trypan blue, followed by  Viscoat were placed in the anterior chamber.   A 2.4 mm blade was then used to make to complete the clear corneal   incision temporally. A cystotome was used to make a tear in   the anterior capsular flap, which was continued around with Utrata forceps for   continuous curvilinear capsulorrhexis. BSS on a Reis cannula was then used for   hydrodissection and hydrodelineation of lens. The lens was noted to spin   freely in the bag. Phacoemulsification handpiece was then used to remove the   lens in a divide-and-conquer manner. Irrigation aspiration handpiece removed   the remaining  cortical material. Provisc was placed in the eye followed by the   lens as mentioned above without any complications. Once the lens was in proper   position, irrigation aspiration handpiece was used to remove the remaining Provisc. The   wounds were then hydrated with BSS and noted to be watertight. The eye had a   good physiological pressure and the lid speculum was removed under the   microscope without any shallowing. The eye was then covered with a collagen   shield soaked in Pred Forte and Vigamox and turned over to Anesthesia in stable   condition after placement of patch and shield.

## 2023-06-14 NOTE — PROGRESS NOTES
Discharge instructions reviewed w/  pt and family, verbalized understanding. Pt in NADN. No complaints at this time. To be d/c'd with brother.

## 2023-06-14 NOTE — TRANSFER OF CARE
"Anesthesia Transfer of Care Note    Patient: Ida Santana    Procedure(s) Performed: Procedure(s) (LRB):  PHACOEMULSIFICATION, CATARACT (Right)  INSERTION, IOL PROSTHESIS (Right)    Patient location: PACU    Anesthesia Type: MAC    Transport from OR: Transported from OR on room air with adequate spontaneous ventilation    Post pain: adequate analgesia    Post assessment: tolerated procedure well and no apparent anesthetic complications    Post vital signs: stable    Level of consciousness: awake    Nausea/Vomiting: no nausea/vomiting    Complications: none    Transfer of care protocol was followed      Last vitals:   Visit Vitals  BP (!) 156/69   Pulse 65   Temp 36.7 °C (98.1 °F) (Skin)   Resp 16   Ht 5' 7" (1.702 m)   Wt 70.3 kg (155 lb)   LMP  (LMP Unknown)   SpO2 100%   Breastfeeding No   BMI 24.28 kg/m²     "

## 2023-06-14 NOTE — ANESTHESIA RELEASE NOTE
"Anesthesia Release from PACU Note    Patient: Ida Santana    Procedure(s) Performed: Procedure(s) (LRB):  PHACOEMULSIFICATION, CATARACT (Right)  INSERTION, IOL PROSTHESIS (Right)    Anesthesia type: MAC    Post pain: Adequate analgesia    Post assessment: no apparent anesthetic complications and tolerated procedure well    Last Vitals:   Visit Vitals  BP (!) 156/76   Pulse (!) 59   Temp 36.7 °C (98.1 °F) (Skin)   Resp 16   Ht 5' 7" (1.702 m)   Wt 70.3 kg (155 lb)   LMP  (LMP Unknown)   SpO2 100%   Breastfeeding No   BMI 24.28 kg/m²       Post vital signs: stable    Level of consciousness: awake and alert     Nausea/Vomiting: no nausea/no vomiting    Complications: none    Airway Patency: patent    Respiratory: unassisted, spontaneous ventilation, room air    Cardiovascular: stable and blood pressure at baseline    Hydration: euvolemic  "

## 2023-06-14 NOTE — DISCHARGE SUMMARY
[facility] - [facility] (1st Fl)  Discharge Note  Short Stay    Procedure(s) (LRB):  PHACOEMULSIFICATION, CATARACT (Right)  INSERTION, IOL PROSTHESIS (Right)      OUTCOME: Patient tolerated treatment/procedure well without complication and is now ready for discharge.    DISPOSITION: Home or Self Care    FINAL DIAGNOSIS:  Nuclear sclerosis of right eye    FOLLOWUP: In clinic    DISCHARGE INSTRUCTIONS:    Discharge Procedure Orders   Leave dressing on - Keep it clean, dry, and intact until clinic visit        TIME SPENT ON DISCHARGE: 10 minutes

## 2023-06-14 NOTE — INTERVAL H&P NOTE
H&P completed on 6/2/2023 has been reviewed, the patient examined and I concur with the findings and plan.

## 2023-06-15 ENCOUNTER — OFFICE VISIT (OUTPATIENT)
Dept: OPHTHALMOLOGY | Facility: CLINIC | Age: 83
End: 2023-06-15
Payer: COMMERCIAL

## 2023-06-15 VITALS — DIASTOLIC BLOOD PRESSURE: 76 MMHG | HEART RATE: 62 BPM | SYSTOLIC BLOOD PRESSURE: 134 MMHG

## 2023-06-15 DIAGNOSIS — Q07.8 ANOMALOUS OPTIC NERVE: ICD-10-CM

## 2023-06-15 DIAGNOSIS — H40.1122 PRIMARY OPEN-ANGLE GLAUCOMA, LEFT EYE, MODERATE STAGE: ICD-10-CM

## 2023-06-15 DIAGNOSIS — H26.499 ANTERIOR CAPSULAR OPACIFICATION FOLLOWING EXTRACTION OF CATARACT: ICD-10-CM

## 2023-06-15 DIAGNOSIS — H44.002 ENDOPHTHALMITIS, LEFT EYE: ICD-10-CM

## 2023-06-15 DIAGNOSIS — Z48.810 POSTOPERATIVE CARE FOR CATARACT: Primary | ICD-10-CM

## 2023-06-15 DIAGNOSIS — Z98.49 POSTOPERATIVE CARE FOR CATARACT: Primary | ICD-10-CM

## 2023-06-15 DIAGNOSIS — H40.1111 PRIMARY OPEN-ANGLE GLAUCOMA, RIGHT EYE, MILD STAGE: ICD-10-CM

## 2023-06-15 DIAGNOSIS — H33.002 RHEGMATOGENOUS RETINAL DETACHMENT OF LEFT EYE: ICD-10-CM

## 2023-06-15 DIAGNOSIS — M35.01 KCS (KERATOCONJUNCTIVITIS SICCA): ICD-10-CM

## 2023-06-15 DIAGNOSIS — Z96.1 PSEUDOPHAKIA: ICD-10-CM

## 2023-06-15 PROCEDURE — 1126F PR PAIN SEVERITY QUANTIFIED, NO PAIN PRESENT: ICD-10-PCS | Mod: CPTII,S$GLB,, | Performed by: OPHTHALMOLOGY

## 2023-06-15 PROCEDURE — 1159F PR MEDICATION LIST DOCUMENTED IN MEDICAL RECORD: ICD-10-PCS | Mod: CPTII,S$GLB,, | Performed by: OPHTHALMOLOGY

## 2023-06-15 PROCEDURE — 99024 POSTOP FOLLOW-UP VISIT: CPT | Mod: S$GLB,,, | Performed by: OPHTHALMOLOGY

## 2023-06-15 PROCEDURE — 3288F PR FALLS RISK ASSESSMENT DOCUMENTED: ICD-10-PCS | Mod: CPTII,S$GLB,, | Performed by: OPHTHALMOLOGY

## 2023-06-15 PROCEDURE — 3078F PR MOST RECENT DIASTOLIC BLOOD PRESSURE < 80 MM HG: ICD-10-PCS | Mod: CPTII,S$GLB,, | Performed by: OPHTHALMOLOGY

## 2023-06-15 PROCEDURE — 3075F SYST BP GE 130 - 139MM HG: CPT | Mod: CPTII,S$GLB,, | Performed by: OPHTHALMOLOGY

## 2023-06-15 PROCEDURE — 1126F AMNT PAIN NOTED NONE PRSNT: CPT | Mod: CPTII,S$GLB,, | Performed by: OPHTHALMOLOGY

## 2023-06-15 PROCEDURE — 3078F DIAST BP <80 MM HG: CPT | Mod: CPTII,S$GLB,, | Performed by: OPHTHALMOLOGY

## 2023-06-15 PROCEDURE — 1101F PT FALLS ASSESS-DOCD LE1/YR: CPT | Mod: CPTII,S$GLB,, | Performed by: OPHTHALMOLOGY

## 2023-06-15 PROCEDURE — 99999 PR PBB SHADOW E&M-EST. PATIENT-LVL III: CPT | Mod: PBBFAC,,, | Performed by: OPHTHALMOLOGY

## 2023-06-15 PROCEDURE — 99999 PR PBB SHADOW E&M-EST. PATIENT-LVL III: ICD-10-PCS | Mod: PBBFAC,,, | Performed by: OPHTHALMOLOGY

## 2023-06-15 PROCEDURE — 1160F RVW MEDS BY RX/DR IN RCRD: CPT | Mod: CPTII,S$GLB,, | Performed by: OPHTHALMOLOGY

## 2023-06-15 PROCEDURE — 1159F MED LIST DOCD IN RCRD: CPT | Mod: CPTII,S$GLB,, | Performed by: OPHTHALMOLOGY

## 2023-06-15 PROCEDURE — 1101F PR PT FALLS ASSESS DOC 0-1 FALLS W/OUT INJ PAST YR: ICD-10-PCS | Mod: CPTII,S$GLB,, | Performed by: OPHTHALMOLOGY

## 2023-06-15 PROCEDURE — 99024 PR POST-OP FOLLOW-UP VISIT: ICD-10-PCS | Mod: S$GLB,,, | Performed by: OPHTHALMOLOGY

## 2023-06-15 PROCEDURE — 3075F PR MOST RECENT SYSTOLIC BLOOD PRESS GE 130-139MM HG: ICD-10-PCS | Mod: CPTII,S$GLB,, | Performed by: OPHTHALMOLOGY

## 2023-06-15 PROCEDURE — 3288F FALL RISK ASSESSMENT DOCD: CPT | Mod: CPTII,S$GLB,, | Performed by: OPHTHALMOLOGY

## 2023-06-15 PROCEDURE — 1160F PR REVIEW ALL MEDS BY PRESCRIBER/CLIN PHARMACIST DOCUMENTED: ICD-10-PCS | Mod: CPTII,S$GLB,, | Performed by: OPHTHALMOLOGY

## 2023-06-15 RX ORDER — MOXIFLOXACIN 5 MG/ML
1 SOLUTION/ DROPS OPHTHALMIC 4 TIMES DAILY
COMMUNITY
Start: 2023-06-15 | End: 2023-06-23

## 2023-06-15 RX ORDER — PREDNISOLONE ACETATE 10 MG/ML
1 SUSPENSION/ DROPS OPHTHALMIC 4 TIMES DAILY
COMMUNITY
Start: 2023-06-15 | End: 2023-06-23 | Stop reason: SDUPTHER

## 2023-06-15 NOTE — PROGRESS NOTES
HPI     Post-op Evaluation     Additional comments: Pt states OD did okay last night and did not have   any problems           Comments    1 day po Phaco IOL OD 6/14/23    1. 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 SHANTE  4. NS OD  5. PCIOL OS  6. Hx Endophthalmitis OS  7. Rhegmatogenous RD OS    MEDS:  Cosopt BID OU  Latanoprost QHS OU            Last edited by Portia Stevens MA on 6/15/2023  8:27 AM.            Assessment /Plan     For exam results, see Encounter Report.    Postoperative care for cataract    Primary open-angle glaucoma, right eye, mild stage    Primary open-angle glaucoma, left eye, moderate stage    Rhegmatogenous retinal detachment of left eye    Anomalous optic nerve    KCS (keratoconjunctivitis sicca)    Anterior capsular opacification following extraction of cataract    Pseudophakia    Endophthalmitis, left eye         Glaucoma (type and duration)    Suspect 2/2 lrg cups ou    First HVF   2000   First photos   2013   Treatment / Drops started   xal 1/2016           Family history    none        Glaucoma meds   Cosopt ou bid // ((Latanoprost started 1/4/2016  - Stopped 3/22/2019  Od))  // re-started 1/18/2022 ou for OCT and ? VF prog        H/O adverse rxn to glaucoma drops    none        LASERS    none        GLAUCOMA SURGERIES    none        OTHER EYE SURGERIES    PC IOL os 8/2/2019 (done  W/ Lane - SO removal)    TR repair w/ PPVx and yas oil - os - 2/2 endogen endopth os - 3/15/2017 - Lane        CDR    0.75-0.8 / 0.7-0.75        Tbase    18-22 / 19-22          Tmax    22/30             Ttarget    ? 17/17            HVF    10 test 2000 to  20203- near full od // gen depression - INS         Gonio    +2-3 ou w/ yas oil drop sup os         CCT    510/517        OCT    5  test 2013 to 2023 - RNFL - bord TI  od //  dec G/TS/I, bord NS/N os (+prog os)         HRT    5 test 2016 to 2020 -MR -MR -  Dec. IT od // nl os /// CDR 0.69 od // 0.50 os  (large disc size )         Disc photos    2013, 2015 - [facility]      - Ttoday   17/20  - Test done today  pre-op phaco/IOL od       2. Anomaolous Optic nerves    - very large disc size ou    - HRT - 2.97 od // 3.04 os (nl is up to 2.8 ou)     3. Dry eyes     4. NS - OD   vis sign // consider removal prn     5. H/O endogenous endophthalmitis  OS  - And secondary tract RD - S/P PPVx and Silicone Oil (Mazzulla)    Oil removed at the same time as the phaco/IOL done 8/2/2017     6. S/P complex phaco/IOL os with trypan blue and Leslye ring and SO removal     - (done with mazzulla ) // 8/2/2017// PCB00 19.5   - Doing well - consider yag to ant capsule phimosis / opacification       Cosopt bid  OU -   Latanoprost - STOP/ OFF  (3/22/2019) // re-start 1/18/2022 - ?? OCT - RNFL prog os and ? VF prog os     Minimal change in IOP with re-starting latanoprost    Will cont as it may minimize fluctuations      Happy with new glasses (3/22/2019)     Hold off on yag cap to ant capsule phimosis and PCO OS  - [facility] doing OK - may be affecting the VF os     Ok to use OTC - zaditor or pataday for allergies - bid prn   Cont AT's prn     Pre-op phaco/IOL od   IOL calcs - OD  DIB00 19.5  MTA4U0 16.9    Phaco / IOL OD Date: 6/14/2023 - FMZ60-46.5  POD # 1 - phaco/IOL   Doing well  Begin Pred Acetate QID   Begin vigamox QID  Shield at night  Glasses day  No lifting, no bending, no eye rubbing    Cont cosopt bid ou   Cont latanoprost - OS only - hold od     F/U 1 week, AR/MR ou

## 2023-06-17 NOTE — PROGRESS NOTES
HPI     Post-op Evaluation     Additional comments: Pt states OD is doing okay and has no complaints   today           Comments    S/p Phaco IOL OD 6/14/23    1. 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 SHANTE  4. PCIOL OU  5. Hx Endophthalmitis OS  6. Rhegmatogenous RD OS    MEDS:  PA QID OD  Vigamox QID OD  Cosopt BID OU  Latanoprost QHS OS            Last edited by Portia Stevens MA on 6/23/2023  9:00 AM.            Assessment /Plan     For exam results, see Encounter Report.    Postoperative care for cataract    Primary open-angle glaucoma, right eye, mild stage    Primary open-angle glaucoma, left eye, moderate stage    Rhegmatogenous retinal detachment of left eye    Anomalous optic nerve    KCS (keratoconjunctivitis sicca)    Anterior capsular opacification following extraction of cataract    Pseudophakia    Endophthalmitis, left eye           Glaucoma (type and duration)    Suspect 2/2 lrg cups ou    First HVF   2000   First photos   2013   Treatment / Drops started   xal 1/2016           Family history    none        Glaucoma meds   Cosopt ou bid // ((Latanoprost started 1/4/2016  - Stopped 3/22/2019  Od))  // re-started 1/18/2022 ou for OCT and ? VF prog        H/O adverse rxn to glaucoma drops    none        LASERS    none        GLAUCOMA SURGERIES    none        OTHER EYE SURGERIES    PC IOL os 8/2/2019 (done  W/ Lane - SO removal)    TR repair w/ PPVx and yas oil - os - 2/2 endogen endopth os - 3/15/2017 - Lane        CDR    0.75-0.8 / 0.7-0.75        Tbase    18-22 / 19-22          Tmax    22/30             Ttarget    ? 17/17            HVF    10 test 2000 to  20203- near full od // gen depression - INS         Gonio    +2-3 ou w/ yas oil drop sup os         CCT    510/517        OCT    5  test 2013 to 2023 - RNFL - bord TI  od //  dec G/TS/I, bord NS/N os (+prog os)         HRT    5 test 2016 to 2020 -MR -MR -  Dec. IT od // nl os /// CDR 0.69 od // 0.50 os  (large disc size )         Disc photos    2013, 2015 -  [facility]     - Ttoday   14/18  - Test done today  post -op phaco/IOL od - 6/14/2023      2. Anomaolous Optic nerves    - very large disc size ou    - HRT - 2.97 od // 3.04 os (nl is up to 2.8 ou)     3. Dry eyes     4. NS - OD   vis sign // consider removal prn     5. H/O endogenous endophthalmitis  OS  - And secondary tract RD - S/P PPVx and Silicone Oil ([facility])    Oil removed at the same time as the phaco/IOL done 8/2/2017     6. S/P complex phaco/IOL os with trypan blue and Leslye ring and SO removal     - (done with mazzulla ) // 8/2/2017// PCB00 19.5   - Doing well - consider yag to ant capsule phimosis / opacification       Cosopt bid  OU -   Latanoprost - STOP/ OFF  (3/22/2019) // re-start 1/18/2022 - ?? OCT - RNFL prog os and ? VF prog os     Minimal change in IOP with re-starting latanoprost    Will cont as it may minimize fluctuations      Happy with new glasses (3/22/2019)     Hold off on yag cap to ant capsule phimosis and PCO OS  - [facility] doing OK - may be affecting the VF os     Ok to use OTC - zaditor or pataday for allergies - bid prn   Cont AT's prn     Pre-op phaco/IOL od   IOL calcs - OD  DIB00 19.5  MTA4U0 16.9    Phaco / IOL OD Date: 6/14/2023 - YIZ28-22.5  POW  # 1 - phaco/IOL   Doing well  Begin Pred Acetate taper - decrease every 2 weeks   Begin vigamox QID- STOP  Shield at night - 1 more week  Glasses day  No lifting, no bending, no eye rubbing    Cont cosopt bid ou   Cont latanoprost - OS only - hold od     F/U 5 week, AR/MR ou

## 2023-06-23 ENCOUNTER — OFFICE VISIT (OUTPATIENT)
Dept: OPHTHALMOLOGY | Facility: CLINIC | Age: 83
End: 2023-06-23
Payer: COMMERCIAL

## 2023-06-23 DIAGNOSIS — H40.1111 PRIMARY OPEN-ANGLE GLAUCOMA, RIGHT EYE, MILD STAGE: ICD-10-CM

## 2023-06-23 DIAGNOSIS — H26.499 ANTERIOR CAPSULAR OPACIFICATION FOLLOWING EXTRACTION OF CATARACT: ICD-10-CM

## 2023-06-23 DIAGNOSIS — M35.01 KCS (KERATOCONJUNCTIVITIS SICCA): ICD-10-CM

## 2023-06-23 DIAGNOSIS — Z96.1 PSEUDOPHAKIA: ICD-10-CM

## 2023-06-23 DIAGNOSIS — H44.002 ENDOPHTHALMITIS, LEFT EYE: ICD-10-CM

## 2023-06-23 DIAGNOSIS — Q07.8 ANOMALOUS OPTIC NERVE: ICD-10-CM

## 2023-06-23 DIAGNOSIS — Z48.810 POSTOPERATIVE CARE FOR CATARACT: Primary | ICD-10-CM

## 2023-06-23 DIAGNOSIS — Z98.49 POSTOPERATIVE CARE FOR CATARACT: Primary | ICD-10-CM

## 2023-06-23 DIAGNOSIS — H40.1122 PRIMARY OPEN-ANGLE GLAUCOMA, LEFT EYE, MODERATE STAGE: ICD-10-CM

## 2023-06-23 DIAGNOSIS — H40.1131 PRIMARY OPEN ANGLE GLAUCOMA OF BOTH EYES, MILD STAGE: ICD-10-CM

## 2023-06-23 DIAGNOSIS — H33.002 RHEGMATOGENOUS RETINAL DETACHMENT OF LEFT EYE: ICD-10-CM

## 2023-06-23 PROCEDURE — 3288F PR FALLS RISK ASSESSMENT DOCUMENTED: ICD-10-PCS | Mod: CPTII,S$GLB,, | Performed by: OPHTHALMOLOGY

## 2023-06-23 PROCEDURE — 1101F PT FALLS ASSESS-DOCD LE1/YR: CPT | Mod: CPTII,S$GLB,, | Performed by: OPHTHALMOLOGY

## 2023-06-23 PROCEDURE — 1126F PR PAIN SEVERITY QUANTIFIED, NO PAIN PRESENT: ICD-10-PCS | Mod: CPTII,S$GLB,, | Performed by: OPHTHALMOLOGY

## 2023-06-23 PROCEDURE — 99024 POSTOP FOLLOW-UP VISIT: CPT | Mod: S$GLB,,, | Performed by: OPHTHALMOLOGY

## 2023-06-23 PROCEDURE — 99999 PR PBB SHADOW E&M-EST. PATIENT-LVL III: CPT | Mod: PBBFAC,,, | Performed by: OPHTHALMOLOGY

## 2023-06-23 PROCEDURE — 1159F PR MEDICATION LIST DOCUMENTED IN MEDICAL RECORD: ICD-10-PCS | Mod: CPTII,S$GLB,, | Performed by: OPHTHALMOLOGY

## 2023-06-23 PROCEDURE — 1126F AMNT PAIN NOTED NONE PRSNT: CPT | Mod: CPTII,S$GLB,, | Performed by: OPHTHALMOLOGY

## 2023-06-23 PROCEDURE — 1159F MED LIST DOCD IN RCRD: CPT | Mod: CPTII,S$GLB,, | Performed by: OPHTHALMOLOGY

## 2023-06-23 PROCEDURE — 1160F RVW MEDS BY RX/DR IN RCRD: CPT | Mod: CPTII,S$GLB,, | Performed by: OPHTHALMOLOGY

## 2023-06-23 PROCEDURE — 1160F PR REVIEW ALL MEDS BY PRESCRIBER/CLIN PHARMACIST DOCUMENTED: ICD-10-PCS | Mod: CPTII,S$GLB,, | Performed by: OPHTHALMOLOGY

## 2023-06-23 PROCEDURE — 3288F FALL RISK ASSESSMENT DOCD: CPT | Mod: CPTII,S$GLB,, | Performed by: OPHTHALMOLOGY

## 2023-06-23 PROCEDURE — 99999 PR PBB SHADOW E&M-EST. PATIENT-LVL III: ICD-10-PCS | Mod: PBBFAC,,, | Performed by: OPHTHALMOLOGY

## 2023-06-23 PROCEDURE — 1101F PR PT FALLS ASSESS DOC 0-1 FALLS W/OUT INJ PAST YR: ICD-10-PCS | Mod: CPTII,S$GLB,, | Performed by: OPHTHALMOLOGY

## 2023-06-23 PROCEDURE — 99024 PR POST-OP FOLLOW-UP VISIT: ICD-10-PCS | Mod: S$GLB,,, | Performed by: OPHTHALMOLOGY

## 2023-06-23 RX ORDER — DORZOLAMIDE HYDROCHLORIDE AND TIMOLOL MALEATE 20; 5 MG/ML; MG/ML
1 SOLUTION/ DROPS OPHTHALMIC 2 TIMES DAILY
Qty: 3 EACH | Refills: 3 | Status: SHIPPED | OUTPATIENT
Start: 2023-06-23

## 2023-06-23 RX ORDER — PREDNISOLONE ACETATE 10 MG/ML
SUSPENSION/ DROPS OPHTHALMIC
Qty: 10 ML | Refills: 0 | Status: SHIPPED | OUTPATIENT
Start: 2023-06-23 | End: 2023-10-27 | Stop reason: ALTCHOICE

## 2023-07-23 NOTE — PROGRESS NOTES
HPI    DLS: 6/23/2023    Pt here for S/P phaco w/IOL OD- 6/14/2023  Pt states no eye pain or discomfort. Pt states she's not sure how many   times a day she should be using the Pred Forte eye drops.    Meds;  PA BID OD   Latanoprost QHS OS ONLY  Cosopt BID OU    1. 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 SHANTE   4. PCIOL OU   5. Hx Endophthalmitis OS   6. Rhegmatogenous RD OS     Last edited by Windy Mosqueda on 7/27/2023  8:57 AM.            Assessment /Plan     For exam results, see Encounter Report.    Postoperative care for cataract    Primary open-angle glaucoma, right eye, mild stage    Primary open-angle glaucoma, left eye, moderate stage    Rhegmatogenous retinal detachment of left eye    Anomalous optic nerve    KCS (keratoconjunctivitis sicca)    Anterior capsular opacification following extraction of cataract    Pseudophakia    Endophthalmitis, left eye             Glaucoma (type and duration)    Suspect 2/2 lrg cups ou    First HVF   2000   First photos   2013   Treatment / Drops started   xal 1/2016           Family history    none        Glaucoma meds   Cosopt ou bid // ((Latanoprost started 1/4/2016  - Stopped 3/22/2019  Od))  // re-started 1/18/2022 ou for OCT and ? VF prog        H/O adverse rxn to glaucoma drops    none        LASERS    none        GLAUCOMA SURGERIES    none        OTHER EYE SURGERIES    PC IOL os 8/2/2017 (done  W/ [facility] - SO removal)    TR repair w/ PPVx and yas oil - os - 2/2 endogen endopth os - 3/15/2017 - [facility] //         CDR    0.75-0.8 / 0.7-0.75        Tbase    18-22 / 19-22          Tmax    22/30             Ttarget    ? 17/17            HVF    10 test 2000 to  20203- near full od // gen depression - INS         Gonio    +2-3 ou w/ yas oil drop sup os         CCT    510/517        OCT    5  test 2013 to 2023 - RNFL - bord TI  od //  dec G/TS/I, bord NS/N os (+prog os)         HRT    5 test 2016 to 2020 -MR -MR -  Dec. IT od // nl os /// CDR 0.69 od // 0.50 os  (large disc size )          Disc photos    2013, 2015 - [facility]     - Ttoday   14/16  - Test done today  post -op phaco/IOL od - 6/14/2023      2. Anomaolous Optic nerves    - very large disc size ou    - HRT - 2.97 od // 3.04 os (nl is up to 2.8 ou)     3. Dry eyes     4. H/O endogenous endophthalmitis  OS  - And secondary tract RD - S/P PPVx and Silicone Oil (Mazzulla)    Oil removed at the same time as the phaco/IOL done 8/2/2017     5. S/P complex phaco/IOL os with trypan blue and Leslye ring and SO removal     - (done with mazzulla ) // 8/2/2017// PCB00 19.5   - Doing well - consider yag to ant capsule phimosis / opacification     6. PC IOL od - 6/14/2023   DIB00 19.5      Cosopt bid  OU -   Latanoprost - STOP/ OFF  (3/22/2019) // re-start 1/18/2022 - ?? OCT - RNFL prog os and ? VF prog os     Minimal change in IOP with re-starting latanoprost    Will cont as it may minimize fluctuations      Happy with new glasses (3/22/2019)     Hold off on yag cap to ant capsule phimosis and PCO OS  - [facility] doing OK - may be affecting the VF os     Ok to use OTC - zaditor or pataday for allergies - bid prn   Cont AT's prn     Pre-op phaco/IOL od   IOL calcs - OD  DIB00 19.5  MTA4U0 16.9    Phaco / IOL OD Date: 6/14/2023 - RTC31-03.5  POW  # 6 - phaco/IOL   Doing well  Pred Acetate 1 x day for 2 more weeks then stop     Cont cosopt bid ou   Cont latanoprost - OS only - hold od     Given Rx for glasses - but very similar to her pre-op glasses - so if prefers can cont wit her older glasses     F/U 3 months - with IOP and gonio

## 2023-07-27 ENCOUNTER — OFFICE VISIT (OUTPATIENT)
Dept: OPHTHALMOLOGY | Facility: CLINIC | Age: 83
End: 2023-07-27
Payer: COMMERCIAL

## 2023-07-27 DIAGNOSIS — Z96.1 PSEUDOPHAKIA: ICD-10-CM

## 2023-07-27 DIAGNOSIS — H26.499 ANTERIOR CAPSULAR OPACIFICATION FOLLOWING EXTRACTION OF CATARACT: ICD-10-CM

## 2023-07-27 DIAGNOSIS — H33.002 RHEGMATOGENOUS RETINAL DETACHMENT OF LEFT EYE: ICD-10-CM

## 2023-07-27 DIAGNOSIS — H44.002 ENDOPHTHALMITIS, LEFT EYE: ICD-10-CM

## 2023-07-27 DIAGNOSIS — H40.1122 PRIMARY OPEN-ANGLE GLAUCOMA, LEFT EYE, MODERATE STAGE: ICD-10-CM

## 2023-07-27 DIAGNOSIS — Z48.810 POSTOPERATIVE CARE FOR CATARACT: Primary | ICD-10-CM

## 2023-07-27 DIAGNOSIS — M35.01 KCS (KERATOCONJUNCTIVITIS SICCA): ICD-10-CM

## 2023-07-27 DIAGNOSIS — Z98.49 POSTOPERATIVE CARE FOR CATARACT: Primary | ICD-10-CM

## 2023-07-27 DIAGNOSIS — Q07.8 ANOMALOUS OPTIC NERVE: ICD-10-CM

## 2023-07-27 DIAGNOSIS — H40.1111 PRIMARY OPEN-ANGLE GLAUCOMA, RIGHT EYE, MILD STAGE: ICD-10-CM

## 2023-07-27 PROCEDURE — 1101F PT FALLS ASSESS-DOCD LE1/YR: CPT | Mod: CPTII,S$GLB,, | Performed by: OPHTHALMOLOGY

## 2023-07-27 PROCEDURE — 99024 PR POST-OP FOLLOW-UP VISIT: ICD-10-PCS | Mod: S$GLB,,, | Performed by: OPHTHALMOLOGY

## 2023-07-27 PROCEDURE — 1160F PR REVIEW ALL MEDS BY PRESCRIBER/CLIN PHARMACIST DOCUMENTED: ICD-10-PCS | Mod: CPTII,S$GLB,, | Performed by: OPHTHALMOLOGY

## 2023-07-27 PROCEDURE — 1159F PR MEDICATION LIST DOCUMENTED IN MEDICAL RECORD: ICD-10-PCS | Mod: CPTII,S$GLB,, | Performed by: OPHTHALMOLOGY

## 2023-07-27 PROCEDURE — 3288F FALL RISK ASSESSMENT DOCD: CPT | Mod: CPTII,S$GLB,, | Performed by: OPHTHALMOLOGY

## 2023-07-27 PROCEDURE — 99999 PR PBB SHADOW E&M-EST. PATIENT-LVL III: CPT | Mod: PBBFAC,,, | Performed by: OPHTHALMOLOGY

## 2023-07-27 PROCEDURE — 99024 POSTOP FOLLOW-UP VISIT: CPT | Mod: S$GLB,,, | Performed by: OPHTHALMOLOGY

## 2023-07-27 PROCEDURE — 1159F MED LIST DOCD IN RCRD: CPT | Mod: CPTII,S$GLB,, | Performed by: OPHTHALMOLOGY

## 2023-07-27 PROCEDURE — 1160F RVW MEDS BY RX/DR IN RCRD: CPT | Mod: CPTII,S$GLB,, | Performed by: OPHTHALMOLOGY

## 2023-07-27 PROCEDURE — 1101F PR PT FALLS ASSESS DOC 0-1 FALLS W/OUT INJ PAST YR: ICD-10-PCS | Mod: CPTII,S$GLB,, | Performed by: OPHTHALMOLOGY

## 2023-07-27 PROCEDURE — 1126F PR PAIN SEVERITY QUANTIFIED, NO PAIN PRESENT: ICD-10-PCS | Mod: CPTII,S$GLB,, | Performed by: OPHTHALMOLOGY

## 2023-07-27 PROCEDURE — 99999 PR PBB SHADOW E&M-EST. PATIENT-LVL III: ICD-10-PCS | Mod: PBBFAC,,, | Performed by: OPHTHALMOLOGY

## 2023-07-27 PROCEDURE — 3288F PR FALLS RISK ASSESSMENT DOCUMENTED: ICD-10-PCS | Mod: CPTII,S$GLB,, | Performed by: OPHTHALMOLOGY

## 2023-07-27 PROCEDURE — 1126F AMNT PAIN NOTED NONE PRSNT: CPT | Mod: CPTII,S$GLB,, | Performed by: OPHTHALMOLOGY

## 2023-10-21 NOTE — PROGRESS NOTES
HPI     Glaucoma            Comments: 3 month ck today and pt states eyes have been getting red and   itchy but only in the mornings when she goes outside          Comments    DLS: 7/27/23    1. 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 SHANTE  4. PCIOL OU  5. Hx Endophthalmitis OS  6. Rhegmatogenous RD OS    MEDS:  Cosopt BID OU  Latanoprost QHS OS            Last edited by Portia Stevens MA on 10/27/2023  9:06 AM.            Assessment /Plan     For exam results, see Encounter Report.    Primary open-angle glaucoma, right eye, mild stage    Primary open-angle glaucoma, left eye, moderate stage    Rhegmatogenous retinal detachment of left eye    Anomalous optic nerve    KCS (keratoconjunctivitis sicca)    Anterior capsular opacification following extraction of cataract    Pseudophakia             Glaucoma (type and duration)    Suspect 2/2 lrg cups ou    First HVF   2000   First photos   2013   Treatment / Drops started   xal 1/2016           Family history    none        Glaucoma meds   Cosopt ou bid // ((Latanoprost started 1/4/2016  - Stopped 3/22/2019  Od))  // re-started 1/18/2022 ou for OCT and ? VF prog        H/O adverse rxn to glaucoma drops    none        LASERS    none        GLAUCOMA SURGERIES    none        OTHER EYE SURGERIES    PC IOL os 8/2/2017 (done  W/ [facility] - SO removal)    TR repair w/ PPVx and yas oil - os - 2/2 endogen endopth os - 3/15/2017 - [facility] //  PC IOL - OD - 6/14/2023        CDR    0.75-0.8 / 0.7-0.75        Tbase    18-22 / 19-22          Tmax    22/30             Ttarget    ? 17/17            HVF    10 test 2000 to  20203- near full od // gen depression - INS         Gonio    +2-3 ou w/ yas oil drop sup os         CCT    510/517        OCT    5  test 2013 to 2023 - RNFL - bord TI  od //  dec G/TS/I, bord NS/N os (+prog os)         HRT    5 test 2016 to 2020 -MR -MR -  Dec. IT od // nl os /// CDR 0.69 od // 0.50 os  (large disc size )         Disc photos    2013, 2015 - [facility]     - [place]    13/15  - Test done today  IOP / gonio   post -op phaco/IOL od - 6/14/2023      2. Anomaolous Optic nerves    - very large disc size ou    - HRT - 2.97 od // 3.04 os (nl is up to 2.8 ou)     3. Dry eyes     4. H/O endogenous endophthalmitis  OS  - And secondary tract RD - S/P PPVx and Silicone Oil (Mazzulla)    Oil removed at the same time as the phaco/IOL done 8/2/2017     5. S/P complex phaco/IOL os with trypan blue and Leslye ring and SO removal     - (done with mazzulla ) // 8/2/2017// PCB00 19.5   - Doing well - consider yag to ant capsule phimosis / opacification     6. PC IOL od - 6/14/2023   DIB00 19.5      PLAN  Cosopt bid  OU -   Latanoprost - STOP/ OFF  (3/22/2019) // re-start 1/18/2022 - ?? OCT - RNFL prog os and ? VF prog os     Minimal change in IOP with re-starting latanoprost    Will cont as it may minimize fluctuations      Happy with new glasses (3/22/2019)     Hold off on yag cap to ant capsule phimosis and PCO OS  - [facility] doing OK - may be affecting the VF os     Ok to use OTC - zaditor or pataday for allergies - bid prn   Cont AT's prn     IOL calcs - OD  DIB00 19.5  MTA4U0 16.9    Phaco / IOL OD Date: 6/14/2023 - QKJ29-62.5  POM  > 4  - phaco/IOL   Doing well    Cont cosopt bid ou   Cont latanoprost - OS only - hold od     Given Rx for glasses - but very similar to her pre-op glasses - so if prefers can cont wit her older glasses     F/U 4 months - with  HVF / DFE / OCT

## 2023-10-27 ENCOUNTER — OFFICE VISIT (OUTPATIENT)
Dept: OPHTHALMOLOGY | Facility: CLINIC | Age: 83
End: 2023-10-27
Payer: COMMERCIAL

## 2023-10-27 DIAGNOSIS — Z96.1 PSEUDOPHAKIA: ICD-10-CM

## 2023-10-27 DIAGNOSIS — H40.1111 PRIMARY OPEN-ANGLE GLAUCOMA, RIGHT EYE, MILD STAGE: Primary | ICD-10-CM

## 2023-10-27 DIAGNOSIS — H33.002 RHEGMATOGENOUS RETINAL DETACHMENT OF LEFT EYE: ICD-10-CM

## 2023-10-27 DIAGNOSIS — H26.499 ANTERIOR CAPSULAR OPACIFICATION FOLLOWING EXTRACTION OF CATARACT: ICD-10-CM

## 2023-10-27 DIAGNOSIS — M35.01 KCS (KERATOCONJUNCTIVITIS SICCA): ICD-10-CM

## 2023-10-27 DIAGNOSIS — Q07.8 ANOMALOUS OPTIC NERVE: ICD-10-CM

## 2023-10-27 DIAGNOSIS — H40.1122 PRIMARY OPEN-ANGLE GLAUCOMA, LEFT EYE, MODERATE STAGE: ICD-10-CM

## 2023-10-27 PROCEDURE — 1101F PT FALLS ASSESS-DOCD LE1/YR: CPT | Mod: CPTII,S$GLB,, | Performed by: OPHTHALMOLOGY

## 2023-10-27 PROCEDURE — 1126F PR PAIN SEVERITY QUANTIFIED, NO PAIN PRESENT: ICD-10-PCS | Mod: CPTII,S$GLB,, | Performed by: OPHTHALMOLOGY

## 2023-10-27 PROCEDURE — 1126F AMNT PAIN NOTED NONE PRSNT: CPT | Mod: CPTII,S$GLB,, | Performed by: OPHTHALMOLOGY

## 2023-10-27 PROCEDURE — 1160F RVW MEDS BY RX/DR IN RCRD: CPT | Mod: CPTII,S$GLB,, | Performed by: OPHTHALMOLOGY

## 2023-10-27 PROCEDURE — 1160F PR REVIEW ALL MEDS BY PRESCRIBER/CLIN PHARMACIST DOCUMENTED: ICD-10-PCS | Mod: CPTII,S$GLB,, | Performed by: OPHTHALMOLOGY

## 2023-10-27 PROCEDURE — 99999 PR PBB SHADOW E&M-EST. PATIENT-LVL III: CPT | Mod: PBBFAC,,, | Performed by: OPHTHALMOLOGY

## 2023-10-27 PROCEDURE — 3288F PR FALLS RISK ASSESSMENT DOCUMENTED: ICD-10-PCS | Mod: CPTII,S$GLB,, | Performed by: OPHTHALMOLOGY

## 2023-10-27 PROCEDURE — 92020 PR SPECIAL EYE EVAL,GONIOSCOPY: ICD-10-PCS | Mod: S$GLB,,, | Performed by: OPHTHALMOLOGY

## 2023-10-27 PROCEDURE — 99214 OFFICE O/P EST MOD 30 MIN: CPT | Mod: S$GLB,,, | Performed by: OPHTHALMOLOGY

## 2023-10-27 PROCEDURE — 92020 GONIOSCOPY: CPT | Mod: S$GLB,,, | Performed by: OPHTHALMOLOGY

## 2023-10-27 PROCEDURE — 1101F PR PT FALLS ASSESS DOC 0-1 FALLS W/OUT INJ PAST YR: ICD-10-PCS | Mod: CPTII,S$GLB,, | Performed by: OPHTHALMOLOGY

## 2023-10-27 PROCEDURE — 1159F MED LIST DOCD IN RCRD: CPT | Mod: CPTII,S$GLB,, | Performed by: OPHTHALMOLOGY

## 2023-10-27 PROCEDURE — 99999 PR PBB SHADOW E&M-EST. PATIENT-LVL III: ICD-10-PCS | Mod: PBBFAC,,, | Performed by: OPHTHALMOLOGY

## 2023-10-27 PROCEDURE — 1159F PR MEDICATION LIST DOCUMENTED IN MEDICAL RECORD: ICD-10-PCS | Mod: CPTII,S$GLB,, | Performed by: OPHTHALMOLOGY

## 2023-10-27 PROCEDURE — 3288F FALL RISK ASSESSMENT DOCD: CPT | Mod: CPTII,S$GLB,, | Performed by: OPHTHALMOLOGY

## 2023-10-27 PROCEDURE — 99214 PR OFFICE/OUTPT VISIT, EST, LEVL IV, 30-39 MIN: ICD-10-PCS | Mod: S$GLB,,, | Performed by: OPHTHALMOLOGY

## 2023-11-08 ENCOUNTER — TELEPHONE (OUTPATIENT)
Dept: OBSTETRICS AND GYNECOLOGY | Facility: CLINIC | Age: 83
End: 2023-11-08
Payer: COMMERCIAL

## 2023-11-08 NOTE — TELEPHONE ENCOUNTER
----- Message from Chantal Salter sent at 11/8/2023  1:29 PM CST -----  Regarding: sooner appt  Name of caller: garret       What is the requesting detail: pt is requesting a call back to schedule a sooner appt. First available is in feb. Pt needs to be seen as in December. Please advise       Can the clinic reply by [facility]:       What number to call back:[phone]

## 2023-11-09 ENCOUNTER — TELEPHONE (OUTPATIENT)
Dept: DERMATOLOGY | Facility: CLINIC | Age: 83
End: 2023-11-09
Payer: COMMERCIAL

## 2023-11-09 ENCOUNTER — TELEPHONE (OUTPATIENT)
Dept: OBSTETRICS AND GYNECOLOGY | Facility: CLINIC | Age: 83
End: 2023-11-09
Payer: COMMERCIAL

## 2023-11-09 NOTE — TELEPHONE ENCOUNTER
----- Message from Yoli Leger sent at 11/9/2023 11:25 AM CST -----  Regarding: appt  Contact: [phone]  PATIENTCALL     Pt is calling to speak with someone in provider office regarding she is trying to schedule an appt she states she have an area under her stomach the is giving her some problems. She is asking for a return call please call pt at  [phone]

## 2023-11-09 NOTE — TELEPHONE ENCOUNTER
----- Message from Margo Nicko sent at 11/9/2023  2:37 PM CST -----  Patient says that she had a missed call form this office not to long ago. And would like for someone   to reach back out to her again in this matter. No message was left In regards.    Contact#[phone]

## 2023-11-09 NOTE — TELEPHONE ENCOUNTER
----- Message from Maria Victoria Dc sent at 11/9/2023 12:09 PM CST -----      Name of Who is Calling: OSIRIS SORIANO [[id number]]      What is the request in detail: Pt called to speak with the office.Please contact to further discuss and advise.          Can the clinic reply by [facility]: Y      What Number to Call Back if not in [facility]: [phone]

## 2023-11-13 ENCOUNTER — OFFICE VISIT (OUTPATIENT)
Dept: DERMATOLOGY | Facility: CLINIC | Age: 83
End: 2023-11-13
Payer: COMMERCIAL

## 2023-11-13 DIAGNOSIS — L30.4 INTERTRIGO: Primary | ICD-10-CM

## 2023-11-13 PROCEDURE — 1159F MED LIST DOCD IN RCRD: CPT | Mod: CPTII,S$GLB,, | Performed by: DERMATOLOGY

## 2023-11-13 PROCEDURE — 1101F PR PT FALLS ASSESS DOC 0-1 FALLS W/OUT INJ PAST YR: ICD-10-PCS | Mod: CPTII,S$GLB,, | Performed by: DERMATOLOGY

## 2023-11-13 PROCEDURE — 1160F RVW MEDS BY RX/DR IN RCRD: CPT | Mod: CPTII,S$GLB,, | Performed by: DERMATOLOGY

## 2023-11-13 PROCEDURE — 1101F PT FALLS ASSESS-DOCD LE1/YR: CPT | Mod: CPTII,S$GLB,, | Performed by: DERMATOLOGY

## 2023-11-13 PROCEDURE — 1126F AMNT PAIN NOTED NONE PRSNT: CPT | Mod: CPTII,S$GLB,, | Performed by: DERMATOLOGY

## 2023-11-13 PROCEDURE — 3288F PR FALLS RISK ASSESSMENT DOCUMENTED: ICD-10-PCS | Mod: CPTII,S$GLB,, | Performed by: DERMATOLOGY

## 2023-11-13 PROCEDURE — 1160F PR REVIEW ALL MEDS BY PRESCRIBER/CLIN PHARMACIST DOCUMENTED: ICD-10-PCS | Mod: CPTII,S$GLB,, | Performed by: DERMATOLOGY

## 2023-11-13 PROCEDURE — 99214 PR OFFICE/OUTPT VISIT, EST, LEVL IV, 30-39 MIN: ICD-10-PCS | Mod: S$GLB,,, | Performed by: DERMATOLOGY

## 2023-11-13 PROCEDURE — 99999 PR PBB SHADOW E&M-EST. PATIENT-LVL III: ICD-10-PCS | Mod: PBBFAC,,, | Performed by: DERMATOLOGY

## 2023-11-13 PROCEDURE — 1159F PR MEDICATION LIST DOCUMENTED IN MEDICAL RECORD: ICD-10-PCS | Mod: CPTII,S$GLB,, | Performed by: DERMATOLOGY

## 2023-11-13 PROCEDURE — 3288F FALL RISK ASSESSMENT DOCD: CPT | Mod: CPTII,S$GLB,, | Performed by: DERMATOLOGY

## 2023-11-13 PROCEDURE — 99214 OFFICE O/P EST MOD 30 MIN: CPT | Mod: S$GLB,,, | Performed by: DERMATOLOGY

## 2023-11-13 PROCEDURE — 1126F PR PAIN SEVERITY QUANTIFIED, NO PAIN PRESENT: ICD-10-PCS | Mod: CPTII,S$GLB,, | Performed by: DERMATOLOGY

## 2023-11-13 PROCEDURE — 99999 PR PBB SHADOW E&M-EST. PATIENT-LVL III: CPT | Mod: PBBFAC,,, | Performed by: DERMATOLOGY

## 2023-11-13 RX ORDER — KETOCONAZOLE 20 MG/ML
SHAMPOO, SUSPENSION TOPICAL
Qty: 120 ML | Refills: 5 | Status: SHIPPED | OUTPATIENT
Start: 2023-11-13

## 2023-11-13 NOTE — PATIENT INSTRUCTIONS
Flares:  Recommend cleanse/compress 2x/day followed by cool blow dry and then application of prescription medication.     Maintenance:  Cool blow dry after showering 1x/day then apply Zeasorb AF powder.

## 2023-11-13 NOTE — PROGRESS NOTES
Subjective:      Patient ID:  Ida Santana is a 83 y.o. female who presents for   Chief Complaint   Patient presents with    Intertrigo     F/u     Pt also needs refill on keto shampoo prn.    Intertrigo - Follow-up  Symptom Course: cleared but flared 1 - 2 weeks ago; recurrent x years.  Currently using: blow drying on cool after showering 1x/day followed by Zeasorb AF powder.  Affected locations: abdomen  Signs / symptoms: itching, irritated and redness      Review of Systems   Skin:  Positive for itching and rash (abdomen).   Hematologic/Lymphatic: Bruises/bleeds easily (on asa).       Objective:   Physical Exam   Constitutional: She appears well-developed and well-nourished. No distress.   Neurological: She is alert and oriented to person, place, and time. She is not disoriented.   Psychiatric: She has a normal mood and affect.   Skin:   Areas Examined (abnormalities noted in diagram):   Abdomen Inspection Performed            Diagram Legend     Erythematous scaling macule/papule c/w actinic keratosis       Vascular papule c/w angioma      Pigmented verrucoid papule/plaque c/w seborrheic keratosis      Yellow umbilicated papule c/w sebaceous hyperplasia      Irregularly shaped tan macule c/w lentigo     1-2 mm smooth white papules consistent with Milia      Movable subcutaneous cyst with punctum c/w epidermal inclusion cyst      Subcutaneous movable cyst c/w pilar cyst      Firm pink to brown papule c/w dermatofibroma      Pedunculated fleshy papule(s) c/w skin tag(s)      Evenly pigmented macule c/w junctional nevus     Mildly variegated pigmented, slightly irregular-bordered macule c/w mildly atypical nevus      Flesh colored to evenly pigmented papule c/w intradermal nevus       Pink pearly papule/plaque c/w basal cell carcinoma      Erythematous hyperkeratotic cursted plaque c/w SCC      Surgical scar with no sign of skin cancer recurrence      Open and closed comedones      Inflammatory papules and  pustules      Verrucoid papule consistent consistent with wart     Erythematous eczematous patches and plaques     Dystrophic onycholytic nail with subungual debris c/w onychomycosis     Umbilicated papule    Erythematous-base heme-crusted tan verrucoid plaque consistent with inflamed seborrheic keratosis     Erythematous Silvery Scaling Plaque c/w Psoriasis     See annotation      Assessment / Plan:        Intertrigo - under pannus  -     triamcinolone acetonide 0.1% (KENALOG) 0.1 % cream; AAA bid after cool blow dry  Dispense: 1 each; Refill: 3    Flares:  Recommend cleanse/compress 2x/day followed by cool blow dry and then application of prescription medication.     Maintenance:  Cool blow dry after showering 1x/day then apply Zeasorb AF powder.     Other orders  -     ketoconazole (NIZORAL) 2 % shampoo; Wash hair with medicated shampoo at least 2x/week - let sit on scalp at least 5 minutes prior to rinsing  Dispense: 120 mL; Refill: 5             Follow up if symptoms worsen or fail to improve.

## 2023-12-14 ENCOUNTER — OFFICE VISIT (OUTPATIENT)
Dept: OBSTETRICS AND GYNECOLOGY | Facility: CLINIC | Age: 83
End: 2023-12-14
Payer: COMMERCIAL

## 2023-12-14 ENCOUNTER — TELEPHONE (OUTPATIENT)
Dept: ENDOSCOPY | Facility: HOSPITAL | Age: 83
End: 2023-12-14
Payer: COMMERCIAL

## 2023-12-14 VITALS
WEIGHT: 154.56 LBS | SYSTOLIC BLOOD PRESSURE: 128 MMHG | HEIGHT: 67 IN | BODY MASS INDEX: 24.26 KG/M2 | DIASTOLIC BLOOD PRESSURE: 76 MMHG

## 2023-12-14 DIAGNOSIS — Z01.419 ENCOUNTER FOR GYNECOLOGICAL EXAMINATION WITHOUT ABNORMAL FINDING: Primary | ICD-10-CM

## 2023-12-14 DIAGNOSIS — Z78.0 POSTMENOPAUSAL: ICD-10-CM

## 2023-12-14 PROCEDURE — 3078F DIAST BP <80 MM HG: CPT | Mod: CPTII,S$GLB,, | Performed by: OBSTETRICS & GYNECOLOGY

## 2023-12-14 PROCEDURE — 3288F PR FALLS RISK ASSESSMENT DOCUMENTED: ICD-10-PCS | Mod: CPTII,S$GLB,, | Performed by: OBSTETRICS & GYNECOLOGY

## 2023-12-14 PROCEDURE — 3074F PR MOST RECENT SYSTOLIC BLOOD PRESSURE < 130 MM HG: ICD-10-PCS | Mod: CPTII,S$GLB,, | Performed by: OBSTETRICS & GYNECOLOGY

## 2023-12-14 PROCEDURE — 1159F PR MEDICATION LIST DOCUMENTED IN MEDICAL RECORD: ICD-10-PCS | Mod: CPTII,S$GLB,, | Performed by: OBSTETRICS & GYNECOLOGY

## 2023-12-14 PROCEDURE — 1126F AMNT PAIN NOTED NONE PRSNT: CPT | Mod: CPTII,S$GLB,, | Performed by: OBSTETRICS & GYNECOLOGY

## 2023-12-14 PROCEDURE — 1101F PT FALLS ASSESS-DOCD LE1/YR: CPT | Mod: CPTII,S$GLB,, | Performed by: OBSTETRICS & GYNECOLOGY

## 2023-12-14 PROCEDURE — 99999 PR PBB SHADOW E&M-EST. PATIENT-LVL III: ICD-10-PCS | Mod: PBBFAC,,, | Performed by: OBSTETRICS & GYNECOLOGY

## 2023-12-14 PROCEDURE — 99999 PR PBB SHADOW E&M-EST. PATIENT-LVL III: CPT | Mod: PBBFAC,,, | Performed by: OBSTETRICS & GYNECOLOGY

## 2023-12-14 PROCEDURE — 3288F FALL RISK ASSESSMENT DOCD: CPT | Mod: CPTII,S$GLB,, | Performed by: OBSTETRICS & GYNECOLOGY

## 2023-12-14 PROCEDURE — 99397 PER PM REEVAL EST PAT 65+ YR: CPT | Mod: S$GLB,,, | Performed by: OBSTETRICS & GYNECOLOGY

## 2023-12-14 PROCEDURE — 1101F PR PT FALLS ASSESS DOC 0-1 FALLS W/OUT INJ PAST YR: ICD-10-PCS | Mod: CPTII,S$GLB,, | Performed by: OBSTETRICS & GYNECOLOGY

## 2023-12-14 PROCEDURE — 99397 PR PREVENTIVE VISIT,EST,65 & OVER: ICD-10-PCS | Mod: S$GLB,,, | Performed by: OBSTETRICS & GYNECOLOGY

## 2023-12-14 PROCEDURE — 3074F SYST BP LT 130 MM HG: CPT | Mod: CPTII,S$GLB,, | Performed by: OBSTETRICS & GYNECOLOGY

## 2023-12-14 PROCEDURE — 1126F PR PAIN SEVERITY QUANTIFIED, NO PAIN PRESENT: ICD-10-PCS | Mod: CPTII,S$GLB,, | Performed by: OBSTETRICS & GYNECOLOGY

## 2023-12-14 PROCEDURE — 1159F MED LIST DOCD IN RCRD: CPT | Mod: CPTII,S$GLB,, | Performed by: OBSTETRICS & GYNECOLOGY

## 2023-12-14 PROCEDURE — 1160F PR REVIEW ALL MEDS BY PRESCRIBER/CLIN PHARMACIST DOCUMENTED: ICD-10-PCS | Mod: CPTII,S$GLB,, | Performed by: OBSTETRICS & GYNECOLOGY

## 2023-12-14 PROCEDURE — 1160F RVW MEDS BY RX/DR IN RCRD: CPT | Mod: CPTII,S$GLB,, | Performed by: OBSTETRICS & GYNECOLOGY

## 2023-12-14 PROCEDURE — 3078F PR MOST RECENT DIASTOLIC BLOOD PRESSURE < 80 MM HG: ICD-10-PCS | Mod: CPTII,S$GLB,, | Performed by: OBSTETRICS & GYNECOLOGY

## 2023-12-14 NOTE — PROGRESS NOTES
CC: Well woman exam    Ida Santana is a 83 y.o. female  presents for a well woman exam.  LMP: No LMP recorded (lmp unknown). Patient is postmenopausal..  No issues, problems, or complaints.    Past Medical History:   Diagnosis Date    Breast cancer     Left breast DCIS    Cancer 2001    ductal carcinoma in situ left breast 2001    Glaucoma     History of uterine fibroid     Hyperlipidemia     dyslipidemia    Hypertension     MRSA infection     Open angle 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 eyes 2013    Osteopenia     Pancreas cyst     Potassium depletion     Retinal detachment of left eye with retinal break 2017     Past Surgical History:   Procedure Laterality Date    Adrenal Gland Surgery to right side      BREAST BIOPSY Left     left breast DCIS    BREAST LUMPECTOMY Left 2001    CATARACT EXTRACTION W/  INTRAOCULAR LENS IMPLANT Left 2017    with PPV/SO removal ( AND )    CATARACT EXTRACTION W/  INTRAOCULAR LENS IMPLANT Right 2023        COLONOSCOPY      COLONOSCOPY N/A 10/31/2016    Procedure: COLONOSCOPY;  Surgeon: YAMILETH Richards MD;  Location: [facility] (4TH FLR);  Service: Endoscopy;  Laterality: N/A;    ENDOSCOPIC ULTRASOUND OF UPPER GASTROINTESTINAL TRACT N/A 2019    Procedure: ULTRASOUND, UPPER GI TRACT, ENDOSCOPIC;  Surgeon: Ladonna Wells MD;  Location: [facility] (2ND FLR);  Service: Endoscopy;  Laterality: N/A;    ENDOSCOPIC ULTRASOUND OF UPPER GASTROINTESTINAL TRACT N/A 2020    Procedure: ULTRASOUND, UPPER GI TRACT, ENDOSCOPIC;  Surgeon: Ladonna Wells MD;  Location: [facility] (2ND FLR);  Service: Endoscopy;  Laterality: N/A;  -[address]    ERCP N/A 2020    Procedure: ERCP (ENDOSCOPIC RETROGRADE CHOLANGIOPANCREATOGRAPHY);  Surgeon: Ladonna Wells MD;  Location: [facility] (2ND FLR);  Service: Endoscopy;  Laterality: N/A;    INTRAOCULAR  PROSTHESES INSERTION Right 2023    Procedure: INSERTION, IOL PROSTHESIS;  Surgeon: Lynda Crawford MD;  Location: [facility] OR [address] [place] [address];  Service: Ophthalmology;  Laterality: Right;    KIDNEY SURGERY  2017    patient does not know what they did, but knows she does not have kidney cancer    PARS PLANA VITRECTOMY Left 2017    25g PPV WITH SO REMOVAL AND PHACO IOL ( AND )    PHACOEMULSIFICATION OF CATARACT Right 2023    Procedure: PHACOEMULSIFICATION, CATARACT;  Surgeon: Lynda Crawford MD;  Location: [facility] OR [address] [place] [address];  Service: Ophthalmology;  Laterality: Right;    RETINAL DETACHMENT SURGERY Left 2017    PPVx and SO Removal//Phaco IOL ( AND )     Social History     Socioeconomic History    Marital status: Single   Tobacco Use    Smoking status: Never    Smokeless tobacco: Never   Substance and Sexual Activity    Alcohol use: No    Drug use: No    Sexual activity: Never     Birth control/protection: Post-menopausal   Social History Narrative    Retired  (34 years) with [employer]    Masters in Early Childhood Education        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        Exercise:  Walks several days a week.     Family History   Problem Relation Age of Onset    Cancer Mother         breast    Breast cancer Mother         40s and again in 50s (bilateral)    Breast cancer Other 40    Heart disease Father         bypass    Cancer Father         asbestos    Stroke Brother     Hypertension Brother     Ovarian cancer Neg Hx     Amblyopia Neg Hx     Blindness Neg Hx     Cataracts Neg Hx     Diabetes Neg Hx     Glaucoma Neg Hx     Macular degeneration Neg Hx     Retinal detachment Neg Hx     Strabismus Neg Hx     Thyroid disease Neg Hx     Colon cancer Neg Hx     Melanoma Neg Hx      OB History          0    Para        Term   0            AB        Living             SAB        IAB        Ectopic        Multiple        Live  "Births                     /76   Ht 5' 7" (1.702 m)   Wt 70.1 kg (154 lb 8.7 oz)   LMP  (LMP Unknown)   BMI 24.20 kg/m²       ROS:    ROS:  GENERAL: Denies weight gain or weight loss. Feeling well overall.   SKIN: Denies rash or lesions.   HEAD: Denies head injury or headache.   NODES: Denies enlarged lymph nodes.   CHEST: Denies chest pain or shortness of breath.   CARDIOVASCULAR: Denies palpitations or left sided chest pain.   ABDOMEN: No abdominal pain, constipation, diarrhea, nausea, vomiting or rectal bleeding.   URINARY: No frequency, dysuria, hematuria, or burning on urination.  REPRODUCTIVE: See HPI.   BREASTS: The patient performs breast self-examination and denies pain, lumps, or nipple discharge.   HEMATOLOGIC: No easy bruisability or excessive bleeding.   MUSCULOSKELETAL: Denies joint pain or swelling.   NEUROLOGIC: Denies syncope or weakness.   PSYCHIATRIC: Denies depression, anxiety or mood swings.    PHYSICAL EXAM:    APPEARANCE: Well nourished, well developed, in no acute distress.  AFFECT: WNL, alert and oriented x 3  SKIN: No acne or hirsutism  NECK: Neck symmetric without masses or thyromegaly  NODES: No inguinal, cervical, axillary, or femoral lymph node enlargement  CHEST: Good respiratory effect  ABDOMEN: Soft.  No tenderness or masses.  No hepatosplenomegaly.  No hernias.  BREASTS: Symmetrical, no skin changes or visible lesions.  No palpable masses, nipple discharge bilaterally.  PELVIC: atrophic  external genitalia without lesions.  Normal hair distribution.  Adequate perineal body, normal urethral meatus.  Vagina moist and well rugated without lesions or discharge.  Cervix pink, without lesions, discharge or tenderness.  No significant cystocele or rectocele.  Bimanual exam shows uterus to be normal size, regular, mobile and nontender.  Adnexa without masses or tenderness.    RECTAL: Rectovaginal exam confirms above with normal sphincter tone, no masses.  EXTREMITIES: No " edema.      ICD-10-CM ICD-9-CM    1. Encounter for gynecological examination without abnormal finding  Z01.419 V72.31       2. Postmenopausal  Z78.0 V49.81             Patient was counseled today on A.C.S. Pap guidelines and recommendations for yearly pelvic exams, mammograms and monthly self breast exams; to see her PCP for other health maintenance.     Follow up in about 1 year (around 12/14/2024), or if symptoms worsen or fail to improve.

## 2023-12-19 ENCOUNTER — HOSPITAL ENCOUNTER (OUTPATIENT)
Dept: RADIOLOGY | Facility: HOSPITAL | Age: 83
Discharge: HOME | End: 2023-12-19
Attending: INTERNAL MEDICINE
Payer: COMMERCIAL

## 2023-12-19 VITALS — BODY MASS INDEX: 23.49 KG/M2 | WEIGHT: 150 LBS

## 2023-12-19 DIAGNOSIS — Z85.3 HISTORY OF BREAST CANCER IN FEMALE: ICD-10-CM

## 2023-12-19 PROCEDURE — 77067 SCR MAMMO BI INCL CAD: CPT | Mod: 26,,, | Performed by: RADIOLOGY

## 2023-12-19 PROCEDURE — 77067 SCR MAMMO BI INCL CAD: CPT | Mod: TC

## 2023-12-19 PROCEDURE — 77067 MAMMO DIGITAL SCREENING BILAT WITH TOMO: ICD-10-PCS | Mod: 26,,, | Performed by: RADIOLOGY

## 2023-12-19 PROCEDURE — 77063 MAMMO DIGITAL SCREENING BILAT WITH TOMO: ICD-10-PCS | Mod: 26,,, | Performed by: RADIOLOGY

## 2023-12-19 PROCEDURE — 77063 BREAST TOMOSYNTHESIS BI: CPT | Mod: 26,,, | Performed by: RADIOLOGY

## 2024-01-02 ENCOUNTER — TELEPHONE (OUTPATIENT)
Dept: INTERNAL MEDICINE | Facility: CLINIC | Age: 84
End: 2024-01-02
Payer: COMMERCIAL

## 2024-01-02 DIAGNOSIS — M85.80 OSTEOPENIA, UNSPECIFIED LOCATION: Primary | ICD-10-CM

## 2024-01-02 DIAGNOSIS — I10 ESSENTIAL HYPERTENSION: ICD-10-CM

## 2024-01-02 NOTE — TELEPHONE ENCOUNTER
Pt requesting annual lab orders, pended. Please add any other pertinent labs and we can call to schedule.

## 2024-01-02 NOTE — TELEPHONE ENCOUNTER
----- Message from Sharon Boris sent at 1/2/2024 11:32 AM CST -----  Contact: [phone]  type: Lab     Caller is requesting to schedule their Lab appointment prior to annual appointment.  Order is not listed in EPIC.  Please enter order and contact patient to schedule.     Name of Caller: Ida Ritchie Date and Time of Labs: Wednesday, March 6,2024 @ 8 am      Date of Annual Physical Appointment: Wednesday, March 13,2024     Where would they like the lab performed? [facility]      Would the patient rather a call back or a response via [employer]? Call      Best Call Back Number: [phone]     Additional Information: n/a

## 2024-01-27 ENCOUNTER — HOSPITAL ENCOUNTER (EMERGENCY)
Facility: HOSPITAL | Age: 84
Discharge: HOME | End: 2024-01-27
Attending: EMERGENCY MEDICINE
Payer: COMMERCIAL

## 2024-01-27 VITALS
SYSTOLIC BLOOD PRESSURE: 178 MMHG | OXYGEN SATURATION: 99 % | TEMPERATURE: 98 F | HEART RATE: 89 BPM | RESPIRATION RATE: 18 BRPM | DIASTOLIC BLOOD PRESSURE: 88 MMHG
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
## 2024-01-27 DIAGNOSIS — S01.81XA CHIN LACERATION, INITIAL ENCOUNTER: ICD-10-CM

## 2024-01-27 PROCEDURE — 90471 IMMUNIZATION ADMIN: CPT

## 2024-01-27 PROCEDURE — 63600175 PHARM REV CODE 636 W HCPCS

## 2024-01-27 PROCEDURE — 25000003 PHARM REV CODE 250

## 2024-01-27 PROCEDURE — 99285 EMERGENCY DEPT VISIT HI MDM: CPT | Mod: 25

## 2024-01-27 PROCEDURE — 12013 RPR F/E/E/N/L/M 2.6-5.0 CM: CPT

## 2024-01-27 PROCEDURE — 90715 TDAP VACCINE 7 YRS/> IM: CPT

## 2024-01-27 RX ORDER — BACITRACIN ZINC 500 [USP'U]/G
OINTMENT TOPICAL
Status: COMPLETED | OUTPATIENT
Start: 2024-01-27 | End: 2024-01-27

## 2024-01-27 RX ORDER — LIDOCAINE HYDROCHLORIDE 10 MG/ML
5 INJECTION, SOLUTION EPIDURAL; INFILTRATION; INTRACAUDAL; PERINEURAL
Status: COMPLETED | OUTPATIENT
Start: 2024-01-27 | End: 2024-01-27

## 2024-01-27 RX ADMIN — TETANUS TOXOID, REDUCED DIPHTHERIA TOXOID AND ACELLULAR PERTUSSIS VACCINE, ADSORBED 0.5 ML: 5; 2.5; 8; 8; 2.5 SUSPENSION INTRAMUSCULAR at 05:01

## 2024-01-27 RX ADMIN — LIDOCAINE HYDROCHLORIDE 50 MG: 10 INJECTION, SOLUTION EPIDURAL; INFILTRATION; INTRACAUDAL at 04:01

## 2024-01-27 RX ADMIN — Medication 1 EACH: at 05:01

## 2024-01-27 NOTE — ED PROVIDER NOTES
Encounter Date: 1/27/2024       History     Chief Complaint   Patient presents with    Fall     Got up to use the restroom, mechanically tripped on rug. Landed chin-first and sustained a lac there. Denies LOC. Denies neck pain. Baby ASA daily, otherwise no blood thinners     The history is provided by the patient.     Ms. Coombs is an 83-year-old female with a PMH of hypertension, hyperlipidemia, breast cancer, osteopenia  who presents s/p fall they 4 cm vertically oriented laceration to the left side of her chin.  She states that she was walking in her kitchen when she accidentally fell, hit her chin on the side of a cabinet and then subsequently fell to the ground.  She did not lose fall during the consciousness, she takes a baby aspirin.  She denies pain in her head or any vision changes.  Prior to the fall she states she was in her otherwise normal state of health, and states there were no preceding symptoms to the fall.  Denies recent fever, chills, nausea, vomiting, diarrhea or any change with bowel movements or urination.    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    Glaucoma     History of uterine fibroid     Hyperlipidemia     dyslipidemia    Hypertension     MRSA infection     Open angle 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 eyes 05/02/2013    Osteopenia     Pancreas cyst     Potassium depletion 1998    Retinal detachment of left eye with retinal break 03/13/2017     Past Surgical History:   Procedure Laterality Date    Adrenal Gland Surgery to right side      BREAST BIOPSY Left 2001    left breast DCIS    BREAST LUMPECTOMY Left 09/11/2001    CATARACT EXTRACTION W/  INTRAOCULAR LENS  IMPLANT Left 08/02/2017    with PPV/SO removal ( AND )    CATARACT EXTRACTION W/  INTRAOCULAR LENS IMPLANT Right 06/14/2023        COLONOSCOPY      COLONOSCOPY N/A 10/31/2016    Procedure: COLONOSCOPY;  Surgeon: YAMILETH Richards MD;  Location: [facility] ENDO (4TH FLR);  Service: Endoscopy;  Laterality: N/A;    ENDOSCOPIC ULTRASOUND OF UPPER GASTROINTESTINAL TRACT N/A 08/07/2019    Procedure: ULTRASOUND, UPPER GI TRACT, ENDOSCOPIC;  Surgeon: Ladonna Wells MD;  Location: [facility] ENDO (2ND FLR);  Service: Endoscopy;  Laterality: N/A;    ENDOSCOPIC ULTRASOUND OF UPPER GASTROINTESTINAL TRACT N/A 09/14/2020    Procedure: ULTRASOUND, UPPER GI TRACT, ENDOSCOPIC;  Surgeon: Ladonna Wells MD;  Location: [facility] ENDO (2ND FLR);  Service: Endoscopy;  Laterality: N/A;  9/11-covid [address]    ERCP N/A 09/14/2020    Procedure: ERCP (ENDOSCOPIC RETROGRADE CHOLANGIOPANCREATOGRAPHY);  Surgeon: Ladonna Wells MD;  Location: [facility] ENDO (2ND FLR);  Service: Endoscopy;  Laterality: N/A;    INTRAOCULAR PROSTHESES INSERTION Right 6/14/2023    Procedure: INSERTION, IOL PROSTHESIS;  Surgeon: Lynda Crawford MD;  Location: [facility] OR 1ST FLR;  Service: Ophthalmology;  Laterality: Right;    KIDNEY SURGERY  03/27/2017    patient does not know what they did, but knows she does not have kidney cancer    PARS PLANA VITRECTOMY Left 08/02/2017    25g PPV WITH SO REMOVAL AND PHACO IOL ( AND )    PHACOEMULSIFICATION OF CATARACT Right 6/14/2023    Procedure: PHACOEMULSIFICATION, CATARACT;  Surgeon: Lynda Crawford MD;  Location: [facility] OR 1ST FLR;  Service: Ophthalmology;  Laterality: Right;    RETINAL DETACHMENT SURGERY Left 08/22/2017    PPVx and SO Removal//Phaco IOL ( AND )     Family History   Problem Relation Age of Onset    Cancer Mother         breast    Breast cancer Mother         40s and again in 50s (bilateral)    Breast cancer Other 40    Heart  disease Father         bypass    Cancer Father         asbestos    Stroke Brother     Hypertension Brother     Ovarian cancer Neg Hx     Amblyopia Neg Hx     Blindness Neg Hx     Cataracts Neg Hx     Diabetes Neg Hx     Glaucoma Neg Hx     Macular degeneration Neg Hx     Retinal detachment Neg Hx     Strabismus Neg Hx     Thyroid disease Neg Hx     Colon cancer Neg Hx     Melanoma Neg Hx      Social History     Tobacco Use    Smoking status: Never    Smokeless tobacco: Never   Substance Use Topics    Alcohol use: No    Drug use: No       Physical Exam     Initial Vitals   BP Pulse Resp Temp SpO2   01/27/24 0400 01/27/24 0358 01/27/24 0358 01/27/24 0358 01/27/24 0358   (S) (!) 186/77 92 18 98 °F (36.7 °C) 97 %      MAP       --                Physical Exam    Nursing note and vitals reviewed.  Constitutional: She appears well-developed. No distress.   HENT:   Head: Normocephalic and atraumatic.   Mouth/Throat: Oropharynx is clear and moist and mucous membranes are normal.   Eyes: EOM are normal. Pupils are equal, round, and reactive to light.   Neck:   Normal range of motion.  Cardiovascular:  Normal rate, regular rhythm and normal heart sounds.           Pulmonary/Chest: Breath sounds normal. No respiratory distress.   Abdominal: She exhibits no distension.   Musculoskeletal:         General: No edema.      Cervical back: Normal range of motion.     Neurological: She is alert and oriented to person, place, and time.   Skin: Skin is warm.   See media.  A 4 cm vertically oriented laceration is noted in the left portion of the chin.   Psychiatric: She has a normal mood and affect. Her behavior is normal. Thought content normal.         ED Course   Lac Repair    Date/Time: 1/27/2024 5:43 AM    Performed by: Kenrick Beltran MD  Authorized by: Becka Toribio MD    Consent:     Consent obtained:  Verbal    Consent given by:  Patient    Risks, benefits, and alternatives were discussed: yes      Risks discussed:   Poor cosmetic result and infection  Universal protocol:     Procedure explained and questions answered to patient or proxy's satisfaction: yes      Immediately prior to procedure, a time out was called: yes      Patient identity confirmed:  Verbally with patient  Anesthesia:     Anesthesia method:  Local infiltration    Local anesthetic:  Lidocaine 1% w/o epi  Laceration details:     Location:  Face    Face location:  Chin    Length (cm):  4    Depth (mm):  10  Pre-procedure details:     Preparation:  Patient was prepped and draped in usual sterile fashion  Exploration:     Limited defect created (wound extended): yes      Hemostasis achieved with:  Direct pressure    Imaging outcome: foreign body not noted      Wound exploration: wound explored through full range of motion and entire depth of wound visualized      Wound extent: areolar tissue violated and fascia violated      Contaminated: no    Treatment:     Area cleansed with:  Saline    Amount of cleaning:  Standard    Irrigation solution:  Sterile water    Irrigation method:  Pressure wash    Debridement:  None    Undermining:  None    Scar revision: no      Layers/structures repaired:  Galea  Galea:     Suture size:  3-0    Suture material:  Vicryl    Suture technique:  Buried horizontal mattress    Number of sutures:  3  Skin repair:     Repair method:  Sutures    Suture size:  4-0    Suture material:  Prolene    Suture technique:  Simple interrupted    Number of sutures:  7  Approximation:     Approximation:  Close  Repair type:     Repair type:  Simple  Post-procedure details:     Dressing:  Antibiotic ointment and adhesive bandage    Procedure completion:  Tolerated with difficulty    Labs Reviewed - No data to display       Imaging Results              CT Cervical Spine Without Contrast (Final result)  Result time 01/27/24 07:08:23      Final result by Marck Lopez MD (01/27/24 07:08:23)                   Impression:      CT head:    No acute  intracranial hemorrhage or displaced calvarial fracture.    CT maxillofacial:    Left perimandibular soft tissue laceration with soft tissue swelling and edema, as described.  No acute maxillofacial fracture.    CT cervical spine:    No acute fracture or traumatic malalignment.    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    Electronically signed by resident: Eric Hernandez  Date:    01/27/2024  Time:    06:27    Electronically signed by: Marck Lopez  Date:    01/27/2024  Time:    07:08               Narrative:    EXAMINATION:  CT HEAD WITHOUT CONTRAST; CT MAXILLOFACIAL WITHOUT CONTRAST; CT CERVICAL SPINE WITHOUT CONTRAST    CLINICAL HISTORY:  Facial trauma, penetrating;; Neck trauma (Age >= 65y);    TECHNIQUE:  Low dose axial CT images obtained throughout the head, cervical spine, and facial bones without intravenous contrast. Sagittal and coronal reconstructions were performed.    COMPARISON:  MRI brain 01/13/2017    FINDINGS:  Intracranial compartment:    Generalized cerebral volume loss with compensatory prominence of ventricles and sulci.  No acute hydrocephalus.  No extra-axial blood or fluid collections.    Patchy supratentorial white matter hypoattenuation while nonspecific compatible with chronic microvascular ischemic change.  No parenchymal mass effect, hemorrhage, edema or major vascular distribution infarct.    Skull base atherosclerotic calcification.    Skull: No displaced calvarial fracture.    Maxillofacial:    Left perimandibular deep laceration with soft tissue gas foci and adjacent subcutaneous and platysma intramuscular thickening/edema and stranding.  Inflammation/stranding additionally noted about smaller adjoining muscles of facial expression.  Laceration appears to extend to the buccal space a gas adjoining the  buccal cortex of the left mandibular body.    The globes and intraorbital contents are within normal limits. No orbital fracture.    The remainder of the facial bones appear intact without evidence of an acute displaced fracture. No osseous destructive lesions.    Temporomandibular joints appropriately position without evidence of dislocation.    Paranasal sinuses essentially clear. Mastoids are clear.    Cervical spine:    Cervical spine alignment is within normal limits.  No spondylolisthesis.  No acute fracture or traumatic dislocation.  Multilevel disc height loss most significant at C5-C6.  Multilevel bulky posterior disc osteophyte complex is or ossification along the posterior longitudinal ligament.  Focal degenerative changes as below:    C2-C3: Left uncovertebral spurring resulting in mild left neural foraminal narrowing.    C3-C4: Posterior disc osteophyte complex uncovertebral spurring and facet joint arthropathy resulting in mild spinal canal stenosis.    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  Facet joint arthropathy greater on the right.  Findings result in mild left neural foraminal narrowing and mild spinal canal stenosis.    C5-C6: Posterior disc osteophyte complex with uncovertebral spurring resulting in severe bilateral neural foraminal narrowing and moderate spinal stenosis.    C6-C7: Posterior disc osteophyte complex uncovertebral spurring and possible ossification of the posterior longitudinal ligament resulting in severe bilateral neural foraminal narrowing and moderate spinal canal stenosis.    C7-T1: No significant spinal canal stenosis or neural foraminal narrowing.    Miscellaneous: Lung apices demonstrate no focal consolidation.  No cervical lymphadenopathy.  Right thyroid nodule 1.0 cm.                                       CT Maxillofacial Without Contrast (Final result)  Result time 01/27/24  07:08:23      Final result by Marck Lopez MD (01/27/24 07:08:23)                   Impression:      CT head:    No acute intracranial hemorrhage or displaced calvarial fracture.    CT maxillofacial:    Left perimandibular soft tissue laceration with soft tissue swelling and edema, as described.  No acute maxillofacial fracture.    CT cervical spine:    No acute fracture or traumatic malalignment.    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    Electronically signed by resident: Eric Hernandez  Date:    01/27/2024  Time:    06:27    Electronically signed by: Marck Lopez  Date:    01/27/2024  Time:    07:08               Narrative:    EXAMINATION:  CT HEAD WITHOUT CONTRAST; CT MAXILLOFACIAL WITHOUT CONTRAST; CT CERVICAL SPINE WITHOUT CONTRAST    CLINICAL HISTORY:  Facial trauma, penetrating;; Neck trauma (Age >= 65y);    TECHNIQUE:  Low dose axial CT images obtained throughout the head, cervical spine, and facial bones without intravenous contrast. Sagittal and coronal reconstructions were performed.    COMPARISON:  MRI brain 01/13/2017    FINDINGS:  Intracranial compartment:    Generalized cerebral volume loss with compensatory prominence of ventricles and sulci.  No acute hydrocephalus.  No extra-axial blood or fluid collections.    Patchy supratentorial white matter hypoattenuation while nonspecific compatible with chronic microvascular ischemic change.  No parenchymal mass effect, hemorrhage, edema or major vascular distribution infarct.    Skull base atherosclerotic calcification.    Skull: No displaced calvarial fracture.    Maxillofacial:    Left perimandibular deep laceration with soft tissue gas foci and adjacent subcutaneous and platysma intramuscular thickening/edema and stranding.  Inflammation/stranding additionally  noted about smaller adjoining muscles of facial expression.  Laceration appears to extend to the buccal space a gas adjoining the buccal cortex of the left mandibular body.    The globes and intraorbital contents are within normal limits. No orbital fracture.    The remainder of the facial bones appear intact without evidence of an acute displaced fracture. No osseous destructive lesions.    Temporomandibular joints appropriately position without evidence of dislocation.    Paranasal sinuses essentially clear. Mastoids are clear.    Cervical spine:    Cervical spine alignment is within normal limits.  No spondylolisthesis.  No acute fracture or traumatic dislocation.  Multilevel disc height loss most significant at C5-C6.  Multilevel bulky posterior disc osteophyte complex is or ossification along the posterior longitudinal ligament.  Focal degenerative changes as below:    C2-C3: Left uncovertebral spurring resulting in mild left neural foraminal narrowing.    C3-C4: Posterior disc osteophyte complex uncovertebral spurring and facet joint arthropathy resulting in mild spinal canal stenosis.    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  Facet joint arthropathy greater on the right.  Findings result in mild left neural foraminal narrowing and mild spinal canal stenosis.    C5-C6: Posterior disc osteophyte complex with uncovertebral spurring resulting in severe bilateral neural foraminal narrowing and moderate spinal stenosis.    C6-C7: Posterior disc osteophyte complex uncovertebral spurring and possible ossification of the posterior longitudinal ligament resulting in severe bilateral neural foraminal narrowing and moderate spinal canal stenosis.    C7-T1: No significant spinal canal stenosis or neural foraminal narrowing.    Miscellaneous: Lung apices demonstrate no focal consolidation.  No cervical lymphadenopathy.  Right  thyroid nodule 1.0 cm.                                       CT Head Without Contrast (Final result)  Result time 01/27/24 07:08:23      Final result by Marck Lopez MD (01/27/24 07:08:23)                   Impression:      CT head:    No acute intracranial hemorrhage or displaced calvarial fracture.    CT maxillofacial:    Left perimandibular soft tissue laceration with soft tissue swelling and edema, as described.  No acute maxillofacial fracture.    CT cervical spine:    No acute fracture or traumatic malalignment.    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    Electronically signed by resident: Eric Hernandez  Date:    01/27/2024  Time:    06:27    Electronically signed by: Marck Lopez  Date:    01/27/2024  Time:    07:08               Narrative:    EXAMINATION:  CT HEAD WITHOUT CONTRAST; CT MAXILLOFACIAL WITHOUT CONTRAST; CT CERVICAL SPINE WITHOUT CONTRAST    CLINICAL HISTORY:  Facial trauma, penetrating;; Neck trauma (Age >= 65y);    TECHNIQUE:  Low dose axial CT images obtained throughout the head, cervical spine, and facial bones without intravenous contrast. Sagittal and coronal reconstructions were performed.    COMPARISON:  MRI brain 01/13/2017    FINDINGS:  Intracranial compartment:    Generalized cerebral volume loss with compensatory prominence of ventricles and sulci.  No acute hydrocephalus.  No extra-axial blood or fluid collections.    Patchy supratentorial white matter hypoattenuation while nonspecific compatible with chronic microvascular ischemic change.  No parenchymal mass effect, hemorrhage, edema or major vascular distribution infarct.    Skull base atherosclerotic calcification.    Skull: No displaced calvarial fracture.    Maxillofacial:    Left perimandibular deep laceration with soft tissue gas foci and  adjacent subcutaneous and platysma intramuscular thickening/edema and stranding.  Inflammation/stranding additionally noted about smaller adjoining muscles of facial expression.  Laceration appears to extend to the buccal space a gas adjoining the buccal cortex of the left mandibular body.    The globes and intraorbital contents are within normal limits. No orbital fracture.    The remainder of the facial bones appear intact without evidence of an acute displaced fracture. No osseous destructive lesions.    Temporomandibular joints appropriately position without evidence of dislocation.    Paranasal sinuses essentially clear. Mastoids are clear.    Cervical spine:    Cervical spine alignment is within normal limits.  No spondylolisthesis.  No acute fracture or traumatic dislocation.  Multilevel disc height loss most significant at C5-C6.  Multilevel bulky posterior disc osteophyte complex is or ossification along the posterior longitudinal ligament.  Focal degenerative changes as below:    C2-C3: Left uncovertebral spurring resulting in mild left neural foraminal narrowing.    C3-C4: Posterior disc osteophyte complex uncovertebral spurring and facet joint arthropathy resulting in mild spinal canal stenosis.    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  Facet joint arthropathy greater on the right.  Findings result in mild left neural foraminal narrowing and mild spinal canal stenosis.    C5-C6: Posterior disc osteophyte complex with uncovertebral spurring resulting in severe bilateral neural foraminal narrowing and moderate spinal stenosis.    C6-C7: Posterior disc osteophyte complex uncovertebral spurring and possible ossification of the posterior longitudinal ligament resulting in severe bilateral neural foraminal narrowing and moderate spinal canal stenosis.    C7-T1: No significant spinal canal stenosis or neural  foraminal narrowing.    Miscellaneous: Lung apices demonstrate no focal consolidation.  No cervical lymphadenopathy.  Right thyroid nodule 1.0 cm.                                       Medications   LIDOcaine (PF) 10 mg/ml (1%) injection 50 mg (50 mg Infiltration Given 1/27/24 5127)   Tdap (BOOSTRIX) vaccine injection 0.5 mL (0.5 mLs Intramuscular Given 1/27/24 0574)   bacitracin zinc ointment (1 each Topical (Top) Given 1/27/24 9611)     Medical Decision Making  Ms. Santana is an 83-year-old female who presents s/p fall now with a lower left chin laceration.  Differential diagnosis includes but is not limited to contusion, concussion, intracranial hemorrhage cervical fracture, maxillary fracture. Will evaluate with a CT head/maxillofacial/cervical spine.  The laceration was appropriately closed using suture.  Tetanus booster was given.    While in the emergency department prior to my handoff, the patient remained hemodynamically stable and exam was overall reassuring.  Patient handed off to the oncoming team pending the results of the CT head/maxillofacial/cervical spine.  Anticipate discharge home should these be negative.    Amount and/or Complexity of Data Reviewed  Radiology: ordered.     Details: Previous medical records, specifically from her hospital stay on 06/2024 was reviewed to better understand the patient's past medical history.    Risk  OTC drugs.  Prescription drug management.                                      Clinical Impression:  Final diagnoses:  [W19.XXXA] Fall, initial encounter (Primary)  [S01.81XA] Chin laceration, initial encounter          ED Disposition Condition    Discharge Stable          ED Prescriptions    None       Follow-up Information       Follow up With Specialties Details Why Contact Info    Elly Kruger MD Internal Medicine In 1 week As needed [id number] [facility] [address]  [phone]               Kenrick Beltran MD  Resident  01/27/24 1180

## 2024-01-27 NOTE — DISCHARGE INSTRUCTIONS
Please return to the emergency department should you experience new symptoms such as headache vision changes or decreased ability to walk and move around.  The 7 stitches that you have in your chin we will need to be removed in approximately 1 week.  He may come back to this emergency department or any urgent care and they can be removed at that time.    We did not find any emergencies on the CT imaging of your head or neck. We did not see evidence of fracture or dislocation. If you find you have ongoing pain that does not improve over time or any new symptoms, please return to the ED for evaluation

## 2024-01-27 NOTE — PROVIDER PROGRESS NOTES - EMERGENCY DEPT.
Encounter Date: 1/27/2024    ED Physician Progress Notes          Physician Note:   Signout Note  I received signout from the previous providers.      Chief complaint: fall at home     Per sign out and chart review:  Ida Santana is a 83 y.o. female , PMH of hypertension, hyperlipidemia, breast cancer, osteopenia  who presents s/p fall they 4 cm vertically oriented laceration to the left side of her chin.  She states that she was walking in her kitchen when she accidentally fell, hit her chin on the side of a cabinet and then subsequently fell to the ground.  She did not lose fall during the consciousness, she takes a baby aspirin.  She denies pain in her head or any vision changes.  Prior to the fall she states she was in her otherwise normal state of health, and states there were no preceding symptoms to the fall.  Denies recent fever, chills, nausea, vomiting, diarrhea or any change with bowel movements or urination.     During ED stay patient pt given Tdap and lac repair dressed with bacitracin.     Pt signed out to me pending: CT head/maxillofacial/cervical spine.      Update/ Disposition: CT head: No acute intracranial hemorrhage or displaced calvarial fracture.     CT maxillofacial: Left perimandibular soft tissue laceration with soft tissue swelling and edema, as described.  No acute maxillofacial fracture.     CT cervical spine: No acute fracture or traumatic malalignment. 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     Patient, caregiver and/or family understands the plan and verbalized agreement. All questions answered.  I discussed the negative CT imaging findings with the patient who reports understanding and agreement with the plan for care.  We reviewed her discharge information including need for return approximately 1 week  to have her stitches removed.  Patient is stable for discharge     Diagnostic Impression:    Laceration     Alesia Rodrigues MD  [facility], PGY1

## 2024-01-27 NOTE — ED TRIAGE NOTES
Chief Complaint   Patient presents with    Fall     Got up to use the restroom, mechanically tripped on rug. Landed chin-first and sustained a lac there. Denies LOC. Denies neck pain. Baby ASA daily, otherwise no blood thinners     APPEARANCE: No acute distress.    NEURO: Awake, alert, appropriate for age  HEENT: Head symmetrical. No obvious deformity  RESPIRATORY: Airway is open and patent. Respirations are spontaneous on room air.   NEUROVASCULAR: All extremities are warm and pink with capillary refill less than 3 seconds.   MUSCULOSKELETAL: Moves all extremities, wiggling toes and moving hands.   SKIN: 4cm well approximated gaping lac to L chin.  Warm and dry, adequate turgor, mucus membranes moist and pink  SOCIAL: Patient is accompanied by family.   Will continue to monitor.

## 2024-02-03 ENCOUNTER — HOSPITAL ENCOUNTER (EMERGENCY)
Facility: HOSPITAL | Age: 84
Discharge: HOME | End: 2024-02-03
Attending: EMERGENCY MEDICINE
Payer: COMMERCIAL

## 2024-02-03 VITALS
DIASTOLIC BLOOD PRESSURE: 93 MMHG | TEMPERATURE: 99 F | SYSTOLIC BLOOD PRESSURE: 206 MMHG | HEART RATE: 87 BPM | BODY MASS INDEX: 23.54 KG/M2 | WEIGHT: 150 LBS | OXYGEN SATURATION: 97 % | HEIGHT: 67 IN | RESPIRATION RATE: 14 BRPM

## 2024-02-03 DIAGNOSIS — Z48.02 VISIT FOR SUTURE REMOVAL: Primary | ICD-10-CM

## 2024-02-03 DIAGNOSIS — S01.81XD CHIN LACERATION, SUBSEQUENT ENCOUNTER: ICD-10-CM

## 2024-02-03 PROCEDURE — 99282 EMERGENCY DEPT VISIT SF MDM: CPT

## 2024-02-03 NOTE — ED PROVIDER NOTES
Encounter Date: 2/3/2024       History     Chief Complaint   Patient presents with    Suture / Staple Removal     On left side of face had them in Saturday      83-year-old female presents for suture removal.  She had a trip and fall 1 week ago and suffered a laceration to her left lower chin.  No issues or falls since this event.  Patient denies nausea, vomiting, diarrhea, fever, cough, shortness of breath, chest pain, abdominal pain, or dysuria.  The patients available PMH, PSH, Social History, medications, allergies, and triage vital signs were reviewed just prior to their medical evaluation.         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    Glaucoma     History of uterine fibroid     Hyperlipidemia     dyslipidemia    Hypertension     MRSA infection     Open angle 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 eyes 05/02/2013    Osteopenia     Pancreas cyst     Potassium depletion 1998    Retinal detachment of left eye with retinal break 03/13/2017     Past Surgical History:   Procedure Laterality Date    Adrenal Gland Surgery to right side      BREAST BIOPSY Left 2001    left breast DCIS    BREAST LUMPECTOMY Left 09/11/2001    CATARACT EXTRACTION W/  INTRAOCULAR LENS IMPLANT Left 08/02/2017    with PPV/SO removal ( AND )    CATARACT EXTRACTION W/  INTRAOCULAR LENS IMPLANT Right 06/14/2023        COLONOSCOPY      COLONOSCOPY N/A 10/31/2016    Procedure: COLONOSCOPY;  Surgeon: YAMILETH Richards MD;  Location: [facility] ([address] [place] [address]);  Service: Endoscopy;  Laterality: N/A;    ENDOSCOPIC ULTRASOUND OF UPPER GASTROINTESTINAL TRACT N/A 08/07/2019    Procedure: ULTRASOUND,  UPPER GI TRACT, ENDOSCOPIC;  Surgeon: Ladonna Wells MD;  Location: [facility] ENDO (2ND FLR);  Service: Endoscopy;  Laterality: N/A;    ENDOSCOPIC ULTRASOUND OF UPPER GASTROINTESTINAL TRACT N/A 09/14/2020    Procedure: ULTRASOUND, UPPER GI TRACT, ENDOSCOPIC;  Surgeon: Ladonna Wells MD;  Location: [facility] ENDO (2ND FLR);  Service: Endoscopy;  Laterality: N/A;  9/11-covid [address]    ERCP N/A 09/14/2020    Procedure: ERCP (ENDOSCOPIC RETROGRADE CHOLANGIOPANCREATOGRAPHY);  Surgeon: Ladonna Wells MD;  Location: [facility] ENDO (2ND FLR);  Service: Endoscopy;  Laterality: N/A;    INTRAOCULAR PROSTHESES INSERTION Right 6/14/2023    Procedure: INSERTION, IOL PROSTHESIS;  Surgeon: Lynda Crawford MD;  Location: [facility] OR [address] [place] [address];  Service: Ophthalmology;  Laterality: Right;    KIDNEY SURGERY  03/27/2017    patient does not know what they did, but knows she does not have kidney cancer    PARS PLANA VITRECTOMY Left 08/02/2017    25g PPV WITH SO REMOVAL AND PHACO IOL ( AND )    PHACOEMULSIFICATION OF CATARACT Right 6/14/2023    Procedure: PHACOEMULSIFICATION, CATARACT;  Surgeon: Lynda Crawford MD;  Location: [facility] OR [address] [place] [address];  Service: Ophthalmology;  Laterality: Right;    RETINAL DETACHMENT SURGERY Left 08/22/2017    PPVx and SO Removal//Phaco IOL ( AND )     Family History   Problem Relation Age of Onset    Cancer Mother         breast    Breast cancer Mother         40s and again in 50s (bilateral)    Breast cancer Other 40    Heart disease Father         bypass    Cancer Father         asbestos    Stroke Brother     Hypertension Brother     Ovarian cancer Neg Hx     Amblyopia Neg Hx     Blindness Neg Hx     Cataracts Neg Hx     Diabetes Neg Hx     Glaucoma Neg Hx     Macular degeneration Neg Hx     Retinal detachment Neg Hx     Strabismus Neg Hx     Thyroid disease Neg Hx     Colon cancer Neg Hx     Melanoma Neg Hx      Social History     Tobacco Use    Smoking  status: Never    Smokeless tobacco: Never   Substance Use Topics    Alcohol use: No    Drug use: No     Review of Systems   Constitutional:  Negative for fever.   Respiratory:  Negative for cough and shortness of breath.    Cardiovascular:  Negative for chest pain.   Gastrointestinal:  Negative for abdominal pain, diarrhea, nausea and vomiting.   Genitourinary:  Negative for dysuria.   Skin:  Positive for wound.       Physical Exam     Initial Vitals   BP Pulse Resp Temp SpO2   02/03/24 1032 02/03/24 1030 02/03/24 1030 02/03/24 1030 02/03/24 1030   (!) 206/93 87 14 99 °F (37.2 °C) 97 %      MAP       --                Physical Exam    Nursing note and vitals reviewed.  Constitutional: She appears well-developed and well-nourished. She is not diaphoretic. No distress.   HENT:   Head: Normocephalic and atraumatic.   Nose: Nose normal.   Eyes: Conjunctivae are normal. Right eye exhibits no discharge. Left eye exhibits no discharge.   Neck: Neck supple.   Normal range of motion.  Cardiovascular:  Normal rate, regular rhythm and normal heart sounds.     Exam reveals no gallop and no friction rub.       No murmur heard.  Pulmonary/Chest: Breath sounds normal. No respiratory distress. She has no wheezes. She has no rhonchi. She has no rales.   Abdominal: She exhibits no distension.   Musculoskeletal:         General: No tenderness or edema. Normal range of motion.      Cervical back: Normal range of motion and neck supple.     Neurological: She is alert and oriented to person, place, and time. GCS score is 15. GCS eye subscore is 4. GCS verbal subscore is 5. GCS motor subscore is 6.   Skin: Skin is warm and dry. No rash noted. No erythema.   Resolving laceration to left chin,no infection   Psychiatric: She has a normal mood and affect. Her behavior is normal. Judgment and thought content normal.         ED Course   Suture Removal    Date/Time: 2/4/2024 8:59 AM  Location procedure was performed: [facility] EMERGENCY  DEPARTMENT    Performed by: Gino Mejia MD  Authorized by: Gino Mejia MD  Pre-operative diagnosis: laceration  Post-operative diagnosis: same  Body area: head/neck  Location details: chin  Wound Appearance: clean, well healed, nontender and no drainage  Sutures Removed: 7  Facility: sutures placed in this facility  Complications: No  Specimens: No  Implants: No  Patient tolerance: Patient tolerated the procedure well with no immediate complications        Labs Reviewed - No data to display       Imaging Results    None          Medications - No data to display  Medical Decision Making  83-year-old female presents for suture removal.  Vitals with hypertension.  Physical exam as above.  Sutures removed as above.  No complications or issues.  No infection.  Counseled on wound care.  Patient will return to ED for worsening symptoms, inability to eat/drink, fever greater than 100.4, or any other concerns. Did bedside teaching with return precautions.  All questions answered.  The patient acknowledges understanding.  Gave verbal discharge instructions.                                       Clinical Impression:  Final diagnoses:  [Z48.02] Visit for suture removal (Primary)  [S01.81XD] Chin laceration, subsequent encounter          ED Disposition Condition    Discharge Stable          ED Prescriptions    None       Follow-up Information       Follow up With Specialties Details Why Contact Info    Follow up with primary physician as soon as possible.  Call tomorrow for an appointment.        [facility] - Emergency Dept Emergency Medicine  Return to ED for worsening symptoms, inability to eat/drink, fever greater than 100.4, or any other concerns. [address]  [facility] [id number]  [phone]             Gino Mejia MD  02/04/24 0901

## 2024-02-03 NOTE — ED TRIAGE NOTES
Sutures to be removed from left lower face - placed here 1/27/202. Edges well approximated, no redness , swelling or drainage.

## 2024-02-03 NOTE — ED NOTES
LOC: The patient is awake and alert; oriented x 3 and speaking appropriately.  APPEARANCE: Patient resting comfortably, patient is clean and well groomed  SKIN: warm and dry, normal skin turgor & moist mucus membranes, skin intact, no breakdown noted.sutures to left lower face near jaw- edges well approximated , no swelling or  drainage or redness.  MUSCULOSKELETAL: Patient moving all extremities well, no obvious swelling or deformities noted  RESPIRATORY: Airway is open and patent, respirations are spontaneous, normal effort and rate  CARDIAC: Patient has a normal rate, no peripheral edema noted, capillary refill < 3 seconds; No complaints of chest pain   ABDOMEN: Soft and non tender to palpation, no distention noted.

## 2024-02-18 NOTE — PROGRESS NOTES
HPI    DLS: 10/27/2023    Pt here for HVF review/OCT;      Meds;  Cosopt BID OU  Latanoprost QHS OS    1. 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 SHANTE   4. PCIOL OU   5. Hx Endophthalmitis OS   6. Rhegmatogenous RD OS     Last edited by Windy Mosqueda on 2/20/2024 10:30 AM.              Assessment /Plan     For exam results, see Encounter Report.    Primary open-angle glaucoma, right eye, mild stage    Primary open-angle glaucoma, left eye, moderate stage    Rhegmatogenous retinal detachment of left eye    Anomalous optic nerve    KCS (keratoconjunctivitis sicca)    Anterior capsular opacification following extraction of cataract    Pseudophakia    Endophthalmitis, left eye           Glaucoma (type and duration)    Suspect 2/2 lrg cups ou    First HVF   2000   First photos   2013   Treatment / Drops started   xal 1/2016           Family history    none        Glaucoma meds   Cosopt ou bid // ((Latanoprost started 1/4/2016  - Stopped 3/22/2019  Od))  // re-started 1/18/2022 ou for OCT and ? VF prog        H/O adverse rxn to glaucoma drops    none        LASERS    none        GLAUCOMA SURGERIES    none        OTHER EYE SURGERIES    PC IOL os 8/2/2017 (done  W/ [facility] - SO removal)    TR repair w/ PPVx and yas oil - os - 2/2 endogen endopth os - 3/15/2017 - [facility] //  PC IOL - OD - 6/14/2023        CDR    0.75-0.8 / 0.7-0.75        Tbase    18-22 / 19-22          Tmax    22/30             Ttarget    ? 17/17            HVF    11 test 2000 to  2024- near full od // gen depression - SAD / IAD (fluctuates )         Gonio    +2-3 ou w/ yas oil drop sup os         CCT    510/517        OCT    5  test 2013 to 2023 - RNFL - bord TI  od //  dec G/TS/I, bord NS/N os (+prog os)         HRT    5 test 2016 to 2020 -MR -MR -  Dec. IT od // nl os /// CDR 0.69 od // 0.50 os  (large disc size )         Disc photos    2013, 2015 - OIS     - Ttoday   15/16  - Test done today  IOP / HVF / DFE / OCT   post -op phaco/IOL od - 6/14/2023      2.  Anomaolous Optic nerves    - very large disc size ou    - HRT - 2.97 od // 3.04 os (nl is up to 2.8 ou)     3. Dry eyes     4. H/O endogenous endophthalmitis  OS  - And secondary tract RD - S/P PPVx and Silicone Oil ([facility])    Oil removed at the same time as the phaco/IOL done 8/2/2017     5. S/P complex phaco/IOL os with trypan blue and Leslye ring and SO removal     - (done with mazzulla ) // 8/2/2017// PCB00 19.5   - Doing well - consider yag to ant capsule phimosis / opacification     6. PC IOL od - 6/14/2023   DIB00 19.5      PLAN  Cosopt bid  OU -   Latanoprost - STOP/ OFF  (3/22/2019) // re-start 1/18/2022 - ?? OCT - RNFL prog os and ? VF prog os     Minimal change in IOP with re-starting latanoprost    Will cont as it may minimize fluctuations      Happy with new glasses (3/22/2019)     Hold off on yag cap to ant capsule phimosis and PCO OS  - [facility] doing OK - may be affecting the VF os     Ok to use OTC - zaditor or pataday for allergies - bid prn   Cont AT's prn     IOL calcs - OD  DIB00 19.5  MTA4U0 16.9    Phaco / IOL OD Date: 6/14/2023 - XUU80-26.5  POM  > 8 - phaco/IOL   Doing well    Cont cosopt bid ou   Cont latanoprost - OS only - hold od     Given Rx for glasses - but very similar to her pre-op glasses - so if prefers can cont wit her older glasses     F/U 4 months - with  IOP / gonio

## 2024-02-20 ENCOUNTER — OFFICE VISIT (OUTPATIENT)
Dept: OPHTHALMOLOGY | Facility: CLINIC | Age: 84
End: 2024-02-20
Payer: COMMERCIAL

## 2024-02-20 ENCOUNTER — CLINICAL SUPPORT (OUTPATIENT)
Dept: OPHTHALMOLOGY | Facility: CLINIC | Age: 84
End: 2024-02-20
Payer: COMMERCIAL

## 2024-02-20 DIAGNOSIS — H40.1111 PRIMARY OPEN-ANGLE GLAUCOMA, RIGHT EYE, MILD STAGE: Primary | ICD-10-CM

## 2024-02-20 DIAGNOSIS — H33.002 RHEGMATOGENOUS RETINAL DETACHMENT OF LEFT EYE: ICD-10-CM

## 2024-02-20 DIAGNOSIS — Q07.8 ANOMALOUS OPTIC NERVE: ICD-10-CM

## 2024-02-20 DIAGNOSIS — H26.499 ANTERIOR CAPSULAR OPACIFICATION FOLLOWING EXTRACTION OF CATARACT: ICD-10-CM

## 2024-02-20 DIAGNOSIS — H40.1122 PRIMARY OPEN-ANGLE GLAUCOMA, LEFT EYE, MODERATE STAGE: ICD-10-CM

## 2024-02-20 DIAGNOSIS — M35.01 KCS (KERATOCONJUNCTIVITIS SICCA): ICD-10-CM

## 2024-02-20 DIAGNOSIS — H44.002 ENDOPHTHALMITIS, LEFT EYE: ICD-10-CM

## 2024-02-20 DIAGNOSIS — Z96.1 PSEUDOPHAKIA: ICD-10-CM

## 2024-02-20 PROCEDURE — 99214 OFFICE O/P EST MOD 30 MIN: CPT | Mod: S$GLB,,, | Performed by: OPHTHALMOLOGY

## 2024-02-20 PROCEDURE — 92083 EXTENDED VISUAL FIELD XM: CPT | Mod: S$GLB,,, | Performed by: OPHTHALMOLOGY

## 2024-02-20 PROCEDURE — 1126F AMNT PAIN NOTED NONE PRSNT: CPT | Mod: CPTII,S$GLB,, | Performed by: OPHTHALMOLOGY

## 2024-02-20 PROCEDURE — 99999 PR PBB SHADOW E&M-EST. PATIENT-LVL III: CPT | Mod: PBBFAC,,, | Performed by: OPHTHALMOLOGY

## 2024-02-20 PROCEDURE — 1101F PT FALLS ASSESS-DOCD LE1/YR: CPT | Mod: CPTII,S$GLB,, | Performed by: OPHTHALMOLOGY

## 2024-02-20 PROCEDURE — 3288F FALL RISK ASSESSMENT DOCD: CPT | Mod: CPTII,S$GLB,, | Performed by: OPHTHALMOLOGY

## 2024-02-20 PROCEDURE — 92133 CPTRZD OPH DX IMG PST SGM ON: CPT | Mod: S$GLB,,, | Performed by: OPHTHALMOLOGY

## 2024-02-20 PROCEDURE — 1160F RVW MEDS BY RX/DR IN RCRD: CPT | Mod: CPTII,S$GLB,, | Performed by: OPHTHALMOLOGY

## 2024-02-20 PROCEDURE — 1159F MED LIST DOCD IN RCRD: CPT | Mod: CPTII,S$GLB,, | Performed by: OPHTHALMOLOGY

## 2024-02-20 NOTE — PROGRESS NOTES
Visual field test done.  Patient stated  latex allergies used pirate patch         Glasses Prescription     Sphere Cylinder Axis Add   Right -3.50 +1.25 037 +3.00   Left -2.75 +0.75 074 +3.00   Type: Bifocal

## 2024-02-28 DIAGNOSIS — I10 ESSENTIAL HYPERTENSION: ICD-10-CM

## 2024-02-28 RX ORDER — IRBESARTAN 300 MG/1
TABLET ORAL
Qty: 90 TABLET | Refills: 0 | Status: SHIPPED | OUTPATIENT
Start: 2024-02-28 | End: 2024-03-13 | Stop reason: SDUPTHER

## 2024-02-28 NOTE — TELEPHONE ENCOUNTER
Care Due:                  Date            Visit Type   Department     Provider  --------------------------------------------------------------------------------                                EP -                              PRIMARY      [facility] INTERNAL  Last Visit: 02-      CARE ([facility])   MEDICINE       MANUELA GIFFORD                              EP -                              PRIMARY      [facility] INTERNAL  Next Visit: 03-      CARE ([facility])   GIOVANNI GIFFORD                                                            Last  Test          Frequency    Reason                     Performed    Due Date  --------------------------------------------------------------------------------    CMP.........  12 months..  irbesartan...............  02- 02-    [facility] Embedded Care Due Messages. Reference number: [id number].   2/28/2024 12:24:55 PM CST

## 2024-03-06 ENCOUNTER — LAB VISIT (OUTPATIENT)
Dept: LAB | Facility: HOSPITAL | Age: 84
End: 2024-03-06
Attending: INTERNAL MEDICINE
Payer: COMMERCIAL

## 2024-03-06 DIAGNOSIS — I10 ESSENTIAL HYPERTENSION: ICD-10-CM

## 2024-03-06 LAB
ALBUMIN SERPL BCP-MCNC: 3.9 G/DL (ref 3.5–5.2)
ALP SERPL-CCNC: 79 U/L (ref 55–135)
ALT SERPL W/O P-5'-P-CCNC: 15 U/L (ref 10–44)
ANION GAP SERPL CALC-SCNC: 6 MMOL/L (ref 8–16)
AST SERPL-CCNC: 18 U/L (ref 10–40)
BASOPHILS # BLD AUTO: 0.04 K/UL (ref 0–0.2)
BASOPHILS NFR BLD: 0.7 % (ref 0–1.9)
BILIRUB SERPL-MCNC: 0.5 MG/DL (ref 0.1–1)
BUN SERPL-MCNC: 16 MG/DL (ref 8–23)
CALCIUM SERPL-MCNC: 10.1 MG/DL (ref 8.7–10.5)
CHLORIDE SERPL-SCNC: 107 MMOL/L (ref 95–110)
CO2 SERPL-SCNC: 28 MMOL/L (ref 23–29)
CREAT SERPL-MCNC: 0.9 MG/DL (ref 0.5–1.4)
DIFFERENTIAL METHOD BLD: ABNORMAL
EOSINOPHIL # BLD AUTO: 0.2 K/UL (ref 0–0.5)
EOSINOPHIL NFR BLD: 2.7 % (ref 0–8)
ERYTHROCYTE [DISTWIDTH] IN BLOOD BY AUTOMATED COUNT: 12.8 % (ref 11.5–14.5)
EST. GFR  (NO RACE VARIABLE): >60 ML/MIN/1.73 M^2
GLUCOSE SERPL-MCNC: 162 MG/DL (ref 70–110)
HCT VFR BLD AUTO: 42.7 % (ref 37–48.5)
HGB BLD-MCNC: 14.1 G/DL (ref 12–16)
IMM GRANULOCYTES # BLD AUTO: 0.01 K/UL (ref 0–0.04)
IMM GRANULOCYTES NFR BLD AUTO: 0.2 % (ref 0–0.5)
LYMPHOCYTES # BLD AUTO: 1.3 K/UL (ref 1–4.8)
LYMPHOCYTES NFR BLD: 22.8 % (ref 18–48)
MCH RBC QN AUTO: 32.3 PG (ref 27–31)
MCHC RBC AUTO-ENTMCNC: 33 G/DL (ref 32–36)
MCV RBC AUTO: 98 FL (ref 82–98)
MONOCYTES # BLD AUTO: 0.4 K/UL (ref 0.3–1)
MONOCYTES NFR BLD: 6.5 % (ref 4–15)
NEUTROPHILS # BLD AUTO: 3.7 K/UL (ref 1.8–7.7)
NEUTROPHILS NFR BLD: 67.1 % (ref 38–73)
NRBC BLD-RTO: 0 /100 WBC
PLATELET # BLD AUTO: 212 K/UL (ref 150–450)
PMV BLD AUTO: 11.8 FL (ref 9.2–12.9)
POTASSIUM SERPL-SCNC: 3.9 MMOL/L (ref 3.5–5.1)
PROT SERPL-MCNC: 7.3 G/DL (ref 6–8.4)
RBC # BLD AUTO: 4.36 M/UL (ref 4–5.4)
SODIUM SERPL-SCNC: 141 MMOL/L (ref 136–145)
WBC # BLD AUTO: 5.56 K/UL (ref 3.9–12.7)

## 2024-03-06 PROCEDURE — 80053 COMPREHEN METABOLIC PANEL: CPT | Performed by: INTERNAL MEDICINE

## 2024-03-06 PROCEDURE — 36415 COLL VENOUS BLD VENIPUNCTURE: CPT | Performed by: INTERNAL MEDICINE

## 2024-03-06 PROCEDURE — 85025 COMPLETE CBC W/AUTO DIFF WBC: CPT | Performed by: INTERNAL MEDICINE

## 2024-03-13 ENCOUNTER — OFFICE VISIT (OUTPATIENT)
Dept: INTERNAL MEDICINE | Facility: CLINIC | Age: 84
End: 2024-03-13
Payer: COMMERCIAL

## 2024-03-13 ENCOUNTER — LAB VISIT (OUTPATIENT)
Dept: LAB | Facility: HOSPITAL | Age: 84
End: 2024-03-13
Attending: INTERNAL MEDICINE
Payer: COMMERCIAL

## 2024-03-13 VITALS
HEART RATE: 79 BPM | WEIGHT: 153.44 LBS | DIASTOLIC BLOOD PRESSURE: 72 MMHG | SYSTOLIC BLOOD PRESSURE: 134 MMHG | OXYGEN SATURATION: 98 % | BODY MASS INDEX: 24.08 KG/M2 | HEIGHT: 67 IN

## 2024-03-13 DIAGNOSIS — Z85.3 HISTORY OF BREAST CANCER IN FEMALE: ICD-10-CM

## 2024-03-13 DIAGNOSIS — M85.80 OSTEOPENIA, UNSPECIFIED LOCATION: ICD-10-CM

## 2024-03-13 DIAGNOSIS — I10 ESSENTIAL HYPERTENSION: ICD-10-CM

## 2024-03-13 DIAGNOSIS — Z00.00 ANNUAL PHYSICAL EXAM: Primary | ICD-10-CM

## 2024-03-13 DIAGNOSIS — I10 HYPERTENSION, UNSPECIFIED TYPE: ICD-10-CM

## 2024-03-13 DIAGNOSIS — R73.9 HYPERGLYCEMIA: ICD-10-CM

## 2024-03-13 LAB
ESTIMATED AVG GLUCOSE: 114 MG/DL (ref 68–131)
HBA1C MFR BLD: 5.6 % (ref 4–5.6)

## 2024-03-13 PROCEDURE — 1101F PT FALLS ASSESS-DOCD LE1/YR: CPT | Mod: CPTII,S$GLB,, | Performed by: INTERNAL MEDICINE

## 2024-03-13 PROCEDURE — 3078F DIAST BP <80 MM HG: CPT | Mod: CPTII,S$GLB,, | Performed by: INTERNAL MEDICINE

## 2024-03-13 PROCEDURE — 99397 PER PM REEVAL EST PAT 65+ YR: CPT | Mod: S$GLB,,, | Performed by: INTERNAL MEDICINE

## 2024-03-13 PROCEDURE — 83036 HEMOGLOBIN GLYCOSYLATED A1C: CPT | Performed by: INTERNAL MEDICINE

## 2024-03-13 PROCEDURE — 1159F MED LIST DOCD IN RCRD: CPT | Mod: CPTII,S$GLB,, | Performed by: INTERNAL MEDICINE

## 2024-03-13 PROCEDURE — 1126F AMNT PAIN NOTED NONE PRSNT: CPT | Mod: CPTII,S$GLB,, | Performed by: INTERNAL MEDICINE

## 2024-03-13 PROCEDURE — 3288F FALL RISK ASSESSMENT DOCD: CPT | Mod: CPTII,S$GLB,, | Performed by: INTERNAL MEDICINE

## 2024-03-13 PROCEDURE — 1160F RVW MEDS BY RX/DR IN RCRD: CPT | Mod: CPTII,S$GLB,, | Performed by: INTERNAL MEDICINE

## 2024-03-13 PROCEDURE — 36415 COLL VENOUS BLD VENIPUNCTURE: CPT | Performed by: INTERNAL MEDICINE

## 2024-03-13 PROCEDURE — 99999 PR PBB SHADOW E&M-EST. PATIENT-LVL IV: CPT | Mod: PBBFAC,,, | Performed by: INTERNAL MEDICINE

## 2024-03-13 PROCEDURE — 3075F SYST BP GE 130 - 139MM HG: CPT | Mod: CPTII,S$GLB,, | Performed by: INTERNAL MEDICINE

## 2024-03-13 RX ORDER — VERAPAMIL HYDROCHLORIDE 240 MG/1
240 CAPSULE, EXTENDED RELEASE ORAL DAILY
Qty: 90 CAPSULE | Refills: 3 | Status: SHIPPED | OUTPATIENT
Start: 2024-03-13

## 2024-03-13 RX ORDER — IRBESARTAN 300 MG/1
TABLET ORAL
Qty: 90 TABLET | Refills: 3 | Status: SHIPPED | OUTPATIENT
Start: 2024-03-13 | End: 2024-06-03 | Stop reason: SDUPTHER

## 2024-03-13 NOTE — PROGRESS NOTES
Subjective     Patient ID: Ida Santana is a 83 y.o. female.    Chief Complaint: Annual Exam    HPI  Fell jan 27.        Not sure why.  Walking into the kitchen, hitting chin on cabinet.        Evaluated in Ed.   Chin lacerated repaired.      Walks regularly, lifts small wts.       Htn - well controlled.    H/o br cancer.      Caring for brother and his wife.         Doing very well for a 84 yo woman.      Review of Systems   Constitutional:  Negative for fever and unexpected weight change.   HENT:  Negative for nasal congestion and postnasal drip.    Respiratory:  Negative for chest tightness, shortness of breath and wheezing.    Cardiovascular:  Negative for chest pain and leg swelling.   Gastrointestinal:  Negative for abdominal pain, anal bleeding, constipation, diarrhea, nausea and vomiting.   Genitourinary:  Negative for dysuria and urgency.   Integumentary:  Negative for rash.   Neurological:  Negative for headaches.   Psychiatric/Behavioral:  Negative for dysphoric mood and sleep disturbance. The patient is not nervous/anxious.           Objective     Physical Exam  Constitutional:       General: She is not in acute distress.     Appearance: She is well-developed.   HENT:      Head: Normocephalic and atraumatic.      Right Ear: External ear normal.      Left Ear: External ear normal.      Nose: Nose normal.   Eyes:      General: No scleral icterus.        Right eye: No discharge.         Left eye: No discharge.      Conjunctiva/sclera: Conjunctivae normal.      Pupils: Pupils are equal, round, and reactive to light.   Neck:      Thyroid: No thyromegaly.      Vascular: No JVD.   Cardiovascular:      Rate and Rhythm: Normal rate and regular rhythm.      Heart sounds: Murmur (systolic ULSB) heard.      No gallop.   Pulmonary:      Effort: Pulmonary effort is normal. No respiratory distress.      Breath sounds: Normal breath sounds. No wheezing or rales.   Abdominal:      General: Bowel sounds are normal.  There is no distension.      Palpations: Abdomen is soft. There is no mass.      Tenderness: There is no abdominal tenderness. There is no guarding or rebound.   Musculoskeletal:         General: No tenderness. Normal range of motion.      Cervical back: Normal range of motion and neck supple.   Lymphadenopathy:      Cervical: No cervical adenopathy.   Skin:     General: Skin is warm and dry.      Findings: No rash.   Neurological:      Mental Status: She is alert and oriented to person, place, and time.      Cranial Nerves: No cranial nerve deficit.      Coordination: Coordination normal.   Psychiatric:         Behavior: Behavior normal.         Thought Content: Thought content normal.         Judgment: Judgment normal.            Assessment and Plan     1. Annual physical exam    2. Hypertension, unspecified type  -     NURSING COMMUNICATION: Create [facility] Account  -     [facility] Enrollment Order    3. History of breast cancer in female    4. Osteopenia, unspecified location    5. Essential hypertension  Comments:  Encouraged to check BP  Orders:  -     irbesartan (AVAPRO) 300 MG tablet; 1 tab po q day  Dispense: 90 tablet; Refill: 3  -     verapamiL (VERELAN) 240 MG C24P; Take 1 capsule (240 mg total) by mouth once daily.  Dispense: 90 capsule; Refill: 3    6. Hyperglycemia  -     Hemoglobin A1C; Future; Expected date: 06/11/2024          RSV  Stop asa, as no clear indication       Follow up in about 1 year (around 3/13/2025) for PE.

## 2024-05-15 ENCOUNTER — TELEPHONE (OUTPATIENT)
Dept: GASTROENTEROLOGY | Facility: CLINIC | Age: 84
End: 2024-05-15
Payer: COMMERCIAL

## 2024-05-15 NOTE — TELEPHONE ENCOUNTER
Patient stopped by the office wanted to schedule an appointment with Dr Calhoun. I told her that you would give her a call with an appointment.

## 2024-05-17 ENCOUNTER — TELEPHONE (OUTPATIENT)
Dept: ENDOSCOPY | Facility: HOSPITAL | Age: 84
End: 2024-05-17
Payer: COMMERCIAL

## 2024-05-17 DIAGNOSIS — D49.0 IPMN (INTRADUCTAL PAPILLARY MUCINOUS NEOPLASM): ICD-10-CM

## 2024-05-17 DIAGNOSIS — K86.2 PANCREAS CYST: Primary | ICD-10-CM

## 2024-05-17 NOTE — TELEPHONE ENCOUNTER
Spoke to pt to schedule procedure(s) Upper Endoscopy Ultrasound (EUS)       Physician to perform procedure(s) Dr. THEO Calhoun  Date of Procedure (s) 6/6/24  Arrival Time 8:00 AM  Time of Procedure(s) 9:000 AM   Location of Procedure(s) [address]  Type of Rx Prep sent to patient: N/A  Instructions provided to patient via [facility]    Patient was informed on the following information and verbalized understanding. Screening questionnaire reviewed with patient and complete. If procedure requires anesthesia, a responsible adult needs to be present to accompany the patient home, patient cannot drive after receiving anesthesia. Appointment details are tentative, especially check-in time. Patient will receive a prep-op call 7 days prior to confirm check-in time for procedure. If applicable the patient should contact their pharmacy to verify Rx for procedure prep is ready for pick-up. Patient was advised to call the scheduling department at [phone] if pharmacy states no Rx is available. Patient was advised to call the endoscopy scheduling department if any questions or concerns arise.       Endoscopy Scheduling Department      Upper Endoscopic Ultrasound (EUS) Prep Instructions    Date of procedure: 6/6/24 Arrive at: 8:00 AM    Location of Department:   [facility] - [address] [place] [address]  Take the [facility] Elevators to 2nd Floor Outpatient Surgery    How to prep:      Day Before Procedure 6/5/24     You may have a light evening meal.   No solid food after 7:00 pm.   Continue drinking clear liquids.      Day of the Procedure 6/6/24     You may have water/clear liquids until 4 hours before your procedure or as directed by the scheduling nurse 5:00 AM.   See below for list.    What You CANNOT do:   Do not drink milk or anything colored red.  Do not drink alcohol.  No gum chewing or candy morning of procedure    Liquids That Are OK to Drink:   Water  Sports drinks (Gatorade  Power-Aid)  Coffee or tea (no cream or nondairy creamer)  Clear juices without pulp (apple, white grape)  Gelatin desserts (no fruit or toppings)  Clear soda (sprite, coke, ginger ale)  Chicken broth (until 12 midnight the night before procedure)            IMPORTANT INFORMATION TO KNOW BEFORE YOUR PROCEDURE    [facility] [place] 2nd Floor    If your procedure requires the administration of anesthesia, it is necessary for a responsible adult to drive you home. (Medical Transportation, Uber, Lyft, Taxi, etc. may ONLY be used if a responsible adult is present to accompany you home.  The responsible adult CAN'T be the  of the service).      person must be available to return to pick you up within 15 minutes of being notified of discharge.       Please bring a picture ID, insurance card, & copayment      Take Medications as directed below:    If you begin taking any blood thinning medications or injectable weight loss/diabetes medications (other than insulin) , please contact the endoscopy scheduling department listed below as soon as possible.    If you are diabetic see the attached instruction sheet regarding your medication.     If you take HEART, BLOOD PRESSURE, SEIZURE, PAIN, LUNG (including inhalers/nebulizers), ANTI-REJECTION (transplant patients), or PSYCHIATRIC medications, please take at your regular times with a sip of water or as directed by the scheduling nurse.     Important contact information:    Endoscopy [phone] Hours of operation Monday-Friday 8:00-4:30pm.    Questions about insurance or financial obligations call [phone] or [phone].    If you have questions regarding the prep or need to reschedule, please call [phone]. After hours questions requiring immediate assistance, contact [facility] nurse line at [phone] or [phone].   NOTE:     On occasion, unforeseen circumstances may cause a delay in your procedure start  time. We respect your time and appreciate your patience during these circumstances.      Comments: n/a

## 2024-05-30 ENCOUNTER — TELEPHONE (OUTPATIENT)
Dept: ENDOSCOPY | Facility: HOSPITAL | Age: 84
End: 2024-05-30
Payer: COMMERCIAL

## 2024-05-30 NOTE — TELEPHONE ENCOUNTER
Confirmed eus appt for 6/6/24 with pt. Confirmed receipt of prep  instructions. Reviewed instructions pt denies taking blood thinning or glp1 medications.Confirmed ride home after procedure.Pt verbalized understanding

## 2024-06-03 DIAGNOSIS — I10 ESSENTIAL HYPERTENSION: ICD-10-CM

## 2024-06-03 RX ORDER — IRBESARTAN 300 MG/1
TABLET ORAL
Qty: 90 TABLET | Refills: 3 | Status: SHIPPED | OUTPATIENT
Start: 2024-06-03

## 2024-06-03 NOTE — TELEPHONE ENCOUNTER
----- Message from Sirisha Blancas sent at 6/3/2024 11:18 AM CDT -----  Contact: [phone]  [email or web address]     Patient is calling for Medical Advice regarding:speak to office     How long has patient had these symptoms:    Pharmacy name and phone#:    Would like response via S[employer]: no     Comments:  Pt is calling she states she has a new insurance and she states she is needing a new prescription for  irbesartan (AVAPRO) 300 MG tablet  she states she only has 1 left please give return call

## 2024-06-03 NOTE — TELEPHONE ENCOUNTER
No care due was identified.  [facility] Embedded Care Due Messages. Reference number: [id number].   6/03/2024 11:25:13 AM CDT

## 2024-06-03 NOTE — TELEPHONE ENCOUNTER
Refill Decision Note   Ida Santana  is requesting a refill authorization.  Brief Assessment and Rationale for Refill:  Approve     Medication Therapy Plan:         Comments:     Note composed:4:59 PM 06/03/2024

## 2024-06-06 ENCOUNTER — ANESTHESIA EVENT (OUTPATIENT)
Dept: ENDOSCOPY | Facility: HOSPITAL | Age: 84
End: 2024-06-06
Payer: COMMERCIAL

## 2024-06-06 ENCOUNTER — ANESTHESIA (OUTPATIENT)
Dept: ENDOSCOPY | Facility: HOSPITAL | Age: 84
End: 2024-06-06
Payer: COMMERCIAL

## 2024-06-06 ENCOUNTER — HOSPITAL ENCOUNTER (OUTPATIENT)
Facility: HOSPITAL | Age: 84
Discharge: HOME | End: 2024-06-06
Attending: INTERNAL MEDICINE | Admitting: INTERNAL MEDICINE
Payer: COMMERCIAL

## 2024-06-06 VITALS
SYSTOLIC BLOOD PRESSURE: 142 MMHG | RESPIRATION RATE: 15 BRPM | BODY MASS INDEX: 23.54 KG/M2 | OXYGEN SATURATION: 97 % | HEART RATE: 62 BPM | TEMPERATURE: 98 F | WEIGHT: 150 LBS | HEIGHT: 67 IN | DIASTOLIC BLOOD PRESSURE: 68 MMHG

## 2024-06-06 DIAGNOSIS — K86.2 PANCREAS CYST: ICD-10-CM

## 2024-06-06 PROCEDURE — 25000003 PHARM REV CODE 250: Performed by: NURSE ANESTHETIST, CERTIFIED REGISTERED

## 2024-06-06 PROCEDURE — 43259 EGD US EXAM DUODENUM/JEJUNUM: CPT | Performed by: INTERNAL MEDICINE

## 2024-06-06 PROCEDURE — D9220A PRA ANESTHESIA: Mod: CRNA,,, | Performed by: NURSE ANESTHETIST, CERTIFIED REGISTERED

## 2024-06-06 PROCEDURE — 63600175 PHARM REV CODE 636 W HCPCS: Performed by: NURSE ANESTHETIST, CERTIFIED REGISTERED

## 2024-06-06 PROCEDURE — 37000009 HC ANESTHESIA EA ADD 15 MINS: Performed by: INTERNAL MEDICINE

## 2024-06-06 PROCEDURE — D9220A PRA ANESTHESIA: Mod: ANES,,, | Performed by: ANESTHESIOLOGY

## 2024-06-06 PROCEDURE — 43259 EGD US EXAM DUODENUM/JEJUNUM: CPT | Mod: ,,, | Performed by: INTERNAL MEDICINE

## 2024-06-06 PROCEDURE — 37000008 HC ANESTHESIA 1ST 15 MINUTES: Performed by: INTERNAL MEDICINE

## 2024-06-06 RX ORDER — FENTANYL CITRATE 50 UG/ML
INJECTION, SOLUTION INTRAMUSCULAR; INTRAVENOUS
Status: DISCONTINUED | OUTPATIENT
Start: 2024-06-06 | End: 2024-06-06

## 2024-06-06 RX ORDER — SODIUM CHLORIDE 9 MG/ML
INJECTION, SOLUTION INTRAVENOUS CONTINUOUS
Status: DISCONTINUED | OUTPATIENT
Start: 2024-06-06 | End: 2024-06-06 | Stop reason: HOSPADM

## 2024-06-06 RX ORDER — ONDANSETRON HYDROCHLORIDE 2 MG/ML
4 INJECTION, SOLUTION INTRAVENOUS ONCE AS NEEDED
Status: DISCONTINUED | OUTPATIENT
Start: 2024-06-06 | End: 2024-06-06 | Stop reason: HOSPADM

## 2024-06-06 RX ORDER — SODIUM CHLORIDE 0.9 % (FLUSH) 0.9 %
10 SYRINGE (ML) INJECTION
Status: DISCONTINUED | OUTPATIENT
Start: 2024-06-06 | End: 2024-06-06 | Stop reason: HOSPADM

## 2024-06-06 RX ORDER — PROPOFOL 10 MG/ML
VIAL (ML) INTRAVENOUS
Status: DISCONTINUED | OUTPATIENT
Start: 2024-06-06 | End: 2024-06-06

## 2024-06-06 RX ADMIN — FENTANYL CITRATE 100 MCG: 50 INJECTION, SOLUTION INTRAMUSCULAR; INTRAVENOUS at 08:06

## 2024-06-06 RX ADMIN — PROPOFOL 20 MG: 10 INJECTION, EMULSION INTRAVENOUS at 08:06

## 2024-06-06 RX ADMIN — PROPOFOL 30 MG: 10 INJECTION, EMULSION INTRAVENOUS at 08:06

## 2024-06-06 RX ADMIN — SODIUM CHLORIDE: 0.9 INJECTION, SOLUTION INTRAVENOUS at 08:06

## 2024-06-06 NOTE — TRANSFER OF CARE
"Anesthesia Transfer of Care Note    Patient: Ida Santana    Procedure(s) Performed: Procedure(s) (LRB):  ULTRASOUND, UPPER GI TRACT, ENDOSCOPIC (N/A)    Patient location: [facility]    Anesthesia Type: general    Transport from OR: Transported from OR on room air with adequate spontaneous ventilation    Post pain: adequate analgesia    Post assessment: no apparent anesthetic complications and tolerated procedure well    Post vital signs: stable    Level of consciousness: awake    Nausea/Vomiting: no nausea/vomiting    Complications: none    Transfer of care protocol was followed      Last vitals: Visit Vitals  BP (!) 198/87 (BP Location: Left arm, Patient Position: Lying)   Pulse 89   Temp 36.6 °C (97.9 °F) (Skin)   Resp 14   Ht 5' 7" (1.702 m)   Wt 68 kg (150 lb)   LMP  (LMP Unknown)   SpO2 (!) 94%   Breastfeeding No   BMI 23.49 kg/m²     "

## 2024-06-06 NOTE — H&P
Short Stay Endoscopy History and Physical    PCP - Elly Kruger MD  Referring Physician - Eric Calhoun MD  [address]  [address] [facility]  [place] [address]    Procedure - eus  ASA - per anesthesia  Mallampati - per anesthesia  History of Anesthesia problems - no  Family history Anesthesia problems -  no   Plan of anesthesia - General    HPI:  This is a 83 y.o. 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    Surgical History:  has a past surgical history that includes Colonoscopy; Colonoscopy (N/A, 10/31/2016); Kidney surgery (03/27/2017); Pars plana vitrectomy (Left, 08/02/2017); Retinal detachment surgery (Left, 08/22/2017); Adrenal Gland Surgery to right side; Breast biopsy (Left, 2001); Breast lumpectomy (Left, 09/11/2001); Endoscopic ultrasound of upper gastrointestinal tract (N/A, 08/07/2019); Endoscopic ultrasound of upper gastrointestinal tract (N/A, 09/14/2020); ERCP (N/A, 09/14/2020); Cataract extraction w/  intraocular lens implant (Left, 08/02/2017); Cataract extraction w/  intraocular lens implant (Right, 06/14/2023); Phacoemulsification of cataract (Right, 6/14/2023); and Intraocular prosthesis insertion (Right, 6/14/2023).    Family History: family history includes Breast cancer in her mother; Breast cancer (age of onset: 40) in an other family member; Cancer in her father  and mother; Heart disease in her father; Hypertension in her brother; Stroke in her brother..    Social History:  reports that she has never smoked. She has never used smokeless tobacco. She reports that she does not drink alcohol and does not use drugs.    Review of patient's allergies indicates:   Allergen Reactions    Adhesive tape-silicones      Use PAPER Tape / Tegaderm    Atorvastatin      Muscle pain    Pravastatin Rash    Tapentadol Hives       Medications:   Medications Prior to Admission   Medication Sig Dispense Refill Last Dose    irbesartan (AVAPRO) 300 MG tablet 1 tab po q day 90 tablet 3 6/5/2024    MULTIVITAMIN WITH MINERALS (ONE-A-DAY 50 PLUS) Tab Take 1 tablet by mouth once daily.    6/5/2024    verapamiL (VERELAN) 240 MG C24P Take 1 capsule (240 mg total) by mouth once daily. 90 capsule 3 6/5/2024    dorzolamide-timolol 2-0.5% (COSOPT) 22.3-6.8 mg/mL ophthalmic solution Place 1 drop into both eyes 2 (two) times daily. If possible Patient request a 90 day supply = 3 bottles 3 each 3     ketoconazole (NIZORAL) 2 % shampoo Wash hair with medicated shampoo at least 2x/week - let sit on scalp at least 5 minutes prior to rinsing 120 mL 5     latanoprost 0.005 % ophthalmic solution INSTILL 1 DROP IN BOTH EYES EVERY EVENING (Patient taking differently: Place 1 drop into the left eye every evening.) 7.5 mL 3     TAC 0.1% cream, Loprox, MOM apply to affected area twice a day and then blow dry on the cool setting afterwards 60 g 3     triamcinolone 0.033%, ciclopirox 0.257%, milk of magnesia topical suspension Apply to affected area twice a day after cool blow dry. discard after 30 days 180 g 3        Physical Exam:    Vital Signs:   Vitals:    06/06/24 0811   BP: (!) 198/87   Pulse: 89   Resp: 14   Temp: 97.9 °F (36.6 °C)       General Appearance: Well appearing in no acute distress    Labs:  Lab Results   Component Value Date    WBC 5.56 03/06/2024    HGB 14.1 03/06/2024    HCT 42.7 03/06/2024      03/06/2024    CHOL 160 10/06/2021    TRIG 105 10/06/2021    HDL 43 10/06/2021    ALT 15 03/06/2024    AST 18 03/06/2024     03/06/2024    K 3.9 03/06/2024     03/06/2024    CREATININE 0.9 03/06/2024    BUN 16 03/06/2024    CO2 28 03/06/2024    TSH 1.760 03/14/2017    INR 1.1 01/19/2017    GLUF 132 (H) 01/10/2006    HGBA1C 5.6 03/13/2024       I have explained the risks and benefits of this endoscopic procedure to the patient including but not limited to bleeding, inflammation, infection, perforation, and death.      Eric Calhoun MD

## 2024-06-06 NOTE — PROVATION PATIENT INSTRUCTIONS
Discharge Summary/Instructions after an Endoscopic Procedure  Patient Name: Ida Santana  Patient MRN: [id number]  Patient YOB: 1940  Thursday, June 6, 2024  Eric Calhoun MD  Dear patient,  As a result of recent federal legislation (The Federal Cures Act), you may   receive lab or pathology results from your procedure in your [facility]   account before your physician is able to contact you. Your physician or   their representative will relay the results to you with their   recommendations at their soonest availability.  Thank you,  RESTRICTIONS:  During your procedure today, you received medications for sedation.  These   medications may affect your judgment, balance and coordination.  Therefore,   for 24 hours, you have the following restrictions:   - DO NOT drive a car, operate machinery, make legal/financial decisions,   sign important papers or drink alcohol.    ACTIVITY:  Today: no heavy lifting, straining or running due to procedural   sedation/anesthesia.  The following day: return to full activity including work.  DIET:  Eat and drink normally unless instructed otherwise.     TREATMENT FOR COMMON SIDE EFFECTS:  - Mild abdominal pain, nausea, belching, bloating or excessive gas:  rest,   eat lightly and use a heating pad.  - Sore Throat: treat with throat lozenges and/or gargle with warm salt   water.  - Because air was used during the procedure, expelling large amounts of air   from your rectum or belching is normal.  - If a bowel prep was taken, you may not have a bowel movement for 1-3 days.    This is normal.  SYMPTOMS TO WATCH FOR AND REPORT TO YOUR PHYSICIAN:  1. Abdominal pain or bloating, other than gas cramps.  2. Chest pain.  3. Back pain.  4. Signs of infection such as: chills or fever occurring within 24 hours   after the procedure.  5. Rectal bleeding, which would show as bright red, maroon, or black stools.   (A tablespoon of blood from the rectum is not serious, especially if    hemorrhoids are present.)  6. Vomiting.  7. Weakness or dizziness.  GO DIRECTLY TO THE NEAREST EMERGENCY ROOM IF YOU HAVE ANY OF THE FOLLOWING:      Difficulty breathing              Chills and/or fever over 101 F   Persistent vomiting and/or vomiting blood   Severe abdominal pain   Severe chest pain   Black, tarry stools   Bleeding- more than one tablespoon   Any other symptom or condition that you feel may need urgent attention  Your doctor recommends these additional instructions:  If any biopsies were taken, your doctors clinic will contact you in 1 to 2   weeks with any results.  - Discharge patient to home.   - Resume previous diet.   - Continue present medications.   - Return to pancreas cyst clinic in 1 year.  For questions, problems or results please call your physician - Eric Calhoun MD at Work:  [phone].  [facility], EMERGENCY ROOM PHONE NUMBER: [phone]  IF A COMPLICATION OR EMERGENCY SITUATION ARISES AND YOU ARE UNABLE TO REACH   YOUR PHYSICIAN - GO DIRECTLY TO THE EMERGENCY ROOM.  Eric Calhoun MD  6/6/2024 9:44:37 AM  This report has been verified and signed electronically.  Dear patient,  As a result of recent federal legislation (The Federal Cures Act), you may   receive lab or pathology results from your procedure in your [facility]   account before your physician is able to contact you. Your physician or   their representative will relay the results to you with their   recommendations at their soonest availability.  Thank you,  PROVATION

## 2024-06-06 NOTE — ANESTHESIA PREPROCEDURE EVALUATION
06/06/2024  Ida Santana is a 83 y.o., female.      Pre-op Assessment          Review of Systems  Anesthesia Hx:  No problems with previous Anesthesia                Hematology/Oncology:                        --  Cancer in past history:       Breast              Cardiovascular:     Hypertension    Denies CAD.                                        Pulmonary:     Denies Asthma.     Denies Sleep Apnea.                Renal/:   Denies Chronic Renal Disease.                Hepatic/GI:   Denies PUD.   Denies GERD. Denies Liver Disease.            Neurological:    Denies CVA.    Denies Seizures.                                Endocrine:  Denies Diabetes. Denies Hypothyroidism.              Physical Exam  General: Alert    Airway:  Mallampati: III / II  Mouth Opening: Normal  TM Distance: Normal  Tongue: Normal  Neck ROM: Normal ROM    Dental:  Partial Dentures        Anesthesia Plan  Type of Anesthesia, risks & benefits discussed:    Anesthesia Type: Gen Natural Airway, MAC  Intra-op Monitoring Plan: Standard ASA Monitors  Post Op Pain Control Plan: multimodal analgesia and IV/PO Opioids PRN  Induction:  IV  Airway Plan: Direct  Informed Consent: Informed consent signed with the Patient and all parties understand the risks and agree with anesthesia plan.  All questions answered.   ASA Score: 3    Ready For Surgery From Anesthesia Perspective.     .

## 2024-06-07 NOTE — ANESTHESIA POSTPROCEDURE EVALUATION
Anesthesia Post Evaluation    Patient: Ida Santana    Procedure(s) Performed: Procedure(s) (LRB):  ULTRASOUND, UPPER GI TRACT, ENDOSCOPIC (N/A)    Final Anesthesia Type: general      Patient location during evaluation: PACU  Patient participation: Yes- Able to Participate  Level of consciousness: awake  Post-procedure vital signs: reviewed and stable  Pain management: adequate  Airway patency: patent    PONV status at discharge: No PONV  Anesthetic complications: no      Cardiovascular status: blood pressure returned to baseline  Respiratory status: unassisted  Hydration status: euvolemic  Follow-up not needed.              Vitals Value Taken Time   /68 06/06/24 0947   Temp 36.6 °C (97.9 °F) 06/06/24 0849   Pulse 63 06/06/24 1000   Resp 15 06/06/24 0945   SpO2 99 % 06/06/24 1000   Vitals shown include unfiled device data.      No case tracking events are documented in the log.      Pain/Ellen Score: Ellen Score: 9 (6/6/2024  9:15 AM)

## 2024-06-15 NOTE — PROGRESS NOTES
HPI    DLS: 2/20/2024    Pt here for 4 Month Check;  Pt states no eye pain or discomfort.     Meds;  Cosopt BID OU  Latanoprost QHS OS    1. 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 SHANTE   4. PCIOL OU   5. Hx (endogenous) Endophthalmitis OS   6. H/O Tractional  RD OS     Last edited by Lynda Crawford MD on 6/18/2024 10:44 AM.            Assessment /Plan     For exam results, see Encounter Report.    Primary open-angle glaucoma, right eye, mild stage    Primary open-angle glaucoma, left eye, moderate stage    Anomalous optic nerve    KCS (keratoconjunctivitis sicca)    Anterior capsular opacification following extraction of cataract    Pseudophakia    History of endophthalmitis    H/O retinal detachment           Glaucoma (type and duration)    Suspect 2/2 lrg cups ou    First HVF   2000   First photos   2013   Treatment / Drops started   xal 1/2016           Family history    none        Glaucoma meds   Cosopt ou bid // ((Latanoprost started 1/4/2016  - Stopped 3/22/2019  Od))  // re-started 1/18/2022 ou for OCT and ? VF prog        H/O adverse rxn to glaucoma drops    none        LASERS    none        GLAUCOMA SURGERIES    none        OTHER EYE SURGERIES    PC IOL os 8/2/2017 (done  W/ Mazz - SO removal)    TR repair w/ PPVx and yas oil - os - 2/2 endogen endopth os - 3/15/2017 - Mazz //  PC IOL - OD - 6/14/2023// PC IOL od - 6/14/2023         CDR    0.75-0.8 / 0.7-0.75        Tbase    18-22 / 19-22          Tmax    22/30             Ttarget    ? 17/17            HVF    11 test 2000 to  2024- near full od // gen depression - SAD / IAD (fluctuates )         Gonio    +2-3 ou w/ yas oil drop sup os         CCT    510/517        OCT    5  test 2013 to 2023 - RNFL - bord TI  od //  dec G/TS/I, bord NS/N os (+prog os)         HRT    5 test 2016 to 2020 -MR -MR -  Dec. IT od // nl os /// CDR 0.69 od // 0.50 os  (large disc size )         Disc photos    2013, 2015 - OIS     - Ttoday   15/16  - Test done today  IOP / gonio    post -op phaco/IOL od - 6/14/2023      2. Anomaolous Optic nerves    - very large disc size ou    - HRT - 2.97 od // 3.04 os (nl is up to 2.8 ou)     3. Dry eyes     4. H/O endogenous endophthalmitis  OS  - And secondary tract RD - S/P PPVx and Silicone Oil (Mazzulla)    Oil removed at the same time as the phaco/IOL done 8/2/2017     5. S/P complex phaco/IOL os with trypan blue and Leslye ring and SO removal     - (done with mazzulla ) // 8/2/2017// PCB00 19.5   - Doing well - consider yag to ant capsule phimosis / opacification     6. PC IOL od - 6/14/2023   DIB00 19.5      PLAN  Cosopt bid  OU -   Latanoprost - STOP/ OFF  (3/22/2019) // re-start 1/18/2022 - ?? OCT - RNFL prog os and ? VF prog os     Hold off on yag cap to ant capsule phimosis and PCO OS  - [facility] doing OK - may be affecting the VF os     Ok to use OTC - zaditor or pataday for allergies - bid prn   Cont AT's prn     Phaco / IOL OD Date: 6/14/2023 - ERC33-22.5   POY > 1  - phaco/IOL   Doing well    Cont cosopt bid ou   Cont latanoprost - OS only - hold od     Given Rx for glasses - but very similar to her pre-op glasses - so if prefers can cont with her older glasses  (2/2024)     F/U 4 months - with  IOP

## 2024-06-18 ENCOUNTER — OFFICE VISIT (OUTPATIENT)
Dept: OPHTHALMOLOGY | Facility: CLINIC | Age: 84
End: 2024-06-18
Payer: COMMERCIAL

## 2024-06-18 DIAGNOSIS — Q07.8 ANOMALOUS OPTIC NERVE: ICD-10-CM

## 2024-06-18 DIAGNOSIS — Z96.1 PSEUDOPHAKIA: ICD-10-CM

## 2024-06-18 DIAGNOSIS — H26.499 ANTERIOR CAPSULAR OPACIFICATION FOLLOWING EXTRACTION OF CATARACT: ICD-10-CM

## 2024-06-18 DIAGNOSIS — H40.1111 PRIMARY OPEN-ANGLE GLAUCOMA, RIGHT EYE, MILD STAGE: Primary | ICD-10-CM

## 2024-06-18 DIAGNOSIS — M35.01 KCS (KERATOCONJUNCTIVITIS SICCA): ICD-10-CM

## 2024-06-18 DIAGNOSIS — Z86.69 HISTORY OF ENDOPHTHALMITIS: ICD-10-CM

## 2024-06-18 DIAGNOSIS — Z86.69 H/O RETINAL DETACHMENT: ICD-10-CM

## 2024-06-18 DIAGNOSIS — H40.1122 PRIMARY OPEN-ANGLE GLAUCOMA, LEFT EYE, MODERATE STAGE: ICD-10-CM

## 2024-06-18 DIAGNOSIS — H40.1131 PRIMARY OPEN ANGLE GLAUCOMA OF BOTH EYES, MILD STAGE: ICD-10-CM

## 2024-06-18 PROCEDURE — 1101F PT FALLS ASSESS-DOCD LE1/YR: CPT | Mod: CPTII,S$GLB,, | Performed by: OPHTHALMOLOGY

## 2024-06-18 PROCEDURE — 1126F AMNT PAIN NOTED NONE PRSNT: CPT | Mod: CPTII,S$GLB,, | Performed by: OPHTHALMOLOGY

## 2024-06-18 PROCEDURE — 99999 PR PBB SHADOW E&M-EST. PATIENT-LVL III: CPT | Mod: PBBFAC,,, | Performed by: OPHTHALMOLOGY

## 2024-06-18 PROCEDURE — 92020 GONIOSCOPY: CPT | Mod: S$GLB,,, | Performed by: OPHTHALMOLOGY

## 2024-06-18 PROCEDURE — 1160F RVW MEDS BY RX/DR IN RCRD: CPT | Mod: CPTII,S$GLB,, | Performed by: OPHTHALMOLOGY

## 2024-06-18 PROCEDURE — 3288F FALL RISK ASSESSMENT DOCD: CPT | Mod: CPTII,S$GLB,, | Performed by: OPHTHALMOLOGY

## 2024-06-18 PROCEDURE — 99214 OFFICE O/P EST MOD 30 MIN: CPT | Mod: S$GLB,,, | Performed by: OPHTHALMOLOGY

## 2024-06-18 PROCEDURE — 1159F MED LIST DOCD IN RCRD: CPT | Mod: CPTII,S$GLB,, | Performed by: OPHTHALMOLOGY

## 2024-06-18 RX ORDER — DORZOLAMIDE HYDROCHLORIDE AND TIMOLOL MALEATE 20; 5 MG/ML; MG/ML
1 SOLUTION/ DROPS OPHTHALMIC 2 TIMES DAILY
Qty: 3 EACH | Refills: 3 | Status: SHIPPED | OUTPATIENT
Start: 2024-06-18

## 2024-06-18 RX ORDER — LATANOPROST 50 UG/ML
1 SOLUTION/ DROPS OPHTHALMIC NIGHTLY
Qty: 7.5 ML | Refills: 3 | Status: SHIPPED | OUTPATIENT
Start: 2024-06-18

## 2024-10-17 ENCOUNTER — TELEPHONE (OUTPATIENT)
Dept: INTERNAL MEDICINE | Facility: CLINIC | Age: 84
End: 2024-10-17
Payer: COMMERCIAL

## 2024-10-17 DIAGNOSIS — Z12.31 ENCOUNTER FOR SCREENING MAMMOGRAM FOR BREAST CANCER: Primary | ICD-10-CM

## 2024-10-17 NOTE — PROGRESS NOTES
HPI     Glaucoma            Comments: 4 month IOP cl today and pt states no changes since last exam           Comments    DLS: 6/18/24    1. 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 SHANTE  4. PCIOL OU  5. Hx Endophthalmitis OS  6. Rhegmatogenous RD OS    MEDS:  Cosopt BID OU  Latanoprost QHS OS            Last edited by Portia Stevens MA on 10/21/2024  9:17 AM.            Assessment /Plan     For exam results, see Encounter Report.    Primary open-angle glaucoma, right eye, mild stage    Primary open-angle glaucoma, left eye, moderate stage    Anomalous optic nerve    KCS (keratoconjunctivitis sicca)    Anterior capsular opacification following extraction of cataract    Pseudophakia    History of endophthalmitis    H/O retinal detachment    Rhegmatogenous retinal detachment of left eye             Glaucoma (type and duration)    Suspect 2/2 lrg cups ou    First HVF   2000   First photos   2013   Treatment / Drops started   xal 1/2016           Family history    none        Glaucoma meds   Cosopt ou bid // ((Latanoprost started 1/4/2016  - Stopped 3/22/2019  Od))  // re-started 1/18/2022 ou for OCT and ? VF prog        H/O adverse rxn to glaucoma drops    none        LASERS    none        GLAUCOMA SURGERIES    none        OTHER EYE SURGERIES    PC IOL os 8/2/2017 (done  W/ Mazz - SO removal)    TR repair w/ PPVx and yas oil - os - 2/2 endogen endopth os - 3/15/2017 - Mazz //               PC IOL - OD - 6/14/2023        CDR    0.75-0.8 / 0.7-0.75        Tbase    18-22 / 19-22          Tmax    22/30             Ttarget    ? 17/17            HVF    11 test 2000 to  2024- near full od // gen depression - SAD / IAD (fluctuates )         Gonio    +2-3 ou w/ yas oil drop sup os         CCT    510/517        OCT    5  test 2013 to 2023 - RNFL - bord TI  od //  dec G/TS/I, bord NS/N os (+prog os)         HRT    5 test 2016 to 2020 -MR -MR -  Dec. IT od // nl os /// CDR 0.69 od // 0.50 os  (large disc size )         Disc photos    2013,  2015 - OIS     - Ttoday   12/18  - Test done today  IOP  post -op phaco/IOL od - 6/14/2023      2. Anomaolous Optic nerves    - very large disc size ou    - HRT - 2.97 od // 3.04 os (nl is up to 2.8 ou)     3. Dry eyes     4. H/O endogenous endophthalmitis  OS  - And secondary tract RD - S/P PPVx and Silicone Oil (Mazzulla)    Oil removed at the same time as the phaco/IOL done 8/2/2017     5. S/P complex phaco/IOL os with trypan blue and Leslye ring and SO removal     - (done with mazzulla ) // 8/2/2017// PCB00 19.5   - Doing well - consider yag to ant capsule phimosis / opacification     6. PC IOL od - 6/14/2023 - DIB00 19.5      PC IOL OS - 8/2/2017 (at time of silicone oil removal)       PLAN  Cosopt bid  OU -   Latanoprost - STOP/ OFF  (3/22/2019) // re-start 1/18/2022 - ?? OCT - RNFL prog os and ? VF prog os     Hold off on yag cap to ant capsule phimosis and PCO OS  - [facility] doing OK - may be affecting the VF os     Ok to use OTC - zaditor or pataday for allergies - bid prn   Cont AT's prn     Phaco / IOL OD Date: 6/14/2023 - DHA32-28.5   POY > 2  - phaco/IOL   Doing well    Cont cosopt bid ou   Cont latanoprost - OS only - hold od     Given Rx for glasses - but very similar to her pre-op glasses - so if prefers can cont with her older glasses  (2/2024)     10/21/2-24   IOP good od // borderline to high os   CSM      F/U 4 months - with  IOP // HVF / DFE / OCT

## 2024-10-17 NOTE — TELEPHONE ENCOUNTER
----- Message from Paula sent at 10/16/2024  3:38 PM CDT -----  Contact: Self/[phone]  Caller is requesting to schedule their annual screening mammogram appointment. Order is not listed in Epic.  Please enter order and contact patient to schedule.    Would the patient like a call back, or a response through their [facility] portal?:   call back     Where would they like the mammogram performed?:[facility]     Comments: pt stated that she had her last Mammogram on 12/19. Please advise

## 2024-10-21 ENCOUNTER — OFFICE VISIT (OUTPATIENT)
Dept: OPHTHALMOLOGY | Facility: CLINIC | Age: 84
End: 2024-10-21
Payer: COMMERCIAL

## 2024-10-21 DIAGNOSIS — H33.002 RHEGMATOGENOUS RETINAL DETACHMENT OF LEFT EYE: ICD-10-CM

## 2024-10-21 DIAGNOSIS — Q07.8 ANOMALOUS OPTIC NERVE: ICD-10-CM

## 2024-10-21 DIAGNOSIS — H40.1122 PRIMARY OPEN-ANGLE GLAUCOMA, LEFT EYE, MODERATE STAGE: ICD-10-CM

## 2024-10-21 DIAGNOSIS — Z86.69 HISTORY OF ENDOPHTHALMITIS: ICD-10-CM

## 2024-10-21 DIAGNOSIS — Z96.1 PSEUDOPHAKIA: ICD-10-CM

## 2024-10-21 DIAGNOSIS — H26.499 ANTERIOR CAPSULAR OPACIFICATION FOLLOWING EXTRACTION OF CATARACT: ICD-10-CM

## 2024-10-21 DIAGNOSIS — H16.229 KCS (KERATOCONJUNCTIVITIS SICCA): ICD-10-CM

## 2024-10-21 DIAGNOSIS — Z86.69 H/O RETINAL DETACHMENT: ICD-10-CM

## 2024-10-21 DIAGNOSIS — H40.1111 PRIMARY OPEN-ANGLE GLAUCOMA, RIGHT EYE, MILD STAGE: Primary | ICD-10-CM

## 2024-10-21 PROCEDURE — 1101F PT FALLS ASSESS-DOCD LE1/YR: CPT | Mod: CPTII,S$GLB,, | Performed by: OPHTHALMOLOGY

## 2024-10-21 PROCEDURE — 1159F MED LIST DOCD IN RCRD: CPT | Mod: CPTII,S$GLB,, | Performed by: OPHTHALMOLOGY

## 2024-10-21 PROCEDURE — 1160F RVW MEDS BY RX/DR IN RCRD: CPT | Mod: CPTII,S$GLB,, | Performed by: OPHTHALMOLOGY

## 2024-10-21 PROCEDURE — 99999 PR PBB SHADOW E&M-EST. PATIENT-LVL III: CPT | Mod: PBBFAC,,, | Performed by: OPHTHALMOLOGY

## 2024-10-21 PROCEDURE — 99214 OFFICE O/P EST MOD 30 MIN: CPT | Mod: S$GLB,,, | Performed by: OPHTHALMOLOGY

## 2024-10-21 PROCEDURE — 1126F AMNT PAIN NOTED NONE PRSNT: CPT | Mod: CPTII,S$GLB,, | Performed by: OPHTHALMOLOGY

## 2024-10-21 PROCEDURE — 3288F FALL RISK ASSESSMENT DOCD: CPT | Mod: CPTII,S$GLB,, | Performed by: OPHTHALMOLOGY

## 2024-12-20 ENCOUNTER — OFFICE VISIT (OUTPATIENT)
Dept: OBSTETRICS AND GYNECOLOGY | Facility: CLINIC | Age: 84
End: 2024-12-20
Payer: COMMERCIAL

## 2024-12-20 VITALS
DIASTOLIC BLOOD PRESSURE: 76 MMHG | WEIGHT: 147.94 LBS | SYSTOLIC BLOOD PRESSURE: 128 MMHG | BODY MASS INDEX: 23.22 KG/M2 | HEIGHT: 67 IN

## 2024-12-20 DIAGNOSIS — Z01.419 ENCOUNTER FOR GYNECOLOGICAL EXAMINATION WITHOUT ABNORMAL FINDING: Primary | ICD-10-CM

## 2024-12-20 DIAGNOSIS — Z78.0 POSTMENOPAUSAL: ICD-10-CM

## 2024-12-20 DIAGNOSIS — B37.9 CANDIDA INFECTION: ICD-10-CM

## 2024-12-20 PROCEDURE — 99999 PR PBB SHADOW E&M-EST. PATIENT-LVL III: CPT | Mod: PBBFAC,,, | Performed by: OBSTETRICS & GYNECOLOGY

## 2024-12-20 RX ORDER — NYSTATIN 100000 [USP'U]/G
POWDER TOPICAL 4 TIMES DAILY
Qty: 30 G | Refills: 3 | Status: SHIPPED | OUTPATIENT
Start: 2024-12-20

## 2024-12-20 NOTE — PROGRESS NOTES
CC: Well woman exam    Ida Santana is a 84 y.o. female  presents for a well woman exam.  LMP: No LMP recorded (lmp unknown). Patient is postmenopausal..  No issues, problems, or complaints.  Postmenopausal    Past Medical History:   Diagnosis Date    Breast cancer     Left breast DCIS    Cancer 2001    ductal carcinoma in situ left breast 2001    Glaucoma     History of uterine fibroid     Hyperlipidemia     dyslipidemia    Hypertension     MRSA infection     Open angle 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 eyes 2013    Osteopenia     Pancreas cyst     Potassium depletion     Retinal detachment of left eye with retinal break 2017     Past Surgical History:   Procedure Laterality Date    Adrenal Gland Surgery to right side      BREAST BIOPSY Left     left breast DCIS    BREAST LUMPECTOMY Left 2001    CATARACT EXTRACTION W/  INTRAOCULAR LENS IMPLANT Left 2017    with PPV/SO removal ( AND )    CATARACT EXTRACTION W/  INTRAOCULAR LENS IMPLANT Right 2023        COLONOSCOPY      COLONOSCOPY N/A 10/31/2016    Procedure: COLONOSCOPY;  Surgeon: YAMILETH Richards MD;  Location: [facility] (4TH FLR);  Service: Endoscopy;  Laterality: N/A;    ENDOSCOPIC ULTRASOUND OF UPPER GASTROINTESTINAL TRACT N/A 2019    Procedure: ULTRASOUND, UPPER GI TRACT, ENDOSCOPIC;  Surgeon: Ladonna Wells MD;  Location: [facility] (2ND FLR);  Service: Endoscopy;  Laterality: N/A;    ENDOSCOPIC ULTRASOUND OF UPPER GASTROINTESTINAL TRACT N/A 2020    Procedure: ULTRASOUND, UPPER GI TRACT, ENDOSCOPIC;  Surgeon: Ladonna Wells MD;  Location: [facility] (2ND FLR);  Service: Endoscopy;  Laterality: N/A;  -covid [address]-tb    ENDOSCOPIC ULTRASOUND OF UPPER GASTROINTESTINAL TRACT N/A 2024    Procedure: ULTRASOUND, UPPER GI TRACT, ENDOSCOPIC;  Surgeon: Eric Calhoun MD;  Location: [facility] (2ND FLR);  Service:  Endoscopy;  Laterality: N/A;  5/17/24: instructions sent via portal-GD  5/30-pre call complete-tb    ERCP N/A 09/14/2020    Procedure: ERCP (ENDOSCOPIC RETROGRADE CHOLANGIOPANCREATOGRAPHY);  Surgeon: Ladonna Wells MD;  Location: [facility] (2ND FLR);  Service: Endoscopy;  Laterality: N/A;    INTRAOCULAR PROSTHESES INSERTION Right 06/14/2023    Procedure: INSERTION, IOL PROSTHESIS;  Surgeon: Lynda Crawford MD;  Location: [facility] OR [address] [place] [address];  Service: Ophthalmology;  Laterality: Right;    KIDNEY SURGERY  03/27/2017    patient does not know what they did, but knows she does not have kidney cancer    PARS PLANA VITRECTOMY Left 08/02/2017    25g PPV WITH SO REMOVAL AND PHACO IOL ( AND )    PHACOEMULSIFICATION OF CATARACT Right 06/14/2023    Procedure: PHACOEMULSIFICATION, CATARACT;  Surgeon: Lynda Crawford MD;  Location: [address];  Service: Ophthalmology;  Laterality: Right;    RETINAL DETACHMENT SURGERY Left 08/22/2017    PPVx and SO Removal//Phaco IOL ( AND )     Social History     Socioeconomic History    Marital status: Single   Tobacco Use    Smoking status: Never    Smokeless tobacco: Never   Substance and Sexual Activity    Alcohol use: No    Drug use: No    Sexual activity: Never     Birth control/protection: Post-menopausal   Social History Narrative    Retired  (34 years) with [employer]    Masters in Early Childhood Education        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        Exercise:  Walks several days a week.     Family History   Problem Relation Name Age of Onset    Cancer Mother          breast    Breast cancer Mother          40s and again in 50s (bilateral)    Heart disease Father          bypass    Cancer Father          asbestos    Stroke Brother Neel     Hypertension Brother Neel     Breast cancer Other Niece 40    Ovarian cancer Neg Hx      Amblyopia Neg Hx      Blindness Neg Hx      Cataracts Neg Hx      Diabetes Neg  "Hx      Glaucoma Neg Hx      Macular degeneration Neg Hx      Retinal detachment Neg Hx      Strabismus Neg Hx      Thyroid disease Neg Hx      Colon cancer Neg Hx      Melanoma Neg Hx       OB History          0    Para        Term   0            AB        Living             SAB        IAB        Ectopic        Multiple        Live Births                     /76 (BP Location: Left arm, Patient Position: Sitting)   Ht 5' 7" (1.702 m)   Wt 67.1 kg (147 lb 14.9 oz)   LMP  (LMP Unknown)   BMI 23.17 kg/m²       ROS:    ROS:  GENERAL: Denies weight gain or weight loss. Feeling well overall.   SKIN: Denies rash or lesions.   HEAD: Denies head injury or headache.   NODES: Denies enlarged lymph nodes.   CHEST: Denies chest pain or shortness of breath.   CARDIOVASCULAR: Denies palpitations or left sided chest pain.   ABDOMEN: No abdominal pain, constipation, diarrhea, nausea, vomiting or rectal bleeding.   URINARY: No frequency, dysuria, hematuria, or burning on urination.  REPRODUCTIVE: See HPI.   BREASTS: The patient performs breast self-examination and denies pain, lumps, or nipple discharge.   HEMATOLOGIC: No easy bruisability or excessive bleeding.   MUSCULOSKELETAL: Denies joint pain or swelling.   NEUROLOGIC: Denies syncope or weakness.   PSYCHIATRIC: Denies depression, anxiety or mood swings.    PHYSICAL EXAM:    APPEARANCE: Well nourished, well developed, in no acute distress.  AFFECT: WNL, alert and oriented x 3  SKIN: No acne or hirsutism  NECK: Neck symmetric without masses or thyromegaly  NODES: No inguinal, cervical, axillary, or femoral lymph node enlargement  CHEST: Good respiratory effect  ABDOMEN: Soft.  No tenderness or masses.  No hepatosplenomegaly.  No hernias.  There is candida in the skin fold in her lower abdomen  BREASTS: Symmetrical, no skin changes or visible lesions.  No palpable masses, nipple discharge bilaterally.  PELVIC: Normal external genitalia without lesions.  " Normal hair distribution.  Adequate perineal body, normal urethral meatus.  Vagina moist and well rugated without lesions or discharge.  Cervix pink, without lesions, discharge or tenderness.  No significant cystocele or rectocele.  Bimanual exam shows uterus to be normal size, regular, mobile and nontender.  Adnexa without masses or tenderness.    RECTAL: Rectovaginal exam confirms above with normal sphincter tone, no masses.  EXTREMITIES: No edema.    Diagnosis      ICD-10-CM ICD-9-CM    1. Encounter for gynecological examination without abnormal finding  Z01.419 V72.31       2. Postmenopausal  Z78.0 V49.81       3. Candida infection  B37.9 112.9 nystatin (MYCOSTATIN) powder            Patient was counseled today on A.C.S. Pap guidelines and recommendations for yearly pelvic exams, mammograms and monthly self breast exams; to see her PCP for other health maintenance.     Follow up if symptoms worsen or fail to improve.

## 2024-12-23 ENCOUNTER — HOSPITAL ENCOUNTER (OUTPATIENT)
Dept: RADIOLOGY | Facility: HOSPITAL | Age: 84
Discharge: HOME | End: 2024-12-23
Attending: INTERNAL MEDICINE
Payer: COMMERCIAL

## 2024-12-23 DIAGNOSIS — Z12.31 ENCOUNTER FOR SCREENING MAMMOGRAM FOR BREAST CANCER: ICD-10-CM

## 2024-12-23 PROCEDURE — 77063 BREAST TOMOSYNTHESIS BI: CPT | Mod: 26,,, | Performed by: RADIOLOGY

## 2024-12-23 PROCEDURE — 77067 SCR MAMMO BI INCL CAD: CPT | Mod: 26,,, | Performed by: RADIOLOGY

## 2024-12-23 PROCEDURE — 77067 SCR MAMMO BI INCL CAD: CPT | Mod: TC

## 2024-12-26 ENCOUNTER — TELEPHONE (OUTPATIENT)
Dept: RADIOLOGY | Facility: HOSPITAL | Age: 84
End: 2024-12-26
Payer: COMMERCIAL

## 2024-12-26 NOTE — TELEPHONE ENCOUNTER
Called patient in reference to her breast imaging and scheduling repeat mammogram images. No answer. Left the patient a message with my direct phone number.

## 2024-12-30 ENCOUNTER — HOSPITAL ENCOUNTER (OUTPATIENT)
Dept: RADIOLOGY | Facility: HOSPITAL | Age: 84
Discharge: HOME | End: 2024-12-30
Attending: INTERNAL MEDICINE
Payer: COMMERCIAL

## 2024-12-30 VITALS — BODY MASS INDEX: 23.07 KG/M2 | HEIGHT: 67 IN | WEIGHT: 147 LBS

## 2024-12-30 DIAGNOSIS — R92.8 ABNORMAL FINDING ON BREAST IMAGING: ICD-10-CM

## 2024-12-30 PROCEDURE — 77065 DX MAMMO INCL CAD UNI: CPT | Mod: TC,RT

## 2025-01-16 DIAGNOSIS — Z78.0 MENOPAUSE: ICD-10-CM

## 2025-01-28 ENCOUNTER — TELEPHONE (OUTPATIENT)
Dept: INTERNAL MEDICINE | Facility: CLINIC | Age: 85
End: 2025-01-28
Payer: COMMERCIAL

## 2025-01-28 DIAGNOSIS — R73.9 HYPERGLYCEMIA: ICD-10-CM

## 2025-01-28 DIAGNOSIS — I10 ESSENTIAL HYPERTENSION: Primary | ICD-10-CM

## 2025-01-28 NOTE — TELEPHONE ENCOUNTER
----- Message from Vivienne sent at 1/27/2025  1:52 PM CST -----  Contact: Patient @ [phone]  type: Lab    Caller is requesting to schedule their Lab appointment prior to an appointment.    Order is not listed in EPIC.  Please enter order and contact patient to schedule.    Preferred Date and Time of Labs:03/19/2025 9 am     Date of Appointment:03/26/25    [facility]    Would the patient rather a call back or a response via [facility]? Call     Best Call Back Number:

## 2025-02-24 ENCOUNTER — OFFICE VISIT (OUTPATIENT)
Dept: OPHTHALMOLOGY | Facility: CLINIC | Age: 85
End: 2025-02-24
Payer: COMMERCIAL

## 2025-02-24 ENCOUNTER — CLINICAL SUPPORT (OUTPATIENT)
Dept: OPHTHALMOLOGY | Facility: CLINIC | Age: 85
End: 2025-02-24
Payer: COMMERCIAL

## 2025-02-24 DIAGNOSIS — Q07.8 ANOMALOUS OPTIC NERVE: ICD-10-CM

## 2025-02-24 DIAGNOSIS — Z86.69 H/O RETINAL DETACHMENT: ICD-10-CM

## 2025-02-24 DIAGNOSIS — Z86.69 HISTORY OF ENDOPHTHALMITIS: ICD-10-CM

## 2025-02-24 DIAGNOSIS — H26.499 ANTERIOR CAPSULAR OPACIFICATION FOLLOWING EXTRACTION OF CATARACT: ICD-10-CM

## 2025-02-24 DIAGNOSIS — Z96.1 PSEUDOPHAKIA: ICD-10-CM

## 2025-02-24 DIAGNOSIS — H44.002 ENDOPHTHALMITIS, LEFT EYE: ICD-10-CM

## 2025-02-24 DIAGNOSIS — H40.1122 PRIMARY OPEN-ANGLE GLAUCOMA, LEFT EYE, MODERATE STAGE: ICD-10-CM

## 2025-02-24 DIAGNOSIS — H40.1111 PRIMARY OPEN-ANGLE GLAUCOMA, RIGHT EYE, MILD STAGE: Primary | ICD-10-CM

## 2025-02-24 DIAGNOSIS — H16.229 KCS (KERATOCONJUNCTIVITIS SICCA): ICD-10-CM

## 2025-02-24 PROCEDURE — 3288F FALL RISK ASSESSMENT DOCD: CPT | Mod: CPTII,S$GLB,, | Performed by: OPHTHALMOLOGY

## 2025-02-24 PROCEDURE — 1160F RVW MEDS BY RX/DR IN RCRD: CPT | Mod: CPTII,S$GLB,, | Performed by: OPHTHALMOLOGY

## 2025-02-24 PROCEDURE — 92133 CPTRZD OPH DX IMG PST SGM ON: CPT | Mod: S$GLB,,, | Performed by: OPHTHALMOLOGY

## 2025-02-24 PROCEDURE — 92083 EXTENDED VISUAL FIELD XM: CPT | Mod: S$GLB,,, | Performed by: OPHTHALMOLOGY

## 2025-02-24 PROCEDURE — 1159F MED LIST DOCD IN RCRD: CPT | Mod: CPTII,S$GLB,, | Performed by: OPHTHALMOLOGY

## 2025-02-24 PROCEDURE — 1126F AMNT PAIN NOTED NONE PRSNT: CPT | Mod: CPTII,S$GLB,, | Performed by: OPHTHALMOLOGY

## 2025-02-24 PROCEDURE — 1101F PT FALLS ASSESS-DOCD LE1/YR: CPT | Mod: CPTII,S$GLB,, | Performed by: OPHTHALMOLOGY

## 2025-02-24 PROCEDURE — 99999 PR PBB SHADOW E&M-EST. PATIENT-LVL III: CPT | Mod: PBBFAC,,, | Performed by: OPHTHALMOLOGY

## 2025-02-24 PROCEDURE — 99214 OFFICE O/P EST MOD 30 MIN: CPT | Mod: S$GLB,,, | Performed by: OPHTHALMOLOGY

## 2025-02-24 RX ORDER — BRIMONIDINE TARTRATE 2 MG/ML
1 SOLUTION/ DROPS OPHTHALMIC 2 TIMES DAILY
Qty: 10 ML | Refills: 3 | Status: SHIPPED | OUTPATIENT
Start: 2025-02-24

## 2025-02-24 NOTE — PROGRESS NOTES
Oct done ou       24-2  ss done ou     Rel & Fix =  good ou     Coop =      good     Patient has no allergies to latex or adhesives at this time    Jthomas    Mrx    -1.00 + 1.00 x 25   od    -1.00 + 1.25 x 145   os

## 2025-02-24 NOTE — PROGRESS NOTES
HPI    DLS: 6/18/24     Patient here for 4 month IOP ck, HVF/OCT, and DFE. Patient states vision   is the same since her last exam.     Cosopt BID OU  Latanoprost QHS OS    1. 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 SHANTE   4. PCIOL OU   5. Hx Endophthalmitis OS   6. Rhegmatogenous RD OS     Last edited by Ema Sy MA on 2/24/2025  9:04 AM.            Assessment /Plan     For exam results, see Encounter Report.           Glaucoma (type and duration)    Suspect 2/2 lrg cups ou    First HVF   2000   First photos   2013   Treatment / Drops started   xal 1/2016           Family history    none        Glaucoma meds   Cosopt ou bid // ((Latanoprost started 1/4/2016  - Stopped 3/22/2019  Od))  // re-started 1/18/2022 ou for OCT and ? VF prog        H/O adverse rxn to glaucoma drops    none        LASERS    none        GLAUCOMA SURGERIES    none        OTHER EYE SURGERIES    PC IOL os 8/2/2017 (done  W/ Mazz - SO removal) // PC IOL od - 6/14/2023   TR repair w/ PPVx and yas oil - os - 2/2 endogen endopth os - 3/15/2017 - Mazz //               PC IOL - OD - 6/14/2023        CDR    0.75-0.8 / 0.7-0.75        Tbase    18-22 / 19-22          Tmax    22/30             Ttarget    ? 17/17            HVF    12 test 2000 to  2025- near full od // gen depression - SAD / IAD (fluctuates )         Gonio    +2-3 ou w/ yas oil drop sup os         CCT    510/517        OCT    6  test 2018 to 2025 - RNFL - bord TI  od //  dec G/TS/I, bord NS/N os (+prog os)         HRT    5 test 2016 to 2020 -MR -MR -  Dec. IT od // nl os /// CDR 0.69 od // 0.50 os  (large disc size )         Disc photos    2013, 2015 - OIS     - Ttoday   16/19  - Test done today  IOP // HVF / DFE / OCT   post -op phaco/IOL od - 6/14/2023      2. Anomaolous Optic nerves    - very large disc size ou    - HRT - 2.97 od // 3.04 os (nl is up to 2.8 ou)     3. Dry eyes     4. H/O endogenous endophthalmitis  OS  - And secondary tract RD - S/P PPVx and Silicone Oil (Brittnee)     Oil removed at the same time as the phaco/IOL done 8/2/2017     5. S/P complex phaco/IOL os with trypan blue and Leslye ring and SO removal     - (done with dcik ) // 8/2/2017// PCB00 19.5   - Doing well - consider yag to ant capsule phimosis / opacification     6. PC IOL od - 6/14/2023 - DIB00 19.5      PC IOL OS - 8/2/2017 (at time of silicone oil removal)       PLAN  Cosopt bid  OU -   Latanoprost - STOP/ OFF  (3/22/2019) // re-start 1/18/2022 - ?? OCT - RNFL prog os and ? VF prog os     Hold off on yag cap to ant capsule phimosis and PCO OS  - VA doing OK - may be affecting the VF os     Ok to use OTC - zaditor or pataday for allergies - bid prn   Cont AT's prn     Phaco / IOL OD Date: 6/14/2023 - [id number]   POY > 1  - phaco/IOL   Doing well    Cont cosopt bid ou   Cont latanoprost - OS only - hold od     Given Rx for glasses - but very similar to her pre-op glasses - so if prefers can cont with her older glasses  (2/2024)     2/24/2025   IOP good od // borderline to high os   HVF - stable od // stable os since 2022  OCT stable od // stable os sicne 2023  Cont cosopt ou  Cont latanoprost os     Trail of brimonidine os - bid     F/U 3-4 months - with  IOP  w/ addition of brimonidine os and // gonio

## 2025-03-19 ENCOUNTER — LAB VISIT (OUTPATIENT)
Dept: LAB | Facility: HOSPITAL | Age: 85
End: 2025-03-19
Attending: INTERNAL MEDICINE
Payer: COMMERCIAL

## 2025-03-19 DIAGNOSIS — I10 ESSENTIAL HYPERTENSION: ICD-10-CM

## 2025-03-19 LAB
ALBUMIN SERPL BCP-MCNC: 3.9 G/DL (ref 3.5–5.2)
ALP SERPL-CCNC: 89 U/L (ref 40–150)
ALT SERPL W/O P-5'-P-CCNC: 16 U/L (ref 10–44)
ANION GAP SERPL CALC-SCNC: 12 MMOL/L (ref 8–16)
AST SERPL-CCNC: 23 U/L (ref 10–40)
BASOPHILS # BLD AUTO: 0.04 K/UL (ref 0–0.2)
BASOPHILS NFR BLD: 1 % (ref 0–1.9)
BILIRUB SERPL-MCNC: 0.8 MG/DL (ref 0.1–1)
BUN SERPL-MCNC: 14 MG/DL (ref 8–23)
CALCIUM SERPL-MCNC: 9.6 MG/DL (ref 8.7–10.5)
CHLORIDE SERPL-SCNC: 109 MMOL/L (ref 95–110)
CO2 SERPL-SCNC: 20 MMOL/L (ref 23–29)
CREAT SERPL-MCNC: 0.7 MG/DL (ref 0.5–1.4)
DIFFERENTIAL METHOD BLD: ABNORMAL
EOSINOPHIL # BLD AUTO: 0.1 K/UL (ref 0–0.5)
EOSINOPHIL NFR BLD: 3.3 % (ref 0–8)
ERYTHROCYTE [DISTWIDTH] IN BLOOD BY AUTOMATED COUNT: 12.8 % (ref 11.5–14.5)
EST. GFR  (NO RACE VARIABLE): >60 ML/MIN/1.73 M^2
GLUCOSE SERPL-MCNC: 91 MG/DL (ref 70–110)
HCT VFR BLD AUTO: 39.9 % (ref 37–48.5)
HGB BLD-MCNC: 13.4 G/DL (ref 12–16)
IMM GRANULOCYTES # BLD AUTO: 0.01 K/UL (ref 0–0.04)
IMM GRANULOCYTES NFR BLD AUTO: 0.3 % (ref 0–0.5)
LYMPHOCYTES # BLD AUTO: 1.4 K/UL (ref 1–4.8)
LYMPHOCYTES NFR BLD: 35.3 % (ref 18–48)
MCH RBC QN AUTO: 32.2 PG (ref 27–31)
MCHC RBC AUTO-ENTMCNC: 33.6 G/DL (ref 32–36)
MCV RBC AUTO: 96 FL (ref 82–98)
MONOCYTES # BLD AUTO: 0.4 K/UL (ref 0.3–1)
MONOCYTES NFR BLD: 10.3 % (ref 4–15)
NEUTROPHILS # BLD AUTO: 2 K/UL (ref 1.8–7.7)
NEUTROPHILS NFR BLD: 49.8 % (ref 38–73)
NRBC BLD-RTO: 0 /100 WBC
PLATELET # BLD AUTO: 203 K/UL (ref 150–450)
PMV BLD AUTO: 11.5 FL (ref 9.2–12.9)
POTASSIUM SERPL-SCNC: 3.9 MMOL/L (ref 3.5–5.1)
PROT SERPL-MCNC: 7.3 G/DL (ref 6–8.4)
RBC # BLD AUTO: 4.16 M/UL (ref 4–5.4)
SODIUM SERPL-SCNC: 141 MMOL/L (ref 136–145)
WBC # BLD AUTO: 3.99 K/UL (ref 3.9–12.7)

## 2025-03-19 PROCEDURE — 80053 COMPREHEN METABOLIC PANEL: CPT | Performed by: INTERNAL MEDICINE

## 2025-03-19 PROCEDURE — 36415 COLL VENOUS BLD VENIPUNCTURE: CPT | Performed by: INTERNAL MEDICINE

## 2025-03-19 PROCEDURE — 85025 COMPLETE CBC W/AUTO DIFF WBC: CPT | Performed by: INTERNAL MEDICINE

## 2025-03-26 ENCOUNTER — IMMUNIZATION (OUTPATIENT)
Dept: INTERNAL MEDICINE | Facility: CLINIC | Age: 85
End: 2025-03-26
Payer: COMMERCIAL

## 2025-03-26 ENCOUNTER — LAB VISIT (OUTPATIENT)
Dept: LAB | Facility: HOSPITAL | Age: 85
End: 2025-03-26
Attending: INTERNAL MEDICINE
Payer: COMMERCIAL

## 2025-03-26 ENCOUNTER — OFFICE VISIT (OUTPATIENT)
Dept: INTERNAL MEDICINE | Facility: CLINIC | Age: 85
End: 2025-03-26
Payer: COMMERCIAL

## 2025-03-26 VITALS
HEART RATE: 82 BPM | HEIGHT: 67 IN | BODY MASS INDEX: 23.32 KG/M2 | DIASTOLIC BLOOD PRESSURE: 58 MMHG | SYSTOLIC BLOOD PRESSURE: 118 MMHG | WEIGHT: 148.56 LBS | OXYGEN SATURATION: 98 %

## 2025-03-26 DIAGNOSIS — I35.8 AORTIC VALVE SCLEROSIS: ICD-10-CM

## 2025-03-26 DIAGNOSIS — Z00.00 ANNUAL PHYSICAL EXAM: Primary | ICD-10-CM

## 2025-03-26 DIAGNOSIS — I10 ESSENTIAL HYPERTENSION: ICD-10-CM

## 2025-03-26 DIAGNOSIS — Z23 NEED FOR VACCINATION: Primary | ICD-10-CM

## 2025-03-26 LAB
ABSOLUTE EOSINOPHIL (OHS): 0.13 K/UL
ABSOLUTE MONOCYTE (OHS): 0.47 K/UL (ref 0.3–1)
ABSOLUTE NEUTROPHIL COUNT (OHS): 2.74 K/UL (ref 1.8–7.7)
BASOPHILS # BLD AUTO: 0.02 K/UL
BASOPHILS NFR BLD AUTO: 0.4 %
ERYTHROCYTE [DISTWIDTH] IN BLOOD BY AUTOMATED COUNT: 12.9 % (ref 11.5–14.5)
HCT VFR BLD AUTO: 42.9 % (ref 37–48.5)
HGB BLD-MCNC: 14.4 GM/DL (ref 12–16)
IMM GRANULOCYTES # BLD AUTO: 0.01 K/UL (ref 0–0.04)
IMM GRANULOCYTES NFR BLD AUTO: 0.2 % (ref 0–0.5)
LYMPHOCYTES # BLD AUTO: 1.67 K/UL (ref 1–4.8)
MCH RBC QN AUTO: 32.4 PG (ref 27–50)
MCHC RBC AUTO-ENTMCNC: 33.6 G/DL (ref 32–36)
MCV RBC AUTO: 96 FL (ref 82–98)
NUCLEATED RBC (/100WBC) (OHS): 0 /100 WBC
PLATELET # BLD AUTO: 226 K/UL (ref 150–450)
PMV BLD AUTO: 11.5 FL (ref 9.2–12.9)
RBC # BLD AUTO: 4.45 M/UL (ref 4–5.4)
RELATIVE EOSINOPHIL (OHS): 2.6 %
RELATIVE LYMPHOCYTE (OHS): 33.1 % (ref 18–48)
RELATIVE MONOCYTE (OHS): 9.3 % (ref 4–15)
RELATIVE NEUTROPHIL (OHS): 54.4 % (ref 38–73)
WBC # BLD AUTO: 5.04 K/UL (ref 3.9–12.7)

## 2025-03-26 PROCEDURE — 1101F PT FALLS ASSESS-DOCD LE1/YR: CPT | Mod: CPTII,S$GLB,, | Performed by: INTERNAL MEDICINE

## 2025-03-26 PROCEDURE — 90480 ADMN SARSCOV2 VAC 1/ONLY CMP: CPT | Mod: S$GLB,,, | Performed by: INTERNAL MEDICINE

## 2025-03-26 PROCEDURE — 85025 COMPLETE CBC W/AUTO DIFF WBC: CPT

## 2025-03-26 PROCEDURE — 3288F FALL RISK ASSESSMENT DOCD: CPT | Mod: CPTII,S$GLB,, | Performed by: INTERNAL MEDICINE

## 2025-03-26 PROCEDURE — 1126F AMNT PAIN NOTED NONE PRSNT: CPT | Mod: CPTII,S$GLB,, | Performed by: INTERNAL MEDICINE

## 2025-03-26 PROCEDURE — 99999 PR PBB SHADOW E&M-EST. PATIENT-LVL III: CPT | Mod: PBBFAC,,, | Performed by: INTERNAL MEDICINE

## 2025-03-26 PROCEDURE — 3078F DIAST BP <80 MM HG: CPT | Mod: CPTII,S$GLB,, | Performed by: INTERNAL MEDICINE

## 2025-03-26 PROCEDURE — 1159F MED LIST DOCD IN RCRD: CPT | Mod: CPTII,S$GLB,, | Performed by: INTERNAL MEDICINE

## 2025-03-26 PROCEDURE — 36415 COLL VENOUS BLD VENIPUNCTURE: CPT

## 2025-03-26 PROCEDURE — 91320 SARSCV2 VAC 30MCG TRS-SUC IM: CPT | Mod: S$GLB,,, | Performed by: INTERNAL MEDICINE

## 2025-03-26 PROCEDURE — 99397 PER PM REEVAL EST PAT 65+ YR: CPT | Mod: S$GLB,,, | Performed by: INTERNAL MEDICINE

## 2025-03-26 PROCEDURE — 3074F SYST BP LT 130 MM HG: CPT | Mod: CPTII,S$GLB,, | Performed by: INTERNAL MEDICINE

## 2025-03-26 RX ORDER — IRBESARTAN 300 MG/1
TABLET ORAL
Qty: 90 TABLET | Refills: 3 | Status: SHIPPED | OUTPATIENT
Start: 2025-03-26

## 2025-03-26 RX ORDER — VERAPAMIL HYDROCHLORIDE 240 MG/1
240 CAPSULE, DELAYED RELEASE ORAL DAILY
Qty: 90 CAPSULE | Refills: 3 | Status: SHIPPED | OUTPATIENT
Start: 2025-03-26

## 2025-03-26 NOTE — PROGRESS NOTES
Subjective     Patient ID: Ida Santana is a 84 y.o. female.    Chief Complaint: Annual Exam    HPI  Busy, caring for brother.  Caring for  nephew's family    Happy memories of k teaching.    Htn, tx with irbesartan 300 mg and verapamil 240 mg.    H/o br cancer.     Osteopenia of Femoral neck by scan 10/22.  Av sclerosis by 2017 echo    Review of Systems   Constitutional:  Negative for fever and unexpected weight change.   HENT:  Negative for nasal congestion and postnasal drip.    Respiratory:  Negative for chest tightness, shortness of breath and wheezing.    Cardiovascular:  Negative for chest pain and leg swelling.   Gastrointestinal:  Negative for abdominal pain, anal bleeding, constipation, diarrhea, nausea and vomiting.   Genitourinary:  Negative for dysuria and urgency.   Integumentary:  Negative for rash.   Neurological:  Negative for headaches.   Psychiatric/Behavioral:  Negative for dysphoric mood and sleep disturbance. The patient is not nervous/anxious.           Objective     Physical Exam  Constitutional:       General: She is not in acute distress.     Appearance: She is well-developed. She is not ill-appearing, toxic-appearing or diaphoretic.   HENT:      Head: Normocephalic and atraumatic.      Right Ear: External ear normal.      Left Ear: External ear normal.      Nose: Nose normal.   Eyes:      General: No scleral icterus.        Right eye: No discharge.         Left eye: No discharge.      Conjunctiva/sclera: Conjunctivae normal.      Pupils: Pupils are equal, round, and reactive to light.   Neck:      Thyroid: No thyromegaly.      Vascular: No JVD.   Cardiovascular:      Rate and Rhythm: Normal rate and regular rhythm.      Heart sounds: Murmur (systolic, throughout) heard.      No gallop.   Pulmonary:      Effort: Pulmonary effort is normal. No respiratory distress.      Breath sounds: Normal breath sounds. No stridor. No wheezing, rhonchi or rales.   Abdominal:      General: Bowel  sounds are normal. There is no distension.      Palpations: Abdomen is soft. There is no mass.      Tenderness: There is no abdominal tenderness. There is no guarding or rebound.   Musculoskeletal:         General: No tenderness. Normal range of motion.      Cervical back: Normal range of motion and neck supple.      Right lower leg: No edema.      Left lower leg: No edema.   Lymphadenopathy:      Cervical: No cervical adenopathy.   Skin:     General: Skin is warm and dry.      Findings: No rash.   Neurological:      Mental Status: She is alert and oriented to person, place, and time.      Cranial Nerves: No cranial nerve deficit.      Coordination: Coordination normal.   Psychiatric:         Behavior: Behavior normal.         Thought Content: Thought content normal.         Judgment: Judgment normal.       Results for orders placed or performed in visit on 03/19/25   COMPREHENSIVE METABOLIC PANEL    Collection Time: 03/19/25  8:49 AM   Result Value Ref Range    Sodium 141 136 - 145 mmol/L    Potassium 3.9 3.5 - 5.1 mmol/L    Chloride 109 95 - 110 mmol/L    CO2 20 (L) 23 - 29 mmol/L    Glucose 91 70 - 110 mg/dL    BUN 14 8 - 23 mg/dL    Creatinine 0.7 0.5 - 1.4 mg/dL    Calcium 9.6 8.7 - 10.5 mg/dL    Total Protein 7.3 6.0 - 8.4 g/dL    Albumin 3.9 3.5 - 5.2 g/dL    Total Bilirubin 0.8 0.1 - 1.0 mg/dL    Alkaline Phosphatase 89 40 - 150 U/L    AST 23 10 - 40 U/L    ALT 16 10 - 44 U/L    eGFR >60.0 >60 mL/min/1.73 m^2    Anion Gap 12 8 - 16 mmol/L   CBC W/ AUTO DIFFERENTIAL    Collection Time: 03/19/25  8:49 AM   Result Value Ref Range    WBC 3.99 3.90 - 12.70 K/uL    RBC 4.16 4.00 - 5.40 M/uL    Hemoglobin 13.4 12.0 - 16.0 g/dL    Hematocrit 39.9 37.0 - 48.5 %    MCV 96 82 - 98 fL    MCH 32.2 (H) 27.0 - 31.0 pg    MCHC 33.6 32.0 - 36.0 g/dL    RDW 12.8 11.5 - 14.5 %    Platelets 203 150 - 450 K/uL    MPV 11.5 9.2 - 12.9 fL    Immature Granulocytes 0.3 0.0 - 0.5 %    Gran # (ANC) 2.0 1.8 - 7.7 K/uL    Immature Grans  (Abs) 0.01 0.00 - 0.04 K/uL    Lymph # 1.4 1.0 - 4.8 K/uL    Mono # 0.4 0.3 - 1.0 K/uL    Eos # 0.1 0.0 - 0.5 K/uL    Baso # 0.04 0.00 - 0.20 K/uL    nRBC 0 0 /100 WBC    Gran % 49.8 38.0 - 73.0 %    Lymph % 35.3 18.0 - 48.0 %    Mono % 10.3 4.0 - 15.0 %    Eosinophil % 3.3 0.0 - 8.0 %    Basophil % 1.0 0.0 - 1.9 %    Differential Method Automated       118/58     Assessment and Plan     1. Annual physical exam    2. Essential hypertension  Comments:  Encouraged to check BP  Orders:  -     irbesartan (AVAPRO) 300 MG tablet; 1 tab po q day  Dispense: 90 tablet; Refill: 3  -     verapamiL (VERELAN) 240 MG C24P; Take 1 capsule (240 mg total) by mouth once daily.  Dispense: 90 capsule; Refill: 3    3. BMI 23.0-23.9, adult  -     CBC Auto Differential; Future; Expected date: 06/26/2025    4. Aortic valve sclerosis  -     Echo        Covid vax now         Follow up in about 1 year (around 3/26/2026) for PE.

## 2025-03-30 ENCOUNTER — RESULTS FOLLOW-UP (OUTPATIENT)
Dept: INTERNAL MEDICINE | Facility: CLINIC | Age: 85
End: 2025-03-30

## 2025-03-31 ENCOUNTER — HOSPITAL ENCOUNTER (OUTPATIENT)
Dept: CARDIOLOGY | Facility: HOSPITAL | Age: 85
Discharge: HOME | End: 2025-03-31
Attending: INTERNAL MEDICINE
Payer: COMMERCIAL

## 2025-03-31 VITALS
HEIGHT: 67 IN | WEIGHT: 148.63 LBS | HEART RATE: 75 BPM | DIASTOLIC BLOOD PRESSURE: 60 MMHG | SYSTOLIC BLOOD PRESSURE: 115 MMHG | BODY MASS INDEX: 23.33 KG/M2

## 2025-03-31 PROCEDURE — 93306 TTE W/DOPPLER COMPLETE: CPT | Mod: 26,,, | Performed by: INTERNAL MEDICINE

## 2025-03-31 PROCEDURE — 93306 TTE W/DOPPLER COMPLETE: CPT

## 2025-04-03 ENCOUNTER — RESULTS FOLLOW-UP (OUTPATIENT)
Dept: INTERNAL MEDICINE | Facility: CLINIC | Age: 85
End: 2025-04-03

## 2025-04-04 ENCOUNTER — TELEPHONE (OUTPATIENT)
Dept: INTERNAL MEDICINE | Facility: CLINIC | Age: 85
End: 2025-04-04
Payer: COMMERCIAL

## 2025-04-04 NOTE — TELEPHONE ENCOUNTER
Called and spoke to sister Bhavna about patients Echo result. Praise God was verbalized with understanding of results.

## 2025-04-26 NOTE — Clinical Note
MICU DAILY GOALS     Family/Goals of care/Code Status   Code Status: Full Code    24H Vital Sign Range  Temp:  [98.3 °F (36.8 °C)-98.9 °F (37.2 °C)]   Pulse:  []   Resp:  [16-49]   BP: (118-195)/()   SpO2:  [62 %-99 %]      Shift Events (include procedures and significant events)  Ms Liriano asked for jello and water. While her son was feeding it to her she started coughing up jello. I contacted the provider, and we ordered a speech therapy consult. No evidence of possible aspiration while taking medications. No other acute events throughout this shift.     AWAKE RASS: Goal -    Actual -      Restraint necessity: Not necessary   BREATHE SBT: Not intubated    Coordinate A & B, analgesics/sedatives Pain: managed   SAT: Not intubated   Delirium CAM-ICU:     Early(intubated/ Progressive (non-intubated) Mobility MOVE Screen (INTUBATED ONLY): Not intubated    Activity: Activity Management: Rolling - L1   Feeding/Nutrition Diet order: Diet/Nutrition Received: NPO,     Thrombus DVT prophylaxis:     HOB Elevation Head of Bed (HOB) Positioning: HOB elevated   Ulcer Prophylaxis GI: yes   Glucose control managed Glycemic Management: blood glucose monitored, oral hydration promoted   Skin Skin assessment:     Sacrum intact/not altered? Yes  Heels intact/not altered? Yes  Surgical wound? No    CHECK ONE!   (no altered skin or altered skin) and sub boxes:  [x] No Altered Skin Integrity Present    [x]Prevention Measures Documented    [] Altered Skin Integrity Present or Discovered   [] LDA already present in EPIC, daily doc completed              [] LDA added if not already in EPIC (describe/stage wound).               [] Wound Image Taken (required on admit,                   transfer/discharge and every Tuesday)    Wound Care Consulted? No   Bowel Function no issues    Indwelling Catheter Necessity            De-escalation Antibiotics No        VS and assessment per flow sheet, patient progressing towards goals as  She needs an EUS in 1/2024 tolerated, plan of care reviewed with [unfilled], all concerns addressed, will continue to monitor.

## 2025-05-19 NOTE — ASSESSMENT & PLAN NOTE
2/2 UTI vs hyponatremia vs intercranial event - status much improved today  - continue to monitor  - CT and MRI head negative  -avoid benzo, narcotics if possible     Patient is following up on requested call back to go over all test results.

## 2025-05-24 NOTE — PROGRESS NOTES
HPI     Glaucoma            Comments: 3 month IOP ck and pt states no changes since last exam           Comments    DLS: 2/24/25    1. 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 SHANTE  4. PCIOL OU  5. Hx Endophthalmitis OS  6. Rhegmatogenous RD OS    MEDS:  Cosopt BID OU  Brimonidine BID OS  Latanoprost QHS OS            Last edited by Portia Stevens MA on 5/26/2025  1:12 PM.            Assessment /Plan     For exam results, see Encounter Report.    Primary open-angle glaucoma, right eye, mild stage    Primary open-angle glaucoma, left eye, moderate stage  -     brimonidine 0.2% (ALPHAGAN) 0.2 % Drop; Place 1 drop into the left eye 3 (three) times daily.  Dispense: 15 mL; Refill: 3    Anomalous optic nerve    KCS (keratoconjunctivitis sicca)    Anterior capsular opacification following extraction of cataract    Pseudophakia    History of endophthalmitis    Endophthalmitis, left eye    H/O retinal detachment    Primary open angle glaucoma of both eyes, mild stage  -     latanoprost 0.005 % ophthalmic solution; Place 1 drop into both eyes every evening.  Dispense: 7.5 mL; Refill: 3  -     dorzolamide-timolol 2-0.5% (COSOPT) 22.3-6.8 mg/mL ophthalmic solution; Place 1 drop into both eyes 2 (two) times daily. If possible Patient request a 90 day supply = 3 bottles  Dispense: 3 each; Refill: 3           Glaucoma (type and duration)    Suspect 2/2 lrg cups ou    First HVF   2000   First photos   2013   Treatment / Drops started   xal 1/2016           Family history    none        Glaucoma meds   Cosopt ou bid // ((Latanoprost started 1/4/2016  - Stopped 3/22/2019  Od))  // re-started 1/18/2022 ou for OCT and ? VF prog        H/O adverse rxn to glaucoma drops    none        LASERS    none        GLAUCOMA SURGERIES    none        OTHER EYE SURGERIES    PC IOL os 8/2/2017 (done  W/ Lane - SO removal) // PC IOL od - 6/14/2023   TR repair w/ PPVx and yas oil - os - 2/2 endogen endopth os - 3/15/2017 - Lane //               PC IOL - OD -  6/14/2023        CDR    0.75-0.8 / 0.7-0.75        Tbase    18-22 / 19-22          Tmax    22/30             Ttarget    ? 17/17            HVF    12 test 2000 to  2025- near full od // gen depression - SAD / IAD (fluctuates )         Gonio    +2-3 ou w/ yas oil drop sup os         CCT    510/517        OCT    6  test 2018 to 2025 - RNFL - bord TI  od //  dec G/TS/I, bord NS/N os (+prog os)         HRT    5 test 2016 to 2020 -MR -MR -  Dec. IT od // nl os /// CDR 0.69 od // 0.50 os  (large disc size )         Disc photos    2013, 2015 - OIS     - Ttoday   5/22  - Test done today  IOP // adjust gtts   post -op phaco/IOL od - 6/14/2023      2. Anomaolous Optic nerves    - very large disc size ou    - HRT - 2.97 od // 3.04 os (nl is up to 2.8 ou)     3. Dry eyes     4. H/O endogenous endophthalmitis  OS  - And secondary tract RD - S/P PPVx and Silicone Oil (Mazzulla)    Oil removed at the same time as the phaco/IOL done 8/2/2017     5. S/P complex phaco/IOL os with trypan blue and Leslye ring and SO removal     - (done with mazzulla ) // 8/2/2017// PCB00 19.5   - Doing well - consider yag to ant capsule phimosis / opacification     6. PC IOL od - 6/14/2023 - DIB00 19.5      PC IOL OS - 8/2/2017 (at time of silicone oil removal)       PLAN  Cosopt bid  OU -   Latanoprost - STOP/ OFF  (3/22/2019) // re-start 1/18/2022 - ?? OCT - RNFL prog os and ? VF prog os     Hold off on yag cap to ant capsule phimosis and PCO OS  - VA doing OK - may be affecting the VF os     Ok to use OTC - zaditor or pataday for allergies - bid prn   Cont AT's prn     Phaco / IOL OD Date: 6/14/2023 - [id number]   POY > 1  - phaco/IOL   Doing well    Cont cosopt bid ou   Cont latanoprost - OS only - hold od     Given Rx for glasses - but very similar to her pre-op glasses - so if prefers can cont with her older glasses  (2/2024)     5/26/2025   IOP good od // borderline to high os   Adjust gtts   Latanoprost OU q hs (change to OU )   Cosopt OU bid    brimonidine os - increase to tid os     F/U 4 months - with  IOP  w/ change in eye drop schedule - and // gonio - ?? If angle open enough to try slt os

## 2025-05-26 ENCOUNTER — OFFICE VISIT (OUTPATIENT)
Dept: OPHTHALMOLOGY | Facility: CLINIC | Age: 85
End: 2025-05-26
Payer: COMMERCIAL

## 2025-05-26 DIAGNOSIS — H44.002 ENDOPHTHALMITIS, LEFT EYE: ICD-10-CM

## 2025-05-26 DIAGNOSIS — H40.1111 PRIMARY OPEN-ANGLE GLAUCOMA, RIGHT EYE, MILD STAGE: Primary | ICD-10-CM

## 2025-05-26 DIAGNOSIS — H40.1122 PRIMARY OPEN-ANGLE GLAUCOMA, LEFT EYE, MODERATE STAGE: ICD-10-CM

## 2025-05-26 DIAGNOSIS — H40.1131 PRIMARY OPEN ANGLE GLAUCOMA OF BOTH EYES, MILD STAGE: ICD-10-CM

## 2025-05-26 DIAGNOSIS — H16.229 KCS (KERATOCONJUNCTIVITIS SICCA): ICD-10-CM

## 2025-05-26 DIAGNOSIS — Q07.8 ANOMALOUS OPTIC NERVE: ICD-10-CM

## 2025-05-26 DIAGNOSIS — Z86.69 H/O RETINAL DETACHMENT: ICD-10-CM

## 2025-05-26 DIAGNOSIS — Z86.69 HISTORY OF ENDOPHTHALMITIS: ICD-10-CM

## 2025-05-26 DIAGNOSIS — H26.499 ANTERIOR CAPSULAR OPACIFICATION FOLLOWING EXTRACTION OF CATARACT: ICD-10-CM

## 2025-05-26 DIAGNOSIS — Z96.1 PSEUDOPHAKIA: ICD-10-CM

## 2025-05-26 PROCEDURE — 99214 OFFICE O/P EST MOD 30 MIN: CPT | Mod: S$GLB,,, | Performed by: OPHTHALMOLOGY

## 2025-05-26 PROCEDURE — 1160F RVW MEDS BY RX/DR IN RCRD: CPT | Mod: CPTII,S$GLB,, | Performed by: OPHTHALMOLOGY

## 2025-05-26 PROCEDURE — 3288F FALL RISK ASSESSMENT DOCD: CPT | Mod: CPTII,S$GLB,, | Performed by: OPHTHALMOLOGY

## 2025-05-26 PROCEDURE — 99999 PR PBB SHADOW E&M-EST. PATIENT-LVL III: CPT | Mod: PBBFAC,,, | Performed by: OPHTHALMOLOGY

## 2025-05-26 PROCEDURE — 1101F PT FALLS ASSESS-DOCD LE1/YR: CPT | Mod: CPTII,S$GLB,, | Performed by: OPHTHALMOLOGY

## 2025-05-26 PROCEDURE — 1159F MED LIST DOCD IN RCRD: CPT | Mod: CPTII,S$GLB,, | Performed by: OPHTHALMOLOGY

## 2025-05-26 PROCEDURE — 1126F AMNT PAIN NOTED NONE PRSNT: CPT | Mod: CPTII,S$GLB,, | Performed by: OPHTHALMOLOGY

## 2025-05-26 RX ORDER — BRIMONIDINE TARTRATE 2 MG/ML
1 SOLUTION/ DROPS OPHTHALMIC 3 TIMES DAILY
Qty: 15 ML | Refills: 3 | Status: SHIPPED | OUTPATIENT
Start: 2025-05-26

## 2025-05-26 RX ORDER — DORZOLAMIDE HYDROCHLORIDE AND TIMOLOL MALEATE 20; 5 MG/ML; MG/ML
1 SOLUTION/ DROPS OPHTHALMIC 2 TIMES DAILY
Qty: 3 EACH | Refills: 3 | Status: SHIPPED | OUTPATIENT
Start: 2025-05-26

## 2025-05-26 RX ORDER — LATANOPROST 50 UG/ML
1 SOLUTION/ DROPS OPHTHALMIC NIGHTLY
Qty: 7.5 ML | Refills: 3 | Status: SHIPPED | OUTPATIENT
Start: 2025-05-26

## 2025-05-29 NOTE — PROGRESS NOTES
Advance Care Planning   Ambulatory ACP Specialist Patient Outreach    Date:  2025    ACP Specialist:  TOMÁS Hoff    Outreach call to patient in follow-up to ACP Specialist referral from:Christal Jang MD    [x] PCP  [] Provider   [] Ambulatory Care Management [] Other     For:                  [x] Advance Directive Assistance              [] Complete Portable DNR order              [] Complete POST/POLST/MOST              [] Code Status Discussion             [] Discuss Goals of Care             [] Early ACP Decision-Making              [] Other (Specify)    Date Referral Received:2025    Next Step:   [] ACP scheduled conversation  [x] Outreach again in one week               [] Email / Mail ACP Info Sheets  [] Email / Mail Advance Directive   [] Closing referral.  Routing closure to referring provider/staff and to ACP Specialist .    [] Closure letter mailed to patient with invitation to contact ACP Specialist if / when ready.   [] Other (Specify here):       [x] At this time, Healthcare Decision Maker Is:         [x] Primary agent named in scanned advance directive.    [] Legal Next of Kin.     [] Unable to determine legal decision maker at this time.    Outreaches:       [x] 1st -  Date:  2025               Intervention:  [] Spoke with Patient   [x] Left Voice mail [] Email / Mail    [] Tamagot  [] Other (Specify) :     Outcomes:ACP Specialist Referral received. Placed call to home/mobile [phone] and there was no answer. Left general VM with SW contact and encouraged return call. Will make another attempt in one week. Noted pt has exsitign ACP docs on file (marked  and that was corrected). Will continue to try and reach this pt.           [] 2nd -  Date:                 Intervention:  [] Spoke with Patient  [] Left Voice mail [] Email / Mail    [] ThingWorxhart  [] Other (Specify) :              Outcomes:                [] 3rd -  Date:                Intervention:   CC: Well woman exam    Ida Santana is a 78 y.o. female  presents for a well woman exam.  No issues, problems, or complaints.      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    Osteopenia     Potassium depletion     Retinal detachment of left eye with retinal break 3/13/2017     Past Surgical History:   Procedure Laterality Date    Adrenal Gland Surgery to right side      ADRENALECTOMY Left 3/22/2017    Performed by Porfirio Arellano MD at [facility] OR 2ND FLR    BREAST BIOPSY      left breast DCIS    BREAST LUMPECTOMY Left 2001    CATARACT EXTRACTION W/  INTRAOCULAR LENS IMPLANT Left 2017    with PPV/SO removal ( AND )    CHOLECYSTECTOMY-LAPAROSCOPIC N/A 3/1/2018    Performed by Lambert Villa Jr., MD at [facility] OR 2ND FLR    COLONOSCOPY      COLONOSCOPY N/A 10/31/2016    Procedure: COLONOSCOPY;  Surgeon: YAMILETH Richards MD;  Location: [facility] (4TH FLR);  Service: Endoscopy;  Laterality: N/A;    COLONOSCOPY N/A 10/31/2016    Performed by YAMILETH Richards MD at [facility] (4TH FLR)    INSERTION-INTRAOCULAR LENS (IOL) Left 2017    Performed by Lynda Crawford MD at [facility] OR 1ST FLR    KIDNEY SURGERY  2017    patient does not know what they did, but knows she does not have kidney cancer    PARS PLANA VITRECTOMY Left 2017    25g PPV WITH SO REMOVAL AND PHACO IOL ( AND )    PHACOEMULSIFICATION-ASPIRATION-CATARACT Left 2017    Performed by Lynda Crawford [facility] at [facility] OR 1ST FLR    REPAIR-RETINA (VITRECTOMY) Left 2017    [facility]     "REPAIR-RETINA (VITRECTOMY) Left 3/15/2017    Performed by [facility] at [facility] OR 1ST FLR    RETINAL DETACHMENT SURGERY Left 2017    PPVx and SO Removal//Phaco IOL ( AND )    TRANSESOPHAGEAL ECHOCARDIOGRAM (MIS) N/A 2017    Performed by Aubrey Auguste [facility] at [facility] CATH LAB     Family History   Problem Relation Age of Onset    Cancer Mother         breast    Breast cancer Mother         40s and again in 50s (bilateral)    Breast cancer Other 40    Heart disease Father         bypass    Cancer Father         asbestos    Stroke Brother     Ovarian cancer Neg Hx     Amblyopia Neg Hx     Blindness Neg Hx     Cataracts Neg Hx     Diabetes Neg Hx     Glaucoma Neg Hx     Hypertension Neg Hx     Macular degeneration Neg Hx     Retinal detachment Neg Hx     Strabismus Neg Hx     Thyroid disease Neg Hx     Colon cancer Neg Hx     Melanoma Neg Hx      Social History     Tobacco Use    Smoking status: Never Smoker    Smokeless tobacco: Never Used   Substance Use Topics    Alcohol use: No    Drug use: No     OB History      Para Term  AB Living    0   0          SAB TAB Ectopic Multiple Live Births                       /[birth year]   Ht 5' 7" (1.702 m)   Wt 70.7 kg (155 lb 13.8 oz)   LMP  (LMP Unknown)   BMI 24.41 kg/m²     ROS:  GENERAL: Denies weight gain or weight loss. Feeling well overall.   SKIN: Denies rash or lesions.   HEAD: Denies head injury or headache.   NODES: Denies enlarged lymph nodes.   CHEST: Denies chest pain or shortness of breath.   CARDIOVASCULAR: Denies palpitations or left sided chest pain.   ABDOMEN: No abdominal pain, constipation, diarrhea, nausea, vomiting or rectal bleeding.   URINARY: No frequency, dysuria, hematuria, or burning on urination.  REPRODUCTIVE: See HPI.   BREASTS: The patient performs breast self-examination and denies pain, lumps, or nipple discharge.   HEMATOLOGIC: No easy bruisability or excessive " bleeding.   MUSCULOSKELETAL: Denies joint pain or swelling.   NEUROLOGIC: Denies syncope or weakness.   PSYCHIATRIC: Denies depression, anxiety or mood swings.    PE:   APPEARANCE: Well nourished, well developed, in no acute distress.  AFFECT: WNL, alert and oriented x 3.  SKIN: No acne or hirsutism.  NECK: Neck symmetric without masses or thyromegaly.  NODES: No inguinal, cervical, axillary or femoral lymph node enlargement.  CHEST: Good respiratory effort.   ABDOMEN: Soft. No tenderness or masses. No hepatosplenomegaly. No hernias.  BREASTS: Symmetrical, no skin changes or visible lesions. No palpable masses, nipple discharge bilaterally.  PELVIC: Normal external female genitalia without lesions. Normal hair distribution. Adequate perineal body, normal urethral meatus. Vagina atrophic without lesions or discharge. No significant cystocele or rectocele. Bimanual exam shows uterus and cervix to be surgically absent. Adnexa without masses or tenderness.  RECTAL: Rectovaginal exam confirms above with normal sphincter tone, no masses.  EXTREMITIES: No edema.      ICD-10-CM ICD-9-CM    1. Encounter for gynecological examination without abnormal finding Z01.419 V72.31    2. Postmenopausal Z78.0 V49.81    3. History of breast cancer Z85.3 V10.3            Patient was counseled today on A.C.S. Pap guidelines and recommendations for yearly pelvic exams, mammograms and monthly self breast exams; to see her PCP for other health maintenance.     Follow-up in about 1 year (around 10/30/2019).

## 2025-06-03 ENCOUNTER — TELEPHONE (OUTPATIENT)
Dept: GASTROENTEROLOGY | Facility: CLINIC | Age: 85
End: 2025-06-03
Payer: COMMERCIAL

## 2025-06-03 ENCOUNTER — OFFICE VISIT (OUTPATIENT)
Dept: DERMATOLOGY | Facility: CLINIC | Age: 85
End: 2025-06-03
Payer: COMMERCIAL
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
## 2025-06-03 PROCEDURE — 99213 OFFICE O/P EST LOW 20 MIN: CPT | Mod: S$GLB,,, | Performed by: DERMATOLOGY

## 2025-06-03 PROCEDURE — 99999 PR PBB SHADOW E&M-EST. PATIENT-LVL III: CPT | Mod: PBBFAC,,, | Performed by: DERMATOLOGY

## 2025-06-03 PROCEDURE — 1160F RVW MEDS BY RX/DR IN RCRD: CPT | Mod: CPTII,S$GLB,, | Performed by: DERMATOLOGY

## 2025-06-03 PROCEDURE — 1101F PT FALLS ASSESS-DOCD LE1/YR: CPT | Mod: CPTII,S$GLB,, | Performed by: DERMATOLOGY

## 2025-06-03 PROCEDURE — 1126F AMNT PAIN NOTED NONE PRSNT: CPT | Mod: CPTII,S$GLB,, | Performed by: DERMATOLOGY

## 2025-06-03 PROCEDURE — 1159F MED LIST DOCD IN RCRD: CPT | Mod: CPTII,S$GLB,, | Performed by: DERMATOLOGY

## 2025-06-03 PROCEDURE — 3288F FALL RISK ASSESSMENT DOCD: CPT | Mod: CPTII,S$GLB,, | Performed by: DERMATOLOGY

## 2025-06-03 RX ORDER — KETOCONAZOLE 20 MG/ML
SHAMPOO, SUSPENSION TOPICAL
Qty: 120 ML | Refills: 5 | Status: SHIPPED | OUTPATIENT
Start: 2025-06-03

## 2025-06-03 RX ORDER — BETAMETHASONE DIPROPIONATE 0.5 MG/G
CREAM TOPICAL
Qty: 45 G | Refills: 0 | Status: SHIPPED | OUTPATIENT
Start: 2025-06-03

## 2025-07-29 ENCOUNTER — TELEPHONE (OUTPATIENT)
Dept: GASTROENTEROLOGY | Facility: CLINIC | Age: 85
End: 2025-07-29
Payer: COMMERCIAL

## 2025-07-29 NOTE — TELEPHONE ENCOUNTER
Copied from CRM #[id number]. Topic: General Inquiry - Patient Advice  >> Jul 29, 2025 11:55 AM Thea wrote:  .Who Called: Ida Esther    Caller is requesting assistance/information from provider's office.    Symptoms (please be specific): asking to speak to nurse in regards to next step in care      Preferred Method of Contact: Phone Call  Patient's Preferred Phone Number on File: [phone]

## 2025-07-31 ENCOUNTER — TELEPHONE (OUTPATIENT)
Dept: INTERNAL MEDICINE | Facility: CLINIC | Age: 85
End: 2025-07-31
Payer: COMMERCIAL

## 2025-07-31 NOTE — TELEPHONE ENCOUNTER
Copied from CRM #[id number]. Topic: General Inquiry - Test Results  >> Jul 31, 2025  1:41 PM Paula wrote:  Type:  Test Results    Who Called: Patient/Ida   Name of Test (Lab/Mammo/Etc): Echo  Date of Test: 3/31  Ordering Provider: Dr Kruger   Where the test was performed: [facility]   Would the patient rather a call back or a response via [facility]? Call back   Best Call Back Number: [phone]  Additional Information:

## 2025-08-01 ENCOUNTER — TELEPHONE (OUTPATIENT)
Dept: INTERNAL MEDICINE | Facility: CLINIC | Age: 85
End: 2025-08-01
Payer: COMMERCIAL

## 2025-08-01 NOTE — TELEPHONE ENCOUNTER
Copied from CRM #[id number]. Topic: General Inquiry - Return Call  >> Jul 31, 2025  4:24 PM Christine wrote:  Type:  Patient Returning Call    Who Called:Ida Santana  Who Left Message for Patient:Mallika Jarvis MA  Does the patient know what this is regarding?:returning the call  Would the patient rather a call back or a response via [facility]? callback  Best Call Back Number:[phone]  Additional Information:

## 2025-08-08 ENCOUNTER — TELEPHONE (OUTPATIENT)
Dept: GASTROENTEROLOGY | Facility: CLINIC | Age: 85
End: 2025-08-08
Payer: COMMERCIAL

## 2025-08-08 NOTE — TELEPHONE ENCOUNTER
Copied from CRM #[id number]. Topic: General Inquiry - Return Call  >> Aug 8, 2025  1:27 PM Thea wrote:  .Who Called: Ida Coombse    Patient is returning phone call    Who Left Message for Patient:Brigitte  Does the patient know what this is regarding?:didn't say     Preferred Method of Contact: Phone Call  Patient's Preferred Phone Number on File: [phone]

## 2025-09-03 ENCOUNTER — TELEPHONE (OUTPATIENT)
Dept: GASTROENTEROLOGY | Facility: CLINIC | Age: 85
End: 2025-09-03
Payer: COMMERCIAL

## (undated) DEVICE — SYR 1CC TB SG 27GX1/2

## (undated) DEVICE — SPONGE LAP 18X18 PREWASHED

## (undated) DEVICE — CLIP LIGACLIP XTRA TITANIUM

## (undated) DEVICE — CLIP HEMO-LOK ML

## (undated) DEVICE — GOWN SURGICAL X-LARGE

## (undated) DEVICE — SYR DISP LL 5CC

## (undated) DEVICE — TRAY MINOR GEN SURG

## (undated) DEVICE — SUT 7/0 18IN COATED VICRYL

## (undated) DEVICE — GARTER EYE ADULT

## (undated) DEVICE — ELECTRODE EXTENDED BLADE

## (undated) DEVICE — NDL HYPO A BEVEL 22X1 1/2

## (undated) DEVICE — SOL GONAK

## (undated) DEVICE — FORCEP GRASPING 25GA SMOOTH

## (undated) DEVICE — STAPLER SKIN PROXIMATE WIDE

## (undated) DEVICE — TRAY CATH UM FOLEY SIL W 16FR

## (undated) DEVICE — CLOSURE SKIN STERI STRIP 1/2X4

## (undated) DEVICE — SHIELD EYE METAL FOX 50/BX

## (undated) DEVICE — COVER MAYO STAND REINFRCD 30

## (undated) DEVICE — SPONGE GAUZE 16PLY 4X4

## (undated) DEVICE — BLADE SURG BVL ANG COAX 2.4MM

## (undated) DEVICE — SOL WATER STRL IRR 1000ML

## (undated) DEVICE — BACKFLUSH 25GA SOFT-TIP DISP

## (undated) DEVICE — OIL SILICONE

## (undated) DEVICE — SEE MEDLINE ITEM 146372

## (undated) DEVICE — NEEDLE HYPODERMIC HUB LUER LOC

## (undated) DEVICE — SOL BETADINE 5%

## (undated) DEVICE — PACK INSTRUMENT COVER DISPO

## (undated) DEVICE — TROCAR ENDOPATH XCEL 5X100MM

## (undated) DEVICE — SUT MCRYL PLUS 4-0 PS2 27IN

## (undated) DEVICE — STAPLER INT LINEAR ARTC 3.5-45

## (undated) DEVICE — ADHESIVE DERMABOND ADVANCED

## (undated) DEVICE — SOL IRR BSS OPHTH 500ML STRL

## (undated) DEVICE — SYR 10CC LUER LOCK

## (undated) DEVICE — Device

## (undated) DEVICE — SUT 0 VICRYL / UR6 (J603)

## (undated) DEVICE — GLOVE BIOGEL ECLIPSE SZ 6.5

## (undated) DEVICE — KNIFE ANGLE 1MM

## (undated) DEVICE — LENS VITRCTMY OPHTH 30DEG 59DE

## (undated) DEVICE — DRESSING TRANS 4X4 TEGADERM

## (undated) DEVICE — SEE MEDLINE ITEM 157131

## (undated) DEVICE — CONTAINER SPECIMEN STRL 4OZ

## (undated) DEVICE — SYRINGE 30CC LL W/O NDL

## (undated) DEVICE — SEE MEDLINE ITEM 146417

## (undated) DEVICE — SHIELD COLLAGEN 12HR CORNEAL

## (undated) DEVICE — KIT GREY EYE

## (undated) DEVICE — SHIELD EYE PLASTIC CLEAR ADLT

## (undated) DEVICE — TUBING HF INSUFFLATION W/ FLTR

## (undated) DEVICE — DRAPE THREE-QTR REINF 53X77IN

## (undated) DEVICE — FORCEP GRIESHABER MAXGRIP 25G

## (undated) DEVICE — ELECTRODE REM PLYHSV RETURN 9

## (undated) DEVICE — PAD EYE OVAL CNTOUR 1.62X2.62

## (undated) DEVICE — PACK TOTAL PLUS 25G VITRECTOMY

## (undated) DEVICE — SEE MEDLINE ITEM 157117

## (undated) DEVICE — BAG TISS RETRV MONARCH 10MM

## (undated) DEVICE — PAD EYE OVAL STRL 1 5/8X 2 5/8

## (undated) DEVICE — LOOP VESSEL BLUE MAXI

## (undated) DEVICE — KNIFE OPHTH MICRO UNITOME 5MM

## (undated) DEVICE — DRAPE STERI 32 X 50

## (undated) DEVICE — TRAY MUSCLE LID EYE

## (undated) DEVICE — PROBE ILLUM FLEX CURVE LASER

## (undated) DEVICE — TROCAR ENDOPATH XCEL 12MM 10CM

## (undated) DEVICE — SOL BSS BALANCED SALT

## (undated) DEVICE — SEE MEDLINE ITEM 152622

## (undated) DEVICE — APPLICATOR CHLORAPREP ORN 26ML

## (undated) DEVICE — CORD FOR BIPOLAR FORCEPS 12

## (undated) DEVICE — ADHESIVE MASTISOL VIAL 48/BX

## (undated) DEVICE — CART STAPLE RELD 45MM WHT

## (undated) DEVICE — SUT SILK 0 STRANDS 30IN BLK

## (undated) DEVICE — SYR 30CC LUER LOCK

## (undated) DEVICE — NDL HYPO REG 25G X 1 1/2

## (undated) DEVICE — SUT 2/0 27IN PDS II VIO MO

## (undated) DEVICE — DRESSING ADH ISLAND 3.6 X 14

## (undated) DEVICE — CLIP MED TICALL

## (undated) DEVICE — CANNULA ENDOPATH XCEL 5X100MM

## (undated) DEVICE — HEMOSTAT SURGICEL 4X8IN

## (undated) DEVICE — LOOP VESSEL YELLOW MAXI

## (undated) DEVICE — DRAPE EYE POUCH FEN INCISE

## (undated) DEVICE — DRAPE ABDOMINAL TIBURON 14X11

## (undated) DEVICE — SUT 1 48IN PDS II VIO MONO

## (undated) DEVICE — SYR 3CC 21 X 1 LUER LOCK

## (undated) DEVICE — SOL BALANCED SALT 500ML

## (undated) DEVICE — SEE MEDLINE ITEM 146313

## (undated) DEVICE — KIT PERFLUOROCARBON LIQUID

## (undated) DEVICE — SUT SILK 3-0 STRANDS 30IN

## (undated) DEVICE — SCISSOR 5MMX35CM DIRECT DRIVE

## (undated) DEVICE — IRRIGATOR ENDOSCOPY DISP.

## (undated) DEVICE — FILTER STRAW

## (undated) DEVICE — SEE MEDLINE ITEM 156902